# Patient Record
Sex: FEMALE | Race: OTHER | HISPANIC OR LATINO | Employment: OTHER | URBAN - METROPOLITAN AREA
[De-identification: names, ages, dates, MRNs, and addresses within clinical notes are randomized per-mention and may not be internally consistent; named-entity substitution may affect disease eponyms.]

---

## 2017-02-15 ENCOUNTER — TRANSCRIBE ORDERS (OUTPATIENT)
Dept: ADMINISTRATIVE | Facility: HOSPITAL | Age: 72
End: 2017-02-15

## 2017-03-16 ENCOUNTER — APPOINTMENT (EMERGENCY)
Dept: CT IMAGING | Facility: HOSPITAL | Age: 72
End: 2017-03-16
Payer: COMMERCIAL

## 2017-03-16 ENCOUNTER — HOSPITAL ENCOUNTER (EMERGENCY)
Facility: HOSPITAL | Age: 72
Discharge: HOME | End: 2017-03-16
Attending: EMERGENCY MEDICINE | Admitting: EMERGENCY MEDICINE
Payer: COMMERCIAL

## 2017-03-16 VITALS
SYSTOLIC BLOOD PRESSURE: 155 MMHG | OXYGEN SATURATION: 98 % | BODY MASS INDEX: 21.61 KG/M2 | TEMPERATURE: 98.2 F | DIASTOLIC BLOOD PRESSURE: 79 MMHG | RESPIRATION RATE: 16 BRPM | WEIGHT: 122 LBS | HEART RATE: 70 BPM

## 2017-03-16 DIAGNOSIS — R42 VERTIGO: Primary | ICD-10-CM

## 2017-03-16 LAB
ALBUMIN SERPL BCP-MCNC: 4.2 G/DL (ref 3.5–5)
ALP SERPL-CCNC: 76 U/L (ref 46–116)
ALT SERPL W P-5'-P-CCNC: 19 U/L (ref 12–78)
ANION GAP SERPL CALCULATED.3IONS-SCNC: 10 MMOL/L (ref 4–13)
AST SERPL W P-5'-P-CCNC: 16 U/L (ref 5–45)
ATRIAL RATE: 68 BPM
BACTERIA UR QL AUTO: ABNORMAL /HPF
BASOPHILS # BLD AUTO: 0.04 THOUSANDS/ΜL (ref 0–0.1)
BASOPHILS NFR BLD AUTO: 1 % (ref 0–1)
BILIRUB SERPL-MCNC: 0.59 MG/DL (ref 0.2–1)
BILIRUB UR QL STRIP: NEGATIVE
BUN SERPL-MCNC: 17 MG/DL (ref 5–25)
CALCIUM SERPL-MCNC: 9.6 MG/DL (ref 8.3–10.1)
CHLORIDE SERPL-SCNC: 101 MMOL/L (ref 100–108)
CLARITY UR: CLEAR
CO2 SERPL-SCNC: 30 MMOL/L (ref 21–32)
COLOR UR: YELLOW
CREAT SERPL-MCNC: 0.59 MG/DL (ref 0.6–1.3)
EOSINOPHIL # BLD AUTO: 0.3 THOUSAND/ΜL (ref 0–0.61)
EOSINOPHIL NFR BLD AUTO: 4 % (ref 0–6)
ERYTHROCYTE [DISTWIDTH] IN BLOOD BY AUTOMATED COUNT: 13.2 % (ref 11.6–15.1)
GFR SERPL CREATININE-BSD FRML MDRD: >60 ML/MIN/1.73SQ M
GLUCOSE SERPL-MCNC: 102 MG/DL (ref 65–140)
GLUCOSE UR STRIP-MCNC: NEGATIVE MG/DL
HCT VFR BLD AUTO: 40.4 % (ref 34.8–46.1)
HGB BLD-MCNC: 14.4 G/DL (ref 11.5–15.4)
HGB UR QL STRIP.AUTO: ABNORMAL
KETONES UR STRIP-MCNC: ABNORMAL MG/DL
LEUKOCYTE ESTERASE UR QL STRIP: ABNORMAL
LYMPHOCYTES # BLD AUTO: 1.64 THOUSANDS/ΜL (ref 0.6–4.47)
LYMPHOCYTES NFR BLD AUTO: 22 % (ref 14–44)
MCH RBC QN AUTO: 33.8 PG (ref 26.8–34.3)
MCHC RBC AUTO-ENTMCNC: 35.6 G/DL (ref 31.4–37.4)
MCV RBC AUTO: 95 FL (ref 82–98)
MONOCYTES # BLD AUTO: 0.32 THOUSAND/ΜL (ref 0.17–1.22)
MONOCYTES NFR BLD AUTO: 4 % (ref 4–12)
NEUTROPHILS # BLD AUTO: 5.27 THOUSANDS/ΜL (ref 1.85–7.62)
NEUTS SEG NFR BLD AUTO: 69 % (ref 43–75)
NITRITE UR QL STRIP: NEGATIVE
NON-SQ EPI CELLS URNS QL MICRO: ABNORMAL /HPF
NRBC BLD AUTO-RTO: 0 /100 WBCS
P AXIS: 59 DEGREES
PH UR STRIP.AUTO: 6 [PH] (ref 4.5–8)
PLATELET # BLD AUTO: 224 THOUSANDS/UL (ref 149–390)
PMV BLD AUTO: 12.3 FL (ref 8.9–12.7)
POTASSIUM SERPL-SCNC: 4.4 MMOL/L (ref 3.5–5.3)
PR INTERVAL: 150 MS
PROT SERPL-MCNC: 7.8 G/DL (ref 6.4–8.2)
PROT UR STRIP-MCNC: NEGATIVE MG/DL
QRS AXIS: 82 DEGREES
QRSD INTERVAL: 80 MS
QT INTERVAL: 428 MS
QTC INTERVAL: 455 MS
RBC # BLD AUTO: 4.26 MILLION/UL (ref 3.81–5.12)
RBC #/AREA URNS AUTO: ABNORMAL /HPF
SODIUM SERPL-SCNC: 141 MMOL/L (ref 136–145)
SP GR UR STRIP.AUTO: 1.02 (ref 1–1.03)
T WAVE AXIS: 69 DEGREES
UROBILINOGEN UR QL STRIP.AUTO: 0.2 E.U./DL
VENTRICULAR RATE: 68 BPM
WBC # BLD AUTO: 7.57 THOUSAND/UL (ref 4.31–10.16)
WBC #/AREA URNS AUTO: ABNORMAL /HPF

## 2017-03-16 PROCEDURE — 81002 URINALYSIS NONAUTO W/O SCOPE: CPT | Performed by: EMERGENCY MEDICINE

## 2017-03-16 PROCEDURE — 80053 COMPREHEN METABOLIC PANEL: CPT | Performed by: EMERGENCY MEDICINE

## 2017-03-16 PROCEDURE — 81001 URINALYSIS AUTO W/SCOPE: CPT

## 2017-03-16 PROCEDURE — 85025 COMPLETE CBC W/AUTO DIFF WBC: CPT | Performed by: EMERGENCY MEDICINE

## 2017-03-16 PROCEDURE — 70450 CT HEAD/BRAIN W/O DYE: CPT

## 2017-03-16 PROCEDURE — 87186 SC STD MICRODIL/AGAR DIL: CPT

## 2017-03-16 PROCEDURE — 36415 COLL VENOUS BLD VENIPUNCTURE: CPT | Performed by: EMERGENCY MEDICINE

## 2017-03-16 PROCEDURE — 87077 CULTURE AEROBIC IDENTIFY: CPT

## 2017-03-16 PROCEDURE — 99284 EMERGENCY DEPT VISIT MOD MDM: CPT

## 2017-03-16 PROCEDURE — 93005 ELECTROCARDIOGRAM TRACING: CPT

## 2017-03-16 PROCEDURE — 87086 URINE CULTURE/COLONY COUNT: CPT

## 2017-03-16 RX ORDER — MECLIZINE HCL 12.5 MG/1
12.5 TABLET ORAL 3 TIMES DAILY PRN
Qty: 20 TABLET | Refills: 0 | Status: SHIPPED | OUTPATIENT
Start: 2017-03-16 | End: 2019-04-26 | Stop reason: ALTCHOICE

## 2017-03-16 RX ORDER — MECLIZINE HCL 12.5 MG/1
25 TABLET ORAL ONCE
Status: COMPLETED | OUTPATIENT
Start: 2017-03-16 | End: 2017-03-16

## 2017-03-16 RX ORDER — CANDESARTAN 8 MG/1
1 TABLET ORAL DAILY
COMMUNITY
End: 2019-01-09 | Stop reason: DRUGHIGH

## 2017-03-16 RX ORDER — ALBUTEROL SULFATE 90 UG/1
2 AEROSOL, METERED RESPIRATORY (INHALATION) EVERY 6 HOURS PRN
COMMUNITY
End: 2019-01-09 | Stop reason: SDUPTHER

## 2017-03-16 RX ADMIN — MECLIZINE HCL 25 MG: 12.5 TABLET ORAL at 08:59

## 2017-03-18 LAB — BACTERIA UR CULT: NORMAL

## 2017-08-02 ENCOUNTER — TRANSCRIBE ORDERS (OUTPATIENT)
Dept: ADMINISTRATIVE | Facility: HOSPITAL | Age: 72
End: 2017-08-02

## 2017-08-02 ENCOUNTER — HOSPITAL ENCOUNTER (OUTPATIENT)
Dept: RADIOLOGY | Facility: HOSPITAL | Age: 72
Discharge: HOME | End: 2017-08-02
Payer: COMMERCIAL

## 2017-08-02 DIAGNOSIS — M79.671 RIGHT FOOT PAIN: ICD-10-CM

## 2017-08-02 DIAGNOSIS — M79.671 RIGHT FOOT PAIN: Primary | ICD-10-CM

## 2017-08-02 PROCEDURE — 73630 X-RAY EXAM OF FOOT: CPT

## 2017-08-25 ENCOUNTER — HOSPITAL ENCOUNTER (OUTPATIENT)
Facility: HOSPITAL | Age: 72
Setting detail: OBSERVATION
Discharge: HOME | End: 2017-08-26
Attending: EMERGENCY MEDICINE | Admitting: INTERNAL MEDICINE
Payer: COMMERCIAL

## 2017-08-25 ENCOUNTER — APPOINTMENT (EMERGENCY)
Dept: CT IMAGING | Facility: HOSPITAL | Age: 72
End: 2017-08-25
Payer: COMMERCIAL

## 2017-08-25 DIAGNOSIS — I10 UNCONTROLLED HYPERTENSION: ICD-10-CM

## 2017-08-25 DIAGNOSIS — N39.0 URINARY TRACT INFECTION: ICD-10-CM

## 2017-08-25 DIAGNOSIS — R55 SYNCOPE: Primary | ICD-10-CM

## 2017-08-25 PROBLEM — E11.9 DIABETES MELLITUS (HCC): Chronic | Status: ACTIVE | Noted: 2017-08-25

## 2017-08-25 PROBLEM — J45.909 ASTHMA: Chronic | Status: ACTIVE | Noted: 2017-08-25

## 2017-08-25 LAB
ALBUMIN SERPL BCP-MCNC: 3.8 G/DL (ref 3.5–5)
ALP SERPL-CCNC: 48 U/L (ref 46–116)
ALT SERPL W P-5'-P-CCNC: 24 U/L (ref 12–78)
ANION GAP SERPL CALCULATED.3IONS-SCNC: 8 MMOL/L (ref 4–13)
APTT PPP: 26 SECONDS (ref 23–35)
AST SERPL W P-5'-P-CCNC: 13 U/L (ref 5–45)
ATRIAL RATE: 67 BPM
BACTERIA UR QL AUTO: ABNORMAL /HPF
BASOPHILS # BLD AUTO: 0.04 THOUSANDS/ΜL (ref 0–0.1)
BASOPHILS NFR BLD AUTO: 1 % (ref 0–1)
BILIRUB SERPL-MCNC: 0.21 MG/DL (ref 0.2–1)
BILIRUB UR QL STRIP: NEGATIVE
BUN SERPL-MCNC: 17 MG/DL (ref 5–25)
CALCIUM SERPL-MCNC: 8.9 MG/DL (ref 8.3–10.1)
CHLORIDE SERPL-SCNC: 106 MMOL/L (ref 100–108)
CLARITY UR: ABNORMAL
CO2 SERPL-SCNC: 30 MMOL/L (ref 21–32)
COLOR UR: YELLOW
COLOR, POC: NORMAL
CREAT SERPL-MCNC: 0.74 MG/DL (ref 0.6–1.3)
EOSINOPHIL # BLD AUTO: 0.31 THOUSAND/ΜL (ref 0–0.61)
EOSINOPHIL NFR BLD AUTO: 4 % (ref 0–6)
ERYTHROCYTE [DISTWIDTH] IN BLOOD BY AUTOMATED COUNT: 13.9 % (ref 11.6–15.1)
GFR SERPL CREATININE-BSD FRML MDRD: 82 ML/MIN/1.73SQ M
GLUCOSE SERPL-MCNC: 137 MG/DL (ref 65–140)
GLUCOSE SERPL-MCNC: 146 MG/DL (ref 65–140)
GLUCOSE SERPL-MCNC: 206 MG/DL (ref 65–140)
GLUCOSE UR STRIP-MCNC: NEGATIVE MG/DL
HCT VFR BLD AUTO: 36.1 % (ref 34.8–46.1)
HGB BLD-MCNC: 12.5 G/DL (ref 11.5–15.4)
HGB UR QL STRIP.AUTO: ABNORMAL
INR PPP: 0.97 (ref 0.86–1.16)
KETONES UR STRIP-MCNC: NEGATIVE MG/DL
LEUKOCYTE ESTERASE UR QL STRIP: ABNORMAL
LYMPHOCYTES # BLD AUTO: 2.31 THOUSANDS/ΜL (ref 0.6–4.47)
LYMPHOCYTES NFR BLD AUTO: 31 % (ref 14–44)
MCH RBC QN AUTO: 31.7 PG (ref 26.8–34.3)
MCHC RBC AUTO-ENTMCNC: 34.6 G/DL (ref 31.4–37.4)
MCV RBC AUTO: 92 FL (ref 82–98)
MONOCYTES # BLD AUTO: 0.49 THOUSAND/ΜL (ref 0.17–1.22)
MONOCYTES NFR BLD AUTO: 7 % (ref 4–12)
NEUTROPHILS # BLD AUTO: 4.36 THOUSANDS/ΜL (ref 1.85–7.62)
NEUTS SEG NFR BLD AUTO: 57 % (ref 43–75)
NITRITE UR QL STRIP: POSITIVE
NON-SQ EPI CELLS URNS QL MICRO: ABNORMAL /HPF
NRBC BLD AUTO-RTO: 0 /100 WBCS
P AXIS: 73 DEGREES
PH UR STRIP.AUTO: 6 [PH] (ref 4.5–8)
PLATELET # BLD AUTO: 225 THOUSANDS/UL (ref 149–390)
PMV BLD AUTO: 12.3 FL (ref 8.9–12.7)
POTASSIUM SERPL-SCNC: 4.1 MMOL/L (ref 3.5–5.3)
PR INTERVAL: 164 MS
PROT SERPL-MCNC: 7.1 G/DL (ref 6.4–8.2)
PROT UR STRIP-MCNC: ABNORMAL MG/DL
PROTHROMBIN TIME: 12.9 SECONDS (ref 12.1–14.4)
QRS AXIS: 63 DEGREES
QRSD INTERVAL: 86 MS
QT INTERVAL: 416 MS
QTC INTERVAL: 439 MS
RBC # BLD AUTO: 3.94 MILLION/UL (ref 3.81–5.12)
RBC #/AREA URNS AUTO: ABNORMAL /HPF
SODIUM SERPL-SCNC: 144 MMOL/L (ref 136–145)
SP GR UR STRIP.AUTO: 1.02 (ref 1–1.03)
T WAVE AXIS: 72 DEGREES
TROPONIN I SERPL-MCNC: <0.02 NG/ML
TROPONIN I SERPL-MCNC: <0.02 NG/ML
UROBILINOGEN UR QL STRIP.AUTO: 0.2 E.U./DL
VENTRICULAR RATE: 67 BPM
WBC # BLD AUTO: 7.51 THOUSAND/UL (ref 4.31–10.16)
WBC #/AREA URNS AUTO: ABNORMAL /HPF

## 2017-08-25 PROCEDURE — 87186 SC STD MICRODIL/AGAR DIL: CPT

## 2017-08-25 PROCEDURE — 93005 ELECTROCARDIOGRAM TRACING: CPT | Performed by: EMERGENCY MEDICINE

## 2017-08-25 PROCEDURE — 36415 COLL VENOUS BLD VENIPUNCTURE: CPT

## 2017-08-25 PROCEDURE — 85730 THROMBOPLASTIN TIME PARTIAL: CPT | Performed by: EMERGENCY MEDICINE

## 2017-08-25 PROCEDURE — 82948 REAGENT STRIP/BLOOD GLUCOSE: CPT

## 2017-08-25 PROCEDURE — 84484 ASSAY OF TROPONIN QUANT: CPT | Performed by: EMERGENCY MEDICINE

## 2017-08-25 PROCEDURE — 84484 ASSAY OF TROPONIN QUANT: CPT | Performed by: FAMILY MEDICINE

## 2017-08-25 PROCEDURE — 85025 COMPLETE CBC W/AUTO DIFF WBC: CPT | Performed by: EMERGENCY MEDICINE

## 2017-08-25 PROCEDURE — 80053 COMPREHEN METABOLIC PANEL: CPT | Performed by: EMERGENCY MEDICINE

## 2017-08-25 PROCEDURE — 81002 URINALYSIS NONAUTO W/O SCOPE: CPT | Performed by: EMERGENCY MEDICINE

## 2017-08-25 PROCEDURE — 70450 CT HEAD/BRAIN W/O DYE: CPT

## 2017-08-25 PROCEDURE — 99285 EMERGENCY DEPT VISIT HI MDM: CPT

## 2017-08-25 PROCEDURE — 85610 PROTHROMBIN TIME: CPT | Performed by: EMERGENCY MEDICINE

## 2017-08-25 PROCEDURE — 81001 URINALYSIS AUTO W/SCOPE: CPT

## 2017-08-25 PROCEDURE — 87086 URINE CULTURE/COLONY COUNT: CPT

## 2017-08-25 PROCEDURE — 87077 CULTURE AEROBIC IDENTIFY: CPT

## 2017-08-25 RX ORDER — ALBUTEROL SULFATE 90 UG/1
2 AEROSOL, METERED RESPIRATORY (INHALATION) EVERY 6 HOURS PRN
Status: DISCONTINUED | OUTPATIENT
Start: 2017-08-25 | End: 2017-08-26 | Stop reason: HOSPADM

## 2017-08-25 RX ORDER — MECLIZINE HCL 12.5 MG/1
12.5 TABLET ORAL 3 TIMES DAILY PRN
Status: DISCONTINUED | OUTPATIENT
Start: 2017-08-25 | End: 2017-08-26 | Stop reason: HOSPADM

## 2017-08-25 RX ORDER — CANDESARTAN 4 MG/1
8 TABLET ORAL DAILY
Status: DISCONTINUED | OUTPATIENT
Start: 2017-08-26 | End: 2017-08-26 | Stop reason: HOSPADM

## 2017-08-25 RX ORDER — ONDANSETRON 2 MG/ML
4 INJECTION INTRAMUSCULAR; INTRAVENOUS EVERY 6 HOURS PRN
Status: DISCONTINUED | OUTPATIENT
Start: 2017-08-25 | End: 2017-08-26 | Stop reason: HOSPADM

## 2017-08-25 RX ORDER — CEPHALEXIN 250 MG/1
500 CAPSULE ORAL ONCE
Status: COMPLETED | OUTPATIENT
Start: 2017-08-25 | End: 2017-08-25

## 2017-08-25 RX ORDER — HEPARIN SODIUM 5000 [USP'U]/ML
5000 INJECTION, SOLUTION INTRAVENOUS; SUBCUTANEOUS EVERY 8 HOURS SCHEDULED
Status: DISCONTINUED | OUTPATIENT
Start: 2017-08-25 | End: 2017-08-26 | Stop reason: HOSPADM

## 2017-08-25 RX ADMIN — FLUTICASONE PROPIONATE AND SALMETEROL 1 PUFF: 50; 250 POWDER RESPIRATORY (INHALATION) at 23:49

## 2017-08-25 RX ADMIN — CEFAZOLIN SODIUM 500 MG: 1 INJECTION, POWDER, FOR SOLUTION INTRAMUSCULAR; INTRAVENOUS at 22:45

## 2017-08-25 RX ADMIN — CEPHALEXIN 500 MG: 250 CAPSULE ORAL at 19:03

## 2017-08-25 RX ADMIN — HEPARIN SODIUM 5000 UNITS: 5000 INJECTION, SOLUTION INTRAVENOUS; SUBCUTANEOUS at 22:45

## 2017-08-26 VITALS
HEART RATE: 67 BPM | DIASTOLIC BLOOD PRESSURE: 88 MMHG | BODY MASS INDEX: 24.29 KG/M2 | RESPIRATION RATE: 18 BRPM | WEIGHT: 137.13 LBS | TEMPERATURE: 97.3 F | SYSTOLIC BLOOD PRESSURE: 170 MMHG | OXYGEN SATURATION: 99 %

## 2017-08-26 PROBLEM — B96.20 E. COLI UTI (URINARY TRACT INFECTION): Status: ACTIVE | Noted: 2017-08-25

## 2017-08-26 PROBLEM — R55 SYNCOPE: Status: RESOLVED | Noted: 2017-08-25 | Resolved: 2017-08-26

## 2017-08-26 LAB
ANION GAP SERPL CALCULATED.3IONS-SCNC: 8 MMOL/L (ref 4–13)
BUN SERPL-MCNC: 14 MG/DL (ref 5–25)
CALCIUM SERPL-MCNC: 9 MG/DL (ref 8.3–10.1)
CHLORIDE SERPL-SCNC: 105 MMOL/L (ref 100–108)
CO2 SERPL-SCNC: 29 MMOL/L (ref 21–32)
CREAT SERPL-MCNC: 0.61 MG/DL (ref 0.6–1.3)
ERYTHROCYTE [DISTWIDTH] IN BLOOD BY AUTOMATED COUNT: 13.9 % (ref 11.6–15.1)
GFR SERPL CREATININE-BSD FRML MDRD: 92 ML/MIN/1.73SQ M
GLUCOSE P FAST SERPL-MCNC: 118 MG/DL (ref 65–99)
GLUCOSE SERPL-MCNC: 118 MG/DL (ref 65–140)
GLUCOSE SERPL-MCNC: 129 MG/DL (ref 65–140)
GLUCOSE SERPL-MCNC: 138 MG/DL (ref 65–140)
GLUCOSE SERPL-MCNC: 147 MG/DL (ref 65–140)
HCT VFR BLD AUTO: 35.1 % (ref 34.8–46.1)
HGB BLD-MCNC: 12.2 G/DL (ref 11.5–15.4)
MCH RBC QN AUTO: 32 PG (ref 26.8–34.3)
MCHC RBC AUTO-ENTMCNC: 34.8 G/DL (ref 31.4–37.4)
MCV RBC AUTO: 92 FL (ref 82–98)
PLATELET # BLD AUTO: 186 THOUSANDS/UL (ref 149–390)
PLATELET # BLD AUTO: 192 THOUSANDS/UL (ref 149–390)
PMV BLD AUTO: 12.3 FL (ref 8.9–12.7)
PMV BLD AUTO: 12.9 FL (ref 8.9–12.7)
POTASSIUM SERPL-SCNC: 4 MMOL/L (ref 3.5–5.3)
RBC # BLD AUTO: 3.81 MILLION/UL (ref 3.81–5.12)
SODIUM SERPL-SCNC: 142 MMOL/L (ref 136–145)
TROPONIN I SERPL-MCNC: <0.02 NG/ML
TROPONIN I SERPL-MCNC: <0.02 NG/ML
WBC # BLD AUTO: 5.96 THOUSAND/UL (ref 4.31–10.16)

## 2017-08-26 PROCEDURE — 80048 BASIC METABOLIC PNL TOTAL CA: CPT | Performed by: FAMILY MEDICINE

## 2017-08-26 PROCEDURE — 85027 COMPLETE CBC AUTOMATED: CPT | Performed by: FAMILY MEDICINE

## 2017-08-26 PROCEDURE — 82948 REAGENT STRIP/BLOOD GLUCOSE: CPT

## 2017-08-26 PROCEDURE — 84484 ASSAY OF TROPONIN QUANT: CPT | Performed by: FAMILY MEDICINE

## 2017-08-26 PROCEDURE — 85049 AUTOMATED PLATELET COUNT: CPT | Performed by: FAMILY MEDICINE

## 2017-08-26 RX ORDER — CEPHALEXIN 500 MG/1
500 CAPSULE ORAL EVERY 6 HOURS SCHEDULED
Qty: 20 CAPSULE | Refills: 0 | Status: SHIPPED | OUTPATIENT
Start: 2017-08-26 | End: 2017-08-31

## 2017-08-26 RX ORDER — HYDRALAZINE HYDROCHLORIDE 20 MG/ML
10 INJECTION INTRAMUSCULAR; INTRAVENOUS EVERY 6 HOURS PRN
Status: DISCONTINUED | OUTPATIENT
Start: 2017-08-26 | End: 2017-08-26 | Stop reason: HOSPADM

## 2017-08-26 RX ADMIN — FLUTICASONE PROPIONATE AND SALMETEROL 1 PUFF: 50; 250 POWDER RESPIRATORY (INHALATION) at 08:27

## 2017-08-26 RX ADMIN — CEFAZOLIN SODIUM 500 MG: 1 INJECTION, POWDER, FOR SOLUTION INTRAMUSCULAR; INTRAVENOUS at 14:44

## 2017-08-26 RX ADMIN — HYDRALAZINE HYDROCHLORIDE 10 MG: 20 INJECTION INTRAMUSCULAR; INTRAVENOUS at 06:01

## 2017-08-26 RX ADMIN — CANDESARTAN CILEXETIL 8 MG: 4 TABLET ORAL at 08:27

## 2017-08-26 RX ADMIN — CEFAZOLIN SODIUM 500 MG: 1 INJECTION, POWDER, FOR SOLUTION INTRAMUSCULAR; INTRAVENOUS at 06:01

## 2017-08-26 RX ADMIN — HEPARIN SODIUM 5000 UNITS: 5000 INJECTION, SOLUTION INTRAVENOUS; SUBCUTANEOUS at 14:43

## 2017-08-26 RX ADMIN — HEPARIN SODIUM 5000 UNITS: 5000 INJECTION, SOLUTION INTRAVENOUS; SUBCUTANEOUS at 06:01

## 2017-08-27 LAB — BACTERIA UR CULT: NORMAL

## 2017-10-24 ENCOUNTER — TRANSCRIBE ORDERS (OUTPATIENT)
Dept: ADMINISTRATIVE | Facility: HOSPITAL | Age: 72
End: 2017-10-24

## 2017-10-24 DIAGNOSIS — Z12.31 VISIT FOR SCREENING MAMMOGRAM: Primary | ICD-10-CM

## 2018-01-10 NOTE — MISCELLANEOUS
Provider Comments  Provider Comments:   PT WAS A TAWANA Jackson 89 FOLLOW UP      Signatures   Electronically signed by :  Dante Dean MD; May 26 2016  4:30PM EST                       (Author)

## 2018-01-12 NOTE — MISCELLANEOUS
Message   Recorded as Task   Date: 04/12/2016 01:01 PM, Created By: Lu Brunner   Task Name: Miscellaneous   Assigned To: Toya Hughes   Regarding Patient: Janet Martinez, Status: In Progress   Comment:    Jessica Fontanez - 12 Apr 2016 1:01 PM     TASK CREATED  Patient's sodium worsened  Have her restart fluid restriction 1 5 L per day  Recheck BMP in 2 weeks  Follow up with Dr Bower Service 2-4 weeks   Louie,Cindy - 14 Apr 2016 8:53 AM     TASK REASSIGNED: Previously Assigned To Kansritikainrinne 46 - 14 Apr 2016 10:50 AM     TASK EDITED  unable to leave voicemail   called home and cell #   Cindy Palma - 14 Apr 2016 10:57 AM     TASK IN PROGRESS   Cindy Palma - 21 Apr 2016 9:41 AM     TASK EDITED  Called home number; patient was not home at the time  Will try again later   Unable to contact patient since 4/14/2016   no appt has been made  Active Problems    1  Acute low back pain (724 2) (M54 5)   2  Asthma (493 90) (J45 909)   3  Clavicle fracture (810 00) (S42 009A)   4  Diabetes Mellitus (250 00)   5  Dyslipidemia (272 4) (E78 5)   6  Fracture of left clavicle (810 00) (S42 002A)   7  Hearing Loss (389 9)   8  Hypertension (401 9) (I10)   9  Hyponatremia (276 1) (E87 1)   10  Multiple rib fractures (807 09) (S22 49XA)   11  Multiple transverse process fractures (805 8)   12  Neck strain (847 0) (S16 1XXA)   13  Paresthesia of both hands (782 0) (R20 2)   14  Pneumothorax, left (512 89) (J93 9)   15  Poorly controlled type 2 diabetes mellitus (250 00) (E11 65)   16  Subarachnoid hemorrhage (430) (I60 9)   17  Trapezius muscle spasm (728 85) (N56 483)    Current Meds   1  Albuterol Sulfate (2 5 MG/3ML) 0 083% Inhalation Nebulization Solution; inhale contents   of 1 vial in nebulizer every 4 to 6 hours if needed   for SHORTNESS OF BREATH AND WHEEZING; Therapy: 69Uov7270 to (Evaluate:29Apr2013)  Requested for: 53Urg2311; Last   Rx:13Onx1055 Ordered   2   Diazepam 2 MG Oral Tablet; TAKE 1 TABLET AT BEDTIME; Therapy: 05Apr2016 to (Evaluate:12Apr2016); Last Rx:05Apr2016 Ordered   3  MetFORMIN HCl - 850 MG Oral Tablet; TAKE 1 TABLET TWICE DAILY,  WITH MORNING   AND EVENING MEAL; Therapy: 32RLW4947 to (Evaluate:19Jan2014)  Requested for: 21Oct2013; Last   Rx:21Oct2013 Ordered   4  OneTouch Test STRP; TEST TWICE DAILY; Therapy: 14SQX1749 to (Last Rx:08Feb2012)  Requested for: 17DMD2667 Ordered   5  OneTouch UltraSoft Lancets Miscellaneous; Therapy: 49BRG6953 to (Last Rx:03Feb2012)  Requested for: 03Feb2012 Ordered   6  OxyCONTIN 10 MG Oral Tablet ER 12 Hour Abuse-Deterrent; 1 tablet every 12hrs x 1   week then DC; Therapy: 02DUQ1885 to (Evaluate:29Mar2016); Last Rx:22Mar2016 Ordered   7  TraMADol HCl - 50 MG Oral Tablet; TAKE 1 TABLET EVERY 4 TO 6 HOURS AS NEEDED   FOR PAIN;   Therapy: 05Apr2016 to (Evaluate:12Apr2016); Last Rx:05Apr2016 Ordered   8  Ventolin  (90 Base) MCG/ACT Inhalation Aerosol Solution; inhale 2 puff by   mouth every 6 hours if needed; Therapy: 61Sjc0907 to (Evaluate:17May2013)  Requested for: 11Apr2013; Last   Rx:11Apr2013 Ordered    Allergies    1  Bactrim TABS   2  Penicillins    Signatures   Electronically signed by : ODETTE Lockwood;  Apr 21 2016 12:04PM EST                       (Author)

## 2018-01-16 NOTE — MISCELLANEOUS
Message   Recorded as Task   Date: 06/15/2016 09:49 AM, Created By: Henna Rankin   Task Name: Medical Complaint Callback   Assigned To: Henna Rankin   Regarding Patient: Sebastian Zhu, Status: Active   Comment:    Costenbader,Rondel - 15 Myles 2016 9:49 AM     TASK CREATED  Pt called c/o "pressure and sensitivity" to L side of head near her ear  No other symptoms  Reviewed with Brittney Ryan who advised f/u with her PCP  Pt was informed and stated an understanding          Signatures   Electronically signed by : Gloria Christopher, ; Myles 15 2016  9:50AM EST                       (Author)

## 2018-05-31 ENCOUNTER — APPOINTMENT (OUTPATIENT)
Dept: LAB | Facility: HOSPITAL | Age: 73
End: 2018-05-31
Payer: COMMERCIAL

## 2018-05-31 ENCOUNTER — TRANSCRIBE ORDERS (OUTPATIENT)
Dept: ADMINISTRATIVE | Facility: HOSPITAL | Age: 73
End: 2018-05-31

## 2018-05-31 DIAGNOSIS — E78.5 HYPERLIPIDEMIA, UNSPECIFIED HYPERLIPIDEMIA TYPE: ICD-10-CM

## 2018-05-31 DIAGNOSIS — E55.9 AVITAMINOSIS D: ICD-10-CM

## 2018-05-31 DIAGNOSIS — R35.0 URINARY FREQUENCY: ICD-10-CM

## 2018-05-31 DIAGNOSIS — E13.8 DIABETES MELLITUS OF OTHER TYPE WITH COMPLICATION, UNSPECIFIED WHETHER LONG TERM INSULIN USE: Primary | ICD-10-CM

## 2018-05-31 DIAGNOSIS — E13.8 DIABETES MELLITUS OF OTHER TYPE WITH COMPLICATION, UNSPECIFIED WHETHER LONG TERM INSULIN USE: ICD-10-CM

## 2018-05-31 LAB
25(OH)D3 SERPL-MCNC: 14.9 NG/ML (ref 30–100)
ALBUMIN SERPL BCP-MCNC: 3.7 G/DL (ref 3.5–5)
ALP SERPL-CCNC: 78 U/L (ref 46–116)
ALT SERPL W P-5'-P-CCNC: 17 U/L (ref 12–78)
ANION GAP SERPL CALCULATED.3IONS-SCNC: 8 MMOL/L (ref 4–13)
AST SERPL W P-5'-P-CCNC: 11 U/L (ref 5–45)
BACTERIA UR QL AUTO: ABNORMAL /HPF
BASOPHILS # BLD AUTO: 0.04 THOUSANDS/ΜL (ref 0–0.1)
BASOPHILS NFR BLD AUTO: 1 % (ref 0–1)
BILIRUB SERPL-MCNC: 0.43 MG/DL (ref 0.2–1)
BILIRUB UR QL STRIP: NEGATIVE
BUN SERPL-MCNC: 15 MG/DL (ref 5–25)
CALCIUM SERPL-MCNC: 9.3 MG/DL (ref 8.3–10.1)
CHLORIDE SERPL-SCNC: 98 MMOL/L (ref 100–108)
CHOLEST SERPL-MCNC: 180 MG/DL (ref 50–200)
CLARITY UR: ABNORMAL
CO2 SERPL-SCNC: 30 MMOL/L (ref 21–32)
COLOR UR: YELLOW
CREAT SERPL-MCNC: 0.65 MG/DL (ref 0.6–1.3)
CREAT UR-MCNC: 67.4 MG/DL
EOSINOPHIL # BLD AUTO: 0.24 THOUSAND/ΜL (ref 0–0.61)
EOSINOPHIL NFR BLD AUTO: 4 % (ref 0–6)
ERYTHROCYTE [DISTWIDTH] IN BLOOD BY AUTOMATED COUNT: 13 % (ref 11.6–15.1)
EST. AVERAGE GLUCOSE BLD GHB EST-MCNC: 160 MG/DL
GFR SERPL CREATININE-BSD FRML MDRD: 90 ML/MIN/1.73SQ M
GLUCOSE P FAST SERPL-MCNC: 162 MG/DL (ref 65–99)
GLUCOSE UR STRIP-MCNC: NEGATIVE MG/DL
HBA1C MFR BLD: 7.2 % (ref 4.2–6.3)
HCT VFR BLD AUTO: 36.2 % (ref 34.8–46.1)
HDLC SERPL-MCNC: 48 MG/DL (ref 40–60)
HGB BLD-MCNC: 12.9 G/DL (ref 11.5–15.4)
HGB UR QL STRIP.AUTO: ABNORMAL
KETONES UR STRIP-MCNC: NEGATIVE MG/DL
LDLC SERPL CALC-MCNC: 107 MG/DL (ref 0–100)
LEUKOCYTE ESTERASE UR QL STRIP: ABNORMAL
LYMPHOCYTES # BLD AUTO: 2.24 THOUSANDS/ΜL (ref 0.6–4.47)
LYMPHOCYTES NFR BLD AUTO: 33 % (ref 14–44)
MCH RBC QN AUTO: 31.5 PG (ref 26.8–34.3)
MCHC RBC AUTO-ENTMCNC: 35.6 G/DL (ref 31.4–37.4)
MCV RBC AUTO: 89 FL (ref 82–98)
MICROALBUMIN UR-MCNC: 45.5 MG/L (ref 0–20)
MICROALBUMIN/CREAT 24H UR: 68 MG/G CREATININE (ref 0–30)
MONOCYTES # BLD AUTO: 0.32 THOUSAND/ΜL (ref 0.17–1.22)
MONOCYTES NFR BLD AUTO: 5 % (ref 4–12)
NEUTROPHILS # BLD AUTO: 3.92 THOUSANDS/ΜL (ref 1.85–7.62)
NEUTS SEG NFR BLD AUTO: 58 % (ref 43–75)
NITRITE UR QL STRIP: NEGATIVE
NON-SQ EPI CELLS URNS QL MICRO: ABNORMAL /HPF
NONHDLC SERPL-MCNC: 132 MG/DL
NRBC BLD AUTO-RTO: 0 /100 WBCS
PH UR STRIP.AUTO: 6 [PH] (ref 4.5–8)
PLATELET # BLD AUTO: 201 THOUSANDS/UL (ref 149–390)
PMV BLD AUTO: 12 FL (ref 8.9–12.7)
POTASSIUM SERPL-SCNC: 5.3 MMOL/L (ref 3.5–5.3)
PROT SERPL-MCNC: 7.1 G/DL (ref 6.4–8.2)
PROT UR STRIP-MCNC: NEGATIVE MG/DL
RBC # BLD AUTO: 4.09 MILLION/UL (ref 3.81–5.12)
RBC #/AREA URNS AUTO: ABNORMAL /HPF
SODIUM SERPL-SCNC: 136 MMOL/L (ref 136–145)
SP GR UR STRIP.AUTO: 1.01 (ref 1–1.03)
TRIGL SERPL-MCNC: 127 MG/DL
TSH SERPL DL<=0.05 MIU/L-ACNC: 2.23 UIU/ML (ref 0.36–3.74)
UROBILINOGEN UR QL STRIP.AUTO: 0.2 E.U./DL
WBC # BLD AUTO: 6.76 THOUSAND/UL (ref 4.31–10.16)
WBC #/AREA URNS AUTO: ABNORMAL /HPF

## 2018-05-31 PROCEDURE — 87186 SC STD MICRODIL/AGAR DIL: CPT

## 2018-05-31 PROCEDURE — 82570 ASSAY OF URINE CREATININE: CPT

## 2018-05-31 PROCEDURE — 80061 LIPID PANEL: CPT

## 2018-05-31 PROCEDURE — 80053 COMPREHEN METABOLIC PANEL: CPT

## 2018-05-31 PROCEDURE — 87086 URINE CULTURE/COLONY COUNT: CPT

## 2018-05-31 PROCEDURE — 82306 VITAMIN D 25 HYDROXY: CPT

## 2018-05-31 PROCEDURE — 36415 COLL VENOUS BLD VENIPUNCTURE: CPT

## 2018-05-31 PROCEDURE — 83036 HEMOGLOBIN GLYCOSYLATED A1C: CPT

## 2018-05-31 PROCEDURE — 85025 COMPLETE CBC W/AUTO DIFF WBC: CPT

## 2018-05-31 PROCEDURE — 84443 ASSAY THYROID STIM HORMONE: CPT

## 2018-05-31 PROCEDURE — 87077 CULTURE AEROBIC IDENTIFY: CPT

## 2018-05-31 PROCEDURE — 81001 URINALYSIS AUTO W/SCOPE: CPT

## 2018-05-31 PROCEDURE — 82043 UR ALBUMIN QUANTITATIVE: CPT

## 2018-06-02 LAB — BACTERIA UR CULT: ABNORMAL

## 2018-06-12 ENCOUNTER — HOSPITAL ENCOUNTER (EMERGENCY)
Facility: HOSPITAL | Age: 73
Discharge: HOME | End: 2018-06-12
Attending: EMERGENCY MEDICINE | Admitting: EMERGENCY MEDICINE
Payer: COMMERCIAL

## 2018-06-12 ENCOUNTER — APPOINTMENT (EMERGENCY)
Dept: RADIOLOGY | Facility: HOSPITAL | Age: 73
End: 2018-06-12
Payer: COMMERCIAL

## 2018-06-12 VITALS
HEIGHT: 65 IN | DIASTOLIC BLOOD PRESSURE: 60 MMHG | SYSTOLIC BLOOD PRESSURE: 127 MMHG | HEART RATE: 64 BPM | RESPIRATION RATE: 17 BRPM | BODY MASS INDEX: 22.74 KG/M2 | TEMPERATURE: 98 F | WEIGHT: 136.47 LBS | OXYGEN SATURATION: 95 %

## 2018-06-12 DIAGNOSIS — N39.0 UTI (URINARY TRACT INFECTION): ICD-10-CM

## 2018-06-12 DIAGNOSIS — R73.9 HYPERGLYCEMIA: ICD-10-CM

## 2018-06-12 DIAGNOSIS — J40 BRONCHITIS: Primary | ICD-10-CM

## 2018-06-12 LAB
ANION GAP SERPL CALCULATED.3IONS-SCNC: 8 MMOL/L (ref 4–13)
APTT PPP: 29 SECONDS (ref 24–36)
ATRIAL RATE: 58 BPM
BACTERIA UR QL AUTO: ABNORMAL /HPF
BASOPHILS # BLD AUTO: 0.05 THOUSANDS/ΜL (ref 0–0.1)
BASOPHILS NFR BLD AUTO: 1 % (ref 0–1)
BILIRUB UR QL STRIP: NEGATIVE
BUN SERPL-MCNC: 11 MG/DL (ref 5–25)
CALCIUM SERPL-MCNC: 9.1 MG/DL (ref 8.3–10.1)
CHLORIDE SERPL-SCNC: 98 MMOL/L (ref 100–108)
CLARITY UR: ABNORMAL
CO2 SERPL-SCNC: 26 MMOL/L (ref 21–32)
COLOR UR: YELLOW
CREAT SERPL-MCNC: 0.66 MG/DL (ref 0.6–1.3)
EOSINOPHIL # BLD AUTO: 0.27 THOUSAND/ΜL (ref 0–0.61)
EOSINOPHIL NFR BLD AUTO: 5 % (ref 0–6)
ERYTHROCYTE [DISTWIDTH] IN BLOOD BY AUTOMATED COUNT: 12.9 % (ref 11.6–15.1)
GFR SERPL CREATININE-BSD FRML MDRD: 89 ML/MIN/1.73SQ M
GLUCOSE SERPL-MCNC: 269 MG/DL (ref 65–140)
GLUCOSE UR STRIP-MCNC: ABNORMAL MG/DL
HCT VFR BLD AUTO: 37.7 % (ref 34.8–46.1)
HGB BLD-MCNC: 13.2 G/DL (ref 11.5–15.4)
HGB UR QL STRIP.AUTO: ABNORMAL
INR PPP: 1 (ref 0.86–1.17)
KETONES UR STRIP-MCNC: NEGATIVE MG/DL
LEUKOCYTE ESTERASE UR QL STRIP: ABNORMAL
LYMPHOCYTES # BLD AUTO: 1.6 THOUSANDS/ΜL (ref 0.6–4.47)
LYMPHOCYTES NFR BLD AUTO: 29 % (ref 14–44)
MCH RBC QN AUTO: 30.5 PG (ref 26.8–34.3)
MCHC RBC AUTO-ENTMCNC: 35 G/DL (ref 31.4–37.4)
MCV RBC AUTO: 87 FL (ref 82–98)
MONOCYTES # BLD AUTO: 0.31 THOUSAND/ΜL (ref 0.17–1.22)
MONOCYTES NFR BLD AUTO: 6 % (ref 4–12)
NEUTROPHILS # BLD AUTO: 3.22 THOUSANDS/ΜL (ref 1.85–7.62)
NEUTS SEG NFR BLD AUTO: 59 % (ref 43–75)
NITRITE UR QL STRIP: POSITIVE
NON-SQ EPI CELLS URNS QL MICRO: ABNORMAL /HPF
NT-PROBNP SERPL-MCNC: 32 PG/ML
P AXIS: 128 DEGREES
PH UR STRIP.AUTO: 6.5 [PH] (ref 4.5–8)
PLATELET # BLD AUTO: 211 THOUSANDS/UL (ref 149–390)
PMV BLD AUTO: 11.8 FL (ref 8.9–12.7)
POTASSIUM SERPL-SCNC: 4.3 MMOL/L (ref 3.5–5.3)
PR INTERVAL: 156 MS
PROT UR STRIP-MCNC: NEGATIVE MG/DL
PROTHROMBIN TIME: 13.3 SECONDS (ref 11.8–14.2)
QRS AXIS: 64 DEGREES
QRSD INTERVAL: 82 MS
QT INTERVAL: 420 MS
QTC INTERVAL: 412 MS
RBC # BLD AUTO: 4.33 MILLION/UL (ref 3.81–5.12)
RBC #/AREA URNS AUTO: ABNORMAL /HPF
SODIUM SERPL-SCNC: 132 MMOL/L (ref 136–145)
SP GR UR STRIP.AUTO: 1.01 (ref 1–1.03)
T WAVE AXIS: 217 DEGREES
TROPONIN I SERPL-MCNC: <0.02 NG/ML
UROBILINOGEN UR QL STRIP.AUTO: 0.2 E.U./DL
VENTRICULAR RATE: 58 BPM
WBC # BLD AUTO: 5.45 THOUSAND/UL (ref 4.31–10.16)
WBC #/AREA URNS AUTO: ABNORMAL /HPF

## 2018-06-12 PROCEDURE — 87086 URINE CULTURE/COLONY COUNT: CPT

## 2018-06-12 PROCEDURE — 94640 AIRWAY INHALATION TREATMENT: CPT

## 2018-06-12 PROCEDURE — 85730 THROMBOPLASTIN TIME PARTIAL: CPT | Performed by: EMERGENCY MEDICINE

## 2018-06-12 PROCEDURE — 99284 EMERGENCY DEPT VISIT MOD MDM: CPT

## 2018-06-12 PROCEDURE — 84484 ASSAY OF TROPONIN QUANT: CPT | Performed by: EMERGENCY MEDICINE

## 2018-06-12 PROCEDURE — 85025 COMPLETE CBC W/AUTO DIFF WBC: CPT | Performed by: EMERGENCY MEDICINE

## 2018-06-12 PROCEDURE — 71046 X-RAY EXAM CHEST 2 VIEWS: CPT

## 2018-06-12 PROCEDURE — 93005 ELECTROCARDIOGRAM TRACING: CPT

## 2018-06-12 PROCEDURE — 80048 BASIC METABOLIC PNL TOTAL CA: CPT | Performed by: EMERGENCY MEDICINE

## 2018-06-12 PROCEDURE — 36415 COLL VENOUS BLD VENIPUNCTURE: CPT | Performed by: EMERGENCY MEDICINE

## 2018-06-12 PROCEDURE — 81001 URINALYSIS AUTO W/SCOPE: CPT

## 2018-06-12 PROCEDURE — 87186 SC STD MICRODIL/AGAR DIL: CPT

## 2018-06-12 PROCEDURE — 87077 CULTURE AEROBIC IDENTIFY: CPT

## 2018-06-12 PROCEDURE — 93010 ELECTROCARDIOGRAM REPORT: CPT | Performed by: INTERNAL MEDICINE

## 2018-06-12 PROCEDURE — 85610 PROTHROMBIN TIME: CPT | Performed by: EMERGENCY MEDICINE

## 2018-06-12 PROCEDURE — 83880 ASSAY OF NATRIURETIC PEPTIDE: CPT | Performed by: EMERGENCY MEDICINE

## 2018-06-12 RX ORDER — ALBUTEROL SULFATE 2.5 MG/3ML
5 SOLUTION RESPIRATORY (INHALATION) ONCE
Status: COMPLETED | OUTPATIENT
Start: 2018-06-12 | End: 2018-06-12

## 2018-06-12 RX ORDER — FEXOFENADINE HCL 180 MG/1
180 TABLET ORAL DAILY
COMMUNITY
Start: 2018-06-11 | End: 2019-04-26 | Stop reason: ALTCHOICE

## 2018-06-12 RX ORDER — CEPHALEXIN 500 MG/1
500 CAPSULE ORAL 4 TIMES DAILY
Qty: 20 CAPSULE | Refills: 0 | Status: SHIPPED | OUTPATIENT
Start: 2018-06-12 | End: 2018-06-17

## 2018-06-12 RX ORDER — ALBUTEROL SULFATE 90 UG/1
2 AEROSOL, METERED RESPIRATORY (INHALATION) EVERY 4 HOURS PRN
Qty: 1 INHALER | Refills: 0 | Status: SHIPPED | OUTPATIENT
Start: 2018-06-12 | End: 2018-12-13 | Stop reason: HOSPADM

## 2018-06-12 RX ADMIN — ALBUTEROL SULFATE 5 MG: 2.5 SOLUTION RESPIRATORY (INHALATION) at 07:30

## 2018-06-12 RX ADMIN — IPRATROPIUM BROMIDE 0.5 MG: 0.5 SOLUTION RESPIRATORY (INHALATION) at 07:30

## 2018-06-12 NOTE — ED NOTES
Besides asthma patient c/o sinus congestion and itchy irritated eyes  States has had frequent urination  Has not been testing her blood sugar- states is out of supplies  Was taking Claritin with no relief  A pharmacist recommended Allegra- took 1 yesterday       Joseluis Munoz RN  06/12/18 0800

## 2018-06-12 NOTE — DISCHARGE INSTRUCTIONS
Acute Bronchitis   AMBULATORY CARE:   Acute bronchitis  is swelling and irritation in the air passages of your lungs  This irritation may cause you to cough or have other breathing problems  Acute bronchitis often starts because of another illness, such as a cold or the flu  The illness spreads from your nose and throat to your windpipe and airways  Bronchitis is often called a chest cold  Acute bronchitis lasts about 3 to 6 weeks and is usually not a serious illness  Your cough can last for several weeks  You may have any of the following symptoms:   · A cough with sputum that may be clear, yellow, or green    · Feeling more tired than usual, and body aches    · A fever and chills    · Wheezing when you breathe    · A tight chest or pain when you breathe or cough  Seek care immediately if:   · You cough up blood  · Your lips or fingernails turn blue  · You feel like you are not getting enough air when you breathe  Contact your healthcare provider if:   · You have a fever  · Your breathing problems do not go away or get worse  · Your cough does not get better within 4 weeks  · You have questions or concerns about your condition or care  Self-care:   · Get more rest   Rest helps your body to heal  Slowly start to do more each day  Rest when you feel it is needed  · Avoid irritants in the air  Avoid chemicals, fumes, and dust  Wear a face mask if you must work around dust or fumes  Stay inside on days when air pollution levels are high  If you have allergies, stay inside when pollen counts are high  Do not use aerosol products, such as spray-on deodorant, bug spray, and hair spray  · Do not smoke or be around others who smoke  Nicotine and other chemicals in cigarettes and cigars damages the cilia that move mucus out of your lungs  Ask your healthcare provider for information if you currently smoke and need help to quit  E-cigarettes or smokeless tobacco still contain nicotine   Talk to your healthcare provider before you use these products  · Drink liquids as directed  Liquids help keep your air passages moist and help you cough up mucus  You may need to drink more liquids when you have acute bronchitis  Ask how much liquid to drink each day and which liquids are best for you  · Use a humidifier or vaporizer  Use a cool mist humidifier or a vaporizer to increase air moisture in your home  This may make it easier for you to breathe and help decrease your cough  Prevent acute bronchitis by doing the following:   · Get the vaccinations you need  Ask your healthcare provider if you should get vaccinated against the flu or pneumonia  · Prevent the spread of germs  You can decrease your risk of acute bronchitis and other illnesses by doing the following:     [facility] your hands often with soap and water  Carry germ-killing hand lotion or gel with you  You can use the lotion or gel to clean your hands when soap and water are not available  ¨ Do not touch your eyes, nose, or mouth unless you have washed your hands first     ¨ Always cover your mouth when you cough to prevent the spread of germs  It is best to cough into a tissue or your shirt sleeve instead of into your hand  Ask those around you cover their mouths when they cough  ¨ Try to avoid people who have a cold or the flu  If you are sick, stay away from others as much as possible  Medicines: Your healthcare provider may  give you any of the following:  · Ibuprofen or acetaminophen  are medicines that help lower your fever  They are available without a doctor's order  Ask your healthcare provider which medicine is right for you  Ask how much to take and how often to take it  Follow directions  These medicines can cause stomach bleeding if not taken correctly  Ibuprofen can cause kidney damage  Do not take ibuprofen if you have kidney disease, an ulcer, or allergies to aspirin  Acetaminophen can cause liver damage   Do not take more than 4,000 milligrams in 24 hours  · Decongestants  help loosen mucus in your lungs and make it easier to cough up  This can help you breathe easier  · Cough suppressants  decrease your urge to cough  If your cough produces mucus, do not take a cough suppressant unless your healthcare provider tells you to  Your healthcare provider may suggest that you take a cough suppressant at night so you can rest     · Inhalers  may be given  Your healthcare provider may give you one or more inhalers to help you breathe easier and cough less  An inhaler gives your medicine to open your airways  Ask your healthcare provider to show you how to use your inhaler correctly  Follow up with your healthcare provider as directed:  Write down questions you have so you will remember to ask them during your follow-up visits  © 2017 [employer] Information is for End User's use only and may not be sold, redistributed or otherwise used for commercial purposes  All illustrations and images included in CareNotes® are the copyrighted property of [employer]  or [employer]  The above information is an  only  It is not intended as medical advice for individual conditions or treatments  Talk to your doctor, nurse or pharmacist before following any medical regimen to see if it is safe and effective for you  Urinary Tract Infection in Women   WHAT YOU NEED TO KNOW:   A urinary tract infection (UTI) is caused by bacteria that get inside your urinary tract  Most bacteria that enter your urinary tract come out when you urinate  If the bacteria stay in your urinary tract, you may get an infection  Your urinary tract includes your kidneys, ureters, bladder, and urethra  Urine is made in your kidneys, and it flows from the ureters to the bladder  Urine leaves the bladder through the urethra  A UTI is more common in your lower urinary tract, which includes your bladder and urethra  DISCHARGE INSTRUCTIONS:   Return to the emergency department if:   · You are urinating very little or not at all  · You have a high fever with shaking chills  · You have side or back pain that gets worse  Contact your healthcare provider if:   · You have a fever  · You do not feel better after 2 days of taking antibiotics  · You are vomiting  · You have questions or concerns about your condition or care  Medicines:   · Antibiotics  help fight a bacterial infection  · Medicines  may be given to decrease pain and burning when you urinate  They will also help decrease the feeling that you need to urinate often  These medicines will make your urine orange or red  · Take your medicine as directed  Contact your healthcare provider if you think your medicine is not helping or if you have side effects  Tell him or her if you are allergic to any medicine  Keep a list of the medicines, vitamins, and herbs you take  Include the amounts, and when and why you take them  Bring the list or the pill bottles to follow-up visits  Carry your medicine list with you in case of an emergency  Follow up with your healthcare provider as directed:  Write down your questions so you remember to ask them during your visits  Prevent another UTI:   · Empty your bladder often  Urinate and empty your bladder as soon as you feel the need  Do not hold your urine for long periods of time  · Wipe from front to back after you urinate or have a bowel movement  This will help prevent germs from getting into your urinary tract through your urethra  · Drink liquids as directed  Ask how much liquid to drink each day and which liquids are best for you  You may need to drink more liquids than usual to help flush out the bacteria  Do not drink alcohol, caffeine, or citrus juices  These can irritate your bladder and increase your symptoms   Your healthcare provider may recommend cranberry juice to help prevent a UTI     · Urinate after you have sex  This can help flush out bacteria passed during sex  · Do not douche or use feminine deodorants  These can change the chemical balance in your vagina  · Change sanitary pads or tampons often  This will help prevent germs from getting into your urinary tract  · Do pelvic muscle exercises often  Pelvic muscle exercises may help you start and stop urinating  Strong pelvic muscles may help you empty your bladder easier  Squeeze these muscles tightly for 5 seconds like you are trying to hold back urine  Then relax for 5 seconds  Gradually work up to squeezing for 10 seconds  Do 3 sets of 15 repetitions a day, or as directed  © 2017 [employer]  Information is for End User's use only and may not be sold, redistributed or otherwise used for commercial purposes  All illustrations and images included in CareNotes® are the copyrighted property of [employer]  or [employer]  The above information is an  only  It is not intended as medical advice for individual conditions or treatments  Talk to your doctor, nurse or pharmacist before following any medical regimen to see if it is safe and effective for you  Diabetes in the Older Adult   WHAT YOU NEED TO KNOW:   Older adults with diabetes are at risk for heart disease, stroke, kidney disease, blindness, and nerve damage   You may also be at risk for any of the following:  · Poor nutrition or low blood sugar levels    · Confusion or problems with memory, attention, or learning new things    · Trouble controlling urination or frequent urinary tract infections    · Trouble with coordination or balance    · Falls and injuries    · Pain    · Depression    · Open sores on your legs or feet  DISCHARGE INSTRUCTIONS:   Call 911 for any of the following:   · You have any of the following signs of a stroke:      ¨ Numbness or drooping on one side of your face     ¨ Weakness in an arm or leg    ¨ Confusion or difficulty speaking    ¨ Dizziness, a severe headache, or vision loss    · You have any of the following signs of a heart attack:      ¨ Squeezing, pressure, or pain in your chest that lasts longer than 5 minutes or returns    ¨ Discomfort or pain in your back, neck, jaw, stomach, or arm     ¨ Trouble breathing    ¨ Nausea or vomiting    ¨ Lightheadedness or a sudden cold sweat, especially with chest pain or trouble breathing  Return to the emergency department if:   · You have severe abdominal pain, or the pain spreads to your back  You may also be vomiting  · You have trouble staying awake or focusing  · You are shaking or sweating  · You have blurred or double vision  · Your breath has a fruity, sweet smell  · Your breathing is deep and labored, or rapid and shallow  · Your heartbeat is fast and weak  · You fall and get hurt  Contact your healthcare provider if:   · You are vomiting or have diarrhea  · You have an upset stomach and cannot eat the foods on your meal plan  · You feel weak or more tired than usual      · You feel dizzy, have headaches, or are easily irritated  · Your skin is red, warm, dry, or swollen  · You have a wound that does not heal      · You have numbness in your arms or legs  · You have trouble coping with your illness, or you feel anxious or depressed  · You have problems with your memory  · You have changes in your vision  · You have questions or concerns about your condition or care  Medicines  may be given to decrease the amount of sugar in your blood  You may also need medicine to lower your blood pressure or cholesterol, or medicine to prevent blood clots  Manage the ABCs and prevent problems caused by diabetes:   · Check your blood sugar levels as directed  Your healthcare provider will tell you when and how often to check during the day   Your healthcare provider will also tell you what your blood sugar levels should be before and after a meal  You may need to check for ketones in your urine or blood if your level is higher than directed  Write down your results and show them to your healthcare provider  Your provider may use the results to make changes to your medicine, food, or exercise schedules  Ask your healthcare provider for more information about how to treat a high or low blood sugar level  · Follow your meal plan as directed  A dietitian will help you make a meal plan to keep your blood sugar level steady and make sure you get enough nutrition  Do not skip meals  Your blood sugar level may drop too low if you have taken diabetes medicine and do not eat  Ask your healthcare provider about programs in your community that can deliver the meals to your home  · Try to be active for 30 to 60 minutes most days of the week  Exercise can help keep your blood sugar level steady, decrease your risk of heart disease, and help you lose weight  It can also help improve your balance and decrease your risk for falls  Work with your healthcare provider to create an exercise plan  Ask a family member or friend to exercise with you  Start slow and exercise for 5 to 10 minutes at a time  Examples of activities include walking or swimming  Include muscle strengthening activities 2 to 3 days each week  Include balance training 2 to 3 times each week  Activities that help increase balance include yoga and gabriela chi      · Maintain a healthy weight  Ask your healthcare provider how much you should weigh  A healthy weight can help you control your diabetes and prevent heart disease  Ask your provider to help you create a weight loss plan if you are overweight  Together you can set manageable weight loss goals  · Do not smoke  Ask your healthcare provider for information if you currently smoke and need help to quit  Do not use e-cigarettes or smokeless tobacco in place of cigarettes or to help you quit  They still contain nicotine  · Manage stress  Stress may increase your blood sugar level  Deep breathing, muscle relaxation, and music may help you relax  Ask your healthcare provider for more information about these practices  Other ways to manage your diabetes:   · Check your feet every day for sores  Look at your whole foot, including the bottom, and between and under your toes  Check for wounds, corns, and calluses  Use a mirror to see the bottom of your feet  The skin on your feet may be shiny, tight, dry, or darker than normal  Your feet may also be cold and pale  Feel your feet by running your hands along the tops, bottoms, sides, and between your toes  Redness, swelling, and warmth are signs of blood flow problems that can lead to a foot ulcer  Do not try to remove corns or calluses yourself  · Wear medical alert identification  Wear medical alert jewelry or carry a card that says you have diabetes  Ask your healthcare provider where to get these items  · Ask about vaccines  You have a higher risk for serious illness if you get the flu, pneumonia, or hepatitis  Ask your healthcare provider if you should get a flu, pneumonia, shingles, or hepatitis B vaccine, and when to get the vaccine  · Keep all appointments  You may need to return to have your A1c checked every 3 months  You will need to return at least once each year to have your feet checked  You will need an eye exam once a year to check for retinopathy  You will also need urine tests every year to check for kidney problems  You may need tests to monitor for heart disease  Write down your questions so you remember to ask them during your visits  · Get help from family and friends  You may need help checking your blood sugar level, giving insulin injections, or preparing your meals  Ask your family and friends to help you with these tasks   Talk to your healthcare provider if you do not have someone at home to help you  A healthcare provider can come to your home to help you with these tasks  Follow up with your healthcare provider as directed: You may need to return to have your A1c checked every 3 months  You will need to return at least once each year to have your feet checked  You will need an eye exam once a year to check for retinopathy  You will also need urine tests every year to check for kidney problems  You may need tests to monitor for heart disease  Write down your questions so you remember to ask them during your visits  © 2017 [employer] Information is for End User's use only and may not be sold, redistributed or otherwise used for commercial purposes  All illustrations and images included in CareNotes® are the copyrighted property of [employer]  or [employer]  The above information is an  only  It is not intended as medical advice for individual conditions or treatments  Talk to your doctor, nurse or pharmacist before following any medical regimen to see if it is safe and effective for you

## 2018-06-12 NOTE — ED PROVIDER NOTES
History  Chief Complaint   Patient presents with    Asthma     Pt complains of asthma like symptoms supposed to take [facility] but hasn't been using it due to being out    Allergies     Pt reports also having allergies  Taking OTC medications with no relief  History provided by:  Patient   used: No    Medical Problem - Major   Location:  Coughing, asthma acting up, chest tight for 1 week  Severity:  Moderate  Onset quality:  Gradual  Duration:  1 week  Timing:  Constant  Progression:  Worsening  Chronicity:  Recurrent  Context:  No fever  Having trouble getting new glucose meter through PCP, they ordered the wrong one  Taking allegra  No sputum  No n/v or diaphoresis  No radiatio of pain  Relieved by:  Nothing  Worsened by:  Coughing  Ineffective treatments:  Ran out of meds  Associated symptoms: chest pain, congestion, cough, rhinorrhea, shortness of breath and wheezing    Associated symptoms: no abdominal pain, no diarrhea, no fever, no headaches, no nausea, no sore throat and no vomiting        Prior to Admission Medications   Prescriptions Last Dose Informant Patient Reported? Taking?    Mometasone Furo-Formoterol Fum (DULERA) 100-5 MCG/ACT AERO Past Week at Unknown time  No Yes   Si puffs BID   albuterol (PROVENTIL HFA,VENTOLIN HFA) 90 mcg/act inhaler Past Week at Unknown time  Yes Yes   Sig: Inhale 2 puffs every 6 (six) hours as needed for wheezing   candesartan (ATACAND) 8 MG tablet 2018 at Unknown time  Yes Yes   Sig: Take 1 tablet by mouth daily   fexofenadine (ALLEGRA) 180 MG tablet 2018 at Unknown time  Yes Yes   Sig: Take 180 mg by mouth daily   meclizine (ANTIVERT) 12 5 MG tablet   No No   Sig: Take 1 tablet by mouth 3 (three) times a day as needed for dizziness   metFORMIN (GLUCOPHAGE) 850 mg tablet 2018 at Unknown time  Yes Yes   Sig: Take 850 mg by mouth 2 (two) times a day with meals      Facility-Administered Medications: None       Past Medical History:   Diagnosis Date    Asthma     Diabetes mellitus ([facility] [address] )     Hypertension        Past Surgical History:   Procedure Laterality Date    BRONCHOSCOPY N/A 3/16/2016    Procedure: BRONCHOSCOPY FLEXIBLE/anesth ;  Surgeon: Veda Carney DO;  Location: [facility]; Service:        History reviewed  No pertinent family history  I have reviewed and agree with the history as documented  Social History   Substance Use Topics    Smoking status: Never Smoker    Smokeless tobacco: Never Used    Alcohol use No        Review of Systems   Constitutional: Negative for chills and fever  HENT: Positive for congestion and rhinorrhea  Negative for sore throat  Respiratory: Positive for cough, chest tightness, shortness of breath and wheezing  Cardiovascular: Positive for chest pain  Negative for leg swelling  Gastrointestinal: Negative for abdominal pain, diarrhea, nausea and vomiting  Genitourinary: Positive for frequency  Negative for difficulty urinating  Neurological: Negative for headaches  All other systems reviewed and are negative  Physical Exam  Physical Exam   Constitutional: She appears well-developed and well-nourished  She is cooperative  Non-toxic appearance  She does not have a sickly appearance  She does not appear ill  No distress  HENT:   Head: Normocephalic and atraumatic  Right Ear: Hearing normal  No drainage or swelling  Left Ear: Hearing normal  No drainage or swelling  Mouth/Throat: Mucous membranes are normal    Eyes: Conjunctivae and lids are normal  Right eye exhibits no discharge  Left eye exhibits no discharge  Neck: Trachea normal and normal range of motion  No JVD present  Cardiovascular: Normal rate, regular rhythm, normal heart sounds, intact distal pulses and normal pulses  Exam reveals no gallop and no friction rub  No murmur heard  Pulmonary/Chest: Effort normal  No stridor  No respiratory distress  She has wheezes  She has no rales   She exhibits no tenderness  Abdominal: Soft  Normal appearance  She exhibits no ascites and no mass  There is no hepatosplenomegaly  There is no tenderness  There is no rebound, no guarding and no CVA tenderness  Musculoskeletal: Normal range of motion  She exhibits no edema  Lymphadenopathy:        Right: No inguinal adenopathy present  Left: No inguinal adenopathy present  Neurological: She is alert  She has normal strength  She exhibits normal muscle tone  Gait normal  GCS eye subscore is 4  GCS verbal subscore is 5  GCS motor subscore is 6  Skin: Skin is warm, dry and intact  No rash noted  She is not diaphoretic  No pallor  Psychiatric: She has a normal mood and affect  Her speech is normal  Cognition and memory are normal    Nursing note and vitals reviewed  Vital Signs  ED Triage Vitals [06/12/18 0706]   Temperature Pulse Respirations Blood Pressure SpO2   98 °F (36 7 °C) 74 22 (!) 191/78 97 %      Temp Source Heart Rate Source Patient Position - Orthostatic VS BP Location FiO2 (%)   Oral Monitor Sitting Right arm --      Pain Score       2           Vitals:    06/12/18 0706 06/12/18 0731 06/12/18 0815 06/12/18 0845   BP: (!) 191/78 137/65 138/65 127/60   Pulse: 74  68 64   Patient Position - Orthostatic VS: Sitting          Visual Acuity      ED Medications  Medications   albuterol inhalation solution 5 mg (5 mg Nebulization Given 6/12/18 0730)   ipratropium (ATROVENT) 0 02 % inhalation solution 0 5 mg (0 5 mg Nebulization Given 6/12/18 0730)       Diagnostic Studies  Results Reviewed     Procedure Component Value Units Date/Time    Urine Microscopic [[id number]] Collected:  06/12/18 0816    Lab Status:   In process Specimen:  Urine from Urine, Clean Catch Updated:  06/12/18 0815    POCT urinalysis dipstick [[id number]]  (Abnormal) Resulted:  06/12/18 0813    Lab Status:  Final result Specimen:  Urine Updated:  06/12/18 0813    ED Urine Macroscopic [[id number]]  (Abnormal) Collected:  06/12/18 0377 Lab Status:  Final result Specimen:  Urine Updated:  06/12/18 0812     Color, UA Yellow     Clarity, UA Cloudy     pH, UA 6 5     Leukocytes, UA Small (A)     Nitrite, UA Positive (A)     Protein, UA Negative mg/dl      Glucose,  (1/4%) (A) mg/dl      Ketones, UA Negative mg/dl      Urobilinogen, UA 0 2 E U /dl      Bilirubin, UA Negative     Blood, UA Trace (A)     Specific [place], UA 1 010    Narrative:       CLINITEK RESULT    Basic metabolic panel [[id number]]  (Abnormal) Collected:  06/12/18 0725    Lab Status:  Final result Specimen:  Blood from Arm, Left Updated:  06/12/18 0757     Sodium 132 (L) mmol/L      Potassium 4 3 mmol/L      Chloride 98 (L) mmol/L      CO2 26 mmol/L      Anion Gap 8 mmol/L      BUN 11 mg/dL      Creatinine 0 66 mg/dL      Glucose 269 (H) mg/dL      Calcium 9 1 mg/dL      eGFR 89 ml/min/1 73sq m     Narrative:         National Kidney Disease Education Program recommendations are as follows:  GFR calculation is accurate only with a steady state creatinine  Chronic Kidney disease less than 60 ml/min/1 73 sq  meters  Kidney failure less than 15 ml/min/1 73 sq  meters      B-type natriuretic peptide [[id number]]  (Normal) Collected:  06/12/18 0725    Lab Status:  Final result Specimen:  Blood from Arm, Left Updated:  06/12/18 0757     NT-proBNP 32 pg/mL     Protime-INR [[id number]]  (Normal) Collected:  06/12/18 0725    Lab Status:  Final result Specimen:  Blood from Arm, Left Updated:  06/12/18 0757     Protime 13 3 seconds      INR 1 00    APTT [[id number]]  (Normal) Collected:  06/12/18 0725    Lab Status:  Final result Specimen:  Blood from Arm, Left Updated:  06/12/18 0757     PTT 29 seconds     Troponin I [[id number]]  (Normal) Collected:  06/12/18 0725    Lab Status:  Final result Specimen:  Blood from Arm, Left Updated:  06/12/18 0753     Troponin I <0 02 ng/mL     Narrative:         [employer] Chemistry analyzer 99% cutoff is > 0 04 ng/mL in network labs    o cTnI 99% cutoff is useful only when applied to patients in the clinical setting of myocardial ischemia  o cTnI 99% cutoff should be interpreted in the context of clinical history, ECG findings and possibly cardiac imaging to establish correct diagnosis  o cTnI 99% cutoff may be suggestive but clearly not indicative of a coronary event without the clinical setting of myocardial ischemia  CBC and differential [[id number]]  (Normal) Collected:  06/12/18 0725    Lab Status:  Final result Specimen:  Blood from Arm, Left Updated:  06/12/18 0731     WBC 5 45 Thousand/uL      RBC 4 33 Million/uL      Hemoglobin 13 2 g/dL      Hematocrit 37 7 %      MCV 87 fL      MCH 30 5 pg      MCHC 35 0 g/dL      RDW 12 9 %      MPV 11 8 fL      Platelets 831 Thousands/uL      Neutrophils Relative 59 %      Lymphocytes Relative 29 %      Monocytes Relative 6 %      Eosinophils Relative 5 %      Basophils Relative 1 %      Neutrophils Absolute 3 22 Thousands/µL      Lymphocytes Absolute 1 60 Thousands/µL      Monocytes Absolute 0 31 Thousand/µL      Eosinophils Absolute 0 27 Thousand/µL      Basophils Absolute 0 05 Thousands/µL                  XR chest 2 views   ED Interpretation by Papo Kerr MD (06/12 3558)   I have personally reviewed the x-ray and my findings are: no acute disease  Procedures  ECG 12 Lead Documentation  Date/Time: 6/12/2018 7:51 AM  Performed by: Janet Benoit  Authorized by: Janet Benoit     Indications / Diagnosis:  Coughing, CP for 1 week    ECG reviewed by me, the ED Provider: yes    Patient location:  ED  Interpretation:     Interpretation: non-specific    Rate:     ECG rate:  58    ECG rate assessment: bradycardic    Rhythm:     Rhythm: sinus bradycardia    Ectopy:     Ectopy: none    QRS:     QRS axis:  Normal    QRS intervals:  Normal  Conduction:     Conduction: normal    ST segments:     ST segments:  Non-specific  T waves:     T waves: non-specific             Phone Contacts  ED Phone Contact    ED Course       Patient feeling much better at discharge  HEART Risk Score      Most Recent Value   History  0 Filed at: 06/12/2018 0822   ECG  0 Filed at: 06/12/2018 5255   Age  2 Filed at: 06/12/2018 8876   Risk Factors  1 Filed at: 06/12/2018 7515   Troponin  0 Filed at: 06/12/2018 2509   Heart Score Risk Calculator   History  0 Filed at: 06/12/2018 4274   ECG  0 Filed at: 06/12/2018 3522   Age  2 Filed at: 06/12/2018 7239   Risk Factors  1 Filed at: 06/12/2018 1537   Troponin  0 Filed at: 06/12/2018 9658   HEART Score  3 Filed at: 06/12/2018 5178   HEART Score  3 Filed at: 06/12/2018 2209                            MDM  CritCare Time    Disposition  Final diagnoses:   Bronchitis   UTI (urinary tract infection)   Hyperglycemia     Time reflects when diagnosis was documented in both MDM as applicable and the Disposition within this note     Time User Action Codes Description Comment    6/12/2018  8:54 AM Jenn HOPE Add [J40] Bronchitis     6/12/2018  8:54 AM Jenn Washington Add [N39 0] UTI (urinary tract infection)     6/12/2018  8:54 AM Jenn Washington Add [R73 9] Hyperglycemia       ED Disposition     ED Disposition Condition Comment    Discharge  [facility] discharge to home/self care      Condition at discharge: Good        Follow-up Information     Follow up With Specialties Details Why Contact Info    Belia Fowler MD Family Medicine Schedule an appointment as soon as possible for a visit in 1 week  [phone] [facility]            Patient's Medications   Discharge Prescriptions    ALBUTEROL (PROVENTIL HFA,VENTOLIN HFA) 90 MCG/ACT INHALER    Inhale 2 puffs every 4 (four) hours as needed for wheezing       Start Date: 6/12/2018 End Date: --       Order Dose: 2 puffs       Quantity: 1 Inhaler    Refills: 0    CEPHALEXIN (KEFLEX) 500 MG CAPSULE    Take 1 capsule (500 mg total) by mouth 4 (four) times a day for 5 days Start Date: 6/12/2018 End Date: 6/17/2018       Order Dose: 500 mg       Quantity: 20 capsule    Refills: 0    MOMETASONE-FORMOTEROL (DULERA) 100-5 MCG/ACT INHALER    Inhale 2 puffs 2 (two) times a day Rinse mouth after use  Start Date: 6/12/2018 End Date: --       Order Dose: 2 puffs       Quantity: 1 Inhaler    Refills: 0     No discharge procedures on file      ED Provider  Electronically Signed by           Nick Ramon MD  06/12/18 3710

## 2018-06-14 LAB — BACTERIA UR CULT: ABNORMAL

## 2018-07-19 ENCOUNTER — APPOINTMENT (OUTPATIENT)
Dept: LAB | Facility: HOSPITAL | Age: 73
End: 2018-07-19
Payer: COMMERCIAL

## 2018-07-19 ENCOUNTER — TRANSCRIBE ORDERS (OUTPATIENT)
Dept: ADMINISTRATIVE | Facility: HOSPITAL | Age: 73
End: 2018-07-19

## 2018-07-19 DIAGNOSIS — E78.5 HYPERLIPIDEMIA, UNSPECIFIED HYPERLIPIDEMIA TYPE: ICD-10-CM

## 2018-07-19 DIAGNOSIS — E11.9 DIABETES MELLITUS, STABLE (HCC): Primary | ICD-10-CM

## 2018-07-19 DIAGNOSIS — R53.83 FATIGUE, UNSPECIFIED TYPE: ICD-10-CM

## 2018-07-19 DIAGNOSIS — R07.81 PLEURITIC CHEST PAIN: ICD-10-CM

## 2018-07-19 DIAGNOSIS — E11.9 DIABETES MELLITUS, STABLE (HCC): ICD-10-CM

## 2018-07-19 LAB
ALBUMIN SERPL BCP-MCNC: 3.9 G/DL (ref 3.5–5)
ALP SERPL-CCNC: 84 U/L (ref 46–116)
ALT SERPL W P-5'-P-CCNC: 17 U/L (ref 12–78)
ANION GAP SERPL CALCULATED.3IONS-SCNC: 6 MMOL/L (ref 4–13)
AST SERPL W P-5'-P-CCNC: 13 U/L (ref 5–45)
BASOPHILS # BLD AUTO: 0.05 THOUSANDS/ΜL (ref 0–0.1)
BASOPHILS NFR BLD AUTO: 1 % (ref 0–1)
BILIRUB SERPL-MCNC: 0.36 MG/DL (ref 0.2–1)
BUN SERPL-MCNC: 9 MG/DL (ref 5–25)
CALCIUM SERPL-MCNC: 9.4 MG/DL (ref 8.3–10.1)
CHLORIDE SERPL-SCNC: 99 MMOL/L (ref 100–108)
CHOLEST SERPL-MCNC: 174 MG/DL (ref 50–200)
CO2 SERPL-SCNC: 31 MMOL/L (ref 21–32)
CREAT SERPL-MCNC: 0.65 MG/DL (ref 0.6–1.3)
CREAT UR-MCNC: 82.7 MG/DL
EOSINOPHIL # BLD AUTO: 0.33 THOUSAND/ΜL (ref 0–0.61)
EOSINOPHIL NFR BLD AUTO: 5 % (ref 0–6)
ERYTHROCYTE [DISTWIDTH] IN BLOOD BY AUTOMATED COUNT: 13.1 % (ref 11.6–15.1)
ERYTHROCYTE [SEDIMENTATION RATE] IN BLOOD: 0 MM/HOUR (ref 0–20)
EST. AVERAGE GLUCOSE BLD GHB EST-MCNC: 143 MG/DL
GFR SERPL CREATININE-BSD FRML MDRD: 90 ML/MIN/1.73SQ M
GLUCOSE P FAST SERPL-MCNC: 180 MG/DL (ref 65–99)
HBA1C MFR BLD: 6.6 % (ref 4.2–6.3)
HCT VFR BLD AUTO: 38.3 % (ref 34.8–46.1)
HDLC SERPL-MCNC: 58 MG/DL (ref 40–60)
HGB BLD-MCNC: 13.2 G/DL (ref 11.5–15.4)
LDLC SERPL CALC-MCNC: 89 MG/DL (ref 0–100)
LYMPHOCYTES # BLD AUTO: 1.89 THOUSANDS/ΜL (ref 0.6–4.47)
LYMPHOCYTES NFR BLD AUTO: 30 % (ref 14–44)
MAGNESIUM SERPL-MCNC: 1.4 MG/DL (ref 1.6–2.6)
MCH RBC QN AUTO: 30.8 PG (ref 26.8–34.3)
MCHC RBC AUTO-ENTMCNC: 34.5 G/DL (ref 31.4–37.4)
MCV RBC AUTO: 90 FL (ref 82–98)
MICROALBUMIN UR-MCNC: 30.6 MG/L (ref 0–20)
MICROALBUMIN/CREAT 24H UR: 37 MG/G CREATININE (ref 0–30)
MONOCYTES # BLD AUTO: 0.31 THOUSAND/ΜL (ref 0.17–1.22)
MONOCYTES NFR BLD AUTO: 5 % (ref 4–12)
NEUTROPHILS # BLD AUTO: 3.73 THOUSANDS/ΜL (ref 1.85–7.62)
NEUTS SEG NFR BLD AUTO: 59 % (ref 43–75)
NONHDLC SERPL-MCNC: 116 MG/DL
NRBC BLD AUTO-RTO: 0 /100 WBCS
PLATELET # BLD AUTO: 198 THOUSANDS/UL (ref 149–390)
PMV BLD AUTO: 12.7 FL (ref 8.9–12.7)
POTASSIUM SERPL-SCNC: 4.6 MMOL/L (ref 3.5–5.3)
PROT SERPL-MCNC: 6.8 G/DL (ref 6.4–8.2)
RBC # BLD AUTO: 4.28 MILLION/UL (ref 3.81–5.12)
SODIUM SERPL-SCNC: 136 MMOL/L (ref 136–145)
TRIGL SERPL-MCNC: 133 MG/DL
WBC # BLD AUTO: 6.31 THOUSAND/UL (ref 4.31–10.16)

## 2018-07-19 PROCEDURE — 83735 ASSAY OF MAGNESIUM: CPT

## 2018-07-19 PROCEDURE — 80053 COMPREHEN METABOLIC PANEL: CPT

## 2018-07-19 PROCEDURE — 80061 LIPID PANEL: CPT

## 2018-07-19 PROCEDURE — 83036 HEMOGLOBIN GLYCOSYLATED A1C: CPT

## 2018-07-19 PROCEDURE — 82043 UR ALBUMIN QUANTITATIVE: CPT | Performed by: FAMILY MEDICINE

## 2018-07-19 PROCEDURE — 85025 COMPLETE CBC W/AUTO DIFF WBC: CPT

## 2018-07-19 PROCEDURE — 82570 ASSAY OF URINE CREATININE: CPT | Performed by: FAMILY MEDICINE

## 2018-07-19 PROCEDURE — 85652 RBC SED RATE AUTOMATED: CPT

## 2018-07-19 PROCEDURE — 36415 COLL VENOUS BLD VENIPUNCTURE: CPT

## 2018-07-20 ENCOUNTER — HOSPITAL ENCOUNTER (OUTPATIENT)
Dept: NON INVASIVE DIAGNOSTICS | Facility: HOSPITAL | Age: 73
Discharge: HOME | End: 2018-07-20
Payer: COMMERCIAL

## 2018-07-20 ENCOUNTER — TRANSCRIBE ORDERS (OUTPATIENT)
Dept: ADMINISTRATIVE | Facility: HOSPITAL | Age: 73
End: 2018-07-20

## 2018-07-20 DIAGNOSIS — Z01.818 PREOP EXAMINATION: ICD-10-CM

## 2018-07-20 DIAGNOSIS — Z01.818 PREOP EXAMINATION: Primary | ICD-10-CM

## 2018-07-20 LAB
ATRIAL RATE: 63 BPM
P AXIS: 33 DEGREES
PR INTERVAL: 164 MS
QRS AXIS: 22 DEGREES
QRSD INTERVAL: 86 MS
QT INTERVAL: 426 MS
QTC INTERVAL: 435 MS
T WAVE AXIS: 41 DEGREES
VENTRICULAR RATE: 63 BPM

## 2018-07-20 PROCEDURE — 93010 ELECTROCARDIOGRAM REPORT: CPT | Performed by: INTERNAL MEDICINE

## 2018-07-20 PROCEDURE — 93005 ELECTROCARDIOGRAM TRACING: CPT

## 2018-09-05 ENCOUNTER — TRANSCRIBE ORDERS (OUTPATIENT)
Dept: ADMINISTRATIVE | Facility: HOSPITAL | Age: 73
End: 2018-09-05

## 2018-09-05 ENCOUNTER — HOSPITAL ENCOUNTER (OUTPATIENT)
Dept: RADIOLOGY | Facility: HOSPITAL | Age: 73
Discharge: HOME | End: 2018-09-05
Payer: COMMERCIAL

## 2018-09-05 DIAGNOSIS — R07.82 INTERCOSTAL PAIN: Primary | ICD-10-CM

## 2018-09-05 DIAGNOSIS — M25.552 HIP PAIN, ACUTE, LEFT: ICD-10-CM

## 2018-09-05 DIAGNOSIS — R07.82 INTERCOSTAL PAIN: ICD-10-CM

## 2018-09-05 PROCEDURE — 73502 X-RAY EXAM HIP UNI 2-3 VIEWS: CPT

## 2018-09-05 PROCEDURE — 71101 X-RAY EXAM UNILAT RIBS/CHEST: CPT

## 2018-12-07 ENCOUNTER — APPOINTMENT (EMERGENCY)
Dept: RADIOLOGY | Facility: HOSPITAL | Age: 73
DRG: 202 | End: 2018-12-07
Payer: COMMERCIAL

## 2018-12-07 ENCOUNTER — HOSPITAL ENCOUNTER (INPATIENT)
Facility: HOSPITAL | Age: 73
LOS: 6 days | Discharge: HOME | DRG: 202 | End: 2018-12-13
Attending: EMERGENCY MEDICINE | Admitting: INTERNAL MEDICINE
Payer: COMMERCIAL

## 2018-12-07 DIAGNOSIS — E87.1 HYPONATREMIA: ICD-10-CM

## 2018-12-07 DIAGNOSIS — J45.31 MILD PERSISTENT ASTHMA WITH EXACERBATION: ICD-10-CM

## 2018-12-07 DIAGNOSIS — J45.902 STATUS ASTHMATICUS: Primary | ICD-10-CM

## 2018-12-07 LAB
ALBUMIN SERPL BCP-MCNC: 3.6 G/DL (ref 3.5–5)
ALP SERPL-CCNC: 109 U/L (ref 46–116)
ALT SERPL W P-5'-P-CCNC: 22 U/L (ref 12–78)
ANION GAP SERPL CALCULATED.3IONS-SCNC: 10 MMOL/L (ref 4–13)
AST SERPL W P-5'-P-CCNC: 13 U/L (ref 5–45)
BASOPHILS # BLD AUTO: 0.05 THOUSANDS/ΜL (ref 0–0.1)
BASOPHILS NFR BLD AUTO: 1 % (ref 0–1)
BILIRUB SERPL-MCNC: 0.35 MG/DL (ref 0.2–1)
BUN SERPL-MCNC: 9 MG/DL (ref 5–25)
CALCIUM SERPL-MCNC: 9.2 MG/DL (ref 8.3–10.1)
CHLORIDE SERPL-SCNC: 98 MMOL/L (ref 100–108)
CO2 SERPL-SCNC: 27 MMOL/L (ref 21–32)
CREAT SERPL-MCNC: 0.57 MG/DL (ref 0.6–1.3)
EOSINOPHIL # BLD AUTO: 0.15 THOUSAND/ΜL (ref 0–0.61)
EOSINOPHIL NFR BLD AUTO: 3 % (ref 0–6)
ERYTHROCYTE [DISTWIDTH] IN BLOOD BY AUTOMATED COUNT: 13.2 % (ref 11.6–15.1)
FLUAV AG SPEC QL IA: NEGATIVE
FLUAV AG SPEC QL: ABNORMAL
FLUBV AG SPEC QL IA: NEGATIVE
FLUBV AG SPEC QL: ABNORMAL
GFR SERPL CREATININE-BSD FRML MDRD: 93 ML/MIN/1.73SQ M
GLUCOSE SERPL-MCNC: 154 MG/DL (ref 65–140)
GLUCOSE SERPL-MCNC: 328 MG/DL (ref 65–140)
GLUCOSE SERPL-MCNC: 404 MG/DL (ref 65–140)
HCT VFR BLD AUTO: 36.3 % (ref 34.8–46.1)
HGB BLD-MCNC: 12.7 G/DL (ref 11.5–15.4)
IMM GRANULOCYTES # BLD AUTO: 0.02 THOUSAND/UL (ref 0–0.2)
IMM GRANULOCYTES NFR BLD AUTO: 0 % (ref 0–2)
LYMPHOCYTES # BLD AUTO: 1.51 THOUSANDS/ΜL (ref 0.6–4.47)
LYMPHOCYTES NFR BLD AUTO: 28 % (ref 14–44)
MAGNESIUM SERPL-MCNC: 1.5 MG/DL (ref 1.6–2.6)
MCH RBC QN AUTO: 32.1 PG (ref 26.8–34.3)
MCHC RBC AUTO-ENTMCNC: 35 G/DL (ref 31.4–37.4)
MCV RBC AUTO: 92 FL (ref 82–98)
MONOCYTES # BLD AUTO: 0.56 THOUSAND/ΜL (ref 0.17–1.22)
MONOCYTES NFR BLD AUTO: 10 % (ref 4–12)
NEUTROPHILS # BLD AUTO: 3.18 THOUSANDS/ΜL (ref 1.85–7.62)
NEUTS SEG NFR BLD AUTO: 58 % (ref 43–75)
NRBC BLD AUTO-RTO: 0 /100 WBCS
NT-PROBNP SERPL-MCNC: 291 PG/ML
PLATELET # BLD AUTO: 167 THOUSANDS/UL (ref 149–390)
PLATELET # BLD AUTO: 177 THOUSANDS/UL (ref 149–390)
PMV BLD AUTO: 11.7 FL (ref 8.9–12.7)
PMV BLD AUTO: 12.2 FL (ref 8.9–12.7)
POTASSIUM SERPL-SCNC: 4.1 MMOL/L (ref 3.5–5.3)
PROT SERPL-MCNC: 7.2 G/DL (ref 6.4–8.2)
RBC # BLD AUTO: 3.96 MILLION/UL (ref 3.81–5.12)
RSV B RNA SPEC QL NAA+PROBE: DETECTED
SODIUM SERPL-SCNC: 135 MMOL/L (ref 136–145)
TROPONIN I SERPL-MCNC: <0.02 NG/ML
WBC # BLD AUTO: 5.47 THOUSAND/UL (ref 4.31–10.16)

## 2018-12-07 PROCEDURE — 71046 X-RAY EXAM CHEST 2 VIEWS: CPT

## 2018-12-07 PROCEDURE — 83880 ASSAY OF NATRIURETIC PEPTIDE: CPT | Performed by: PHYSICIAN ASSISTANT

## 2018-12-07 PROCEDURE — 96374 THER/PROPH/DIAG INJ IV PUSH: CPT

## 2018-12-07 PROCEDURE — 99222 1ST HOSP IP/OBS MODERATE 55: CPT | Performed by: INTERNAL MEDICINE

## 2018-12-07 PROCEDURE — 80053 COMPREHEN METABOLIC PANEL: CPT | Performed by: PHYSICIAN ASSISTANT

## 2018-12-07 PROCEDURE — 82948 REAGENT STRIP/BLOOD GLUCOSE: CPT

## 2018-12-07 PROCEDURE — 93005 ELECTROCARDIOGRAM TRACING: CPT

## 2018-12-07 PROCEDURE — 85049 AUTOMATED PLATELET COUNT: CPT | Performed by: INTERNAL MEDICINE

## 2018-12-07 PROCEDURE — 99285 EMERGENCY DEPT VISIT HI MDM: CPT

## 2018-12-07 PROCEDURE — 87040 BLOOD CULTURE FOR BACTERIA: CPT | Performed by: PHYSICIAN ASSISTANT

## 2018-12-07 PROCEDURE — 83735 ASSAY OF MAGNESIUM: CPT | Performed by: PHYSICIAN ASSISTANT

## 2018-12-07 PROCEDURE — 36415 COLL VENOUS BLD VENIPUNCTURE: CPT | Performed by: PHYSICIAN ASSISTANT

## 2018-12-07 PROCEDURE — 85025 COMPLETE CBC W/AUTO DIFF WBC: CPT | Performed by: PHYSICIAN ASSISTANT

## 2018-12-07 PROCEDURE — 84484 ASSAY OF TROPONIN QUANT: CPT | Performed by: PHYSICIAN ASSISTANT

## 2018-12-07 PROCEDURE — 94760 N-INVAS EAR/PLS OXIMETRY 1: CPT

## 2018-12-07 PROCEDURE — 94644 CONT INHLJ TX 1ST HOUR: CPT

## 2018-12-07 PROCEDURE — 96361 HYDRATE IV INFUSION ADD-ON: CPT

## 2018-12-07 PROCEDURE — 87631 RESP VIRUS 3-5 TARGETS: CPT | Performed by: PHYSICIAN ASSISTANT

## 2018-12-07 PROCEDURE — 94640 AIRWAY INHALATION TREATMENT: CPT

## 2018-12-07 RX ORDER — IPRATROPIUM BROMIDE AND ALBUTEROL SULFATE 2.5; .5 MG/3ML; MG/3ML
3 SOLUTION RESPIRATORY (INHALATION)
Status: DISCONTINUED | OUTPATIENT
Start: 2018-12-07 | End: 2018-12-13 | Stop reason: HOSPADM

## 2018-12-07 RX ORDER — METHYLPREDNISOLONE SODIUM SUCCINATE 40 MG/ML
40 INJECTION, POWDER, LYOPHILIZED, FOR SOLUTION INTRAMUSCULAR; INTRAVENOUS EVERY 12 HOURS SCHEDULED
Status: DISCONTINUED | OUTPATIENT
Start: 2018-12-08 | End: 2018-12-09

## 2018-12-07 RX ORDER — GUAIFENESIN/DEXTROMETHORPHAN 100-10MG/5
10 SYRUP ORAL EVERY 4 HOURS PRN
Status: DISCONTINUED | OUTPATIENT
Start: 2018-12-07 | End: 2018-12-13 | Stop reason: HOSPADM

## 2018-12-07 RX ORDER — MAGNESIUM SULFATE HEPTAHYDRATE 40 MG/ML
2 INJECTION, SOLUTION INTRAVENOUS ONCE
Status: DISCONTINUED | OUTPATIENT
Start: 2018-12-07 | End: 2018-12-13 | Stop reason: HOSPADM

## 2018-12-07 RX ORDER — METHYLPREDNISOLONE SODIUM SUCCINATE 125 MG/2ML
125 INJECTION, POWDER, LYOPHILIZED, FOR SOLUTION INTRAMUSCULAR; INTRAVENOUS ONCE
Status: COMPLETED | OUTPATIENT
Start: 2018-12-07 | End: 2018-12-07

## 2018-12-07 RX ORDER — LOSARTAN POTASSIUM 25 MG/1
12.5 TABLET ORAL DAILY
Status: DISCONTINUED | OUTPATIENT
Start: 2018-12-07 | End: 2018-12-09

## 2018-12-07 RX ORDER — SODIUM CHLORIDE FOR INHALATION 0.9 %
12 VIAL, NEBULIZER (ML) INHALATION ONCE
Status: COMPLETED | OUTPATIENT
Start: 2018-12-07 | End: 2018-12-07

## 2018-12-07 RX ORDER — FLUTICASONE FUROATE AND VILANTEROL 100; 25 UG/1; UG/1
1 POWDER RESPIRATORY (INHALATION) 2 TIMES DAILY
Status: DISCONTINUED | OUTPATIENT
Start: 2018-12-07 | End: 2018-12-13 | Stop reason: HOSPADM

## 2018-12-07 RX ORDER — ACETAMINOPHEN 325 MG/1
650 TABLET ORAL EVERY 6 HOURS PRN
Status: DISCONTINUED | OUTPATIENT
Start: 2018-12-07 | End: 2018-12-13 | Stop reason: HOSPADM

## 2018-12-07 RX ORDER — LORATADINE 10 MG/1
10 TABLET ORAL DAILY
Status: DISCONTINUED | OUTPATIENT
Start: 2018-12-07 | End: 2018-12-13 | Stop reason: HOSPADM

## 2018-12-07 RX ORDER — BENZONATATE 100 MG/1
100 CAPSULE ORAL 3 TIMES DAILY PRN
Status: DISCONTINUED | OUTPATIENT
Start: 2018-12-07 | End: 2018-12-13 | Stop reason: HOSPADM

## 2018-12-07 RX ADMIN — ENOXAPARIN SODIUM 40 MG: 40 INJECTION SUBCUTANEOUS at 14:06

## 2018-12-07 RX ADMIN — IPRATROPIUM BROMIDE AND ALBUTEROL SULFATE 3 ML: 2.5; .5 SOLUTION RESPIRATORY (INHALATION) at 14:06

## 2018-12-07 RX ADMIN — IPRATROPIUM BROMIDE AND ALBUTEROL SULFATE 3 ML: 2.5; .5 SOLUTION RESPIRATORY (INHALATION) at 19:00

## 2018-12-07 RX ADMIN — GUAIFENESIN AND DEXTROMETHORPHAN 10 ML: 100; 10 SYRUP ORAL at 22:05

## 2018-12-07 RX ADMIN — ALBUTEROL SULFATE 10 MG: 2.5 SOLUTION RESPIRATORY (INHALATION) at 08:11

## 2018-12-07 RX ADMIN — LORATADINE 10 MG: 10 TABLET ORAL at 14:06

## 2018-12-07 RX ADMIN — FLUTICASONE FUROATE AND VILANTEROL TRIFENATATE 1 PUFF: 100; 25 POWDER RESPIRATORY (INHALATION) at 14:08

## 2018-12-07 RX ADMIN — IPRATROPIUM BROMIDE 1 MG: 0.5 SOLUTION RESPIRATORY (INHALATION) at 08:11

## 2018-12-07 RX ADMIN — FLUTICASONE FUROATE AND VILANTEROL TRIFENATATE 1 PUFF: 100; 25 POWDER RESPIRATORY (INHALATION) at 19:42

## 2018-12-07 RX ADMIN — BENZONATATE 100 MG: 100 CAPSULE ORAL at 16:38

## 2018-12-07 RX ADMIN — SODIUM CHLORIDE 1000 ML: 0.9 INJECTION, SOLUTION INTRAVENOUS at 07:49

## 2018-12-07 RX ADMIN — INSULIN LISPRO 5 UNITS: 100 INJECTION, SOLUTION INTRAVENOUS; SUBCUTANEOUS at 16:00

## 2018-12-07 RX ADMIN — ISODIUM CHLORIDE 6 ML: 0.03 SOLUTION RESPIRATORY (INHALATION) at 08:12

## 2018-12-07 RX ADMIN — LOSARTAN POTASSIUM 12.5 MG: 25 TABLET ORAL at 14:06

## 2018-12-07 RX ADMIN — METHYLPREDNISOLONE SODIUM SUCCINATE 125 MG: 125 INJECTION, POWDER, FOR SOLUTION INTRAMUSCULAR; INTRAVENOUS at 07:47

## 2018-12-07 NOTE — PLAN OF CARE
Problem: DISCHARGE PLANNING - CARE MANAGEMENT  Goal: Discharge to post-acute care or home with appropriate resources  INTERVENTIONS:  - Conduct assessment to determine patient/family and health care team treatment goals, and need for post-acute services based on payer coverage, community resources, and patient preferences, and barriers to discharge  - Address psychosocial, clinical, and financial barriers to discharge as identified in assessment in conjunction with the patient/family and health care team  - Arrange appropriate level of post-acute services according to patient's   needs and preference and payer coverage in collaboration with the physician and health care team  - Communicate with and update the patient/family, physician, and health care team regarding progress on the discharge plan  - Arrange appropriate transportation to post-acute venues  - Patient d/c with appropriate resources once reaches medical stability      Outcome: Progressing

## 2018-12-07 NOTE — ED NOTES
Pt given bag of pretzels and apple juice per request at this time     Harman Ledesma RN  12/07/18 3547

## 2018-12-07 NOTE — ED ATTENDING ATTESTATION
Candi Zambrano DO, saw and evaluated the patient  I have discussed the patient with the resident/non-physician practitioner and agree with the resident's/non-physician practitioner's findings, Plan of Care, and MDM as documented in the resident's/non-physician practitioner's note, except where noted  All available labs and Radiology studies were reviewed  At this point I agree with the current assessment done in the Emergency Department  I have conducted an independent evaluation of this patient a history and physical is as follows:    78-year-old female presents for evaluation of respiratory distress with associated cough and wheezing  On examination:  The patient is awake, alert and oriented  HEENT: Normocephalic/atraumatic  External examination of the ears is unremarkable  Pupils are equal round and reactive to light, there is no conjunctival injection or scleral icterus noted  Nares are patent without rhinorrhea  The oropharynx is moist without injection  The neck is supple  Lungs:  Bilateral inspiratory and expiratory wheezes with decreased air movement  Heart: Regular without murmurs rubs or gallops  Abdomen: Soft and nontender  There are positive bowel sounds  there is no rebound or guarding  Musculoskeletal: Normal range of motion with grossly normal strength  Neuro: Cranial nerves II through XII grossly intact   Nonfocal exam  Skin: No rash noted  Psych: Mood and affect normal    Results for orders placed or performed during the hospital encounter of 12/07/18   Rapid Influenza Screen with Reflex PCR   Result Value Ref Range    Rapid Influenza A Ag Negative Negative, Indeterminate    Rapid Influenza B Ag Negative Negative, Indeterminate   CBC and differential   Result Value Ref Range    WBC 5 47 4 31 - 10 16 Thousand/uL    RBC 3 96 3 81 - 5 12 Million/uL    Hemoglobin 12 7 11 5 - 15 4 g/dL    Hematocrit 36 3 34 8 - 46 1 %    MCV 92 82 - 98 fL    MCH 32 1 26 8 - 34 3 pg    MCHC 35 0 31 4 - 37 4 g/dL    RDW 13 2 11 6 - 15 1 %    MPV 11 7 8 9 - 12 7 fL    Platelets 070 262 - 848 Thousands/uL    nRBC 0 /100 WBCs    Neutrophils Relative 58 43 - 75 %    Immat GRANS % 0 0 - 2 %    Lymphocytes Relative 28 14 - 44 %    Monocytes Relative 10 4 - 12 %    Eosinophils Relative 3 0 - 6 %    Basophils Relative 1 0 - 1 %    Neutrophils Absolute 3 18 1 85 - 7 62 Thousands/µL    Immature Grans Absolute 0 02 0 00 - 0 20 Thousand/uL    Lymphocytes Absolute 1 51 0 60 - 4 47 Thousands/µL    Monocytes Absolute 0 56 0 17 - 1 22 Thousand/µL    Eosinophils Absolute 0 15 0 00 - 0 61 Thousand/µL    Basophils Absolute 0 05 0 00 - 0 10 Thousands/µL   Comprehensive metabolic panel   Result Value Ref Range    Sodium 135 (L) 136 - 145 mmol/L    Potassium 4 1 3 5 - 5 3 mmol/L    Chloride 98 (L) 100 - 108 mmol/L    CO2 27 21 - 32 mmol/L    ANION GAP 10 4 - 13 mmol/L    BUN 9 5 - 25 mg/dL    Creatinine 0 57 (L) 0 60 - 1 30 mg/dL    Glucose 154 (H) 65 - 140 mg/dL    Calcium 9 2 8 3 - 10 1 mg/dL    AST 13 5 - 45 U/L    ALT 22 12 - 78 U/L    Alkaline Phosphatase 109 46 - 116 U/L    Total Protein 7 2 6 4 - 8 2 g/dL    Albumin 3 6 3 5 - 5 0 g/dL    Total Bilirubin 0 35 0 20 - 1 00 mg/dL    eGFR 93 ml/min/1 73sq m   Magnesium   Result Value Ref Range    Magnesium 1 5 (L) 1 6 - 2 6 mg/dL   Troponin I   Result Value Ref Range    Troponin I <0 02 <=0 04 ng/mL   B-type natriuretic peptide   Result Value Ref Range    NT-proBNP 291 (H) <125 pg/mL     Patient was given continuous albuterol and Atrovent  IV steroids    The patient was also given IV magnesium for continued wheezing as well as hypomagnesemia    Diagnosis:  Status asthmaticus, hypomagnesemia    Critical Care Time  CritCare Time 35 minutes for respiratory distress    Procedures

## 2018-12-07 NOTE — ED PROVIDER NOTES
History  Chief Complaint   Patient presents with    Cough     Pt reports productive cough x 2 weeks, with thick white sputum, feeling SOB using albuterol MDI, dullera, and nebulizer machine with no relief  chest pain with cough    Wheezing     This is a 26-year-old female patient with history of asthma for the last 2 weeks she has had thick white sputum feeling short of breath she uses her albuterol 1 to 2 times a week because she states that her doctor told her to do  She started amoxicillin of her 's 2 days ago to try to help her without improvement  She has had no fevers no chills once again her cough is white sputum and she complains of chest pain of more so long her diaphragm only when she coughs  He could hear the wheezing and rhonchi without using a stethoscope  No nausea vomiting diarrhea abdominal pain no chest pain or shortness of breath no urinary symptoms no rashes stiff neck  Patient appears nontoxic  Nothing makes it better or worse  Differential diagnosis includes not limited to asthma exacerbation, COPD, pneumonia influenza less likely due to fact that she does not have fever chills headache or body aches  ACS, heart failure            Prior to Admission Medications   Prescriptions Last Dose Informant Patient Reported? Taking?    Mometasone Furo-Formoterol Fum (DULERA) 100-5 MCG/ACT AERO   No No   Si puffs BID   albuterol (PROVENTIL HFA,VENTOLIN HFA) 90 mcg/act inhaler   Yes No   Sig: Inhale 2 puffs every 6 (six) hours as needed for wheezing   albuterol (PROVENTIL HFA,VENTOLIN HFA) 90 mcg/act inhaler   No No   Sig: Inhale 2 puffs every 4 (four) hours as needed for wheezing   albuterol (PROVENTIL HFA,VENTOLIN HFA) 90 mcg/act inhaler   No No   Sig: Inhale 2 puffs every 4 (four) hours as needed for wheezing   candesartan (ATACAND) 8 MG tablet   Yes No   Sig: Take 1 tablet by mouth daily   fexofenadine (ALLEGRA) 180 MG tablet   Yes No   Sig: Take 180 mg by mouth daily   meclizine (ANTIVERT) 12 5 MG tablet   No No   Sig: Take 1 tablet by mouth 3 (three) times a day as needed for dizziness   metFORMIN (GLUCOPHAGE) 850 mg tablet   Yes No   Sig: Take 850 mg by mouth 2 (two) times a day with meals   mometasone-formoterol (DULERA) 100-5 MCG/ACT inhaler   No No   Sig: Inhale 2 puffs 2 (two) times a day Rinse mouth after use  Facility-Administered Medications: None       Past Medical History:   Diagnosis Date    Asthma     Diabetes mellitus ([address] )     Hypertension        Past Surgical History:   Procedure Laterality Date    BRONCHOSCOPY N/A 3/16/2016    Procedure: BRONCHOSCOPY FLEXIBLE/anesth ;  Surgeon: Vipul Lares DO;  Location: [facility]; Service:        History reviewed  No pertinent family history  I have reviewed and agree with the history as documented  Social History   Substance Use Topics    Smoking status: Never Smoker    Smokeless tobacco: Never Used    Alcohol use No        Review of Systems   All other systems reviewed and are negative  Physical Exam  Physical Exam   Constitutional: She appears well-developed and well-nourished  HENT:   Head: Normocephalic and atraumatic  Right Ear: External ear normal    Left Ear: External ear normal    Nose: Nose normal    Mouth/Throat: Oropharynx is clear and moist    Eyes: Pupils are equal, round, and reactive to light  Conjunctivae are normal    Neck: Normal range of motion  Neck supple  Cardiovascular: Normal rate and regular rhythm  Pulmonary/Chest: She has wheezes  She has rales  She exhibits no tenderness  Mild respiratory distress   Abdominal: Soft  Bowel sounds are normal  There is no tenderness  Neurological: She is alert  Skin: Skin is warm  Psychiatric: She has a normal mood and affect  Her behavior is normal    Nursing note and vitals reviewed        Vital Signs  ED Triage Vitals [12/07/18 0721]   Temperature Pulse Respirations Blood Pressure SpO2   98 4 °F (36 9 °C) 75 (!) 28 (!) 186/84 97 % Temp Source Heart Rate Source Patient Position - Orthostatic VS BP Location FiO2 (%)   Oral Monitor -- Left arm --      Pain Score       3           Vitals:    12/07/18 0721   BP: (!) 186/84   Pulse: 75       Visual Acuity      ED Medications  Medications   magnesium sulfate 2 g/50 mL IVPB (premix) 2 g (2 g Intravenous New Bag 12/7/18 0907)   sodium chloride 0 9 % bolus 1,000 mL (0 mL Intravenous Stopped 12/7/18 0907)   albuterol inhalation solution 10 mg (10 mg Nebulization Given 12/7/18 0811)   ipratropium (ATROVENT) 0 02 % inhalation solution 1 mg (1 mg Nebulization Given 12/7/18 0811)   sodium chloride 0 9 % inhalation solution 12 mL (6 mL Nebulization Given 12/7/18 0812)   methylPREDNISolone sodium succinate (Solu-MEDROL) injection 125 mg (125 mg Intravenous Given 12/7/18 0747)       Diagnostic Studies  Results Reviewed     Procedure Component Value Units Date/Time    Rapid Influenza Screen with Reflex PCR [[id number]] Collected:  12/07/18 0857    Lab Status: In process Specimen:  Nasopharyngeal from Nasopharyngeal Swab Updated:  12/07/18 0906    Blood culture #2 [[id number]] Collected:  12/07/18 0814    Lab Status:   In process Specimen:  Blood from Hand, Right Updated:  12/07/18 0823    Magnesium [[id number]]  (Abnormal) Collected:  12/07/18 0750    Lab Status:  Final result Specimen:  Blood from Arm, Left Updated:  12/07/18 0820     Magnesium 1 5 (L) mg/dL     B-type natriuretic peptide [[id number]]  (Abnormal) Collected:  12/07/18 0750    Lab Status:  Final result Specimen:  Blood from Arm, Left Updated:  12/07/18 0820     NT-proBNP 291 (H) pg/mL     Troponin I [[id number]]  (Normal) Collected:  12/07/18 0750    Lab Status:  Final result Specimen:  Blood from Arm, Left Updated:  12/07/18 0815     Troponin I <0 02 ng/mL     Comprehensive metabolic panel [[id number]]  (Abnormal) Collected:  12/07/18 0750    Lab Status:  Final result Specimen:  Blood from Arm, Left Updated:  12/07/18 0813     Sodium 135 (L) mmol/L Potassium 4 1 mmol/L      Chloride 98 (L) mmol/L      CO2 27 mmol/L      ANION GAP 10 mmol/L      BUN 9 mg/dL      Creatinine 0 57 (L) mg/dL      Glucose 154 (H) mg/dL      Calcium 9 2 mg/dL      AST 13 U/L      ALT 22 U/L      Alkaline Phosphatase 109 U/L      Total Protein 7 2 g/dL      Albumin 3 6 g/dL      Total Bilirubin 0 35 mg/dL      eGFR 93 ml/min/1 73sq m     Narrative:         National Kidney Disease Education Program recommendations are as follows:  GFR calculation is accurate only with a steady state creatinine  Chronic Kidney disease less than 60 ml/min/1 73 sq  meters  Kidney failure less than 15 ml/min/1 73 sq  meters  CBC and differential [[id number]] Collected:  12/07/18 0750    Lab Status:  Final result Specimen:  Blood from Arm, Left Updated:  12/07/18 0800     WBC 5 47 Thousand/uL      RBC 3 96 Million/uL      Hemoglobin 12 7 g/dL      Hematocrit 36 3 %      MCV 92 fL      MCH 32 1 pg      MCHC 35 0 g/dL      RDW 13 2 %      MPV 11 7 fL      Platelets 568 Thousands/uL      nRBC 0 /100 WBCs      Neutrophils Relative 58 %      Immat GRANS % 0 %      Lymphocytes Relative 28 %      Monocytes Relative 10 %      Eosinophils Relative 3 %      Basophils Relative 1 %      Neutrophils Absolute 3 18 Thousands/µL      Immature Grans Absolute 0 02 Thousand/uL      Lymphocytes Absolute 1 51 Thousands/µL      Monocytes Absolute 0 56 Thousand/µL      Eosinophils Absolute 0 15 Thousand/µL      Basophils Absolute 0 05 Thousands/µL     Blood culture #1 [[id number]] Collected:  12/07/18 0750    Lab Status: In process Specimen:  Blood from Arm, Left Updated:  12/07/18 0755                 XR chest 2 views   ED Interpretation by Jacqui Izquierdo PA-C (12/07 0825)   nad      Final Result by Austin Estrada DO (12/07 0840)   No lobar consolidation or large effusion  Cardiomegaly without pulmonary edema  Multiple old left-sided rib fractures              Workstation performed: [id number] Procedures  ECG 12 Lead Documentation  Date/Time: 12/7/2018 7:38 AM  Performed by: Mindy Wilkerson  Authorized by: Mindy Wilkerson     Comments:      Initial EKG interpreted by me 67 beats per minute normal sinus rhythm no ST elevation no ectopic similar to previous axis is normal            Phone Contacts  ED Phone Contact    ED Course  ED Course as of Dec 07 0921   Fri Dec 07, 2018   2585 Patient presents with wheezing and rhonchi  History of asthma chest x-ray negative EKG negative bag is slightly low  Heart neb given  Patient is still wheezing or rhonchitic not much improvement  He will be given magnesium IV also received Solu-Medrol would be admitted for dyspnea and status asthmaticus                                MDM  CritCare Time    Disposition  Final diagnoses:   Status asthmaticus     Time reflects when diagnosis was documented in both MDM as applicable and the Disposition within this note     Time User Action Codes Description Comment    12/7/2018  9:19 AM Vernon Shaw Add [[id number]] Status asthmaticus       ED Disposition     ED Disposition Condition Comment    Admit  Case was discussed with dr Agatha Blancas and the patient's admission status was agreed to be Admission Status: inpatient status to the service of Dr Agatha Blancas   Follow-up Information    None         Patient's Medications   Discharge Prescriptions    No medications on file     No discharge procedures on file      ED Provider  Electronically Signed by           Adelina Mattson PA-C  12/07/18 0522

## 2018-12-07 NOTE — H&P
History and Physical - [facility] Internal Medicine    Patient Information: Kori Mei 68 y o  female MRN: [id number]  Unit/Bed#: E5 -01 Encounter: [id number]  Admitting Physician: Mere Kessler MD  PCP: Oneida Tesfaye MD  Date of Admission:  12/07/18    Assessment/Plan:    Hospital Problem List:     Principal Problem:    Status asthmaticus  Active Problems:    HTN (hypertension)    E  coli UTI (urinary tract infection)    Diabetes mellitus ([facility] [address] )    Asthma      1 ) Asthma exacerbation  -unclear etiology, possibly secondary to need for adjusting her home regimen versus viral bronchitis versus cold induced  -will check influenza, RSV  -Solu-Medrol 40 mg IV b i d   -nebulizers  -Breo Ellipta    2 ) Hypertension  -patient is on candesartan at home, will start losartan 12 5 mg here    3 ) Diabetes mellitus  -will hold oral hypoglycemics  -monitor Accu-Cheks, sliding scale for coverage    4 ) Hyperlipidemia  -diet controlled    VTE Prophylaxis: Enoxaparin (Lovenox)  / sequential compression device   Code Status: FULL  POLST: There is no POLST form on file for this patient (pre-hospital)    Anticipated Length of Stay:  Patient will be admitted on an Inpatient basis with an anticipated length of stay of  greater than 2 midnights  Justification for Hospital Stay:  Asthma exacerbation    Total Time for Visit, including Counseling / Coordination of Care: 30 minutes  Greater than 50% of this total time spent on direct patient counseling and coordination of care  Chief Complaint:   Dyspnea    History of Present Illness:    Kori Mei is a 68 y o  female who presents with dyspnea  Patient has past medical history significant for asthma on home inhalers however progressively worsening over the last 3 weeks  Patient also has history of diabetes mellitus on oral hypoglycemics, hypertension, hyperlipidemia    Patient states her asthma is usually well controlled however the last several weeks he has been having chest tightness and using nebulizers at home without relief  Patient states for the she has been having a productive cough of white phlegm, subjective fever and chills at home  Also complains of having nausea and nonbloody nonbilious vomiting  Patient states he did Robitussin at home without relief  She had her boyfriend at home is also sick with similar complaints  Patient states she started her  amoxicillin 2 days ago without improvement of her symptoms  She denies any chest pain, palpitations  Denies any diarrhea, abdominal pain  Review of Systems:    Review of Systems   Constitutional: Positive for fever  HENT: Negative  Eyes: Negative  Respiratory: Positive for cough, shortness of breath and wheezing  Cardiovascular: Negative  Gastrointestinal: Positive for nausea and vomiting  Endocrine: Negative  Genitourinary: Negative  Musculoskeletal: Negative  Skin: Negative  Allergic/Immunologic: Negative  Neurological: Negative  Hematological: Negative  Psychiatric/Behavioral: Negative  Past Medical and Surgical History:     Past Medical History:   Diagnosis Date    Asthma     Diabetes mellitus ([facility] [address] )     Hypertension        Past Surgical History:   Procedure Laterality Date    BRONCHOSCOPY N/A 3/16/2016    Procedure: BRONCHOSCOPY FLEXIBLE/anesth ;  Surgeon: Yo Smallwood DO;  Location: [facility]; Service:        Meds/Allergies:    Prior to Admission medications    Medication Sig Start Date End Date Taking?  Authorizing Provider   albuterol (PROVENTIL HFA,VENTOLIN HFA) 90 mcg/act inhaler Inhale 2 puffs every 6 (six) hours as needed for wheezing    Historical Provider, MD   albuterol (PROVENTIL HFA,VENTOLIN HFA) 90 mcg/act inhaler Inhale 2 puffs every 4 (four) hours as needed for wheezing 6/12/18   Kip MD [place]   albuterol (PROVENTIL HFA,VENTOLIN HFA) 90 mcg/act inhaler Inhale 2 puffs every 4 (four) hours as needed for wheezing 6/12/18 Mariposa Graf MD   candesartan (ATACAND) 8 MG tablet Take 1 tablet by mouth daily    Historical Provider, MD   fexofenadine (ALLEGRA) 180 MG tablet Take 180 mg by mouth daily 6/11/18   Historical Provider, MD   meclizine (ANTIVERT) 12 5 MG tablet Take 1 tablet by mouth 3 (three) times a day as needed for dizziness 3/16/17   Wilhemena Ceja, DO   metFORMIN (GLUCOPHAGE) 850 mg tablet Take 850 mg by mouth 2 (two) times a day with meals    Historical Provider, MD   Mometasone Furo-Formoterol Fum (DULERA) 100-5 MCG/ACT AERO 2 puffs BID 3/19/16   Christine Hoffmann,    mometasone-formoterol (DULERA) 100-5 MCG/ACT inhaler Inhale 2 puffs 2 (two) times a day Rinse mouth after use  6/12/18   Mariposa Graf MD     I have reviewed home medications with patient personally  Allergies: Allergies   Allergen Reactions    Bactrim [Sulfamethoxazole-Trimethoprim] Hives       Social History:     History   Alcohol Use No     History   Smoking Status    Never Smoker   Smokeless Tobacco    Never Used     History   Drug Use No       Family History:    History reviewed  No pertinent family history  Physical Exam:     Vitals:   Blood Pressure: (!) 179/76 (12/07/18 1107)  Pulse: 74 (12/07/18 1107)  Temperature: 97 6 °F (36 4 °C) (12/07/18 1107)  Temp Source: Temporal (12/07/18 1107)  Respirations: 18 (12/07/18 1107)  Weight - Scale: 57 9 kg (127 lb 10 3 oz) (12/07/18 0721)  SpO2: 93 % (12/07/18 1107)    Constitutional: Patient is oriented to person, place and time, no acute distress  HEENT:  Normocephalic, atraumatic, EOMI, PERRLA, no scleral icterus, no pallor, moist oral mucosa  Neck:  Supple, no masses, no thyromegaly, no bruits Normal range of motion  Lymph nodes:  No lymphadenopathy  Cardiovascular: Normal S1S2, RRR, No murmurs/rubs/gallops appreciated    Pulmonary: Clear to auscultation bilaterally, scattered wheezing  Abdominal: Soft, Bowel sounds present, Non-tender, Non-distended, No rebound/guarding, no hepatomegaly   Musculoskeletal: No tenderness/abnormality   Extremities:  No cyanosis, clubbing or edema  Peripheral pulses palpable and equal bilaterally  Neurological: Cranial nerves II-XII grossly intact, sensation intact, otherwise no focal neurological symptoms  Skin: Skin is warm and dry, no rashes  Additional Data:     Lab Results: I have personally reviewed pertinent reports  Results from last 7 days  Lab Units 12/07/18  0750   WBC Thousand/uL 5 47   HEMOGLOBIN g/dL 12 7   HEMATOCRIT % 36 3   PLATELETS Thousands/uL 177   NEUTROS PCT % 58   LYMPHS PCT % 28   MONOS PCT % 10   EOS PCT % 3       Results from last 7 days  Lab Units 12/07/18  0750   POTASSIUM mmol/L 4 1   CHLORIDE mmol/L 98*   CO2 mmol/L 27   BUN mg/dL 9   CREATININE mg/dL 0 57*   CALCIUM mg/dL 9 2   ALK PHOS U/L 109   ALT U/L 22   AST U/L 13           Imaging: I have personally reviewed pertinent reports  Xr Chest 2 Views    Result Date: 12/7/2018  Narrative: CHEST INDICATION:   sob  COMPARISON:  None EXAM PERFORMED/VIEWS:  XR CHEST PA & LATERAL Images: 2 FINDINGS:  Lungs are adequately aerated  No consolidation or large effusion  Cardiac silhouette is enlarged  Prominent right superior mediastinum, likely ectatic vasculature, similar to prior exam   No vascular congestion or peribronchial thickening  Multiple old left-sided rib fractures are grossly stable  No pneumothorax or free air  Impression: No lobar consolidation or large effusion  Cardiomegaly without pulmonary edema  Multiple old left-sided rib fractures  Workstation performed: [id number]       Allscripts / Epic Records Reviewed: Yes     ** Please Note: This note has been constructed using a voice recognition system   **

## 2018-12-08 LAB
ALBUMIN SERPL BCP-MCNC: 3.5 G/DL (ref 3.5–5)
ALP SERPL-CCNC: 98 U/L (ref 46–116)
ALT SERPL W P-5'-P-CCNC: 20 U/L (ref 12–78)
ANION GAP SERPL CALCULATED.3IONS-SCNC: 10 MMOL/L (ref 4–13)
AST SERPL W P-5'-P-CCNC: 12 U/L (ref 5–45)
BASOPHILS # BLD AUTO: 0.02 THOUSANDS/ΜL (ref 0–0.1)
BASOPHILS NFR BLD AUTO: 0 % (ref 0–1)
BILIRUB SERPL-MCNC: 0.22 MG/DL (ref 0.2–1)
BUN SERPL-MCNC: 15 MG/DL (ref 5–25)
CALCIUM SERPL-MCNC: 9.5 MG/DL (ref 8.3–10.1)
CHLORIDE SERPL-SCNC: 96 MMOL/L (ref 100–108)
CO2 SERPL-SCNC: 25 MMOL/L (ref 21–32)
CREAT SERPL-MCNC: 0.65 MG/DL (ref 0.6–1.3)
EOSINOPHIL # BLD AUTO: 0.01 THOUSAND/ΜL (ref 0–0.61)
EOSINOPHIL NFR BLD AUTO: 0 % (ref 0–6)
ERYTHROCYTE [DISTWIDTH] IN BLOOD BY AUTOMATED COUNT: 13.2 % (ref 11.6–15.1)
GFR SERPL CREATININE-BSD FRML MDRD: 89 ML/MIN/1.73SQ M
GLUCOSE SERPL-MCNC: 197 MG/DL (ref 65–140)
GLUCOSE SERPL-MCNC: 198 MG/DL (ref 65–140)
GLUCOSE SERPL-MCNC: 246 MG/DL (ref 65–140)
GLUCOSE SERPL-MCNC: 258 MG/DL (ref 65–140)
GLUCOSE SERPL-MCNC: 477 MG/DL (ref 65–140)
GLUCOSE SERPL-MCNC: >500 MG/DL (ref 65–140)
HCT VFR BLD AUTO: 34.9 % (ref 34.8–46.1)
HGB BLD-MCNC: 12.2 G/DL (ref 11.5–15.4)
IMM GRANULOCYTES # BLD AUTO: 0.02 THOUSAND/UL (ref 0–0.2)
IMM GRANULOCYTES NFR BLD AUTO: 0 % (ref 0–2)
LYMPHOCYTES # BLD AUTO: 1.22 THOUSANDS/ΜL (ref 0.6–4.47)
LYMPHOCYTES NFR BLD AUTO: 22 % (ref 14–44)
MCH RBC QN AUTO: 32.2 PG (ref 26.8–34.3)
MCHC RBC AUTO-ENTMCNC: 35 G/DL (ref 31.4–37.4)
MCV RBC AUTO: 92 FL (ref 82–98)
MONOCYTES # BLD AUTO: 0.51 THOUSAND/ΜL (ref 0.17–1.22)
MONOCYTES NFR BLD AUTO: 9 % (ref 4–12)
NEUTROPHILS # BLD AUTO: 3.67 THOUSANDS/ΜL (ref 1.85–7.62)
NEUTS SEG NFR BLD AUTO: 69 % (ref 43–75)
NRBC BLD AUTO-RTO: 0 /100 WBCS
PLATELET # BLD AUTO: 183 THOUSANDS/UL (ref 149–390)
PMV BLD AUTO: 11.8 FL (ref 8.9–12.7)
POTASSIUM SERPL-SCNC: 4.1 MMOL/L (ref 3.5–5.3)
PROT SERPL-MCNC: 7.2 G/DL (ref 6.4–8.2)
RBC # BLD AUTO: 3.79 MILLION/UL (ref 3.81–5.12)
SODIUM SERPL-SCNC: 131 MMOL/L (ref 136–145)
WBC # BLD AUTO: 5.45 THOUSAND/UL (ref 4.31–10.16)

## 2018-12-08 PROCEDURE — 85025 COMPLETE CBC W/AUTO DIFF WBC: CPT | Performed by: INTERNAL MEDICINE

## 2018-12-08 PROCEDURE — 99232 SBSQ HOSP IP/OBS MODERATE 35: CPT | Performed by: INTERNAL MEDICINE

## 2018-12-08 PROCEDURE — 94760 N-INVAS EAR/PLS OXIMETRY 1: CPT

## 2018-12-08 PROCEDURE — 80053 COMPREHEN METABOLIC PANEL: CPT | Performed by: INTERNAL MEDICINE

## 2018-12-08 PROCEDURE — 94640 AIRWAY INHALATION TREATMENT: CPT

## 2018-12-08 PROCEDURE — 82948 REAGENT STRIP/BLOOD GLUCOSE: CPT

## 2018-12-08 RX ADMIN — INSULIN LISPRO 5 UNITS: 100 INJECTION, SOLUTION INTRAVENOUS; SUBCUTANEOUS at 16:42

## 2018-12-08 RX ADMIN — INSULIN LISPRO 5 UNITS: 100 INJECTION, SOLUTION INTRAVENOUS; SUBCUTANEOUS at 16:09

## 2018-12-08 RX ADMIN — METHYLPREDNISOLONE SODIUM SUCCINATE 40 MG: 40 INJECTION, POWDER, FOR SOLUTION INTRAMUSCULAR; INTRAVENOUS at 21:41

## 2018-12-08 RX ADMIN — LORATADINE 10 MG: 10 TABLET ORAL at 09:26

## 2018-12-08 RX ADMIN — GUAIFENESIN AND DEXTROMETHORPHAN 10 ML: 100; 10 SYRUP ORAL at 21:41

## 2018-12-08 RX ADMIN — IPRATROPIUM BROMIDE AND ALBUTEROL SULFATE 3 ML: 2.5; .5 SOLUTION RESPIRATORY (INHALATION) at 08:25

## 2018-12-08 RX ADMIN — IPRATROPIUM BROMIDE AND ALBUTEROL SULFATE 3 ML: 2.5; .5 SOLUTION RESPIRATORY (INHALATION) at 14:40

## 2018-12-08 RX ADMIN — ACETAMINOPHEN 650 MG: 325 TABLET ORAL at 07:39

## 2018-12-08 RX ADMIN — IPRATROPIUM BROMIDE AND ALBUTEROL SULFATE 3 ML: 2.5; .5 SOLUTION RESPIRATORY (INHALATION) at 01:33

## 2018-12-08 RX ADMIN — GUAIFENESIN AND DEXTROMETHORPHAN 10 ML: 100; 10 SYRUP ORAL at 11:34

## 2018-12-08 RX ADMIN — LOSARTAN POTASSIUM 12.5 MG: 25 TABLET ORAL at 09:26

## 2018-12-08 RX ADMIN — METHYLPREDNISOLONE SODIUM SUCCINATE 40 MG: 40 INJECTION, POWDER, FOR SOLUTION INTRAMUSCULAR; INTRAVENOUS at 09:30

## 2018-12-08 RX ADMIN — INSULIN LISPRO 1 UNITS: 100 INJECTION, SOLUTION INTRAVENOUS; SUBCUTANEOUS at 11:31

## 2018-12-08 RX ADMIN — INSULIN LISPRO 2 UNITS: 100 INJECTION, SOLUTION INTRAVENOUS; SUBCUTANEOUS at 22:37

## 2018-12-08 RX ADMIN — GUAIFENESIN AND DEXTROMETHORPHAN 10 ML: 100; 10 SYRUP ORAL at 16:16

## 2018-12-08 RX ADMIN — BENZONATATE 100 MG: 100 CAPSULE ORAL at 04:51

## 2018-12-08 RX ADMIN — FLUTICASONE FUROATE AND VILANTEROL TRIFENATATE 1 PUFF: 100; 25 POWDER RESPIRATORY (INHALATION) at 17:55

## 2018-12-08 RX ADMIN — ENOXAPARIN SODIUM 40 MG: 40 INJECTION SUBCUTANEOUS at 09:27

## 2018-12-08 RX ADMIN — INSULIN LISPRO 1 UNITS: 100 INJECTION, SOLUTION INTRAVENOUS; SUBCUTANEOUS at 09:25

## 2018-12-08 RX ADMIN — FLUTICASONE FUROATE AND VILANTEROL TRIFENATATE 1 PUFF: 100; 25 POWDER RESPIRATORY (INHALATION) at 09:30

## 2018-12-08 RX ADMIN — GUAIFENESIN AND DEXTROMETHORPHAN 10 ML: 100; 10 SYRUP ORAL at 05:47

## 2018-12-08 RX ADMIN — IPRATROPIUM BROMIDE AND ALBUTEROL SULFATE 3 ML: 2.5; .5 SOLUTION RESPIRATORY (INHALATION) at 19:26

## 2018-12-08 NOTE — UTILIZATION REVIEW
Initial Clinical Review    Admission: Date/Time/Statement: 12/7/18 @ 0921     Orders Placed This Encounter   Procedures    Inpatient Admission (expected length of stay for this patient is greater than two midnights)     Standing Status:   Standing     Number of Occurrences:   1     Order Specific Question:   Admitting Physician     Answer:   Xavier Guzman     Order Specific Question:   Level of Care     Answer:   Med Surg [16]     Order Specific Question:   Estimated length of stay     Answer:   More than 2 Midnights     Order Specific Question:   Certification     Answer:   I certify that inpatient services are medically necessary for this patient for a duration of greater than two midnights  See H&P and MD Progress Notes for additional information about the patient's course of treatment  ED: Date/Time/Mode of Arrival:   ED Arrival Information     Expected Arrival Acuity Means of Arrival Escorted By Service Admission Type    - 12/7/2018 07:11 Urgent Walk-In Family Member General Medicine Urgent    Arrival Complaint    Asthma; chest pain          Chief Complaint:   Chief Complaint   Patient presents with    Cough     Pt reports productive cough x 2 weeks, with thick white sputum, feeling SOB using albuterol MDI, dullera, and nebulizer machine with no relief  chest pain with cough    Wheezing       History of Illness: a 71-year-old female patient with history of asthma for the last 2 weeks she has had thick white sputum feeling short of breath she uses her albuterol 1 to 2 times a week because she states that her doctor told her to do  She started amoxicillin of her 's 2 days ago to try to help her without improvement  She has had no fevers no chills once again her cough is white sputum and she complains of chest pain of more so long her diaphragm only when she coughs  He could hear the wheezing and rhonchi without using a stethoscope    No nausea vomiting diarrhea abdominal pain no chest pain or shortness of breath no urinary symptoms no rashes stiff neck  Patient appears nontoxic  Nothing makes it better or worse  Differential diagnosis includes not limited to asthma exacerbation, COPD, pneumonia influenza less likely due to fact that she does not have fever chills headache or body aches  ACS, heart failure      ED Vital Signs:   ED Triage Vitals   Temperature Pulse Respirations Blood Pressure SpO2   12/07/18 0721 12/07/18 0721 12/07/18 0721 12/07/18 0721 12/07/18 0721   98 4 °F (36 9 °C) 75 (!) 28 (!) 186/84 97 %      Temp Source Heart Rate Source Patient Position - Orthostatic VS BP Location FiO2 (%)   12/07/18 0721 12/07/18 0721 12/07/18 0932 12/07/18 0721 --   Oral Monitor Lying Left arm       Pain Score       12/07/18 0721       3        Wt Readings from Last 1 Encounters:   12/07/18 57 9 kg (127 lb 10 3 oz)       Results Reviewed     Procedure Component Value Units Date/Time    Influenza A/B and RSV by PCR [[id number]]  (Abnormal) Collected:  12/07/18 0857    Lab Status:  Final result Specimen:  Nasopharyngeal from Nasopharyngeal Swab Updated:  12/07/18 1848     INFLU A PCR None Detected     INFLU B PCR None Detected     RSV PCR Detected (A)    Rapid Influenza Screen with Reflex PCR [[id number]]  (Normal) Collected:  12/07/18 0857    Lab Status:  Final result Specimen:  Nasopharyngeal from Nasopharyngeal Swab Updated:  12/07/18 0930     Rapid Influenza A Ag Negative     Rapid Influenza B Ag Negative    Blood culture #2 [[id number]] Collected:  12/07/18 0814    Lab Status:   In process Specimen:  Blood from Hand, Right Updated:  12/07/18 0823    Magnesium [[id number]]  (Abnormal) Collected:  12/07/18 0750    Lab Status:  Final result Specimen:  Blood from Arm, Left Updated:  12/07/18 0820     Magnesium 1 5 (L) mg/dL     B-type natriuretic peptide [[id number]]  (Abnormal) Collected:  12/07/18 0750    Lab Status:  Final result Specimen:  Blood from Arm, Left Updated:  12/07/18 0820     NT-proBNP 291 (H) pg/mL     Troponin I [[id number]]  (Normal) Collected:  12/07/18 0750    Lab Status:  Final result Specimen:  Blood from Arm, Left Updated:  12/07/18 0815     Troponin I <0 02 ng/mL     Comprehensive metabolic panel [[id number]]  (Abnormal) Collected:  12/07/18 0750    Lab Status:  Final result Specimen:  Blood from Arm, Left Updated:  12/07/18 0813     Sodium 135 (L) mmol/L      Potassium 4 1 mmol/L      Chloride 98 (L) mmol/L      CO2 27 mmol/L      ANION GAP 10 mmol/L      BUN 9 mg/dL      Creatinine 0 57 (L) mg/dL      Glucose 154 (H) mg/dL      Calcium 9 2 mg/dL      AST 13 U/L      ALT 22 U/L      Alkaline Phosphatase 109 U/L      Total Protein 7 2 g/dL      Albumin 3 6 g/dL      Total Bilirubin 0 35 mg/dL      eGFR 93 ml/min/1 73sq m     Narrative:         National Kidney Disease Education Program recommendations are as follows:  GFR calculation is accurate only with a steady state creatinine  Chronic Kidney disease less than 60 ml/min/1 73 sq  meters  Kidney failure less than 15 ml/min/1 73 sq  meters  CBC and differential [[id number]] Collected:  12/07/18 0750    Lab Status:  Final result Specimen:  Blood from Arm, Left Updated:  12/07/18 0800     WBC 5 47 Thousand/uL      RBC 3 96 Million/uL      Hemoglobin 12 7 g/dL      Hematocrit 36 3 %      MCV 92 fL      MCH 32 1 pg      MCHC 35 0 g/dL      RDW 13 2 %      MPV 11 7 fL      Platelets 698 Thousands/uL      nRBC 0 /100 WBCs      Neutrophils Relative 58 %      Immat GRANS % 0 %      Lymphocytes Relative 28 %      Monocytes Relative 10 %      Eosinophils Relative 3 %      Basophils Relative 1 %      Neutrophils Absolute 3 18 Thousands/µL      Immature Grans Absolute 0 02 Thousand/uL      Lymphocytes Absolute 1 51 Thousands/µL      Monocytes Absolute 0 56 Thousand/µL      Eosinophils Absolute 0 15 Thousand/µL      Basophils Absolute 0 05 Thousands/µL     Blood culture #1 [[id number]] Collected:  12/07/18 0750    Lab Status:   In process Specimen: Blood from Arm, Left Updated:  12/07/18 0755            ED Treatment:   Medication Administration from 12/07/2018 0711 to 12/07/2018 1103       Date/Time Order Dose Route Action Action by Comments     12/07/2018 0907 sodium chloride 0 9 % bolus 1,000 mL 0 mL Intravenous Stopped Alyssia Glaser RN      12/07/2018 0749 sodium chloride 0 9 % bolus 1,000 mL 1,000 mL Intravenous [address] Mildred Pino RN      12/07/2018 4486 albuterol inhalation solution 10 mg 10 mg Nebulization Given Wagoner Pippins, RT      12/07/2018 0811 ipratropium (ATROVENT) 0 02 % inhalation solution 1 mg 1 mg Nebulization Given Wagoner Pippins, RT      12/07/2018 9359 sodium chloride 0 9 % inhalation solution 12 mL 6 mL Nebulization Given Wagoner Pippins, RT      12/07/2018 0747 methylPREDNISolone sodium succinate (Solu-MEDROL) injection 125 mg 125 mg Intravenous Given Mildred Pino RN      12/07/2018 6841 magnesium sulfate 2 g/50 mL IVPB (premix) 2 g 2 g Intravenous Continue to Inpatient Floor Mildred Pino RN           Past Medical/Surgical History: Active Ambulatory Problems     Diagnosis Date Noted    Traumatic subarachnoid hemorrhage with loss of consciousness ([facility] [address] ) 02/28/2016    Multiple transverse process fractures 02/28/2016    Multiple closed fractures of ribs of left side 02/28/2016    Closed fracture of left clavicle 02/28/2016    Collapse of left lung 03/15/2016    HTN (hypertension) 08/25/2017    E  coli UTI (urinary tract infection) 08/25/2017    Diabetes mellitus ([address] ) 08/25/2017    Asthma 08/25/2017     Resolved Ambulatory Problems     Diagnosis Date Noted    Vasovagal syncope 08/25/2017     Past Medical History:   Diagnosis Date    Asthma     Diabetes mellitus ([facility] [address] )     Hypertension        Admitting Diagnosis: Status asthmaticus [J45 902]  Asthma [J45 909]    Age/Sex: 68 y o  female    Assessment/Plan: 68 Y  O FEMALE WITH HX ASTHMA C/O SOB WHITE THICK SPUTUM USING INHALERS AT HOME WITH NO IMPROVEMENT (+) WHEEZING AND RHONCHI   MILD RESPIRATORY DISTRESS NOTED      Admission Orders:  Scheduled Meds:   Current Facility-Administered Medications:  acetaminophen 650 mg Oral Q6H PRN Blayne Ward MD    benzonatate 100 mg Oral TID PRN Blayne Ward MD    dextromethorphan-guaiFENesin 10 mL Oral Q4H PRN Virginia Mars Spatzer, CRNP    enoxaparin 40 mg Subcutaneous Daily Blayne Ward MD    fluticasone-vilanterol 1 puff Inhalation BID Blayne Ward MD    insulin lispro 1-5 Units Subcutaneous TID AC Blayne Ward MD    ipratropium-albuterol 3 mL Nebulization Q6H Blayne Ward MD    loratadine 10 mg Oral Daily Blayne Ward MD    losartan 12 5 mg Oral Daily Blayne Ward MD    magnesium sulfate 2 g Intravenous Once Vernon Shaw PA-C Last Rate: Stopped (12/07/18 1128)   methylPREDNISolone sodium succinate 40 mg Intravenous Q12H [address] Blayne Ward MD      Continuous Infusions:    PRN Meds:   acetaminophen    benzonatate    dextromethorphan-guaiFENesin   BLOOD SUGARS BEFORE MEALS  SALINE LOCK  PEAK FLOW  POST TREATMENT PEAK FLOW 120 =33 %  COMPRESSION DEVICES  INCENTIVE SPIROMETRY

## 2018-12-08 NOTE — PROGRESS NOTES
[facility] Progress Note  Patient: Kori Mei 68 y o  female   MRN: [id number]  PCP: Oneida Tesfaye MD  Unit/Bed#: E5 -80 Encounter: [id number]  Date Of Visit: 18    Assessment:    Principal Problem:    Status asthmaticus  Active Problems:    HTN (hypertension)    E  coli UTI (urinary tract infection)    Diabetes mellitus ([address] )    Asthma      Plan:    1 ) Asthma exacerbation  -secondary to RSV   -Solu-Medrol 40 mg IV daily starting 18  -nebulizers  -Breo Ellipta     2 ) Hypertension  -patient is on candesartan at home, will start losartan 12 5 mg here     3 ) Diabetes mellitus  -will hold oral hypoglycemics  -monitor Accu-Cheks, sliding scale for coverage     4 ) Hyperlipidemia  -diet controlled    VTE Pharmacologic Prophylaxis:   Pharmacologic:  Lovenox    Patient Centered Rounds: I have performed bedside rounds with nursing staff today  Time Spent for Care: 20 minutes  More than 50% of total time spent on counseling and coordination of care as described above  Current Length of Stay: 1 day(s)    Current Patient Status: Inpatient   Certification Statement: The patient will continue to require additional inpatient hospital stay due to Asthma exacerbation    Discharge Plan / Estimated Discharge Date: 24-48 hours    Code Status: Level 1 - Full Code      Subjective:   Patient seen and examined at bedside, complaining of dyspnea, coughing  Objective:     Vitals:   Temp (24hrs), Av 6 °F (36 4 °C), Min:97 1 °F (36 2 °C), Max:98 °F (36 7 °C)    Temp:  [97 1 °F (36 2 °C)-98 °F (36 7 °C)] 97 1 °F (36 2 °C)  HR:  [63-85] 85  Resp:  [18-20] 20  BP: (154-176)/(68-96) 176/80  SpO2:  [93 %-97 %] 94 %  Body mass index is 21 24 kg/m²       Input and Output Summary (last 24 hours):     No intake or output data in the 24 hours ending 18 1511    Physical Exam:    Constitutional: Patient is oriented to person, place and time, no acute distress  HEENT:  Normocephalic, atraumatic, EOMI, PERRLA, no scleral icterus, no pallor, moist oral mucosa  Neck:  Supple, no masses, no thyromegaly, no bruits Normal range of motion  Lymph nodes:  No lymphadenopathy  Cardiovascular: Normal S1S2, RRR, No murmurs/rubs/gallops appreciated  Pulmonary: bilateral scattered rhonchi  Abdominal: Soft, Bowel sounds present, Non-tender, Non-distended, No rebound/guarding, no hepatomegaly   Musculoskeletal: No tenderness/abnormality   Extremities:  No cyanosis, clubbing or edema  Peripheral pulses palpable and equal bilaterally  Neurological: Cranial nerves II-XII grossly intact, sensation intact, otherwise no focal neurological symptoms  Skin: Skin is warm and dry, no rashes  Additional Data:     Labs:      Results from last 7 days  Lab Units 12/08/18  0457   WBC Thousand/uL 5 45   HEMOGLOBIN g/dL 12 2   HEMATOCRIT % 34 9   PLATELETS Thousands/uL 183   NEUTROS PCT % 69   LYMPHS PCT % 22   MONOS PCT % 9   EOS PCT % 0       Results from last 7 days  Lab Units 12/08/18  0457   POTASSIUM mmol/L 4 1   CHLORIDE mmol/L 96*   CO2 mmol/L 25   BUN mg/dL 15   CREATININE mg/dL 0 65   CALCIUM mg/dL 9 5   ALK PHOS U/L 98   ALT U/L 20   AST U/L 12            I Have Reviewed All Lab Data Listed Above  Recent Cultures (last 7 days):       Results from last 7 days  Lab Units 12/07/18  0857 12/07/18  0814 12/07/18  0750   BLOOD CULTURE   --  No Growth at 24 hrs  No Growth at 24 hrs     INFLUENZA B PCR  None Detected  --   --    RSV PCR  Detected*  --   --        Last 24 Hours Medication List:     Current Facility-Administered Medications:  acetaminophen 650 mg Oral Q6H PRN Caleb Altamirano MD    benzonatate 100 mg Oral TID PRN Caleb Altamirano MD    dextromethorphan-guaiFENesin 10 mL Oral Q4H PRN Mora Luo Spatzer, CRNP    enoxaparin 40 mg Subcutaneous Daily Caleb Altamirano MD    fluticasone-vilanterol 1 puff Inhalation BID Caleb Altamirano MD    insulin lispro 1-5 Units Subcutaneous TID Lawson Figueroa MD ipratropium-albuterol 3 mL Nebulization Q6H Jazmin Arboleda MD    loratadine 10 mg Oral Daily Jazmin Arboleda MD    losartan 12 5 mg Oral Daily Jazmin Arboleda MD    magnesium sulfate 2 g Intravenous Once Vernon Shaw PA-C Last Rate: Stopped (12/07/18 1128)   methylPREDNISolone sodium succinate 40 mg Intravenous Q12H Magdaleno Davenport MD         Today, Patient Was Seen By: Jazmin Arboleda MD

## 2018-12-09 PROBLEM — E78.5 HYPERLIPIDEMIA: Chronic | Status: ACTIVE | Noted: 2018-12-09

## 2018-12-09 PROBLEM — J40 TRACHEOBRONCHITIS: Status: ACTIVE | Noted: 2018-12-09

## 2018-12-09 LAB
ANION GAP SERPL CALCULATED.3IONS-SCNC: 10 MMOL/L (ref 4–13)
BUN SERPL-MCNC: 20 MG/DL (ref 5–25)
CALCIUM SERPL-MCNC: 10 MG/DL (ref 8.3–10.1)
CHLORIDE SERPL-SCNC: 94 MMOL/L (ref 100–108)
CO2 SERPL-SCNC: 26 MMOL/L (ref 21–32)
CREAT SERPL-MCNC: 0.78 MG/DL (ref 0.6–1.3)
GFR SERPL CREATININE-BSD FRML MDRD: 76 ML/MIN/1.73SQ M
GLUCOSE SERPL-MCNC: 273 MG/DL (ref 65–140)
GLUCOSE SERPL-MCNC: 273 MG/DL (ref 65–140)
GLUCOSE SERPL-MCNC: 306 MG/DL (ref 65–140)
GLUCOSE SERPL-MCNC: 320 MG/DL (ref 65–140)
GLUCOSE SERPL-MCNC: 331 MG/DL (ref 65–140)
GLUCOSE SERPL-MCNC: 378 MG/DL (ref 65–140)
POTASSIUM SERPL-SCNC: 4.3 MMOL/L (ref 3.5–5.3)
SODIUM SERPL-SCNC: 130 MMOL/L (ref 136–145)

## 2018-12-09 PROCEDURE — 82948 REAGENT STRIP/BLOOD GLUCOSE: CPT

## 2018-12-09 PROCEDURE — 94640 AIRWAY INHALATION TREATMENT: CPT

## 2018-12-09 PROCEDURE — 80048 BASIC METABOLIC PNL TOTAL CA: CPT | Performed by: PHYSICIAN ASSISTANT

## 2018-12-09 PROCEDURE — 99232 SBSQ HOSP IP/OBS MODERATE 35: CPT | Performed by: PHYSICIAN ASSISTANT

## 2018-12-09 PROCEDURE — 94760 N-INVAS EAR/PLS OXIMETRY 1: CPT

## 2018-12-09 RX ORDER — HYDRALAZINE HYDROCHLORIDE 20 MG/ML
5 INJECTION INTRAMUSCULAR; INTRAVENOUS ONCE
Status: COMPLETED | OUTPATIENT
Start: 2018-12-09 | End: 2018-12-09

## 2018-12-09 RX ORDER — PREDNISONE 20 MG/1
40 TABLET ORAL DAILY
Status: DISCONTINUED | OUTPATIENT
Start: 2018-12-10 | End: 2018-12-10

## 2018-12-09 RX ORDER — DOCUSATE SODIUM 100 MG/1
100 CAPSULE, LIQUID FILLED ORAL 2 TIMES DAILY
Status: DISCONTINUED | OUTPATIENT
Start: 2018-12-09 | End: 2018-12-13 | Stop reason: HOSPADM

## 2018-12-09 RX ORDER — LOSARTAN POTASSIUM 25 MG/1
25 TABLET ORAL DAILY
Status: DISCONTINUED | OUTPATIENT
Start: 2018-12-10 | End: 2018-12-13 | Stop reason: HOSPADM

## 2018-12-09 RX ADMIN — IPRATROPIUM BROMIDE AND ALBUTEROL SULFATE 3 ML: 2.5; .5 SOLUTION RESPIRATORY (INHALATION) at 01:12

## 2018-12-09 RX ADMIN — IPRATROPIUM BROMIDE AND ALBUTEROL SULFATE 3 ML: 2.5; .5 SOLUTION RESPIRATORY (INHALATION) at 19:37

## 2018-12-09 RX ADMIN — FLUTICASONE FUROATE AND VILANTEROL TRIFENATATE 1 PUFF: 100; 25 POWDER RESPIRATORY (INHALATION) at 08:43

## 2018-12-09 RX ADMIN — HYDRALAZINE HYDROCHLORIDE 5 MG: 20 INJECTION INTRAMUSCULAR; INTRAVENOUS at 01:26

## 2018-12-09 RX ADMIN — DOCUSATE SODIUM 100 MG: 100 CAPSULE, LIQUID FILLED ORAL at 11:42

## 2018-12-09 RX ADMIN — IPRATROPIUM BROMIDE AND ALBUTEROL SULFATE 3 ML: 2.5; .5 SOLUTION RESPIRATORY (INHALATION) at 14:35

## 2018-12-09 RX ADMIN — ENOXAPARIN SODIUM 40 MG: 40 INJECTION SUBCUTANEOUS at 08:48

## 2018-12-09 RX ADMIN — INSULIN LISPRO 3 UNITS: 100 INJECTION, SOLUTION INTRAVENOUS; SUBCUTANEOUS at 21:28

## 2018-12-09 RX ADMIN — IPRATROPIUM BROMIDE AND ALBUTEROL SULFATE 3 ML: 2.5; .5 SOLUTION RESPIRATORY (INHALATION) at 07:52

## 2018-12-09 RX ADMIN — INSULIN LISPRO 4 UNITS: 100 INJECTION, SOLUTION INTRAVENOUS; SUBCUTANEOUS at 16:47

## 2018-12-09 RX ADMIN — INSULIN LISPRO 3 UNITS: 100 INJECTION, SOLUTION INTRAVENOUS; SUBCUTANEOUS at 08:40

## 2018-12-09 RX ADMIN — GUAIFENESIN AND DEXTROMETHORPHAN 10 ML: 100; 10 SYRUP ORAL at 06:10

## 2018-12-09 RX ADMIN — FLUTICASONE FUROATE AND VILANTEROL TRIFENATATE 1 PUFF: 100; 25 POWDER RESPIRATORY (INHALATION) at 18:24

## 2018-12-09 RX ADMIN — GUAIFENESIN AND DEXTROMETHORPHAN 10 ML: 100; 10 SYRUP ORAL at 18:26

## 2018-12-09 RX ADMIN — METHYLPREDNISOLONE SODIUM SUCCINATE 40 MG: 40 INJECTION, POWDER, FOR SOLUTION INTRAMUSCULAR; INTRAVENOUS at 08:45

## 2018-12-09 RX ADMIN — LORATADINE 10 MG: 10 TABLET ORAL at 08:43

## 2018-12-09 RX ADMIN — DOCUSATE SODIUM 100 MG: 100 CAPSULE, LIQUID FILLED ORAL at 18:24

## 2018-12-09 RX ADMIN — BENZONATATE 100 MG: 100 CAPSULE ORAL at 21:29

## 2018-12-09 RX ADMIN — INSULIN LISPRO 3 UNITS: 100 INJECTION, SOLUTION INTRAVENOUS; SUBCUTANEOUS at 11:42

## 2018-12-09 RX ADMIN — LOSARTAN POTASSIUM 12.5 MG: 25 TABLET ORAL at 08:42

## 2018-12-09 NOTE — ASSESSMENT & PLAN NOTE
· Negative for influenza, positive for RSV   · Chest x-ray:  No lobar consolidation or large effusion, cardiomegaly without pulmonary edema, multiple old left-sided rib fractures  · Droplet precaution  · Continue supportive care

## 2018-12-09 NOTE — ASSESSMENT & PLAN NOTE
Lab Results   Component Value Date    HGBA1C 6 6 (H) 07/19/2018       Recent Labs      12/08/18   1728  12/08/18   2040  12/09/18   0101  12/09/18   0758   POCGLU  477*  258*  273*  273*       Blood Sugar Average: Last 72 hrs:  (P) 218 [id number]     · Well controlled per last A1c 6 6%   · Hyperglycemia now in setting of IV steroids   · Continue SSI, diabetic diet, Accu-Checks   · Continue to hold oral hypoglycemics while hospitalized, will resume upon discharge

## 2018-12-09 NOTE — ASSESSMENT & PLAN NOTE
· Asthma exacerbation secondary to RSV  · Blood cultures negative x 24 hrs, afebrile without leukocytosis  · Originally on IV Solu-Medrol 40 mg q 12 hours  · Will transition to p o  Prednisone 40 mg p o   Daily for 3 days, continue taper decreasing 10 mg every 3 days to taper completion  · Encourage incentive spirometer, flutter valve to help break up secretions  · Continue Breo, DuoNeb  · Patient still with some wheezing and chest congestion  · Please check Ambulatory pulse ox  · Will continue to monitor and if continues to clinically improve likely discharge within the next 24-48 hours  · Patient would benefit from outpatient follow-up with pulmonology

## 2018-12-09 NOTE — PROGRESS NOTES
Progress Note - So Figueroa 1945, 68 y o  female MRN: [id number]  Unit/Bed#: E5 -01 Encounter: [id number]  Primary Care Provider: Jose Guadalupe Pimentel MD   Date and time admitted to hospital: 12/7/2018  7:16 AM    * Status asthmaticus   Assessment & Plan    · Asthma exacerbation secondary to RSV  · Blood cultures negative x 24 hrs, afebrile without leukocytosis  · Originally on IV Solu-Medrol 40 mg q 12 hours  · Will transition to p o  Prednisone 40 mg p o   Daily for 3 days, continue taper decreasing 10 mg every 3 days to taper completion  · Encourage incentive spirometer, flutter valve to help break up secretions  · Continue Breo, DuoNeb  · Patient still with some wheezing and chest congestion  · Please check Ambulatory pulse ox  · Will continue to monitor and if continues to clinically improve likely discharge within the next 24-48 hours  · Patient would benefit from outpatient follow-up with pulmonology     Tracheobronchitis due to RSV   Assessment & Plan    · Negative for influenza, positive for RSV   · Chest x-ray:  No lobar consolidation or large effusion, cardiomegaly without pulmonary edema, multiple old left-sided rib fractures  · Droplet precaution  · Continue supportive care     HTN (hypertension)   Assessment & Plan    · Accelerated BP  · Will increase losartan to 25 mg po daily   · Monitor for improvement in blood pressure     Diabetes mellitus [facility])   Assessment & Plan    Lab Results   Component Value Date    HGBA1C 6 6 (H) 07/19/2018       Recent Labs      12/08/18   1728  12/08/18   2040  12/09/18   0101  12/09/18   0758   POCGLU  477*  258*  273*  273*       Blood Sugar Average: Last 72 hrs:  (P) 218 [id number]     · Well controlled per last A1c 6 6%   · Hyperglycemia now in setting of IV steroids   · Continue SSI, diabetic diet, Accu-Checks   · Continue to hold oral hypoglycemics while hospitalized, will resume upon discharge      Hyperlipidemia   Assessment & Plan    · Diet controlled        VTE Pharmacologic Prophylaxis:   Pharmacologic: Enoxaparin (Lovenox)  Mechanical VTE Prophylaxis in Place: No    Patient Centered Rounds: I have performed bedside rounds with nursing staff today  Discussions with Specialists or Other Care Team Provider:     Education and Discussions with Family / Patient: patient     Time Spent for Care: 30 minutes  More than 50% of total time spent on counseling and coordination of care as described above  Current Length of Stay: 2 day(s)    Current Patient Status: Inpatient   Certification Statement: The patient will continue to require additional inpatient hospital stay due to asthma exacerbation, RSV positive     Discharge Plan: pending     Code Status: Level 1 - Full Code      Subjective:   Ms Yue Torres continues to complain of cough unable to produce any sputum  She admits to shortness of breath with minimal exertion and with speaking in long sentences  She denies any chest pain, abdominal pain, urinary symptoms, fevers, night sweats, chills  She does not feel ready to go home today  Objective:     Vitals:   Temp (24hrs), Av 7 °F (36 5 °C), Min:97 6 °F (36 4 °C), Max:97 8 °F (36 6 °C)    Temp:  [97 6 °F (36 4 °C)-97 8 °F (36 6 °C)] 97 8 °F (36 6 °C)  HR:  [71-87] 72  Resp:  [18-20] 18  BP: (158-190)/(71-80) 172/79  SpO2:  [94 %-100 %] 100 %  Body mass index is 21 24 kg/m²  Input and Output Summary (last 24 hours):     No intake or output data in the 24 hours ending 18 1056    Physical Exam:     Physical Exam   Constitutional: She is oriented to person, place, and time  No distress  HENT:   Mouth/Throat: Oropharynx is clear and moist    Eyes: Conjunctivae are normal    Neck: Neck supple  Cardiovascular: Normal rate, regular rhythm and intact distal pulses  Pulmonary/Chest: No stridor     Patient is able to speak in clear sentences, presence of chest congestion with mild wheezing throughout lung fields bilaterally, no rhonchi or rales noted, no use of accessory muscles of respiration   Abdominal: Soft  Bowel sounds are normal  She exhibits no distension  There is no tenderness  There is no rebound and no guarding  Musculoskeletal: She exhibits no edema  Neurological: She is alert and oriented to person, place, and time  Skin: Skin is warm and dry  She is not diaphoretic  Psychiatric: She has a normal mood and affect  Nursing note and vitals reviewed  Additional Data:     Labs:      Results from last 7 days  Lab Units 12/08/18  0457   WBC Thousand/uL 5 45   HEMOGLOBIN g/dL 12 2   HEMATOCRIT % 34 9   PLATELETS Thousands/uL 183   NEUTROS PCT % 69   LYMPHS PCT % 22   MONOS PCT % 9   EOS PCT % 0       Results from last 7 days  Lab Units 12/08/18  0457   SODIUM mmol/L 131*   POTASSIUM mmol/L 4 1   CHLORIDE mmol/L 96*   CO2 mmol/L 25   BUN mg/dL 15   CREATININE mg/dL 0 65   ANION GAP mmol/L 10   CALCIUM mg/dL 9 5   ALBUMIN g/dL 3 5   TOTAL BILIRUBIN mg/dL 0 22   ALK PHOS U/L 98   ALT U/L 20   AST U/L 12   GLUCOSE RANDOM mg/dL 246*           Results from last 7 days  Lab Units 12/09/18  0758 12/09/18  0101 12/08/18  2040 12/08/18  1728 12/08/18  1635 12/08/18  1606 12/08/18  1603 12/08/18  1109 12/08/18  0733 12/07/18  2047 12/07/18  1547   POC GLUCOSE mg/dl 273* 273* 258* 477* >500* >500* >500* 198* 197* 328* 404*                   * I Have Reviewed All Lab Data Listed Above  * Additional Pertinent Lab Tests Reviewed: Labs Pending At The Time Of This Note    Imaging:    Imaging Reports Reviewed Today Include: chest xray   Imaging Personally Reviewed by Myself Includes:  none    Recent Cultures (last 7 days):       Results from last 7 days  Lab Units 12/07/18  0857 12/07/18  0814 12/07/18  0750   BLOOD CULTURE   --  No Growth at 24 hrs  No Growth at 24 hrs     INFLUENZA B PCR  None Detected  --   --    RSV PCR  Detected*  --   --        Last 24 Hours Medication List:     Current Facility-Administered Medications:  acetaminophen 650 mg Oral Q6H PRN Jarred Paz MD    benzonatate 100 mg Oral TID PRN Jarred Paz MD    dextromethorphan-guaiFENesin 10 mL Oral Q4H PRN Wheeler Gerhard Spatzer, CRNP    enoxaparin 40 mg Subcutaneous Daily Jarred Paz MD    fluticasone-vilanterol 1 puff Inhalation BID Jarred Paz MD    insulin lispro 1-5 Units Subcutaneous TID AC Jarred Paz MD    insulin lispro 1-5 Units Subcutaneous HS JAQUAN Jarvis    ipratropium-albuterol 3 mL Nebulization Q6H Jarred Paz MD    loratadine 10 mg Oral Daily Jarred Paz MD    [START ON 12/10/2018] losartan 25 mg Oral Daily Kelsi Salinas PA-C    magnesium sulfate 2 g Intravenous Once Vernon Shaw PA-C Last Rate: Stopped (12/07/18 1128)   [START ON 12/10/2018] predniSONE 40 mg Oral Daily Galdino Montgomery PA-C         Today, Patient Was Seen By: Galdino Montgomery PA-C    ** Please Note: Dictation voice to text software may have been used in the creation of this document   **

## 2018-12-09 NOTE — ASSESSMENT & PLAN NOTE
· Accelerated BP  · Will increase losartan to 25 mg po daily   · Monitor for improvement in blood pressure

## 2018-12-10 LAB
GLUCOSE SERPL-MCNC: 192 MG/DL (ref 65–140)
GLUCOSE SERPL-MCNC: 310 MG/DL (ref 65–140)
GLUCOSE SERPL-MCNC: 346 MG/DL (ref 65–140)
GLUCOSE SERPL-MCNC: 347 MG/DL (ref 65–140)

## 2018-12-10 PROCEDURE — 94760 N-INVAS EAR/PLS OXIMETRY 1: CPT

## 2018-12-10 PROCEDURE — 99232 SBSQ HOSP IP/OBS MODERATE 35: CPT | Performed by: FAMILY MEDICINE

## 2018-12-10 PROCEDURE — 82948 REAGENT STRIP/BLOOD GLUCOSE: CPT

## 2018-12-10 PROCEDURE — 94640 AIRWAY INHALATION TREATMENT: CPT

## 2018-12-10 RX ORDER — FLUTICASONE PROPIONATE 50 MCG
1 SPRAY, SUSPENSION (ML) NASAL DAILY
Status: DISCONTINUED | OUTPATIENT
Start: 2018-12-10 | End: 2018-12-13 | Stop reason: HOSPADM

## 2018-12-10 RX ORDER — METHYLPREDNISOLONE SODIUM SUCCINATE 125 MG/2ML
60 INJECTION, POWDER, LYOPHILIZED, FOR SOLUTION INTRAMUSCULAR; INTRAVENOUS EVERY 8 HOURS SCHEDULED
Status: DISCONTINUED | OUTPATIENT
Start: 2018-12-10 | End: 2018-12-11

## 2018-12-10 RX ORDER — GUAIFENESIN 600 MG
600 TABLET, EXTENDED RELEASE 12 HR ORAL EVERY 12 HOURS SCHEDULED
Status: DISCONTINUED | OUTPATIENT
Start: 2018-12-10 | End: 2018-12-13 | Stop reason: HOSPADM

## 2018-12-10 RX ADMIN — GUAIFENESIN AND DEXTROMETHORPHAN 10 ML: 100; 10 SYRUP ORAL at 05:48

## 2018-12-10 RX ADMIN — INSULIN LISPRO 4 UNITS: 100 INJECTION, SOLUTION INTRAVENOUS; SUBCUTANEOUS at 22:28

## 2018-12-10 RX ADMIN — PREDNISONE 40 MG: 20 TABLET ORAL at 08:33

## 2018-12-10 RX ADMIN — FLUTICASONE FUROATE AND VILANTEROL TRIFENATATE 1 PUFF: 100; 25 POWDER RESPIRATORY (INHALATION) at 08:33

## 2018-12-10 RX ADMIN — FLUTICASONE FUROATE AND VILANTEROL TRIFENATATE 1 PUFF: 100; 25 POWDER RESPIRATORY (INHALATION) at 19:20

## 2018-12-10 RX ADMIN — ENOXAPARIN SODIUM 40 MG: 40 INJECTION SUBCUTANEOUS at 08:33

## 2018-12-10 RX ADMIN — FLUTICASONE PROPIONATE 1 SPRAY: 50 SPRAY, METERED NASAL at 11:45

## 2018-12-10 RX ADMIN — BENZONATATE 100 MG: 100 CAPSULE ORAL at 16:35

## 2018-12-10 RX ADMIN — IPRATROPIUM BROMIDE AND ALBUTEROL SULFATE 3 ML: 2.5; .5 SOLUTION RESPIRATORY (INHALATION) at 01:31

## 2018-12-10 RX ADMIN — GUAIFENESIN AND DEXTROMETHORPHAN 10 ML: 100; 10 SYRUP ORAL at 10:25

## 2018-12-10 RX ADMIN — LOSARTAN POTASSIUM 25 MG: 25 TABLET, FILM COATED ORAL at 08:33

## 2018-12-10 RX ADMIN — INSULIN LISPRO 3 UNITS: 100 INJECTION, SOLUTION INTRAVENOUS; SUBCUTANEOUS at 11:45

## 2018-12-10 RX ADMIN — ACETAMINOPHEN 650 MG: 325 TABLET ORAL at 05:51

## 2018-12-10 RX ADMIN — GUAIFENESIN 600 MG: 600 TABLET, EXTENDED RELEASE ORAL at 22:24

## 2018-12-10 RX ADMIN — BENZONATATE 100 MG: 100 CAPSULE ORAL at 08:55

## 2018-12-10 RX ADMIN — INSULIN LISPRO 1 UNITS: 100 INJECTION, SOLUTION INTRAVENOUS; SUBCUTANEOUS at 08:33

## 2018-12-10 RX ADMIN — IPRATROPIUM BROMIDE AND ALBUTEROL SULFATE 3 ML: 2.5; .5 SOLUTION RESPIRATORY (INHALATION) at 19:24

## 2018-12-10 RX ADMIN — GUAIFENESIN AND DEXTROMETHORPHAN 10 ML: 100; 10 SYRUP ORAL at 16:35

## 2018-12-10 RX ADMIN — GUAIFENESIN 600 MG: 600 TABLET, EXTENDED RELEASE ORAL at 11:08

## 2018-12-10 RX ADMIN — DOCUSATE SODIUM 100 MG: 100 CAPSULE, LIQUID FILLED ORAL at 08:34

## 2018-12-10 RX ADMIN — IPRATROPIUM BROMIDE AND ALBUTEROL SULFATE 3 ML: 2.5; .5 SOLUTION RESPIRATORY (INHALATION) at 13:49

## 2018-12-10 RX ADMIN — IPRATROPIUM BROMIDE AND ALBUTEROL SULFATE 3 ML: 2.5; .5 SOLUTION RESPIRATORY (INHALATION) at 08:13

## 2018-12-10 RX ADMIN — METHYLPREDNISOLONE SODIUM SUCCINATE 60 MG: 125 INJECTION, POWDER, FOR SOLUTION INTRAMUSCULAR; INTRAVENOUS at 22:28

## 2018-12-10 RX ADMIN — METHYLPREDNISOLONE SODIUM SUCCINATE 60 MG: 125 INJECTION, POWDER, FOR SOLUTION INTRAMUSCULAR; INTRAVENOUS at 14:22

## 2018-12-10 RX ADMIN — LORATADINE 10 MG: 10 TABLET ORAL at 08:33

## 2018-12-10 RX ADMIN — INSULIN LISPRO 4 UNITS: 100 INJECTION, SOLUTION INTRAVENOUS; SUBCUTANEOUS at 16:36

## 2018-12-10 NOTE — ASSESSMENT & PLAN NOTE
· Asthma exacerbation secondary to RSV  · Blood cultures negative x 24 hrs, afebrile without leukocytosis  · Originally on IV Solu-Medrol 40 mg q 12 hours but  was transitioned to oral prednisone taper today  ·  however oral prednisone discontinued and IV Solu-Medrol 60 mg q 8 hours started due to significant wheezing on lung exam   · Encourage incentive spirometer, flutter valve to help break up secretions  · Continue Breo, DuoNeb  · Patient still with some wheezing and chest congestion  · Please check Ambulatory pulse ox  · Will continue to monitor and if continues to clinically improve likely discharge within the next 24-48 hours  · Patient would benefit from outpatient follow-up with pulmonology

## 2018-12-10 NOTE — PROGRESS NOTES
Progress Note - Jordan Main 1945, 68 y o  female MRN: [id number]    Unit/Bed#: E5 -01 Encounter: [id number]    Primary Care Provider: Suly Hamilton MD   Date and time admitted to hospital: 12/7/2018  7:16 AM        * Status asthmaticus   Assessment & Plan    · Asthma exacerbation secondary to RSV  · Blood cultures negative x 24 hrs, afebrile without leukocytosis  · Originally on IV Solu-Medrol 40 mg q 12 hours but  was transitioned to oral prednisone taper today  ·  however oral prednisone discontinued and IV Solu-Medrol 60 mg q 8 hours started due to significant wheezing on lung exam   · Encourage incentive spirometer, flutter valve to help break up secretions  · Continue Breo, DuoNeb  · Patient still with some wheezing and chest congestion  · Please check Ambulatory pulse ox  · Will continue to monitor and if continues to clinically improve likely discharge within the next 24-48 hours  · Patient would benefit from outpatient follow-up with pulmonology  Tracheobronchitis due to RSV   Assessment & Plan    · Negative for influenza, positive for RSV   · Chest x-ray:  No lobar consolidation or large effusion, cardiomegaly without pulmonary edema, multiple old left-sided rib fractures  · Droplet precaution  · Continue supportive care     Diabetes mellitus [facility])   Assessment & Plan    Lab Results   Component Value Date    HGBA1C 6 6 (H) 07/19/2018       Recent Labs      12/09/18   1635  12/09/18   2037  12/10/18   0742  12/10/18   1121   POCGLU  378*  306*  192*  310*       Blood Sugar Average: Last 72 hrs:  (P) 244 625     · Well controlled per last A1c 6 6%   · Hyperglycemia now in setting of IV steroids   · Continue SSI, diabetic diet, Accu-Checks   · Continue to hold oral hypoglycemics while hospitalized, will resume upon discharge      HTN (hypertension)   Assessment & Plan    · Accelerated BP possibly due to IV Solu-Medrol    ·  continue losartan to 25 mg po daily   · Monitor for improvement in blood pressure  Hyperlipidemia   Assessment & Plan    · Diet controlled          VTE Pharmacologic Prophylaxis:   Pharmacologic: Enoxaparin (Lovenox)  Mechanical VTE Prophylaxis in Place: No    Patient Centered Rounds: I have performed bedside rounds with nursing staff today  Discussions with Specialists or Other Care Team Provider:   Yes    Education and Discussions with Family / Patient:  yes    Time Spent for Care: 20 minutes  More than 50% of total time spent on counseling and coordination of care as described above  Current Length of Stay: 3 day(s)    Current Patient Status: Inpatient   Certification Statement: The patient will continue to require additional inpatient hospital stay due to Status asthmaticus    Discharge Plan: To be determined    Code Status: Level 1 - Full Code      Subjective:    patient states that she still feels fatigued, tired, extremely short of breath and wheezy  Objective:     Vitals:   Temp (24hrs), Av 5 °F (36 4 °C), Min:97 1 °F (36 2 °C), Max:97 9 °F (36 6 °C)    Temp:  [97 1 °F (36 2 °C)-97 9 °F (36 6 °C)] 97 9 °F (36 6 °C)  HR:  [58-83] 83  Resp:  [18-20] 20  BP: (168-180)/(73-96) 168/75  SpO2:  [92 %-97 %] 92 %  Body mass index is 21 24 kg/m²  Input and Output Summary (last 24 hours):     No intake or output data in the 24 hours ending 12/10/18 6412    Physical Exam:     Physical Exam   Constitutional: She is oriented to person, place, and time  She appears well-developed and well-nourished  She appears distressed  HENT:   Head: Normocephalic and atraumatic  Eyes: Pupils are equal, round, and reactive to light  EOM are normal    Cardiovascular: Normal rate, regular rhythm and normal heart sounds  Pulmonary/Chest: Effort normal  No respiratory distress  She has wheezes  Abdominal: Soft  Bowel sounds are normal  She exhibits no distension  There is no tenderness  Musculoskeletal: Normal range of motion   She exhibits no edema or tenderness  Neurological: She is alert and oriented to person, place, and time  Skin: Skin is warm and dry  She is not diaphoretic  Additional Data:     Labs:      Results from last 7 days  Lab Units 12/08/18  0457   WBC Thousand/uL 5 45   HEMOGLOBIN g/dL 12 2   HEMATOCRIT % 34 9   PLATELETS Thousands/uL 183   NEUTROS PCT % 69   LYMPHS PCT % 22   MONOS PCT % 9   EOS PCT % 0       Results from last 7 days  Lab Units 12/09/18  1103 12/08/18  0457   SODIUM mmol/L 130* 131*   POTASSIUM mmol/L 4 3 4 1   CHLORIDE mmol/L 94* 96*   CO2 mmol/L 26 25   BUN mg/dL 20 15   CREATININE mg/dL 0 78 0 65   ANION GAP mmol/L 10 10   CALCIUM mg/dL 10 0 9 5   ALBUMIN g/dL  --  3 5   TOTAL BILIRUBIN mg/dL  --  0 22   ALK PHOS U/L  --  98   ALT U/L  --  20   AST U/L  --  12   GLUCOSE RANDOM mg/dL 331* 246*           Results from last 7 days  Lab Units 12/10/18  1121 12/10/18  0742 12/09/18  2037 12/09/18  1635 12/09/18  1110 12/09/18  0758 12/09/18  0101 12/08/18  2040 12/08/18  1728 12/08/18  1635 12/08/18  1606 12/08/18  1603   POC GLUCOSE mg/dl 310* 192* 306* 378* 320* 273* 273* 258* 477* >500* >500* >500*                   * I Have Reviewed All Lab Data Listed Above  * Additional Pertinent Lab Tests Reviewed: Antwon 67 Admission Reviewed    Imaging:    Imaging Reports Reviewed Today Include:   None available  Imaging Personally Reviewed by Myself Includes:   none    Recent Cultures (last 7 days):       Results from last 7 days  Lab Units 12/07/18  0857 12/07/18  0814 12/07/18  0750   BLOOD CULTURE   --  No Growth at 72 hrs  No Growth at 72 hrs     INFLUENZA B PCR  None Detected  --   --    RSV PCR  Detected*  --   --        Last 24 Hours Medication List:     Current Facility-Administered Medications:  acetaminophen 650 mg Oral Q6H PRN Scarlet Santizo MD    benzonatate 100 mg Oral TID PRN Scarlet Santizo MD    dextromethorphan-guaiFENesin 10 mL Oral Q4H PRN Kelley Elder Spatzer, CRNP    docusate sodium 100 mg Oral BID Kelsi Salinas PA-C    enoxaparin 40 mg Subcutaneous Daily Chasity Lainez MD    fluticasone 1 spray Each Nare Daily Astrid Dhaliwal MD    fluticasone-vilanterol 1 puff Inhalation BID Chasity Lainez MD    guaiFENesin 600 mg Oral Q12H [address] Astrid Dhaliwal MD    insulin lispro 1-5 Units Subcutaneous TID AC Chasity Lainez MD    insulin lispro 1-5 Units Subcutaneous HS JAQUAN Shen    ipratropium-albuterol 3 mL Nebulization Q6H Chasity Lainez MD    loratadine 10 mg Oral Daily Chasity Lainez MD    losartan 25 mg Oral Daily Kelsi Salinas PA-C    magnesium sulfate 2 g Intravenous Once Vernon Shaw PA-C Last Rate: Stopped (12/07/18 1128)   methylPREDNISolone sodium succinate 60 mg Intravenous Q8H Dawn Navarrete MD         Today, Patient Was Seen By: Astrid Dhaliwal MD    ** Please Note: Dictation voice to text software may have been used in the creation of this document   **

## 2018-12-10 NOTE — ASSESSMENT & PLAN NOTE
· Accelerated BP possibly due to IV Solu-Medrol  ·  continue losartan to 25 mg po daily   · Monitor for improvement in blood pressure

## 2018-12-10 NOTE — ASSESSMENT & PLAN NOTE
Lab Results   Component Value Date    HGBA1C 6 6 (H) 07/19/2018       Recent Labs      12/09/18   1635  12/09/18   2037  12/10/18   0742  12/10/18   1121   POCGLU  378*  306*  192*  310*       Blood Sugar Average: Last 72 hrs:  (P) 244 625     · Well controlled per last A1c 6 6%   · Hyperglycemia now in setting of IV steroids   · Continue SSI, diabetic diet, Accu-Checks   · Continue to hold oral hypoglycemics while hospitalized, will resume upon discharge

## 2018-12-11 PROBLEM — E87.1 HYPONATREMIA: Status: ACTIVE | Noted: 2018-12-11

## 2018-12-11 LAB
ANION GAP SERPL CALCULATED.3IONS-SCNC: 11 MMOL/L (ref 4–13)
ATRIAL RATE: 67 BPM
BUN SERPL-MCNC: 21 MG/DL (ref 5–25)
CALCIUM SERPL-MCNC: 9.5 MG/DL (ref 8.3–10.1)
CHLORIDE SERPL-SCNC: 92 MMOL/L (ref 100–108)
CO2 SERPL-SCNC: 26 MMOL/L (ref 21–32)
CREAT SERPL-MCNC: 0.68 MG/DL (ref 0.6–1.3)
GFR SERPL CREATININE-BSD FRML MDRD: 88 ML/MIN/1.73SQ M
GLUCOSE SERPL-MCNC: 323 MG/DL (ref 65–140)
GLUCOSE SERPL-MCNC: 331 MG/DL (ref 65–140)
GLUCOSE SERPL-MCNC: 351 MG/DL (ref 65–140)
GLUCOSE SERPL-MCNC: 366 MG/DL (ref 65–140)
GLUCOSE SERPL-MCNC: 397 MG/DL (ref 65–140)
MAGNESIUM SERPL-MCNC: 2.1 MG/DL (ref 1.6–2.6)
P AXIS: 76 DEGREES
POTASSIUM SERPL-SCNC: 4 MMOL/L (ref 3.5–5.3)
PR INTERVAL: 154 MS
PROCALCITONIN SERPL-MCNC: <0.05 NG/ML
QRS AXIS: 76 DEGREES
QRSD INTERVAL: 80 MS
QT INTERVAL: 386 MS
QTC INTERVAL: 407 MS
SODIUM SERPL-SCNC: 129 MMOL/L (ref 136–145)
T WAVE AXIS: 77 DEGREES
VENTRICULAR RATE: 67 BPM

## 2018-12-11 PROCEDURE — 80048 BASIC METABOLIC PNL TOTAL CA: CPT | Performed by: FAMILY MEDICINE

## 2018-12-11 PROCEDURE — 99222 1ST HOSP IP/OBS MODERATE 55: CPT | Performed by: INTERNAL MEDICINE

## 2018-12-11 PROCEDURE — 94640 AIRWAY INHALATION TREATMENT: CPT

## 2018-12-11 PROCEDURE — 82948 REAGENT STRIP/BLOOD GLUCOSE: CPT

## 2018-12-11 PROCEDURE — 83735 ASSAY OF MAGNESIUM: CPT | Performed by: FAMILY MEDICINE

## 2018-12-11 PROCEDURE — 94760 N-INVAS EAR/PLS OXIMETRY 1: CPT

## 2018-12-11 PROCEDURE — 84145 PROCALCITONIN (PCT): CPT | Performed by: PHYSICIAN ASSISTANT

## 2018-12-11 PROCEDURE — 93010 ELECTROCARDIOGRAM REPORT: CPT | Performed by: INTERNAL MEDICINE

## 2018-12-11 PROCEDURE — 99232 SBSQ HOSP IP/OBS MODERATE 35: CPT | Performed by: FAMILY MEDICINE

## 2018-12-11 RX ORDER — METHYLPREDNISOLONE SODIUM SUCCINATE 40 MG/ML
40 INJECTION, POWDER, LYOPHILIZED, FOR SOLUTION INTRAMUSCULAR; INTRAVENOUS EVERY 12 HOURS SCHEDULED
Status: DISCONTINUED | OUTPATIENT
Start: 2018-12-11 | End: 2018-12-12

## 2018-12-11 RX ORDER — INSULIN GLARGINE 100 [IU]/ML
8 INJECTION, SOLUTION SUBCUTANEOUS
Status: DISCONTINUED | OUTPATIENT
Start: 2018-12-11 | End: 2018-12-12

## 2018-12-11 RX ADMIN — IPRATROPIUM BROMIDE AND ALBUTEROL SULFATE 3 ML: 2.5; .5 SOLUTION RESPIRATORY (INHALATION) at 19:09

## 2018-12-11 RX ADMIN — DOCUSATE SODIUM 100 MG: 100 CAPSULE, LIQUID FILLED ORAL at 18:30

## 2018-12-11 RX ADMIN — MAGNESIUM OXIDE TAB 400 MG (241.3 MG ELEMENTAL MG) 400 MG: 400 (241.3 MG) TAB at 18:30

## 2018-12-11 RX ADMIN — FLUTICASONE FUROATE AND VILANTEROL TRIFENATATE 1 PUFF: 100; 25 POWDER RESPIRATORY (INHALATION) at 08:32

## 2018-12-11 RX ADMIN — IPRATROPIUM BROMIDE AND ALBUTEROL SULFATE 3 ML: 2.5; .5 SOLUTION RESPIRATORY (INHALATION) at 01:35

## 2018-12-11 RX ADMIN — INSULIN LISPRO 4 UNITS: 100 INJECTION, SOLUTION INTRAVENOUS; SUBCUTANEOUS at 21:20

## 2018-12-11 RX ADMIN — BENZONATATE 100 MG: 100 CAPSULE ORAL at 01:28

## 2018-12-11 RX ADMIN — INSULIN LISPRO 3 UNITS: 100 INJECTION, SOLUTION INTRAVENOUS; SUBCUTANEOUS at 08:24

## 2018-12-11 RX ADMIN — GUAIFENESIN 600 MG: 600 TABLET, EXTENDED RELEASE ORAL at 08:31

## 2018-12-11 RX ADMIN — METHYLPREDNISOLONE SODIUM SUCCINATE 60 MG: 125 INJECTION, POWDER, FOR SOLUTION INTRAMUSCULAR; INTRAVENOUS at 06:11

## 2018-12-11 RX ADMIN — LORATADINE 10 MG: 10 TABLET ORAL at 08:31

## 2018-12-11 RX ADMIN — DOCUSATE SODIUM 100 MG: 100 CAPSULE, LIQUID FILLED ORAL at 08:33

## 2018-12-11 RX ADMIN — BENZONATATE 100 MG: 100 CAPSULE ORAL at 12:01

## 2018-12-11 RX ADMIN — LOSARTAN POTASSIUM 25 MG: 25 TABLET, FILM COATED ORAL at 08:31

## 2018-12-11 RX ADMIN — GUAIFENESIN AND DEXTROMETHORPHAN 10 ML: 100; 10 SYRUP ORAL at 01:28

## 2018-12-11 RX ADMIN — GUAIFENESIN AND DEXTROMETHORPHAN 10 ML: 100; 10 SYRUP ORAL at 14:56

## 2018-12-11 RX ADMIN — GUAIFENESIN AND DEXTROMETHORPHAN 10 ML: 100; 10 SYRUP ORAL at 21:25

## 2018-12-11 RX ADMIN — INSULIN LISPRO 5 UNITS: 100 INJECTION, SOLUTION INTRAVENOUS; SUBCUTANEOUS at 11:53

## 2018-12-11 RX ADMIN — INSULIN LISPRO 4 UNITS: 100 INJECTION, SOLUTION INTRAVENOUS; SUBCUTANEOUS at 16:41

## 2018-12-11 RX ADMIN — ENOXAPARIN SODIUM 40 MG: 40 INJECTION SUBCUTANEOUS at 08:30

## 2018-12-11 RX ADMIN — IPRATROPIUM BROMIDE AND ALBUTEROL SULFATE 3 ML: 2.5; .5 SOLUTION RESPIRATORY (INHALATION) at 13:21

## 2018-12-11 RX ADMIN — MAGNESIUM OXIDE TAB 400 MG (241.3 MG ELEMENTAL MG) 400 MG: 400 (241.3 MG) TAB at 11:54

## 2018-12-11 RX ADMIN — GUAIFENESIN 600 MG: 600 TABLET, EXTENDED RELEASE ORAL at 21:19

## 2018-12-11 RX ADMIN — INSULIN GLARGINE 8 UNITS: 100 INJECTION, SOLUTION SUBCUTANEOUS at 21:19

## 2018-12-11 RX ADMIN — IPRATROPIUM BROMIDE AND ALBUTEROL SULFATE 3 ML: 2.5; .5 SOLUTION RESPIRATORY (INHALATION) at 07:40

## 2018-12-11 RX ADMIN — FLUTICASONE PROPIONATE 1 SPRAY: 50 SPRAY, METERED NASAL at 08:32

## 2018-12-11 RX ADMIN — FLUTICASONE FUROATE AND VILANTEROL TRIFENATATE 1 PUFF: 100; 25 POWDER RESPIRATORY (INHALATION) at 18:31

## 2018-12-11 RX ADMIN — METHYLPREDNISOLONE SODIUM SUCCINATE 40 MG: 40 INJECTION, POWDER, FOR SOLUTION INTRAMUSCULAR; INTRAVENOUS at 21:19

## 2018-12-11 NOTE — PROGRESS NOTES
Patient refused for the new IV to be put in since the old one is due to be changed  Patient stated that she is going home tomorrow 12/12 and she does not need a new one for now  no cyanosis/no clubbing/no pedal edema

## 2018-12-11 NOTE — ASSESSMENT & PLAN NOTE
· Asthma exacerbation secondary to RSV  · Blood cultures negative x 72 hrs, afebrile   · Originally on IV Solu-Medrol 40 mg q 12 hours, attempted transition to po prednisone yesterday however due to continued chest congestion switched back to IV Solu-Medrol 60mg q8hrs   · At this point patient due to lack of significant improvement will consult Pulmonology for further recommendations   · Will check procalcitonin   · Encourage incentive spirometer, flutter valve to help break up secretions which seems to have helped   · Continue Иван CareyoNeb  · Maintained SpO2 during ambulation with Ambulatory pulse ox 94%   · Patient would benefit from outpatient follow-up with pulmonology

## 2018-12-11 NOTE — ASSESSMENT & PLAN NOTE
· Accelerated BP, improved currently at 158/82,  possibly due to IV Solu-Medrol    · continue losartan to 25 mg po daily   · Monitor for improvement in blood pressure

## 2018-12-11 NOTE — CONSULTS
Consultation - Pulmonary Medicine   Eric Moyer 68 y o  female MRN: [id number]  Unit/Bed#: E5 -01 Encounter: [id number]      Physician Requesting Consult: Dr Sarkis Bruner  Reason for Consult: RSV    Assessment/Plan:    1  Asthma exacerbation secondary to RSV  1  Continue flonase, Bro, and ipratroprium  2  Continue incentive spirometer  3  Decrease steroid to solu-medrol 40 mg Q12h  Likely can transition to oral prednisone tomorrow  2  Tracheobronchitis due RSV infection  1  Chest xray is clear without any signs of pneumonia  2  Continue mucinex, and Robitussin DM    ______________________________________________________________________    HPI:    Eric Moyer is a 68 y o  female with a past medical history significant for asthma diagnosed 20 years by her PCP  She states her asthma was under control until approximately 3 weeks ago when she started with a cough, dyspnea on exertion  She presented to the emergency department 4 days ago with ongoing cough and chest congestion along with expiratory wheeze  The patient was found to have RSV and an asthma exacerbation  She was placed on IV Solu medrol, inhalers, and robitussin  Yesterday, the patient was transitioned to oral prednisone, however, the patient began to have increasing wheezing and dyspnea and was transitioned back to solu medrol 60mg Q8h  Currently, the patient states she feels much better  She is not wheezing at this time and is able to ambulate around the halls without difficulty  Her oxygenation is well on room air  She states she still has a mild cough but was able to sleep the entire night without difficulty  Lajean Cassette PFT results:  None    Review of Systems:  Mild dyspnea, mild wheeze, positive cough with little sputum production  Full 12 point review of systems was performed  Aside from what was mentioned in the HPI, it is otherwise negative      Historical Information   Past Medical History:   Diagnosis Date    Asthma     Diabetes mellitus ([address] )     Hypertension      Past Surgical History:   Procedure Laterality Date    BRONCHOSCOPY N/A 3/16/2016    Procedure: BRONCHOSCOPY FLEXIBLE/anesth ;  Surgeon: Marc Camarena DO;  Location: [facility]; Service:      Social History   History   Alcohol Use No     History   Smoking Status    Never Smoker   Smokeless Tobacco    Never Used       Occupational history:      Family History:   History reviewed  No pertinent family history  Medications: The patient's active and prehospital medications were reviewed      Current Facility-Administered Medications:  acetaminophen 650 mg Oral Q6H PRN Amanda Alfaro MD    benzonatate 100 mg Oral TID PRN Amanda Alfaro MD    dextromethorphan-guaiFENesin 10 mL Oral Q4H PRN Zandra Destine Spatzer, CRNP    docusate sodium 100 mg Oral BID Kelsi Salinas PA-C    enoxaparin 40 mg Subcutaneous Daily Amanda Alfaro MD    fluticasone 1 spray Each Nare Daily Bonnie Gant MD    fluticasone-vilanterol 1 puff Inhalation BID Amanda Alfaro MD    guaiFENesin 600 mg Oral Q12H [facility] Bonnie Gant MD    insulin glargine 8 Units Subcutaneous HS Kelsi Salinas PA-C    insulin lispro 1-5 Units Subcutaneous TID AC Amanda Alfaro MD    insulin lispro 1-5 Units Subcutaneous HS JAQUAN Patino    ipratropium-albuterol 3 mL Nebulization Q6H Amanda Alfaro MD    loratadine 10 mg Oral Daily Amanda Alfaro MD    losartan 25 mg Oral Daily Kelsi Salinas PA-C    magnesium oxide 400 mg Oral BID Bonnie Gant MD    magnesium sulfate 2 g Intravenous Once Vernon Shaw PA-C Last Rate: Stopped (12/07/18 1128)   methylPREDNISolone sodium succinate 60 mg Intravenous Q8H [facility] Bonnie Gant MD          PhysicalExamination:  Vitals:   Vitals:    12/10/18 2300 12/11/18 0135 12/11/18 0710 12/11/18 0740   BP: 145/75   158/82   BP Location: Left arm   Left arm   Pulse:    84   Resp:    22   Temp:    98 3 °F (36 8 °C)   TempSrc:    Temporal   SpO2:  93% 95% 95%   Weight:         Body mass index is 21 24 kg/m²  Temp  Min: 97 1 °F (36 2 °C)  Max: 98 4 °F (36 9 °C)  IBW: -92 5 kg    SpO2: 95 %,   SpO2 Activity: At Rest,   O2 Device: None (Room air)    Gen: No acute distress  HEENT: NC/AT PERRLA EOMI oropharynx moist  Neck: supple, no JVD, trachea midline, no adenopathy  Chest: CTA, no wheeze at this time  Cardiac: Clear S1 and S2  Abd: Soft NT/ND +BS  Ext: no edema  Neuro: A&Ox3, non-focal  Skin: W/D/I    Diagnostic Data:  CBC:     Results from last 7 days  Lab Units 12/08/18  0457 12/07/18  1405 12/07/18  0750   WBC Thousand/uL 5 45  --  5 47   HEMOGLOBIN g/dL 12 2  --  12 7   HEMATOCRIT % 34 9  --  36 3   PLATELETS Thousands/uL 183 167 177       CMP:     Results from last 7 days  Lab Units 12/11/18  0620 12/09/18  1103 12/08/18  0457 12/07/18  0750   SODIUM mmol/L 129* 130* 131* 135*   POTASSIUM mmol/L 4 0 4 3 4 1 4 1   CHLORIDE mmol/L 92* 94* 96* 98*   CO2 mmol/L 26 26 25 27   BUN mg/dL 21 20 15 9   CREATININE mg/dL 0 68 0 78 0 65 0 57*   CALCIUM mg/dL 9 5 10 0 9 5 9 2   ALK PHOS U/L  --   --  98 109   ALT U/L  --   --  20 22   AST U/L  --   --  12 13         Microbiology:    Results from last 7 days  Lab Units 12/07/18  0857 12/07/18  0814 12/07/18  0750   BLOOD CULTURE   --  No Growth at 72 hrs  No Growth at 72 hrs     INFLUENZA B PCR  None Detected  --   --    RSV PCR  Detected*  --   --        ABG: No results found for: PHART, UMG7SPJ, PO2ART, HSH6JAM, N1FOROTP, BEART, SOURCE    Imaging:  CXR; no active disease    Cardiac lab/EKG/telemetry/ECHO:     Results from last 7 days  Lab Units 12/07/18  0750   TROPONIN I ng/mL <0 02   NT-PRO BNP pg/mL 291*           Ace Doretha, CRNP

## 2018-12-11 NOTE — PROGRESS NOTES
Progress Note - Chapis Hernandez 1945, 68 y o  female MRN: [id number]  Unit/Bed#: E5 -01 Encounter: [id number]  Primary Care Provider: Matthew Ryan MD   Date and time admitted to hospital: 12/7/2018  7:16 AM    * Status asthmaticus   Assessment & Plan    · Asthma exacerbation secondary to RSV  · Blood cultures negative x 72 hrs, afebrile   · Originally on IV Solu-Medrol 40 mg q 12 hours, attempted transition to po prednisone yesterday however due to continued chest congestion switched back to IV Solu-Medrol 60mg q8hrs   · At this point patient due to lack of significant improvement will consult Pulmonology for further recommendations   · Will check procalcitonin   · Encourage incentive spirometer, flutter valve to help break up secretions which seems to have helped   · Continue Breo, DuoNeb  · Maintained SpO2 during ambulation with Ambulatory pulse ox 94%   · Patient would benefit from outpatient follow-up with pulmonology       Tracheobronchitis due to RSV   Assessment & Plan    · Negative for influenza, positive for RSV   · Chest x-ray:  No lobar consolidation or large effusion, cardiomegaly without pulmonary edema, multiple old left-sided rib fractures  · Droplet precaution  · Continue supportive care  · Appreciate pulmonology input      HTN (hypertension)   Assessment & Plan    · Accelerated BP, improved currently at 158/82,  possibly due to IV Solu-Medrol    · continue losartan to 25 mg po daily   · Monitor for improvement in blood pressure     Diabetes mellitus [facility])   Assessment & Plan    Lab Results   Component Value Date    HGBA1C 6 6 (H) 07/19/2018       Recent Labs      12/10/18   1121  12/10/18   1622  12/10/18   2031  12/11/18   0749   POCGLU  310*  347*  346*  323*       Blood Sugar Average: Last 72 hrs:  (P) 188 [id number]     · Well controlled per last A1c 6 6%   · Hyperglycemia in setting of IV steroids   · Will add lantus 8 units qHS for better glucose control   · Continue SSI, diabetic diet, Accu-Checks   · Continue to hold oral hypoglycemics while hospitalized, will resume upon discharge      Hyperlipidemia   Assessment & Plan    · Diet controlled      Hyponatremia   Assessment & Plan    · Na corrected for hyperglycemia is 133   · Will continue to monitor electrolytes and treat hyperglycemia        VTE Pharmacologic Prophylaxis:   Pharmacologic: Enoxaparin (Lovenox)  Mechanical VTE Prophylaxis in Place: Yes    Patient Centered Rounds: I have performed bedside rounds with nursing staff today  Discussions with Specialists or Other Care Team Provider: nursing     Education and Discussions with Family / Patient: patient     Time Spent for Care: 30 minutes  More than 50% of total time spent on counseling and coordination of care as described above  Current Length of Stay: 4 day(s)    Current Patient Status: Inpatient   Certification Statement: The patient will continue to require additional inpatient hospital stay due to status asthmaticus, tracheobronchitis due to RSV     Discharge Plan: pending     Code Status: Level 1 - Full Code      Subjective:   Patient continues to have chest congestion and coughing with sputum production since using flutter valve  She admits to minimal improvement in symptoms  She denies any abdominal pain, dysuria, chest pain, significant shortness of breath at rest   She does have improvement in symptoms with ambulation  Objective:     Vitals:   Temp (24hrs), Av 9 °F (36 6 °C), Min:97 6 °F (36 4 °C), Max:98 3 °F (36 8 °C)    Temp:  [97 6 °F (36 4 °C)-98 3 °F (36 8 °C)] 98 3 °F (36 8 °C)  HR:  [78-84] 84  Resp:  [20-22] 22  BP: (145-169)/(75-82) 158/82  SpO2:  [92 %-98 %] 95 %  Body mass index is 21 24 kg/m²  Input and Output Summary (last 24 hours):     No intake or output data in the 24 hours ending 18 0837    Physical Exam:     Physical Exam   Constitutional: She is oriented to person, place, and time  No distress     HENT:   Head: Normocephalic and atraumatic  Mouth/Throat: Oropharynx is clear and moist    Eyes: Conjunctivae are normal    Neck: Neck supple  Cardiovascular: Normal rate, regular rhythm and intact distal pulses  Pulmonary/Chest: Effort normal  No respiratory distress  Presence of chest congestion with coarse wheezing in the lowe lobes bilaterally, no audible wheezing currently, no use of accessory muscles of respiration   Abdominal: Soft  Bowel sounds are normal  She exhibits no distension  There is no tenderness  There is no rebound and no guarding  Musculoskeletal: She exhibits no edema  Neurological: She is alert and oriented to person, place, and time  Skin: Skin is warm and dry  She is not diaphoretic  Psychiatric: She has a normal mood and affect  Nursing note and vitals reviewed  Additional Data:     Labs:      Results from last 7 days  Lab Units 12/08/18  0457   WBC Thousand/uL 5 45   HEMOGLOBIN g/dL 12 2   HEMATOCRIT % 34 9   PLATELETS Thousands/uL 183   NEUTROS PCT % 69   LYMPHS PCT % 22   MONOS PCT % 9   EOS PCT % 0       Results from last 7 days  Lab Units 12/11/18  0620  12/08/18  0457   SODIUM mmol/L 129*  < > 131*   POTASSIUM mmol/L 4 0  < > 4 1   CHLORIDE mmol/L 92*  < > 96*   CO2 mmol/L 26  < > 25   BUN mg/dL 21  < > 15   CREATININE mg/dL 0 68  < > 0 65   ANION GAP mmol/L 11  < > 10   CALCIUM mg/dL 9 5  < > 9 5   ALBUMIN g/dL  --   --  3 5   TOTAL BILIRUBIN mg/dL  --   --  0 22   ALK PHOS U/L  --   --  98   ALT U/L  --   --  20   AST U/L  --   --  12   GLUCOSE RANDOM mg/dL 351*  < > 246*   < > = values in this interval not displayed          Results from last 7 days  Lab Units 12/11/18  0749 12/10/18  2031 12/10/18  1622 12/10/18  1121 12/10/18  0742 12/09/18  2037 12/09/18  1635 12/09/18  1110 12/09/18  0758 12/09/18  0101 12/08/18  2040 12/08/18  1728   POC GLUCOSE mg/dl 323* 346* 347* 310* 192* 306* 378* 320* 273* 273* 258* 477*                   * I Have Reviewed All Lab Data Listed Above   * Additional Pertinent Lab Tests Reviewed: All Labs Within Last 24 Hours Reviewed    Imaging:    Imaging Reports Reviewed Today Include: CXR  Imaging Personally Reviewed by Myself Includes:  none    Recent Cultures (last 7 days):       Results from last 7 days  Lab Units 12/07/18  0857 12/07/18  0814 12/07/18  0750   BLOOD CULTURE   --  No Growth at 72 hrs  No Growth at 72 hrs  INFLUENZA B PCR  None Detected  --   --    RSV PCR  Detected*  --   --        Last 24 Hours Medication List:     Current Facility-Administered Medications:  acetaminophen 650 mg Oral Q6H PRN Tarik Villanueva MD    benzonatate 100 mg Oral TID PRN Tarik Villanueva MD    dextromethorphan-guaiFENesin 10 mL Oral Q4H PRN Marleta Miller Spatzer, CRNP    docusate sodium 100 mg Oral BID Kelsi Salinas PA-C    enoxaparin 40 mg Subcutaneous Daily Tarik Villanueva MD    fluticasone 1 spray Each Nare Daily Delmi Heart MD    fluticasone-vilanterol 1 puff Inhalation BID Tarik Villanueva MD    guaiFENesin 600 mg Oral Q12H [facility] Delmi Heart MD    insulin glargine 8 Units Subcutaneous HS Kelsi Salinas PA-C    insulin lispro 1-5 Units Subcutaneous TID AC Tarik Villanueva MD    insulin lispro 1-5 Units Subcutaneous HS JAQUAN Anna    ipratropium-albuterol 3 mL Nebulization Q6H Tarik Villanueva MD    loratadine 10 mg Oral Daily Tarik Villanueva MD    losartan 25 mg Oral Daily Kelsi Salinas PA-C    magnesium sulfate 2 g Intravenous Once Vernon Shaw PA-C Last Rate: Stopped (12/07/18 1128)   methylPREDNISolone sodium succinate 60 mg Intravenous Q8H Obdulia Min MD         Today, Patient Was Seen By: Rj Palm PA-C    ** Please Note: Dictation voice to text software may have been used in the creation of this document   **

## 2018-12-11 NOTE — ASSESSMENT & PLAN NOTE
· Na corrected for hyperglycemia is 133   · Will continue to monitor electrolytes and treat hyperglycemia

## 2018-12-11 NOTE — ASSESSMENT & PLAN NOTE
Lab Results   Component Value Date    HGBA1C 6 6 (H) 07/19/2018       Recent Labs      12/10/18   1121  12/10/18   1622  12/10/18   2031  12/11/18   0749   POCGLU  310*  347*  346*  323*       Blood Sugar Average: Last 72 hrs:  (P) 518 [id number]     · Well controlled per last A1c 6 6%   · Hyperglycemia in setting of IV steroids   · Will add lantus 8 units qHS for better glucose control   · Continue SSI, diabetic diet, Accu-Checks   · Continue to hold oral hypoglycemics while hospitalized, will resume upon discharge

## 2018-12-11 NOTE — NURSING NOTE
Pt refused IV stick at this time  Pt informed of reason for IV site change, pt cont to refuse  IV site patent, non tender, no erythema noted  Dressing was reinforced 12/10  Will cont to monitor

## 2018-12-11 NOTE — ASSESSMENT & PLAN NOTE
· Negative for influenza, positive for RSV   · Chest x-ray:  No lobar consolidation or large effusion, cardiomegaly without pulmonary edema, multiple old left-sided rib fractures  · Droplet precaution  · Continue supportive care  · Appreciate pulmonology input

## 2018-12-12 PROBLEM — J45.901 ASTHMA EXACERBATION: Status: ACTIVE | Noted: 2018-12-07

## 2018-12-12 LAB
ANION GAP SERPL CALCULATED.3IONS-SCNC: 10 MMOL/L (ref 4–13)
BACTERIA BLD CULT: NORMAL
BACTERIA BLD CULT: NORMAL
BASOPHILS # BLD AUTO: 0.01 THOUSANDS/ΜL (ref 0–0.1)
BASOPHILS NFR BLD AUTO: 0 % (ref 0–1)
BUN SERPL-MCNC: 21 MG/DL (ref 5–25)
CALCIUM SERPL-MCNC: 9.1 MG/DL (ref 8.3–10.1)
CHLORIDE SERPL-SCNC: 91 MMOL/L (ref 100–108)
CO2 SERPL-SCNC: 25 MMOL/L (ref 21–32)
CREAT SERPL-MCNC: 0.68 MG/DL (ref 0.6–1.3)
EOSINOPHIL # BLD AUTO: 0 THOUSAND/ΜL (ref 0–0.61)
EOSINOPHIL NFR BLD AUTO: 0 % (ref 0–6)
ERYTHROCYTE [DISTWIDTH] IN BLOOD BY AUTOMATED COUNT: 12.6 % (ref 11.6–15.1)
GFR SERPL CREATININE-BSD FRML MDRD: 88 ML/MIN/1.73SQ M
GLUCOSE SERPL-MCNC: 218 MG/DL (ref 65–140)
GLUCOSE SERPL-MCNC: 221 MG/DL (ref 65–140)
GLUCOSE SERPL-MCNC: 396 MG/DL (ref 65–140)
GLUCOSE SERPL-MCNC: 453 MG/DL (ref 65–140)
HCT VFR BLD AUTO: 37.9 % (ref 34.8–46.1)
HGB BLD-MCNC: 13.2 G/DL (ref 11.5–15.4)
IMM GRANULOCYTES # BLD AUTO: 0.08 THOUSAND/UL (ref 0–0.2)
IMM GRANULOCYTES NFR BLD AUTO: 1 % (ref 0–2)
LYMPHOCYTES # BLD AUTO: 1.26 THOUSANDS/ΜL (ref 0.6–4.47)
LYMPHOCYTES NFR BLD AUTO: 14 % (ref 14–44)
MCH RBC QN AUTO: 31.9 PG (ref 26.8–34.3)
MCHC RBC AUTO-ENTMCNC: 34.8 G/DL (ref 31.4–37.4)
MCV RBC AUTO: 92 FL (ref 82–98)
MONOCYTES # BLD AUTO: 0.61 THOUSAND/ΜL (ref 0.17–1.22)
MONOCYTES NFR BLD AUTO: 7 % (ref 4–12)
NEUTROPHILS # BLD AUTO: 7.25 THOUSANDS/ΜL (ref 1.85–7.62)
NEUTS SEG NFR BLD AUTO: 78 % (ref 43–75)
NRBC BLD AUTO-RTO: 0 /100 WBCS
PLATELET # BLD AUTO: 266 THOUSANDS/UL (ref 149–390)
PMV BLD AUTO: 12.1 FL (ref 8.9–12.7)
POTASSIUM SERPL-SCNC: 4.2 MMOL/L (ref 3.5–5.3)
RBC # BLD AUTO: 4.14 MILLION/UL (ref 3.81–5.12)
SODIUM SERPL-SCNC: 126 MMOL/L (ref 136–145)
WBC # BLD AUTO: 9.21 THOUSAND/UL (ref 4.31–10.16)

## 2018-12-12 PROCEDURE — 94640 AIRWAY INHALATION TREATMENT: CPT

## 2018-12-12 PROCEDURE — 94760 N-INVAS EAR/PLS OXIMETRY 1: CPT

## 2018-12-12 PROCEDURE — 99232 SBSQ HOSP IP/OBS MODERATE 35: CPT | Performed by: FAMILY MEDICINE

## 2018-12-12 PROCEDURE — 82948 REAGENT STRIP/BLOOD GLUCOSE: CPT

## 2018-12-12 PROCEDURE — 94668 MNPJ CHEST WALL SBSQ: CPT

## 2018-12-12 PROCEDURE — 80048 BASIC METABOLIC PNL TOTAL CA: CPT | Performed by: PHYSICIAN ASSISTANT

## 2018-12-12 PROCEDURE — 85025 COMPLETE CBC W/AUTO DIFF WBC: CPT | Performed by: PHYSICIAN ASSISTANT

## 2018-12-12 PROCEDURE — 99232 SBSQ HOSP IP/OBS MODERATE 35: CPT | Performed by: INTERNAL MEDICINE

## 2018-12-12 RX ORDER — PREDNISONE 20 MG/1
20 TABLET ORAL DAILY
Status: DISCONTINUED | OUTPATIENT
Start: 2018-12-17 | End: 2018-12-13 | Stop reason: HOSPADM

## 2018-12-12 RX ORDER — METHYLPREDNISOLONE SODIUM SUCCINATE 40 MG/ML
40 INJECTION, POWDER, LYOPHILIZED, FOR SOLUTION INTRAMUSCULAR; INTRAVENOUS ONCE
Status: COMPLETED | OUTPATIENT
Start: 2018-12-12 | End: 2018-12-12

## 2018-12-12 RX ORDER — PREDNISONE 20 MG/1
40 TABLET ORAL DAILY
Status: DISCONTINUED | OUTPATIENT
Start: 2018-12-13 | End: 2018-12-13 | Stop reason: HOSPADM

## 2018-12-12 RX ORDER — PREDNISONE 1 MG/1
10 TABLET ORAL DAILY
Status: DISCONTINUED | OUTPATIENT
Start: 2018-12-19 | End: 2018-12-13 | Stop reason: HOSPADM

## 2018-12-12 RX ORDER — INSULIN GLARGINE 100 [IU]/ML
20 INJECTION, SOLUTION SUBCUTANEOUS
Status: DISCONTINUED | OUTPATIENT
Start: 2018-12-12 | End: 2018-12-13

## 2018-12-12 RX ADMIN — DOCUSATE SODIUM 100 MG: 100 CAPSULE, LIQUID FILLED ORAL at 17:06

## 2018-12-12 RX ADMIN — IPRATROPIUM BROMIDE AND ALBUTEROL SULFATE 3 ML: 2.5; .5 SOLUTION RESPIRATORY (INHALATION) at 00:43

## 2018-12-12 RX ADMIN — INSULIN LISPRO 6 UNITS: 100 INJECTION, SOLUTION INTRAVENOUS; SUBCUTANEOUS at 11:46

## 2018-12-12 RX ADMIN — DOCUSATE SODIUM 100 MG: 100 CAPSULE, LIQUID FILLED ORAL at 08:54

## 2018-12-12 RX ADMIN — FLUTICASONE FUROATE AND VILANTEROL TRIFENATATE 1 PUFF: 100; 25 POWDER RESPIRATORY (INHALATION) at 17:06

## 2018-12-12 RX ADMIN — FLUTICASONE PROPIONATE 1 SPRAY: 50 SPRAY, METERED NASAL at 08:53

## 2018-12-12 RX ADMIN — METHYLPREDNISOLONE SODIUM SUCCINATE 40 MG: 40 INJECTION, POWDER, FOR SOLUTION INTRAMUSCULAR; INTRAVENOUS at 22:05

## 2018-12-12 RX ADMIN — INSULIN GLARGINE 20 UNITS: 100 INJECTION, SOLUTION SUBCUTANEOUS at 22:08

## 2018-12-12 RX ADMIN — GUAIFENESIN AND DEXTROMETHORPHAN 10 ML: 100; 10 SYRUP ORAL at 15:48

## 2018-12-12 RX ADMIN — MAGNESIUM OXIDE TAB 400 MG (241.3 MG ELEMENTAL MG) 400 MG: 400 (241.3 MG) TAB at 08:53

## 2018-12-12 RX ADMIN — GUAIFENESIN 600 MG: 600 TABLET, EXTENDED RELEASE ORAL at 08:53

## 2018-12-12 RX ADMIN — INSULIN LISPRO 2 UNITS: 100 INJECTION, SOLUTION INTRAVENOUS; SUBCUTANEOUS at 11:30

## 2018-12-12 RX ADMIN — INSULIN LISPRO 6 UNITS: 100 INJECTION, SOLUTION INTRAVENOUS; SUBCUTANEOUS at 17:06

## 2018-12-12 RX ADMIN — INSULIN LISPRO 2 UNITS: 100 INJECTION, SOLUTION INTRAVENOUS; SUBCUTANEOUS at 22:06

## 2018-12-12 RX ADMIN — METHYLPREDNISOLONE SODIUM SUCCINATE 40 MG: 40 INJECTION, POWDER, FOR SOLUTION INTRAMUSCULAR; INTRAVENOUS at 08:54

## 2018-12-12 RX ADMIN — LOSARTAN POTASSIUM 25 MG: 25 TABLET, FILM COATED ORAL at 08:53

## 2018-12-12 RX ADMIN — LORATADINE 10 MG: 10 TABLET ORAL at 08:53

## 2018-12-12 RX ADMIN — INSULIN LISPRO 2 UNITS: 100 INJECTION, SOLUTION INTRAVENOUS; SUBCUTANEOUS at 15:56

## 2018-12-12 RX ADMIN — IPRATROPIUM BROMIDE AND ALBUTEROL SULFATE 3 ML: 2.5; .5 SOLUTION RESPIRATORY (INHALATION) at 19:00

## 2018-12-12 RX ADMIN — GUAIFENESIN AND DEXTROMETHORPHAN 10 ML: 100; 10 SYRUP ORAL at 11:11

## 2018-12-12 RX ADMIN — FLUTICASONE FUROATE AND VILANTEROL TRIFENATATE 1 PUFF: 100; 25 POWDER RESPIRATORY (INHALATION) at 08:53

## 2018-12-12 RX ADMIN — ENOXAPARIN SODIUM 40 MG: 40 INJECTION SUBCUTANEOUS at 08:54

## 2018-12-12 RX ADMIN — GUAIFENESIN AND DEXTROMETHORPHAN 10 ML: 100; 10 SYRUP ORAL at 04:22

## 2018-12-12 RX ADMIN — MAGNESIUM OXIDE TAB 400 MG (241.3 MG ELEMENTAL MG) 400 MG: 400 (241.3 MG) TAB at 17:06

## 2018-12-12 RX ADMIN — IPRATROPIUM BROMIDE AND ALBUTEROL SULFATE 3 ML: 2.5; .5 SOLUTION RESPIRATORY (INHALATION) at 13:44

## 2018-12-12 RX ADMIN — BENZONATATE 100 MG: 100 CAPSULE ORAL at 04:22

## 2018-12-12 RX ADMIN — IPRATROPIUM BROMIDE AND ALBUTEROL SULFATE 3 ML: 2.5; .5 SOLUTION RESPIRATORY (INHALATION) at 07:45

## 2018-12-12 RX ADMIN — GUAIFENESIN 600 MG: 600 TABLET, EXTENDED RELEASE ORAL at 22:05

## 2018-12-12 RX ADMIN — INSULIN LISPRO 6 UNITS: 100 INJECTION, SOLUTION INTRAVENOUS; SUBCUTANEOUS at 09:55

## 2018-12-12 RX ADMIN — INSULIN LISPRO 5 UNITS: 100 INJECTION, SOLUTION INTRAVENOUS; SUBCUTANEOUS at 08:51

## 2018-12-12 NOTE — DISCHARGE INSTRUCTIONS
Acute Bronchitis   WHAT YOU NEED TO KNOW:   What is acute bronchitis? Acute bronchitis is swelling and irritation in the air passages of your lungs  This irritation may cause you to cough or have other breathing problems  Acute bronchitis often starts because of another illness, such as a cold or the flu  The illness spreads from your nose and throat to your windpipe and airways  Bronchitis is often called a chest cold  Acute bronchitis lasts about 3 to 6 weeks and is usually not a serious illness  What causes acute bronchitis? · Infection  caused by a virus, bacteria, or a fungus    · Polluted air  caused by chemical fumes, dust, smoke, allergens, or pollution  What increases my risk for acute bronchitis? · Age, usually older adults    · Smoking cigarettes or being around cigarette smoke    · Chronic lung diseases or chronic sinus infections    · Weakened immune system    · Gastroesophageal reflux disease    · Allergies and environmental changes  What are the signs and symptoms of acute bronchitis? · A cough with sputum that may be clear, yellow, or green    · Feeling more tired than usual, and body aches    · A fever and chills    · Wheezing when you breathe    · A tight chest or pain when you breathe or cough  How is acute bronchitis diagnosed? Your healthcare provider may diagnose bronchitis by your symptoms  If he is not sure, you may need the following:  · Blood tests  will be done to see if your symptoms are caused by an infection  · X-ray  pictures of your lungs and heart may show signs of infection, such as pneumonia  Chest x-rays may also show fluid around your heart and lungs  How is acute bronchitis treated? Your healthcare provider will treat any condition that has caused your acute bronchitis  He may also give you any of the following:  · Ibuprofen or acetaminophen  are medicines that help lower your fever  They are available without a doctor's order   Ask your healthcare provider which medicine is right for you  Ask how much to take and how often to take it  Follow directions  These medicines can cause stomach bleeding if not taken correctly  Ibuprofen can cause kidney damage  Do not take ibuprofen if you have kidney disease, an ulcer, or allergies to aspirin  Acetaminophen can cause liver damage  Do not take more than 4,000 milligrams in 24 hours  · Decongestants  help loosen mucus in your lungs and make it easier to cough up  This can help you breathe easier  · Cough suppressants  decrease your urge to cough  If your cough produces mucus, do not take a cough suppressant unless your healthcare provider tells you to  Your healthcare provider may suggest that you take a cough suppressant at night so you can rest     · Inhalers  may be given  Your healthcare provider may give you one or more inhalers to help you breathe easier and cough less  An inhaler gives your medicine to open your airways  Ask your healthcare provider to show you how to use your inhaler correctly  How can I care for myself when I have acute bronchitis? · Get more rest   Rest helps your body to heal  Slowly start to do more each day  Rest when you feel it is needed  · Avoid irritants in the air  Avoid chemicals, fumes, and dust  Wear a face mask if you must work around dust or fumes  Stay inside on days when air pollution levels are high  If you have allergies, stay inside when pollen counts are high  Do not use aerosol products, such as spray-on deodorant, bug spray, and hair spray  · Do not smoke or be around others who smoke  Nicotine and other chemicals in cigarettes and cigars damages the cilia that move mucus out of your lungs  Ask your healthcare provider for information if you currently smoke and need help to quit  E-cigarettes or smokeless tobacco still contain nicotine  Talk to your healthcare provider before you use these products  · Drink liquids as directed    Liquids help keep your air passages moist and help you cough up mucus  You may need to drink more liquids when you have acute bronchitis  Ask how much liquid to drink each day and which liquids are best for you  · Use a humidifier or vaporizer  Use a cool mist humidifier or a vaporizer to increase air moisture in your home  This may make it easier for you to breathe and help decrease your cough  How can I decrease my risk for acute bronchitis? · Get the vaccinations you need  Ask your healthcare provider if you should get vaccinated against the flu or pneumonia  · Prevent the spread of germs  You can decrease your risk of acute bronchitis and other illnesses by doing the following:     [facility] your hands often with soap and water  Carry germ-killing hand lotion or gel with you  You can use the lotion or gel to clean your hands when soap and water are not available  ¨ Do not touch your eyes, nose, or mouth unless you have washed your hands first     ¨ Always cover your mouth when you cough to prevent the spread of germs  It is best to cough into a tissue or your shirt sleeve instead of into your hand  Ask those around you cover their mouths when they cough  ¨ Try to avoid people who have a cold or the flu  If you are sick, stay away from others as much as possible  When should I seek immediate care? · You cough up blood  · Your lips or fingernails turn blue  · You feel like you are not getting enough air when you breathe  When should I contact my healthcare provider? · You have a fever  · Your breathing problems do not go away or get worse  · Your cough does not get better within 4 weeks  · You have questions or concerns about your condition or care  CARE AGREEMENT:   You have the right to help plan your care  Learn about your health condition and how it may be treated  Discuss treatment options with your caregivers to decide what care you want to receive  You always have the right to refuse treatment  The above information is an  only  It is not intended as medical advice for individual conditions or treatments  Talk to your doctor, nurse or pharmacist before following any medical regimen to see if it is safe and effective for you  © 2017 [facility] Information is for End User's use only and may not be sold, redistributed or otherwise used for commercial purposes  All illustrations and images included in CareNotes® are the copyrighted property of A D A M [employer]  or [employer]

## 2018-12-12 NOTE — PROGRESS NOTES
Patient wants to shower even though no order to shower  Patient encouraged to wait for the order, however says she would like to shower and that she will be fine

## 2018-12-12 NOTE — ASSESSMENT & PLAN NOTE
Lab Results   Component Value Date    HGBA1C 6 6 (H) 07/19/2018       Recent Labs      12/11/18   1054  12/11/18   1558  12/11/18   2045  12/12/18   0833   POCGLU  397*  366*  331*  453*       Blood Sugar Average: Last 72 hrs:  (P) 329 [id number]     · Well controlled per last A1c 6 6%   · Hyperglycemia in setting of IV steroids   · Increase Lantus to 20 units qHS for better glucose control, add Humalog 6 units t i d   · Continue SSI, diabetic diet, Accu-Checks   · Suspect hyperglycemia will continue to improve with tapering of steroids and normalize once off steroids completely   · Continue to hold oral hypoglycemics while hospitalized

## 2018-12-12 NOTE — PROGRESS NOTES
Progress Note - Dot English 1945, 68 y o  female MRN: [id number]  Unit/Bed#: E5 -01 Encounter: [id number]  Primary Care Provider: Jordin Snow MD   Date and time admitted to hospital: 12/7/2018  7:16 AM    * Asthma exacerbation secondary to RSV    Assessment & Plan    · Asthma exacerbation secondary to RSV  · Blood cultures negative x 4 days, afebrile   · procalcitonin normal   · Originally on IV Solu-Medrol 40 mg q 12 hours, attempted transition to po prednisone however due to continued chest congestion switched back to IV Solu-Medrol 60mg q8hrs   · Appreciate Pulmonology consult due to persistent wheezing   · Decreased IV Solu-Medrol to 40 mg q12hrs yesterday, likely transition to po prednisone today or tomorrow per Pulmonology   · Encourage incentive spirometer, flutter valve to help break up secretions which seems to have helped   · Continue Breo, DuoNeb, flonase   · Maintained SpO2 during ambulation with Ambulatory pulse ox 94%   · Patient would benefit from outpatient follow-up with pulmonology  · Hopeful for discharge in the next 24-48 hours if continues to clinically improve, tolerates transition to po prednisone and better glucose control      Tracheobronchitis due to RSV   Assessment & Plan    · Negative for influenza, positive for RSV   · Chest x-ray:  No lobar consolidation or large effusion, cardiomegaly without pulmonary edema, multiple old left-sided rib fractures  · Suspect significant wheezing 2/2 RSV in conjunction with asthma   · Continue supportive care with mucinex and Robitussin      HTN (hypertension)   Assessment & Plan    · BP improved, currently acceptable  · continue losartan to 25 mg po daily      Diabetes mellitus [facility])   Assessment & Plan    Lab Results   Component Value Date    HGBA1C 6 6 (H) 07/19/2018       Recent Labs      12/11/18   1054  12/11/18   1558  12/11/18   2045  12/12/18   0833   POCGLU  397*  366*  331*  453*       Blood Sugar Average: Last 72 hrs:  (P) 330 [id number]     · Well controlled per last A1c 6 6%   · Hyperglycemia in setting of IV steroids   · Increase Lantus to 20 units qHS for better glucose control, add Humalog 6 units t i d   · Continue SSI, diabetic diet, Accu-Checks   · Suspect hyperglycemia will continue to improve with tapering of steroids and normalize once off steroids completely   · Continue to hold oral hypoglycemics while hospitalized      Hyperlipidemia   Assessment & Plan    · Diet controlled      Hyponatremia   Assessment & Plan    · Na corrected for hyperglycemia is 131  · Continue to treat hyperglycemia   · Monitor electrolytes       VTE Pharmacologic Prophylaxis:   Pharmacologic: Enoxaparin (Lovenox)  Mechanical VTE Prophylaxis in Place: Yes    Patient Centered Rounds: I have performed bedside rounds with nursing staff today  Discussions with Specialists or Other Care Team Provider:     Education and Discussions with Family / Patient: patient, unable to get a hold of daughter via phone     Time Spent for Care: 30 minutes  More than 50% of total time spent on counseling and coordination of care as described above  Current Length of Stay: 5 day(s)    Current Patient Status: Inpatient   Certification Statement: The patient will continue to require additional inpatient hospital stay due to asthma exacerbation secondary to RSV, hyperglycemia     Discharge Plan: pending     Code Status: Level 1 - Full Code      Subjective:   Patient admits to some improvement in chest congestion  She still admits to feeling "smothered" and aggravated cough when lying flat  She denies any fevers, chills, night sweats, abdominal pain, urinary symptoms  She had normal bowel movement yesterday       Objective:     Vitals:   Temp (24hrs), Av 5 °F (36 4 °C), Min:97 2 °F (36 2 °C), Max:97 8 °F (36 6 °C)    Temp:  [97 2 °F (36 2 °C)-97 8 °F (36 6 °C)] 97 2 °F (36 2 °C)  HR:  [72-83] 83  Resp:  [18-20] 20  BP: (137-155)/(63-74) 137/63  SpO2:  [74 %-97 %] 94 %  Body mass index is 21 24 kg/m²  Input and Output Summary (last 24 hours): Intake/Output Summary (Last 24 hours) at 12/12/18 1054  Last data filed at 12/12/18 0748   Gross per 24 hour   Intake              900 ml   Output                0 ml   Net              900 ml       Physical Exam:     Physical Exam   Constitutional: She is oriented to person, place, and time  No distress  HENT:   Mouth/Throat: Oropharynx is clear and moist    Eyes: Conjunctivae are normal    Neck: Neck supple  Cardiovascular: Normal rate, regular rhythm and intact distal pulses  Pulmonary/Chest: Effort normal  No respiratory distress  Course wheezing during expiration worse in the lower lung fields bilaterally, no rhonchi or rales noted   Abdominal: Soft  Bowel sounds are normal  She exhibits no distension  There is no tenderness  There is no rebound and no guarding  Musculoskeletal: She exhibits no edema  Neurological: She is alert and oriented to person, place, and time  Skin: Skin is warm and dry  She is not diaphoretic  Psychiatric: She has a normal mood and affect  Nursing note and vitals reviewed  Additional Data:     Labs:      Results from last 7 days  Lab Units 12/12/18  0510   WBC Thousand/uL 9 21   HEMOGLOBIN g/dL 13 2   HEMATOCRIT % 37 9   PLATELETS Thousands/uL 266   NEUTROS PCT % 78*   LYMPHS PCT % 14   MONOS PCT % 7   EOS PCT % 0       Results from last 7 days  Lab Units 12/12/18  0510  12/08/18  0457   SODIUM mmol/L 126*  < > 131*   POTASSIUM mmol/L 4 2  < > 4 1   CHLORIDE mmol/L 91*  < > 96*   CO2 mmol/L 25  < > 25   BUN mg/dL 21  < > 15   CREATININE mg/dL 0 68  < > 0 65   ANION GAP mmol/L 10  < > 10   CALCIUM mg/dL 9 1  < > 9 5   ALBUMIN g/dL  --   --  3 5   TOTAL BILIRUBIN mg/dL  --   --  0 22   ALK PHOS U/L  --   --  98   ALT U/L  --   --  20   AST U/L  --   --  12   GLUCOSE RANDOM mg/dL 396*  < > 246*   < > = values in this interval not displayed          Results from last 7 days  Lab Units 12/12/18  0833 12/11/18  2045 12/11/18  1558 12/11/18  1054 12/11/18  0749 12/10/18  2031 12/10/18  1622 12/10/18  1121 12/10/18  0742 12/09/18  2037 12/09/18  1635 12/09/18  1110   POC GLUCOSE mg/dl 453* 331* 366* 397* 323* 346* 347* 310* 192* 306* 378* 320*           Results from last 7 days  Lab Units 12/11/18  0924   PROCALCITONIN ng/ml <0 05           * I Have Reviewed All Lab Data Listed Above  * Additional Pertinent Lab Tests Reviewed: All Labs Within Last 24 Hours Reviewed    Imaging:    Imaging Reports Reviewed Today Include: chest xray   Imaging Personally Reviewed by Myself Includes:  none    Recent Cultures (last 7 days):       Results from last 7 days  Lab Units 12/07/18  0857 12/07/18  0814 12/07/18  0750   BLOOD CULTURE   --  No Growth After 4 Days  No Growth After 4 Days     INFLUENZA B PCR  None Detected  --   --    RSV PCR  Detected*  --   --        Last 24 Hours Medication List:     Current Facility-Administered Medications:  acetaminophen 650 mg Oral Q6H PRN Blayne Ward MD    benzonatate 100 mg Oral TID PRN Blayne Ward MD    dextromethorphan-guaiFENesin 10 mL Oral Q4H PRN Virginia Mars Spatzer, CRNP    docusate sodium 100 mg Oral BID Kelsi Salinas PA-C    enoxaparin 40 mg Subcutaneous Daily Blayne Ward MD    fluticasone 1 spray Each Nare Daily Rajeev Darling MD    fluticasone-vilanterol 1 puff Inhalation BID Blayne Ward MD    guaiFENesin 600 mg Oral Q12H [address] Rajeev Darling MD    insulin glargine 20 Units Subcutaneous HS Rajeev Darling MD    insulin lispro 1-5 Units Subcutaneous TID AC Blayne Ward MD    insulin lispro 1-5 Units Subcutaneous HS JAQUAN Villatoro    insulin lispro 6 Units Subcutaneous TID With Meals Rajeev Darling MD    ipratropium-albuterol 3 mL Nebulization Q6H Blayne Ward MD    loratadine 10 mg Oral Daily Blayne Ward MD    losartan 25 mg Oral Daily Kelsi Salinas PA-C    magnesium oxide 400 mg Oral BID Rajeev Darling MD    magnesium sulfate 2 g Intravenous Once Ele Powell PA-C Last Rate: Stopped (12/07/18 1128)   methylPREDNISolone sodium succinate 40 mg Intravenous Q12H [address] JAQUAN Rodney         Today, Patient Was Seen By: Gerda Nicholson PA-C    ** Please Note: Dictation voice to text software may have been used in the creation of this document   **

## 2018-12-12 NOTE — UTILIZATION REVIEW
Continued Stay Review    Date:   12/12/2018    Vital Signs: /63 (BP Location: Left arm)   Pulse 83   Temp (!) 97 2 °F (36 2 °C) (Temporal)   Resp 20   Wt 57 9 kg (127 lb 10 3 oz)   LMP  (LMP Unknown)   SpO2 94%   BMI 21 24 kg/m²     Medications:   Scheduled Meds:   Current Facility-Administered Medications:  acetaminophen 650 mg Oral Q6H PRN Gaurav Kirk MD    benzonatate 100 mg Oral TID PRN Gaurav Kirk MD    dextromethorphan-guaiFENesin 10 mL Oral Q4H PRN Pearl Pleas Spatzer, CRNP    docusate sodium 100 mg Oral BID Kelsi Salinas PA-C    enoxaparin 40 mg Subcutaneous Daily Gaurav Kirk MD    fluticasone 1 spray Each Nare Daily Ashutosh Bowman MD    fluticasone-vilanterol 1 puff Inhalation BID Gaurav Kirk MD    guaiFENesin 600 mg Oral Q12H [address] Ashutosh Bowman MD    insulin glargine 20 Units Subcutaneous HS Ashutosh Bowman MD    insulin lispro 1-5 Units Subcutaneous TID AC Gaurav Kirk MD    insulin lispro 1-5 Units Subcutaneous HS JAQUAN Solomon    insulin lispro 6 Units Subcutaneous TID With Meals Ashutosh Bowman MD    ipratropium-albuterol 3 mL Nebulization Q6H Gaurav Kirk MD    loratadine 10 mg Oral Daily Gaurav Kirk MD    losartan 25 mg Oral Daily Kelsi Salinas PA-C    magnesium oxide 400 mg Oral BID Ashutosh Bowman MD    magnesium sulfate 2 g Intravenous Once Vernon Shwa PA-C Last Rate: Stopped (12/07/18 1128)   methylPREDNISolone sodium succinate 40 mg Intravenous Q12H [address] JAQUAN Love      Continuous Infusions:    PRN Meds:   acetaminophen    benzonatate    dextromethorphan-guaiFENesin    Abnormal Labs/Diagnostic Results:    Na  126  Chloride   91    Age/Sex: 68 y o  female     Assessment/Plan:   Asthma exacerbation secondary to RSV    Assessment & Plan     · Asthma exacerbation secondary to RSV  · Blood cultures negative x 4 days, afebrile   · procalcitonin normal   · Originally on IV Solu-Medrol 40 mg q 12 hours, attempted transition to po prednisone however due to continued chest congestion switched back to IV Solu-Medrol 60mg q8hrs   · Appreciate Pulmonology consult due to persistent wheezing   ? Decreased IV Solu-Medrol to 40 mg q12hrs yesterday, likely transition to po prednisone today or tomorrow per Pulmonology   · Encourage incentive spirometer, flutter valve to help break up secretions which seems to have helped   · Continue Breo, DuoNeb, flonase   · Maintained SpO2 during ambulation with Ambulatory pulse ox 94%   · Patient would benefit from outpatient follow-up with pulmonology  · Hopeful for discharge in the next 24-48 hours if continues to clinically improve, tolerates transition to po prednisone and better glucose control       Tracheobronchitis due to RSV   Assessment & Plan     · Negative for influenza, positive for RSV   · Chest x-ray:  No lobar consolidation or large effusion, cardiomegaly without pulmonary edema, multiple old left-sided rib fractures  · Suspect significant wheezing 2/2 RSV in conjunction with asthma   · Continue supportive care with mucinex and Robitussin         HTN (hypertension)   Assessment & Plan     · BP improved, currently acceptable  · continue losartan to 25 mg po daily       Diabetes mellitus ([facility] [address] )     Hyperlipidemia   Assessment & Plan     · Diet controlled       Hyponatremia   Assessment & Plan     · Na corrected for hyperglycemia is 131  · Continue to treat hyperglycemia   · Monitor electrolytes         The patient will continue to require additional inpatient hospital stay due to asthma exacerbation secondary to RSV, hyperglycemia      Patient admits to some improvement in chest congestion  She still admits to feeling "smothered" and aggravated cough when lying flat  She denies any fevers, chills, night sweats, abdominal pain, urinary symptoms  She had normal bowel movement yesterday  Discharge Plan:    [address] Utilization Review Department  Phone: [phone];  Fax [phone]  [email or web address]  ATTENTION: Please call with any questions or concerns to [phone]  and carefully listen to the prompts so that you are directed to the right person  Send all requests for admission clinical reviews, approved or denied determinations and any other requests to fax [phone]   All voicemails are confidential

## 2018-12-12 NOTE — ASSESSMENT & PLAN NOTE
· Asthma exacerbation secondary to RSV  · Blood cultures negative x 4 days, afebrile   · procalcitonin normal   · Originally on IV Solu-Medrol 40 mg q 12 hours, attempted transition to po prednisone however due to continued chest congestion switched back to IV Solu-Medrol 60mg q8hrs   · Appreciate Pulmonology consult due to persistent wheezing   · Decreased IV Solu-Medrol to 40 mg q12hrs yesterday, likely transition to po prednisone today or tomorrow per Pulmonology   · Encourage incentive spirometer, flutter valve to help break up secretions which seems to have helped   · Continue Breo, DuoNeb, flonase   · Maintained SpO2 during ambulation with Ambulatory pulse ox 94%   · Patient would benefit from outpatient follow-up with pulmonology  · Hopeful for discharge in the next 24-48 hours if continues to clinically improve, tolerates transition to po prednisone and better glucose control

## 2018-12-12 NOTE — ASSESSMENT & PLAN NOTE
· Negative for influenza, positive for RSV   · Chest x-ray:  No lobar consolidation or large effusion, cardiomegaly without pulmonary edema, multiple old left-sided rib fractures  · Suspect significant wheezing 2/2 RSV in conjunction with asthma   · Continue supportive care with mucinex and Robitussin

## 2018-12-12 NOTE — PROGRESS NOTES
Progress Note - Pulmonary   Ayesha Johnson 68 y o  female MRN: [id number]  Unit/Bed#: E5 -01 Encounter: [id number]    Assessment/Plan:    1  Pulmonary insufficiency secondary to RSV      *  keep O2 greater than 88%, pulmonary toileting,  OOB as tolerated, IS      *  patient requires no home oxygen, please obtain ambulatory    2  Acute tracheobronchitis secondary to RSV infection      *  bilateral breath sounds wheezy and rhonchorous      *  continue IV steroid Q 12 for 1 more day      *  will switch over to oral prednisone likely 12/13/18, with a longer taper:      *  40 mg Q 2 days, 30 mg to 2 days, 20 mg Q 2 days, 10 mg Q 2 days      *  continue Mucinex and Robitussin      *  Procalcitonin, BC (4 days) negative, afebrile, no WBC     3  Mild to Moderate persistent asthma with acute exacerbation secondary to RSV       *  patient's asthma is normally very well controlled       *  home regimen Delera b i d , Proventil q 6h p r n  Allegra daily       *  continue Flonase, Breo, DuoNeb q 6    4  Grade 1 diastolic heart failure      *  EF 60%, BNP-to 291      *  patient appears euvolemic    Outpatient follow-up for discharge instructions    Chief Complaint:    "His still feel very wheezy"    Subjective:    Jenna was sitting comfortably in her chair  She reported that she is still feeling not at her respiratory baseline  She reports wheezing and tightness with clear to yellow mucus production  Patient states that she uses her incentive spirometry approximately 10-12 times an hour which seems to be relieving the chest tightness  Patient currently denies hemoptysis, fever, chills, nausea, vomiting, palpitations, chest pain, or leg swelling  Objective:    Vitals: Blood pressure 137/63, pulse 83, temperature (!) 97 2 °F (36 2 °C), temperature source Temporal, resp  rate 20, weight 57 9 kg (127 lb 10 3 oz), SpO2 94 %  RA,Body mass index is 21 24 kg/m²        Intake/Output Summary (Last 24 hours) at 12/12/18 Dee 8 filed at 12/12/18 0748   Gross per 24 hour   Intake              900 ml   Output                0 ml   Net              900 ml       Invasive Devices     Peripheral Intravenous Line            Peripheral IV 12/07/18 Left Wrist 5 days                Physical Exam:   Physical Exam   Constitutional: She is oriented to person, place, and time  She appears well-developed and well-nourished  HENT:   Head: Normocephalic and atraumatic  Neck: Normal range of motion  Neck supple  Cardiovascular: Normal rate, regular rhythm and normal heart sounds  Exam reveals no gallop and no friction rub  No murmur heard  Pulmonary/Chest: No accessory muscle usage  No tachypnea and no bradypnea  No respiratory distress  She has no decreased breath sounds  She has wheezes  She has rhonchi  She has no rales  She exhibits no tenderness  Abdominal: Soft  Bowel sounds are normal  She exhibits no distension  There is no tenderness  Musculoskeletal: Normal range of motion  She exhibits no edema or deformity  Neurological: She is alert and oriented to person, place, and time  Skin: Skin is warm and dry  Psychiatric: She has a normal mood and affect  Her behavior is normal        Labs:    I have personally reviewed pertinent lab results CBC:   Lab Results   Component Value Date    WBC 9 21 12/12/2018    HGB 13 2 12/12/2018    HCT 37 9 12/12/2018    MCV 92 12/12/2018     12/12/2018    MCH 31 9 12/12/2018    MCHC 34 8 12/12/2018    RDW 12 6 12/12/2018    MPV 12 1 12/12/2018    NRBC 0 12/12/2018   , CMP:   Lab Results   Component Value Date    SODIUM 126 (L) 12/12/2018    K 4 2 12/12/2018    CL 91 (L) 12/12/2018    CO2 25 12/12/2018    BUN 21 12/12/2018    CREATININE 0 68 12/12/2018    CALCIUM 9 1 12/12/2018    EGFR 88 12/12/2018     Imaging and other studies: I have personally reviewed pertinent films in PACS     Chest x-ray 12/07/2018- no acute cardiopulmonary disease

## 2018-12-13 VITALS
SYSTOLIC BLOOD PRESSURE: 140 MMHG | TEMPERATURE: 98.2 F | HEART RATE: 74 BPM | WEIGHT: 127.65 LBS | OXYGEN SATURATION: 94 % | BODY MASS INDEX: 21.24 KG/M2 | DIASTOLIC BLOOD PRESSURE: 74 MMHG | RESPIRATION RATE: 18 BRPM

## 2018-12-13 LAB
GLUCOSE SERPL-MCNC: 229 MG/DL (ref 65–140)
GLUCOSE SERPL-MCNC: 246 MG/DL (ref 65–140)
GLUCOSE SERPL-MCNC: 260 MG/DL (ref 65–140)
GLUCOSE SERPL-MCNC: 271 MG/DL (ref 65–140)

## 2018-12-13 PROCEDURE — 82948 REAGENT STRIP/BLOOD GLUCOSE: CPT

## 2018-12-13 PROCEDURE — 99238 HOSP IP/OBS DSCHRG MGMT 30/<: CPT | Performed by: FAMILY MEDICINE

## 2018-12-13 PROCEDURE — 99232 SBSQ HOSP IP/OBS MODERATE 35: CPT | Performed by: INTERNAL MEDICINE

## 2018-12-13 PROCEDURE — 94760 N-INVAS EAR/PLS OXIMETRY 1: CPT

## 2018-12-13 PROCEDURE — 94640 AIRWAY INHALATION TREATMENT: CPT

## 2018-12-13 PROCEDURE — 94668 MNPJ CHEST WALL SBSQ: CPT

## 2018-12-13 RX ORDER — PREDNISONE 20 MG/1
40 TABLET ORAL DAILY
Qty: 1 TABLET | Refills: 0 | Status: SHIPPED | OUTPATIENT
Start: 2018-12-14 | End: 2018-12-15

## 2018-12-13 RX ORDER — PREDNISONE 20 MG/1
20 TABLET ORAL DAILY
Qty: 2 TABLET | Refills: 0 | Status: SHIPPED | OUTPATIENT
Start: 2018-12-17 | End: 2018-12-19

## 2018-12-13 RX ORDER — PREDNISONE 10 MG/1
10 TABLET ORAL DAILY
Qty: 2 TABLET | Refills: 0 | Status: SHIPPED | OUTPATIENT
Start: 2018-12-19 | End: 2018-12-21

## 2018-12-13 RX ORDER — PREDNISONE 10 MG/1
30 TABLET ORAL DAILY
Qty: 6 TABLET | Refills: 0 | Status: SHIPPED | OUTPATIENT
Start: 2018-12-15 | End: 2018-12-17

## 2018-12-13 RX ADMIN — BENZONATATE 100 MG: 100 CAPSULE ORAL at 11:08

## 2018-12-13 RX ADMIN — IPRATROPIUM BROMIDE AND ALBUTEROL SULFATE 3 ML: 2.5; .5 SOLUTION RESPIRATORY (INHALATION) at 07:13

## 2018-12-13 RX ADMIN — FLUTICASONE PROPIONATE 1 SPRAY: 50 SPRAY, METERED NASAL at 08:14

## 2018-12-13 RX ADMIN — PREDNISONE 40 MG: 20 TABLET ORAL at 08:12

## 2018-12-13 RX ADMIN — GUAIFENESIN AND DEXTROMETHORPHAN 10 ML: 100; 10 SYRUP ORAL at 12:38

## 2018-12-13 RX ADMIN — LOSARTAN POTASSIUM 25 MG: 25 TABLET, FILM COATED ORAL at 08:12

## 2018-12-13 RX ADMIN — GUAIFENESIN 600 MG: 600 TABLET, EXTENDED RELEASE ORAL at 08:13

## 2018-12-13 RX ADMIN — INSULIN LISPRO 6 UNITS: 100 INJECTION, SOLUTION INTRAVENOUS; SUBCUTANEOUS at 11:09

## 2018-12-13 RX ADMIN — GUAIFENESIN AND DEXTROMETHORPHAN 10 ML: 100; 10 SYRUP ORAL at 07:25

## 2018-12-13 RX ADMIN — BENZONATATE 100 MG: 100 CAPSULE ORAL at 01:41

## 2018-12-13 RX ADMIN — INSULIN LISPRO 3 UNITS: 100 INJECTION, SOLUTION INTRAVENOUS; SUBCUTANEOUS at 08:15

## 2018-12-13 RX ADMIN — IPRATROPIUM BROMIDE AND ALBUTEROL SULFATE 3 ML: 2.5; .5 SOLUTION RESPIRATORY (INHALATION) at 00:51

## 2018-12-13 RX ADMIN — INSULIN LISPRO 6 UNITS: 100 INJECTION, SOLUTION INTRAVENOUS; SUBCUTANEOUS at 08:15

## 2018-12-13 RX ADMIN — LORATADINE 10 MG: 10 TABLET ORAL at 08:12

## 2018-12-13 RX ADMIN — FLUTICASONE FUROATE AND VILANTEROL TRIFENATATE 1 PUFF: 100; 25 POWDER RESPIRATORY (INHALATION) at 08:16

## 2018-12-13 RX ADMIN — DOCUSATE SODIUM 100 MG: 100 CAPSULE, LIQUID FILLED ORAL at 08:12

## 2018-12-13 RX ADMIN — ENOXAPARIN SODIUM 40 MG: 40 INJECTION SUBCUTANEOUS at 08:12

## 2018-12-13 RX ADMIN — METFORMIN HYDROCHLORIDE 850 MG: 850 TABLET, FILM COATED ORAL at 11:08

## 2018-12-13 RX ADMIN — MAGNESIUM OXIDE TAB 400 MG (241.3 MG ELEMENTAL MG) 400 MG: 400 (241.3 MG) TAB at 08:13

## 2018-12-13 RX ADMIN — INSULIN LISPRO 2 UNITS: 100 INJECTION, SOLUTION INTRAVENOUS; SUBCUTANEOUS at 11:08

## 2018-12-13 RX ADMIN — GUAIFENESIN AND DEXTROMETHORPHAN 10 ML: 100; 10 SYRUP ORAL at 00:41

## 2018-12-13 NOTE — ASSESSMENT & PLAN NOTE
· Na corrected for hyperglycemia was 131    · Continue to treat hyperglycemia   · Monitor electrolytes

## 2018-12-13 NOTE — DISCHARGE SUMMARY
Discharge- Harry Peoples 1945, 68 y o  female MRN: [id number]    Unit/Bed#: E5 -01 Encounter: [id number]    Primary Care Provider: Ren Alexander MD   Date and time admitted to hospital: 12/7/2018  7:16 AM        * Asthma exacerbation secondary to RSV    Assessment & Plan    · Asthma exacerbation secondary to RSV  · Blood cultures negative x 4 days, afebrile   · procalcitonin normal   · Transition to oral prednisone taper 40 mg daily for 2 days, then taper by 10 mg every 2 days until complete  · Resume Dulera b i d , Allegra, and Proventil at home  · Will require outpatient pulmonology follow-up  · Patient is hemodynamically stable for discharge to home today  Tracheobronchitis due to RSV   Assessment & Plan    · Negative for influenza, positive for RSV   · Chest x-ray:  No lobar consolidation or large effusion, cardiomegaly without pulmonary edema, multiple old left-sided rib fractures  · Suspect significant wheezing 2/2 RSV in conjunction with asthma   · Continue supportive care with mucinex and Robitussin      Diabetes mellitus [facility])   Assessment & Plan    Lab Results   Component Value Date    HGBA1C 6 6 (H) 07/19/2018       Recent Labs      12/12/18   2054  12/12/18   2204  12/13/18   0724  12/13/18   1053   POCGLU  260*  229*  271*  246*       Blood Sugar Average: Last 72 hrs:  (P) 512 [id number]     · Well controlled per last A1c 6 6%   · Blood sugars now better controlled to institution of Lantus 20 units subcutaneously at bedtime daily, mealtime insulin lispro 6 units t i d  with meals, sliding scale insulin  · Continue with diabetic diet  · In preparation for discharge today, metformin 850 mg PO BID was resumed today       HTN (hypertension)   Assessment & Plan    · BP improved, currently acceptable  · continue losartan to 25 mg po daily      Hyperlipidemia   Assessment & Plan    · Diet controlled      Hyponatremia   Assessment & Plan    · Na corrected for hyperglycemia was 131   · Continue to treat hyperglycemia   · Monitor electrolytes           Discharging Physician / Practitioner: Ti Eubanks MD  PCP: Diann Murillo MD  Admission Date:   Admission Orders     Ordered        12/07/18 0921  Inpatient Admission (expected length of stay for this patient is greater than two midnights)  Once             Discharge Date: 12/13/18    Resolved Problems  Date Reviewed: 12/13/2018    None          Consultations During Hospital Stay:  · Pulmonology    Procedures Performed:     · Chest x-ray PA and lateral 12/07/2018, 12 lead EKG 12/07/2018  Significant Findings / Test Results:     · Chest x-ray PA and lateral 12/7-no lobar consolidation or large effusion  Cardiomegaly without pulmonary edema  Multiple old left-sided rib fractures  · Twelve lead EKG 12/7-normal sinus rhythm  Incidental Findings:   · None     Test Results Pending at Discharge (will require follow up): · None     Outpatient Tests Requested:  · None    Complications:  Hyperglycemia    Reason for Admission:  Acute asthma exacerbation secondary to RSV  Hospital Course: Gorge Mo is a 68 y o  female patient who originally presented to the hospital on 12/7/2018 due to dyspnea with chest tightness and cough which she has been having for the last several weeks prior to her presentation  She also described some cough productive of white phlegm, subjective fever and chills at home  She also had a sick contact at home who was her boyfriend with similar complaints  She started her boyfriend's amoxicillin 2 days prior to her presentation without any improvement in his symptoms  Upon evaluation in the ED at [facility] she was found to have an acute asthma exacerbation and was started on IV Solu-Medrol with DuoNeb breathing treatments  Influenza A and B and RSV PCR testing came back positive for RSV but negative for influenza a and B  She was kept on droplet isolation    She responded well initially to the IV steroids as well as the DuoNeb nebulized breathing treatments during hospitalization  However her condition slightly deteriorated with increased wheezing on lung exam after transition to oral prednisone  The oral prednisone was discontinued and she was placed back on the IV Solu-Medrol  A pulmonology consultation was obtained  They agreed with the current management plan and followed along nicely with the patient's management  During hospitalization and treatment with IV steroids she developed hyperglycemia which required institution of bedtime Lantus insulin as well as mealtime insulin to control her blood sugars  Her blood sugars responded well to these measures  Eventually she was transitioned off of the IV Solu-Medrol back to oral prednisone with improvement in her wheezing on lung exam   She was also started back on her home dose of metformin 850 mg PO BID  The patient will be continued on oral prednisone taper for the next couple of days  She is to follow up with pulmonology as an outpatient  The patient is hemodynamically stable for discharge to home today  Please see above list of diagnoses and related plan for additional information  Condition at Discharge: stable     Discharge Day Visit / Exam:     Subjective:  Patient denies any chest pain or shortness of breath  Vitals: Blood Pressure: 140/74 (12/13/18 0700)  Pulse: 74 (12/13/18 0700)  Temperature: 98 2 °F (36 8 °C) (12/13/18 0700)  Temp Source: Temporal (12/13/18 0700)  Respirations: 18 (12/13/18 0700)  Weight - Scale: 57 9 kg (127 lb 10 3 oz) (12/07/18 0721)  SpO2: 94 % (12/13/18 0713)  Exam:   Physical Exam   Constitutional: She is oriented to person, place, and time  She appears well-developed and well-nourished  No distress  HENT:   Head: Normocephalic and atraumatic  Cardiovascular: Normal rate, regular rhythm and normal heart sounds  Pulmonary/Chest: Effort normal  No respiratory distress  She has wheezes  Markedly decreased wheezing in both lung fields bilaterally  Abdominal: Soft  Bowel sounds are normal    Musculoskeletal: Normal range of motion  Neurological: She is alert and oriented to person, place, and time  Skin: She is not diaphoretic  Discussion with Family:  No    Discharge instructions/Information to patient and family:   See after visit summary for information provided to patient and family  Provisions for Follow-Up Care:  See after visit summary for information related to follow-up care and any pertinent home health orders  Disposition:     Home    For Discharges to [facility] SNF:   · Not Applicable to this Patient - Not Applicable to this Patient    Planned Readmission:  None     Discharge Statement:  I spent 23 minutes discharging the patient  This time was spent on the day of discharge  I had direct contact with the patient on the day of discharge  Greater than 50% of the total time was spent examining patient, answering all patient questions, arranging and discussing plan of care with patient as well as directly providing post-discharge instructions  Additional time then spent on discharge activities  Discharge Medications:  See after visit summary for reconciled discharge medications provided to patient and family        ** Please Note: This note has been constructed using a voice recognition system **

## 2018-12-13 NOTE — ASSESSMENT & PLAN NOTE
Lab Results   Component Value Date    HGBA1C 6 6 (H) 07/19/2018       Recent Labs      12/12/18 2054 12/12/18   2204  12/13/18   0724  12/13/18   1053   POCGLU  260*  229*  271*  246*       Blood Sugar Average: Last 72 hrs:  (P) 463 [id number]     · Well controlled per last A1c 6 6%   · Blood sugars now better controlled to institution of Lantus 20 units subcutaneously at bedtime daily, mealtime insulin lispro 6 units t i d  with meals, sliding scale insulin  · Continue with diabetic diet  · In preparation for discharge today, metformin 850 mg PO BID was resumed today

## 2018-12-13 NOTE — ASSESSMENT & PLAN NOTE
· Asthma exacerbation secondary to RSV  · Blood cultures negative x 4 days, afebrile   · procalcitonin normal   · Transition to oral prednisone taper 40 mg daily for 2 days, then taper by 10 mg every 2 days until complete  · Resume Dulera b i d , Allegra, and Proventil at home  · Will require outpatient pulmonology follow-up  · Patient is hemodynamically stable for discharge to home today

## 2018-12-13 NOTE — PROGRESS NOTES
Progress Note - Pulmonary   Ilene Morton 68 y o  female MRN: [id number]  Unit/Bed#: E5 -01 Encounter: [id number]    Assessment/Plan:    1  Pulmonary insufficiency secondary to RSV      *  keep O2 greater than 88%, pulmonary toileting,  OOB as tolerated, IS      *  patient requires no home oxygen, please obtain ambulatory     2  Acute tracheobronchitis secondary to RSV infection      *  bilateral breath sounds wheezy and rhonchorous (improved)      *  switch over to oral prednisone 40 mg Q 2 days, 30 mg to 2 days, 20 mg Q 2 days, 10 mg Q 2 days      *  continue Mucinex and Robitussin      *  Procalcitonin, BC (5 days) negative, afebrile, no WBC      3  Mild to Moderate persistent asthma with acute exacerbation secondary to RSV       *  patient's asthma is normally very well controlled       *   will continue home regimen Delera b i d , Proventil q 6h p r n  Allegra daily       *  continue Flonase, Breo, DuoNeb q 6     4  Grade 1 diastolic heart failure      *  EF 60%, BNP-to 291      *  patient appears euvolemic     Outpatient follow-up for discharge instructions    Chief Complaint:    "I would like to be discharged"    Subjective:    [place] very pleasant woman who is sitting comfortably on her bed  She reported that she feels her breathing has improved significantly upon admission  Holding her flutter valve and expressed that she has been using it approximately every 15-30 minutes  Patient expressed that she would like to be discharged today and she would like to follow up outpatient with pulmonary  Patient currently denies cough, hemoptysis, fever, chills, nausea, vomiting, and headache  Objective:    Vitals: Blood pressure 140/74, pulse 74, temperature 98 2 °F (36 8 °C), temperature source Temporal, resp  rate 18, weight 57 9 kg (127 lb 10 3 oz), SpO2 94 %  RA,Body mass index is 21 24 kg/m²      No intake or output data in the 24 hours ending 12/13/18 1037    Invasive Devices     Peripheral Intravenous Line            Peripheral IV 12/07/18 Left Wrist 6 days                Physical Exam:   Physical Exam   Constitutional: She is oriented to person, place, and time  She appears well-developed and well-nourished  HENT:   Head: Normocephalic and atraumatic  Neck: Normal range of motion  Neck supple  No tracheal deviation present  No thyromegaly present  Cardiovascular: Normal rate, regular rhythm and normal heart sounds  Exam reveals no gallop and no friction rub  No murmur heard  Pulmonary/Chest: Effort normal  No accessory muscle usage  No respiratory distress  She has decreased breath sounds  She has wheezes in the right upper field, the right middle field, the right lower field and the left lower field  She has rhonchi in the right upper field, the right middle field and the right lower field  She has no rales  She exhibits no tenderness  Abdominal: Soft  Bowel sounds are normal  She exhibits no distension  There is no tenderness  Musculoskeletal: Normal range of motion  She exhibits no edema or deformity  Neurological: She is alert and oriented to person, place, and time  Skin: Skin is warm and dry  Psychiatric: She has a normal mood and affect  Her behavior is normal        Labs:  I have personally reviewed pertinent lab results from 12/12/2018    Imaging and other studies: I have personally reviewed pertinent films in PACS     Chest x-ray 12/07/2018- no acute cardiopulmonary disease

## 2019-01-09 ENCOUNTER — OFFICE VISIT (OUTPATIENT)
Dept: PULMONOLOGY | Facility: CLINIC | Age: 74
End: 2019-01-09
Payer: COMMERCIAL

## 2019-01-09 VITALS
HEART RATE: 85 BPM | HEIGHT: 65 IN | RESPIRATION RATE: 18 BRPM | TEMPERATURE: 98.7 F | BODY MASS INDEX: 21.66 KG/M2 | SYSTOLIC BLOOD PRESSURE: 122 MMHG | DIASTOLIC BLOOD PRESSURE: 60 MMHG | OXYGEN SATURATION: 95 % | WEIGHT: 130 LBS

## 2019-01-09 DIAGNOSIS — J45.909 ASTHMA, UNSPECIFIED ASTHMA SEVERITY, UNSPECIFIED WHETHER COMPLICATED, UNSPECIFIED WHETHER PERSISTENT: Primary | ICD-10-CM

## 2019-01-09 PROCEDURE — 99213 OFFICE O/P EST LOW 20 MIN: CPT | Performed by: PHYSICIAN ASSISTANT

## 2019-01-09 RX ORDER — ATORVASTATIN CALCIUM 20 MG/1
20 TABLET, FILM COATED ORAL
Refills: 0 | COMMUNITY
Start: 2018-10-18 | End: 2020-02-20 | Stop reason: SDUPTHER

## 2019-01-09 RX ORDER — LANCETS
EACH MISCELLANEOUS
COMMUNITY
Start: 2012-02-03 | End: 2020-09-15

## 2019-01-09 RX ORDER — CANDESARTAN 32 MG/1
32 TABLET ORAL
COMMUNITY
Start: 2017-06-28 | End: 2019-06-05 | Stop reason: SDUPTHER

## 2019-01-09 RX ORDER — ALBUTEROL SULFATE 90 UG/1
2 AEROSOL, METERED RESPIRATORY (INHALATION) EVERY 6 HOURS PRN
Qty: 1 INHALER | Refills: 5 | Status: SHIPPED | OUTPATIENT
Start: 2019-01-09 | End: 2019-07-09 | Stop reason: SDUPTHER

## 2019-01-09 RX ORDER — ALBUTEROL SULFATE 2.5 MG/3ML
SOLUTION RESPIRATORY (INHALATION)
COMMUNITY
Start: 2018-05-09 | End: 2019-12-20 | Stop reason: SDUPTHER

## 2019-01-09 NOTE — PROGRESS NOTES
Assessment:    1  Asthma, unspecified asthma severity, unspecified whether complicated, unspecified whether persistent  albuterol (PROVENTIL HFA,VENTOLIN HFA) 90 mcg/act inhaler    mometasone-formoterol (DULERA) 100-5 MCG/ACT inhaler    CANCELED: PNEUMOCOCCAL CONJUGATE VACCINE 13-VALENT GREATER THAN 6 MONTHS   2  Need for pneumococcal vaccination  CANCELED: PNEUMOCOCCAL CONJUGATE VACCINE 13-VALENT GREATER THAN 6 MONTHS     12/07/2018 chest x-ray two views showing no active cardiopulmonary disease  Plan:     Ms Tunde Rivera will continue her current pulmonary medications regimen consisting of Dulera 100-5 b i d  And albuterol Q 4 p r n  Gerhardt Sep Two samples of Dulera given to patient  Also refills sent to pharmacy for [facility] and albuterol  Patient instructed to use her albuterol on a more frequent basis if she does notice dyspnea with exertion or occasional reported wheezing  She is scheduled to return to our office in 6 months or sooner if needed  Patient instructed to call us with any questions/concerns or recurrent/worsening symptoms  Prevnar 13 was ordered however patient left office prior to administration  Patient states influenza vaccine up-to-date  Subjective:     Patient ID: Vj Moulton is a 68 y o  female  Chief Complaint:  HPI  67-year-old female who was recently admitted from 12/07/2018 till 12/13/2018 for asthma exacerbation/ tracheobronchitis secondary to RSV infection  Patient states feeling much better since her hospitalization and reports occasional wheezing  And dyspnea with exertion  Denies any fevers, chills, cough, chest pain  See full ROS below  Patient remains on Dulera 100- 5 b i d  And albuterol which she is usually using only once in the morning  Patient instructed that she may use her albuterol every 4 hr as needed for shortness of breath and wheezing  Patient did receive her influenza vaccine this year    However has not had any pneumococcal vaccines in the past     Review of Systems   Constitutional: Positive for fatigue  Negative for chills and fever  HENT: Positive for congestion (Mostly nasal congestion in the morning)  Negative for rhinorrhea, sinus pain and trouble swallowing  Respiratory: Positive for chest tightness ( mild) and shortness of breath ( mild states mostly with activity)  Negative for cough and wheezing  Cardiovascular: Negative for chest pain, palpitations and leg swelling  Gastrointestinal: Negative for abdominal pain, nausea and vomiting  Neurological: Negative for dizziness and headaches  Past Medical History:   Diagnosis Date    Asthma     Diabetes mellitus ([facility] [address] )     Hypertension        Current Outpatient Prescriptions on File Prior to Visit   Medication Sig Dispense Refill    magnesium oxide (MAG-OX) 400 mg Take 1 tablet (400 mg total) by mouth 2 (two) times a day 60 tablet 2    meclizine (ANTIVERT) 12 5 MG tablet Take 1 tablet by mouth 3 (three) times a day as needed for dizziness 20 tablet 0    metFORMIN (GLUCOPHAGE) 850 mg tablet Take 850 mg by mouth 2 (two) times a day with meals      [DISCONTINUED] albuterol (PROVENTIL HFA,VENTOLIN HFA) 90 mcg/act inhaler Inhale 2 puffs every 6 (six) hours as needed for wheezing      [DISCONTINUED] Mometasone Furo-Formoterol Fum (DULERA) 100-5 MCG/ACT AERO 2 puffs BID 13 g 0    fexofenadine (ALLEGRA) 180 MG tablet Take 180 mg by mouth daily      [DISCONTINUED] candesartan (ATACAND) 8 MG tablet Take 1 tablet by mouth daily       No current facility-administered medications on file prior to visit  Objective:  /60 (BP Location: Left arm, Patient Position: Sitting, Cuff Size: Standard)   Pulse 85   Temp 98 7 °F (37 1 °C) (Tympanic)   Resp 18   Ht 5' 5" (1 651 m)   Wt 59 kg (130 lb)   LMP  (LMP Unknown)   SpO2 95%   BMI 21 63 kg/m²      Room-air  Physical Exam   Constitutional: She appears well-developed and well-nourished  Non-toxic appearance  No distress     HENT:   Head: Normocephalic and atraumatic  Nose: Nose normal  No rhinorrhea or sinus tenderness  Mouth/Throat: Uvula is midline and oropharynx is clear and moist  No oropharyngeal exudate  Eyes: Pupils are equal, round, and reactive to light  Conjunctivae are normal  No scleral icterus  Neck: Neck supple  No tracheal deviation present  Cardiovascular: Normal rate, regular rhythm, normal heart sounds and intact distal pulses  Exam reveals no gallop and no friction rub  No murmur heard  Pulmonary/Chest: Effort normal and breath sounds normal  No respiratory distress  She has no wheezes  She has no rales  She exhibits no tenderness  Abdominal: Soft  Bowel sounds are normal  There is no tenderness  Musculoskeletal: She exhibits no edema or tenderness  Lymphadenopathy:     She has no cervical adenopathy  Neurological: She is alert  Skin: Capillary refill takes less than 2 seconds  No rash noted  She is not diaphoretic  Psychiatric: She has a normal mood and affect  Nursing note and vitals reviewed

## 2019-02-20 DIAGNOSIS — Z11.59 ENCOUNTER FOR HEPATITIS C SCREENING TEST FOR LOW RISK PATIENT: ICD-10-CM

## 2019-02-20 DIAGNOSIS — Z12.11 SCREENING FOR COLON CANCER: Primary | ICD-10-CM

## 2019-02-21 ENCOUNTER — OFFICE VISIT (OUTPATIENT)
Dept: FAMILY MEDICINE CLINIC | Facility: CLINIC | Age: 74
End: 2019-02-21
Payer: COMMERCIAL

## 2019-02-21 VITALS
HEART RATE: 89 BPM | SYSTOLIC BLOOD PRESSURE: 140 MMHG | WEIGHT: 132.2 LBS | DIASTOLIC BLOOD PRESSURE: 72 MMHG | TEMPERATURE: 97.7 F | OXYGEN SATURATION: 96 % | BODY MASS INDEX: 22.02 KG/M2 | HEIGHT: 65 IN

## 2019-02-21 DIAGNOSIS — E83.42 HYPOMAGNESEMIA: ICD-10-CM

## 2019-02-21 DIAGNOSIS — E78.5 HYPERLIPIDEMIA, UNSPECIFIED HYPERLIPIDEMIA TYPE: Chronic | ICD-10-CM

## 2019-02-21 DIAGNOSIS — Z12.11 SCREENING FOR COLON CANCER: ICD-10-CM

## 2019-02-21 DIAGNOSIS — Z23 NEED FOR PNEUMOCOCCAL VACCINATION: ICD-10-CM

## 2019-02-21 DIAGNOSIS — E11.9 TYPE 2 DIABETES MELLITUS WITHOUT COMPLICATION, WITHOUT LONG-TERM CURRENT USE OF INSULIN (HCC): Primary | ICD-10-CM

## 2019-02-21 DIAGNOSIS — Z12.39 SCREENING FOR BREAST CANCER: ICD-10-CM

## 2019-02-21 DIAGNOSIS — Z00.00 MEDICARE ANNUAL WELLNESS VISIT, INITIAL: ICD-10-CM

## 2019-02-21 PROBLEM — J40 TRACHEOBRONCHITIS: Status: RESOLVED | Noted: 2018-12-09 | Resolved: 2019-02-21

## 2019-02-21 PROBLEM — N39.0 E. COLI UTI (URINARY TRACT INFECTION): Status: RESOLVED | Noted: 2017-08-25 | Resolved: 2019-02-21

## 2019-02-21 PROBLEM — E87.1 HYPONATREMIA: Status: RESOLVED | Noted: 2018-12-11 | Resolved: 2019-02-21

## 2019-02-21 PROBLEM — B96.20 E. COLI UTI (URINARY TRACT INFECTION): Status: RESOLVED | Noted: 2017-08-25 | Resolved: 2019-02-21

## 2019-02-21 PROBLEM — J45.901 ASTHMA EXACERBATION: Status: RESOLVED | Noted: 2018-12-07 | Resolved: 2019-02-21

## 2019-02-21 PROBLEM — J45.909 ASTHMA: Chronic | Status: RESOLVED | Noted: 2017-08-25 | Resolved: 2019-02-21

## 2019-02-21 LAB — SL AMB POCT HEMOGLOBIN AIC: 6.2 (ref ?–6.5)

## 2019-02-21 PROCEDURE — G0438 PPPS, INITIAL VISIT: HCPCS | Performed by: FAMILY MEDICINE

## 2019-02-21 PROCEDURE — 3725F SCREEN DEPRESSION PERFORMED: CPT | Performed by: FAMILY MEDICINE

## 2019-02-21 PROCEDURE — 3044F HG A1C LEVEL LT 7.0%: CPT | Performed by: FAMILY MEDICINE

## 2019-02-21 PROCEDURE — 90670 PCV13 VACCINE IM: CPT | Performed by: FAMILY MEDICINE

## 2019-02-21 PROCEDURE — 3008F BODY MASS INDEX DOCD: CPT | Performed by: FAMILY MEDICINE

## 2019-02-21 PROCEDURE — 1160F RVW MEDS BY RX/DR IN RCRD: CPT | Performed by: FAMILY MEDICINE

## 2019-02-21 PROCEDURE — 1170F FXNL STATUS ASSESSED: CPT | Performed by: FAMILY MEDICINE

## 2019-02-21 PROCEDURE — 83036 HEMOGLOBIN GLYCOSYLATED A1C: CPT | Performed by: FAMILY MEDICINE

## 2019-02-21 PROCEDURE — G0009 ADMIN PNEUMOCOCCAL VACCINE: HCPCS | Performed by: FAMILY MEDICINE

## 2019-02-21 PROCEDURE — 4040F PNEUMOC VAC/ADMIN/RCVD: CPT | Performed by: FAMILY MEDICINE

## 2019-02-21 PROCEDURE — 1125F AMNT PAIN NOTED PAIN PRSNT: CPT | Performed by: FAMILY MEDICINE

## 2019-02-21 PROCEDURE — 99213 OFFICE O/P EST LOW 20 MIN: CPT | Performed by: FAMILY MEDICINE

## 2019-02-21 PROCEDURE — 1101F PT FALLS ASSESS-DOCD LE1/YR: CPT | Performed by: FAMILY MEDICINE

## 2019-02-21 NOTE — PROGRESS NOTES
Assessment and Plan:    Problem List Items Addressed This Visit        Endocrine    Diabetes mellitus ([facility] [address] ) - Primary (Chronic)        Health Maintenance Due   Topic Date Due    Hepatitis C Screening  1945   [employer] Annual Wellness Visit (AWV)  1945    CRC Screening: Colonoscopy  1945    Diabetic Foot Exam  08/15/1956    DM Eye Exam  08/15/1956    HEPATITIS B VACCINES (1 of 3 - Risk 3-dose series) 08/15/1965    Pneumococcal PPSV23/PCV13 65+ Years / Low and Medium Risk (1 of 2 - PCV13) 08/15/2011    HEMOGLOBIN A1C  01/19/2019         HPI:  Chapis Hernandez is a 68 y o  female here for her Initial Wellness Visit  Patient Active Problem List   Diagnosis    Traumatic subarachnoid hemorrhage with loss of consciousness ([facility] )    Multiple transverse process fractures    Multiple closed fractures of ribs of left side    Closed fracture of left clavicle    Collapse of left lung    HTN (hypertension)    E  coli UTI (urinary tract infection)    [facility]    Asthma    Asthma exacerbation secondary to RSV     Tracheobronchitis due to RSV    Hyperlipidemia    Hyponatremia     Past Medical History:   Diagnosis Date    Asthma     [facility]     Hypertension      Past Surgical History:   Procedure Laterality Date    BRONCHOSCOPY N/A 3/16/2016    Procedure: BRONCHOSCOPY FLEXIBLE/anesth ;  Surgeon: Saintclair Levee, DO;  Location: [facility]; Service:      No family history on file    Social History     Tobacco Use   Smoking Status Never Smoker   Smokeless Tobacco Never Used     Social History     Substance and Sexual Activity   Alcohol Use No      Social History     Substance and Sexual Activity   Drug Use No       Current Outpatient Medications   Medication Sig Dispense Refill    albuterol (2 5 mg/3 mL) 0 083 % nebulizer solution       albuterol (PROVENTIL HFA,VENTOLIN HFA) 90 mcg/act inhaler Inhale 2 puffs every 6 (six) hours as needed for wheezing 1 Inhaler 5    atorvastatin (LIPITOR) 20 mg tablet 20 mg  0    Blood Glucose Calibration (ONETOUCH GLUCOSE CONTROL VI) use as directed      candesartan (ATACAND) 32 MG tablet Take 32 mg by mouth      esomeprazole (NEXIUM) 20 mg capsule Take 20 mg by mouth      fexofenadine (ALLEGRA) 180 MG tablet Take 180 mg by mouth daily      Lancets (ONETOUCH ULTRASOFT) lancets by Does not apply route      magnesium oxide (MAG-OX) 400 mg Take 1 tablet (400 mg total) by mouth 2 (two) times a day 60 tablet 2    meclizine (ANTIVERT) 12 5 MG tablet Take 1 tablet by mouth 3 (three) times a day as needed for dizziness 20 tablet 0    metFORMIN (GLUCOPHAGE) 850 mg tablet Take 850 mg by mouth 2 (two) times a day with meals      mometasone-formoterol (DULERA) 100-5 MCG/ACT inhaler 2 puffs BID 13 g 5    ONE TOUCH ULTRA TEST test strip   0     No current facility-administered medications for this visit  Allergies   Allergen Reactions    Bactrim [Sulfamethoxazole-Trimethoprim] Hives    Penicillins      Immunization History   Administered Date(s) Administered    H1N1, All Formulations 12/09/2009, 02/22/2010    INFLUENZA 09/20/2014, 08/07/2015, 09/25/2018    Tdap 08/15/2013       Patient Care Team:  Oneida Tesfaye MD as PCP - General (Family Medicine)  Isaiah Herman MD    Medicare Screening Tests and Risk Assessments:  Shakira Morgan is here for her Subsequent Wellness visit  Health Risk Assessment:  Patient rates overall health as good  Patient feels that their physical health rating is Same  Eyesight was rated as Same  Hearing was rated as Same  Patient feels that their emotional and mental health rating is Same  Pain experienced by patient in the last 7 days has been None  Patient states that she has experienced no weight loss or gain in last 6 months  Emotional/Mental Health:  Patient has been feeling nervous/anxious  PHQ-9 Depression Screening:    Frequency of the following problems over the past two weeks:      1   Little interest or pleasure in doing things: 0 - not at all      2  Feeling down, depressed, or hopeless: 0 - not at all  PHQ-2 Score: 0          Broken Bones/Falls: Fall Risk Assessment:    In the past year, patient has experienced: No history of falling in past year          Bladder/Bowel:  Patient has not leaked urine accidently in the last six months  Patient reports no loss of bowel control  Immunizations:  Patient has had a flu vaccination within the last year  Patient has not received a pneumonia shot  Patient has not received a shingles shot  Patient has received tetanus/diphtheria shot  Home Safety:  Patient has trouble with stairs inside or outside of their home  Patient currently reports that there are no safety hazards present in home, working smoke alarms, working carbon monoxide detectors  Preventative Screenings:   Breast cancer screening performed, colon cancer screen completed, cholesterol screen completed, glaucoma eye exam completed,     Nutrition:  Current diet: Regular with servings of the following:    Medications:  Patient is currently taking over-the-counter supplements  Patient is able to manage medications  Lifestyle Choices:  Patient reports no tobacco use  Patient has not smoked or used tobacco in the past   Patient reports no alcohol use  Patient drives a vehicle  Patient wears seat belt  Activities of Daily Living:  Can get out of bed by his or her self, able to dress self, able to make own meals, able to do own shopping, able to bathe self, can do own laundry/housekeeping, can manage own money, pay bills and track expenses    Previous Hospitalizations:  Hospitalization or ED visit in past 12 months        Advanced Directives:  Patient has not decided on power of   Patient has not completed advanced directive          Preventative Screening/Counseling:      Cardiovascular:      General: Screening Current      Counseling: Healthy Diet and Healthy Weight          Diabetes:      General: Screening Current      Counseling: Healthy Diet and Healthy Weight          Colorectal Cancer:      General: Screening Current          Breast Cancer:      General: Risks and Benefits Discussed          Cervical Cancer:      General: Risks and Benefits Discussed          Osteoporosis:      General: Risks and Benefits Discussed      Counseling: Calcium and Vitamin D Intake and Regular Weightbearing Exercise      Due for studies: DXA Axial          AAA:      General: Screening Not Indicated          Glaucoma:      General: Screening Current      Comments: Has upcoming appt-had ptosis surgery        HIV:      General: Screening Not Indicated          Hepatitis C:      General: Risks and Benefits Discussed        Advanced Directives:   Patient has no living will for healthcare, has durable POA for healthcare, patient does not have an advanced directive  5 wishes given  Immunizations:      Influenza: Influenza UTD This Year      Pneumococcal: Lifetime Vaccine Completed      Shingrix: Risks & Benefits Discussed      TDAP: Tdap Vaccine UTD      Other Preventative Counseling (Non-Medicare):   Fall Prevention, Increase physical activity, Car/seat belt/driving safety reviewed, Skin self-exam and Sunscreen use

## 2019-02-21 NOTE — PROGRESS NOTES
Assessment/Plan:    No problem-specific Assessment & Plan notes found for this encounter  Diagnoses and all orders for this visit:    Type 2 diabetes mellitus without complication, without long-term current use of insulin ([facility])  -     POCT hemoglobin A1c    Medicare annual wellness visit, initial    Screening for colon cancer  -     Occult Blood, Fecal Immunochemical; Future    Screening for breast cancer  -     Mammo screening bilateral w cad; Future    Need for pneumococcal vaccination  -     PNEUMOCOCCAL CONJUGATE VACCINE 13-VALENT GREATER THAN 6 MONTHS    Other orders  -     esomeprazole (NEXIUM) 20 mg capsule; Take 20 mg by mouth          Subjective:      Patient ID: Belgica Reed is a 68 y o  female  Diabetes   She presents for her follow-up diabetic visit  She has type 2 diabetes mellitus  No MedicAlert identification noted  Her disease course has been stable  Pertinent negatives for diabetes include no blurred vision, no chest pain, no fatigue, no foot paresthesias and no weight loss  Symptoms are stable  There are no diabetic complications  Risk factors for coronary artery disease include diabetes mellitus, dyslipidemia and post-menopausal  Current diabetic treatment includes oral agent (monotherapy)  She is compliant with treatment all of the time  Her weight is stable  She is following a generally healthy diet  Meal planning includes avoidance of concentrated sweets  She has not had a previous visit with a dietitian  There is no change in her home blood glucose trend  An ACE inhibitor/angiotensin II receptor blocker is being taken  She does not see a podiatrist Eye exam is current  The following portions of the patient's history were reviewed and updated as appropriate: allergies, current medications, past family history, past medical history, past social history, past surgical history and problem list       Review of Systems   Constitutional: Negative for fatigue and weight loss     Eyes: Negative for blurred vision  Cardiovascular: Negative for chest pain  Musculoskeletal: Positive for arthralgias  Has had knee arthritis but doing better with taking vitamins         Objective:      /72 (BP Location: Left arm, Patient Position: Sitting, Cuff Size: Adult)   Pulse 89   Temp 97 7 °F (36 5 °C) (Temporal)   Ht 5' 5" (1 651 m)   Wt 60 kg (132 lb 3 2 oz)   LMP  (LMP Unknown)   SpO2 96%   BMI 22 00 kg/m²          Physical Exam   Constitutional: She appears well-developed and well-nourished  HENT:   Right Ear: Tympanic membrane normal    Left Ear: Tympanic membrane normal    Nose: Nose normal    Mouth/Throat: Oropharynx is clear and moist    Neck: No thyromegaly present  Cardiovascular: Normal rate, regular rhythm and normal heart sounds  Musculoskeletal: She exhibits no edema or tenderness  Lymphadenopathy:     She has no cervical adenopathy  Vitals reviewed

## 2019-03-15 ENCOUNTER — TRANSCRIBE ORDERS (OUTPATIENT)
Dept: ADMINISTRATIVE | Facility: HOSPITAL | Age: 74
End: 2019-03-15

## 2019-03-15 DIAGNOSIS — Z12.31 VISIT FOR SCREENING MAMMOGRAM: Primary | ICD-10-CM

## 2019-03-15 LAB
LEFT EYE DIABETIC RETINOPATHY: NORMAL
RIGHT EYE DIABETIC RETINOPATHY: NORMAL

## 2019-03-15 PROCEDURE — 3072F LOW RISK FOR RETINOPATHY: CPT | Performed by: FAMILY MEDICINE

## 2019-03-18 ENCOUNTER — HOSPITAL ENCOUNTER (OUTPATIENT)
Dept: MAMMOGRAPHY | Facility: MEDICAL CENTER | Age: 74
Discharge: HOME | End: 2019-03-18
Payer: COMMERCIAL

## 2019-03-18 ENCOUNTER — APPOINTMENT (OUTPATIENT)
Dept: LAB | Facility: HOSPITAL | Age: 74
End: 2019-03-18
Payer: COMMERCIAL

## 2019-03-18 VITALS — HEIGHT: 65 IN | WEIGHT: 132 LBS | BODY MASS INDEX: 21.99 KG/M2

## 2019-03-18 DIAGNOSIS — E83.42 HYPOMAGNESEMIA: ICD-10-CM

## 2019-03-18 DIAGNOSIS — Z11.59 ENCOUNTER FOR HEPATITIS C SCREENING TEST FOR LOW RISK PATIENT: ICD-10-CM

## 2019-03-18 DIAGNOSIS — E11.9 TYPE 2 DIABETES MELLITUS WITHOUT COMPLICATION, WITHOUT LONG-TERM CURRENT USE OF INSULIN (HCC): ICD-10-CM

## 2019-03-18 DIAGNOSIS — Z12.11 SCREENING FOR COLON CANCER: ICD-10-CM

## 2019-03-18 DIAGNOSIS — E78.5 HYPERLIPIDEMIA, UNSPECIFIED HYPERLIPIDEMIA TYPE: Chronic | ICD-10-CM

## 2019-03-18 DIAGNOSIS — Z12.31 VISIT FOR SCREENING MAMMOGRAM: ICD-10-CM

## 2019-03-18 LAB
ALBUMIN SERPL BCP-MCNC: 3.7 G/DL (ref 3.5–5)
ALP SERPL-CCNC: 80 U/L (ref 46–116)
ALT SERPL W P-5'-P-CCNC: 17 U/L (ref 12–78)
ANION GAP SERPL CALCULATED.3IONS-SCNC: 7 MMOL/L (ref 4–13)
AST SERPL W P-5'-P-CCNC: 15 U/L (ref 5–45)
BILIRUB SERPL-MCNC: 0.44 MG/DL (ref 0.2–1)
BUN SERPL-MCNC: 10 MG/DL (ref 5–25)
CALCIUM SERPL-MCNC: 9.2 MG/DL (ref 8.3–10.1)
CHLORIDE SERPL-SCNC: 100 MMOL/L (ref 100–108)
CHOLEST SERPL-MCNC: 159 MG/DL (ref 50–200)
CO2 SERPL-SCNC: 32 MMOL/L (ref 21–32)
CREAT SERPL-MCNC: 0.52 MG/DL (ref 0.6–1.3)
GFR SERPL CREATININE-BSD FRML MDRD: 96 ML/MIN/1.73SQ M
GLUCOSE P FAST SERPL-MCNC: 117 MG/DL (ref 65–99)
HCV AB SER QL: NORMAL
HDLC SERPL-MCNC: 80 MG/DL (ref 40–60)
HEMOCCULT STL QL IA: NEGATIVE
LDLC SERPL CALC-MCNC: 65 MG/DL (ref 0–100)
MAGNESIUM SERPL-MCNC: 1.5 MG/DL (ref 1.6–2.6)
POTASSIUM SERPL-SCNC: 4.4 MMOL/L (ref 3.5–5.3)
PROT SERPL-MCNC: 7.1 G/DL (ref 6.4–8.2)
SODIUM SERPL-SCNC: 139 MMOL/L (ref 136–145)
TRIGL SERPL-MCNC: 70 MG/DL
TSH SERPL DL<=0.05 MIU/L-ACNC: 2.04 UIU/ML (ref 0.36–3.74)

## 2019-03-18 PROCEDURE — 77063 BREAST TOMOSYNTHESIS BI: CPT

## 2019-03-18 PROCEDURE — 86803 HEPATITIS C AB TEST: CPT

## 2019-03-18 PROCEDURE — 83735 ASSAY OF MAGNESIUM: CPT

## 2019-03-18 PROCEDURE — G0328 FECAL BLOOD SCRN IMMUNOASSAY: HCPCS

## 2019-03-18 PROCEDURE — 84443 ASSAY THYROID STIM HORMONE: CPT

## 2019-03-18 PROCEDURE — 77067 SCR MAMMO BI INCL CAD: CPT

## 2019-03-18 PROCEDURE — 36415 COLL VENOUS BLD VENIPUNCTURE: CPT

## 2019-03-18 PROCEDURE — 80061 LIPID PANEL: CPT

## 2019-03-18 PROCEDURE — 80053 COMPREHEN METABOLIC PANEL: CPT

## 2019-03-19 ENCOUNTER — TELEPHONE (OUTPATIENT)
Dept: FAMILY MEDICINE CLINIC | Facility: CLINIC | Age: 74
End: 2019-03-19

## 2019-04-26 ENCOUNTER — OFFICE VISIT (OUTPATIENT)
Dept: FAMILY MEDICINE CLINIC | Facility: CLINIC | Age: 74
End: 2019-04-26
Payer: COMMERCIAL

## 2019-04-26 VITALS
HEART RATE: 78 BPM | DIASTOLIC BLOOD PRESSURE: 78 MMHG | RESPIRATION RATE: 18 BRPM | SYSTOLIC BLOOD PRESSURE: 138 MMHG | BODY MASS INDEX: 21.76 KG/M2 | HEIGHT: 65 IN | WEIGHT: 130.6 LBS | TEMPERATURE: 97.5 F

## 2019-04-26 DIAGNOSIS — H25.9 AGE-RELATED CATARACT OF BOTH EYES, UNSPECIFIED AGE-RELATED CATARACT TYPE: Primary | ICD-10-CM

## 2019-04-26 PROCEDURE — 99214 OFFICE O/P EST MOD 30 MIN: CPT | Performed by: FAMILY MEDICINE

## 2019-04-26 PROCEDURE — 3008F BODY MASS INDEX DOCD: CPT | Performed by: FAMILY MEDICINE

## 2019-05-22 ENCOUNTER — APPOINTMENT (INPATIENT)
Dept: NON INVASIVE DIAGNOSTICS | Facility: HOSPITAL | Age: 74
DRG: 282 | End: 2019-05-22
Payer: COMMERCIAL

## 2019-05-22 ENCOUNTER — HOSPITAL ENCOUNTER (INPATIENT)
Facility: HOSPITAL | Age: 74
LOS: 1 days | Discharge: HOME | DRG: 282 | End: 2019-05-23
Attending: EMERGENCY MEDICINE | Admitting: INTERNAL MEDICINE
Payer: COMMERCIAL

## 2019-05-22 ENCOUNTER — APPOINTMENT (EMERGENCY)
Dept: RADIOLOGY | Facility: HOSPITAL | Age: 74
DRG: 282 | End: 2019-05-22
Payer: COMMERCIAL

## 2019-05-22 DIAGNOSIS — R07.9 CHEST PAIN: ICD-10-CM

## 2019-05-22 DIAGNOSIS — R77.8 ELEVATED TROPONIN: ICD-10-CM

## 2019-05-22 DIAGNOSIS — I21.4 NSTEMI (NON-ST ELEVATED MYOCARDIAL INFARCTION) (HCC): Primary | ICD-10-CM

## 2019-05-22 DIAGNOSIS — R07.89 CHEST WALL PAIN: ICD-10-CM

## 2019-05-22 DIAGNOSIS — I10 ESSENTIAL HYPERTENSION: Chronic | ICD-10-CM

## 2019-05-22 DIAGNOSIS — R03.0 ELEVATED BLOOD PRESSURE READING: ICD-10-CM

## 2019-05-22 LAB
ALBUMIN SERPL BCP-MCNC: 3.9 G/DL (ref 3.5–5)
ALP SERPL-CCNC: 113 U/L (ref 46–116)
ALT SERPL W P-5'-P-CCNC: 16 U/L (ref 12–78)
ANION GAP SERPL CALCULATED.3IONS-SCNC: 8 MMOL/L (ref 4–13)
APTT PPP: 28 SECONDS (ref 26–38)
AST SERPL W P-5'-P-CCNC: 12 U/L (ref 5–45)
ATRIAL RATE: 66 BPM
ATRIAL RATE: 81 BPM
BASOPHILS # BLD AUTO: 0.06 THOUSANDS/ΜL (ref 0–0.1)
BASOPHILS NFR BLD AUTO: 1 % (ref 0–1)
BILIRUB SERPL-MCNC: 0.47 MG/DL (ref 0.2–1)
BUN SERPL-MCNC: 11 MG/DL (ref 5–25)
CALCIUM SERPL-MCNC: 9.8 MG/DL (ref 8.3–10.1)
CHLORIDE SERPL-SCNC: 99 MMOL/L (ref 100–108)
CO2 SERPL-SCNC: 31 MMOL/L (ref 21–32)
CREAT SERPL-MCNC: 0.57 MG/DL (ref 0.6–1.3)
EOSINOPHIL # BLD AUTO: 0.35 THOUSAND/ΜL (ref 0–0.61)
EOSINOPHIL NFR BLD AUTO: 4 % (ref 0–6)
ERYTHROCYTE [DISTWIDTH] IN BLOOD BY AUTOMATED COUNT: 13.2 % (ref 11.6–15.1)
GFR SERPL CREATININE-BSD FRML MDRD: 93 ML/MIN/1.73SQ M
GLUCOSE SERPL-MCNC: 122 MG/DL (ref 65–140)
GLUCOSE SERPL-MCNC: 155 MG/DL (ref 65–140)
GLUCOSE SERPL-MCNC: 161 MG/DL (ref 65–140)
GLUCOSE SERPL-MCNC: 162 MG/DL (ref 65–140)
GLUCOSE SERPL-MCNC: 196 MG/DL (ref 65–140)
HCT VFR BLD AUTO: 42.1 % (ref 34.8–46.1)
HGB BLD-MCNC: 14.1 G/DL (ref 11.5–15.4)
IMM GRANULOCYTES # BLD AUTO: 0.03 THOUSAND/UL (ref 0–0.2)
IMM GRANULOCYTES NFR BLD AUTO: 0 % (ref 0–2)
INR PPP: 0.95 (ref 0.86–1.17)
LYMPHOCYTES # BLD AUTO: 2.36 THOUSANDS/ΜL (ref 0.6–4.47)
LYMPHOCYTES NFR BLD AUTO: 28 % (ref 14–44)
MCH RBC QN AUTO: 32 PG (ref 26.8–34.3)
MCHC RBC AUTO-ENTMCNC: 33.5 G/DL (ref 31.4–37.4)
MCV RBC AUTO: 96 FL (ref 82–98)
MONOCYTES # BLD AUTO: 0.41 THOUSAND/ΜL (ref 0.17–1.22)
MONOCYTES NFR BLD AUTO: 5 % (ref 4–12)
NEUTROPHILS # BLD AUTO: 5.13 THOUSANDS/ΜL (ref 1.85–7.62)
NEUTS SEG NFR BLD AUTO: 62 % (ref 43–75)
NRBC BLD AUTO-RTO: 0 /100 WBCS
NT-PROBNP SERPL-MCNC: 67 PG/ML
P AXIS: 52 DEGREES
P AXIS: 85 DEGREES
PLATELET # BLD AUTO: 217 THOUSANDS/UL (ref 149–390)
PLATELET # BLD AUTO: 258 THOUSANDS/UL (ref 149–390)
PMV BLD AUTO: 11.7 FL (ref 8.9–12.7)
PMV BLD AUTO: 11.9 FL (ref 8.9–12.7)
POTASSIUM SERPL-SCNC: 5 MMOL/L (ref 3.5–5.3)
PR INTERVAL: 158 MS
PROT SERPL-MCNC: 7.7 G/DL (ref 6.4–8.2)
PROTHROMBIN TIME: 12.8 SECONDS (ref 11.8–14.2)
QRS AXIS: 33 DEGREES
QRS AXIS: 76 DEGREES
QRSD INTERVAL: 86 MS
QRSD INTERVAL: 94 MS
QT INTERVAL: 370 MS
QT INTERVAL: 454 MS
QTC INTERVAL: 426 MS
QTC INTERVAL: 475 MS
RBC # BLD AUTO: 4.41 MILLION/UL (ref 3.81–5.12)
SODIUM SERPL-SCNC: 138 MMOL/L (ref 136–145)
T WAVE AXIS: 57 DEGREES
T WAVE AXIS: 86 DEGREES
TROPONIN I SERPL-MCNC: 0.06 NG/ML
TROPONIN I SERPL-MCNC: 0.57 NG/ML
TROPONIN I SERPL-MCNC: 0.59 NG/ML
VENTRICULAR RATE: 66 BPM
VENTRICULAR RATE: 80 BPM
WBC # BLD AUTO: 8.34 THOUSAND/UL (ref 4.31–10.16)

## 2019-05-22 PROCEDURE — 85025 COMPLETE CBC W/AUTO DIFF WBC: CPT | Performed by: EMERGENCY MEDICINE

## 2019-05-22 PROCEDURE — 93454 CORONARY ARTERY ANGIO S&I: CPT | Performed by: PHYSICIAN ASSISTANT

## 2019-05-22 PROCEDURE — C1769 GUIDE WIRE: HCPCS | Performed by: PHYSICIAN ASSISTANT

## 2019-05-22 PROCEDURE — 82948 REAGENT STRIP/BLOOD GLUCOSE: CPT

## 2019-05-22 PROCEDURE — 93005 ELECTROCARDIOGRAM TRACING: CPT

## 2019-05-22 PROCEDURE — 93454 CORONARY ARTERY ANGIO S&I: CPT | Performed by: INTERNAL MEDICINE

## 2019-05-22 PROCEDURE — 99152 MOD SED SAME PHYS/QHP 5/>YRS: CPT | Performed by: PHYSICIAN ASSISTANT

## 2019-05-22 PROCEDURE — 99152 MOD SED SAME PHYS/QHP 5/>YRS: CPT | Performed by: INTERNAL MEDICINE

## 2019-05-22 PROCEDURE — 85730 THROMBOPLASTIN TIME PARTIAL: CPT | Performed by: EMERGENCY MEDICINE

## 2019-05-22 PROCEDURE — B2111ZZ FLUOROSCOPY OF MULTIPLE CORONARY ARTERIES USING LOW OSMOLAR CONTRAST: ICD-10-PCS | Performed by: INTERNAL MEDICINE

## 2019-05-22 PROCEDURE — 83880 ASSAY OF NATRIURETIC PEPTIDE: CPT | Performed by: EMERGENCY MEDICINE

## 2019-05-22 PROCEDURE — 84484 ASSAY OF TROPONIN QUANT: CPT | Performed by: EMERGENCY MEDICINE

## 2019-05-22 PROCEDURE — 96374 THER/PROPH/DIAG INJ IV PUSH: CPT

## 2019-05-22 PROCEDURE — 99285 EMERGENCY DEPT VISIT HI MDM: CPT | Performed by: EMERGENCY MEDICINE

## 2019-05-22 PROCEDURE — 36415 COLL VENOUS BLD VENIPUNCTURE: CPT | Performed by: EMERGENCY MEDICINE

## 2019-05-22 PROCEDURE — 93010 ELECTROCARDIOGRAM REPORT: CPT | Performed by: INTERNAL MEDICINE

## 2019-05-22 PROCEDURE — 99285 EMERGENCY DEPT VISIT HI MDM: CPT

## 2019-05-22 PROCEDURE — 85610 PROTHROMBIN TIME: CPT | Performed by: EMERGENCY MEDICINE

## 2019-05-22 PROCEDURE — 99223 1ST HOSP IP/OBS HIGH 75: CPT | Performed by: INTERNAL MEDICINE

## 2019-05-22 PROCEDURE — 94640 AIRWAY INHALATION TREATMENT: CPT

## 2019-05-22 PROCEDURE — 84484 ASSAY OF TROPONIN QUANT: CPT | Performed by: INTERNAL MEDICINE

## 2019-05-22 PROCEDURE — C1894 INTRO/SHEATH, NON-LASER: HCPCS | Performed by: PHYSICIAN ASSISTANT

## 2019-05-22 PROCEDURE — 85049 AUTOMATED PLATELET COUNT: CPT | Performed by: INTERNAL MEDICINE

## 2019-05-22 PROCEDURE — 99153 MOD SED SAME PHYS/QHP EA: CPT | Performed by: PHYSICIAN ASSISTANT

## 2019-05-22 PROCEDURE — 99222 1ST HOSP IP/OBS MODERATE 55: CPT | Performed by: INTERNAL MEDICINE

## 2019-05-22 PROCEDURE — 80053 COMPREHEN METABOLIC PANEL: CPT | Performed by: EMERGENCY MEDICINE

## 2019-05-22 PROCEDURE — 71046 X-RAY EXAM CHEST 2 VIEWS: CPT

## 2019-05-22 RX ORDER — VERAPAMIL HCL 2.5 MG/ML
AMPUL (ML) INTRAVENOUS CODE/TRAUMA/SEDATION MEDICATION
Status: COMPLETED | OUTPATIENT
Start: 2019-05-22 | End: 2019-05-22

## 2019-05-22 RX ORDER — HEPARIN SODIUM 1000 [USP'U]/ML
3600 INJECTION, SOLUTION INTRAVENOUS; SUBCUTANEOUS ONCE
Status: DISCONTINUED | OUTPATIENT
Start: 2019-05-22 | End: 2019-05-22 | Stop reason: SDUPTHER

## 2019-05-22 RX ORDER — LIDOCAINE HYDROCHLORIDE 10 MG/ML
INJECTION, SOLUTION INFILTRATION; PERINEURAL CODE/TRAUMA/SEDATION MEDICATION
Status: COMPLETED | OUTPATIENT
Start: 2019-05-22 | End: 2019-05-22

## 2019-05-22 RX ORDER — PANTOPRAZOLE SODIUM 40 MG/1
40 INJECTION, POWDER, FOR SOLUTION INTRAVENOUS EVERY 12 HOURS SCHEDULED
Status: DISCONTINUED | OUTPATIENT
Start: 2019-05-22 | End: 2019-05-22

## 2019-05-22 RX ORDER — KETOROLAC TROMETHAMINE 30 MG/ML
15 INJECTION, SOLUTION INTRAMUSCULAR; INTRAVENOUS ONCE
Status: COMPLETED | OUTPATIENT
Start: 2019-05-22 | End: 2019-05-22

## 2019-05-22 RX ORDER — SODIUM CHLORIDE 9 MG/ML
100 INJECTION, SOLUTION INTRAVENOUS ONCE
Status: DISCONTINUED | OUTPATIENT
Start: 2019-05-23 | End: 2019-05-23

## 2019-05-22 RX ORDER — DIFLUPREDNATE 0.5 MG/ML
1 EMULSION OPHTHALMIC 4 TIMES DAILY
Status: DISCONTINUED | OUTPATIENT
Start: 2019-05-22 | End: 2019-05-22 | Stop reason: CLARIF

## 2019-05-22 RX ORDER — HEPARIN SODIUM 1000 [USP'U]/ML
4000 INJECTION, SOLUTION INTRAVENOUS; SUBCUTANEOUS ONCE
Status: COMPLETED | OUTPATIENT
Start: 2019-05-22 | End: 2019-05-22

## 2019-05-22 RX ORDER — CLOPIDOGREL BISULFATE 75 MG/1
300 TABLET ORAL ONCE
Status: COMPLETED | OUTPATIENT
Start: 2019-05-22 | End: 2019-05-22

## 2019-05-22 RX ORDER — ASPIRIN 325 MG
325 TABLET ORAL DAILY
Status: DISCONTINUED | OUTPATIENT
Start: 2019-05-22 | End: 2019-05-23 | Stop reason: HOSPADM

## 2019-05-22 RX ORDER — KETOROLAC TROMETHAMINE 5 MG/ML
1 SOLUTION OPHTHALMIC 4 TIMES DAILY
Status: DISCONTINUED | OUTPATIENT
Start: 2019-05-22 | End: 2019-05-23 | Stop reason: HOSPADM

## 2019-05-22 RX ORDER — ATORVASTATIN CALCIUM 40 MG/1
40 TABLET, FILM COATED ORAL
Status: DISCONTINUED | OUTPATIENT
Start: 2019-05-23 | End: 2019-05-23 | Stop reason: HOSPADM

## 2019-05-22 RX ORDER — ATORVASTATIN CALCIUM 10 MG/1
20 TABLET, FILM COATED ORAL
Status: DISCONTINUED | OUTPATIENT
Start: 2019-05-23 | End: 2019-05-22

## 2019-05-22 RX ORDER — AMLODIPINE BESYLATE 5 MG/1
5 TABLET ORAL DAILY
Status: DISCONTINUED | OUTPATIENT
Start: 2019-05-22 | End: 2019-05-23 | Stop reason: HOSPADM

## 2019-05-22 RX ORDER — KETOROLAC TROMETHAMINE 5 MG/ML
1 SOLUTION OPHTHALMIC 4 TIMES DAILY
COMMUNITY
End: 2020-01-06

## 2019-05-22 RX ORDER — SODIUM CHLORIDE 9 MG/ML
INJECTION, SOLUTION INTRAVENOUS
Status: COMPLETED | OUTPATIENT
Start: 2019-05-22 | End: 2019-05-22

## 2019-05-22 RX ORDER — TRIPROLIDINE/PSEUDOEPHEDRINE 2.5MG-60MG
1 TABLET ORAL 4 TIMES DAILY
COMMUNITY
End: 2020-01-06

## 2019-05-22 RX ORDER — PANTOPRAZOLE SODIUM 40 MG/1
40 TABLET, DELAYED RELEASE ORAL
Status: DISCONTINUED | OUTPATIENT
Start: 2019-05-22 | End: 2019-05-23 | Stop reason: HOSPADM

## 2019-05-22 RX ORDER — MIDAZOLAM HYDROCHLORIDE 1 MG/ML
INJECTION INTRAMUSCULAR; INTRAVENOUS CODE/TRAUMA/SEDATION MEDICATION
Status: COMPLETED | OUTPATIENT
Start: 2019-05-22 | End: 2019-05-22

## 2019-05-22 RX ORDER — DIFLUPREDNATE 0.5 MG/ML
1 EMULSION OPHTHALMIC 2 TIMES DAILY
Status: DISCONTINUED | OUTPATIENT
Start: 2019-05-23 | End: 2019-05-23 | Stop reason: HOSPADM

## 2019-05-22 RX ORDER — FENTANYL CITRATE 50 UG/ML
INJECTION, SOLUTION INTRAMUSCULAR; INTRAVENOUS CODE/TRAUMA/SEDATION MEDICATION
Status: COMPLETED | OUTPATIENT
Start: 2019-05-22 | End: 2019-05-22

## 2019-05-22 RX ORDER — IPRATROPIUM BROMIDE AND ALBUTEROL SULFATE 2.5; .5 MG/3ML; MG/3ML
3 SOLUTION RESPIRATORY (INHALATION) EVERY 6 HOURS PRN
Status: DISCONTINUED | OUTPATIENT
Start: 2019-05-22 | End: 2019-05-23 | Stop reason: HOSPADM

## 2019-05-22 RX ORDER — SODIUM CHLORIDE 9 MG/ML
125 INJECTION, SOLUTION INTRAVENOUS CONTINUOUS
Status: DISPENSED | OUTPATIENT
Start: 2019-05-22 | End: 2019-05-22

## 2019-05-22 RX ORDER — DIFLUPREDNATE 0.5 MG/ML
1 EMULSION OPHTHALMIC DAILY
Status: DISCONTINUED | OUTPATIENT
Start: 2019-05-30 | End: 2019-05-23 | Stop reason: HOSPADM

## 2019-05-22 RX ORDER — DIFLUPREDNATE 0.5 MG/ML
1 EMULSION OPHTHALMIC 3 TIMES DAILY
Status: COMPLETED | OUTPATIENT
Start: 2019-05-22 | End: 2019-05-22

## 2019-05-22 RX ORDER — FLUTICASONE FUROATE AND VILANTEROL 100; 25 UG/1; UG/1
1 POWDER RESPIRATORY (INHALATION)
Status: DISCONTINUED | OUTPATIENT
Start: 2019-05-22 | End: 2019-05-23 | Stop reason: HOSPADM

## 2019-05-22 RX ORDER — HEPARIN SODIUM 1000 [USP'U]/ML
INJECTION, SOLUTION INTRAVENOUS; SUBCUTANEOUS CODE/TRAUMA/SEDATION MEDICATION
Status: COMPLETED | OUTPATIENT
Start: 2019-05-22 | End: 2019-05-22

## 2019-05-22 RX ORDER — ALBUTEROL SULFATE 2.5 MG/3ML
2.5 SOLUTION RESPIRATORY (INHALATION) ONCE
Status: COMPLETED | OUTPATIENT
Start: 2019-05-22 | End: 2019-05-22

## 2019-05-22 RX ORDER — HEPARIN SODIUM 10000 [USP'U]/100ML
3-20 INJECTION, SOLUTION INTRAVENOUS
Status: DISCONTINUED | OUTPATIENT
Start: 2019-05-22 | End: 2019-05-22

## 2019-05-22 RX ORDER — LOSARTAN POTASSIUM 25 MG/1
25 TABLET ORAL DAILY
Status: DISCONTINUED | OUTPATIENT
Start: 2019-05-22 | End: 2019-05-23 | Stop reason: HOSPADM

## 2019-05-22 RX ORDER — NITROGLYCERIN 20 MG/100ML
INJECTION INTRAVENOUS CODE/TRAUMA/SEDATION MEDICATION
Status: COMPLETED | OUTPATIENT
Start: 2019-05-22 | End: 2019-05-22

## 2019-05-22 RX ADMIN — FENTANYL CITRATE 50 MCG: 50 INJECTION, SOLUTION INTRAMUSCULAR; INTRAVENOUS at 14:38

## 2019-05-22 RX ADMIN — HEPARIN SODIUM AND DEXTROSE 12 UNITS/KG/HR: 10000; 5 INJECTION INTRAVENOUS at 11:45

## 2019-05-22 RX ADMIN — HEPARIN SODIUM 4000 UNITS: 1000 INJECTION INTRAVENOUS; SUBCUTANEOUS at 11:40

## 2019-05-22 RX ADMIN — NITROGLYCERIN 1 INCH: 20 OINTMENT TOPICAL at 06:19

## 2019-05-22 RX ADMIN — METOPROLOL TARTRATE 25 MG: 25 TABLET, FILM COATED ORAL at 22:06

## 2019-05-22 RX ADMIN — NITROGLYCERIN 200 MCG: 20 INJECTION INTRAVENOUS at 14:50

## 2019-05-22 RX ADMIN — MIDAZOLAM 2 MG: 1 INJECTION INTRAMUSCULAR; INTRAVENOUS at 14:38

## 2019-05-22 RX ADMIN — LIDOCAINE HYDROCHLORIDE 1 ML: 10 INJECTION, SOLUTION INFILTRATION; PERINEURAL at 14:46

## 2019-05-22 RX ADMIN — KETOROLAC TROMETHAMINE 1 DROP: 5 SOLUTION OPHTHALMIC at 22:08

## 2019-05-22 RX ADMIN — Medication 2.5 MG: at 14:50

## 2019-05-22 RX ADMIN — PANTOPRAZOLE SODIUM 40 MG: 40 TABLET, DELAYED RELEASE ORAL at 16:15

## 2019-05-22 RX ADMIN — HEPARIN SODIUM 4000 UNITS: 1000 INJECTION, SOLUTION INTRAVENOUS; SUBCUTANEOUS at 14:50

## 2019-05-22 RX ADMIN — DIFLUPREDNATE 1 DROP: 0.5 EMULSION OPHTHALMIC at 22:09

## 2019-05-22 RX ADMIN — INSULIN LISPRO 1 UNITS: 100 INJECTION, SOLUTION INTRAVENOUS; SUBCUTANEOUS at 09:35

## 2019-05-22 RX ADMIN — PANTOPRAZOLE SODIUM 40 MG: 40 TABLET, DELAYED RELEASE ORAL at 10:53

## 2019-05-22 RX ADMIN — LOSARTAN POTASSIUM 25 MG: 25 TABLET, FILM COATED ORAL at 09:34

## 2019-05-22 RX ADMIN — METOPROLOL TARTRATE 25 MG: 25 TABLET, FILM COATED ORAL at 12:26

## 2019-05-22 RX ADMIN — IOHEXOL 45 ML: 350 INJECTION, SOLUTION INTRAVENOUS at 14:54

## 2019-05-22 RX ADMIN — KETOROLAC TROMETHAMINE 1 DROP: 5 SOLUTION OPHTHALMIC at 17:17

## 2019-05-22 RX ADMIN — DIFLUPREDNATE 1 DROP: 0.5 EMULSION OPHTHALMIC at 16:15

## 2019-05-22 RX ADMIN — KETOROLAC TROMETHAMINE 15 MG: 30 INJECTION, SOLUTION INTRAMUSCULAR; INTRAVENOUS at 05:28

## 2019-05-22 RX ADMIN — SODIUM CHLORIDE 125 ML/HR: 0.9 INJECTION, SOLUTION INTRAVENOUS at 15:19

## 2019-05-22 RX ADMIN — FLUTICASONE FUROATE AND VILANTEROL TRIFENATATE 1 PUFF: 100; 25 POWDER RESPIRATORY (INHALATION) at 09:35

## 2019-05-22 RX ADMIN — ASPIRIN 325 MG ORAL TABLET 325 MG: 325 PILL ORAL at 09:34

## 2019-05-22 RX ADMIN — SODIUM CHLORIDE 100 ML/HR: 0.9 INJECTION, SOLUTION INTRAVENOUS at 14:38

## 2019-05-22 RX ADMIN — ENOXAPARIN SODIUM 40 MG: 40 INJECTION SUBCUTANEOUS at 09:34

## 2019-05-22 RX ADMIN — ALBUTEROL SULFATE 2.5 MG: 2.5 SOLUTION RESPIRATORY (INHALATION) at 05:29

## 2019-05-22 RX ADMIN — FENTANYL CITRATE 50 MCG: 50 INJECTION, SOLUTION INTRAMUSCULAR; INTRAVENOUS at 14:50

## 2019-05-22 RX ADMIN — CLOPIDOGREL BISULFATE 300 MG: 75 TABLET ORAL at 11:40

## 2019-05-22 RX ADMIN — AMLODIPINE BESYLATE 5 MG: 5 TABLET ORAL at 16:15

## 2019-05-23 ENCOUNTER — TRANSITIONAL CARE MANAGEMENT (OUTPATIENT)
Dept: FAMILY MEDICINE CLINIC | Facility: CLINIC | Age: 74
End: 2019-05-23

## 2019-05-23 VITALS
HEART RATE: 64 BPM | BODY MASS INDEX: 23.09 KG/M2 | OXYGEN SATURATION: 96 % | TEMPERATURE: 96.7 F | RESPIRATION RATE: 18 BRPM | DIASTOLIC BLOOD PRESSURE: 85 MMHG | SYSTOLIC BLOOD PRESSURE: 139 MMHG | HEIGHT: 63 IN | WEIGHT: 130.3 LBS

## 2019-05-23 PROBLEM — R07.89 CHEST WALL PAIN: Status: RESOLVED | Noted: 2019-05-22 | Resolved: 2019-05-23

## 2019-05-23 LAB
ALBUMIN SERPL BCP-MCNC: 3.7 G/DL (ref 3.5–5)
ALP SERPL-CCNC: 113 U/L (ref 46–116)
ALT SERPL W P-5'-P-CCNC: 20 U/L (ref 12–78)
ANION GAP SERPL CALCULATED.3IONS-SCNC: 13 MMOL/L (ref 4–13)
AST SERPL W P-5'-P-CCNC: 17 U/L (ref 5–45)
BASOPHILS # BLD AUTO: 0.07 THOUSANDS/ΜL (ref 0–0.1)
BASOPHILS NFR BLD AUTO: 1 % (ref 0–1)
BILIRUB SERPL-MCNC: 0.55 MG/DL (ref 0.2–1)
BUN SERPL-MCNC: 19 MG/DL (ref 5–25)
CALCIUM SERPL-MCNC: 9.6 MG/DL (ref 8.3–10.1)
CHLORIDE SERPL-SCNC: 100 MMOL/L (ref 100–108)
CHOLEST SERPL-MCNC: 193 MG/DL (ref 50–200)
CO2 SERPL-SCNC: 25 MMOL/L (ref 21–32)
CREAT SERPL-MCNC: 0.69 MG/DL (ref 0.6–1.3)
EOSINOPHIL # BLD AUTO: 0.33 THOUSAND/ΜL (ref 0–0.61)
EOSINOPHIL NFR BLD AUTO: 4 % (ref 0–6)
ERYTHROCYTE [DISTWIDTH] IN BLOOD BY AUTOMATED COUNT: 13.1 % (ref 11.6–15.1)
GFR SERPL CREATININE-BSD FRML MDRD: 87 ML/MIN/1.73SQ M
GLUCOSE SERPL-MCNC: 114 MG/DL (ref 65–140)
GLUCOSE SERPL-MCNC: 182 MG/DL (ref 65–140)
GLUCOSE SERPL-MCNC: 211 MG/DL (ref 65–140)
HCT VFR BLD AUTO: 39.7 % (ref 34.8–46.1)
HDLC SERPL-MCNC: 73 MG/DL (ref 40–60)
HGB BLD-MCNC: 13.5 G/DL (ref 11.5–15.4)
IMM GRANULOCYTES # BLD AUTO: 0.02 THOUSAND/UL (ref 0–0.2)
IMM GRANULOCYTES NFR BLD AUTO: 0 % (ref 0–2)
LDLC SERPL CALC-MCNC: 86 MG/DL (ref 0–100)
LYMPHOCYTES # BLD AUTO: 2.05 THOUSANDS/ΜL (ref 0.6–4.47)
LYMPHOCYTES NFR BLD AUTO: 24 % (ref 14–44)
MCH RBC QN AUTO: 32.4 PG (ref 26.8–34.3)
MCHC RBC AUTO-ENTMCNC: 34 G/DL (ref 31.4–37.4)
MCV RBC AUTO: 95 FL (ref 82–98)
MONOCYTES # BLD AUTO: 0.5 THOUSAND/ΜL (ref 0.17–1.22)
MONOCYTES NFR BLD AUTO: 6 % (ref 4–12)
NEUTROPHILS # BLD AUTO: 5.52 THOUSANDS/ΜL (ref 1.85–7.62)
NEUTS SEG NFR BLD AUTO: 65 % (ref 43–75)
NRBC BLD AUTO-RTO: 0 /100 WBCS
PLATELET # BLD AUTO: 243 THOUSANDS/UL (ref 149–390)
PMV BLD AUTO: 12.4 FL (ref 8.9–12.7)
POTASSIUM SERPL-SCNC: 4.2 MMOL/L (ref 3.5–5.3)
PROT SERPL-MCNC: 7.4 G/DL (ref 6.4–8.2)
RBC # BLD AUTO: 4.17 MILLION/UL (ref 3.81–5.12)
SODIUM SERPL-SCNC: 138 MMOL/L (ref 136–145)
TRIGL SERPL-MCNC: 168 MG/DL
WBC # BLD AUTO: 8.49 THOUSAND/UL (ref 4.31–10.16)

## 2019-05-23 PROCEDURE — 99239 HOSP IP/OBS DSCHRG MGMT >30: CPT | Performed by: INTERNAL MEDICINE

## 2019-05-23 PROCEDURE — 80061 LIPID PANEL: CPT | Performed by: PHYSICIAN ASSISTANT

## 2019-05-23 PROCEDURE — 85025 COMPLETE CBC W/AUTO DIFF WBC: CPT | Performed by: INTERNAL MEDICINE

## 2019-05-23 PROCEDURE — 82948 REAGENT STRIP/BLOOD GLUCOSE: CPT

## 2019-05-23 PROCEDURE — 99232 SBSQ HOSP IP/OBS MODERATE 35: CPT | Performed by: INTERNAL MEDICINE

## 2019-05-23 PROCEDURE — 80053 COMPREHEN METABOLIC PANEL: CPT | Performed by: INTERNAL MEDICINE

## 2019-05-23 RX ORDER — PANTOPRAZOLE SODIUM 40 MG/1
40 TABLET, DELAYED RELEASE ORAL DAILY
Qty: 30 TABLET | Refills: 0 | Status: SHIPPED | OUTPATIENT
Start: 2019-05-23 | End: 2019-06-05 | Stop reason: SDUPTHER

## 2019-05-23 RX ORDER — AMLODIPINE BESYLATE 5 MG/1
5 TABLET ORAL DAILY
Qty: 30 TABLET | Refills: 0 | Status: SHIPPED | OUTPATIENT
Start: 2019-05-24 | End: 2019-06-05 | Stop reason: SDUPTHER

## 2019-05-23 RX ADMIN — INSULIN LISPRO 2 UNITS: 100 INJECTION, SOLUTION INTRAVENOUS; SUBCUTANEOUS at 08:44

## 2019-05-23 RX ADMIN — KETOROLAC TROMETHAMINE 1 DROP: 5 SOLUTION OPHTHALMIC at 08:44

## 2019-05-23 RX ADMIN — AMLODIPINE BESYLATE 5 MG: 5 TABLET ORAL at 08:43

## 2019-05-23 RX ADMIN — ASPIRIN 325 MG ORAL TABLET 325 MG: 325 PILL ORAL at 08:43

## 2019-05-23 RX ADMIN — DIFLUPREDNATE 1 DROP: 0.5 EMULSION OPHTHALMIC at 08:44

## 2019-05-23 RX ADMIN — FLUTICASONE FUROATE AND VILANTEROL TRIFENATATE 1 PUFF: 100; 25 POWDER RESPIRATORY (INHALATION) at 08:44

## 2019-05-23 RX ADMIN — METOPROLOL TARTRATE 25 MG: 25 TABLET, FILM COATED ORAL at 08:43

## 2019-05-23 RX ADMIN — LOSARTAN POTASSIUM 25 MG: 25 TABLET, FILM COATED ORAL at 08:43

## 2019-05-23 RX ADMIN — PANTOPRAZOLE SODIUM 40 MG: 40 TABLET, DELAYED RELEASE ORAL at 06:39

## 2019-06-05 ENCOUNTER — OFFICE VISIT (OUTPATIENT)
Dept: FAMILY MEDICINE CLINIC | Facility: CLINIC | Age: 74
End: 2019-06-05
Payer: COMMERCIAL

## 2019-06-05 VITALS
DIASTOLIC BLOOD PRESSURE: 56 MMHG | TEMPERATURE: 97.8 F | BODY MASS INDEX: 23.21 KG/M2 | HEART RATE: 80 BPM | SYSTOLIC BLOOD PRESSURE: 118 MMHG | HEIGHT: 63 IN | WEIGHT: 131 LBS | RESPIRATION RATE: 18 BRPM

## 2019-06-05 DIAGNOSIS — R77.8 ELEVATED TROPONIN: ICD-10-CM

## 2019-06-05 DIAGNOSIS — E11.9 TYPE 2 DIABETES MELLITUS WITHOUT COMPLICATION, WITHOUT LONG-TERM CURRENT USE OF INSULIN (HCC): Chronic | ICD-10-CM

## 2019-06-05 DIAGNOSIS — Z78.0 POSTMENOPAUSAL: Primary | ICD-10-CM

## 2019-06-05 DIAGNOSIS — I10 ESSENTIAL HYPERTENSION: Chronic | ICD-10-CM

## 2019-06-05 PROCEDURE — 99495 TRANSJ CARE MGMT MOD F2F 14D: CPT | Performed by: NURSE PRACTITIONER

## 2019-06-05 PROCEDURE — 4010F ACE/ARB THERAPY RXD/TAKEN: CPT | Performed by: FAMILY MEDICINE

## 2019-06-05 PROCEDURE — 1160F RVW MEDS BY RX/DR IN RCRD: CPT | Performed by: NURSE PRACTITIONER

## 2019-06-05 RX ORDER — CANDESARTAN 32 MG/1
32 TABLET ORAL DAILY
Qty: 90 TABLET | Refills: 1 | Status: SHIPPED | OUTPATIENT
Start: 2019-06-05 | End: 2019-10-14 | Stop reason: CLARIF

## 2019-06-05 RX ORDER — AMLODIPINE BESYLATE 5 MG/1
5 TABLET ORAL DAILY
Qty: 90 TABLET | Refills: 1 | Status: SHIPPED | OUTPATIENT
Start: 2019-06-05 | End: 2020-02-07 | Stop reason: SDUPTHER

## 2019-06-05 RX ORDER — PANTOPRAZOLE SODIUM 40 MG/1
40 TABLET, DELAYED RELEASE ORAL DAILY
Qty: 90 TABLET | Refills: 1 | Status: SHIPPED | OUTPATIENT
Start: 2019-06-05 | End: 2020-01-06

## 2019-06-20 ENCOUNTER — TELEPHONE (OUTPATIENT)
Dept: GASTROENTEROLOGY | Facility: CLINIC | Age: 74
End: 2019-06-20

## 2019-07-09 ENCOUNTER — OFFICE VISIT (OUTPATIENT)
Dept: PULMONOLOGY | Facility: CLINIC | Age: 74
End: 2019-07-09
Payer: COMMERCIAL

## 2019-07-09 VITALS
OXYGEN SATURATION: 96 % | BODY MASS INDEX: 24.1 KG/M2 | HEART RATE: 71 BPM | WEIGHT: 136 LBS | DIASTOLIC BLOOD PRESSURE: 72 MMHG | SYSTOLIC BLOOD PRESSURE: 134 MMHG | HEIGHT: 63 IN | TEMPERATURE: 97.8 F | RESPIRATION RATE: 16 BRPM

## 2019-07-09 DIAGNOSIS — J45.30 MILD PERSISTENT ASTHMA WITHOUT COMPLICATION: Primary | ICD-10-CM

## 2019-07-09 DIAGNOSIS — J45.909 ASTHMA, UNSPECIFIED ASTHMA SEVERITY, UNSPECIFIED WHETHER COMPLICATED, UNSPECIFIED WHETHER PERSISTENT: ICD-10-CM

## 2019-07-09 DIAGNOSIS — G47.33 OSA (OBSTRUCTIVE SLEEP APNEA): ICD-10-CM

## 2019-07-09 PROCEDURE — 99214 OFFICE O/P EST MOD 30 MIN: CPT | Performed by: INTERNAL MEDICINE

## 2019-07-09 RX ORDER — ALBUTEROL SULFATE 90 UG/1
2 AEROSOL, METERED RESPIRATORY (INHALATION) EVERY 6 HOURS PRN
Qty: 1 INHALER | Refills: 5 | Status: SHIPPED | OUTPATIENT
Start: 2019-07-09 | End: 2020-12-16 | Stop reason: SDUPTHER

## 2019-07-09 NOTE — ASSESSMENT & PLAN NOTE
· Patient with previously diagnosed obstructive sleep apnea many years ago  Tried CPAP but could not tolerate it  · She has significant daytime fatigue, morning headaches, poor sleep quality with frequent awakenings  Findings are suggestive of obstructive sleep apnea  · Split night polysomnogram has been ordered    She is agreeable to attempt CPAP again if she has significant sleep apnea

## 2019-07-09 NOTE — ASSESSMENT & PLAN NOTE
· Continues to use Dulera with good control  · Has a rescue albuterol inhaler which she uses intermittently  Has not required this lately however    · Continue present management

## 2019-07-09 NOTE — PROGRESS NOTES
Progress note - Pulmonary Medicine   Kathy Dunham 68 y o  female MRN: [id number]       Impression & Plan:     Mild persistent asthma without complication  · Continues to use Dulera with good control  · Has a rescue albuterol inhaler which she uses intermittently  Has not required this lately however  · Continue present management    NGOC (obstructive sleep apnea)  · Patient with previously diagnosed obstructive sleep apnea many years ago  Tried CPAP but could not tolerate it  · She has significant daytime fatigue, morning headaches, poor sleep quality with frequent awakenings  Findings are suggestive of obstructive sleep apnea  · Split night polysomnogram has been ordered  She is agreeable to attempt CPAP again if she has significant sleep apnea    I will see her back in 4 months  Will obtain spirometry at that time  ______________________________________________________________________    HPI:    Kathy Dunham presents today for follow-up of mild persistent asthma without recent exacerbation  She was last in the hospital in May  She was not seen by us at that time  During the hospitalization she had a shortness of breath and chest tightness  She had accelerated hypertension and a type 2 MI  Cardiac catheterization was negative  She also had elevated troponin levels  She did improve with conservative treatment and has been discharged with pulmonary follow-up  Currently she does not report significant asthma symptoms  She has been taking her Dulera regularly  No wheezing or chest tightness  She does feel fatigued and lacks energy  She feels like she might need supplemental oxygen  She does have snoring, significant awakenings, and has been told by her  that she has periods of apnea  She had a remote diagnosis of sleep apnea 20 years ago but was intolerant to CPAP at that time and returned the machine  She intermittently has morning headaches particularly with the bed night's sleep  She does have a stable weight however  Review of Systems:  Review of Systems   Constitutional: Negative for activity change, appetite change and unexpected weight change  HENT: Negative for congestion, postnasal drip and sore throat  Cardiovascular: Negative  Gastrointestinal: Negative  All other systems reviewed and are negative  Past medical history, surgical history, and family history were reviewed and updated as appropriate    Social history updates:  Social History     Tobacco Use   Smoking Status Never Smoker   Smokeless Tobacco Never Used       PhysicalExamination:  Vitals:   /72   Pulse 71   Temp 97 8 °F (36 6 °C)   Resp 16   Ht 5' 3" (1 6 m)   Wt 61 7 kg (136 lb)   LMP  (LMP Unknown)   SpO2 96%   BMI 24 09 kg/m²     Gen: Comfortable  Non-labored  HEENT: PERRL  O/P: clear  moist   Slightly crowded posteriorly  Neck: Trachea is midline  No JVD  No adenopathy  Chest:  Symmetric chest wall excursion with clear breath sounds  Cardiac:  Regular  no murmur  Abdomen: Soft and nontender  Bowel sounds are present  Extremities: No edema  Neuro:  Nonfocal  Skin:  No rash    Diagnostic Data:  Labs:   I personally reviewed the most recent laboratory data pertinent to today's visit    Lab Results   Component Value Date    WBC 8 49 05/23/2019    HGB 13 5 05/23/2019    HCT 39 7 05/23/2019    MCV 95 05/23/2019     05/23/2019     Lab Results   Component Value Date    SODIUM 138 05/23/2019    K 4 2 05/23/2019    CO2 25 05/23/2019     05/23/2019    BUN 19 05/23/2019    CREATININE 0 69 05/23/2019    CALCIUM 9 6 05/23/2019       Imaging:  I personally reviewed the images on the [facility] system pertinent to today's visit  5/22 chest x-ray shows clear lung ross    Jose Henderson MD

## 2019-07-25 ENCOUNTER — TELEPHONE (OUTPATIENT)
Dept: SLEEP CENTER | Facility: CLINIC | Age: 74
End: 2019-07-25

## 2019-07-25 NOTE — TELEPHONE ENCOUNTER
----- Message from Sonido Canada DO sent at 7/25/2019  7:28 AM EDT -----  Chart reviewed  Study approved   ----- Message -----  From: Michela Jarrell MA  Sent: 7/19/2019   8:25 AM EDT  To: Sleep Medicine [facility] Provider    This sleep study needs approval      If approved please sign and return to clerical pool  If denied please include reasons why  Also provide alternative testing if warranted  Please sign and return to clerical pool

## 2019-07-29 ENCOUNTER — APPOINTMENT (EMERGENCY)
Dept: RADIOLOGY | Facility: HOSPITAL | Age: 74
End: 2019-07-29
Payer: COMMERCIAL

## 2019-07-29 ENCOUNTER — HOSPITAL ENCOUNTER (EMERGENCY)
Facility: HOSPITAL | Age: 74
Discharge: HOME | End: 2019-07-29
Admitting: EMERGENCY MEDICINE
Payer: COMMERCIAL

## 2019-07-29 VITALS
OXYGEN SATURATION: 96 % | HEART RATE: 75 BPM | BODY MASS INDEX: 24.09 KG/M2 | SYSTOLIC BLOOD PRESSURE: 159 MMHG | DIASTOLIC BLOOD PRESSURE: 74 MMHG | RESPIRATION RATE: 18 BRPM | TEMPERATURE: 98.7 F | WEIGHT: 136 LBS

## 2019-07-29 DIAGNOSIS — M25.539 WRIST PAIN: ICD-10-CM

## 2019-07-29 DIAGNOSIS — M25.531 RIGHT WRIST PAIN: Primary | ICD-10-CM

## 2019-07-29 DIAGNOSIS — S69.91XA RIGHT WRIST INJURY: ICD-10-CM

## 2019-07-29 DIAGNOSIS — M79.643 HAND PAIN: ICD-10-CM

## 2019-07-29 PROCEDURE — 73110 X-RAY EXAM OF WRIST: CPT

## 2019-07-29 PROCEDURE — 99284 EMERGENCY DEPT VISIT MOD MDM: CPT | Performed by: EMERGENCY MEDICINE

## 2019-07-29 PROCEDURE — 73130 X-RAY EXAM OF HAND: CPT

## 2019-07-29 PROCEDURE — 99283 EMERGENCY DEPT VISIT LOW MDM: CPT

## 2019-07-29 PROCEDURE — 96372 THER/PROPH/DIAG INJ SC/IM: CPT

## 2019-07-29 RX ORDER — KETOROLAC TROMETHAMINE 30 MG/ML
15 INJECTION, SOLUTION INTRAMUSCULAR; INTRAVENOUS ONCE
Status: COMPLETED | OUTPATIENT
Start: 2019-07-29 | End: 2019-07-29

## 2019-07-29 RX ORDER — ACETAMINOPHEN 325 MG/1
975 TABLET ORAL ONCE
Status: COMPLETED | OUTPATIENT
Start: 2019-07-29 | End: 2019-07-29

## 2019-07-29 RX ADMIN — KETOROLAC TROMETHAMINE 15 MG: 30 INJECTION, SOLUTION INTRAMUSCULAR at 04:07

## 2019-07-29 RX ADMIN — ACETAMINOPHEN 975 MG: 325 TABLET ORAL at 04:05
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
## 2019-08-22 ENCOUNTER — TELEPHONE (OUTPATIENT)
Dept: PULMONOLOGY | Facility: HOSPITAL | Age: 74
End: 2019-08-22

## 2019-08-22 NOTE — TELEPHONE ENCOUNTER
Patient needs a 6m f/u// split study prior in November with Dr Keena Farooq in [facility]  Reminder card sent

## 2019-08-26 DIAGNOSIS — E11.9 TYPE 2 DIABETES MELLITUS WITHOUT COMPLICATION, WITHOUT LONG-TERM CURRENT USE OF INSULIN (HCC): Chronic | ICD-10-CM

## 2019-08-26 RX ORDER — NEOMYCIN SULFATE, POLYMYXIN B SULFATE, AND DEXAMETHASONE 3.5; 10000; 1 MG/G; [USP'U]/G; MG/G
OINTMENT OPHTHALMIC
Refills: 0 | COMMUNITY
Start: 2019-08-20 | End: 2019-10-14 | Stop reason: CLARIF

## 2019-08-26 RX ORDER — LOTEPREDNOL ETABONATE 2 MG/ML
1 SUSPENSION/ DROPS OPHTHALMIC DAILY
Refills: 0 | COMMUNITY
Start: 2019-08-21 | End: 2020-01-06

## 2019-08-26 RX ORDER — HYDROCHLOROTHIAZIDE 12.5 MG/1
TABLET ORAL
Refills: 0 | COMMUNITY
Start: 2019-07-22 | End: 2019-09-29 | Stop reason: HOSPADM

## 2019-08-27 ENCOUNTER — OFFICE VISIT (OUTPATIENT)
Dept: UROLOGY | Age: 74
End: 2019-08-27
Payer: COMMERCIAL

## 2019-08-27 VITALS
SYSTOLIC BLOOD PRESSURE: 138 MMHG | BODY MASS INDEX: 23.39 KG/M2 | HEART RATE: 82 BPM | HEIGHT: 63 IN | DIASTOLIC BLOOD PRESSURE: 60 MMHG | WEIGHT: 132 LBS

## 2019-08-27 DIAGNOSIS — R82.81 PYURIA: ICD-10-CM

## 2019-08-27 DIAGNOSIS — N39.41 URGE INCONTINENCE OF URINE: Primary | ICD-10-CM

## 2019-08-27 LAB
POST-VOID RESIDUAL VOLUME, ML POC: 11 ML
SL AMB  POCT GLUCOSE, UA: ABNORMAL
SL AMB LEUKOCYTE ESTERASE,UA: ABNORMAL
SL AMB POCT BILIRUBIN,UA: ABNORMAL
SL AMB POCT BLOOD,UA: ABNORMAL
SL AMB POCT CLARITY,UA: CLEAR
SL AMB POCT COLOR,UA: YELLOW
SL AMB POCT KETONES,UA: ABNORMAL
SL AMB POCT NITRITE,UA: POSITIVE
SL AMB POCT PH,UA: 5
SL AMB POCT SPECIFIC GRAVITY,UA: 1.01
SL AMB POCT URINE PROTEIN: ABNORMAL
SL AMB POCT UROBILINOGEN: 0.2

## 2019-08-27 PROCEDURE — 51798 US URINE CAPACITY MEASURE: CPT | Performed by: UROLOGY

## 2019-08-27 PROCEDURE — 87077 CULTURE AEROBIC IDENTIFY: CPT | Performed by: UROLOGY

## 2019-08-27 PROCEDURE — 87086 URINE CULTURE/COLONY COUNT: CPT | Performed by: UROLOGY

## 2019-08-27 PROCEDURE — 99204 OFFICE O/P NEW MOD 45 MIN: CPT | Performed by: UROLOGY

## 2019-08-27 PROCEDURE — 81002 URINALYSIS NONAUTO W/O SCOPE: CPT | Performed by: UROLOGY

## 2019-08-27 PROCEDURE — 87186 SC STD MICRODIL/AGAR DIL: CPT | Performed by: UROLOGY

## 2019-08-27 RX ORDER — CEPHALEXIN 500 MG/1
500 CAPSULE ORAL EVERY 12 HOURS SCHEDULED
Qty: 20 CAPSULE | Refills: 0 | Status: SHIPPED | OUTPATIENT
Start: 2019-08-27 | End: 2019-09-06

## 2019-08-27 NOTE — PATIENT INSTRUCTIONS
Cephalexin (By mouth)   Cephalexin (jwv-b-XIN-in)  Treats infections  This medicine is a cephalosporin antibiotic  Brand Name(s): Daxbia, Keflex   There may be other brand names for this medicine  When This Medicine Should Not Be Used: This medicine is not right for everyone  Do not use this medicine if you had an allergic reaction to cephalexin or another cephalosporin medicine  How to Use This Medicine:   Capsule, Tablet, Tablet for Suspension, Liquid  · Your doctor will tell you how much medicine to use  Do not use more than directed  · Read and follow the patient instructions that come with this medicine  Talk to your doctor or pharmacist if you have any questions  · You may take your medicine with food or milk to avoid stomach upset  · Oral liquid: Shake well just before use  Measure the oral liquid medicine with a marked measuring spoon, oral syringe, or medicine cup  · Take all of the medicine in your prescription to clear up your infection, even if you feel better after the first few doses  · Missed dose: Take a dose as soon as you remember  If it is almost time for your next dose, wait until then and take a regular dose  Do not take extra medicine to make up for a missed dose  · Capsule or tablet: Store at room temperature away from heat, moisture, and direct light  · Oral liquid: Store in the refrigerator for 14 days  After 14 days, throw away any unused medicine  Do not freeze  Drugs and Foods to Avoid:   Ask your doctor or pharmacist before using any other medicine, including over-the-counter medicines, vitamins, and herbal products  · Some medicines and foods can affect how cephalexin works  Tell your doctor if you are also using  ¨ Metformin  ¨ Probenecid  Warnings While Using This Medicine:   · Tell your doctor if you are pregnant or breastfeeding, or if you have kidney disease, liver disease, or a history of digestive problems, such as colitis   Tell your doctor if you are allergic to penicillin  · This medicine can cause diarrhea  Call your doctor if the diarrhea becomes severe, does not stop, or is bloody  Do not take any medicine to stop diarrhea until you have talked to your doctor  Diarrhea can occur 2 months or more after you stop taking this medicine  · Tell any doctor or dentist who treats you that you are using this medicine  This medicine may affect certain medical test results  · Call your doctor if your symptoms do not improve or if they get worse  · Keep all medicine out of the reach of children  Never share your medicine with anyone  Possible Side Effects While Using This Medicine:   Call your doctor right away if you notice any of these side effects:  · Allergic reaction: Itching or hives, swelling in your face or hands, swelling or tingling in your mouth or throat, chest tightness, trouble breathing  · Blistering, peeling, red skin rash  · Severe diarrhea, especially if bloody or ongoing  · Severe stomach pain, vomiting  If you notice these less serious side effects, talk with your doctor:   · Mild diarrhea or nausea  If you notice other side effects that you think are caused by this medicine, tell your doctor  Call your doctor for medical advice about side effects  You may report side effects to FDA at 1-966-FDA-108  © 2017 [address] [employer] [employer] Information is for End User's use only and may not be sold, redistributed or otherwise used for commercial purposes  The above information is an  only  It is not intended as medical advice for individual conditions or treatments  Talk to your doctor, nurse or pharmacist before following any medical regimen to see if it is safe and effective for you

## 2019-08-27 NOTE — ASSESSMENT & PLAN NOTE
Pyuria is noted  Her postvoid residual is normal   We will obtain a urine culture and treat empirically with Keflex 500 mg twice daily times 10 days  If her symptoms do not improve we will then consider further evaluation with renal ultrasound, cystoscopy and the use of an anticholinergic agent  She will return in 1 month

## 2019-08-27 NOTE — LETTER
August 27, 2019     Miguel[place] Maldonado, [address] [address]    Patient: Carlin Reddy   YOB: 1945   Date of Visit: 8/27/2019       Dear Dr Cyndie Goldberg:    Thank you for referring Carlin Reddy to me for evaluation  Below are my notes for this consultation  If you have questions, please do not hesitate to call me  I look forward to following your patient along with you  Sincerely,        Rhina Lopez MD        CC: No Recipients  Rhina Lopez MD  8/27/2019 10:50 AM  Sign at close encounter  Assessment/Plan:    Urge incontinence of urine  Pyuria is noted  Her postvoid residual is normal   We will obtain a urine culture and treat empirically with Keflex 500 mg twice daily times 10 days  If her symptoms do not improve we will then consider further evaluation with renal ultrasound, cystoscopy and the use of an anticholinergic agent  She will return in 1 month  Pyuria  See above  Diagnoses and all orders for this visit:    Urge incontinence of urine  -     POCT urine dip  -     POCT Measure PVR  -     Urine culture  -     cephalexin (KEFLEX) 500 mg capsule; Take 1 capsule (500 mg total) by mouth every 12 (twelve) hours for 10 days    Pyuria  -     cephalexin (KEFLEX) 500 mg capsule; Take 1 capsule (500 mg total) by mouth every 12 (twelve) hours for 10 days    Other orders  -     neomycin-polymyxin-dexamethasone (MAXITROL) 0 35%-10,000 units/g-0 1%; APPLY AT BEDTIME A THIN RIBBON TO BOTH EYELIDS FOR 1 WEEK, THEN STOP  -     ALREX 0 2 % SUSP  -     hydrochlorothiazide (HYDRODIURIL) 12 5 mg tablet          Subjective:      Patient ID: Carlin Reddy is a 69 y o  female  Chief complaint:  Urge incontinence    HPI:  61-year-old female with remote history of bladder suspension with approximately 1 year of urinary urgency and urge incontinence  The patient notes mild dysuria as well  She voids frequently in gets up twice a night to urinate  She has been using 4 pads per day  She denies gross hematuria  She has no fever or flank pain  She is unhappy with her voiding pattern  With questioning she does remember being on an anticholinergic agent in the past for her voiding symptoms  The following portions of the patient's history were reviewed and updated as appropriate: allergies, current medications, past family history, past medical history, past social history, past surgical history and problem list     Review of Systems   Constitutional: Negative for activity change, appetite change, fatigue and fever  HENT: Negative  Eyes: Positive for discharge  Respiratory: Positive for shortness of breath  Cardiovascular: Negative  Gastrointestinal: Positive for diarrhea  Negative for blood in stool and constipation  Endocrine: Negative  Genitourinary:        See HPI   Musculoskeletal: Negative  Skin: Negative  Allergic/Immunologic: Negative  Neurological: Negative  Hematological: Negative  Psychiatric/Behavioral: Negative  Objective:      /60 (BP Location: Left arm, Patient Position: Sitting, Cuff Size: Adult)   Pulse 82   Ht 5' 3" (1 6 m)   Wt 59 9 kg (132 lb)   LMP  (LMP Unknown)   BMI 23 38 kg/m²           Physical Exam   Constitutional: She is oriented to person, place, and time  She appears well-developed and well-nourished  HENT:   Head: Normocephalic and atraumatic  Eyes: Conjunctivae are normal    Neck: Neck supple  Cardiovascular: Normal rate  Pulmonary/Chest: Effort normal    Abdominal: Soft  She exhibits no distension and no mass  There is no tenderness  There is no rebound, no guarding and no CVA tenderness  Musculoskeletal: She exhibits no edema  No CVAT   Neurological: She is alert and oriented to person, place, and time  Skin: Skin is warm and dry  Psychiatric: She has a normal mood and affect   Her behavior is normal  Judgment and thought content normal

## 2019-08-27 NOTE — PROGRESS NOTES
Assessment/Plan:    Urge incontinence of urine  Pyuria is noted  Her postvoid residual is normal   We will obtain a urine culture and treat empirically with Keflex 500 mg twice daily times 10 days  If her symptoms do not improve we will then consider further evaluation with renal ultrasound, cystoscopy and the use of an anticholinergic agent  She will return in 1 month  Pyuria  See above  Diagnoses and all orders for this visit:    Urge incontinence of urine  -     POCT urine dip  -     POCT Measure PVR  -     Urine culture  -     cephalexin (KEFLEX) 500 mg capsule; Take 1 capsule (500 mg total) by mouth every 12 (twelve) hours for 10 days    Pyuria  -     cephalexin (KEFLEX) 500 mg capsule; Take 1 capsule (500 mg total) by mouth every 12 (twelve) hours for 10 days    Other orders  -     neomycin-polymyxin-dexamethasone (MAXITROL) 0 35%-10,000 units/g-0 1%; APPLY AT BEDTIME A THIN RIBBON TO BOTH EYELIDS FOR 1 WEEK, THEN STOP  -     ALREX 0 2 % SUSP  -     hydrochlorothiazide (HYDRODIURIL) 12 5 mg tablet          Subjective:      Patient ID: Mateo Posada is a 69 y o  female  Chief complaint:  Urge incontinence    HPI:  67-year-old female with remote history of bladder suspension with approximately 1 year of urinary urgency and urge incontinence  The patient notes mild dysuria as well  She voids frequently in gets up twice a night to urinate  She has been using 4 pads per day  She denies gross hematuria  She has no fever or flank pain  She is unhappy with her voiding pattern  With questioning she does remember being on an anticholinergic agent in the past for her voiding symptoms        The following portions of the patient's history were reviewed and updated as appropriate: allergies, current medications, past family history, past medical history, past social history, past surgical history and problem list     Review of Systems   Constitutional: Negative for activity change, appetite change, fatigue and fever  HENT: Negative  Eyes: Positive for discharge  Respiratory: Positive for shortness of breath  Cardiovascular: Negative  Gastrointestinal: Positive for diarrhea  Negative for blood in stool and constipation  Endocrine: Negative  Genitourinary:        See HPI   Musculoskeletal: Negative  Skin: Negative  Allergic/Immunologic: Negative  Neurological: Negative  Hematological: Negative  Psychiatric/Behavioral: Negative  Objective:      /60 (BP Location: Left arm, Patient Position: Sitting, Cuff Size: Adult)   Pulse 82   Ht 5' 3" (1 6 m)   Wt 59 9 kg (132 lb)   LMP  (LMP Unknown)   BMI 23 38 kg/m²          Physical Exam   Constitutional: She is oriented to person, place, and time  She appears well-developed and well-nourished  HENT:   Head: Normocephalic and atraumatic  Eyes: Conjunctivae are normal    Neck: Neck supple  Cardiovascular: Normal rate  Pulmonary/Chest: Effort normal    Abdominal: Soft  She exhibits no distension and no mass  There is no tenderness  There is no rebound, no guarding and no CVA tenderness  Musculoskeletal: She exhibits no edema  No CVAT   Neurological: She is alert and oriented to person, place, and time  Skin: Skin is warm and dry  Psychiatric: She has a normal mood and affect   Her behavior is normal  Judgment and thought content normal

## 2019-08-27 NOTE — ASSESSMENT & PLAN NOTE
Problem: Patient Care Overview  Goal: Plan of Care Review  Outcome: Ongoing (interventions implemented as appropriate)   05/19/19 4934   OTHER   Outcome Summary VSS throughout shift. multiple bm's today. pain treated with tylenol. no diuretic today d/t sodium levels. coumadin continued. will continue to monitor.    Plan of Care Review   Progress improving   Coping/Psychosocial   Plan of Care Reviewed With patient     Goal: Individualization and Mutuality  Outcome: Ongoing (interventions implemented as appropriate)      Problem: Fall Risk (Adult)  Goal: Identify Related Risk Factors and Signs and Symptoms  Outcome: Ongoing (interventions implemented as appropriate)      Problem: Cardiac Surgery (Adult)  Goal: Signs and Symptoms of Listed Potential Problems Will be Absent, Minimized or Managed (Cardiac Surgery)  Outcome: Ongoing (interventions implemented as appropriate)         See above

## 2019-08-29 ENCOUNTER — TELEPHONE (OUTPATIENT)
Dept: UROLOGY | Facility: MEDICAL CENTER | Age: 74
End: 2019-08-29

## 2019-08-29 LAB — BACTERIA UR CULT: ABNORMAL

## 2019-08-29 NOTE — TELEPHONE ENCOUNTER
----- Message from Teresa Kurtz MD sent at 8/29/2019  1:01 PM EDT -----  Please call the patient regarding her abnormal result  She did have a urine infection  The Keflex that I prescribed should work    Keep her follow-up appointment

## 2019-09-16 ENCOUNTER — OFFICE VISIT (OUTPATIENT)
Dept: FAMILY MEDICINE CLINIC | Facility: CLINIC | Age: 74
End: 2019-09-16
Payer: COMMERCIAL

## 2019-09-16 VITALS
DIASTOLIC BLOOD PRESSURE: 68 MMHG | WEIGHT: 133 LBS | SYSTOLIC BLOOD PRESSURE: 118 MMHG | BODY MASS INDEX: 23.57 KG/M2 | HEART RATE: 88 BPM | HEIGHT: 63 IN | RESPIRATION RATE: 16 BRPM

## 2019-09-16 DIAGNOSIS — R82.81 PYURIA: Primary | ICD-10-CM

## 2019-09-16 DIAGNOSIS — N30.00 ACUTE CYSTITIS WITHOUT HEMATURIA: ICD-10-CM

## 2019-09-16 DIAGNOSIS — N76.0 ACUTE VAGINITIS: ICD-10-CM

## 2019-09-16 DIAGNOSIS — M54.2 NECK PAIN: ICD-10-CM

## 2019-09-16 DIAGNOSIS — E11.9 TYPE 2 DIABETES MELLITUS WITHOUT COMPLICATION, WITHOUT LONG-TERM CURRENT USE OF INSULIN (HCC): ICD-10-CM

## 2019-09-16 PROCEDURE — 99214 OFFICE O/P EST MOD 30 MIN: CPT | Performed by: FAMILY MEDICINE

## 2019-09-16 RX ORDER — NITROFURANTOIN 25; 75 MG/1; MG/1
100 CAPSULE ORAL 2 TIMES DAILY
Qty: 10 CAPSULE | Refills: 0 | Status: SHIPPED | OUTPATIENT
Start: 2019-09-16 | End: 2019-09-29 | Stop reason: HOSPADM

## 2019-09-16 RX ORDER — METAXALONE 800 MG/1
800 TABLET ORAL 3 TIMES DAILY PRN
Qty: 30 TABLET | Refills: 2 | Status: SHIPPED | OUTPATIENT
Start: 2019-09-16 | End: 2019-10-14 | Stop reason: CLARIF

## 2019-09-16 RX ORDER — FLUCONAZOLE 150 MG/1
150 TABLET ORAL ONCE
Qty: 1 TABLET | Refills: 0 | Status: SHIPPED | OUTPATIENT
Start: 2019-09-16 | End: 2019-09-16

## 2019-09-16 RX ORDER — MELOXICAM 7.5 MG/1
7.5 TABLET ORAL 2 TIMES DAILY PRN
Qty: 30 TABLET | Refills: 5 | Status: SHIPPED | OUTPATIENT
Start: 2019-09-16 | End: 2019-10-14 | Stop reason: CLARIF

## 2019-09-16 NOTE — ASSESSMENT & PLAN NOTE
Lab Results   Component Value Date    HGBA1C 6 2 02/21/2019     Due for lab  No results for input(s): POCGLU in the last 72 hours      Blood Sugar Average: Last 72 hrs:

## 2019-09-16 NOTE — PROGRESS NOTES
Assessment and Plan:    Problem List Items Addressed This Visit        Endocrine    Diabetes mellitus ([facility] [address] ) (Chronic)     Lab Results   Component Value Date    HGBA1C 6 2 02/21/2019     Due for lab  No results for input(s): POCGLU in the last 72 hours  Blood Sugar Average: Last 72 hrs:           Relevant Orders    Comprehensive metabolic panel    Lipid panel    Hemoglobin A1C       Other    Pyuria - Primary    Relevant Medications    nitrofurantoin (MACROBID) 100 mg capsule      Other Visit Diagnoses     Acute cystitis without hematuria        Relevant Medications    nitrofurantoin (MACROBID) 100 mg capsule    Acute vaginitis        Relevant Medications    fluconazole (DIFLUCAN) 150 mg tablet    Neck pain        Relevant Medications    metaxalone (SKELAXIN) 800 mg tablet    meloxicam (MOBIC) 7 5 mg tablet                 Diagnoses and all orders for this visit:    Pyuria  -     nitrofurantoin (MACROBID) 100 mg capsule; Take 1 capsule (100 mg total) by mouth 2 (two) times a day for 5 days    Acute cystitis without hematuria  -     nitrofurantoin (MACROBID) 100 mg capsule; Take 1 capsule (100 mg total) by mouth 2 (two) times a day for 5 days    Acute vaginitis  -     fluconazole (DIFLUCAN) 150 mg tablet; Take 1 tablet (150 mg total) by mouth once for 1 dose    Neck pain  -     metaxalone (SKELAXIN) 800 mg tablet; Take 1 tablet (800 mg total) by mouth 3 (three) times a day as needed for muscle spasms  -     meloxicam (MOBIC) 7 5 mg tablet; Take 1 tablet (7 5 mg total) by mouth 2 (two) times a day as needed for moderate pain    Type 2 diabetes mellitus without complication, without long-term current use of insulin ([facility])  -     Comprehensive metabolic panel; Future  -     Lipid panel; Future  -     Hemoglobin A1C; Future              Subjective:      Patient ID: Fátima Jean is a 69 y o  female  CC:    Chief Complaint   Patient presents with    Shoulder Pain     pt here with c/o shoulder and neck pain x 3 days   Pt states she thinks its from her pillow  Pt states the pain is severe she can hardly move  R Deon    Neck Pain       HPI:    Neck Pain    This is a new problem  The current episode started in the past 7 days  The problem occurs constantly  The problem has been unchanged  The pain is associated with nothing  The pain is present in the right side  The quality of the pain is described as aching  The pain is at a severity of 5/10  The pain is mild  The symptoms are aggravated by bending and twisting  The pain is same all the time  Pertinent negatives include no chest pain, headaches or numbness  She has tried acetaminophen and ice (muscle cream) for the symptoms  The treatment provided mild relief  The following portions of the patient's history were reviewed and updated as appropriate: allergies, current medications, past family history, past medical history, past social history, past surgical history and problem list         Review of Systems   Constitutional: Negative for activity change, appetite change and unexpected weight change  Respiratory: Negative for shortness of breath  Cardiovascular: Negative for chest pain  Genitourinary: Positive for dysuria  Musculoskeletal: Positive for neck pain  Neurological: Negative for numbness and headaches  Data to review:       Objective:    Vitals:    09/16/19 0945   BP: 118/68   BP Location: Left arm   Patient Position: Sitting   Cuff Size: Large   Pulse: 88   Resp: 16   Weight: 60 3 kg (133 lb)   Height: 5' 3" (1 6 m)        Physical Exam   Constitutional: She appears well-developed and well-nourished  uncomfortable   Neck: No thyromegaly present  Cardiovascular: Normal rate, regular rhythm and normal heart sounds  Pulmonary/Chest: Effort normal and breath sounds normal    Musculoskeletal: She exhibits tenderness  Cervical back: She exhibits decreased range of motion, tenderness and spasm     Lymphadenopathy:     She has no cervical adenopathy  Psychiatric: She has a normal mood and affect  Vitals reviewed

## 2019-09-18 ENCOUNTER — APPOINTMENT (OUTPATIENT)
Dept: LAB | Facility: HOSPITAL | Age: 74
DRG: 183 | End: 2019-09-18
Payer: COMMERCIAL

## 2019-09-18 DIAGNOSIS — E11.9 TYPE 2 DIABETES MELLITUS WITHOUT COMPLICATION, WITHOUT LONG-TERM CURRENT USE OF INSULIN (HCC): ICD-10-CM

## 2019-09-18 LAB
ALBUMIN SERPL BCP-MCNC: 3.6 G/DL (ref 3.5–5)
ALP SERPL-CCNC: 96 U/L (ref 46–116)
ALT SERPL W P-5'-P-CCNC: 13 U/L (ref 12–78)
ANION GAP SERPL CALCULATED.3IONS-SCNC: 6 MMOL/L (ref 4–13)
AST SERPL W P-5'-P-CCNC: 11 U/L (ref 5–45)
BILIRUB SERPL-MCNC: 0.55 MG/DL (ref 0.2–1)
BUN SERPL-MCNC: 9 MG/DL (ref 5–25)
CALCIUM SERPL-MCNC: 9.6 MG/DL (ref 8.3–10.1)
CHLORIDE SERPL-SCNC: 99 MMOL/L (ref 100–108)
CHOLEST SERPL-MCNC: 150 MG/DL (ref 50–200)
CO2 SERPL-SCNC: 31 MMOL/L (ref 21–32)
CREAT SERPL-MCNC: 0.52 MG/DL (ref 0.6–1.3)
EST. AVERAGE GLUCOSE BLD GHB EST-MCNC: 126 MG/DL
GFR SERPL CREATININE-BSD FRML MDRD: 95 ML/MIN/1.73SQ M
GLUCOSE P FAST SERPL-MCNC: 133 MG/DL (ref 65–99)
HBA1C MFR BLD: 6 % (ref 4.2–6.3)
HDLC SERPL-MCNC: 53 MG/DL (ref 40–60)
LDLC SERPL CALC-MCNC: 70 MG/DL (ref 0–100)
NONHDLC SERPL-MCNC: 97 MG/DL
POTASSIUM SERPL-SCNC: 4.8 MMOL/L (ref 3.5–5.3)
PROT SERPL-MCNC: 7.4 G/DL (ref 6.4–8.2)
SODIUM SERPL-SCNC: 136 MMOL/L (ref 136–145)
TRIGL SERPL-MCNC: 135 MG/DL

## 2019-09-18 PROCEDURE — 80053 COMPREHEN METABOLIC PANEL: CPT

## 2019-09-18 PROCEDURE — 80061 LIPID PANEL: CPT

## 2019-09-18 PROCEDURE — 83036 HEMOGLOBIN GLYCOSYLATED A1C: CPT

## 2019-09-18 PROCEDURE — 36415 COLL VENOUS BLD VENIPUNCTURE: CPT

## 2019-09-20 ENCOUNTER — HOSPITAL ENCOUNTER (EMERGENCY)
Facility: HOSPITAL | Age: 74
Discharge: NURSING, REHAB OR LONG-TERM CARE | DRG: 183 | End: 2019-09-21
Attending: EMERGENCY MEDICINE | Admitting: EMERGENCY MEDICINE
Payer: COMMERCIAL

## 2019-09-20 ENCOUNTER — APPOINTMENT (EMERGENCY)
Dept: CT IMAGING | Facility: HOSPITAL | Age: 74
DRG: 183 | End: 2019-09-20
Payer: COMMERCIAL

## 2019-09-20 DIAGNOSIS — R07.9 RIGHT-SIDED CHEST PAIN: ICD-10-CM

## 2019-09-20 DIAGNOSIS — J94.2 HEMOTHORAX ON RIGHT: ICD-10-CM

## 2019-09-20 DIAGNOSIS — R09.02 HYPOXIA: ICD-10-CM

## 2019-09-20 DIAGNOSIS — S22.41XA CLOSED FRACTURE OF MULTIPLE RIBS OF RIGHT SIDE, INITIAL ENCOUNTER: Primary | ICD-10-CM

## 2019-09-20 DIAGNOSIS — I71.2 ASCENDING AORTIC ANEURYSM (HCC): ICD-10-CM

## 2019-09-20 DIAGNOSIS — S27.321A CONTUSION OF RIGHT LUNG, INITIAL ENCOUNTER: ICD-10-CM

## 2019-09-20 LAB
ALBUMIN SERPL BCP-MCNC: 3.4 G/DL (ref 3.5–5)
ALP SERPL-CCNC: 91 U/L (ref 46–116)
ALT SERPL W P-5'-P-CCNC: 14 U/L (ref 12–78)
ANION GAP SERPL CALCULATED.3IONS-SCNC: 13 MMOL/L (ref 4–13)
APAP SERPL-MCNC: <2 UG/ML (ref 10–20)
APTT PPP: 23 SECONDS (ref 23–37)
AST SERPL W P-5'-P-CCNC: 15 U/L (ref 5–45)
BASOPHILS # BLD AUTO: 0.09 THOUSANDS/ΜL (ref 0–0.1)
BASOPHILS NFR BLD AUTO: 1 % (ref 0–1)
BILIRUB SERPL-MCNC: 0.14 MG/DL (ref 0.2–1)
BUN SERPL-MCNC: 16 MG/DL (ref 5–25)
CALCIUM SERPL-MCNC: 8.6 MG/DL (ref 8.3–10.1)
CHLORIDE SERPL-SCNC: 98 MMOL/L (ref 100–108)
CO2 SERPL-SCNC: 22 MMOL/L (ref 21–32)
CREAT SERPL-MCNC: 0.6 MG/DL (ref 0.6–1.3)
EOSINOPHIL # BLD AUTO: 0.39 THOUSAND/ΜL (ref 0–0.61)
EOSINOPHIL NFR BLD AUTO: 3 % (ref 0–6)
ERYTHROCYTE [DISTWIDTH] IN BLOOD BY AUTOMATED COUNT: 12.7 % (ref 11.6–15.1)
ETHANOL SERPL-MCNC: 218 MG/DL (ref 0–3)
GFR SERPL CREATININE-BSD FRML MDRD: 91 ML/MIN/1.73SQ M
GLUCOSE SERPL-MCNC: 212 MG/DL (ref 65–140)
HCT VFR BLD AUTO: 38.4 % (ref 34.8–46.1)
HGB BLD-MCNC: 12.9 G/DL (ref 11.5–15.4)
IMM GRANULOCYTES # BLD AUTO: 0.07 THOUSAND/UL (ref 0–0.2)
IMM GRANULOCYTES NFR BLD AUTO: 1 % (ref 0–2)
INR PPP: 0.88 (ref 0.84–1.19)
LIPASE SERPL-CCNC: 80 U/L (ref 73–393)
LYMPHOCYTES # BLD AUTO: 4.52 THOUSANDS/ΜL (ref 0.6–4.47)
LYMPHOCYTES NFR BLD AUTO: 37 % (ref 14–44)
MCH RBC QN AUTO: 33.1 PG (ref 26.8–34.3)
MCHC RBC AUTO-ENTMCNC: 33.6 G/DL (ref 31.4–37.4)
MCV RBC AUTO: 99 FL (ref 82–98)
MONOCYTES # BLD AUTO: 0.58 THOUSAND/ΜL (ref 0.17–1.22)
MONOCYTES NFR BLD AUTO: 5 % (ref 4–12)
NEUTROPHILS # BLD AUTO: 6.5 THOUSANDS/ΜL (ref 1.85–7.62)
NEUTS SEG NFR BLD AUTO: 53 % (ref 43–75)
NRBC BLD AUTO-RTO: 0 /100 WBCS
PLATELET # BLD AUTO: 251 THOUSANDS/UL (ref 149–390)
PMV BLD AUTO: 12.9 FL (ref 8.9–12.7)
POTASSIUM SERPL-SCNC: 3.9 MMOL/L (ref 3.5–5.3)
PROT SERPL-MCNC: 7 G/DL (ref 6.4–8.2)
PROTHROMBIN TIME: 12 SECONDS (ref 11.6–14.5)
RBC # BLD AUTO: 3.9 MILLION/UL (ref 3.81–5.12)
SALICYLATES SERPL-MCNC: <3 MG/DL (ref 3–20)
SODIUM SERPL-SCNC: 133 MMOL/L (ref 136–145)
TROPONIN I SERPL-MCNC: <0.02 NG/ML
WBC # BLD AUTO: 12.15 THOUSAND/UL (ref 4.31–10.16)

## 2019-09-20 PROCEDURE — 36415 COLL VENOUS BLD VENIPUNCTURE: CPT | Performed by: EMERGENCY MEDICINE

## 2019-09-20 PROCEDURE — 85730 THROMBOPLASTIN TIME PARTIAL: CPT | Performed by: EMERGENCY MEDICINE

## 2019-09-20 PROCEDURE — 96365 THER/PROPH/DIAG IV INF INIT: CPT

## 2019-09-20 PROCEDURE — 84484 ASSAY OF TROPONIN QUANT: CPT | Performed by: EMERGENCY MEDICINE

## 2019-09-20 PROCEDURE — 71275 CT ANGIOGRAPHY CHEST: CPT

## 2019-09-20 PROCEDURE — 85025 COMPLETE CBC W/AUTO DIFF WBC: CPT | Performed by: EMERGENCY MEDICINE

## 2019-09-20 PROCEDURE — 99285 EMERGENCY DEPT VISIT HI MDM: CPT | Performed by: EMERGENCY MEDICINE

## 2019-09-20 PROCEDURE — 85610 PROTHROMBIN TIME: CPT | Performed by: EMERGENCY MEDICINE

## 2019-09-20 PROCEDURE — 83690 ASSAY OF LIPASE: CPT | Performed by: EMERGENCY MEDICINE

## 2019-09-20 PROCEDURE — 80320 DRUG SCREEN QUANTALCOHOLS: CPT | Performed by: EMERGENCY MEDICINE

## 2019-09-20 PROCEDURE — 99285 EMERGENCY DEPT VISIT HI MDM: CPT

## 2019-09-20 PROCEDURE — 74174 CTA ABD&PLVS W/CONTRAST: CPT

## 2019-09-20 PROCEDURE — 96367 TX/PROPH/DG ADDL SEQ IV INF: CPT

## 2019-09-20 PROCEDURE — 96375 TX/PRO/DX INJ NEW DRUG ADDON: CPT

## 2019-09-20 PROCEDURE — 80329 ANALGESICS NON-OPIOID 1 OR 2: CPT | Performed by: EMERGENCY MEDICINE

## 2019-09-20 PROCEDURE — 80053 COMPREHEN METABOLIC PANEL: CPT | Performed by: EMERGENCY MEDICINE

## 2019-09-20 PROCEDURE — 93005 ELECTROCARDIOGRAM TRACING: CPT

## 2019-09-20 RX ORDER — ONDANSETRON 2 MG/ML
4 INJECTION INTRAMUSCULAR; INTRAVENOUS ONCE
Status: COMPLETED | OUTPATIENT
Start: 2019-09-20 | End: 2019-09-20

## 2019-09-20 RX ORDER — KETOROLAC TROMETHAMINE 30 MG/ML
15 INJECTION, SOLUTION INTRAMUSCULAR; INTRAVENOUS ONCE
Status: DISCONTINUED | OUTPATIENT
Start: 2019-09-20 | End: 2019-09-20

## 2019-09-20 RX ORDER — FENTANYL CITRATE 50 UG/ML
50 INJECTION, SOLUTION INTRAMUSCULAR; INTRAVENOUS ONCE
Status: COMPLETED | OUTPATIENT
Start: 2019-09-20 | End: 2019-09-20

## 2019-09-20 RX ADMIN — SODIUM CHLORIDE 1000 ML: 0.9 INJECTION, SOLUTION INTRAVENOUS at 21:36

## 2019-09-20 RX ADMIN — IOHEXOL 100 ML: 350 INJECTION, SOLUTION INTRAVENOUS at 22:52

## 2019-09-20 RX ADMIN — FOLIC ACID 1 MG: 5 INJECTION, SOLUTION INTRAMUSCULAR; INTRAVENOUS; SUBCUTANEOUS at 21:44

## 2019-09-20 RX ADMIN — FENTANYL CITRATE 50 MCG: 50 INJECTION INTRAMUSCULAR; INTRAVENOUS at 23:43

## 2019-09-20 RX ADMIN — THIAMINE HYDROCHLORIDE 100 MG: 100 INJECTION, SOLUTION INTRAMUSCULAR; INTRAVENOUS at 22:58

## 2019-09-20 RX ADMIN — ONDANSETRON 4 MG: 2 INJECTION INTRAMUSCULAR; INTRAVENOUS at 22:09

## 2019-09-21 ENCOUNTER — APPOINTMENT (INPATIENT)
Dept: RADIOLOGY | Facility: HOSPITAL | Age: 74
DRG: 183 | End: 2019-09-21
Payer: COMMERCIAL

## 2019-09-21 ENCOUNTER — HOSPITAL ENCOUNTER (INPATIENT)
Facility: HOSPITAL | Age: 74
LOS: 8 days | Discharge: HOME HEALTH | DRG: 183 | End: 2019-09-29
Attending: SURGERY | Admitting: SURGERY
Payer: COMMERCIAL

## 2019-09-21 VITALS
OXYGEN SATURATION: 97 % | TEMPERATURE: 97.3 F | BODY MASS INDEX: 25.03 KG/M2 | DIASTOLIC BLOOD PRESSURE: 61 MMHG | WEIGHT: 141.31 LBS | HEART RATE: 88 BPM | SYSTOLIC BLOOD PRESSURE: 132 MMHG | RESPIRATION RATE: 20 BRPM

## 2019-09-21 DIAGNOSIS — S22.41XA CLOSED FRACTURE OF MULTIPLE RIBS OF RIGHT SIDE: ICD-10-CM

## 2019-09-21 DIAGNOSIS — J93.9 PNEUMOTHORAX: ICD-10-CM

## 2019-09-21 DIAGNOSIS — J94.2 HEMOTHORAX ON RIGHT: Primary | ICD-10-CM

## 2019-09-21 DIAGNOSIS — E87.1 HYPONATREMIA: ICD-10-CM

## 2019-09-21 LAB
GLUCOSE SERPL-MCNC: 171 MG/DL (ref 65–140)
GLUCOSE SERPL-MCNC: 171 MG/DL (ref 65–140)
GLUCOSE SERPL-MCNC: 180 MG/DL (ref 65–140)
GLUCOSE SERPL-MCNC: 234 MG/DL (ref 65–140)
GLUCOSE SERPL-MCNC: 274 MG/DL (ref 65–140)

## 2019-09-21 PROCEDURE — NC001 PR NO CHARGE: Performed by: SURGERY

## 2019-09-21 PROCEDURE — 99285 EMERGENCY DEPT VISIT HI MDM: CPT

## 2019-09-21 PROCEDURE — 71046 X-RAY EXAM CHEST 2 VIEWS: CPT

## 2019-09-21 PROCEDURE — 94762 N-INVAS EAR/PLS OXIMTRY CONT: CPT

## 2019-09-21 PROCEDURE — 0W9930Z DRAINAGE OF RIGHT PLEURAL CAVITY WITH DRAINAGE DEVICE, PERCUTANEOUS APPROACH: ICD-10-PCS | Performed by: SURGERY

## 2019-09-21 PROCEDURE — 82948 REAGENT STRIP/BLOOD GLUCOSE: CPT

## 2019-09-21 PROCEDURE — 32551 INSERTION OF CHEST TUBE: CPT | Performed by: SURGERY

## 2019-09-21 PROCEDURE — 99223 1ST HOSP IP/OBS HIGH 75: CPT | Performed by: SURGERY

## 2019-09-21 RX ORDER — ONDANSETRON 2 MG/ML
4 INJECTION INTRAMUSCULAR; INTRAVENOUS EVERY 6 HOURS PRN
Status: DISCONTINUED | OUTPATIENT
Start: 2019-09-21 | End: 2019-09-29 | Stop reason: HOSPADM

## 2019-09-21 RX ORDER — FENTANYL CITRATE 50 UG/ML
50 INJECTION, SOLUTION INTRAMUSCULAR; INTRAVENOUS ONCE
Status: COMPLETED | OUTPATIENT
Start: 2019-09-21 | End: 2019-09-21

## 2019-09-21 RX ORDER — DOCUSATE SODIUM 100 MG/1
100 CAPSULE, LIQUID FILLED ORAL 2 TIMES DAILY
Status: DISCONTINUED | OUTPATIENT
Start: 2019-09-21 | End: 2019-09-29 | Stop reason: HOSPADM

## 2019-09-21 RX ORDER — THIAMINE MONONITRATE (VIT B1) 100 MG
100 TABLET ORAL DAILY
Status: DISCONTINUED | OUTPATIENT
Start: 2019-09-21 | End: 2019-09-29 | Stop reason: HOSPADM

## 2019-09-21 RX ORDER — FOLIC ACID 1 MG/1
1 TABLET ORAL DAILY
Status: DISCONTINUED | OUTPATIENT
Start: 2019-09-21 | End: 2019-09-29 | Stop reason: HOSPADM

## 2019-09-21 RX ORDER — ALBUTEROL SULFATE 90 UG/1
2 AEROSOL, METERED RESPIRATORY (INHALATION) EVERY 4 HOURS PRN
Status: DISCONTINUED | OUTPATIENT
Start: 2019-09-21 | End: 2019-09-21

## 2019-09-21 RX ORDER — ALBUTEROL SULFATE 90 UG/1
2 AEROSOL, METERED RESPIRATORY (INHALATION) 4 TIMES DAILY
Status: DISCONTINUED | OUTPATIENT
Start: 2019-09-21 | End: 2019-09-22

## 2019-09-21 RX ORDER — OXYCODONE HYDROCHLORIDE 5 MG/1
5 TABLET ORAL EVERY 4 HOURS PRN
Status: DISCONTINUED | OUTPATIENT
Start: 2019-09-21 | End: 2019-09-29 | Stop reason: HOSPADM

## 2019-09-21 RX ORDER — ALBUTEROL SULFATE 2.5 MG/3ML
2.5 SOLUTION RESPIRATORY (INHALATION) EVERY 4 HOURS PRN
Status: DISCONTINUED | OUTPATIENT
Start: 2019-09-21 | End: 2019-09-29 | Stop reason: HOSPADM

## 2019-09-21 RX ORDER — GABAPENTIN 100 MG/1
100 CAPSULE ORAL 2 TIMES DAILY
Status: DISCONTINUED | OUTPATIENT
Start: 2019-09-21 | End: 2019-09-26

## 2019-09-21 RX ORDER — SENNOSIDES 8.6 MG
2 TABLET ORAL DAILY
Status: DISCONTINUED | OUTPATIENT
Start: 2019-09-21 | End: 2019-09-29 | Stop reason: HOSPADM

## 2019-09-21 RX ORDER — LIDOCAINE HYDROCHLORIDE 10 MG/ML
5 INJECTION, SOLUTION EPIDURAL; INFILTRATION; INTRACAUDAL; PERINEURAL ONCE
Status: COMPLETED | OUTPATIENT
Start: 2019-09-21 | End: 2019-09-21

## 2019-09-21 RX ORDER — ACETAMINOPHEN 325 MG/1
975 TABLET ORAL EVERY 8 HOURS SCHEDULED
Status: DISCONTINUED | OUTPATIENT
Start: 2019-09-21 | End: 2019-09-29 | Stop reason: HOSPADM

## 2019-09-21 RX ORDER — OXYCODONE HYDROCHLORIDE 5 MG/1
2.5 TABLET ORAL EVERY 4 HOURS PRN
Status: DISCONTINUED | OUTPATIENT
Start: 2019-09-21 | End: 2019-09-29 | Stop reason: HOSPADM

## 2019-09-21 RX ORDER — HYDROMORPHONE HCL/PF 1 MG/ML
0.2 SYRINGE (ML) INJECTION
Status: DISCONTINUED | OUTPATIENT
Start: 2019-09-21 | End: 2019-09-29 | Stop reason: HOSPADM

## 2019-09-21 RX ORDER — MIDAZOLAM HYDROCHLORIDE 1 MG/ML
4 INJECTION INTRAMUSCULAR; INTRAVENOUS ONCE
Status: COMPLETED | OUTPATIENT
Start: 2019-09-21 | End: 2019-09-21

## 2019-09-21 RX ORDER — LIDOCAINE HYDROCHLORIDE 10 MG/ML
15 INJECTION, SOLUTION EPIDURAL; INFILTRATION; INTRACAUDAL; PERINEURAL ONCE
Status: COMPLETED | OUTPATIENT
Start: 2019-09-21 | End: 2019-09-21

## 2019-09-21 RX ADMIN — THIAMINE HCL TAB 100 MG 100 MG: 100 TAB at 08:35

## 2019-09-21 RX ADMIN — OXYCODONE HYDROCHLORIDE 5 MG: 5 TABLET ORAL at 15:41

## 2019-09-21 RX ADMIN — FENTANYL CITRATE 50 MCG: 50 INJECTION INTRAMUSCULAR; INTRAVENOUS at 17:11

## 2019-09-21 RX ADMIN — Medication 1 TABLET: at 08:35

## 2019-09-21 RX ADMIN — GABAPENTIN 100 MG: 100 CAPSULE ORAL at 17:10

## 2019-09-21 RX ADMIN — ALBUTEROL SULFATE 2 PUFF: 90 AEROSOL, METERED RESPIRATORY (INHALATION) at 08:36

## 2019-09-21 RX ADMIN — INSULIN LISPRO 1 UNITS: 100 INJECTION, SOLUTION INTRAVENOUS; SUBCUTANEOUS at 08:35

## 2019-09-21 RX ADMIN — ALBUTEROL SULFATE 2 PUFF: 90 AEROSOL, METERED RESPIRATORY (INHALATION) at 17:10

## 2019-09-21 RX ADMIN — ALBUTEROL SULFATE 2 PUFF: 90 AEROSOL, METERED RESPIRATORY (INHALATION) at 22:09

## 2019-09-21 RX ADMIN — FENTANYL CITRATE 50 MCG: 50 INJECTION INTRAMUSCULAR; INTRAVENOUS at 13:32

## 2019-09-21 RX ADMIN — FOLIC ACID 1 MG: 1 TABLET ORAL at 08:34

## 2019-09-21 RX ADMIN — INSULIN LISPRO 1 UNITS: 100 INJECTION, SOLUTION INTRAVENOUS; SUBCUTANEOUS at 11:50

## 2019-09-21 RX ADMIN — ALBUTEROL SULFATE 2 PUFF: 90 AEROSOL, METERED RESPIRATORY (INHALATION) at 11:50

## 2019-09-21 RX ADMIN — LIDOCAINE HYDROCHLORIDE 15 ML: 10 INJECTION, SOLUTION EPIDURAL; INFILTRATION; INTRACAUDAL; PERINEURAL at 13:35

## 2019-09-21 RX ADMIN — ALBUTEROL SULFATE 2 PUFF: 90 AEROSOL, METERED RESPIRATORY (INHALATION) at 05:38

## 2019-09-21 RX ADMIN — ACETAMINOPHEN 975 MG: 325 TABLET ORAL at 05:25

## 2019-09-21 RX ADMIN — OXYCODONE HYDROCHLORIDE 5 MG: 5 TABLET ORAL at 02:49

## 2019-09-21 RX ADMIN — FENTANYL CITRATE 50 MCG: 50 INJECTION INTRAMUSCULAR; INTRAVENOUS at 14:08

## 2019-09-21 RX ADMIN — LIDOCAINE HYDROCHLORIDE 5 ML: 10 INJECTION, SOLUTION EPIDURAL; INFILTRATION; INTRACAUDAL; PERINEURAL at 13:35

## 2019-09-21 RX ADMIN — GABAPENTIN 100 MG: 100 CAPSULE ORAL at 08:35

## 2019-09-21 RX ADMIN — INSULIN LISPRO 3 UNITS: 100 INJECTION, SOLUTION INTRAVENOUS; SUBCUTANEOUS at 17:13

## 2019-09-21 RX ADMIN — MIDAZOLAM 2 MG: 1 INJECTION INTRAMUSCULAR; INTRAVENOUS at 13:20

## 2019-09-21 RX ADMIN — ONDANSETRON 4 MG: 2 INJECTION INTRAMUSCULAR; INTRAVENOUS at 15:38

## 2019-09-21 RX ADMIN — OXYCODONE HYDROCHLORIDE 5 MG: 5 TABLET ORAL at 10:39

## 2019-09-21 RX ADMIN — ACETAMINOPHEN 975 MG: 325 TABLET ORAL at 21:08

## 2019-09-21 RX ADMIN — OXYCODONE HYDROCHLORIDE 5 MG: 5 TABLET ORAL at 20:22

## 2019-09-21 NOTE — PLAN OF CARE
Problem: Potential for Falls  Goal: Patient will remain free of falls  Description  INTERVENTIONS:  - Assess patient frequently for physical needs  -  Identify cognitive and physical deficits and behaviors that affect risk of falls    -  [place] fall precautions as indicated by assessment   - Educate patient/family on patient safety including physical limitations  - Instruct patient to call for assistance with activity based on assessment  - Modify environment to reduce risk of injury  - Consider OT/PT consult to assist with strengthening/mobility  Outcome: Progressing     Problem: PAIN - ADULT  Goal: Verbalizes/displays adequate comfort level or baseline comfort level  Description  Interventions:  - Encourage patient to monitor pain and request assistance  - Assess pain using appropriate pain scale  - Administer analgesics based on type and severity of pain and evaluate response  - Implement non-pharmacological measures as appropriate and evaluate response  - Consider cultural and social influences on pain and pain management  - Notify physician/advanced practitioner if interventions unsuccessful or patient reports new pain  Outcome: Progressing     Problem: SAFETY ADULT  Goal: Maintain or return to baseline ADL function  Description  INTERVENTIONS:  -  Assess patient's ability to carry out ADLs; assess patient's baseline for ADL function and identify physical deficits which impact ability to perform ADLs (bathing, care of mouth/teeth, toileting, grooming, dressing, etc )  - Assess/evaluate cause of self-care deficits   - Assess range of motion  - Assess patient's mobility; develop plan if impaired  - Assess patient's need for assistive devices and provide as appropriate  - Encourage maximum independence but intervene and supervise when necessary  - Involve family in performance of ADLs  - Assess for home care needs following discharge   - Consider OT consult to assist with ADL evaluation and planning for discharge  - Provide patient education as appropriate  Outcome: Progressing  Goal: Maintain or return mobility status to optimal level  Description  INTERVENTIONS:  - Assess patient's baseline mobility status (ambulation, transfers, stairs, etc )    - Identify cognitive and physical deficits and behaviors that affect mobility  - Identify mobility aids required to assist with transfers and/or ambulation (gait belt, sit-to-stand, lift, walker, cane, etc )  - [place] fall precautions as indicated by assessment  - Record patient progress and toleration of activity level on Mobility SBAR; progress patient to next Phase/Stage  - Instruct patient to call for assistance with activity based on assessment  - Consider rehabilitation consult to assist with strengthening/weightbearing, etc   Outcome: Progressing     Problem: DISCHARGE PLANNING  Goal: Discharge to home or other facility with appropriate resources  Description  INTERVENTIONS:  - Identify barriers to discharge w/patient and caregiver  - Arrange for needed discharge resources and transportation as appropriate  - Identify discharge learning needs (meds, wound care, etc )  - Arrange for interpretive services to assist at discharge as needed  - Refer to Case Management Department for coordinating discharge planning if the patient needs post-hospital services based on physician/advanced practitioner order or complex needs related to functional status, cognitive ability, or social support system  Outcome: Progressing     Problem: Knowledge Deficit  Goal: Patient/family/caregiver demonstrates understanding of disease process, treatment plan, medications, and discharge instructions  Description  Complete learning assessment and assess knowledge base    Interventions:  - Provide teaching at level of understanding  - Provide teaching via preferred learning methods  Outcome: Progressing

## 2019-09-21 NOTE — PROCEDURES
Chest Tube Insertion  Date/Time: 9/21/2019 2:14 PM  Performed by: Claudene Creeks, MD  Authorized by: Claudene Creeks, MD     Patient location:  Bedside  Other Assisting Provider: No    Consent:     Consent obtained:  Written    Consent given by:  Patient    Risks discussed:  Damage to surrounding structures, incomplete drainage, bleeding, infection, pain and nerve damage    Alternatives discussed:  No treatment  Universal protocol:     Procedure explained and questions answered to patient or proxy's satisfaction: yes      Relevant documents present and verified: yes      Test results available and properly labeled: yes      Patient identity confirmed:  Verbally with patient and arm band  Pre-procedure details:     Skin preparation:  ChloraPrep  Indications:     Indications: pleural effusion and hemothorax    Anesthesia (see MAR for exact dosages): Anesthesia method:  Local infiltration    Local anesthetic:  Lidocaine 1% w/o epi (20cc)  Procedure details:     Placement location:  Lateral    Laterality:  Right    Approach:  Open    Scalpel size:  10    Tube size (Fr):  32    Dissection instrument:  Mariangel clamp and finger    Ultrasound guidance: no      Tension pneumothorax: no      Needle Decompression: no      Tube connected to:  Suction    Drainage characteristics:  Bloody    Suture material:  0 silk    Dressing:  4x4 sterile gauze and Xeroform gauze  Post-procedure details:     Patient tolerance of procedure: Tolerated well, no immediate complications  Comments:      Chest tube in 6th-7th rib space close the diaphragm but appears to be in the thorax  [place] hole appears to be close to lateral chest wall but is still intrathoracic  Initial blood loss of 700 cc  Chest tube tidalling well on -20 cm H2O cont  Suction

## 2019-09-21 NOTE — SOCIAL WORK
CM met with Pt with an introduction and explanation of role  Pt reported residing with her daughter Vermillion and son-n-law in a 2 story home with the use of a cane, roller walker and 4 steps to enter  Pt reported being independent with ADLs with no hx of VNA, SNF, mental health or drug/alcohol placements  Pt denied having a living will, reported the use of [employer] on [address] and has Inna Diaz as a PCP  CM reviewed d/c planning process including the following: identifying help at home, patient preference for d/c planning needs, Discharge Lounge, [facility] Meds to Bed program, availability of treatment team to discuss questions or concerns patient and/or family may have regarding understanding medications and recognizing signs and symptoms once discharged  CM also encouraged patient to follow up with all recommended appointments after discharge  Patient advised of importance for patient and family to participate in managing patients medical well being

## 2019-09-21 NOTE — RESPIRATORY THERAPY NOTE
RT Protocol Note  Betsy Payer 69 y o  female MRN: [id number]  Unit/Bed#: [facility] 259-05 Encounter: [id number]    Assessment    Active Problems:    * No active hospital problems   *      Home Pulmonary Medications:  Albuterol MDI and UDN       Past Medical History:   Diagnosis Date    Asthma     Diabetes mellitus ([facility] [address] )     Hypertension      Social History     Socioeconomic History    Marital status: /Civil Union     Spouse name: None    Number of children: None    Years of education: None    Highest education level: None   Occupational History    None   Social Needs    Financial resource strain: None    Food insecurity:     Worry: None     Inability: None    Transportation needs:     Medical: None     Non-medical: None   Tobacco Use    Smoking status: Never Smoker    Smokeless tobacco: Never Used   Substance and Sexual Activity    Alcohol use: Yes     Frequency: 2-4 times a month     Drinks per session: 3 or 4     Comment: 1 pint rachele on weekends    Drug use: No    Sexual activity: None   Lifestyle    Physical activity:     Days per week: None     Minutes per session: None    Stress: None   Relationships    Social connections:     Talks on phone: None     Gets together: None     Attends Hoahaoism service: None     Active member of club or organization: None     Attends meetings of clubs or organizations: None     Relationship status: None    Intimate partner violence:     Fear of current or ex partner: None     Emotionally abused: None     Physically abused: None     Forced sexual activity: None   Other Topics Concern    None   Social History Narrative    Lives with daughter and son and boyfriend    Jimena alive 18 grandcchildren       Subjective    Subjective Data: Pt asking for pain meds, notified RN    Objective    Physical Exam:   Assessment Type: Assess only  General Appearance: Awake, Alert  Respiratory Pattern: Shallow  Chest Assessment: Chest expansion symmetrical  Bilateral Breath Sounds: Diminished, Clear  O2 Device: 2LPM NC    Vitals:  Blood pressure 132/55, pulse 85, temperature 97 5 °F (36 4 °C), temperature source Oral, resp  rate 18, height 5' 3" (1 6 m), weight 63 7 kg (140 lb 6 9 oz), SpO2 100 %  Imaging and other studies: I have personally reviewed pertinent reports  O2 Device: 2LPM NC     Plan    Respiratory Plan: Home Bronchodilator Patient pathway, Discontinue Protocol  Airway Clearance Plan: Incentive Spirometer     Resp Comments: Pt here with multipule right rib fx  Pt instructed on stenting with blanket roll on right  Pt stops mid breath on IS due to pain  RN aware pt requesting pain medication  MDI changed to QID as pt states she takes albuterol MDI at least TID at home and UDN only when sick  Will continue to monitor

## 2019-09-21 NOTE — EMTALA/ACUTE CARE TRANSFER
[facility]  [address] [id number]  Dept: [phone]      EMTALA TRANSFER CONSENT    NAME Kwabena Farooq                                                                       MRN [id number]    I have been informed of my rights regarding examination, treatment, and transfer   by Dr Jenifer Pickett DO    Benefits: Specialized equipment and/or services available at the receiving facility (Include comment)________________________    Risks: Potential for delay in receiving treatment      Consent for Transfer:  I acknowledge that my medical condition has been evaluated and explained to me by the emergency department physician or other qualified medical person and/or my attending physician, who has recommended that I be transferred to the service of  Accepting Physician: Dr Erin Clarke at [address] Name, [address] : [facility]  The above potential benefits of such transfer, the potential risks associated with such transfer, and the probable risks of not being transferred have been explained to me, and I fully understand them  The doctor has explained that, in my case, the benefits of transfer outweigh the risks  I agree to be transferred  I authorize the performance of emergency medical procedures and treatments upon me in both transit and upon arrival at the receiving facility  Additionally, I authorize the release of any and all medical records to the receiving facility and request they be transported with me, if possible  I understand that the safest mode of transportation during a medical emergency is an ambulance and that the Hospital advocates the use of this mode of transport  Risks of traveling to the receiving facility by car, including absence of medical control, life sustaining equipment, such as oxygen, and medical personnel has been explained to me and I fully understand them      ([place] BELOW)  [  ]  I consent to the stated transfer and to be transported by ambulance/helicopter  [  ]  I consent to the stated transfer, but refuse transportation by ambulance and accept full responsibility for my transportation by car  I understand the risks of non-ambulance transfers and I exonerate the Hospital and its staff from any deterioration in my condition that results from this refusal     X___________________________________________    DATE  19  TIME________  Signature of patient or legally responsible individual signing on patient behalf           RELATIONSHIP TO PATIENT_________________________          Provider [address] [place] [address]                                                                       MRN [id number]    A medical screening exam was performed on the above named patient  Based on the examination:    Condition Necessitating Transfer The primary encounter diagnosis was Closed fracture of multiple ribs of right side, initial encounter  Diagnoses of Hemothorax on right, Ascending aortic aneurysm (HCC), Right-sided chest pain, Hypoxia, and Contusion of right lung, initial encounter were also pertinent to this visit      Patient Condition: The patient has been stabilized such that within reasonable medical probability, no material deterioration of the patient condition or the condition of the unborn child(amparo) is likely to result from the transfer    Reason for Transfer: Level of Care needed not available at this facility    Transfer Requirements: [facility]   · Space available and qualified personnel available for treatment as acknowledged by    · Agreed to accept transfer and to provide appropriate medical treatment as acknowledged by       Dr Edwina Pichardo  · Appropriate medical records of the examination and treatment of the patient are provided at the time of transfer   [address] _______  · Transfer will be performed by qualified personnel from    and appropriate transfer equipment as required, including the use of necessary and appropriate life support measures  Provider Certification: I have examined the patient and explained the following risks and benefits of being transferred/refusing transfer to the patient/family:  General risk, such as traffic hazards, adverse weather conditions, rough terrain or turbulence, possible failure of equipment (including vehicle or aircraft), or consequences of actions of persons outside the control of the transport personnel      Based on these reasonable risks and benefits to the patient and/or the unborn child(amparo), and based upon the information available at the time of the patients examination, I certify that the medical benefits reasonably to be expected from the provision of appropriate medical treatments at another medical facility outweigh the increasing risks, if any, to the individuals medical condition, and in the case of labor to the unborn child, from effecting the transfer      X____________________________________________ DATE 09/21/19        TIME_______      ORIGINAL - SEND TO MEDICAL RECORDS   COPY - SEND WITH PATIENT DURING TRANSFER

## 2019-09-21 NOTE — ED NOTES
Pts family demanding pain medication for pt at this time  RN attempted to inform provider but provider is unavailable at this time  Will try again later        Torres Che RN  09/20/19 6375

## 2019-09-21 NOTE — H&P
H&P Exam - Alicia B [address] 69 y o  female MRN: [id number]  Unit/Bed#: ED 03 Encounter: [id number]    Assessment/Plan   Trauma Alert: Other transfer from [facility]: Ambulance  Trauma Team: Attending Nga Zhang and Residents Jocelyne Babcock  Consultants: None    Trauma Active Problems:     Displaced right rib fractures to 6,7,9 ribs  Nondisplaced fracture rib 8  Hemothorax      Trauma Plan:     Admit to trauma  Rib protocol, IS  Possible chest thoracostomy  Pain management and APS consult   PT/OT  Repeat CXR   CIWA, thiamine and folate supplements       Chief Complaint: right chest wall pain    History of Present Illness   HPI:  Lynnette Jordan is a 69 y o  female who presents as a trauma transfer from [address] [place] [address]  Per patient and she does not know how she injured herself however she does admit to drinking 2 day  Patient states that she does not drink every day and has never gone into withdrawal rehab  Per chart review patient was initially complaining of back pain, she stated that her chest wall pain started approximately 2 hours prior to arrival   Family did present to [facility] however they were not with her and do not live with her, so they could not provide details of to what happened yesterday  Mechanism:Fall    Review of Systems   Eyes: Negative for visual disturbance  Respiratory: Negative for shortness of breath  Cardiovascular: Negative for chest pain  Gastrointestinal: Negative for abdominal pain, nausea and vomiting  Musculoskeletal: Positive for arthralgias and myalgias  Negative for back pain and neck pain  Skin: Negative for wound  Neurological: Negative for light-headedness and headaches  All other systems reviewed and are negative  Historical Information   History is unobtainable from the patient due to intoxication    Efforts to obtain history included the following sources: other medical personnel, obtained from other records    Past Medical History: Diagnosis Date    Asthma     Diabetes mellitus ([facility] [address] )     Hypertension      Past Surgical History:   Procedure Laterality Date    BRONCHOSCOPY N/A 3/16/2016    Procedure: BRONCHOSCOPY FLEXIBLE/anesth ;  Surgeon: Judith John DO;  [facility]; Service:     EYE SURGERY      OOPHORECTOMY Left     age 49     Social History   Social History     Substance and Sexual Activity   Alcohol Use Yes    Comment: social     Social History     Substance and Sexual Activity   Drug Use No     Social History     Tobacco Use   Smoking Status Never Smoker   Smokeless Tobacco Never Used     Immunization History   Administered Date(s) Administered    H1N1, All Formulations 2009, 2010    INFLUENZA 2014, 2015, 2018    Pneumococcal Conjugate 13-Valent 2019    Tdap 08/15/2013     Last Tetanus:   Family History: Non-contributory        Meds/Allergies   PTA meds:   Prior to Admission Medications   Prescriptions Last Dose Informant Patient Reported? Taking?    ALREX 0 2 % SUSP   Yes Yes   Besifloxacin HCl (BESIVANCE) 0 6 % SUSP  Self Yes Yes   Sig: Apply 1 drop to eye 4 (four) times a day   Blood Glucose Calibration (ONETOUCH GLUCOSE CONTROL VI)  Self Yes Yes   Sig: use as directed   Difluprednate (DUREZOL) 0 05 % EMUL  Self Yes Yes   Sig: Apply 1 drop to eye 4 (four) times a day   Lancets (ONETOUCH ULTRASOFT) lancets  Self Yes Yes   Sig: by Does not apply route   ONE TOUCH ULTRA TEST test strip  Self Yes Yes   albuterol (2 5 mg/3 mL) 0 083 % nebulizer solution  Self Yes Yes   albuterol (PROVENTIL HFA,VENTOLIN HFA) 90 mcg/act inhaler   No Yes   Sig: Inhale 2 puffs every 6 (six) hours as needed for wheezing   amLODIPine (NORVASC) 5 mg tablet  Self No Yes   Sig: Take 1 tablet (5 mg total) by mouth daily   atorvastatin (LIPITOR) 20 mg tablet  Self Yes Yes   Si mg   candesartan (ATACAND) 32 MG tablet  Self No Yes   Sig: Take 1 tablet (32 mg total) by mouth daily   hydrochlorothiazide (HYDRODIURIL) 12 5 mg tablet   Yes Yes   ketorolac (ACULAR) 0 5 % ophthalmic solution  Self Yes Yes   Sig: Administer 1 drop to the right eye 4 (four) times a day   meloxicam (MOBIC) 7 5 mg tablet   No Yes   Sig: Take 1 tablet (7 5 mg total) by mouth 2 (two) times a day as needed for moderate pain   metFORMIN (GLUCOPHAGE) 850 mg tablet   No Yes   Sig: take 1 tablet by mouth twice a day WITH MEALS   metaxalone (SKELAXIN) 800 mg tablet   No Yes   Sig: Take 1 tablet (800 mg total) by mouth 3 (three) times a day as needed for muscle spasms   mometasone-formoterol (DULERA) 100-5 MCG/ACT inhaler   No Yes   Si puffs BID   neomycin-polymyxin-dexamethasone (MAXITROL) 0 35%-10,000 units/g-0 1%   Yes Yes   Sig: APPLY AT BEDTIME A THIN RIBBON TO BOTH EYELIDS FOR 1 WEEK, THEN STOP   nitrofurantoin (MACROBID) 100 mg capsule   No No   Sig: Take 1 capsule (100 mg total) by mouth 2 (two) times a day for 5 days   pantoprazole (PROTONIX) 40 mg tablet  Self No Yes   Sig: Take 1 tablet (40 mg total) by mouth daily      Facility-Administered Medications: None       Allergies   Allergen Reactions    Bactrim [Sulfamethoxazole-Trimethoprim] Hives         PHYSICAL EXAM    PE limited by: none    Objective   Vitals:   First set: Temperature: 97 9 °F (36 6 °C) (19)  Pulse: 100 (19)  Respirations: 19 (19)  Blood Pressure: 151/70 (19)    Primary Survey:   (A) Airway: intact  (B) Breathing: breath sounds equal bilaterally  (C) Circulation: Pulses:   pedal  2/4 and radial  2/4  (D) Disabliity:  Eye Opening:   Spontaneous = 4, Motor Response: Obeys commands = 6 and Verbal Response:  Oriented = 5  (E) Expose:  Completed    Secondary Survey: (Click on Physical Exam tab above)  Physical Exam   Constitutional: She appears well-developed and well-nourished  In pain, holding right inferolateral chest wall   HENT:   Head: Normocephalic and atraumatic  Head is without raccoon's eyes     Right Ear: External ear normal  No hemotympanum  Left Ear: External ear normal  No hemotympanum  Eyes: Pupils are equal, round, and reactive to light  EOM are normal  Right eye exhibits no discharge  Left eye exhibits no discharge  Neck: Normal range of motion  Cardiovascular: Normal rate, regular rhythm and intact distal pulses  Pulmonary/Chest: Effort normal  No respiratory distress  She has no wheezes  She exhibits tenderness (right chest wall)  Abdominal: Soft  She exhibits no distension  There is no tenderness  There is no rebound and no guarding  Musculoskeletal: She exhibits no edema or deformity  No midline c/t/l tenderness; moving all extremities without difficulty in bed   Neurological: She is alert  No cranial nerve deficit or sensory deficit  She exhibits normal muscle tone  Skin: Skin is warm  No erythema  Vitals reviewed        Invasive Devices     Peripheral Intravenous Line            Peripheral IV 09/20/19 Left Antecubital less than 1 day                Lab Results:   BMP/CMP:   Lab Results   Component Value Date    SODIUM 133 (L) 09/20/2019    K 3 9 09/20/2019    CL 98 (L) 09/20/2019    CO2 22 09/20/2019    BUN 16 09/20/2019    CREATININE 0 60 09/20/2019    CALCIUM 8 6 09/20/2019    AST 15 09/20/2019    ALT 14 09/20/2019    ALKPHOS 91 09/20/2019    EGFR 91 09/20/2019   , CBC:   Lab Results   Component Value Date    WBC 12 15 (H) 09/20/2019    HGB 12 9 09/20/2019    HCT 38 4 09/20/2019    MCV 99 (H) 09/20/2019     09/20/2019    MCH 33 1 09/20/2019    MCHC 33 6 09/20/2019    RDW 12 7 09/20/2019    MPV 12 9 (H) 09/20/2019    NRBC 0 09/20/2019   , Urinalysis: No results found for: Edwin Olivera, SPECGRAV, PHUR, LEUKOCYTESUR, NITRITE, PROTEINUA, GLUCOSEU, KETONESU, BILIRUBINUR, BLOODU, Troponin:   Lab Results   Component Value Date    TROPONINI <0 02 09/20/2019   , EtOH:   Lab Results   Component Value Date    ETOH 218 (H) 09/20/2019   , UDS: No components found for: RAPIDDRUGSCREEN and ISTAT: No components found for: VBG  Imaging/EKG Studies: Results: I have personally reviewed pertinent reports          Code Status: Level 1 - Full Code  Advance Directive and Living Will:      Power of :    POLST:

## 2019-09-21 NOTE — ED NOTES
Pt was found out of bed urinating on the floor  Rodney Alvarado RN, CORONA Starkey and Brian Villatoro, ED Tech assisted pt back into bed and provided clean linens to pt at this time        Sarah Hernandez RN  09/21/19 0592

## 2019-09-21 NOTE — ED PROVIDER NOTES
History  Chief Complaint   Patient presents with    Shortness of Breath     Per EMS, pt has been drinking all night  Pt reports having rachele  Pt is reporting back pain and shortness of breath  Patient is a 52-year-old female that presents via EMS for shortness of breath and back pain but for me patient is complaining of chest pain  History is limited because of alcohol intoxication  Patient does admit to drinking rachele all night long  History provided by:  Patient  History limited by: Alcohol intoxication   used: No    Chest Pain   Pain location:  R chest  Pain severity:  Unable to specify  Onset quality:  Unable to specify  Duration:  2 hours  Timing:  Unable to specify  Progression:  Unable to specify  Ineffective treatments:  None tried  Associated symptoms: shortness of breath        Prior to Admission Medications   Prescriptions Last Dose Informant Patient Reported? Taking?    ALREX 0 2 % SUSP   Yes No   Besifloxacin HCl (BESIVANCE) 0 6 % SUSP  Self Yes Yes   Sig: Apply 1 drop to eye 4 (four) times a day   Blood Glucose Calibration (ONETOUCH GLUCOSE CONTROL VI)  Self Yes No   Sig: use as directed   Difluprednate (DUREZOL) 0 05 % EMUL  Self Yes Yes   Sig: Apply 1 drop to eye 4 (four) times a day   Lancets (ONETOUCH ULTRASOFT) lancets  Self Yes Yes   Sig: by Does not apply route   ONE TOUCH ULTRA TEST test strip  Self Yes Yes   albuterol (2 5 mg/3 mL) 0 083 % nebulizer solution  Self Yes Yes   albuterol (PROVENTIL HFA,VENTOLIN HFA) 90 mcg/act inhaler   No Yes   Sig: Inhale 2 puffs every 6 (six) hours as needed for wheezing   amLODIPine (NORVASC) 5 mg tablet  Self No Yes   Sig: Take 1 tablet (5 mg total) by mouth daily   atorvastatin (LIPITOR) 20 mg tablet  Self Yes Yes   Si mg   candesartan (ATACAND) 32 MG tablet  Self No Yes   Sig: Take 1 tablet (32 mg total) by mouth daily   hydrochlorothiazide (HYDRODIURIL) 12 5 mg tablet   Yes Yes   ketorolac (ACULAR) 0 5 % ophthalmic solution  Self Yes Yes   Sig: Administer 1 drop to the right eye 4 (four) times a day   meloxicam (MOBIC) 7 5 mg tablet   No Yes   Sig: Take 1 tablet (7 5 mg total) by mouth 2 (two) times a day as needed for moderate pain   metFORMIN (GLUCOPHAGE) 850 mg tablet   No Yes   Sig: take 1 tablet by mouth twice a day WITH MEALS   metaxalone (SKELAXIN) 800 mg tablet   No Yes   Sig: Take 1 tablet (800 mg total) by mouth 3 (three) times a day as needed for muscle spasms   mometasone-formoterol (DULERA) 100-5 MCG/ACT inhaler   No Yes   Si puffs BID   neomycin-polymyxin-dexamethasone (MAXITROL) 0 35%-10,000 units/g-0 1%   Yes Yes   Sig: APPLY AT BEDTIME A THIN RIBBON TO BOTH EYELIDS FOR 1 WEEK, THEN STOP   nitrofurantoin (MACROBID) 100 mg capsule   No Yes   Sig: Take 1 capsule (100 mg total) by mouth 2 (two) times a day for 5 days   pantoprazole (PROTONIX) 40 mg tablet  Self No Yes   Sig: Take 1 tablet (40 mg total) by mouth daily      Facility-Administered Medications: None       Past Medical History:   Diagnosis Date    Asthma     Diabetes mellitus ([facility] [address] )     Hypertension        Past Surgical History:   Procedure Laterality Date    BRONCHOSCOPY N/A 3/16/2016    Procedure: BRONCHOSCOPY FLEXIBLE/anesth ;  Surgeon: Jennifer Souza DO;  [facility]; Service:     EYE SURGERY      OOPHORECTOMY Left     age 49       Family History   Problem Relation Age of Onset    Cancer Mother 45        uterine    Cancer Daughter 48        breast    Cancer Son 35        asbestosis related cancer     I have reviewed and agree with the history as documented  Social History     Tobacco Use    Smoking status: Never Smoker    Smokeless tobacco: Never Used   Substance Use Topics    Alcohol use: Yes     Comment: social    Drug use: No        Review of Systems   Unable to perform ROS: Other   Respiratory: Positive for shortness of breath  Cardiovascular: Positive for chest pain         Physical Exam  Physical Exam Constitutional: She appears well-developed and well-nourished  She is easily aroused  She appears distressed  HENT:   Head: Normocephalic and atraumatic  Mouth/Throat: Oropharynx is clear and moist    Eyes: Right conjunctiva is injected  Left conjunctiva is injected  Pupils are 2 mm bilaterally and sluggish   Neck: Normal range of motion  Neck supple  Cardiovascular: Normal rate, regular rhythm, normal heart sounds and intact distal pulses  Exam reveals no friction rub  No murmur heard  Pulmonary/Chest: Effort normal and breath sounds normal  No respiratory distress  She has no wheezes  She has no rales  Abdominal: Soft  She exhibits no distension  There is no tenderness  There is no rebound and no guarding  Musculoskeletal: Normal range of motion  She exhibits no edema, tenderness or deformity  Right lower leg: She exhibits no tenderness and no edema  Left lower leg: She exhibits no tenderness and no edema  Neurological: She is alert and easily aroused  She is disoriented  Skin: Skin is warm and dry  Psychiatric: Her speech is slurred  Cognition and memory are impaired  Nursing note and vitals reviewed        Vital Signs  ED Triage Vitals [09/20/19 2102]   Temperature Pulse Respirations Blood Pressure SpO2   (!) 97 3 °F (36 3 °C) 89 20 (!) 215/98 92 %      Temp Source Heart Rate Source Patient Position - Orthostatic VS BP Location FiO2 (%)   Oral Monitor Lying Right arm --      Pain Score       Worst Possible Pain           Vitals:    09/20/19 2353 09/21/19 0015 09/21/19 0030 09/21/19 0045   BP: 132/63 135/63 133/61 136/62   Pulse: 95 90 86 88   Patient Position - Orthostatic VS: Lying Lying Lying Lying         Visual Acuity      ED Medications  Medications   sodium chloride 0 9 % bolus 1,000 mL (0 mL Intravenous Stopped 4/67/69 5713)   folic acid 1 mg in sodium chloride 0 9 % 50 mL IVPB (0 mg Intravenous Stopped 9/20/19 0011)   thiamine (VITAMIN B1) 100 mg in sodium chloride 0 9 % 50 mL IVPB (0 mg Intravenous Stopped 9/20/19 2341)   ondansetron (ZOFRAN) injection 4 mg (4 mg Intravenous Given 9/20/19 2209)   iohexol (OMNIPAQUE) 350 MG/ML injection (MULTI-DOSE) 100 mL (100 mL Intravenous Given 9/20/19 2252)   fentanyl citrate (PF) 100 MCG/2ML 50 mcg (50 mcg Intravenous Given 9/20/19 2343)       Diagnostic Studies  Results Reviewed     Procedure Component Value Units Date/Time    Comprehensive metabolic panel [[id number]]  (Abnormal) Collected:  09/20/19 2201    Lab Status:  Final result Specimen:  Blood from Arm, Left Updated:  09/20/19 2231     Sodium 133 mmol/L      Potassium 3 9 mmol/L      Chloride 98 mmol/L      CO2 22 mmol/L      ANION GAP 13 mmol/L      BUN 16 mg/dL      Creatinine 0 60 mg/dL      Glucose 212 mg/dL      Calcium 8 6 mg/dL      AST 15 U/L      ALT 14 U/L      Alkaline Phosphatase 91 U/L      Total Protein 7 0 g/dL      Albumin 3 4 g/dL      Total Bilirubin 0 14 mg/dL      eGFR 91 ml/min/1 73sq m     Narrative:       [place] guidelines for Chronic Kidney Disease (CKD):     Stage 1 with normal or high GFR (GFR > 90 mL/min/1 73 square meters)    Stage 2 Mild CKD (GFR = 60-89 mL/min/1 73 square meters)    Stage 3A Moderate CKD (GFR = 45-59 mL/min/1 73 square meters)    Stage 3B Moderate CKD (GFR = 30-44 mL/min/1 73 square meters)    Stage 4 Severe CKD (GFR = 15-29 mL/min/1 73 square meters)    Stage 5 End Stage CKD (GFR <15 mL/min/1 73 square meters)  Note: GFR calculation is accurate only with a steady state creatinine    Troponin I [[id number]]  (Normal) Collected:  09/20/19 2118    Lab Status:  Final result Specimen:  Blood from Arm, Left Updated:  09/20/19 2201     Troponin I <0 02 ng/mL     Protime-INR [[id number]]  (Normal) Collected:  09/20/19 2118    Lab Status:  Final result Specimen:  Blood from Arm, Left Updated:  09/20/19 2158     Protime 12 0 seconds      INR 0 88    APTT [[id number]]  (Normal) Collected:  09/20/19 0005    Lab Status:  Final result Specimen:  Blood from Arm, Left Updated:  09/20/19 2158     PTT 23 seconds     Acetaminophen level-If concentration is detectable, please discuss with medical  on call  [[id number]]  (Abnormal) Collected:  09/20/19 2118    Lab Status:  Final result Specimen:  Blood from Arm, Left Updated:  09/20/19 2157     Acetaminophen Level <2 ug/mL     Salicylate level [[id number]]  (Abnormal) Collected:  09/20/19 2118    Lab Status:  Final result Specimen:  Blood from Arm, Left Updated:  52/39/65 5770     Salicylate Lvl <3 mg/dL     Lipase [[id number]]  (Normal) Collected:  09/20/19 2118    Lab Status:  Final result Specimen:  Blood from Arm, Left Updated:  09/20/19 2156     Lipase 80 u/L     Ethanol [[id number]]  (Abnormal) Collected:  09/20/19 2118    Lab Status:  Final result Specimen:  Blood from Arm, Left Updated:  09/20/19 2153     Ethanol Lvl 218 mg/dL     CBC and differential [[id number]]  (Abnormal) Collected:  09/20/19 2118    Lab Status:  Final result Specimen:  Blood from Arm, Left Updated:  09/20/19 2143     WBC 12 15 Thousand/uL      RBC 3 90 Million/uL      Hemoglobin 12 9 g/dL      Hematocrit 38 4 %      MCV 99 fL      MCH 33 1 pg      MCHC 33 6 g/dL      RDW 12 7 %      MPV 12 9 fL      Platelets 570 Thousands/uL      nRBC 0 /100 WBCs      Neutrophils Relative 53 %      Immat GRANS % 1 %      Lymphocytes Relative 37 %      Monocytes Relative 5 %      Eosinophils Relative 3 %      Basophils Relative 1 %      Neutrophils Absolute 6 50 Thousands/µL      Immature Grans Absolute 0 07 Thousand/uL      Lymphocytes Absolute 4 52 Thousands/µL      Monocytes Absolute 0 58 Thousand/µL      Eosinophils Absolute 0 39 Thousand/µL      Basophils Absolute 0 09 Thousands/µL                  CTA dissection protocol chest abdomen pelvis w wo contrast   Final Result by  (09/21 0052)   Addendum 1 of 1 by Gabriella Tomas MD (09/20 7433)   ADDENDUM:      Impression should include:      6    There is a moderate right hemothorax  Final                 Procedures  ECG 12 Lead Documentation Only  Date/Time: 9/20/2019 9:37 PM  Performed by: Joe Garner DO  Authorized by: Joe Garner DO     Indications / Diagnosis:  Chest pain  ECG reviewed by me, the ED Provider: yes    Patient location:  ED  Previous ECG:     Previous ECG:  Compared to current    Similarity:  Changes noted  Interpretation:     Interpretation: non-specific    Rate:     ECG rate:  86    ECG rate assessment: normal    Rhythm:     Rhythm: sinus rhythm    Ectopy:     Ectopy: none    QRS:     QRS intervals:  Normal  Conduction:     Conduction: normal    ST segments:     ST segments:  Non-specific  T waves:     T waves: normal             ED Course                               MDM  Number of Diagnoses or Management Options  Ascending aortic aneurysm [facility]): new and requires workup  Closed fracture of multiple ribs of right side, initial encounter: new and requires workup  Contusion of right lung, initial encounter: new and requires workup  Hemothorax on right: new and requires workup  Hypoxia: new and requires workup  Right-sided chest pain: new and requires workup  Diagnosis management comments: Patient is a 16-year-old female that presents via EMS with multiple complaints  For me she is complaining of chest pain  Patient is altered and intoxicated  She does admit to drinking a pint of rachele  Patient is complaining of right-sided chest pain that started about 2 hours ago  Review of systems is limited because of her alcohol intoxication  No family here at the time  She is hypertensive and slightly hypoxic at 92%  She denies vomiting but history is limited  Will obtain labs, CT for possible dissection, give IV fluids and continue to observe  11:54 PM  CT noted and discussed with patient and family  Patient still clinically intoxicated  She denies falling or any trauma today    Family that is with her now does not live with her and they do not know of any trauma that she could have had  There is very minimal bruising to her right flank over her area of trauma  There is no trauma noted anywhere else  I did not obtain CT scans of her head or cervical spine because I was not concerned of trauma at 1st   Because of contrast given this will not give a proper study of her head but I will discuss this with the Department of Trauma  Patient requiring oxygen now but only because she received fentanyl  She responds well to 2 L of oxygen  She only dropped down to 89% on room air  Will discuss with trauma for transfer  12:02 AM  Spoke with Dr Serene Roldan from trauma  He agrees on workup thus far  States that we can hold off on head CT and cervical spine and she could be observed in this can be done tomorrow  Will send over to [place] [id number]         Amount and/or Complexity of Data Reviewed  Clinical lab tests: ordered and reviewed  Tests in the radiology section of CPT®: ordered and reviewed  Tests in the medicine section of CPT®: reviewed and ordered  Review and summarize past medical records: yes    Patient Progress  Patient progress: stable      Disposition  Final diagnoses:   Closed fracture of multiple ribs of right side, initial encounter   Hemothorax on right   Ascending aortic aneurysm (HCC)   Right-sided chest pain   Hypoxia   Contusion of right lung, initial encounter     Time reflects when diagnosis was documented in both MDM as applicable and the Disposition within this note     Time User Action Codes Description Comment    9/20/2019 11:56 PM Good Galicia Add [S22 41XA] Closed fracture of multiple ribs of right side, initial encounter     9/20/2019 11:56 PM Burlene Fluke [J94 2] Hemothorax on right     9/20/2019 11:56 PM Good Galicia Add [I71 2] Ascending aortic aneurysm ([address] )     9/20/2019 11:56 PM Good Galicia Add [R07 9] Right-sided chest pain     9/20/2019 11:56 PM Grayson Néstor Crawford [R09 02] Hypoxia     9/20/2019 11:57 PM Luke Galicia Add [W55 143S] Contusion of right lung, initial encounter       ED Disposition     ED Disposition Condition Date/Time Comment    Transfer to Another Facility-In Network  Fri Sep 20, 2019 11:56 PM Ko Saliva should be transferred out to [facility]  MD Documentation      Most Recent Value   Patient Condition  The patient has been stabilized such that within reasonable medical probability, no material deterioration of the patient condition or the condition of the unborn child(amparo) is likely to result from the transfer   Reason for Transfer  Level of Care needed not available at this facility   Benefits of Transfer  Specialized equipment and/or services available at the receiving facility (Include comment)________________________   Risks of Transfer  Potential for delay in receiving treatment   Accepting Physician  Dr Nicole Ho Name, Torres Marcial   Sending MD Dr Sandra Mercado   Provider Certification  General risk, such as traffic hazards, adverse weather conditions, rough terrain or turbulence, possible failure of equipment (including vehicle or aircraft), or consequences of actions of persons outside the control of the transport personnel      RN Documentation      [facility] Name, Torres Marcial   Transport Mode  Ambulance   Level of Care  Advanced life support      Follow-up Information    None         Patient's Medications   Discharge Prescriptions    No medications on file     No discharge procedures on file      ED Provider  Electronically Signed by           Tamika Esquivel DO  09/21/19 4309

## 2019-09-21 NOTE — RESPIRATORY THERAPY NOTE
RT Protocol Note  Yessenia Leonard 69 y o  female MRN: [id number]  Unit/Bed#: ED 03 Encounter: [id number]    Assessment    Active Problems:    * No active hospital problems  *      Home Pulmonary Medications:  Albuterol MDI Q4prn       Past Medical History:   Diagnosis Date    Asthma     Diabetes mellitus ([facility] [address] )     Hypertension      Social History     Socioeconomic History    Marital status: /Civil Union     Spouse name: None    Number of children: None    Years of education: None    Highest education level: None   Occupational History    None   Social Needs    Financial resource strain: None    Food insecurity:     Worry: None     Inability: None    Transportation needs:     Medical: None     Non-medical: None   Tobacco Use    Smoking status: Never Smoker    Smokeless tobacco: Never Used   Substance and Sexual Activity    Alcohol use: Yes     Comment: social    Drug use: No    Sexual activity: None   Lifestyle    Physical activity:     Days per week: None     Minutes per session: None    Stress: None   Relationships    Social connections:     Talks on phone: None     Gets together: None     Attends Taoism service: None     Active member of club or organization: None     Attends meetings of clubs or organizations: None     Relationship status: None    Intimate partner violence:     Fear of current or ex partner: None     Emotionally abused: None     Physically abused: None     Forced sexual activity: None   Other Topics Concern    None   Social History Narrative    Lives with daughter and son and boyfriend    4  Children,3 alive 25 grandcchildren       Subjective         Objective    Physical Exam:   Assessment Type: Assess only  General Appearance: Awake  Respiratory Pattern: Normal  Chest Assessment: Chest expansion symmetrical  Bilateral Breath Sounds: (P) Diminished    Vitals:  Blood pressure 124/62, pulse 86, temperature 98 °F (36 7 °C), temperature source Oral, resp   rate 20, height 5' 3" (1 6 m), weight 64 1 kg (141 lb 5 oz), SpO2 98 %  Imaging and other studies: I have personally reviewed pertinent reports  Plan    Respiratory Plan: Home Bronchodilator Patient pathway, Discontinue Protocol  Airway Clearance Plan: Discontinue Protocol, Incentive Spirometer     Resp Comments: (P) 69 yr old female with hx of asthma admitted with multiple right fx found resting comfortably in no distress,  ailyn bs diminished, npc, x-ray report na, pt uses prn MDI albuterol for sob and wheezing, Will order prn MDI albuterol for sob and wheezing  Pt to perform IS Q1 WA per ACP

## 2019-09-21 NOTE — ED NOTES
Transport scheduled 2:30 am ANISA     [facility] report to be called to [phone]  Accepting physician Dr José Miguel Donaldson, RN  09/21/19 0588

## 2019-09-22 ENCOUNTER — APPOINTMENT (INPATIENT)
Dept: RADIOLOGY | Facility: HOSPITAL | Age: 74
DRG: 183 | End: 2019-09-22
Payer: COMMERCIAL

## 2019-09-22 PROBLEM — S27.1XXA HEMOTHORAX, TRAUMATIC: Status: ACTIVE | Noted: 2019-09-22

## 2019-09-22 PROBLEM — S22.41XA CLOSED FRACTURE OF MULTIPLE RIBS OF RIGHT SIDE: Status: ACTIVE | Noted: 2019-09-22

## 2019-09-22 LAB
ANION GAP SERPL CALCULATED.3IONS-SCNC: 5 MMOL/L (ref 4–13)
ATRIAL RATE: 86 BPM
BASOPHILS # BLD AUTO: 0.04 THOUSANDS/ΜL (ref 0–0.1)
BASOPHILS NFR BLD AUTO: 1 % (ref 0–1)
BUN SERPL-MCNC: 15 MG/DL (ref 5–25)
CALCIUM SERPL-MCNC: 9.6 MG/DL (ref 8.3–10.1)
CHLORIDE SERPL-SCNC: 93 MMOL/L (ref 100–108)
CO2 SERPL-SCNC: 34 MMOL/L (ref 21–32)
CREAT SERPL-MCNC: 0.71 MG/DL (ref 0.6–1.3)
EOSINOPHIL # BLD AUTO: 0.21 THOUSAND/ΜL (ref 0–0.61)
EOSINOPHIL NFR BLD AUTO: 3 % (ref 0–6)
ERYTHROCYTE [DISTWIDTH] IN BLOOD BY AUTOMATED COUNT: 12.4 % (ref 11.6–15.1)
GFR SERPL CREATININE-BSD FRML MDRD: 85 ML/MIN/1.73SQ M
GLUCOSE SERPL-MCNC: 185 MG/DL (ref 65–140)
GLUCOSE SERPL-MCNC: 232 MG/DL (ref 65–140)
GLUCOSE SERPL-MCNC: 234 MG/DL (ref 65–140)
GLUCOSE SERPL-MCNC: 235 MG/DL (ref 65–140)
GLUCOSE SERPL-MCNC: 246 MG/DL (ref 65–140)
HCT VFR BLD AUTO: 30.9 % (ref 34.8–46.1)
HGB BLD-MCNC: 10.5 G/DL (ref 11.5–15.4)
IMM GRANULOCYTES # BLD AUTO: 0.02 THOUSAND/UL (ref 0–0.2)
IMM GRANULOCYTES NFR BLD AUTO: 0 % (ref 0–2)
LYMPHOCYTES # BLD AUTO: 1.65 THOUSANDS/ΜL (ref 0.6–4.47)
LYMPHOCYTES NFR BLD AUTO: 20 % (ref 14–44)
MCH RBC QN AUTO: 32.8 PG (ref 26.8–34.3)
MCHC RBC AUTO-ENTMCNC: 34 G/DL (ref 31.4–37.4)
MCV RBC AUTO: 97 FL (ref 82–98)
MONOCYTES # BLD AUTO: 0.71 THOUSAND/ΜL (ref 0.17–1.22)
MONOCYTES NFR BLD AUTO: 9 % (ref 4–12)
NEUTROPHILS # BLD AUTO: 5.65 THOUSANDS/ΜL (ref 1.85–7.62)
NEUTS SEG NFR BLD AUTO: 67 % (ref 43–75)
NRBC BLD AUTO-RTO: 0 /100 WBCS
P AXIS: 79 DEGREES
PLATELET # BLD AUTO: 193 THOUSANDS/UL (ref 149–390)
PMV BLD AUTO: 12.6 FL (ref 8.9–12.7)
POTASSIUM SERPL-SCNC: 4.8 MMOL/L (ref 3.5–5.3)
PR INTERVAL: 164 MS
QRS AXIS: 64 DEGREES
QRSD INTERVAL: 88 MS
QT INTERVAL: 368 MS
QTC INTERVAL: 440 MS
RBC # BLD AUTO: 3.2 MILLION/UL (ref 3.81–5.12)
SODIUM SERPL-SCNC: 132 MMOL/L (ref 136–145)
T WAVE AXIS: 77 DEGREES
VENTRICULAR RATE: 86 BPM
WBC # BLD AUTO: 8.28 THOUSAND/UL (ref 4.31–10.16)

## 2019-09-22 PROCEDURE — 94762 N-INVAS EAR/PLS OXIMTRY CONT: CPT

## 2019-09-22 PROCEDURE — 94640 AIRWAY INHALATION TREATMENT: CPT

## 2019-09-22 PROCEDURE — 93010 ELECTROCARDIOGRAM REPORT: CPT | Performed by: INTERNAL MEDICINE

## 2019-09-22 PROCEDURE — 80048 BASIC METABOLIC PNL TOTAL CA: CPT | Performed by: PHYSICIAN ASSISTANT

## 2019-09-22 PROCEDURE — 82948 REAGENT STRIP/BLOOD GLUCOSE: CPT

## 2019-09-22 PROCEDURE — 94760 N-INVAS EAR/PLS OXIMETRY 1: CPT

## 2019-09-22 PROCEDURE — 85025 COMPLETE CBC W/AUTO DIFF WBC: CPT | Performed by: PHYSICIAN ASSISTANT

## 2019-09-22 PROCEDURE — 71045 X-RAY EXAM CHEST 1 VIEW: CPT

## 2019-09-22 PROCEDURE — 99232 SBSQ HOSP IP/OBS MODERATE 35: CPT | Performed by: SURGERY

## 2019-09-22 RX ORDER — LIDOCAINE 50 MG/G
1 PATCH TOPICAL ONCE
Status: COMPLETED | OUTPATIENT
Start: 2019-09-22 | End: 2019-09-22

## 2019-09-22 RX ORDER — FLUTICASONE FUROATE AND VILANTEROL 100; 25 UG/1; UG/1
1 POWDER RESPIRATORY (INHALATION)
Status: DISCONTINUED | OUTPATIENT
Start: 2019-09-23 | End: 2019-09-29 | Stop reason: HOSPADM

## 2019-09-22 RX ORDER — PANTOPRAZOLE SODIUM 40 MG/1
40 TABLET, DELAYED RELEASE ORAL
Status: DISCONTINUED | OUTPATIENT
Start: 2019-09-23 | End: 2019-09-29 | Stop reason: HOSPADM

## 2019-09-22 RX ORDER — METHOCARBAMOL 500 MG/1
500 TABLET, FILM COATED ORAL EVERY 6 HOURS PRN
Status: DISCONTINUED | OUTPATIENT
Start: 2019-09-22 | End: 2019-09-29 | Stop reason: HOSPADM

## 2019-09-22 RX ORDER — AMLODIPINE BESYLATE 5 MG/1
5 TABLET ORAL DAILY
Status: DISCONTINUED | OUTPATIENT
Start: 2019-09-23 | End: 2019-09-29 | Stop reason: HOSPADM

## 2019-09-22 RX ORDER — HYDROCHLOROTHIAZIDE 12.5 MG/1
12.5 TABLET ORAL DAILY
Status: DISCONTINUED | OUTPATIENT
Start: 2019-09-23 | End: 2019-09-29 | Stop reason: HOSPADM

## 2019-09-22 RX ADMIN — ACETAMINOPHEN 975 MG: 325 TABLET ORAL at 21:23

## 2019-09-22 RX ADMIN — ALBUTEROL SULFATE 2.5 MG: 2.5 SOLUTION RESPIRATORY (INHALATION) at 02:47

## 2019-09-22 RX ADMIN — FOLIC ACID 1 MG: 1 TABLET ORAL at 08:54

## 2019-09-22 RX ADMIN — INSULIN LISPRO 3 UNITS: 100 INJECTION, SOLUTION INTRAVENOUS; SUBCUTANEOUS at 17:49

## 2019-09-22 RX ADMIN — OXYCODONE HYDROCHLORIDE 5 MG: 5 TABLET ORAL at 21:23

## 2019-09-22 RX ADMIN — ACETAMINOPHEN 975 MG: 325 TABLET ORAL at 05:24

## 2019-09-22 RX ADMIN — INSULIN LISPRO 3 UNITS: 100 INJECTION, SOLUTION INTRAVENOUS; SUBCUTANEOUS at 13:00

## 2019-09-22 RX ADMIN — OXYCODONE HYDROCHLORIDE 5 MG: 5 TABLET ORAL at 13:00

## 2019-09-22 RX ADMIN — METHOCARBAMOL TABLETS 500 MG: 500 TABLET, COATED ORAL at 13:00

## 2019-09-22 RX ADMIN — DOCUSATE SODIUM 100 MG: 100 CAPSULE, LIQUID FILLED ORAL at 17:48

## 2019-09-22 RX ADMIN — Medication 1 TABLET: at 08:55

## 2019-09-22 RX ADMIN — ALBUTEROL SULFATE 2 PUFF: 90 AEROSOL, METERED RESPIRATORY (INHALATION) at 12:15

## 2019-09-22 RX ADMIN — METHOCARBAMOL TABLETS 500 MG: 500 TABLET, COATED ORAL at 05:24

## 2019-09-22 RX ADMIN — ALBUTEROL SULFATE 2 PUFF: 90 AEROSOL, METERED RESPIRATORY (INHALATION) at 08:54

## 2019-09-22 RX ADMIN — HYDROMORPHONE HYDROCHLORIDE 0.2 MG: 1 INJECTION, SOLUTION INTRAMUSCULAR; INTRAVENOUS; SUBCUTANEOUS at 23:42

## 2019-09-22 RX ADMIN — DOCUSATE SODIUM 100 MG: 100 CAPSULE, LIQUID FILLED ORAL at 08:55

## 2019-09-22 RX ADMIN — GABAPENTIN 100 MG: 100 CAPSULE ORAL at 17:49

## 2019-09-22 RX ADMIN — THIAMINE HCL TAB 100 MG 100 MG: 100 TAB at 08:55

## 2019-09-22 RX ADMIN — INSULIN LISPRO 6 UNITS: 100 INJECTION, SOLUTION INTRAVENOUS; SUBCUTANEOUS at 17:50

## 2019-09-22 RX ADMIN — SENNOSIDES 17.2 MG: 8.6 TABLET, FILM COATED ORAL at 08:55

## 2019-09-22 RX ADMIN — LIDOCAINE 1 PATCH: 50 PATCH CUTANEOUS at 05:25

## 2019-09-22 RX ADMIN — ENOXAPARIN SODIUM 30 MG: 30 INJECTION SUBCUTANEOUS at 17:49

## 2019-09-22 RX ADMIN — INSULIN LISPRO 1 UNITS: 100 INJECTION, SOLUTION INTRAVENOUS; SUBCUTANEOUS at 08:55

## 2019-09-22 RX ADMIN — OXYCODONE HYDROCHLORIDE 5 MG: 5 TABLET ORAL at 02:29

## 2019-09-22 RX ADMIN — OXYCODONE HYDROCHLORIDE 5 MG: 5 TABLET ORAL at 08:55

## 2019-09-22 RX ADMIN — ALBUTEROL SULFATE 2.5 MG: 2.5 SOLUTION RESPIRATORY (INHALATION) at 07:17

## 2019-09-22 RX ADMIN — ACETAMINOPHEN 975 MG: 325 TABLET ORAL at 13:00

## 2019-09-22 RX ADMIN — GABAPENTIN 100 MG: 100 CAPSULE ORAL at 08:54

## 2019-09-22 NOTE — ASSESSMENT & PLAN NOTE
- right-sided rib fractures 6 through 9 on the right  - continue pulmonary toilet  - followup repeat chest x-ray  - chest tube in place secondary to hemothorax  - continue to monitor output  - aggressive pulmonary toilet

## 2019-09-22 NOTE — ASSESSMENT & PLAN NOTE
- traumatic hemothorax on the right  - status post chest tube placement on 09/21/2019  - continue on suction now  - possible transition to water seal pending a m   Chest x-ray on 09/23/2019  - continue to monitor output  - aggressive pulmonary toilet

## 2019-09-22 NOTE — PHYSICAL THERAPY NOTE
Physical Therapy Cancellation Note    PT orders received, chart reviewed  PT spoke to medical team, requesting hold on therapy as pt just received pain medication and likely not able to participate in therapy with current pain levels  PT to continue to follow and perform initial evaluation when able      Anastacio Samayoa, PT, DPT

## 2019-09-22 NOTE — ASSESSMENT & PLAN NOTE
Lab Results   Component Value Date    HGBA1C 6 0 09/18/2019       Recent Labs     09/21/19  1543 09/21/19  2107 09/22/19  0724 09/22/19  1116   POCGLU 274* 234* 185* 232*       Blood Sugar Average: Last 72 hrs:  (P) 418 [id number]     - increase sliding scale today  - continue to monitor blood sugars  - diabetic diet

## 2019-09-22 NOTE — PROGRESS NOTES
Progress Note Angeles Diaz 1945, 69 y o  female MRN: [id number]    Unit/Bed#: [facility] 814-01 Encounter: [id number]    Primary Care Provider: Elizabet Crow MD   Date and time admitted to hospital: 9/21/2019  2:24 AM        Hemothorax, traumatic  Assessment & Plan  - traumatic hemothorax on the right  - status post chest tube placement on 09/21/2019  - continue on suction now  - possible transition to water seal pending a m  Chest x-ray on 09/23/2019  - continue to monitor output  - aggressive pulmonary toilet    Hyperlipidemia  Assessment & Plan  - will discuss with patient home medications confirmed dosage of Lipitor    Diabetes mellitus [facility])  Assessment & Plan  Lab Results   Component Value Date    HGBA1C 6 0 09/18/2019       Recent Labs     09/21/19  1543 09/21/19  2107 09/22/19  0724 09/22/19  1116   POCGLU 274* 234* 185* 232*       Blood Sugar Average: Last 72 hrs:  (P) 660 [id number]     - increase sliding scale today  - continue to monitor blood sugars  - diabetic diet    HTN (hypertension)  Assessment & Plan  - confirm home medication regimen  - discuss with patient at bedside    * Closed fracture of multiple ribs of right side  Assessment & Plan  - right-sided rib fractures 6 through 9 on the right  - continue pulmonary toilet  - followup repeat chest x-ray  - chest tube in place secondary to hemothorax  - continue to monitor output  - aggressive pulmonary toilet    DVT prophylaxis: SCDs and Lovenox  PT and OT: eval and treat    Disposition: Continue chest tube at this time  Continue to monitor clinically  CXR in AM  Follow-up labs  Bedside nurse rounds completed with nurse  Prophylaxis: SCDs and Lovenox    Code status:  Level 1 - Full Code    Consultants:  PT, OT, geriatrics    Is the patient 72 years or older?: YES:    1  Before the illness or injury that brought you to the Emergency, did you need someone to help you on a regular basis? 1=Yes   2   Since the illness or injury that brought you to the Emergency, have you needed more help than usual to take care of yourself? 1=Yes   3  Have you been hospitalized for one or more nights during the past 6 months (excluding a stay in the Emergency Department)? 1=Yes   4  In general, do you see well? 0=Yes   5  In general, do you have serious problems with your memory? 0=No   6  Do you take more than three different medications everyday? 1=Yes   TOTAL   4     Did you order a geriatric consult if the score was 2 or greater?: yes    SUBJECTIVE:     Transfer from: [facility] Received: yes  Tertiary Exam Due on: 9/22/19    Chief Complaint: "Patient reports that she has some pain in her ribs "    HPI/Last 24 hour events:  Reports that her pain is somewhat better this morning however she is still having pain with the chest tube  Denies any fevers, chills, sweats  No nausea or vomiting  No chest pain or shortness of breath  Continues to work with her incentive spirometer      Active medications:           Current Facility-Administered Medications:     acetaminophen (TYLENOL) tablet 975 mg, 975 mg, Oral, Q8H [facility], 975 mg at 09/22/19 1300    albuterol (PROVENTIL HFA,VENTOLIN HFA) inhaler 2 puff, 2 puff, Inhalation, 4x Daily, 2 puff at 09/22/19 1215    albuterol inhalation solution 2 5 mg, 2 5 mg, Nebulization, Q4H PRN, 2 5 mg at 09/22/19 0717    docusate sodium (COLACE) capsule 100 mg, 100 mg, Oral, BID, 100 mg at 09/22/19 0855    enoxaparin (LOVENOX) subcutaneous injection 30 mg, 30 mg, Subcutaneous, K49K [facility]    folic acid (FOLVITE) tablet 1 mg, 1 mg, Oral, Daily, 1 mg at 09/22/19 0854    gabapentin (NEURONTIN) capsule 100 mg, 100 mg, Oral, BID, 100 mg at 09/22/19 0854    HYDROmorphone (DILAUDID) injection 0 2 mg, 0 2 mg, Intravenous, Q3H PRN    insulin lispro (HumaLOG) 100 units/mL subcutaneous injection 1-6 Units, 1-6 Units, Subcutaneous, TID With Meals **AND** Fingerstick Glucose (POCT), , , 4x Daily AC and at bedtime   insulin lispro (HumaLOG) 100 units/mL subcutaneous injection 6 Units, 6 Units, Subcutaneous, TID With Meals    lidocaine (LIDODERM) 5 % patch 1 patch, 1 patch, Topical, Once, 1 patch at 09/22/19 0525    methocarbamol (ROBAXIN) tablet 500 mg, 500 mg, Oral, Q6H PRN, 500 mg at 09/22/19 1300    multivitamin-minerals (CENTRUM) tablet 1 tablet, 1 tablet, Oral, Daily, 1 tablet at 09/22/19 0855    ondansetron (ZOFRAN) injection 4 mg, 4 mg, Intravenous, Q6H PRN, 4 mg at 09/21/19 1538    oxyCODONE (ROXICODONE) IR tablet 2 5 mg, 2 5 mg, Oral, Q4H PRN    oxyCODONE (ROXICODONE) IR tablet 5 mg, 5 mg, Oral, Q4H PRN, 5 mg at 09/22/19 1300    senna (SENOKOT) tablet 17 2 mg, 2 tablet, Oral, Daily, 17 2 mg at 09/22/19 0855    thiamine (VITAMIN B1) tablet 100 mg, 100 mg, Oral, Daily, 100 mg at 09/22/19 0855      OBJECTIVE:     Vitals:   Vitals:    09/22/19 0746   BP: 137/63   Pulse: 73   Resp: 18   Temp: 97 8 °F (36 6 °C)   SpO2: 95%       Physical Exam:   GENERAL APPEARANCE:  No acute distress  NEURO:  Cranial nerves 2-12 intact, no focal deficits  HEENT:  Normocephalic  CV:  Regular rate and rhythm  LUNGS:  Chest tube in place on the right otherwise clear auscultation bilaterally  GI:  Bowel sounds x4, nontender  :  No Fox  MSK:  +2 pulses on extremities, neurovascularly intact  SKIN:  Warm, dry, intact      I/O:   I/O       09/20 0701 - 09/21 0700 09/21 0701 - 09/22 0700 09/22 0701 - 09/23 0700    P  O   180 1080    Total Intake(mL/kg)  180 (2 8) 1080 (17)    Urine (mL/kg/hr)  1000 (0 7) 640 (1 4)    Stool   0    Chest Tube  900     Total Output  1900 640    Net  -1720 +440           Unmeasured Urine Occurrence   1 x          Invasive Devices: Invasive Devices     Peripheral Intravenous Line            Peripheral IV 09/20/19 Left Antecubital 1 day          Drain            Chest Tube 1 Right Pleural less than 1 day                  Imaging:   Xr Chest Pa & Lateral    Result Date: 9/22/2019  Impression: 1    Right basilar chest tube in place  No evident pneumothorax  2   Bibasilar opacities compatible with small effusions and component of atelectasis or infection  Workstation performed: [id number]     Xr Chest Pa & Lateral (24 Hours After Admission)    Result Date: 9/21/2019  Impression: Large right pleural fluid, increased compared to prior  No gross pneumothorax  Limited due to obliquity   Workstation performed: [id number]       Labs: CBC: No results found for: WBC, HGB, HCT, MCV, PLT, ADJUSTEDWBC, MCH, MCHC, RDW, MPV, NRBC  CMP: No results found for: NA, CL, CO2, ANIONGAP, BUN, CREATININE, GLUCOSE, CALCIUM, AST, ALT, ALKPHOS, PROT, BILITOT, EGFR

## 2019-09-22 NOTE — QUICK NOTE
Resumed home medications after discussion with patient and her [employer]   Confirmed medications over the phone with the pharmacist

## 2019-09-22 NOTE — OCCUPATIONAL THERAPY NOTE
Occupational Therapy Cancellation Note    Orders received and chart reviewed  OT spoke with medical team, at this time requesting to hold on initial OT eval 2' Pt recently receiving pain medication and not able to participate in therapy this moment   Will continue to follow to be seen for OT evaluation as appropriate/when medically cleared      David Soliman MS, OTR/L

## 2019-09-23 ENCOUNTER — ANESTHESIA (INPATIENT)
Dept: ANESTHESIOLOGY | Facility: HOSPITAL | Age: 74
DRG: 183 | End: 2019-09-23
Payer: COMMERCIAL

## 2019-09-23 ENCOUNTER — APPOINTMENT (INPATIENT)
Dept: RADIOLOGY | Facility: HOSPITAL | Age: 74
DRG: 183 | End: 2019-09-23
Payer: COMMERCIAL

## 2019-09-23 ENCOUNTER — ANESTHESIA EVENT (INPATIENT)
Dept: ANESTHESIOLOGY | Facility: HOSPITAL | Age: 74
DRG: 183 | End: 2019-09-23
Payer: COMMERCIAL

## 2019-09-23 ENCOUNTER — APPOINTMENT (INPATIENT)
Dept: SURGERY | Facility: HOSPITAL | Age: 74
DRG: 183 | End: 2019-09-23
Payer: COMMERCIAL

## 2019-09-23 LAB
ANION GAP SERPL CALCULATED.3IONS-SCNC: 2 MMOL/L (ref 4–13)
BASOPHILS # BLD AUTO: 0.03 THOUSANDS/ΜL (ref 0–0.1)
BASOPHILS NFR BLD AUTO: 1 % (ref 0–1)
BUN SERPL-MCNC: 13 MG/DL (ref 5–25)
CALCIUM SERPL-MCNC: 9.2 MG/DL (ref 8.3–10.1)
CHLORIDE SERPL-SCNC: 94 MMOL/L (ref 100–108)
CO2 SERPL-SCNC: 36 MMOL/L (ref 21–32)
CREAT SERPL-MCNC: 0.5 MG/DL (ref 0.6–1.3)
EOSINOPHIL # BLD AUTO: 0.18 THOUSAND/ΜL (ref 0–0.61)
EOSINOPHIL NFR BLD AUTO: 3 % (ref 0–6)
ERYTHROCYTE [DISTWIDTH] IN BLOOD BY AUTOMATED COUNT: 12.3 % (ref 11.6–15.1)
GFR SERPL CREATININE-BSD FRML MDRD: 96 ML/MIN/1.73SQ M
GLUCOSE SERPL-MCNC: 156 MG/DL (ref 65–140)
GLUCOSE SERPL-MCNC: 189 MG/DL (ref 65–140)
GLUCOSE SERPL-MCNC: 190 MG/DL (ref 65–140)
GLUCOSE SERPL-MCNC: 221 MG/DL (ref 65–140)
GLUCOSE SERPL-MCNC: 266 MG/DL (ref 65–140)
HCT VFR BLD AUTO: 30 % (ref 34.8–46.1)
HGB BLD-MCNC: 9.9 G/DL (ref 11.5–15.4)
IMM GRANULOCYTES # BLD AUTO: 0.03 THOUSAND/UL (ref 0–0.2)
IMM GRANULOCYTES NFR BLD AUTO: 1 % (ref 0–2)
LYMPHOCYTES # BLD AUTO: 1.49 THOUSANDS/ΜL (ref 0.6–4.47)
LYMPHOCYTES NFR BLD AUTO: 24 % (ref 14–44)
MCH RBC QN AUTO: 31.9 PG (ref 26.8–34.3)
MCHC RBC AUTO-ENTMCNC: 33 G/DL (ref 31.4–37.4)
MCV RBC AUTO: 97 FL (ref 82–98)
MONOCYTES # BLD AUTO: 0.56 THOUSAND/ΜL (ref 0.17–1.22)
MONOCYTES NFR BLD AUTO: 9 % (ref 4–12)
NEUTROPHILS # BLD AUTO: 4.01 THOUSANDS/ΜL (ref 1.85–7.62)
NEUTS SEG NFR BLD AUTO: 62 % (ref 43–75)
NRBC BLD AUTO-RTO: 0 /100 WBCS
PLATELET # BLD AUTO: 183 THOUSANDS/UL (ref 149–390)
PMV BLD AUTO: 12.7 FL (ref 8.9–12.7)
POTASSIUM SERPL-SCNC: 5 MMOL/L (ref 3.5–5.3)
RBC # BLD AUTO: 3.1 MILLION/UL (ref 3.81–5.12)
SODIUM SERPL-SCNC: 132 MMOL/L (ref 136–145)
WBC # BLD AUTO: 6.3 THOUSAND/UL (ref 4.31–10.16)

## 2019-09-23 PROCEDURE — 82948 REAGENT STRIP/BLOOD GLUCOSE: CPT

## 2019-09-23 PROCEDURE — 99222 1ST HOSP IP/OBS MODERATE 55: CPT | Performed by: INTERNAL MEDICINE

## 2019-09-23 PROCEDURE — 94664 DEMO&/EVAL PT USE INHALER: CPT

## 2019-09-23 PROCEDURE — 97167 OT EVAL HIGH COMPLEX 60 MIN: CPT

## 2019-09-23 PROCEDURE — 94668 MNPJ CHEST WALL SBSQ: CPT

## 2019-09-23 PROCEDURE — 71045 X-RAY EXAM CHEST 1 VIEW: CPT

## 2019-09-23 PROCEDURE — 80048 BASIC METABOLIC PNL TOTAL CA: CPT | Performed by: PHYSICIAN ASSISTANT

## 2019-09-23 PROCEDURE — 94760 N-INVAS EAR/PLS OXIMETRY 1: CPT

## 2019-09-23 PROCEDURE — 94762 N-INVAS EAR/PLS OXIMTRY CONT: CPT

## 2019-09-23 PROCEDURE — G8988 SELF CARE GOAL STATUS: HCPCS

## 2019-09-23 PROCEDURE — 94640 AIRWAY INHALATION TREATMENT: CPT

## 2019-09-23 PROCEDURE — 99232 SBSQ HOSP IP/OBS MODERATE 35: CPT | Performed by: SURGERY

## 2019-09-23 PROCEDURE — 85025 COMPLETE CBC W/AUTO DIFF WBC: CPT | Performed by: PHYSICIAN ASSISTANT

## 2019-09-23 PROCEDURE — G8987 SELF CARE CURRENT STATUS: HCPCS

## 2019-09-23 RX ORDER — ALBUMIN, HUMAN INJ 5% 5 %
SOLUTION INTRAVENOUS CONTINUOUS PRN
Status: DISCONTINUED | OUTPATIENT
Start: 2019-09-23 | End: 2019-09-23 | Stop reason: HOSPADM

## 2019-09-23 RX ORDER — HEPARIN SODIUM 5000 [USP'U]/ML
5000 INJECTION, SOLUTION INTRAVENOUS; SUBCUTANEOUS EVERY 8 HOURS SCHEDULED
Status: DISCONTINUED | OUTPATIENT
Start: 2019-09-23 | End: 2019-09-28

## 2019-09-23 RX ORDER — LIDOCAINE HYDROCHLORIDE 15 MG/ML
INJECTION, SOLUTION EPIDURAL; INFILTRATION; INTRACAUDAL; PERINEURAL AS NEEDED
Status: DISCONTINUED | OUTPATIENT
Start: 2019-09-23 | End: 2019-09-23 | Stop reason: HOSPADM

## 2019-09-23 RX ORDER — FENTANYL CITRATE 50 UG/ML
INJECTION, SOLUTION INTRAMUSCULAR; INTRAVENOUS
Status: COMPLETED
Start: 2019-09-23 | End: 2019-09-23

## 2019-09-23 RX ORDER — MIDAZOLAM HYDROCHLORIDE 1 MG/ML
INJECTION INTRAMUSCULAR; INTRAVENOUS
Status: COMPLETED
Start: 2019-09-23 | End: 2019-09-23

## 2019-09-23 RX ORDER — FAMOTIDINE 20 MG/1
20 TABLET, FILM COATED ORAL ONCE
Status: COMPLETED | OUTPATIENT
Start: 2019-09-23 | End: 2019-09-23

## 2019-09-23 RX ORDER — KETAMINE HYDROCHLORIDE 50 MG/ML
INJECTION, SOLUTION, CONCENTRATE INTRAMUSCULAR; INTRAVENOUS AS NEEDED
Status: DISCONTINUED | OUTPATIENT
Start: 2019-09-23 | End: 2019-09-23 | Stop reason: HOSPADM

## 2019-09-23 RX ORDER — MIDAZOLAM HYDROCHLORIDE 1 MG/ML
INJECTION INTRAMUSCULAR; INTRAVENOUS AS NEEDED
Status: DISCONTINUED | OUTPATIENT
Start: 2019-09-23 | End: 2019-09-23 | Stop reason: HOSPADM

## 2019-09-23 RX ORDER — FENTANYL CITRATE 50 UG/ML
INJECTION, SOLUTION INTRAMUSCULAR; INTRAVENOUS AS NEEDED
Status: DISCONTINUED | OUTPATIENT
Start: 2019-09-23 | End: 2019-09-23 | Stop reason: HOSPADM

## 2019-09-23 RX ADMIN — LIDOCAINE HYDROCHLORIDE 3 ML: 15 INJECTION, SOLUTION EPIDURAL; INFILTRATION; INTRACAUDAL; PERINEURAL at 10:34

## 2019-09-23 RX ADMIN — GABAPENTIN 100 MG: 100 CAPSULE ORAL at 17:43

## 2019-09-23 RX ADMIN — HYDROMORPHONE HYDROCHLORIDE 0.2 MG: 1 INJECTION, SOLUTION INTRAMUSCULAR; INTRAVENOUS; SUBCUTANEOUS at 05:39

## 2019-09-23 RX ADMIN — INSULIN LISPRO 6 UNITS: 100 INJECTION, SOLUTION INTRAVENOUS; SUBCUTANEOUS at 17:44

## 2019-09-23 RX ADMIN — ACETAMINOPHEN 975 MG: 325 TABLET ORAL at 05:31

## 2019-09-23 RX ADMIN — ACETAMINOPHEN 975 MG: 325 TABLET ORAL at 21:15

## 2019-09-23 RX ADMIN — DOCUSATE SODIUM 100 MG: 100 CAPSULE, LIQUID FILLED ORAL at 08:47

## 2019-09-23 RX ADMIN — INSULIN LISPRO 6 UNITS: 100 INJECTION, SOLUTION INTRAVENOUS; SUBCUTANEOUS at 12:25

## 2019-09-23 RX ADMIN — LIDOCAINE HYDROCHLORIDE 2 ML: 15 INJECTION, SOLUTION EPIDURAL; INFILTRATION; INTRACAUDAL; PERINEURAL at 10:38

## 2019-09-23 RX ADMIN — Medication: at 12:24

## 2019-09-23 RX ADMIN — DOCUSATE SODIUM 100 MG: 100 CAPSULE, LIQUID FILLED ORAL at 17:43

## 2019-09-23 RX ADMIN — KETAMINE HYDROCHLORIDE 10 MG: 50 INJECTION, SOLUTION INTRAMUSCULAR; INTRAVENOUS at 10:23

## 2019-09-23 RX ADMIN — HEPARIN SODIUM 5000 UNITS: 5000 INJECTION INTRAVENOUS; SUBCUTANEOUS at 21:15

## 2019-09-23 RX ADMIN — MIDAZOLAM 1 MG: 1 INJECTION INTRAMUSCULAR; INTRAVENOUS at 10:23

## 2019-09-23 RX ADMIN — METHOCARBAMOL TABLETS 500 MG: 500 TABLET, COATED ORAL at 08:46

## 2019-09-23 RX ADMIN — INSULIN LISPRO 2 UNITS: 100 INJECTION, SOLUTION INTRAVENOUS; SUBCUTANEOUS at 08:48

## 2019-09-23 RX ADMIN — THIAMINE HCL TAB 100 MG 100 MG: 100 TAB at 08:47

## 2019-09-23 RX ADMIN — ALBUMIN (HUMAN): 12.5 SOLUTION INTRAVENOUS at 10:45

## 2019-09-23 RX ADMIN — FOLIC ACID 1 MG: 1 TABLET ORAL at 08:47

## 2019-09-23 RX ADMIN — FENTANYL CITRATE 50 MCG: 50 INJECTION, SOLUTION INTRAMUSCULAR; INTRAVENOUS at 10:23

## 2019-09-23 RX ADMIN — Medication 1 TABLET: at 08:47

## 2019-09-23 RX ADMIN — FAMOTIDINE 20 MG: 20 TABLET ORAL at 14:19

## 2019-09-23 RX ADMIN — SENNOSIDES 17.2 MG: 8.6 TABLET, FILM COATED ORAL at 08:47

## 2019-09-23 RX ADMIN — Medication: at 11:42

## 2019-09-23 RX ADMIN — INSULIN LISPRO 2 UNITS: 100 INJECTION, SOLUTION INTRAVENOUS; SUBCUTANEOUS at 17:44

## 2019-09-23 RX ADMIN — PANTOPRAZOLE SODIUM 40 MG: 40 TABLET, DELAYED RELEASE ORAL at 05:31

## 2019-09-23 RX ADMIN — INSULIN LISPRO 3 UNITS: 100 INJECTION, SOLUTION INTRAVENOUS; SUBCUTANEOUS at 12:25

## 2019-09-23 RX ADMIN — OXYCODONE HYDROCHLORIDE 5 MG: 5 TABLET ORAL at 08:47

## 2019-09-23 RX ADMIN — HYDROCHLOROTHIAZIDE 12.5 MG: 12.5 TABLET ORAL at 08:45

## 2019-09-23 RX ADMIN — OXYCODONE HYDROCHLORIDE 5 MG: 5 TABLET ORAL at 03:18

## 2019-09-23 RX ADMIN — ALBUMIN (HUMAN): 12.5 SOLUTION INTRAVENOUS at 10:41

## 2019-09-23 RX ADMIN — FLUTICASONE FUROATE AND VILANTEROL TRIFENATATE 1 PUFF: 100; 25 POWDER RESPIRATORY (INHALATION) at 08:45

## 2019-09-23 RX ADMIN — INSULIN LISPRO 6 UNITS: 100 INJECTION, SOLUTION INTRAVENOUS; SUBCUTANEOUS at 08:48

## 2019-09-23 RX ADMIN — AMLODIPINE BESYLATE 5 MG: 5 TABLET ORAL at 08:47

## 2019-09-23 RX ADMIN — GABAPENTIN 100 MG: 100 CAPSULE ORAL at 08:45

## 2019-09-23 RX ADMIN — ALBUTEROL SULFATE 2.5 MG: 2.5 SOLUTION RESPIRATORY (INHALATION) at 08:25

## 2019-09-23 NOTE — OCCUPATIONAL THERAPY NOTE
OccupationalTherapy Evaluation     Patient Name: Emelyn Rubi  PXENG'G Date: 9/23/2019  Problem List  Principal Problem:    Closed fracture of multiple ribs of right side  Active Problems:    HTN (hypertension)    Diabetes mellitus ([facility] [address] )    Hyperlipidemia    Hemothorax, traumatic    Past Medical History  Past Medical History:   Diagnosis Date    Asthma     Diabetes mellitus ([facility] [address] )     Hypertension      Past Surgical History  Past Surgical History:   Procedure Laterality Date    BRONCHOSCOPY N/A 3/16/2016    Procedure: BRONCHOSCOPY FLEXIBLE/anesth ;  Surgeon: Reinaldo Guevara DO;  [facility]; Service:     COLONOSCOPY      EYE SURGERY      OOPHORECTOMY Left     age 49         09/23/19 1740   Note Type   Note type Eval only   Restrictions/Precautions   Weight Bearing Precautions Per Order No   Other Precautions Telemetry;Multiple lines;O2;Fall Risk;Pain  (2L of 02, PCP pump-epidural, chest tube)   Pain Assessment   Pain Assessment 0-10   Pain Score Worst Possible Pain   Pain Type Acute pain;Surgical pain   Pain Location Pelvis;Rib cage   Hospital Pain Intervention(s) Repositioned; Ambulation/increased activity; Emotional support; Rest   Home Living   Type of Home House   Home Layout Two level  (4 RADHA)   Bathroom Shower/Tub Walk-in shower   Bathroom Toilet Raised   [facility];Cane   Prior Function   Level of Ada Independent with ADLs and functional mobility   Lives With Son  (+DIL)   Receives Help From [facility] [address] in the last 6 months 1 to 4   Vocational Retired   Lifestyle   Autonomy I ADL's/IADL's, +RW/SPC occassionally for functional ambulation in house/community (pt states when she feels she needs AD), does not drive, family provides transportation   Reciprocal Relationships family-son/DIL   Service to Others retired   Intrinsic Gratification reading, yoga Psychosocial   Psychosocial (WDL) WDL   Subjective   Subjective motivated and cooperative for therapy   ADL   Where Assessed Edge of bed   Eating Assistance 7  Independent   Grooming Assistance 4  Minimal Assistance   Grooming Deficit Brushing hair   UB Bathing Assistance 4  Minimal Assistance   LB Bathing Assistance 3  Moderate Assistance   UB Dressing Assistance 3  Moderate Assistance   LB Dressing Assistance 2  Maximal Assistance   LB Dressing Deficit Don/doff R sock; Don/doff L sock   Toileting Assistance  3  Moderate Assistance   Additional Comments pt limited with self care tasks 2* to pain/fatigue   Bed Mobility   Supine to Sit 3  Moderate assistance   Additional items Assist x 1; Increased time required;Verbal cues;HOB elevated   Transfers   Sit to Stand 3  Moderate assistance   Additional items Assist x 1; Increased time required;Verbal cues   Stand to Sit 4  Minimal assistance   Additional items Assist x 1; Increased time required;Verbal cues   Stand pivot 4  Minimal assistance   Additional items Assist x 1; Increased time required;Verbal cues  (+RW -1 step verbal cues inst to sequence steps/RW management)   Functional Mobility   Additional Comments min a x1 with RW 2 small steps to chair   Balance   Static Sitting Fair   Static Standing Fair -   Dynamic Standing Poor +   Activity Tolerance   Activity Tolerance Patient limited by fatigue;Patient limited by pain   Medical Staff Made Aware RN, PCA   Nurse Made Aware RN cleared pt for therapy   RUE Assessment   RUE Assessment WFL  (with in pain free range)   LUE Assessment   LUE Assessment WFL  (within pain free range)   Hand Function   Gross Motor Coordination Functional   Fine Motor Coordination Functional   Cognition   Overall Cognitive Status Impaired   Arousal/Participation Alert; Cooperative   Attention Attends with cues to redirect   Orientation Level Oriented to person;Oriented to place;Oriented to time  (grossly to time stating -mon/tues sept 26th ) Memory Decreased recall of recent events;Decreased recall of precautions   Following Commands Follows one step commands with increased time or repetition   Comments pt reports feeling "groggy" from pain medication  pt minimally slow processing, unable to recall full details of current medical events, able to report full social history   Assessment   Limitation Decreased ADL status; Decreased Safe judgement during ADL;Decreased endurance;Decreased self-care trans;Decreased high-level ADLs   Prognosis Fair   Assessment Pt is a 69 y o  female who was admitted from [address] to [facility] on 9/21/2019 with back pain  Closed fracture of multiple ribs of right side pain epidural on 9/21/19 hemothorax with chest tube placement on 9/21/19, hyperlipidemia, diabetes   Pt's problem list also includes PMH of asthma, diabetes, HTN NGOC, pyuria, hearing loss    At baseline pt was completing IADL's/IADL's +RW/SPC for functional ambulation  Pt lives with son/DIL in a [facility] with 4 RADHA  Currently pt requires min/mod a UB, max a LB for overall ADLS and mod a sit to stand, min a with RW for functional mobility/transfers  Pt currently presents with impairments in the following categories -steps to enter environment, difficulty performing ADLS, difficulty performing IADLS  and health management  activity tolerance, endurance, standing balance/tolerance, sitting balance/tolerance, memory, safety , attention , sequencing  and coping skills   These impairments, as well as pt's fatigue, pain and risk for falls  limit pt's ability to safely engage in all baseline areas of occupation, includinggrooming, bathing, dressing, toileting, functional mobility/transfers, community mobility, laundry , house maintenance, medication management, meal prep and leisure activities  From OT standpoint, recommend STR vs home OT and family support pending goal progress upon D/C  OT will continue to follow to address the below stated goals      Goals   Patient Goals get into chair   LTG Time Frame 10-14   Long Term Goal #1 see goals below   Plan   Treatment Interventions ADL retraining;Functional transfer training; Endurance training;Cognitive reorientation;Patient/family training;Equipment evaluation/education; Compensatory technique education; Energy conservation; Activityengagement   Goal Expiration Date 10/07/19   OT Frequency 3-5x/wk   Recommendation   OT Discharge Recommendation Short Term Rehab   OT - OK to Discharge No   Barthel Index   Feeding 10   Bathing 0   Grooming Score 0   Dressing Score 5   Bladder Score 5   Bowels Score 5   Toilet Use Score 5   Transfers (Bed/Chair) Score 5   Mobility (Level Surface) Score 0   Stairs Score 0   Barthel Index Score 35   Modified Nicollet Scale   Modified Nicollet Scale 4      Occupational Therapy Goals:    *Mod I with bed mobility to engage in functional tasks  *Mod I Adl's after setup with use of AE PRN  *Mod I toileting and clothing management   *Mod I functional mobility and transfers to/from all surfaces with Fair + dynamic balance and safety for participation in dynamic adls and iadl tasks   *Demonstrate good carryover with safe use of RW during functional tasks   *Assess DME needs   *Increase activity tolerance to 25-30 minutes for participation in adls and enjoyable activities  *Pt to participate in further cognitive testing with good attention and participation to assist with safe d/c recommendations  *Mod I with Simulated IADL management task    Garima THOMAS, OTR/L

## 2019-09-23 NOTE — ASSESSMENT & PLAN NOTE
- traumatic hemothorax on the right  - status post chest tube placement on 09/21/2019  - continue on suction now  - continue to monitor output  - aggressive pulmonary toilet

## 2019-09-23 NOTE — CONSULTS
Consultation - Acute Pain Service   Lavonne Pineda 69 y o  female MRN: [id number]  Unit/Bed#: [facility] 385-45 Encounter: [id number]               Assessment/Plan     Assessment:   70 yo female s/p traumatic rib fractures day 3  Plan:     1  Epidural placed at T6-7 for pain control  2  Would discontinue sedating medications, and provide only minimal breakthrough pain medications to allow her to wake up and participate in PT/OT  APS will continue to follow; please contact APS ( btwn [phone]) with any further questions    History of Present Illness    Admit Date:  9/21/2019  Hospital Day:  2 days  Primary Service:  Trauma  Attending Provider:  Kayleen Mcdowell DO  Reason for Consult / Principal Problem: Right sided rib fractures, traumatic hemothorax, placement of chest tube  Hx and PE limited by: none  HPI: Lavonne Pineda is a 69y o  year old female who presents with  traumatic rib fractures day 3  She had a hemothorax drained by right sided chest tube  Her fractured ribs are T 6-9  Her current pain is controlled with pain medication ordered, but she refuses to move or participate in PT/OT  She is currently eating and sitting in bed  She has been using her ISB appropriately  Inpatient consult to Acute Pain Service (see Comments)  Consult performed by: Farzana Singh MD  Consult ordered by: Pedro Flores DO          Review of Systems    Historical Information   Past Medical History:   Diagnosis Date    Asthma     Diabetes mellitus ([facility] [address] )     Hypertension      Past Surgical History:   Procedure Laterality Date    BRONCHOSCOPY N/A 3/16/2016    Procedure: BRONCHOSCOPY FLEXIBLE/anesth ;  Surgeon: William Pond DO;  [facility];   Service:     COLONOSCOPY      EYE SURGERY      OOPHORECTOMY Left     age 49     Social History   Social History     Substance and Sexual Activity   Alcohol Use Yes    Frequency: 2-4 times a month    Drinks per session: 3 or 4    Comment: 1 lilly jeffrey on weekends Social History     Substance and Sexual Activity   Drug Use No     Social History     Tobacco Use   Smoking Status Never Smoker   Smokeless Tobacco Never Used     Family History: non-contributory    Meds/Allergies   all current active meds have been reviewed    Allergies   Allergen Reactions    Bactrim [Sulfamethoxazole-Trimethoprim] Hives       Objective   Temp:  [97 4 °F (36 3 °C)-99 °F (37 2 °C)] 98 2 °F (36 8 °C)  HR:  [] 92  Resp:  [16-22] 16  BP: ()/(40-95) 102/52    Intake/Output Summary (Last 24 hours) at 9/23/2019 1240  Last data filed at 9/23/2019 1047  Gross per 24 hour   Intake 2000 ml   Output 1647 ml   Net 353 ml       Physical Exam  HEENT: alert and oriented  Musc: chest tube dressing intact right side  Lung: symmetric chest excursion    Lab Results: I have personally reviewed pertinent labs  Imaging Studies: I have personally reviewed pertinent reports  EKG, Pathology, and Other Studies: I have personally reviewed pertinent reports  Counseling / Coordination of Care  Total floor / unit time spent today Level 1 = 20 minutes  Greater than 50% of total time was spent with the patient and / or family counseling and / or coordination of care

## 2019-09-23 NOTE — ASSESSMENT & PLAN NOTE
Lab Results   Component Value Date    HGBA1C 6 0 09/18/2019       Recent Labs     09/22/19 2057 09/23/19  0723 09/23/19  1153 09/23/19  1613   POCGLU 235* 190* 266* 221*       Blood Sugar Average: Last 72 hrs:  (P) 563 [id number]     - increased sliding scale and add on meal time humalog  - continue to monitor blood sugars  - diabetic diet

## 2019-09-23 NOTE — ASSESSMENT & PLAN NOTE
- right-sided rib fractures 6 through 9 on the right  - continue pulmonary toilet  - followup repeat chest x-ray today, pervious showed apical pneumo  - No air leak, continue suction  - Monitor output  - chest tube in place secondary to hemothorax  - continue to monitor output  - aggressive pulmonary toilet  - epidural placed on 09/23/2019; started on subcutaneous heparin status post epidural placement  - patient pain significantly improved and was able to ascertain 493 on incentive spirometry as soon as she came back from epidural placement

## 2019-09-23 NOTE — UTILIZATION REVIEW
Initial Clinical Review    TRAUMA TRANSFER FROM [facility] ED TO [place] [address]    Admission: Date/Time/Statement: Inpatient Admission Orders (From admission, onward)     Ordered        09/21/19 0240  Inpatient Admission  Once                   Orders Placed This Encounter   Procedures    Inpatient Admission     Standing Status:   Standing     Number of Occurrences:   1     Order Specific Question:   Admitting Physician     Answer:   Houston Edmonds [[id number]]     Order Specific Question:   Level of Care     Answer:   Med Surg [[id number]]     Order Specific Question:   Estimated length of stay     Answer:   Not Applicable     ED Arrival Information     Expected Arrival [address] Shea [facility] of Arrival Escorted By Service Admission Type    9/21/2019 9/21/2019 02:24 Emergent Ambulance [facility] Trauma Emergency    Arrival Complaint    Rib fractures        Chief Complaint   Patient presents with    Rib Pain     Pt is a trauma transfer from [address] [place] [address], pt c/o right sided rib pain      Assessment/Plan: 68 yo female transferred from [facility] to [address] [place] [address] for a trauma evaluation  Initially presented to [facility] c/o chest and back pain with R hemothorax noted on CTA chest a&p  Was drinking rachele "all night" pta and history is unobtainable  It is believed that patient fell  Trauma evaluation revealed equal breath sounds, GCS 15, holding R inferolateral chest wall; imaging confirmed displaced R 6,7,9th ribs, and a nodisplaced R 8th rib fx along with the hemothorax  She is admitted as an inpatient on the trauma service due to hemothorax and rib fractures  Repeat imaging to be obtained, [place] assessments in progress, pain management provided, and will possibly need a thoracostomy  9/21 @1414 chest tube inserted by trauma  9/22 per trauma: continues with pain with chest tube; lungs are clear other than having the chest tube in place   chest tube is to suction, will re-image on 9/23, considering transition to water seal  Geriatrics consulted  I/O        09/20 0701 - 09/21 0700 09/21 0701 - 09/22 0700 09/22 0701 - 09/23 0700     P  O    180 1080     Total Intake(mL/kg)   180 (2 8) 1080 (17)     Urine (mL/kg/hr)   1000 (0 7) 640 (1 4)     Stool     0     Chest Tube   900       Total Output   1900 640     Net   -1720 +440     Unmeasured Urine Occurrence     1 x     9/23 chest tube remains in place to suction, serosanguinous drainage; geriatric consult completed and noted pt is deconditioned, recommending rehab post discharge    9/23 @1240: epidural placed for pain control; refusing to participate with PT/OT      ED Triage Vitals [09/21/19 0224]   Temperature Pulse Respirations Blood Pressure SpO2   97 9 °F (36 6 °C) 100 19 151/70 97 %      Temp Source Heart Rate Source Patient Position - Orthostatic VS BP Location FiO2 (%)   Oral Monitor Lying Left arm --      Pain Score       Worst Possible Pain        Wt Readings from Last 1 Encounters:   09/21/19 63 7 kg (140 lb 6 9 oz)     Additional Vital Signs:   Date/Time  Temp  Pulse  Resp  BP  MAP (mmHg)  SpO2  O2 Device   09/23/19 15:17:33  98 1 °F (36 7 °C)  89  17  138/75  96  92 %  Nasal cannula   09/23/19 1340  98 °F (36 7 °C)  88  16  112/52    96 %  Nasal cannula 2 liters   09/23/19 1236  98 2 °F (36 8 °C)  92  16  102/52    94 %  Nasal cannula   09/23/19 1211  97 4 °F (36 3 °C)Abnormal   86  16  82/46Abnormal      96 %  Nasal cannula   09/23/19 1141  98 °F (36 7 °C)  88  16  78/40Abnormal      95 %  Nasal cannula 2 liters   09/23/19 07:03:02  98 2 °F (36 8 °C)  82  18  130/68  89  96 %     09/23/19 0300  99 °F (37 2 °C)  105    185/95Abnormal     96 %     09/22/19 2300  98 5 °F (36 9 °C)  76    141/72    96 %     09/22/19 1920              Nasal cannula  2 liters   09/22/19 1500  98 7 °F (37 1 °C)  78  22  122/80    96 %  Nasal cannula   09/22/19 0746  97 8 °F (36 6 °C)  73  18  137/63   95 %  Nasal cannula   09/21/19 2330  97 7 °F (36 5 °C)  72  18  110/59    97 %     09/21/19 1710    87  18      97 %  Nasal cannula   09/21/19 1539  97 9 °F (36 6 °C)  104  22  162/88    96 %  Nasal cannula    09/21/19 1100  98 2 °F (36 8 °C)  74  18  147/66    97 %  Nasal cannula   09/21/19 0700  97 5 °F (36 4 °C)  80  18  132/55    100 %  Nasal cannula   09/21/19 0516  98 °F (36 7 °C)  90  20  166/76    98 %       Date and Time Eye Opening Best Verbal Response Best Motor Response Kaylah Coma Scale Score   09/23/19 0800 4 5 6 15   09/22/19 1920 4 5 6 15   09/22/19 0830 4 5 6 15   09/21/19 1941 4 5 6 15   09/21/19 0800 4 5 6 15   09/21/19 0556 4 5 6 15   09/21/19 0415 4 5 6 15   09/21/19 0400 4 5 6 15   09/21/19 0345 4 5 6 15   09/21/19 0330 4 5 6 15   09/21/19 0315 4 5 6 15   09/21/19 0245 4 5 6 15   09/21/19 0229 4 5 6 15     Date and Time P O    09/23/19 1440 360 mL   09/23/19 0845 240 mL   09/22/19 1700 360 mL   09/22/19 1344 900 mL   09/22/19 0746 180 mL   09/21/19 0815 180 mL   Trauma Output     Date and Time Urine   09/22/19 1344 200 mL   09/22/19 0900 200 mL   09/22/19 0401 200 mL   09/21/19 2330 300 mL   09/21/19 1913 100 mL   09/21/19 1100 200 mL   09/21/19 0815 200 mL     Pertinent Labs/Diagnostic Test Results:     XR chest portable   Final Result by Fe Diaz MD (09/23 1631)   Stable right small right apical pneumothorax  Unchanged right basilar chest tube position with the side port projecting over the midaxillary line  Stable right greater than left basilar atelectasis  XR chest portable   Final Result by rFank Luis MD (09/22 1938)   Right chest tube present  Tiny right apical pneumothorax remains present  XR chest pa & lateral   Final Result by Amanda Schulz MD (09/22 1002)   1  Right basilar chest tube in place  No evident pneumothorax  2   Bibasilar opacities compatible with small effusions and component of atelectasis or infection     XR chest pa & lateral (24 hours after admission)   Final Result by Sigrid Rivas MD (09/21 1448)   Large right pleural fluid, increased compared to prior  No gross pneumothorax  Limited due to obliquity         Results from last 7 days   Lab Units 09/23/19  0445 09/22/19  1440 09/20/19 2118   WBC Thousand/uL 6 30 8 28 12 15*   HEMOGLOBIN g/dL 9 9* 10 5* 12 9   HEMATOCRIT % 30 0* 30 9* 38 4   PLATELETS Thousands/uL 183 193 251   NEUTROS ABS Thousands/µL 4 01 5 65 6 50     Results from last 7 days   Lab Units 09/23/19  0445 09/22/19  1439 09/20/19  2201 09/18/19  0720   SODIUM mmol/L 132* 132* 133* 136   POTASSIUM mmol/L 5 0 4 8 3 9 4 8   CHLORIDE mmol/L 94* 93* 98* 99*   CO2 mmol/L 36* 34* 22 31   ANION GAP mmol/L 2* 5 13 6   BUN mg/dL 13 15 16 9   CREATININE mg/dL 0 50* 0 71 0 60 0 52*   EGFR ml/min/1 73sq m 96 85 91 95   CALCIUM mg/dL 9 2 9 6 8 6 9 6     Results from last 7 days   Lab Units 09/20/19  2201 09/18/19  0720   AST U/L 15 11   ALT U/L 14 13   ALK PHOS U/L 91 96   TOTAL PROTEIN g/dL 7 0 7 4   ALBUMIN g/dL 3 4* 3 6   TOTAL BILIRUBIN mg/dL 0 14* 0 55     Results from last 7 days   Lab Units 09/23/19  1613 09/23/19  1153 09/23/19  0723 09/22/19  2057 09/22/19  1606 09/22/19  1116 09/22/19  0724 09/21/19  2107 09/21/19  1543 09/21/19  1128   POC GLUCOSE mg/dl 221* 266* 190* 235* 246* 232* 185* 234* 274* 171*     Results from last 7 days   Lab Units 09/23/19  0445 09/22/19  1439 09/20/19  2201   GLUCOSE RANDOM mg/dL 189* 234* 212*     Results from last 7 days   Lab Units 09/18/19  0720   HEMOGLOBIN A1C % 6 0   EAG mg/dl 126     Results from last 7 days   Lab Units 09/20/19  2118   TROPONIN I ng/mL <0 02         Results from last 7 days   Lab Units 09/20/19 2118   PROTIME seconds 12 0   INR  0 88   PTT seconds 23     Results from last 7 days   Lab Units 09/20/19 2118   LIPASE u/L 80     Results from last 7 days   Lab Units 09/20/19 2118   ETHANOL LVL mg/dL 218*   ACETAMINOPHEN LVL ug/mL <2*   SALICYLATE LVL mg/dL <3*     ED Treatment:   Medication Administration from 09/21/2019 0223 to 09/21/2019 3361       Date/Time Order Dose Route Action     09/21/2019 0249 oxyCODONE (ROXICODONE) IR tablet 5 mg 5 mg Oral Given        Past Medical History:   Diagnosis Date    Asthma     Diabetes mellitus (RUST 75 )     Hypertension      Present on Admission:   Hyperlipidemia   Diabetes mellitus (RUST 75 )   HTN (hypertension)      Admitting Diagnosis: Rib pain [R07 81]  Age/Sex: 69 y o  female  Admission Orders:    Current Facility-Administered Medications:  acetaminophen 975 mg Oral Q8H [address]   albuterol 2 5 mg Nebulization Q4H PRN x 2 on 9/22, x 1 on 9/23  2 puffs MDI x 1on 9/21   amLODIPine 5 mg Oral Daily   docusate sodium 100 mg Oral BID   fluticasone-vilanterol 1 puff Inhalation Daily   folic acid 1 mg Oral Daily   gabapentin 100 mg Oral BID   heparin (porcine) 5,000 Units Subcutaneous Q8H [address]   hydrochlorothiazide 12 5 mg Oral Daily   HYDROmorphone 0 2 mg Intravenous Q3H PRN x 1 on 9/22, x 1 on 9/23   insulin lispro 1-6 Units Subcutaneous TID With Meals   insulin lispro 6 Units Subcutaneous TID With Meals   methocarbamol 500 mg Oral Q6H PRN   multivitamin-minerals 1 tablet Oral Daily   ondansetron 4 mg Intravenous Q6H PRN x 1 on 9/21   oxyCODONE 2 5 mg Oral Q4H PRN   oxyCODONE 5 mg Oral Q4H PRN x 4 on 9/21, x 4 on 9/22, x 2 on 9/23   pantoprazole 40 mg Oral Early Morning   ropivacaine 0 1% and fentaNYL 2 mcg/mL  Epidural Continuous   senna 2 tablet Oral Daily   thiamine 100 mg Oral Daily     IV fentanyl x 3 on 9/21  IV versed x 1 on 9/21  IV ketamine x 1 on 9/23    ropivacaine 0 1% and fentaNYL 2 mcg/mL PCEA   Continuous Rate: 12 mL/hr  PCEA Dose: 5 mL  PCEA Lock-out: 10 Minutes  Max Doses per Hour: 3 doses/hr  Freq: Continuous Route: EP  Last Dose: Stopped (09/23/19 1225)  Start: 09/23/19 1130 End: 09/23/19 1210    albumin human (FLEXBUMIN) 5 % injection   Freq: Continuous   Last Dose: Stopped (09/23/19 1047)  Start: 09/23/19 1041 End: 09/23/19 1048    Chest tube to suction  Ciwa scores  scd's  Maintain sat >92%    IP CONSULT TO ACUTE PAIN SERVICE  IP CONSULT TO CASE MANAGEMENT  IP CONSULT TO ALCOHOL BRIEF INTERVENTION TRAUMA  IP CONSULT TO [address] [place] [address] Utilization Review Department  Phone: [phone]; Fax [phone]  [email or web address]  ATTENTION: Please call with any questions or concerns to [phone]  and carefully listen to the prompts so that you are directed to the right person  Send all requests for admission clinical reviews, approved or denied determinations and any other requests to fax [phone]   All voicemails are confidential

## 2019-09-23 NOTE — PROGRESS NOTES
Progress Note Matt Chávez 1945, 69 y o  female MRN: [id number]    Unit/Bed#: [facility] 814-01 Encounter: [id number]    Primary Care Provider: Andrei Guidry MD   Date and time admitted to hospital: 9/21/2019  2:24 AM        Hemothorax, traumatic  Assessment & Plan  - traumatic hemothorax on the right  - status post chest tube placement on 09/21/2019  - continue on suction now  - continue to monitor output  - aggressive pulmonary toilet    Hyperlipidemia  Assessment & Plan  - will discuss with patient home medications confirmed dosage of Lipitor    Diabetes mellitus [facility])  Assessment & Plan  Lab Results   Component Value Date    HGBA1C 6 0 09/18/2019       Recent Labs     09/22/19  2057 09/23/19  0723 09/23/19  1153 09/23/19  1613   POCGLU 235* 190* 266* 221*       Blood Sugar Average: Last 72 hrs:  (P) 429 [id number]     - increased sliding scale and add on meal time humalog  - continue to monitor blood sugars  - diabetic diet    HTN (hypertension)  Assessment & Plan  - confirm home medication regimen  - discuss with patient at bedside    * Closed fracture of multiple ribs of right side  Assessment & Plan  - right-sided rib fractures 6 through 9 on the right  - continue pulmonary toilet  - followup repeat chest x-ray today, pervious showed apical pneumo  - No air leak, continue suction  - Monitor output  - chest tube in place secondary to hemothorax  - continue to monitor output  - aggressive pulmonary toilet  - epidural placed on 09/23/2019; started on subcutaneous heparin status post epidural placement  - patient pain significantly improved and was able to ascertain 712 on incentive spirometry as soon as she came back from epidural placement    DVT prophylaxis: SCDs and SQH  PT and OT: eval and treat    Disposition: Epidural today  Follow-up repeat CXR  Continue to follow clinically  Patient status post epidural placement is doing much better and able to ascertain 750 on her incentive spirometer  Reports her pain is more controlled  Bedside rounds completed with nurse  SUBJECTIVE:  Chief Complaint: "I am having a lot of pain "    Subjective:  Prior to the epidural the patient was having a lot of pain  She was also noted to be splinting when she would attempt to take deep breaths  Status post epidural placement the patient pain significantly improved  She was able to do better with her incentive spirometer  She reported that she felt much better        OBJECTIVE:     Meds/Allergies     Current Facility-Administered Medications:     acetaminophen (TYLENOL) tablet 975 mg, 975 mg, Oral, Q8H [address], Kathy Mancini DO, 975 mg at 09/23/19 0531    albuterol inhalation solution 2 5 mg, 2 5 mg, Nebulization, Q4H PRN, Yaya Bolden DO, 2 5 mg at 09/23/19 0825    amLODIPine (NORVASC) tablet 5 mg, 5 mg, Oral, Daily, Thomas Trotter PA-C, 5 mg at 09/23/19 0847    docusate sodium (COLACE) capsule 100 mg, 100 mg, Oral, BID, Kathy Mancini DO, 100 mg at 09/23/19 1743    fluticasone-vilanterol (BREO ELLIPTA) 100-25 mcg/inh inhaler 1 puff, 1 puff, Inhalation, Daily, Ángel Silveira PA-C, 1 puff at 36/14/06 9278    folic acid (FOLVITE) tablet 1 mg, 1 mg, Oral, Daily, Kathy Mancini DO, 1 mg at 09/23/19 0847    gabapentin (NEURONTIN) capsule 100 mg, 100 mg, Oral, BID, Kathy Mancini DO, 100 mg at 09/23/19 1743    heparin (porcine) subcutaneous injection 5,000 Units, 5,000 Units, Subcutaneous, Q8H [address], Iain Trotter PA-C    hydrochlorothiazide (HYDRODIURIL) tablet 12 5 mg, 12 5 mg, Oral, Daily, Ángel Silveira PA-C, 12 5 mg at 09/23/19 0845    HYDROmorphone (DILAUDID) injection 0 2 mg, 0 2 mg, Intravenous, Q3H PRN, Kathy Mancini DO, 0 2 mg at 09/23/19 0539    insulin lispro (HumaLOG) 100 units/mL subcutaneous injection 1-6 Units, 1-6 Units, Subcutaneous, TID With Meals, 2 Units at 09/23/19 1744 **AND** Fingerstick Glucose (POCT), , , 4x Daily AC and at bedtime, Ángel Silveira PA-C   insulin lispro (HumaLOG) 100 units/mL subcutaneous injection 6 Units, 6 Units, Subcutaneous, TID With Meals, Eileen Goyal PA-C, 6 Units at 09/23/19 1744    methocarbamol (ROBAXIN) tablet 500 mg, 500 mg, Oral, Q6H PRN, Harjit Chaparro MD, 500 mg at 09/23/19 0846    multivitamin-minerals (CENTRUM) tablet 1 tablet, 1 tablet, Oral, Daily, Kathy Mancini, DO, 1 tablet at 09/23/19 0847    ondansetron (ZOFRAN) injection 4 mg, 4 mg, Intravenous, Q6H PRN, Kathy Mancini, DO, 4 mg at 09/21/19 1538    oxyCODONE (ROXICODONE) IR tablet 2 5 mg, 2 5 mg, Oral, Q4H PRN, Kathy Mancini, DO    oxyCODONE (ROXICODONE) IR tablet 5 mg, 5 mg, Oral, Q4H PRN, Kathy Mancini, DO, 5 mg at 09/23/19 0847    pantoprazole (PROTONIX) EC tablet 40 mg, 40 mg, Oral, Early Morning, Eileen Goyal PA-C, 40 mg at 09/23/19 0531    ropivacaine 0 1% and fentaNYL 2 mcg/mL PCEA, , Epidural, Continuous, Gregg Adams MD    [facility]) tablet 17 2 mg, 2 tablet, Oral, Daily, Kathy Mancini, DO, 17 2 mg at 09/23/19 0847    thiamine (VITAMIN B1) tablet 100 mg, 100 mg, Oral, Daily, Kathy Mancini, DO, 100 mg at 09/23/19 0847     Vitals:   Vitals:    09/23/19 1517   BP: 138/75   Pulse: 89   Resp: 17   Temp: 98 1 °F (36 7 °C)   SpO2: 92%       Intake/Output:  I/O       09/21 0701 - 09/22 0700 09/22 0701 - 09/23 0700 09/23 0701 - 09/24 0700    P  O  180 1440 840    IV Piggyback   500    Total Intake(mL/kg) 180 (2 8) 1440 (22 6) 1340 (21)    Urine (mL/kg/hr) 1000 (0 7) 1715 (1 1) 1262 (1 7)    Stool  0     Chest Tube 900 100 200    Total Output 1900 1815 1462    Net -1720 -375 -122           Unmeasured Urine Occurrence  1 x     Unmeasured Stool Occurrence  0 x            Nutrition/GI Proph/Bowel Reg: Continue current diet    Physical Exam:   GENERAL APPEARANCE: NAD, well-appearing  NEURO: CN 2-12 intact  HEENT: normocephalic  CV: RRR  LUNGS: CTA b/l, Chest tube in place, no air leak noted on exam  GI: bowel sounds x4  : no josef  MSK: +2 pulses on extremities  SKIN: warm, dry, intact    Invasive Devices     Peripheral Intravenous Line            Peripheral IV 09/23/19 Right Forearm less than 1 day          Epidural Line            Epidural Catheter 09/23/19 less than 1 day          Drain            Chest Tube 1 Right Pleural 2 days    External Urinary Catheter less than 1 day                 Lab Results:   Results: I have personally reviewed pertinent reports   , BMP/CMP:   Lab Results   Component Value Date    SODIUM 132 (L) 09/23/2019    K 5 0 09/23/2019    CL 94 (L) 09/23/2019    CO2 36 (H) 09/23/2019    BUN 13 09/23/2019    CREATININE 0 50 (L) 09/23/2019    CALCIUM 9 2 09/23/2019    EGFR 96 09/23/2019    and CBC:   Lab Results   Component Value Date    WBC 6 30 09/23/2019    HGB 9 9 (L) 09/23/2019    HCT 30 0 (L) 09/23/2019    MCV 97 09/23/2019     09/23/2019    MCH 31 9 09/23/2019    MCHC 33 0 09/23/2019    RDW 12 3 09/23/2019    MPV 12 7 09/23/2019    NRBC 0 09/23/2019     Imaging/EKG Studies: Results: I have personally reviewed pertinent reports      Other Studies: no other studies  VTE Prophylaxis: SCDs and SQH

## 2019-09-23 NOTE — PLAN OF CARE
Problem: Potential for Falls  Goal: Patient will remain free of falls  Description  INTERVENTIONS:  - Assess patient frequently for physical needs  -  Identify cognitive and physical deficits and behaviors that affect risk of falls    -  [place] fall precautions as indicated by assessment   - Educate patient/family on patient safety including physical limitations  - Instruct patient to call for assistance with activity based on assessment  - Modify environment to reduce risk of injury  - Consider OT/PT consult to assist with strengthening/mobility  Outcome: Progressing     Problem: PAIN - ADULT  Goal: Verbalizes/displays adequate comfort level or baseline comfort level  Description  Interventions:  - Encourage patient to monitor pain and request assistance  - Assess pain using appropriate pain scale  - Administer analgesics based on type and severity of pain and evaluate response  - Implement non-pharmacological measures as appropriate and evaluate response  - Consider cultural and social influences on pain and pain management  - Notify physician/advanced practitioner if interventions unsuccessful or patient reports new pain  Outcome: Progressing     Problem: SAFETY ADULT  Goal: Maintain or return to baseline ADL function  Description  INTERVENTIONS:  -  Assess patient's ability to carry out ADLs; assess patient's baseline for ADL function and identify physical deficits which impact ability to perform ADLs (bathing, care of mouth/teeth, toileting, grooming, dressing, etc )  - Assess/evaluate cause of self-care deficits   - Assess range of motion  - Assess patient's mobility; develop plan if impaired  - Assess patient's need for assistive devices and provide as appropriate  - Encourage maximum independence but intervene and supervise when necessary  - Involve family in performance of ADLs  - Assess for home care needs following discharge   - Consider OT consult to assist with ADL evaluation and planning for discharge  - Provide patient education as appropriate  Outcome: Progressing  Goal: Maintain or return mobility status to optimal level  Description  INTERVENTIONS:  - Assess patient's baseline mobility status (ambulation, transfers, stairs, etc )    - Identify cognitive and physical deficits and behaviors that affect mobility  - Identify mobility aids required to assist with transfers and/or ambulation (gait belt, sit-to-stand, lift, walker, cane, etc )  - [place] fall precautions as indicated by assessment  - Record patient progress and toleration of activity level on Mobility SBAR; progress patient to next Phase/Stage  - Instruct patient to call for assistance with activity based on assessment  - Consider rehabilitation consult to assist with strengthening/weightbearing, etc   Outcome: Progressing     Problem: DISCHARGE PLANNING  Goal: Discharge to home or other facility with appropriate resources  Description  INTERVENTIONS:  - Identify barriers to discharge w/patient and caregiver  - Arrange for needed discharge resources and transportation as appropriate  - Identify discharge learning needs (meds, wound care, etc )  - Arrange for interpretive services to assist at discharge as needed  - Refer to Case Management Department for coordinating discharge planning if the patient needs post-hospital services based on physician/advanced practitioner order or complex needs related to functional status, cognitive ability, or social support system  Outcome: Progressing     Problem: Knowledge Deficit  Goal: Patient/family/caregiver demonstrates understanding of disease process, treatment plan, medications, and discharge instructions  Description  Complete learning assessment and assess knowledge base    Interventions:  - Provide teaching at level of understanding  - Provide teaching via preferred learning methods  Outcome: Progressing     Problem: Prexisting or High Potential for Compromised Skin Integrity  Goal: Skin integrity is maintained or improved  Description  INTERVENTIONS:  - Identify patients at risk for skin breakdown  - Assess and monitor skin integrity  - Assess and monitor nutrition and hydration status  - Monitor labs   - Assess for incontinence   - Turn and reposition patient  - Assist with mobility/ambulation  - Relieve pressure over bony prominences  - Avoid friction and shearing  - Provide appropriate hygiene as needed including keeping skin clean and dry  - Evaluate need for skin moisturizer/barrier cream  - Collaborate with interdisciplinary team   - Patient/family teaching  - Consider wound care consult   Outcome: Progressing

## 2019-09-23 NOTE — PLAN OF CARE
Problem: OCCUPATIONAL THERAPY ADULT  Goal: Performs self-care activities at highest level of function for planned discharge setting  See evaluation for individualized goals  Description  Treatment Interventions: ADL retraining, Functional transfer training, Endurance training, Cognitive reorientation, Patient/family training, Equipment evaluation/education, Compensatory technique education, Energy conservation, Activityengagement          See flowsheet documentation for full assessment, interventions and recommendations  Note:   Limitation: Decreased ADL status, Decreased Safe judgement during ADL, Decreased endurance, Decreased self-care trans, Decreased high-level ADLs  Prognosis: Fair  Assessment: Pt is a 69 y o  female who was admitted from [facility] to [facility] on 9/21/2019 with back pain  Closed fracture of multiple ribs of right side pain epidural on 9/21/19 hemothorax with chest tube placement on 9/21/19, hyperlipidemia, diabetes   Pt's problem list also includes PMH of asthma, diabetes, HTN NGOC, pyuria, hearing loss    At baseline pt was completing IADL's/IADL's +RW/SPC for functional ambulation  Pt lives with son/DIL in a [facility] with 4 RADHA  Currently pt requires min/mod a UB, max a LB for overall ADLS and mod a sit to stand, min a with RW for functional mobility/transfers  Pt currently presents with impairments in the following categories -steps to enter environment, difficulty performing ADLS, difficulty performing IADLS  and health management  activity tolerance, endurance, standing balance/tolerance, sitting balance/tolerance, memory, safety , attention , sequencing  and coping skills    These impairments, as well as pt's fatigue, pain and risk for falls  limit pt's ability to safely engage in all baseline areas of occupation, includinggrooming, bathing, dressing, toileting, functional mobility/transfers, community mobility, laundry , house maintenance, medication management, meal prep and leisure activities  From OT standpoint, recommend STR vs home OT and family support pending goal progress upon D/C  OT will continue to follow to address the below stated goals        OT Discharge Recommendation: Short Term Rehab  OT - OK to Discharge: No

## 2019-09-23 NOTE — ANESTHESIA PROCEDURE NOTES
Epidural Block    Patient location during procedure: holding area  Start time: 9/23/2019 10:34 AM  Reason for block: procedure for pain and at surgeon's request  Staffing  Anesthesiologist: Stacie Morton MD  Performed: anesthesiologist   Preanesthetic Checklist  Completed: patient identified, site marked, surgical consent, pre-op evaluation, timeout performed, IV checked, risks and benefits discussed and monitors and equipment checked  Epidural  Patient position: sitting  Prep: ChloraPrep  Patient monitoring: continuous pulse ox, cardiac monitor, frequent blood pressure checks and heart rate  Approach: midline  Location: thoracic (1-12) (T6-7)  Injection technique: MARLENE saline  Needle  Needle type: Tuohy   Needle gauge: 18 G  Catheter type: end hole  Catheter size: 18 G  Catheter at skin depth: 12 cm  Test dose: negative  Assessment  Sensory level: T4  patient tolerated the procedure well with no immediate complications

## 2019-09-23 NOTE — CONSULTS
Consultation - [id number] 69 y o  female MRN: [id number]  [facility] Encounter: [id number]      Assessment/Plan  1  Acute pain due to injury  Geriatric pain protocol  Scheduled acetaminophen  Lidoderm patch  P r n  Oxycodone  Epidural in place    2  Ambulatory dysfunction  Patient ambulates with cane/roller walker baseline  PT/OT  Recommend rehab post hospitalization  Vitamin-D 3 1000 International Units daily    3  Deconditioning  Multifactorial  Albumin 3 4  PT/OT  Encourage out of bed, mobilization    4  Delirium precautions  Patient alert and orient x3 baseline  Provide frequent redirection, reorientation, distraction techniques  Avoid deliriogenic medications such as tramadol, benzodiazepines, anticholinergics,  Benadryl  Treat pain, See geriatric pain protocol  Monitor for constipation and urinary retention  Encourage early and frequent moblization, OOB  Encourage Hydration/ Nutrition  Implement sleep hygiene, limit night time interuptions, group activities    5  Acute cystitis without hematuria  Patient started on Macrobid 100 mg  Diflucan 150 mg    6  Neck pain  Per epic patient was seen by PCP on 09/16/2019 and prescribed Skelaxin and Mobic  Do not recommend may cause increased confusion in the older adult population  Refer to geriatric pain protocol  Continue yoga and streching  Re -evaluate pillow    7  Age-related cognitive decline  Normal forgetfulness  TSH within normal limits  Recommend check vitamin B12 and folate    8  Closed fracture of multiple ribs right side  Rib fracture protocol  Geriatric pain protocol  Trauma following    History of Present Illness   Physician Requesting Consult: Laquita Garcia DO  Reason for Consult / Principal Problem:   Hx and PE limited by:   HPI: Robin Monahan is a 69y o  year old female who presents with a trauma transfer from [facility]  She is uncertain how she injured herself  Initial complaint was back/chest wall pain   She states that her neck has been hurting her when she wakes up in the am  She has tried new pillows and yoga, but nothing was helping  She took medication prescribed by doctor and only had a little bit of wine to drink  The next thing she remembers is waking up with pain  She has a past medical history of asthma, diabetes, hypertension, hyperlipidemia  Prior to arrival patient lives at home with her daughter and son-in-law  Her  does the driving  She is able to perform her own ADLs  She ambulates with a cane and roller walker  She denies previous history of falls  She is a   She does not wear glasses  She denies issues with hearing  She does not have dentures  She has some missing teeth and chipped teeth, that she states the dentist want [phone] dollars to fix  She has stress incontinence  She has issues with constipation  Her last BM was Saturday  She has occasional issues with memory forgetting names  She reads daily and is involved in [employer]  Upon exam she is in bed  She is alert and oriented x3  She denies pain  She is c/o constipation, states the nurse just gave me something  Inpatient consult to Gerontology  Consult performed by: Tuesday Author JAQUAN Noe  Consult ordered by: Franklyn Maza PA-C          Review of Systems   Constitutional: Negative for unexpected weight change  HENT: Negative for dental problem and hearing loss  Eyes: Negative for visual disturbance  Respiratory: Negative for shortness of breath  Cardiovascular: Negative for chest pain  Gastrointestinal: Positive for constipation  Genitourinary: Negative for difficulty urinating  Musculoskeletal: Positive for gait problem  Neurological: Negative for dizziness  Psychiatric/Behavioral: Negative for confusion and sleep disturbance         Historical Information   Past Medical History:   Diagnosis Date    Asthma     Diabetes mellitus ([facility] [address] )     Hypertension      Past Surgical History:   Procedure Laterality Date    BRONCHOSCOPY N/A 3/16/2016    Procedure: BRONCHOSCOPY FLEXIBLE/anesth ;  Surgeon: Daphne Graf DO;  Location: [facility];   Service:     COLONOSCOPY      EYE SURGERY      OOPHORECTOMY Left     age 49     Social History   Social History     Substance and Sexual Activity   Alcohol Use Yes    Frequency: 2-4 times a month    Drinks per session: 3 or 4    Comment: 1 maggyt rachele on weekends     Social History     Substance and Sexual Activity   Drug Use No     Social History     Tobacco Use   Smoking Status Never Smoker   Smokeless Tobacco Never Used         Family History: non-contributory    Meds/Allergies   Current meds:   Current Facility-Administered Medications   Medication Dose Route Frequency    acetaminophen (TYLENOL) tablet 975 mg  975 mg Oral Q8H [facility]    albuterol inhalation solution 2 5 mg  2 5 mg Nebulization Q4H PRN    amLODIPine (NORVASC) tablet 5 mg  5 mg Oral Daily    docusate sodium (COLACE) capsule 100 mg  100 mg Oral BID    enoxaparin (LOVENOX) subcutaneous injection 30 mg  30 mg Subcutaneous Q12H [facility]    fluticasone-vilanterol (BREO ELLIPTA) 100-25 mcg/inh inhaler 1 puff  1 puff Inhalation Daily    folic acid (FOLVITE) tablet 1 mg  1 mg Oral Daily    gabapentin (NEURONTIN) capsule 100 mg  100 mg Oral BID    hydrochlorothiazide (HYDRODIURIL) tablet 12 5 mg  12 5 mg Oral Daily    HYDROmorphone (DILAUDID) injection 0 2 mg  0 2 mg Intravenous Q3H PRN    insulin lispro (HumaLOG) 100 units/mL subcutaneous injection 1-6 Units  1-6 Units Subcutaneous TID With Meals    insulin lispro (HumaLOG) 100 units/mL subcutaneous injection 6 Units  6 Units Subcutaneous TID With Meals    methocarbamol (ROBAXIN) tablet 500 mg  500 mg Oral Q6H PRN    multivitamin-minerals (CENTRUM) tablet 1 tablet  1 tablet Oral Daily    ondansetron (ZOFRAN) injection 4 mg  4 mg Intravenous Q6H PRN    oxyCODONE (ROXICODONE) IR tablet 2 5 mg  2 5 mg Oral Q4H PRN    oxyCODONE (ROXICODONE) IR tablet 5 mg 5 mg Oral Q4H PRN    pantoprazole (PROTONIX) EC tablet 40 mg  40 mg Oral Early Morning    senna (SENOKOT) tablet 17 2 mg  2 tablet Oral Daily    thiamine (VITAMIN B1) tablet 100 mg  100 mg Oral Daily      Current PTA meds:  Medications Prior to Admission   Medication    albuterol (2 5 mg/3 mL) 0 083 % nebulizer solution    albuterol (PROVENTIL HFA,VENTOLIN HFA) 90 mcg/act inhaler    ALREX 0 2 % SUSP    amLODIPine (NORVASC) 5 mg tablet    atorvastatin (LIPITOR) 20 mg tablet    Besifloxacin HCl (BESIVANCE) 0 6 % SUSP    Blood Glucose Calibration (ONETOUCH GLUCOSE CONTROL VI)    candesartan (ATACAND) 32 MG tablet    Difluprednate (DUREZOL) 0 05 % EMUL    hydrochlorothiazide (HYDRODIURIL) 12 5 mg tablet    ketorolac (ACULAR) 0 5 % ophthalmic solution    Lancets (ONETOUCH ULTRASOFT) lancets    meloxicam (MOBIC) 7 5 mg tablet    metaxalone (SKELAXIN) 800 mg tablet    metFORMIN (GLUCOPHAGE) 850 mg tablet    mometasone-formoterol (DULERA) 100-5 MCG/ACT inhaler    neomycin-polymyxin-dexamethasone (MAXITROL) 0 35%-10,000 units/g-0 1%    ONE TOUCH ULTRA TEST test strip    pantoprazole (PROTONIX) 40 mg tablet    [] nitrofurantoin (MACROBID) 100 mg capsule        Allergies   Allergen Reactions    Bactrim [Sulfamethoxazole-Trimethoprim] Hives       Objective   Vitals: Blood pressure 130/68, pulse 82, temperature 98 2 °F (36 8 °C), resp  rate 18, height 5' 3" (1 6 m), weight 63 7 kg (140 lb 6 9 oz), SpO2 95 %  ,Body mass index is 24 88 kg/m²  Physical Exam   Constitutional: She is oriented to person, place, and time  She appears well-developed and well-nourished  No distress  HENT:   Head: Normocephalic  Eyes: Right eye exhibits no discharge  Left eye exhibits no discharge  Neck: Normal range of motion  Cardiovascular: Normal rate, regular rhythm and normal heart sounds  Exam reveals no gallop and no friction rub  No murmur heard    Pulmonary/Chest: Effort normal and breath sounds normal  No stridor  No respiratory distress  She has no wheezes  Abdominal: Soft  Bowel sounds are normal  She exhibits no distension  There is no tenderness  There is no guarding  Musculoskeletal: Normal range of motion  She exhibits no edema or deformity  Neurological: She is alert and oriented to person, place, and time  Skin: Skin is warm and dry  She is not diaphoretic  Psychiatric: She has a normal mood and affect  Nursing note and vitals reviewed  Lab Results:   Results from last 7 days   Lab Units 09/23/19  0445   WBC Thousand/uL 6 30   HEMOGLOBIN g/dL 9 9*   HEMATOCRIT % 30 0*   PLATELETS Thousands/uL 183        Results from last 7 days   Lab Units 09/23/19  0445  09/20/19  2201   POTASSIUM mmol/L 5 0   < > 3 9   CHLORIDE mmol/L 94*   < > 98*   CO2 mmol/L 36*   < > 22   BUN mg/dL 13   < > 16   CREATININE mg/dL 0 50*   < > 0 60   CALCIUM mg/dL 9 2   < > 8 6   ALK PHOS U/L  --   --  91   ALT U/L  --   --  14   AST U/L  --   --  15    < > = values in this interval not displayed  Imaging Studies: I have personally reviewed pertinent reports  EKG, Pathology, and Other Studies: I have personally reviewed pertinent reports      VTE Prophylaxis: Sequential compression device (Venodyne)     Code Status: Level 1 - Full Code

## 2019-09-24 ENCOUNTER — APPOINTMENT (INPATIENT)
Dept: RADIOLOGY | Facility: HOSPITAL | Age: 74
DRG: 183 | End: 2019-09-24
Payer: COMMERCIAL

## 2019-09-24 LAB
ANION GAP SERPL CALCULATED.3IONS-SCNC: 6 MMOL/L (ref 4–13)
BASOPHILS # BLD AUTO: 0.04 THOUSANDS/ΜL (ref 0–0.1)
BASOPHILS NFR BLD AUTO: 1 % (ref 0–1)
BUN SERPL-MCNC: 12 MG/DL (ref 5–25)
CALCIUM SERPL-MCNC: 10 MG/DL (ref 8.3–10.1)
CHLORIDE SERPL-SCNC: 94 MMOL/L (ref 100–108)
CO2 SERPL-SCNC: 34 MMOL/L (ref 21–32)
CREAT SERPL-MCNC: 0.56 MG/DL (ref 0.6–1.3)
EOSINOPHIL # BLD AUTO: 0.36 THOUSAND/ΜL (ref 0–0.61)
EOSINOPHIL NFR BLD AUTO: 4 % (ref 0–6)
ERYTHROCYTE [DISTWIDTH] IN BLOOD BY AUTOMATED COUNT: 12.3 % (ref 11.6–15.1)
GFR SERPL CREATININE-BSD FRML MDRD: 93 ML/MIN/1.73SQ M
GLUCOSE SERPL-MCNC: 174 MG/DL (ref 65–140)
GLUCOSE SERPL-MCNC: 178 MG/DL (ref 65–140)
GLUCOSE SERPL-MCNC: 179 MG/DL (ref 65–140)
GLUCOSE SERPL-MCNC: 192 MG/DL (ref 65–140)
GLUCOSE SERPL-MCNC: 241 MG/DL (ref 65–140)
HCT VFR BLD AUTO: 29.6 % (ref 34.8–46.1)
HGB BLD-MCNC: 9.8 G/DL (ref 11.5–15.4)
IMM GRANULOCYTES # BLD AUTO: 0.03 THOUSAND/UL (ref 0–0.2)
IMM GRANULOCYTES NFR BLD AUTO: 0 % (ref 0–2)
LYMPHOCYTES # BLD AUTO: 2.89 THOUSANDS/ΜL (ref 0.6–4.47)
LYMPHOCYTES NFR BLD AUTO: 35 % (ref 14–44)
MCH RBC QN AUTO: 32.3 PG (ref 26.8–34.3)
MCHC RBC AUTO-ENTMCNC: 33.1 G/DL (ref 31.4–37.4)
MCV RBC AUTO: 98 FL (ref 82–98)
MONOCYTES # BLD AUTO: 0.6 THOUSAND/ΜL (ref 0.17–1.22)
MONOCYTES NFR BLD AUTO: 7 % (ref 4–12)
NEUTROPHILS # BLD AUTO: 4.3 THOUSANDS/ΜL (ref 1.85–7.62)
NEUTS SEG NFR BLD AUTO: 53 % (ref 43–75)
NRBC BLD AUTO-RTO: 0 /100 WBCS
PLATELET # BLD AUTO: 229 THOUSANDS/UL (ref 149–390)
PMV BLD AUTO: 12.2 FL (ref 8.9–12.7)
POTASSIUM SERPL-SCNC: 4.8 MMOL/L (ref 3.5–5.3)
RBC # BLD AUTO: 3.03 MILLION/UL (ref 3.81–5.12)
SODIUM SERPL-SCNC: 134 MMOL/L (ref 136–145)
WBC # BLD AUTO: 8.22 THOUSAND/UL (ref 4.31–10.16)

## 2019-09-24 PROCEDURE — 94760 N-INVAS EAR/PLS OXIMETRY 1: CPT

## 2019-09-24 PROCEDURE — 97535 SELF CARE MNGMENT TRAINING: CPT

## 2019-09-24 PROCEDURE — 97163 PT EVAL HIGH COMPLEX 45 MIN: CPT

## 2019-09-24 PROCEDURE — 94762 N-INVAS EAR/PLS OXIMTRY CONT: CPT

## 2019-09-24 PROCEDURE — 99232 SBSQ HOSP IP/OBS MODERATE 35: CPT | Performed by: SURGERY

## 2019-09-24 PROCEDURE — G8978 MOBILITY CURRENT STATUS: HCPCS

## 2019-09-24 PROCEDURE — G8979 MOBILITY GOAL STATUS: HCPCS

## 2019-09-24 PROCEDURE — 82948 REAGENT STRIP/BLOOD GLUCOSE: CPT

## 2019-09-24 PROCEDURE — 85025 COMPLETE CBC W/AUTO DIFF WBC: CPT | Performed by: PHYSICIAN ASSISTANT

## 2019-09-24 PROCEDURE — 80048 BASIC METABOLIC PNL TOTAL CA: CPT | Performed by: PHYSICIAN ASSISTANT

## 2019-09-24 PROCEDURE — 71046 X-RAY EXAM CHEST 2 VIEWS: CPT

## 2019-09-24 PROCEDURE — 71045 X-RAY EXAM CHEST 1 VIEW: CPT

## 2019-09-24 PROCEDURE — 97530 THERAPEUTIC ACTIVITIES: CPT

## 2019-09-24 RX ADMIN — Medication: at 09:54

## 2019-09-24 RX ADMIN — SENNOSIDES 17.2 MG: 8.6 TABLET, FILM COATED ORAL at 09:50

## 2019-09-24 RX ADMIN — FLUTICASONE FUROATE AND VILANTEROL TRIFENATATE 1 PUFF: 100; 25 POWDER RESPIRATORY (INHALATION) at 07:01

## 2019-09-24 RX ADMIN — GABAPENTIN 100 MG: 100 CAPSULE ORAL at 09:50

## 2019-09-24 RX ADMIN — DOCUSATE SODIUM 100 MG: 100 CAPSULE, LIQUID FILLED ORAL at 17:41

## 2019-09-24 RX ADMIN — INSULIN LISPRO 2 UNITS: 100 INJECTION, SOLUTION INTRAVENOUS; SUBCUTANEOUS at 17:41

## 2019-09-24 RX ADMIN — THIAMINE HCL TAB 100 MG 100 MG: 100 TAB at 09:50

## 2019-09-24 RX ADMIN — DOCUSATE SODIUM 100 MG: 100 CAPSULE, LIQUID FILLED ORAL at 09:50

## 2019-09-24 RX ADMIN — INSULIN LISPRO 3 UNITS: 100 INJECTION, SOLUTION INTRAVENOUS; SUBCUTANEOUS at 12:07

## 2019-09-24 RX ADMIN — HEPARIN SODIUM 5000 UNITS: 5000 INJECTION INTRAVENOUS; SUBCUTANEOUS at 05:10

## 2019-09-24 RX ADMIN — INSULIN LISPRO 1 UNITS: 100 INJECTION, SOLUTION INTRAVENOUS; SUBCUTANEOUS at 07:01

## 2019-09-24 RX ADMIN — HEPARIN SODIUM 5000 UNITS: 5000 INJECTION INTRAVENOUS; SUBCUTANEOUS at 14:07

## 2019-09-24 RX ADMIN — HYDROCHLOROTHIAZIDE 12.5 MG: 12.5 TABLET ORAL at 09:50

## 2019-09-24 RX ADMIN — AMLODIPINE BESYLATE 5 MG: 5 TABLET ORAL at 09:50

## 2019-09-24 RX ADMIN — PANTOPRAZOLE SODIUM 40 MG: 40 TABLET, DELAYED RELEASE ORAL at 05:10

## 2019-09-24 RX ADMIN — ACETAMINOPHEN 975 MG: 325 TABLET ORAL at 05:10

## 2019-09-24 RX ADMIN — GABAPENTIN 100 MG: 100 CAPSULE ORAL at 17:41

## 2019-09-24 RX ADMIN — INSULIN LISPRO 6 UNITS: 100 INJECTION, SOLUTION INTRAVENOUS; SUBCUTANEOUS at 07:01

## 2019-09-24 RX ADMIN — Medication 1 TABLET: at 09:50

## 2019-09-24 RX ADMIN — FOLIC ACID 1 MG: 1 TABLET ORAL at 09:50

## 2019-09-24 RX ADMIN — ACETAMINOPHEN 975 MG: 325 TABLET ORAL at 21:36

## 2019-09-24 RX ADMIN — INSULIN LISPRO 6 UNITS: 100 INJECTION, SOLUTION INTRAVENOUS; SUBCUTANEOUS at 17:41

## 2019-09-24 RX ADMIN — HEPARIN SODIUM 5000 UNITS: 5000 INJECTION INTRAVENOUS; SUBCUTANEOUS at 21:36

## 2019-09-24 RX ADMIN — ACETAMINOPHEN 975 MG: 325 TABLET ORAL at 14:07

## 2019-09-24 RX ADMIN — INSULIN LISPRO 6 UNITS: 100 INJECTION, SOLUTION INTRAVENOUS; SUBCUTANEOUS at 12:07

## 2019-09-24 NOTE — QUICK NOTE
Right sided chest tube removed without immediate sequelae  Oxygen saturations in low 90s pre- and post removal  Placed on 2 L NC prophylactically due to residual PTX   Will f/u post pull CXR at  4pm

## 2019-09-24 NOTE — PLAN OF CARE
Problem: OCCUPATIONAL THERAPY ADULT  Goal: Performs self-care activities at highest level of function for planned discharge setting  See evaluation for individualized goals  Description  Treatment Interventions: ADL retraining, Functional transfer training, Endurance training, Cognitive reorientation, Patient/family training, Equipment evaluation/education, Compensatory technique education, Energy conservation, Activityengagement          See flowsheet documentation for full assessment, interventions and recommendations  Note:   Limitation: Decreased ADL status, Decreased Safe judgement during ADL, Decreased endurance, Decreased self-care trans, Decreased high-level ADLs  Prognosis: Fair  Assessment: Patient participated in Skilled OT session this date with interventions consisting of functional transfers/functional mobility, toileting, activity tolerance-see assistance levels above   Patient agreeable to OT treatment session, upon arrival patient was found supine in bed  In comparison to previous session, patient with improvements in functional mobility, cognition, activity tolerance   Patient requiring frequent rest periods and ocassional safety reminders  Patient continues to be functioning below baseline level, occupational performance remains limited secondary to factors listed above and increased risk for falls and injury  From OT standpoint, recommendation at time of d/c would be Short Term Rehab  Patient to benefit from continued Occupational Therapy treatment while in the hospital to address deficits as defined above and maximize level of functional independence with ADLs and functional mobility        OT Discharge Recommendation: Short Term Rehab  OT - OK to Discharge: Yes(when medically cleared)

## 2019-09-24 NOTE — ASSESSMENT & PLAN NOTE
Lab Results   Component Value Date    HGBA1C 6 0 09/18/2019       Recent Labs     09/23/19  1613 09/23/19  2108 09/24/19  0510 09/24/19  1122   POCGLU 221* 156* 174* 241*       Blood Sugar Average: Last 72 hrs:  (P) 074 [id number]     - increased sliding scale and add on meal time humalog  - continue to monitor blood sugars  - diabetic diet

## 2019-09-24 NOTE — ASSESSMENT & PLAN NOTE
- right-sided rib fractures 6 through 9 on the right  - continue pulmonary toilet  - aggressive pulmonary toilet  - epidural placed on 09/23/2019; started on subcutaneous heparin status post epidural placement  - patient pain significantly improved and is achieving 750 on incentive spirometry

## 2019-09-24 NOTE — PROGRESS NOTES
Progress Note - Acute Pain Service    Makenna Busby 69 y o  female MRN: [id number]  Unit/Bed#: [facility] 814-01 Encounter: [id number]      Assessment:   Day 1 s/p epidural placement for traumatic hemothorax and rib fractures T6-9  Doing well  Incentive spirometry improved to 750 cc  Denies pain at rest  Has been subsequently able to participate in rehab  States she will soon ask to walk the halls  Plan:   Continue epidural at current settings  APS will continue to follow; please contact APS ( [phone]) with any further questions    Pain History  24 hour history: After placement of epidural, some initial hypotension, adjustment of dose, but has done well  24 hour opoid requirement: None since placement of epidural     Meds/Allergies   all current active meds have been reviewed    Allergies   Allergen Reactions    Bactrim [Sulfamethoxazole-Trimethoprim] Hives       Objective     Temp:  [97 4 °F (36 3 °C)-99 3 °F (37 4 °C)] 98 °F (36 7 °C)  HR:  [64-92] 64  Resp:  [16-17] 16  BP: ()/(40-75) 109/53    Physical Exam  Alert and oriented  Epidural site intact  Normal chest excursion  Adequate lower extremity power  Lab Results: I have personally reviewed pertinent labs  Imaging Studies: I have personally reviewed pertinent reports  EKG, Pathology, and Other Studies: I have personally reviewed pertinent reports        Levels of Care: Level 1 = 15 minutes

## 2019-09-24 NOTE — OCCUPATIONAL THERAPY NOTE
OccupationalTherapy Progress Note     Patient Name: Ko Spear  [id number] Date: 9/24/2019  Problem List  Principal Problem:    Closed fracture of multiple ribs of right side  Active Problems:    HTN (hypertension)    Diabetes mellitus ([address] )    Hyperlipidemia    Hemothorax, traumatic          09/24/19 1513   Restrictions/Precautions   Weight Bearing Precautions Per Order No   Other Precautions Multiple lines;Telemetry;O2;Fall Risk;Pain  (+chest tube, +epidural PCA pump, 2L 02)   Lifestyle   Autonomy I ADL's/IADL's, +RW/SPC occassionally for functional ambulation in house/community (pt states when she feels she needs AD), does not drive, family provides transportation   Reciprocal Relationships family-son/DIL   Service to Others retired   Intrinsic Gratification reading, yoga   Pain Assessment   Pain Assessment Snyder-Baker FACES   Snyder-Baker FACES Pain Rating 6   Pain Type Acute pain   Pain Location Rib cage   Hospital Pain Intervention(s) Repositioned; Ambulation/increased activity; Emotional support; Rest  (verbal cues to use PCA pump)   ADL   Where Assessed Chair   Eating Assistance 7  Independent   Grooming Assistance 5  Supervision/Setup   Grooming Deficit Setup; Wash/dry hands   Toileting Assistance  5  Supervision/Setup   Toileting Deficit Perineal hygiene  (seated)   Functional Standing Tolerance   Time 1 minute   Activity rest breaks with mobility   Bed Mobility   Supine to Sit 3  Moderate assistance   Additional items Assist x 1; Increased time required  (min a from bed with HOB elevated, mod a stretcher    Sit to Supine 4  Minimal assistance   Additional items LE management;Verbal cues   Transfers   Sit to Stand 4  Minimal assistance   Additional items Assist x 1   Stand to Sit 4  Minimal assistance   Additional items Assist x 1   Stand pivot 4  Minimal assistance   Additional items Assist x 1  (stretcher>chair)   Functional Mobility   Functional Mobility 4  Minimal assistance   Additional Comments min a x 1 with short distance functional mobility -bed>stretcher>bathroom   Additional items Rolling walker   Toilet Transfers   Toilet Transfer From Rolling walker   Toilet Transfer Type To and from   Toilet Transfer to Standard toilet   Toilet Transfer Technique Ambulating   Toilet Transfers Minimal assistance;Verbal cues  (+grab bar use)   Cognition   Overall Cognitive Status   (improved cognition this session)   Arousal/Participation Alert; Cooperative   Attention Attends with cues to redirect   Orientation Level Oriented X4   Memory Within functional limits   Following Commands Follows multistep commands with increased time or repetition  (increased ability to follow multi-step directives this sessiion)   Comments pt with increased processing time to [facility], forgetful at times to use pain pump   Activity Tolerance   Activity Tolerance Patient limited by pain; Patient limited by fatigue   Medical Staff Made Aware RN cleared pt for therapy   Assessment   Assessment Patient participated in Skilled OT session this date with interventions consisting of functional transfers/functional mobility, toileting, activity tolerance-see assistance levels above   Patient agreeable to OT treatment session, upon arrival patient was found supine in bed  In comparison to previous session, patient with improvements in functional mobility, cognition, activity tolerance   Patient requiring frequent rest periods and ocassional safety reminders  Patient continues to be functioning below baseline level, occupational performance remains limited secondary to factors listed above and increased risk for falls and injury  From OT standpoint, recommendation at time of d/c would be Short Term Rehab  Patient to benefit from continued Occupational Therapy treatment while in the hospital to address deficits as defined above and maximize level of functional independence with ADLs and functional mobility      Plan   Treatment Interventions ADL retraining;Functional transfer training; Endurance training;Patient/family training;Equipment evaluation/education; Compensatory technique education; Activityengagement   Goal Expiration Date 10/07/19   OT Frequency 3-5x/wk   Recommendation   OT Discharge Recommendation Short Term Rehab   OT - OK to Discharge Yes  (when medically cleared)     Yariel Perez MOT, OTR/L

## 2019-09-24 NOTE — PHYSICAL THERAPY NOTE
PHYSICAL THERAPY Evaluation NOTE    Patient Name: Beatrix Peabody  HOGQB'W Date: 9/24/2019     AGE:   69 y o  Mrn:   [id number]  ADMIT DX:  Rib pain [R07 81]    Past Medical History:   Diagnosis Date    Asthma     Diabetes mellitus ([facility] [address] )     Hypertension      Length Of Stay: 3  PHYSICAL THERAPY EVALUATION :    09/24/19 1033   Note Type   Note type Eval only   Pain Assessment   Pain Assessment 0-10   Pain Score 2   Pain Type Acute pain   Pain Location Rib cage   Pain Orientation Left   Home Living   Type of Home House  (2 SH, 4 RADHA)   Home Layout Two level   Bathroom Shower/Tub Walk-in shower   Bathroom Toilet Raised   Bathroom Equipment Grab bars in shower   Bathroom Accessibility Accessible   Home Equipment Walker;Cane   Additional Comments Pt  lives with family in a 2 SH, with 4 RADHA   Prior Function   Level of [place] Independent with ADLs and functional mobility   Lives With [employer] Help From Family   ADL Assistance Independent   IADLs Independent   Falls in the last 6 months 1 to 4   Vocational Retired   Comments PTA, Pt  reports INDEP with ADLs, IADLs and functional mobility with RW as AD  Restrictions/Precautions   Weight Bearing Precautions Per Order No   Other Precautions Multiple lines;Telemetry;O2;Fall Risk;Pain  (+chest tube, +epidural PCP pump, 2L 02)   General   Additional Pertinent History Pt  Is a 70 yo F who presents as a trauma transfer from [address] [place] [address]  Pt  Does not know how she injured herself, however admits to drinking upon evaluation  Dx: multiple rib Fx, comorbidities include htn, Diabetes Mellitus, HLD, Hemothorax traumatic, Dyslipidemia, Hearing loss, Mild persistent asthma, NGOC, and Pyuria      Family/Caregiver Present No   Cognition   Overall Cognitive Status WFL   Arousal/Participation Cooperative   Attention Attends with cues to redirect   Orientation Level Oriented X4   Memory Within functional limits   Following Commands Follows multistep commands inconsistently   Comments Pt  was identified with full name and birthdate   RLE Assessment   RLE Assessment   (functionally > 3+/5)   LLE Assessment   LLE Assessment   (functionally > 3+/5)   Coordination   Movements are Fluid and Coordinated 1   Sensation WFL   Light Touch   RLE Light Touch Grossly intact   LLE Light Touch Grossly intact   Bed Mobility   Additional Comments unable to assess Pt  seated OOB in chair prior to PT session   Transfers   Sit to Stand 5  Supervision   Additional items Assist x 1; Increased time required;Verbal cues  (for hand placement and sequencing)   Stand to Sit 5  Supervision   Additional items Assist x 1; Impulsive;Verbal cues  (to reach back to control descent)   Ambulation/Elevation   Gait pattern Improper Weight shift;Narrow LANCE; Forward Flexion;Decreased foot clearance;Shuffling; Inconsistent breanna; Short stride; Step to;Excessively slow   Gait Assistance 5  Supervision   Additional items Assist x 1;Verbal cues  (for hand placement and sequencing)   Assistive Device Rolling walker   Distance 120'x1   Balance   Static Sitting Fair   Dynamic Sitting Fair   Static Standing Fair -   Dynamic Standing Fair -   Ambulatory Fair -   Endurance Deficit   Endurance Deficit Yes   Endurance Deficit Description postural and gait degradation noted with fatigue   Activity Tolerance   Activity Tolerance Patient limited by fatigue   Medical Staff Made Aware Spoke to CM    Nurse Made Aware Spoke to RN   Assessment   Prognosis Good   Problem List Decreased strength;Decreased range of motion;Decreased endurance; Impaired balance;Decreased mobility; Decreased coordination;Decreased safety awareness;Pain;Decreased skin integrity   Assessment Pt  Is a 68 yo F who presents as a trauma transfer from [address] [place] [address]  Pt  Does not know how she injured herself, however admits to drinking upon evaluation   Dx: multiple rib Fx, order placed for PT eval and tx, w/ activity order of up w/ A  pt presents w/ comorbidities of  htn, Diabetes Mellitus, HLD, Hemothorax traumatic, Dyslipidemia, Hearing loss, Mild persistent asthma, NGOC, and Pyuria and personal factors of inaccessible home environment, mobilizing w/ assistive device, stair(s) to enter home, inability to navigate community distances, inability to navigate level surfaces w/o external assistance, unable to perform dynamic tasks in community, unable to perform physical activity, inability to perform IADLs, inability to perform ADLs and inability to live alone  pt presents w/ pain, weakness, decreased ROM, decreased endurance, impaired balance, gait deviations, decreased safety awareness, impaired judgment, fall risk and impaired skin integrity  these impairments are evident in findings from physical examination (weakness, impaired coordination and impaired skin integrity), mobility assessment (need for supervision assist w/ all phases of mobility when usually mobilizing independently, tolerance to only 120 feet of ambulation and need for cueing for mobility technique), and Barthel Index: 55/100  pt needed input for task focus and mobility technique  pt is at risk for falls due to physical and safety awareness deficits  pt's clinical presentation is unstable/unpredictable (evident in need for assist w/ all phases of mobility when usually mobilizing independently, tolerance to only 120 feet of ambulation, pain impacting overall mobility status, declining oxygen saturation w/ low level of activity, need for input for mobility technique, need for input for task focus and mobility technique and need for input for mobility technique/safety)  pt needs inpatient PT tx to improve mobility deficits  discharge recommendation is for home PT and home w/ family support to reduce fall risk and maximize level of functional independence      Goals   Patient Goals to get home   STG Expiration Date 10/04/19   Short Term Goal #1 pt will: Increase bilateral LE strength 1/2 grade to facilitate independent mobility, Perform all bed mobility tasks w/ supervision to decrease fall risk factors, Perform all transfers modified independent to improve independence, Ambulate 250 ft  with roller walker w/ supervision w/o LOB, Navigate 4 stairs w/ supervision with unilateral handrail to facilitate return to previous living environment, Increase all balance 1/2 grade to decrease risk for falls and Improve Barthel Index score to 80 or greater to facilitate independence   PT Treatment Day 0   Plan   Treatment/Interventions Functional transfer training;LE strengthening/ROM; Therapeutic exercise; Endurance training;Elevations; Patient/family training;Equipment eval/education; Bed mobility;Gait training;Spoke to nursing;Spoke to case management; Family   PT Frequency   (3-6x/week)   Recommendation   Recommendation Home PT; Home with family support   Equipment Recommended Walker   PT - OK to Discharge Yes   Additional Comments when medically approrpiate   Barthel Index   Feeding 10   Bathing 0   Grooming Score 0   Dressing Score 5   Bladder Score 5   Bowels Score 5   Toilet Use Score 5   Transfers (Bed/Chair) Score 10   Mobility (Level Surface) Score 10   Stairs Score 5   Barthel Index Score 55     Skilled PT recommended while in hospital and upon DC to progress pt toward treatment goals       Desi Shukla, PT, DPT 9/24/2019

## 2019-09-24 NOTE — PROGRESS NOTES
Progress Note Racquel Sera 1945, 69 y o  female MRN: [id number]    Unit/Bed#: [facility] 814-01 Encounter: [id number]    Primary Care Provider: Artemio Blackmon MD   Date and time admitted to hospital: 9/21/2019  2:24 AM        Hemothorax, traumatic  Assessment & Plan  - output 275ml per 24 hours  - CXR this AM with side hole in fat layer  - chest tube removed this AM by Justen Chu PA-C  - follow up CXR in AM    Diabetes mellitus [facility])  Assessment & Plan  Lab Results   Component Value Date    HGBA1C 6 0 09/18/2019       Recent Labs     09/23/19  1613 09/23/19  2108 09/24/19  0510 09/24/19  1122   POCGLU 221* 156* 174* 241*       Blood Sugar Average: Last 72 hrs:  (P) 009 [id number]     - increased sliding scale and add on meal time humalog  - continue to monitor blood sugars  - diabetic diet    * Closed fracture of multiple ribs of right side  Assessment & Plan  - right-sided rib fractures 6 through 9 on the right  - continue pulmonary toilet  - aggressive pulmonary toilet  - epidural placed on 09/23/2019; started on subcutaneous heparin status post epidural placement  - patient pain significantly improved and is achieving 750 on incentive spirometry           Bedside rounds completed with nurse Ileana Wayne  Disposition: pending progress      SUBJECTIVE:  Chief Complaint: rib pain    Subjective: Rib pain, well controlled    Denies SOB      OBJECTIVE:     Meds/Allergies     Current Facility-Administered Medications:     acetaminophen (TYLENOL) tablet 975 mg, 975 mg, Oral, Q8H [address], Kathy Mancini DO, 975 mg at 09/24/19 0510    albuterol inhalation solution 2 5 mg, 2 5 mg, Nebulization, Q4H PRN, Yaya Bolden DO, 2 5 mg at 09/23/19 0825    amLODIPine (NORVASC) tablet 5 mg, 5 mg, Oral, Daily, Arthur Trotter PA-C, 5 mg at 09/24/19 0950    docusate sodium (COLACE) capsule 100 mg, 100 mg, Oral, BID, Kathy Mancini DO, 100 mg at 09/24/19 0950    fluticasone-vilanterol (BREO ELLIPTA) 100-25 mcg/inh inhaler 1 puff, 1 puff, Inhalation, Daily, Melvin Guevara PA-C, 1 puff at 24/65/00 2372    folic acid (FOLVITE) tablet 1 mg, 1 mg, Oral, Daily, Kathy Ferreraan, DO, 1 mg at 09/24/19 0950    gabapentin (NEURONTIN) capsule 100 mg, 100 mg, Oral, BID, Kathy TORRES Addis, DO, 100 mg at 09/24/19 0950    heparin (porcine) subcutaneous injection 5,000 Units, 5,000 Units, Subcutaneous, Q8H [address], Melvin Guevara PA-C, 5,000 Units at 09/24/19 0510    hydrochlorothiazide (HYDRODIURIL) tablet 12 5 mg, 12 5 mg, Oral, Daily, Melvin Guevara PA-C, 12 5 mg at 09/24/19 0950    HYDROmorphone (DILAUDID) injection 0 2 mg, 0 2 mg, Intravenous, Q3H PRN, Trixie Mcknight DO, 0 2 mg at 09/23/19 0539    insulin lispro (HumaLOG) 100 units/mL subcutaneous injection 1-6 Units, 1-6 Units, Subcutaneous, TID With Meals, 3 Units at 09/24/19 1207 **AND** Fingerstick Glucose (POCT), , , 4x Daily AC and at bedtime, Melvin Guevara PA-C    insulin lispro (HumaLOG) 100 units/mL subcutaneous injection 6 Units, 6 Units, Subcutaneous, TID With Meals, Melvin Guevara PA-C, 6 Units at 09/24/19 1207    methocarbamol (ROBAXIN) tablet 500 mg, 500 mg, Oral, Q6H PRN, Claudene Creeks, MD, 500 mg at 09/23/19 0846    multivitamin-minerals (CENTRUM) tablet 1 tablet, 1 tablet, Oral, Daily, Kathy Mancini, DO, 1 tablet at 09/24/19 0950    ondansetron (ZOFRAN) injection 4 mg, 4 mg, Intravenous, Q6H PRN, Kathy Ferreraan, DO, 4 mg at 09/21/19 1538    oxyCODONE (ROXICODONE) IR tablet 2 5 mg, 2 5 mg, Oral, Q4H PRN, Kathy Mancini DO    oxyCODONE (ROXICODONE) IR tablet 5 mg, 5 mg, Oral, Q4H PRN, Kathy Mancini DO, 5 mg at 09/23/19 0847    pantoprazole (PROTONIX) EC tablet 40 mg, 40 mg, Oral, Early Morning, Melvin Guevara PA-C, 40 mg at 09/24/19 0510    ropivacaine 0 1% and fentaNYL 2 mcg/mL PCEA, , Epidural, Continuous, Maral Quijano MD    [facility]) tablet 17 2 mg, 2 tablet, Oral, Daily, Kathy Mancini DO, 17 2 mg at 09/24/19 1751    thiamine (VITAMIN B1) tablet 100 mg, 100 mg, Oral, Daily, Kathy Mancini DO, 100 mg at 09/24/19 0950     Vitals:   Vitals:    09/24/19 1322   BP:    Pulse:    Resp:    Temp:    SpO2: 96%       Intake/Output:  I/O       09/22 0701 - 09/23 0700 09/23 0701 - 09/24 0700 09/24 0701 - 09/25 0700    P  O  1440 840     I V  (mL/kg)  174 1 (2 7)     IV Piggyback  500     Total Intake(mL/kg) 1440 (22 6) 1514 1 (23 8)     Urine (mL/kg/hr) 1715 (1 1) 2912 (1 9) 1150 (2 8)    Stool 0      Chest Tube 100 275     Total Output 1815 3187 1150    Net -375 -1672 9 -1150           Unmeasured Urine Occurrence 1 x      Unmeasured Stool Occurrence 0 x             Nutrition/GI Proph/Bowel Reg: Colace, Senokot    Physical Exam:   Constitution: patient lying in bed, appears comfortable  HEENT: CLARIBEL, EOMI  CV: regular rate and rhythm, +2 DP pulses  Pulm: CTA, no wheezes, rhonchi or crackles, unlabored, equal bilaterally  Abd: soft, nontender, nondistended, active bowel sounds  Extremities: moves all extremities, equal strength, no edema, no skin breakdown  Neuro: A&O, no focal deficits  Skin: no rash or breakdown, warm          Invasive Devices     Peripheral Intravenous Line            Peripheral IV 09/23/19 Right Forearm 1 day          Epidural Line            Epidural Catheter 09/23/19 1 day          Drain            Chest Tube 1 Right Pleural 2 days    External Urinary Catheter 1 day                 Lab Results: Results: I have personally reviewed pertinent reports  Imaging/EKG Studies: Results: I have personally reviewed pertinent reports      Other Studies: n/a  VTE Prophylaxis: Heparin

## 2019-09-24 NOTE — SOCIAL WORK
Pt recommended for home therapies and a rolling walker  CM discussed this with pt's dtr  Referral was placed for walker  [facility] referrals sent   [facility] accepted

## 2019-09-24 NOTE — PLAN OF CARE
Problem: PHYSICAL THERAPY ADULT  Goal: Performs mobility at highest level of function for planned discharge setting  See evaluation for individualized goals  Description  Treatment/Interventions: Functional transfer training, LE strengthening/ROM, Therapeutic exercise, Endurance training, Elevations, Patient/family training, Equipment eval/education, Bed mobility, Gait training, Spoke to nursing, Spoke to case management, Family  Equipment Recommended: Deng Gone       See flowsheet documentation for full assessment, interventions and recommendations  Outcome: Progressing  Note:   Prognosis: Good  Problem List: Decreased strength, Decreased range of motion, Decreased endurance, Impaired balance, Decreased mobility, Decreased coordination, Decreased safety awareness, Pain, Decreased skin integrity  Assessment: Pt  Is a 70 yo F who presents as a trauma transfer from [address] [place] [address]  Pt  Does not know how she injured herself, however admits to drinking upon evaluation  Dx: multiple rib Fx, order placed for PT eval and tx, w/ activity order of up w/ A  pt presents w/ comorbidities of  htn, Diabetes Mellitus, HLD, Hemothorax traumatic, Dyslipidemia, Hearing loss, Mild persistent asthma, NGOC, and Pyuria and personal factors of inaccessible home environment, mobilizing w/ assistive device, stair(s) to enter home, inability to navigate community distances, inability to navigate level surfaces w/o external assistance, unable to perform dynamic tasks in community, unable to perform physical activity, inability to perform IADLs, inability to perform ADLs and inability to live alone  pt presents w/ pain, weakness, decreased ROM, decreased endurance, impaired balance, gait deviations, decreased safety awareness, impaired judgment, fall risk and impaired skin integrity   these impairments are evident in findings from physical examination (weakness, impaired coordination and impaired skin integrity), mobility assessment (need for supervision assist w/ all phases of mobility when usually mobilizing independently, tolerance to only 120 feet of ambulation and need for cueing for mobility technique), and Barthel Index: 55/100  pt needed input for task focus and mobility technique  pt is at risk for falls due to physical and safety awareness deficits  pt's clinical presentation is unstable/unpredictable (evident in need for assist w/ all phases of mobility when usually mobilizing independently, tolerance to only 120 feet of ambulation, pain impacting overall mobility status, declining oxygen saturation w/ low level of activity, need for input for mobility technique, need for input for task focus and mobility technique and need for input for mobility technique/safety)  pt needs inpatient PT tx to improve mobility deficits  discharge recommendation is for home PT and home w/ family support to reduce fall risk and maximize level of functional independence  Recommendation: Home PT, Home with family support     PT - OK to Discharge: Yes    See flowsheet documentation for full assessment

## 2019-09-24 NOTE — ASSESSMENT & PLAN NOTE
- output 275ml per 24 hours  - CXR this AM with side hole in fat layer  - chest tube removed this AM by Giulia Kam PA-C  - follow up CXR in AM

## 2019-09-25 ENCOUNTER — APPOINTMENT (INPATIENT)
Dept: RADIOLOGY | Facility: HOSPITAL | Age: 74
DRG: 183 | End: 2019-09-25
Payer: COMMERCIAL

## 2019-09-25 LAB
GLUCOSE SERPL-MCNC: 171 MG/DL (ref 65–140)
GLUCOSE SERPL-MCNC: 174 MG/DL (ref 65–140)
GLUCOSE SERPL-MCNC: 188 MG/DL (ref 65–140)
GLUCOSE SERPL-MCNC: 220 MG/DL (ref 65–140)
GLUCOSE SERPL-MCNC: 247 MG/DL (ref 65–140)

## 2019-09-25 PROCEDURE — 97535 SELF CARE MNGMENT TRAINING: CPT

## 2019-09-25 PROCEDURE — 82948 REAGENT STRIP/BLOOD GLUCOSE: CPT

## 2019-09-25 PROCEDURE — 94668 MNPJ CHEST WALL SBSQ: CPT

## 2019-09-25 PROCEDURE — 99232 SBSQ HOSP IP/OBS MODERATE 35: CPT | Performed by: SURGERY

## 2019-09-25 PROCEDURE — 97530 THERAPEUTIC ACTIVITIES: CPT

## 2019-09-25 PROCEDURE — 94762 N-INVAS EAR/PLS OXIMTRY CONT: CPT

## 2019-09-25 PROCEDURE — 99232 SBSQ HOSP IP/OBS MODERATE 35: CPT | Performed by: INTERNAL MEDICINE

## 2019-09-25 PROCEDURE — 71045 X-RAY EXAM CHEST 1 VIEW: CPT

## 2019-09-25 RX ORDER — POLYETHYLENE GLYCOL 3350 17 G/17G
17 POWDER, FOR SOLUTION ORAL DAILY PRN
Status: DISCONTINUED | OUTPATIENT
Start: 2019-09-25 | End: 2019-09-29 | Stop reason: HOSPADM

## 2019-09-25 RX ORDER — OLANZAPINE 5 MG/1
2.5 TABLET, ORALLY DISINTEGRATING ORAL ONCE
Status: DISCONTINUED | OUTPATIENT
Start: 2019-09-25 | End: 2019-09-29 | Stop reason: HOSPADM

## 2019-09-25 RX ADMIN — SENNOSIDES 17.2 MG: 8.6 TABLET, FILM COATED ORAL at 08:06

## 2019-09-25 RX ADMIN — INSULIN LISPRO 6 UNITS: 100 INJECTION, SOLUTION INTRAVENOUS; SUBCUTANEOUS at 17:17

## 2019-09-25 RX ADMIN — INSULIN LISPRO 1 UNITS: 100 INJECTION, SOLUTION INTRAVENOUS; SUBCUTANEOUS at 17:17

## 2019-09-25 RX ADMIN — ACETAMINOPHEN 975 MG: 325 TABLET ORAL at 13:03

## 2019-09-25 RX ADMIN — HYDROCHLOROTHIAZIDE 12.5 MG: 12.5 TABLET ORAL at 08:06

## 2019-09-25 RX ADMIN — ACETAMINOPHEN 975 MG: 325 TABLET ORAL at 05:02

## 2019-09-25 RX ADMIN — INSULIN LISPRO 1 UNITS: 100 INJECTION, SOLUTION INTRAVENOUS; SUBCUTANEOUS at 07:49

## 2019-09-25 RX ADMIN — INSULIN LISPRO 3 UNITS: 100 INJECTION, SOLUTION INTRAVENOUS; SUBCUTANEOUS at 11:32

## 2019-09-25 RX ADMIN — HEPARIN SODIUM 5000 UNITS: 5000 INJECTION INTRAVENOUS; SUBCUTANEOUS at 21:28

## 2019-09-25 RX ADMIN — HEPARIN SODIUM 5000 UNITS: 5000 INJECTION INTRAVENOUS; SUBCUTANEOUS at 05:02

## 2019-09-25 RX ADMIN — FOLIC ACID 1 MG: 1 TABLET ORAL at 08:06

## 2019-09-25 RX ADMIN — INSULIN LISPRO 6 UNITS: 100 INJECTION, SOLUTION INTRAVENOUS; SUBCUTANEOUS at 11:33

## 2019-09-25 RX ADMIN — FLUTICASONE FUROATE AND VILANTEROL TRIFENATATE 1 PUFF: 100; 25 POWDER RESPIRATORY (INHALATION) at 08:08

## 2019-09-25 RX ADMIN — Medication 1 TABLET: at 08:06

## 2019-09-25 RX ADMIN — PANTOPRAZOLE SODIUM 40 MG: 40 TABLET, DELAYED RELEASE ORAL at 05:02

## 2019-09-25 RX ADMIN — ACETAMINOPHEN 975 MG: 325 TABLET ORAL at 21:28

## 2019-09-25 RX ADMIN — GABAPENTIN 100 MG: 100 CAPSULE ORAL at 08:06

## 2019-09-25 RX ADMIN — DOCUSATE SODIUM 100 MG: 100 CAPSULE, LIQUID FILLED ORAL at 08:08

## 2019-09-25 RX ADMIN — HEPARIN SODIUM 5000 UNITS: 5000 INJECTION INTRAVENOUS; SUBCUTANEOUS at 13:02

## 2019-09-25 RX ADMIN — Medication: at 09:55

## 2019-09-25 RX ADMIN — AMLODIPINE BESYLATE 5 MG: 5 TABLET ORAL at 08:06

## 2019-09-25 RX ADMIN — THIAMINE HCL TAB 100 MG 100 MG: 100 TAB at 08:08

## 2019-09-25 RX ADMIN — DOCUSATE SODIUM 100 MG: 100 CAPSULE, LIQUID FILLED ORAL at 17:18

## 2019-09-25 RX ADMIN — GABAPENTIN 100 MG: 100 CAPSULE ORAL at 17:18

## 2019-09-25 RX ADMIN — INSULIN LISPRO 6 UNITS: 100 INJECTION, SOLUTION INTRAVENOUS; SUBCUTANEOUS at 07:49

## 2019-09-25 NOTE — ASSESSMENT & PLAN NOTE
- output 275ml per 24 hours  - CXR this AM with side hole in fat layer  - chest tube removed 9/24  - follow up CXR   - O2 sats dip into the 80's off O2    Attempt to wean

## 2019-09-25 NOTE — PLAN OF CARE
Problem: Potential for Falls  Goal: Patient will remain free of falls  Description  INTERVENTIONS:  - Assess patient frequently for physical needs  -  Identify cognitive and physical deficits and behaviors that affect risk of falls    -  [place] fall precautions as indicated by assessment   - Educate patient/family on patient safety including physical limitations  - Instruct patient to call for assistance with activity based on assessment  - Modify environment to reduce risk of injury  - Consider OT/PT consult to assist with strengthening/mobility  Outcome: Progressing     Problem: PAIN - ADULT  Goal: Verbalizes/displays adequate comfort level or baseline comfort level  Description  Interventions:  - Encourage patient to monitor pain and request assistance  - Assess pain using appropriate pain scale  - Administer analgesics based on type and severity of pain and evaluate response  - Implement non-pharmacological measures as appropriate and evaluate response  - Consider cultural and social influences on pain and pain management  - Notify physician/advanced practitioner if interventions unsuccessful or patient reports new pain  Outcome: Progressing     Problem: SAFETY ADULT  Goal: Maintain or return to baseline ADL function  Description  INTERVENTIONS:  -  Assess patient's ability to carry out ADLs; assess patient's baseline for ADL function and identify physical deficits which impact ability to perform ADLs (bathing, care of mouth/teeth, toileting, grooming, dressing, etc )  - Assess/evaluate cause of self-care deficits   - Assess range of motion  - Assess patient's mobility; develop plan if impaired  - Assess patient's need for assistive devices and provide as appropriate  - Encourage maximum independence but intervene and supervise when necessary  - Involve family in performance of ADLs  - Assess for home care needs following discharge   - Consider OT consult to assist with ADL evaluation and planning for discharge  - Provide patient education as appropriate  Outcome: Progressing  Goal: Maintain or return mobility status to optimal level  Description  INTERVENTIONS:  - Assess patient's baseline mobility status (ambulation, transfers, stairs, etc )    - Identify cognitive and physical deficits and behaviors that affect mobility  - Identify mobility aids required to assist with transfers and/or ambulation (gait belt, sit-to-stand, lift, walker, cane, etc )  - [place] fall precautions as indicated by assessment  - Record patient progress and toleration of activity level on Mobility SBAR; progress patient to next Phase/Stage  - Instruct patient to call for assistance with activity based on assessment  - Consider rehabilitation consult to assist with strengthening/weightbearing, etc   Outcome: Progressing     Problem: DISCHARGE PLANNING  Goal: Discharge to home or other facility with appropriate resources  Description  INTERVENTIONS:  - Identify barriers to discharge w/patient and caregiver  - Arrange for needed discharge resources and transportation as appropriate  - Identify discharge learning needs (meds, wound care, etc )  - Arrange for interpretive services to assist at discharge as needed  - Refer to Case Management Department for coordinating discharge planning if the patient needs post-hospital services based on physician/advanced practitioner order or complex needs related to functional status, cognitive ability, or social support system  Outcome: Progressing     Problem: Knowledge Deficit  Goal: Patient/family/caregiver demonstrates understanding of disease process, treatment plan, medications, and discharge instructions  Description  Complete learning assessment and assess knowledge base    Interventions:  - Provide teaching at level of understanding  - Provide teaching via preferred learning methods  Outcome: Progressing     Problem: Prexisting or High Potential for Compromised Skin Integrity  Goal: Skin integrity is maintained or improved  Description  INTERVENTIONS:  - Identify patients at risk for skin breakdown  - Assess and monitor skin integrity  - Assess and monitor nutrition and hydration status  - Monitor labs   - Assess for incontinence   - Turn and reposition patient  - Assist with mobility/ambulation  - Relieve pressure over bony prominences  - Avoid friction and shearing  - Provide appropriate hygiene as needed including keeping skin clean and dry  - Evaluate need for skin moisturizer/barrier cream  - Collaborate with interdisciplinary team   - Patient/family teaching  - Consider wound care consult   Outcome: Progressing

## 2019-09-25 NOTE — RESPIRATORY THERAPY NOTE
Resp Care      09/25/19 0737   Respiratory Protocol   Protocol Initiated? Yes   Protocol Selection Airway Clearance   Language Barrier? No   Medical & Social History Reviewed? Yes   Diagnostic Studies Reviewed? Yes   Physical Assessment Performed? Yes   Respiratory Plan Discontinue Protocol   Airway Clearance Plan Incentive Spirometer   Respiratory Assessment   Assessment Type Assess only   General Appearance Awake; Alert   Respiratory Pattern Normal   Chest Assessment Chest expansion symmetrical   Cough Dry;Weak   Resp Comments Pt reassessed for ACP  Pt reaching consistant 750 on IS and able to preform independantly  BS clear, CXR reveals a healing pneumothorax  Will d/c protocol but keep IS order and leave flutter at bedside

## 2019-09-25 NOTE — RESPIRATORY THERAPY NOTE
Pt refuses flutter valve and states it doesnt help  Breath sounds are clear/diminished and pt has a dry nonproductive cough  Her CXR from today shows a sm right pneumo, small bilateral pleural effusions and a RLL opacity likely atelectasis  IS performed with pt at this time  Will DC flutter but continue to encourage IS and albuterol prn per patient home therapy

## 2019-09-25 NOTE — PLAN OF CARE
Problem: Potential for Falls  Goal: Patient will remain free of falls  Description  INTERVENTIONS:  - Assess patient frequently for physical needs  -  Identify cognitive and physical deficits and behaviors that affect risk of falls    -  [place] fall precautions as indicated by assessment   - Educate patient/family on patient safety including physical limitations  - Instruct patient to call for assistance with activity based on assessment  - Modify environment to reduce risk of injury  - Consider OT/PT consult to assist with strengthening/mobility  Outcome: Progressing     Problem: PAIN - ADULT  Goal: Verbalizes/displays adequate comfort level or baseline comfort level  Description  Interventions:  - Encourage patient to monitor pain and request assistance  - Assess pain using appropriate pain scale  - Administer analgesics based on type and severity of pain and evaluate response  - Implement non-pharmacological measures as appropriate and evaluate response  - Consider cultural and social influences on pain and pain management  - Notify physician/advanced practitioner if interventions unsuccessful or patient reports new pain  Outcome: Progressing     Problem: SAFETY ADULT  Goal: Maintain or return to baseline ADL function  Description  INTERVENTIONS:  -  Assess patient's ability to carry out ADLs; assess patient's baseline for ADL function and identify physical deficits which impact ability to perform ADLs (bathing, care of mouth/teeth, toileting, grooming, dressing, etc )  - Assess/evaluate cause of self-care deficits   - Assess range of motion  - Assess patient's mobility; develop plan if impaired  - Assess patient's need for assistive devices and provide as appropriate  - Encourage maximum independence but intervene and supervise when necessary  - Involve family in performance of ADLs  - Assess for home care needs following discharge   - Consider OT consult to assist with ADL evaluation and planning for discharge  - Provide patient education as appropriate  Outcome: Progressing  Goal: Maintain or return mobility status to optimal level  Description  INTERVENTIONS:  - Assess patient's baseline mobility status (ambulation, transfers, stairs, etc )    - Identify cognitive and physical deficits and behaviors that affect mobility  - Identify mobility aids required to assist with transfers and/or ambulation (gait belt, sit-to-stand, lift, walker, cane, etc )  - [place] fall precautions as indicated by assessment  - Record patient progress and toleration of activity level on Mobility SBAR; progress patient to next Phase/Stage  - Instruct patient to call for assistance with activity based on assessment  - Consider rehabilitation consult to assist with strengthening/weightbearing, etc   Outcome: Progressing     Problem: DISCHARGE PLANNING  Goal: Discharge to home or other facility with appropriate resources  Description  INTERVENTIONS:  - Identify barriers to discharge w/patient and caregiver  - Arrange for needed discharge resources and transportation as appropriate  - Identify discharge learning needs (meds, wound care, etc )  - Arrange for interpretive services to assist at discharge as needed  - Refer to Case Management Department for coordinating discharge planning if the patient needs post-hospital services based on physician/advanced practitioner order or complex needs related to functional status, cognitive ability, or social support system  Outcome: Progressing     Problem: Knowledge Deficit  Goal: Patient/family/caregiver demonstrates understanding of disease process, treatment plan, medications, and discharge instructions  Description  Complete learning assessment and assess knowledge base    Interventions:  - Provide teaching at level of understanding  - Provide teaching via preferred learning methods  Outcome: Progressing     Problem: Prexisting or High Potential for Compromised Skin Integrity  Goal: Skin integrity is maintained or improved  Description  INTERVENTIONS:  - Identify patients at risk for skin breakdown  - Assess and monitor skin integrity  - Assess and monitor nutrition and hydration status  - Monitor labs   - Assess for incontinence   - Turn and reposition patient  - Assist with mobility/ambulation  - Relieve pressure over bony prominences  - Avoid friction and shearing  - Provide appropriate hygiene as needed including keeping skin clean and dry  - Evaluate need for skin moisturizer/barrier cream  - Collaborate with interdisciplinary team   - Patient/family teaching  - Consider wound care consult   Outcome: Progressing

## 2019-09-25 NOTE — PROGRESS NOTES
Patient evaluated at approximately 0945 a m  Note placed retroactively    Progress Note - Acute Pain Service Regional Follow Up  David Carpio 69 y o  female MRN: [id number]  Unit/Bed#: [facility] 155-05 Encounter: [id number]    Assessment:   Patient is a 70 y/o F s/p fall without clear causality c/b multiple right displaced rib fractures and hemorthorax  Patient had thoracic epidural placed 9/23/19 with excellent analgesia  Patient had chest tube removed today without complication    Plan:   - Continue thoracic epidural with current settings ropivacaine 0 1% with fentanyl 2 mcg/mL 8 mL/hr continuous with 5 mL DD [id number] max 3 doses/hr  Patient does have some sparing but adequately covered with demand doses  - Will evaluate need for ongoing epidural tomorrow with consideration for removal tomorrow vs Friday  - Continue multimodal analgesia with acetaminophen 975 mg PO q8hrs, gabapentin 100 mg PO BID, and robaxin 500 mg PO q6hrs PRN for muscle spasm (consider scheduling if patient continues to favor right side after chest tube tape removal)  - Continue geriatric narcotic regimen with oxycodone 2 5 mg/5mg PO q4hrs PRN for moderate/severe pain  - Continue dilaudid 0 2 mg IV q3hrs for breakthrough pain    APS will continue to follow  Please call  / 1644 ( between [phone] and on weekends) with questions or concerns    Subjective:  Patient states her pain continues to be adequately controlled  She currently rates it a 3/10 and is not considerably worsened by deep breathing or movement  She is most bothered by the chest tube tape which extends on to her right breast and pulls with movement  She wished to have this addressed with the surgical team but understands chest tube may be removed today obviating the need  She denies any side effects from the medications  Mild headache  No nausea   Eating well    Meds/Allergies     Allergies   Allergen Reactions    Bactrim [Sulfamethoxazole-Trimethoprim] Hives       Objective Temp:  [97 7 °F (36 5 °C)-98 6 °F (37 °C)] 98 2 °F (36 8 °C)  HR:  [58-71] 71  Resp:  [16-17] 16  BP: ()/(63-83) 97/63    Physical Exam   Gen: NAD, in good spirits, sitting in chair eating, leaning somewhat to right side  CV: RRR  Pulm: Normal excursion, nonlabored, chest tube in place to wall suction at time of initial encounter (since removed)  Abd: Soft, NTND  Ext: B/l LE motor intact  Epidural site c/d/i, pump continuing to alarm obstruction though no obvious source in external tubing, cartridge replaced with apparent resolution of issue

## 2019-09-25 NOTE — PLAN OF CARE
Problem: Potential for Falls  Goal: Patient will remain free of falls  Description  INTERVENTIONS:  - Assess patient frequently for physical needs  -  Identify cognitive and physical deficits and behaviors that affect risk of falls    -  [place] fall precautions as indicated by assessment   - Educate patient/family on patient safety including physical limitations  - Instruct patient to call for assistance with activity based on assessment  - Modify environment to reduce risk of injury  - Consider OT/PT consult to assist with strengthening/mobility  Outcome: Progressing     Problem: PAIN - ADULT  Goal: Verbalizes/displays adequate comfort level or baseline comfort level  Description  Interventions:  - Encourage patient to monitor pain and request assistance  - Assess pain using appropriate pain scale  - Administer analgesics based on type and severity of pain and evaluate response  - Implement non-pharmacological measures as appropriate and evaluate response  - Consider cultural and social influences on pain and pain management  - Notify physician/advanced practitioner if interventions unsuccessful or patient reports new pain  Outcome: Progressing     Problem: SAFETY ADULT  Goal: Maintain or return to baseline ADL function  Description  INTERVENTIONS:  -  Assess patient's ability to carry out ADLs; assess patient's baseline for ADL function and identify physical deficits which impact ability to perform ADLs (bathing, care of mouth/teeth, toileting, grooming, dressing, etc )  - Assess/evaluate cause of self-care deficits   - Assess range of motion  - Assess patient's mobility; develop plan if impaired  - Assess patient's need for assistive devices and provide as appropriate  - Encourage maximum independence but intervene and supervise when necessary  - Involve family in performance of ADLs  - Assess for home care needs following discharge   - Consider OT consult to assist with ADL evaluation and planning for discharge  - Provide patient education as appropriate  Outcome: Progressing  Goal: Maintain or return mobility status to optimal level  Description  INTERVENTIONS:  - Assess patient's baseline mobility status (ambulation, transfers, stairs, etc )    - Identify cognitive and physical deficits and behaviors that affect mobility  - Identify mobility aids required to assist with transfers and/or ambulation (gait belt, sit-to-stand, lift, walker, cane, etc )  - [place] fall precautions as indicated by assessment  - Record patient progress and toleration of activity level on Mobility SBAR; progress patient to next Phase/Stage  - Instruct patient to call for assistance with activity based on assessment  - Consider rehabilitation consult to assist with strengthening/weightbearing, etc   Outcome: Progressing     Problem: DISCHARGE PLANNING  Goal: Discharge to home or other facility with appropriate resources  Description  INTERVENTIONS:  - Identify barriers to discharge w/patient and caregiver  - Arrange for needed discharge resources and transportation as appropriate  - Identify discharge learning needs (meds, wound care, etc )  - Arrange for interpretive services to assist at discharge as needed  - Refer to Case Management Department for coordinating discharge planning if the patient needs post-hospital services based on physician/advanced practitioner order or complex needs related to functional status, cognitive ability, or social support system  Outcome: Progressing     Problem: Knowledge Deficit  Goal: Patient/family/caregiver demonstrates understanding of disease process, treatment plan, medications, and discharge instructions  Description  Complete learning assessment and assess knowledge base    Interventions:  - Provide teaching at level of understanding  - Provide teaching via preferred learning methods  Outcome: Progressing     Problem: Prexisting or High Potential for Compromised Skin Integrity  Goal: Skin integrity is maintained or improved  Description  INTERVENTIONS:  - Identify patients at risk for skin breakdown  - Assess and monitor skin integrity  - Assess and monitor nutrition and hydration status  - Monitor labs   - Assess for incontinence   - Turn and reposition patient  - Assist with mobility/ambulation  - Relieve pressure over bony prominences  - Avoid friction and shearing  - Provide appropriate hygiene as needed including keeping skin clean and dry  - Evaluate need for skin moisturizer/barrier cream  - Collaborate with interdisciplinary team   - Patient/family teaching  - Consider wound care consult   Outcome: Progressing

## 2019-09-25 NOTE — RESTORATIVE TECHNICIAN NOTE
Restorative Specialist Mobility Note       Activity: Chair     Assistive Device: Other (Comment)(HHA x1)        Repositioned: Up in chair, Sitting              Anti-Embolism Device On:  Bilateral, Sequential compression devices, below knee

## 2019-09-25 NOTE — OCCUPATIONAL THERAPY NOTE
Occupational Therapy Treatment Note:       09/25/19 1605   Restrictions/Precautions   Weight Bearing Precautions Per Order No   Other Precautions Multiple lines;Pain;O2;Fall Risk  (epidual PCA pump, 3L O2)   Pain Assessment   Pain Score 4   Pain Type Acute pain   Pain Location Rib cage   Pain Orientation Right   ADL   Where Assessed Standing at sink   Grooming Assistance 5  Supervision/Setup   Grooming Deficit Wash/dry face   Grooming Comments standing at sink   LB Dressing Assistance 5  Supervision/Setup   LB Dressing Deficit Don/doff R sock; Don/doff L sock   LB Dressing Comments long sit in bed   Toileting Assistance  6  Modified independent   Toileting Deficit Grab bar use   Toileting Comments standard toilet   Functional Standing Tolerance   Time ~5 mins   Activity static standing   Comments RW level   Bed Mobility   Supine to Sit 5  Supervision   Additional items HOB elevated   Additional Comments OOB at end of session   Transfers   Sit to Stand 5  Supervision   Additional items Increased time required   Stand to Sit 5  Supervision   Additional items Increased time required   Stand pivot 5  Supervision   Additional items Increased time required   Toilet transfer 5  Supervision   Additional items Increased time required   Additional Comments RW level, assit to manage lines only   Functional Mobility   Functional Mobility 5  Supervision   Additional Comments greater than house hold distances, A to manage lines only    Additional items Rolling walker   Toilet Transfers   Toilet Transfer From Rolling walker   Toilet Transfer Type To and from   Toilet Transfer to Standard toilet   Toilet Transfer Technique Ambulating   Toilet Transfers Supervision   Toilet Transfers Comments increased time, grab bar use   Cognition   Overall Cognitive Status [facility]   Arousal/Participation Alert; Cooperative   Attention Attends with cues to redirect   Orientation Level Oriented X4   Memory Within functional limits   Following Commands Follows multistep commands inconsistently   Comments Upon discussing home set up and suppoort pt becomes tearful when discussing her family and requies emotional support  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 and not being able to be around to help them is upsetting to her however she reprorts she knows she has to gt better before she can take car of the house and others  Activity Tolerance   Activity Tolerance Patient tolerated treatment well   Medical Staff Made Aware NSG aware   Assessment   Assessment Pt was seen this date for OT tx session focusing on self care tasks, bed mobility, sit to stnad progressions, tranfers, funciontal mobility, enrgy conservation techqniues, fall prevention educaiton and overall activity tolerance  Pt presnts supine and ocmepltes previously mentioned tasks at documented assist levels, see above  Pt has progressed to S level and with increased time is able to compelte all funciotnal tasks with ocassional VC for safety and technique  While significant progress has been made since last session it is important to note pt still has epidural for pain mangment at this time  OT will continue to follow pt on caseload to assess overall funciotnal abilties once epidural is removed  Pt is also noted to be self-limiting at times however with encouragment and reinforcment, pt overall attitue and funciotnal ability was noted to improve throuhout tx session  Educated pt on improtance of sitting upright OOB and mobility to improve activity toleranc while hopitalized, reports understanidng of same  From OT stand point at this time, change d/c recommendation to home with family support  Spoke to OTR/L agreeable to same  Pt resting in chair at end of session with all needs in reach  Continue to follow with current POC     Plan   Treatment Interventions ADL retraining   Goal Expiration Date 10/07/19   OT Treatment Day 2   OT Frequency 3-5x/wk   Recommendation   OT Discharge Recommendation Home with family support     Elver Allred [place]

## 2019-09-25 NOTE — PLAN OF CARE
Problem: OCCUPATIONAL THERAPY ADULT  Goal: Performs self-care activities at highest level of function for planned discharge setting  See evaluation for individualized goals  Description  Treatment Interventions: ADL retraining, Functional transfer training, Endurance training, Cognitive reorientation, Patient/family training, Equipment evaluation/education, Compensatory technique education, Energy conservation, Activityengagement          See flowsheet documentation for full assessment, interventions and recommendations  Outcome: Progressing  Note:   Limitation: Decreased ADL status, Decreased Safe judgement during ADL, Decreased endurance, Decreased self-care trans, Decreased high-level ADLs  Prognosis: Fair  Assessment: Pt was seen this date for OT tx session focusing on self care tasks, bed mobility, sit to stnad progressions, tranfers, funciontal mobility, enrgy conservation techqniues, fall prevention educaiton and overall activity tolerance  Pt presnts supine and ocmepltes previously mentioned tasks at documented assist levels, see above  Pt has progressed to S level and with increased time is able to compelte all funciotnal tasks with ocassional VC for safety and technique  While significant progress has been made since last session it is important to note pt still has epidural for pain mangment at this time  OT will continue to follow pt on caseload to assess overall funciotnal abilties once epidural is removed  Pt is also noted to be self-limiting at times however with encouragment and reinforcment, pt overall attitue and funciotnal ability was noted to improve throuhout tx session  Educated pt on improtance of sitting upright OOB and mobility to improve activity toleranc while hopitalized, reports understanidng of same  From OT stand point at this time, change d/c recommendation to home with family support  Spoke to OTR/L agreeable to same   Pt resting in chair at end of session with all needs in reach  Continue to follow with current POC       OT Discharge Recommendation: Home with family support  OT - OK to Discharge: Yes(when medically cleared)

## 2019-09-25 NOTE — PROGRESS NOTES
Progress Note - Betsy Werner 69 y o  female MRN: [id number]    Unit/Bed#: [facility] 814-01 Encounter: [id number]      Assessment/Plan:    1  Acute pain due to injury  Geriatric pain protocol  Scheduled acetaminophen  Lidoderm patch  P r n  Oxycodone    2  Ambulatory dysfunction  Patient ambulates with cane/roller walker baseline  PT/OT  Recommend rehab post hospitalization  Assessment vitamin D3 1000 International Units p o  Daily    3  Deconditioning  Multifactorial  Albumin 3 4  PT/OT  Encourage mobilization  Encourage adequate p o  Hydration/nutrition    4  Delirium precautions  Patient alert and oriented x3, baseline  Geriatric pain protocol  Monitor for constipation- no bowel movement, MiraLax ordered  Monitor for urinary retention  Encourage mobilization  Encourage hydration/nutrition  Ensure good sleep hygiene    5  Acute cystitis without hematuria  Pt started on Macrobid 100 mg and diflucan 150mg po as an outpatient    6  Neck pain  Per epic patient was seen by PCP on 09/16/2019 and prescribed Skelaxin and Mobic  Do not recommend may cause increased confusion in the older adult population  Refer to geriatric pain protocol  Continue yoga and streching  Re -evaluate pillow    7  Age related cognitive decline  Normal forgetfulness  TSH wnl  Recommend check vitamin b12 and folate    8  Closed fracture of multiple ribs right side  Rib fracture protocol  Geriatric protocol  Trauma following            Subjective:   Upon exam pt is out of bed in recliner chair she states that the teeth to her right side is uncomfortable so she lifted off  Trauma notified  Patient states she still has not had a bowel movement  Objective:     Vitals: Blood pressure 123/64, pulse 67, temperature 97 9 °F (36 6 °C), resp  rate 16, height 5' 3" (1 6 m), weight 63 7 kg (140 lb 6 9 oz), SpO2 99 %  ,Body mass index is 24 88 kg/m²        Intake/Output Summary (Last 24 hours) at 9/25/2019 1307  Last data filed at 9/25/2019 1230  Gross per 24 hour   Intake 1184 85 ml   Output 2152 ml   Net -967 15 ml       Physical Exam:   General : NAD  HEENT : MMM   Heart : Normal rate, no murmur rub or gallop  Lungs : CTA no wheezes, rales or rhonchi  Abdomen : Soft, NT/ND, BS auscultated in all 4 quads  Ext :  no edema  Skin : Pink, warm, dry, age appropriate turgor and mobility  Neuro : Nonfocal  Psych : Alert and O x 3      Invasive Devices     Peripheral Intravenous Line            Peripheral IV 09/23/19 Right Forearm 2 days          Epidural Line            Epidural Catheter 09/23/19 2 days          Drain            External Urinary Catheter 2 days                Lab, Imaging and other studies: I have personally reviewed pertinent reports      VTE Pharmacologic Prophylaxis: Sequential compression device (Venodyne)   VTE Mechanical Prophylaxis: sequential compression device

## 2019-09-25 NOTE — PROGRESS NOTES
Progress Note Maelen Or 1945, 69 y o  female MRN: [id number]    Unit/Bed#: [facility] 814-01 Encounter: [id number]    Primary Care Provider: Stephanie Bliss MD   Date and time admitted to hospital: 9/21/2019  2:24 AM        Hemothorax, traumatic  Assessment & Plan  - output 275ml per 24 hours  - CXR this AM with side hole in fat layer  - chest tube removed 9/24  - follow up CXR   - O2 sats dip into the 80's off O2  Attempt to wean    Diabetes mellitus [facility])  Assessment & Plan  Lab Results   Component Value Date    HGBA1C 6 0 09/18/2019       Recent Labs     09/24/19  1557 09/24/19  2050 09/25/19  0309 09/25/19  0733   POCGLU 192* 178* 171* 174*       Blood Sugar Average: Last 72 hrs:  (P) 569 [id number]     - increased sliding scale and add on meal time humalog  - continue to monitor blood sugars  - diabetic diet    * Closed fracture of multiple ribs of right side  Assessment & Plan  - right-sided rib fractures 6 through 9 on the right  - continue pulmonary toilet  - aggressive pulmonary toilet  - epidural placed on 09/23/2019; started on subcutaneous heparin status post epidural placement  - patient pain significantly improved and is achieving 750 on incentive spirometry           Bedside rounds completed with nurse Jaqueline Nixon  Disposition: pending progress      SUBJECTIVE:  Chief Complaint: itchy    Subjective: Feels itchy  C/O rib pain        OBJECTIVE:     Meds/Allergies     Current Facility-Administered Medications:     acetaminophen (TYLENOL) tablet 975 mg, 975 mg, Oral, Q8H [facility], Kathy Mancini DO, 975 mg at 09/25/19 0502    albuterol inhalation solution 2 5 mg, 2 5 mg, Nebulization, Q4H PRN, Yaya Bolden DO, 2 5 mg at 09/23/19 0825    amLODIPine (NORVASC) tablet 5 mg, 5 mg, Oral, Daily, Rhett Trotter PA-C, 5 mg at 09/25/19 0806    docusate sodium (COLACE) capsule 100 mg, 100 mg, Oral, BID, Kathy Mancini DO, 100 mg at 09/25/19 0808    fluticasone-vilanterol (AZAR RONDON) 100-25 mcg/inh inhaler 1 puff, 1 puff, Inhalation, Daily, Danny Fountain PA-C, 1 puff at 83/88/57 5905    folic acid (FOLVITE) tablet 1 mg, 1 mg, Oral, Daily, Kathy TORRES [place], DO, 1 mg at 09/25/19 0806    gabapentin (NEURONTIN) capsule 100 mg, 100 mg, Oral, BID, Kathy TORRES [place], DO, 100 mg at 09/25/19 0806    heparin (porcine) subcutaneous injection 5,000 Units, 5,000 Units, Subcutaneous, Q8H [address], Danny Founatin PA-C, 5,000 Units at 09/25/19 0502    hydrochlorothiazide (HYDRODIURIL) tablet 12 5 mg, 12 5 mg, Oral, Daily, Danny Fountain PA-C, 12 5 mg at 09/25/19 0806    HYDROmorphone (DILAUDID) injection 0 2 mg, 0 2 mg, Intravenous, Q3H PRN, Penelope Luu DO, 0 2 mg at 09/23/19 0539    insulin lispro (HumaLOG) 100 units/mL subcutaneous injection 1-6 Units, 1-6 Units, Subcutaneous, TID With Meals, 1 Units at 09/25/19 0749 **AND** Fingerstick Glucose (POCT), , , 4x Daily AC and at bedtime, Danny Fountain PA-C    insulin lispro (HumaLOG) 100 units/mL subcutaneous injection 6 Units, 6 Units, Subcutaneous, TID With Meals, Jerrellcoreen Fountain PA-C, 6 Units at 09/25/19 0749    methocarbamol (ROBAXIN) tablet 500 mg, 500 mg, Oral, Q6H PRN, Viridiana Mattson MD, 500 mg at 09/23/19 0846    multivitamin-minerals (CENTRUM) tablet 1 tablet, 1 tablet, Oral, Daily, Kathy Ferreraan, DO, 1 tablet at 09/25/19 0806    ondansetron (ZOFRAN) injection 4 mg, 4 mg, Intravenous, Q6H PRN, Kathy Ferreraan, DO, 4 mg at 09/21/19 1538    oxyCODONE (ROXICODONE) IR tablet 2 5 mg, 2 5 mg, Oral, Q4H PRN, Kathy Mancini DO    oxyCODONE (ROXICODONE) IR tablet 5 mg, 5 mg, Oral, Q4H PRN, Kathy Mancini DO, 5 mg at 09/23/19 0847    pantoprazole (PROTONIX) EC tablet 40 mg, 40 mg, Oral, Early Morning, Danny Fountain PA-C, 40 mg at 09/25/19 0502    ropivacaine 0 1% and fentaNYL 2 mcg/mL PCEA, , Epidural, Continuous, Brittni Liz MD    [facility]) tablet 17 2 mg, 2 tablet, Oral, Daily, Kathy Mancini DO, 17 2 mg at 09/25/19 0806    thiamine (VITAMIN B1) tablet 100 mg, 100 mg, Oral, Daily, Kathy Mancini DO, 100 mg at 09/25/19 5086     Vitals:   Vitals:    09/25/19 0700   BP:    Pulse:    Resp:    Temp:    SpO2: 97%       Intake/Output:  I/O       09/23 0701 - 09/24 0700 09/24 0701 - 09/25 0700 09/25 0701 - 09/26 0700    P  O  840 860 180    I V  (mL/kg) 174 1 (2 7) 244 9 (3 8)     IV Piggyback 500      Total Intake(mL/kg) 1514 1 (23 8) 1104 9 (17 3) 180 (2 8)    Urine (mL/kg/hr) 2912 (1 9) 3302 (2 2)     Stool  0     Chest Tube 275      Total Output 3187 3302     Net -1672 9 -2197 2 +180                  Nutrition/GI Proph/Bowel Reg: Colace, Senokot    Physical Exam:   Constitution: patient lying in bed, appears comfortable  HEENT: CLARIBEL, EOMI  CV: regular rate and rhythm, +2 DP pulses  Pulm: CTA, no wheezes, rhonchi or crackles, unlabored, equal bilaterally  Abd: soft, nontender, nondistended, active bowel sounds  Extremities: moves all extremities, equal strength, no edema, no skin breakdown  Neuro: A&O, no focal deficits  Skin: no rash or breakdown, warm          Invasive Devices     Peripheral Intravenous Line            Peripheral IV 09/23/19 Right Forearm 2 days          Epidural Line            Epidural Catheter 09/23/19 1 day          Drain            External Urinary Catheter 1 day                 Lab Results: Results: I have personally reviewed pertinent reports  Imaging/EKG Studies: Results: I have personally reviewed pertinent reports      Other Studies: n/a  VTE Prophylaxis: Heparin

## 2019-09-25 NOTE — UTILIZATION REVIEW
Continued Stay Review    Date: 9/25/19                          Current Patient Class: IP    Current Level of Care: MED SURG    HPI:74 y o  female initially admitted on 9/21 S/P FALL WITH MULTIPLE RIB FRACTURES AND HEMOTHORAX     Assessment/Plan:  MS MACKEY CONTINUES TO HAVE THE EPIDURAL GOING FOR CONTINUED PAIN RELIEF  SHE HAD CHEST TUBE REMOVED YESTERDAY 9/*24 AND CXR IS STABLE  SATs DIP INTO THE 80s WHEN OFF OXYGEN  NEED TO WORK ON OXYGEN WEANING  PT IS ACHIEVING 750 ON INCENTIVE SPIROMETRY  NEEDS CONTINUED AGGRESSIVE PULMONARY TOILET  Pertinent Labs/Diagnostic Results:     9/25 CXR - No substantial interval change in size of the small right pneumothorax      9/24 CXR - No substantial interval change in size of the small right pneumothorax after removal of the chest tube      Results from last 7 days   Lab Units 09/24/19  0506 09/23/19  0445 09/22/19  1440 09/20/19  2118   WBC Thousand/uL 8 22 6 30 8 28 12 15*   HEMOGLOBIN g/dL 9 8* 9 9* 10 5* 12 9   HEMATOCRIT % 29 6* 30 0* 30 9* 38 4   PLATELETS Thousands/uL 229 183 193 251   NEUTROS ABS Thousands/µL 4 30 4 01 5 65 6 50     Results from last 7 days   Lab Units 09/24/19  0506 09/23/19  0445 09/22/19  1439 09/20/19  2201   SODIUM mmol/L 134* 132* 132* 133*   POTASSIUM mmol/L 4 8 5 0 4 8 3 9   CHLORIDE mmol/L 94* 94* 93* 98*   CO2 mmol/L 34* 36* 34* 22   ANION GAP mmol/L 6 2* 5 13   BUN mg/dL 12 13 15 16   CREATININE mg/dL 0 56* 0 50* 0 71 0 60   EGFR ml/min/1 73sq m 93 96 85 91   CALCIUM mg/dL 10 0 9 2 9 6 8 6     Results from last 7 days   Lab Units 09/20/19  2201   AST U/L 15   ALT U/L 14   ALK PHOS U/L 91   TOTAL PROTEIN g/dL 7 0   ALBUMIN g/dL 3 4*   TOTAL BILIRUBIN mg/dL 0 14*     Results from last 7 days   Lab Units 09/25/19  1102 09/25/19  0733 09/25/19  0309 09/24/19  2050 09/24/19  1557 09/24/19  1122 09/24/19  0510 09/23/19  2108 09/23/19  1613 09/23/19  1153   POC GLUCOSE mg/dl 247* 174* 171* 178* 192* 241* 174* 156* 221* 266*     Results from last 7 days   Lab Units 09/24/19  0506 09/23/19  0445 09/22/19  1439 09/20/19  2201   GLUCOSE RANDOM mg/dL 179* 189* 234* 212*     Results from last 7 days   Lab Units 09/20/19 2118   TROPONIN I ng/mL <0 02     Results from last 7 days   Lab Units 09/20/19 2118   PROTIME seconds 12 0   INR  0 88   PTT seconds 23     Results from last 7 days   Lab Units 09/20/19 2118   LIPASE u/L 80     Results from last 7 days   Lab Units 09/20/19 2118   ETHANOL LVL mg/dL 218*   ACETAMINOPHEN LVL ug/mL <2*   SALICYLATE LVL mg/dL <3*     Vital Signs:   09/25/19 11:19:15  97 9 °F (36 6 °C)  67  16  123/64  84  99 %     09/25/19 0700            97 %  Nasal cannula   09/25/19 06:56:48  97 7 °F (36 5 °C)  58  16  127/69  88  98 %     09/25/19 03:04:43  98 5 °F (36 9 °C)  60  16  124/66  85  98 %     09/24/19 23:02:57  98 6 °F (37 °C)  68  16  146/83  104  98 %  Nasal cannula   09/24/19 2001            96 %  Nasal cannula   09/24/19 18:59:18  98 1 °F (36 7 °C)  66  17  128/66  87  97 %     09/24/19 15:04:58  98 4 °F (36 9 °C)  73  16  129/66  87  98 %  Nasal cannula     Medications:   Scheduled Meds:   Current Facility-Administered Medications:  acetaminophen 975 mg Oral Q8H DINORAH    albuterol 2 5 mg Nebulization Q4H PRN    amLODIPine 5 mg Oral Daily    docusate sodium 100 mg Oral BID    fluticasone-vilanterol 1 puff Inhalation Daily    folic acid 1 mg Oral Daily    gabapentin 100 mg Oral BID    heparin (porcine) 5,000 Units Subcutaneous Q8H [facility]    hydrochlorothiazide 12 5 mg Oral Daily    HYDROmorphone 0 2 mg Intravenous Q3H PRN    insulin lispro 1-6 Units Subcutaneous TID With Meals    insulin lispro 6 Units Subcutaneous TID With Meals    methocarbamol 500 mg Oral Q6H PRN    multivitamin-minerals 1 tablet Oral Daily    ondansetron 4 mg Intravenous Q6H PRN    oxyCODONE 2 5 mg Oral Q4H PRN    oxyCODONE 5 mg Oral Q4H PRN    pantoprazole 40 mg Oral Early Morning    polyethylene glycol 17 g Oral Daily PRN    ropivacaine 0 1% and fentaNYL 2 mcg/mL  Epidural Continuous    senna 2 tablet Oral Daily    thiamine 100 mg Oral Daily        Discharge Plan: [facility] Utilization Review Department  Phone: [phone]; Fax [phone]  [email or web address]  ATTENTION: Please call with any questions or concerns to [phone]  and carefully listen to the prompts so that you are directed to the right person  Send all requests for admission clinical reviews, approved or denied determinations and any other requests to fax [phone]   All voicemails are confidential

## 2019-09-25 NOTE — ASSESSMENT & PLAN NOTE
Lab Results   Component Value Date    HGBA1C 6 0 09/18/2019       Recent Labs     09/24/19  1557 09/24/19 2050 09/25/19  0309 09/25/19  0733   POCGLU 192* 178* 171* 174*       Blood Sugar Average: Last 72 hrs:  (P) 156 [id number]     - increased sliding scale and add on meal time humalog  - continue to monitor blood sugars  - diabetic diet

## 2019-09-26 ENCOUNTER — APPOINTMENT (INPATIENT)
Dept: RADIOLOGY | Facility: HOSPITAL | Age: 74
DRG: 183 | End: 2019-09-26
Payer: COMMERCIAL

## 2019-09-26 LAB
GLUCOSE SERPL-MCNC: 173 MG/DL (ref 65–140)
GLUCOSE SERPL-MCNC: 202 MG/DL (ref 65–140)
GLUCOSE SERPL-MCNC: 211 MG/DL (ref 65–140)
GLUCOSE SERPL-MCNC: 222 MG/DL (ref 65–140)
GLUCOSE SERPL-MCNC: 233 MG/DL (ref 65–140)

## 2019-09-26 PROCEDURE — 99232 SBSQ HOSP IP/OBS MODERATE 35: CPT | Performed by: SURGERY

## 2019-09-26 PROCEDURE — 97530 THERAPEUTIC ACTIVITIES: CPT

## 2019-09-26 PROCEDURE — 94760 N-INVAS EAR/PLS OXIMETRY 1: CPT

## 2019-09-26 PROCEDURE — 71045 X-RAY EXAM CHEST 1 VIEW: CPT

## 2019-09-26 PROCEDURE — 99232 SBSQ HOSP IP/OBS MODERATE 35: CPT | Performed by: NURSE PRACTITIONER

## 2019-09-26 PROCEDURE — 93005 ELECTROCARDIOGRAM TRACING: CPT

## 2019-09-26 PROCEDURE — 82948 REAGENT STRIP/BLOOD GLUCOSE: CPT

## 2019-09-26 PROCEDURE — 97116 GAIT TRAINING THERAPY: CPT

## 2019-09-26 RX ORDER — GABAPENTIN 100 MG/1
100 CAPSULE ORAL 3 TIMES DAILY
Status: DISCONTINUED | OUTPATIENT
Start: 2019-09-26 | End: 2019-09-29 | Stop reason: HOSPADM

## 2019-09-26 RX ORDER — LIDOCAINE 50 MG/G
1 PATCH TOPICAL DAILY
Status: DISCONTINUED | OUTPATIENT
Start: 2019-09-26 | End: 2019-09-29 | Stop reason: HOSPADM

## 2019-09-26 RX ADMIN — DOCUSATE SODIUM 100 MG: 100 CAPSULE, LIQUID FILLED ORAL at 08:19

## 2019-09-26 RX ADMIN — INSULIN LISPRO 6 UNITS: 100 INJECTION, SOLUTION INTRAVENOUS; SUBCUTANEOUS at 12:04

## 2019-09-26 RX ADMIN — LIDOCAINE 1 PATCH: 50 PATCH CUTANEOUS at 14:34

## 2019-09-26 RX ADMIN — SENNOSIDES 17.2 MG: 8.6 TABLET, FILM COATED ORAL at 08:19

## 2019-09-26 RX ADMIN — THIAMINE HCL TAB 100 MG 100 MG: 100 TAB at 08:19

## 2019-09-26 RX ADMIN — FLUTICASONE FUROATE AND VILANTEROL TRIFENATATE 1 PUFF: 100; 25 POWDER RESPIRATORY (INHALATION) at 08:20

## 2019-09-26 RX ADMIN — GABAPENTIN 100 MG: 100 CAPSULE ORAL at 08:19

## 2019-09-26 RX ADMIN — Medication: at 09:25

## 2019-09-26 RX ADMIN — DOCUSATE SODIUM 100 MG: 100 CAPSULE, LIQUID FILLED ORAL at 17:01

## 2019-09-26 RX ADMIN — OXYCODONE HYDROCHLORIDE 5 MG: 5 TABLET ORAL at 17:01

## 2019-09-26 RX ADMIN — GABAPENTIN 100 MG: 100 CAPSULE ORAL at 21:45

## 2019-09-26 RX ADMIN — INSULIN LISPRO 2 UNITS: 100 INJECTION, SOLUTION INTRAVENOUS; SUBCUTANEOUS at 17:00

## 2019-09-26 RX ADMIN — HYDROCHLOROTHIAZIDE 12.5 MG: 12.5 TABLET ORAL at 08:19

## 2019-09-26 RX ADMIN — GABAPENTIN 100 MG: 100 CAPSULE ORAL at 17:01

## 2019-09-26 RX ADMIN — FOLIC ACID 1 MG: 1 TABLET ORAL at 08:19

## 2019-09-26 RX ADMIN — ACETAMINOPHEN 975 MG: 325 TABLET ORAL at 05:59

## 2019-09-26 RX ADMIN — OXYCODONE HYDROCHLORIDE 5 MG: 5 TABLET ORAL at 23:13

## 2019-09-26 RX ADMIN — INSULIN LISPRO 2 UNITS: 100 INJECTION, SOLUTION INTRAVENOUS; SUBCUTANEOUS at 08:20

## 2019-09-26 RX ADMIN — ACETAMINOPHEN 975 MG: 325 TABLET ORAL at 21:45

## 2019-09-26 RX ADMIN — INSULIN LISPRO 3 UNITS: 100 INJECTION, SOLUTION INTRAVENOUS; SUBCUTANEOUS at 12:04

## 2019-09-26 RX ADMIN — Medication 1 TABLET: at 08:19

## 2019-09-26 RX ADMIN — ACETAMINOPHEN 975 MG: 325 TABLET ORAL at 13:12

## 2019-09-26 RX ADMIN — INSULIN LISPRO 6 UNITS: 100 INJECTION, SOLUTION INTRAVENOUS; SUBCUTANEOUS at 08:20

## 2019-09-26 RX ADMIN — HEPARIN SODIUM 5000 UNITS: 5000 INJECTION INTRAVENOUS; SUBCUTANEOUS at 21:45

## 2019-09-26 RX ADMIN — PANTOPRAZOLE SODIUM 40 MG: 40 TABLET, DELAYED RELEASE ORAL at 05:59

## 2019-09-26 RX ADMIN — INSULIN LISPRO 6 UNITS: 100 INJECTION, SOLUTION INTRAVENOUS; SUBCUTANEOUS at 17:00

## 2019-09-26 RX ADMIN — HEPARIN SODIUM 5000 UNITS: 5000 INJECTION INTRAVENOUS; SUBCUTANEOUS at 05:59

## 2019-09-26 RX ADMIN — AMLODIPINE BESYLATE 5 MG: 5 TABLET ORAL at 08:19

## 2019-09-26 RX ADMIN — OXYCODONE HYDROCHLORIDE 5 MG: 5 TABLET ORAL at 12:07

## 2019-09-26 NOTE — ASSESSMENT & PLAN NOTE
- right-sided rib fractures 6 through 9 on the right  - continue pulmonary toilet  - aggressive pulmonary toilet  - epidural placed on 09/23/2019; started on subcutaneous heparin status post epidural placement  - patient pain continues to remain stable and continues to use flutter valve and ascertain 049 on incentive spirometry  - will discuss possible removal of epidural on 09/26/2019 verses 09/27/2019

## 2019-09-26 NOTE — PLAN OF CARE
Problem: PHYSICAL THERAPY ADULT  Goal: Performs mobility at highest level of function for planned discharge setting  See evaluation for individualized goals  Description  Treatment/Interventions: Functional transfer training, LE strengthening/ROM, Therapeutic exercise, Endurance training, Elevations, Patient/family training, Equipment eval/education, Bed mobility, Gait training, Spoke to nursing, Spoke to case management, Family  Equipment Recommended: Ras Chowdhury       See flowsheet documentation for full assessment, interventions and recommendations  Outcome: Progressing  Note:   Prognosis: Good  Problem List: Decreased strength, Decreased endurance, Impaired balance, Decreased mobility, Decreased coordination, Impaired judgement, Decreased safety awareness, Pain  Assessment: Pt participated in therapy session focusing on functional mobility tasks  Pt reports increased pain this session as PCA pump is no longer being utilized for pain control  Pt displayed increased activity tolerance as shown by participation in stair negotiation trial and increased ambulation distance completed this session  Provided pt education regarding safe functional mobility strategies, energy conservation techniques, goals of therapy session, pain management strategies  Skilled physical therapy is indicated to address listed functional deficits focusing on strength, endurance and functional mobility  Recommendation: Home with family support, Home PT     PT - OK to Discharge: Yes    See flowsheet documentation for full assessment

## 2019-09-26 NOTE — ASSESSMENT & PLAN NOTE
- chest tube removed 9/24  - follow up CXR -- stable with small apical pneumothorax  - O2 sats dip into the 80's off O2    Attempt to wean  - follows outpatient with pulmonology; will recommend outpatient follow-up

## 2019-09-26 NOTE — PROGRESS NOTES
Anesthesia Acute Pain Service:      Patient appearing tearful, anxious, and reporting 9-10/10 pain since stopping the epidural   Will restart and reassess tomorrow  Likely to remain until Saturday AM at this point (epidural day #5)  Jose Smith MD  September 26, 2019  2:54 PM      ADDENDUM @ 15:54:    Called by RN d/t "downstream occlusion" alarm on pump  Dressing removed and catheter noted to be kinked at skin  Catheter withdrawn 0 5 cm and resecured in sterile fashion  Catheter flushed without resistance and infusion restarted with resolution of alarm      Jose Smith MD  September 26, 2019  3:54 PM

## 2019-09-26 NOTE — PROGRESS NOTES
Progress Note - Laurel Dinh 69 y o  female MRN: [id number]    [facility] Encounter: [id number]      Assessment/Plan:  1  Acute pain due to injury  Geriatric pain protocol  Scheduled acetaminophen  Lidoderm patch  P r n  Oxycodone  Neurontin 100 mg p o  T i d     2  Ambulatory dysfunction  Patient ambulates with a cane/roller walker baseline  PT/OT  Recommend rehab post hospitalization  Vitamin D3 1000 International Units p o  Daily    3  Deconditioning  Multifactorial  Albumin 3 4  PT/OT  Encourage mobilization  Encourage adequate p o  Hydration/nutrition    4  Delirium precautions  Patient alert and orient x3, baseline  Geriatric rib fracture pain protocol  Monitor for constipation- last bm this am   Monitor for urinary retention  Encourage mobilization  Encourage adequate p o  Hydration/nutrition    5  Acute cystitis without hematuria  Patient started on Macrobid and Diflucan as outpatient    6  Neck pain  Per epic patient was seen by PCP on 09/16/2019 and prescribed Skelaxin and Mobic  Do not recommend may cause increased confusion in the older adult population  Refer to geriatric pain protocol  Continue yoga and streching  Re -evaluate pillow    7  Age-related cognitive decline  Normal forgetfulness  TSH within normal limits  Recommend check vitamin B12 and folate    8  Closed fracture of multiple ribs right side  Rib fracture protocol  Geriatric protocol  Trauma following    9  Hemothorax, traumatic  Chest tube removed 9/24  Trauma following  Subjective:   Upon exam pt in bed, she is alert and oriented x3  She is holding her right side, with a tense look on her face  She is complaining of increased pain since epidural was taken out  Objective:     Vitals: Blood pressure 133/71, pulse 78, temperature 98 1 °F (36 7 °C), resp  rate 20, height 5' 3" (1 6 m), weight 63 7 kg (140 lb 6 9 oz), SpO2 96 %  ,Body mass index is 24 88 kg/m²        Intake/Output Summary (Last 24 hours) at 9/26/2019 1415  Last data filed at 9/26/2019 1406  Gross per 24 hour   Intake 1114 15 ml   Output 2900 ml   Net -1785 85 ml       Physical Exam:   General : NAD  HEENT : MMM   Heart : Normal rate, no murmur rub or gallop  Lungs : CTA no wheezes, rales or rhonchi, decreased  Abdomen : Soft, NT/ND, BS auscultated in all 4 quads  Ext :  no edema  Skin : Pink, warm, dry, age appropriate turgor and mobility  Neuro : Nonfocal  Psych : Alert and O x 3      Invasive Devices     Peripheral Intravenous Line            Peripheral IV 09/23/19 Right Forearm 3 days          Epidural Line            Epidural Catheter 09/23/19 3 days          Drain            External Urinary Catheter 3 days                Lab, Imaging and other studies: I have personally reviewed pertinent reports      VTE Pharmacologic Prophylaxis: Sequential compression device (Venodyne)   VTE Mechanical Prophylaxis: sequential compression device

## 2019-09-26 NOTE — PROGRESS NOTES
Patient evaluated at approximately 0945 a m  Note placed retroactively    Progress Note - Acute Pain Service Regional Follow Up  David Carpio 69 y o  female MRN: [id number]  Unit/Bed#: [facility] 899-24 Encounter: [id number]    Assessment:   Patient is a 68 y/o F s/p fall without clear causality c/b multiple right displaced rib fractures and hemorthorax  Plan:     - Can stop infusion and observe over next 6-8 hours  If pain tolerable can remove epidural   - Continue multimodal analgesia with acetaminophen 975 mg PO q8hrs, gabapentin 100 mg PO BID, and robaxin 500 mg PO q6hrs PRN for muscle spasm (consider scheduling if patient continues to favor right side after chest tube tape removal)  - Continue geriatric narcotic regimen with oxycodone 2 5 mg/5mg PO q4hrs PRN for moderate/severe pain  - Continue dilaudid 0 2 mg IV q3hrs for breakthrough pain    APS will continue to follow  Please call  / [phone] ( between [phone] and on weekends) with questions or concerns    Subjective:    Pain well controlled  Possible d/c epidural today      Meds/Allergies     Allergies   Allergen Reactions    Bactrim [Sulfamethoxazole-Trimethoprim] Hives       Objective     Temp:  [97 9 °F (36 6 °C)-98 6 °F (37 °C)] 98 6 °F (37 °C)  HR:  [67-83] 78  Resp:  [16-18] 16  BP: ()/(63-75) 127/72    Physical Exam     Well appearing, NAD  Normal respiratory pattern  WWP  Site clean, dry, and dressing intact

## 2019-09-26 NOTE — ASSESSMENT & PLAN NOTE
Lab Results   Component Value Date    HGBA1C 6 0 09/18/2019       Recent Labs     09/25/19  1102 09/25/19  1601 09/25/19  2120 09/26/19  0732   POCGLU 247* 188* 220* 222*       Blood Sugar Average: Last 72 hrs:  (P) 202 [id number]     - increased sliding scale and add on meal time humalog  - continue to monitor blood sugars  - diabetic diet

## 2019-09-26 NOTE — PLAN OF CARE
Problem: Potential for Falls  Goal: Patient will remain free of falls  Description  INTERVENTIONS:  - Assess patient frequently for physical needs  -  Identify cognitive and physical deficits and behaviors that affect risk of falls    -  [place] fall precautions as indicated by assessment   - Educate patient/family on patient safety including physical limitations  - Instruct patient to call for assistance with activity based on assessment  - Modify environment to reduce risk of injury  - Consider OT/PT consult to assist with strengthening/mobility  Outcome: Progressing     Problem: PAIN - ADULT  Goal: Verbalizes/displays adequate comfort level or baseline comfort level  Description  Interventions:  - Encourage patient to monitor pain and request assistance  - Assess pain using appropriate pain scale  - Administer analgesics based on type and severity of pain and evaluate response  - Implement non-pharmacological measures as appropriate and evaluate response  - Consider cultural and social influences on pain and pain management  - Notify physician/advanced practitioner if interventions unsuccessful or patient reports new pain  Outcome: Progressing     Problem: SAFETY ADULT  Goal: Maintain or return to baseline ADL function  Description  INTERVENTIONS:  -  Assess patient's ability to carry out ADLs; assess patient's baseline for ADL function and identify physical deficits which impact ability to perform ADLs (bathing, care of mouth/teeth, toileting, grooming, dressing, etc )  - Assess/evaluate cause of self-care deficits   - Assess range of motion  - Assess patient's mobility; develop plan if impaired  - Assess patient's need for assistive devices and provide as appropriate  - Encourage maximum independence but intervene and supervise when necessary  - Involve family in performance of ADLs  - Assess for home care needs following discharge   - Consider OT consult to assist with ADL evaluation and planning for discharge  - Provide patient education as appropriate  Outcome: Progressing  Goal: Maintain or return mobility status to optimal level  Description  INTERVENTIONS:  - Assess patient's baseline mobility status (ambulation, transfers, stairs, etc )    - Identify cognitive and physical deficits and behaviors that affect mobility  - Identify mobility aids required to assist with transfers and/or ambulation (gait belt, sit-to-stand, lift, walker, cane, etc )  - [place] fall precautions as indicated by assessment  - Record patient progress and toleration of activity level on Mobility SBAR; progress patient to next Phase/Stage  - Instruct patient to call for assistance with activity based on assessment  - Consider rehabilitation consult to assist with strengthening/weightbearing, etc   Outcome: Progressing     Problem: DISCHARGE PLANNING  Goal: Discharge to home or other facility with appropriate resources  Description  INTERVENTIONS:  - Identify barriers to discharge w/patient and caregiver  - Arrange for needed discharge resources and transportation as appropriate  - Identify discharge learning needs (meds, wound care, etc )  - Arrange for interpretive services to assist at discharge as needed  - Refer to Case Management Department for coordinating discharge planning if the patient needs post-hospital services based on physician/advanced practitioner order or complex needs related to functional status, cognitive ability, or social support system  Outcome: Progressing     Problem: Knowledge Deficit  Goal: Patient/family/caregiver demonstrates understanding of disease process, treatment plan, medications, and discharge instructions  Description  Complete learning assessment and assess knowledge base    Interventions:  - Provide teaching at level of understanding  - Provide teaching via preferred learning methods  Outcome: Progressing     Problem: Prexisting or High Potential for Compromised Skin Integrity  Goal: Skin integrity is maintained or improved  Description  INTERVENTIONS:  - Identify patients at risk for skin breakdown  - Assess and monitor skin integrity  - Assess and monitor nutrition and hydration status  - Monitor labs   - Assess for incontinence   - Turn and reposition patient  - Assist with mobility/ambulation  - Relieve pressure over bony prominences  - Avoid friction and shearing  - Provide appropriate hygiene as needed including keeping skin clean and dry  - Evaluate need for skin moisturizer/barrier cream  - Collaborate with interdisciplinary team   - Patient/family teaching  - Consider wound care consult   Outcome: Progressing

## 2019-09-26 NOTE — PROGRESS NOTES
Progress Note Karma Gonzales 1945, 69 y o  female MRN: [id number]    Unit/Bed#: [facility] 814-01 Encounter: [id number]    Primary Care Provider: Ronnie Faustin MD   Date and time admitted to hospital: 9/21/2019  2:24 AM        Hemothorax, traumatic  Assessment & Plan  - chest tube removed 9/24  - follow up CXR -- stable with small apical pneumothorax  - O2 sats dip into the 80's off O2  Attempt to wean  - follows outpatient with pulmonology; will recommend outpatient follow-up    Hyperlipidemia  Assessment & Plan  - will discuss with patient home medications; confirmed dosage of Lipitor    Diabetes mellitus [facility])  Assessment & Plan  Lab Results   Component Value Date    HGBA1C 6 0 09/18/2019       Recent Labs     09/25/19  1102 09/25/19  1601 09/25/19  2120 09/26/19  0732   POCGLU 247* 188* 220* 222*       Blood Sugar Average: Last 72 hrs:  (P) 202 [id number]     - increased sliding scale and add on meal time humalog  - continue to monitor blood sugars  - diabetic diet    HTN (hypertension)  Assessment & Plan  - confirm home medication regimen  - discuss with patient at bedside    * Closed fracture of multiple ribs of right side  Assessment & Plan  - right-sided rib fractures 6 through 9 on the right  - continue pulmonary toilet  - aggressive pulmonary toilet  - epidural placed on 09/23/2019; started on subcutaneous heparin status post epidural placement  - patient pain continues to remain stable and continues to use flutter valve and ascertain 455 on incentive spirometry  - will discuss possible removal of epidural on 09/26/2019 verses 09/27/2019    DVT prophylaxis: SCDs and SQH  PT and OT: eval and treat    Disposition: Continue Epidural at this time per APS; will discuss when the appropriate time will be to remove the epidural   Continue to be aggressive with pulmonary toilet    Patient will likely need outpatient follow-up with pulmonology to discuss possible initiation of oxygen for supplementation at baseline  Bedside rounds completed with nurse  SUBJECTIVE:  Chief Complaint: "Doing well today "    Subjective:  Patient is out of bed to the chair and feeling better this morning  She reports she continues to improve daily  She is overall concerned when the epidural does come out if her pain level is going to increase  Otherwise she is doing well with her incentive spirometry and her flutter valve        OBJECTIVE:     Meds/Allergies     Current Facility-Administered Medications:     acetaminophen (TYLENOL) tablet 975 mg, 975 mg, Oral, Q8H [address], Kathy Mancini, DO, 975 mg at 09/26/19 0559    albuterol inhalation solution 2 5 mg, 2 5 mg, Nebulization, Q4H PRN, Yaya Bolden DO, 2 5 mg at 09/23/19 0825    amLODIPine (NORVASC) tablet 5 mg, 5 mg, Oral, Daily, MILKA Peterson-ARISTIDES, 5 mg at 09/26/19 1014    docusate sodium (COLACE) capsule 100 mg, 100 mg, Oral, BID, Kathy Mancini, DO, 100 mg at 09/26/19 0819    fluticasone-vilanterol (BREO ELLIPTA) 100-25 mcg/inh inhaler 1 puff, 1 puff, Inhalation, Daily, Gurvinder Marie PA-C, 1 puff at 22/37/31 7289    folic acid (FOLVITE) tablet 1 mg, 1 mg, Oral, Daily, Kathy Mancini, DO, 1 mg at 09/26/19 0819    gabapentin (NEURONTIN) capsule 100 mg, 100 mg, Oral, BID, Kathy Mancini, DO, 100 mg at 09/26/19 0819    heparin (porcine) subcutaneous injection 5,000 Units, 5,000 Units, Subcutaneous, Q8H [address], MILKA Vogel-C, 5,000 Units at 09/26/19 0559    hydrochlorothiazide (HYDRODIURIL) tablet 12 5 mg, 12 5 mg, Oral, Daily, JAMES VogelC, 12 5 mg at 09/26/19 0819    HYDROmorphone (DILAUDID) injection 0 2 mg, 0 2 mg, Intravenous, Q3H PRN, Kathy Mancini DO, 0 2 mg at 09/23/19 0539    insulin lispro (HumaLOG) 100 units/mL subcutaneous injection 1-6 Units, 1-6 Units, Subcutaneous, TID With Meals, 2 Units at 09/26/19 0820 **AND** Fingerstick Glucose (POCT), , , 4x Daily AC and at bedtime, Gurvinder Cynthia, PA-C    insulin lispro (HumaLOG) 100 units/mL subcutaneous injection 6 Units, 6 Units, Subcutaneous, TID With Meals, Jerald Carter PA-C, 6 Units at 09/26/19 0820    methocarbamol (ROBAXIN) tablet 500 mg, 500 mg, Oral, Q6H PRN, Ronnie Cottrell MD, 500 mg at 09/23/19 0846    multivitamin-minerals (CENTRUM) tablet 1 tablet, 1 tablet, Oral, Daily, Kathy Ferreraan, DO, 1 tablet at 09/26/19 0819    OLANZapine (ZyPREXA ZYDIS) dispersible tablet 2 5 mg, 2 5 mg, Oral, Once, JAQUAN Tineo    ondansetron [facility]) injection 4 mg, 4 mg, Intravenous, Q6H PRN, Kathy Mancini, DO, 4 mg at 09/21/19 1538    oxyCODONE (ROXICODONE) IR tablet 2 5 mg, 2 5 mg, Oral, Q4H PRN, Kathy Mancini, DO    oxyCODONE (ROXICODONE) IR tablet 5 mg, 5 mg, Oral, Q4H PRN, Kathy Mancini, DO, 5 mg at 09/23/19 0847    pantoprazole (PROTONIX) EC tablet 40 mg, 40 mg, Oral, Early Morning, Jerald Carter PA-C, 40 mg at 09/26/19 0559    polyethylene glycol (MIRALAX) packet 17 g, 17 g, Oral, Daily PRN, Tuesday M JAQUAN Gomez    ropivacaine 0 1% and fentaNYL 2 mcg/mL PCEA, , Epidural, Continuous, Iris Flores MD    [facility]) tablet 17 2 mg, 2 tablet, Oral, Daily, Kathy Mancini, DO, 17 2 mg at 09/26/19 0819    thiamine (VITAMIN B1) tablet 100 mg, 100 mg, Oral, Daily, Kathy العليrigan, DO, 100 mg at 09/26/19 0819     Vitals:   Vitals:    09/26/19 0747   BP:    Pulse: 78   Resp:    Temp:    SpO2: 96%       Intake/Output:  I/O       09/24 0701 - 09/25 0700 09/25 0701 - 09/26 0700 09/26 0701 - 09/27 0700    P  O  860 780     I V  (mL/kg) 244 9 (3 8) 234 1 (3 7)     IV Piggyback       Total Intake(mL/kg) 1104 9 (17 3) 1014 1 (15 9)     Urine (mL/kg/hr) 3302 (2 2) 1600 (1) 200 (2 3)    Stool 0 0     Chest Tube       Total Output 3302 1600 200    Net -2197 2 -586 -200           Unmeasured Urine Occurrence  2 x     Unmeasured Stool Occurrence  2 x            Nutrition/GI Proph/Bowel Reg:  Continue current diet    Physical Exam:   GENERAL APPEARANCE:  No acute distress, well-appearing  NEURO:  Cranial nerves 2-12 intact, no focal deficits  HEENT:  Normocephalic  CV:  Regular rate and rhythm  LUNGS:  CTA bilaterally  GI:  Bowel sounds x4, nontender  :  No Fox  MSK:  +2 pulses on extremities, neurovascularly intact  SKIN:  Warm, dry, intact    Invasive Devices     Peripheral Intravenous Line            Peripheral IV 09/23/19 Right Forearm 3 days          Epidural Line            Epidural Catheter 09/23/19 2 days          Drain            External Urinary Catheter 2 days                 Lab Results: Results: I have personally reviewed pertinent reports   , BMP/CMP: No results found for: SODIUM, K, CL, CO2, ANIONGAP, BUN, CREATININE, GLUCOSE, CALCIUM, AST, ALT, ALKPHOS, PROT, BILITOT, EGFR and CBC: No results found for: WBC, HGB, HCT, MCV, PLT, ADJUSTEDWBC, MCH, MCHC, RDW, MPV, NRBC  Imaging/EKG Studies: Results: I have personally reviewed pertinent reports      Other Studies:  No other studies  VTE Prophylaxis:  SCDs and subcutaneous heparin

## 2019-09-26 NOTE — PHYSICAL THERAPY NOTE
PHYSICAL THERAPY NOTE          Patient Name: Kwabena Farooq  EJDFT'I Date: 9/26/2019 09/26/19 1140   Pain Assessment   Pain Assessment 0-10   Pain Score 6   Pain Type Acute pain   Pain Location Rib cage   Pain Orientation Right   Hospital Pain Intervention(s) Repositioned; Ambulation/increased activity   Response to Interventions tolerated   Restrictions/Precautions   Weight Bearing Precautions Per Order No   Other Precautions Multiple lines; Fall Risk;Pain  (epidural PCA pump)   General   Chart Reviewed Yes   Family/Caregiver Present No   Cognition   Overall Cognitive Status WFL   Arousal/Participation Alert; Cooperative   Attention Attends with cues to redirect   Orientation Level Oriented X4   Following Commands Follows one step commands without difficulty   Subjective   Subjective "I am starting to have more pain now"   Bed Mobility   Additional Comments Not assessed, pt received seated in bedside chair, agreeable to therapy session   Transfers   Sit to Stand 5  Supervision   Additional items Increased time required;Verbal cues   Stand to Sit 5  Supervision   Additional items Increased time required;Verbal cues   Toilet transfer 5  Supervision   Additional items Increased time required;Verbal cues; Commode   Ambulation/Elevation   Gait pattern Improper Weight shift;Narrow LANCE; Forward Flexion;Decreased foot clearance; Inconsistent breanna; Short stride; Excessively slow   Gait Assistance 5  Supervision   Additional items Verbal cues   Assistive Device Rolling walker   Distance 125' x 2   Stair Management Assistance 4  Minimal assist   Additional items Verbal cues; Tactile cues; Increased time required   Stair Management Technique One rail L;Alternating pattern; Foreward;Backward  (ascended forward, descended backward)   Number of Stairs 3   Balance   Static Sitting Good   Dynamic Sitting Fair +   Static Standing Fair  (RW)   Dynamic Standing Fair -  (RW)   Ambulatory Fair -  (RW)   Endurance Deficit   Endurance Deficit Yes   Endurance Deficit Description fatigue, pain   Activity Tolerance   Activity Tolerance Patient limited by fatigue;Patient limited by pain   Nurse Made Aware RN cleared patient for PT session   Assessment   Prognosis Good   Problem List Decreased strength;Decreased endurance; Impaired balance;Decreased mobility; Decreased coordination; Impaired judgement;Decreased safety awareness;Pain   Assessment Pt participated in therapy session focusing on functional mobility tasks  Pt reports increased pain this session as PCA pump is no longer being utilized for pain control  Pt displayed increased activity tolerance as shown by participation in stair negotiation trial and increased ambulation distance completed this session  Provided pt education regarding safe functional mobility strategies, energy conservation techniques, goals of therapy session, pain management strategies  Skilled physical therapy is indicated to address listed functional deficits focusing on strength, endurance and functional mobility  Goals   Patient Goals have less pain   STG Expiration Date 10/04/19   Short Term Goal #1 pt will: Increase bilateral LE strength 1/2 grade to facilitate independent mobility, Perform all bed mobility tasks w/ supervision to decrease fall risk factors, Perform all transfers modified independent to improve independence, Ambulate 250 ft  with roller walker w/ supervision w/o LOB, Navigate 4 stairs w/ supervision with unilateral handrail to facilitate return to previous living environment, Increase all balance 1/2 grade to decrease risk for falls and Improve Barthel Index score to 80 or greater to facilitate independence   PT Treatment Day 1   Plan   Treatment/Interventions Functional transfer training;LE strengthening/ROM; Elevations; Therapeutic exercise; Endurance training;Patient/family training;Equipment eval/education; Bed mobility;Gait training;Spoke to nursing   Progress Progressing toward goals   PT Frequency Other (Comment)  (3-6x/wk)   Recommendation   Recommendation Home with family support;Home PT   Equipment Recommended Walker  (RW)   PT - OK to Discharge Yes   Additional Comments when medically stable       Prince Schroeder PT, DPT

## 2019-09-27 ENCOUNTER — APPOINTMENT (INPATIENT)
Dept: RADIOLOGY | Facility: HOSPITAL | Age: 74
DRG: 183 | End: 2019-09-27
Payer: COMMERCIAL

## 2019-09-27 LAB
ANION GAP SERPL CALCULATED.3IONS-SCNC: 6 MMOL/L (ref 4–13)
ATRIAL RATE: 75 BPM
BASOPHILS # BLD AUTO: 0.03 THOUSANDS/ΜL (ref 0–0.1)
BASOPHILS NFR BLD AUTO: 0 % (ref 0–1)
BUN SERPL-MCNC: 15 MG/DL (ref 5–25)
CALCIUM SERPL-MCNC: 9.3 MG/DL (ref 8.3–10.1)
CHLORIDE SERPL-SCNC: 92 MMOL/L (ref 100–108)
CO2 SERPL-SCNC: 31 MMOL/L (ref 21–32)
CREAT SERPL-MCNC: 0.5 MG/DL (ref 0.6–1.3)
EOSINOPHIL # BLD AUTO: 0.32 THOUSAND/ΜL (ref 0–0.61)
EOSINOPHIL NFR BLD AUTO: 4 % (ref 0–6)
ERYTHROCYTE [DISTWIDTH] IN BLOOD BY AUTOMATED COUNT: 12.1 % (ref 11.6–15.1)
GFR SERPL CREATININE-BSD FRML MDRD: 96 ML/MIN/1.73SQ M
GLUCOSE SERPL-MCNC: 178 MG/DL (ref 65–140)
GLUCOSE SERPL-MCNC: 184 MG/DL (ref 65–140)
GLUCOSE SERPL-MCNC: 192 MG/DL (ref 65–140)
GLUCOSE SERPL-MCNC: 211 MG/DL (ref 65–140)
GLUCOSE SERPL-MCNC: 279 MG/DL (ref 65–140)
HCT VFR BLD AUTO: 28.4 % (ref 34.8–46.1)
HGB BLD-MCNC: 9.8 G/DL (ref 11.5–15.4)
IMM GRANULOCYTES # BLD AUTO: 0.04 THOUSAND/UL (ref 0–0.2)
IMM GRANULOCYTES NFR BLD AUTO: 1 % (ref 0–2)
LYMPHOCYTES # BLD AUTO: 1.84 THOUSANDS/ΜL (ref 0.6–4.47)
LYMPHOCYTES NFR BLD AUTO: 23 % (ref 14–44)
MCH RBC QN AUTO: 32.3 PG (ref 26.8–34.3)
MCHC RBC AUTO-ENTMCNC: 34.5 G/DL (ref 31.4–37.4)
MCV RBC AUTO: 94 FL (ref 82–98)
MONOCYTES # BLD AUTO: 0.53 THOUSAND/ΜL (ref 0.17–1.22)
MONOCYTES NFR BLD AUTO: 7 % (ref 4–12)
NEUTROPHILS # BLD AUTO: 5.1 THOUSANDS/ΜL (ref 1.85–7.62)
NEUTS SEG NFR BLD AUTO: 65 % (ref 43–75)
NRBC BLD AUTO-RTO: 0 /100 WBCS
P AXIS: 48 DEGREES
PLATELET # BLD AUTO: 252 THOUSANDS/UL (ref 149–390)
PMV BLD AUTO: 12.5 FL (ref 8.9–12.7)
POTASSIUM SERPL-SCNC: 3.8 MMOL/L (ref 3.5–5.3)
PR INTERVAL: 160 MS
QRS AXIS: 22 DEGREES
QRSD INTERVAL: 88 MS
QT INTERVAL: 398 MS
QTC INTERVAL: 444 MS
RBC # BLD AUTO: 3.03 MILLION/UL (ref 3.81–5.12)
SODIUM SERPL-SCNC: 129 MMOL/L (ref 136–145)
T WAVE AXIS: 52 DEGREES
VENTRICULAR RATE: 75 BPM
WBC # BLD AUTO: 7.86 THOUSAND/UL (ref 4.31–10.16)

## 2019-09-27 PROCEDURE — 97535 SELF CARE MNGMENT TRAINING: CPT

## 2019-09-27 PROCEDURE — 93010 ELECTROCARDIOGRAM REPORT: CPT | Performed by: INTERNAL MEDICINE

## 2019-09-27 PROCEDURE — 97530 THERAPEUTIC ACTIVITIES: CPT

## 2019-09-27 PROCEDURE — 71046 X-RAY EXAM CHEST 2 VIEWS: CPT

## 2019-09-27 PROCEDURE — 82948 REAGENT STRIP/BLOOD GLUCOSE: CPT

## 2019-09-27 PROCEDURE — 99232 SBSQ HOSP IP/OBS MODERATE 35: CPT | Performed by: SURGERY

## 2019-09-27 PROCEDURE — 85025 COMPLETE CBC W/AUTO DIFF WBC: CPT | Performed by: PHYSICIAN ASSISTANT

## 2019-09-27 PROCEDURE — 94760 N-INVAS EAR/PLS OXIMETRY 1: CPT

## 2019-09-27 PROCEDURE — 80048 BASIC METABOLIC PNL TOTAL CA: CPT | Performed by: PHYSICIAN ASSISTANT

## 2019-09-27 RX ORDER — ALBUTEROL SULFATE 90 UG/1
2 AEROSOL, METERED RESPIRATORY (INHALATION) EVERY 4 HOURS PRN
Status: DISCONTINUED | OUTPATIENT
Start: 2019-09-27 | End: 2019-09-29 | Stop reason: HOSPADM

## 2019-09-27 RX ADMIN — THIAMINE HCL TAB 100 MG 100 MG: 100 TAB at 08:38

## 2019-09-27 RX ADMIN — INSULIN LISPRO 2 UNITS: 100 INJECTION, SOLUTION INTRAVENOUS; SUBCUTANEOUS at 17:42

## 2019-09-27 RX ADMIN — INSULIN LISPRO 6 UNITS: 100 INJECTION, SOLUTION INTRAVENOUS; SUBCUTANEOUS at 17:41

## 2019-09-27 RX ADMIN — OXYCODONE HYDROCHLORIDE 5 MG: 5 TABLET ORAL at 12:36

## 2019-09-27 RX ADMIN — HEPARIN SODIUM 5000 UNITS: 5000 INJECTION INTRAVENOUS; SUBCUTANEOUS at 14:25

## 2019-09-27 RX ADMIN — OXYCODONE HYDROCHLORIDE 5 MG: 5 TABLET ORAL at 21:12

## 2019-09-27 RX ADMIN — SENNOSIDES 17.2 MG: 8.6 TABLET, FILM COATED ORAL at 08:38

## 2019-09-27 RX ADMIN — HEPARIN SODIUM 5000 UNITS: 5000 INJECTION INTRAVENOUS; SUBCUTANEOUS at 05:43

## 2019-09-27 RX ADMIN — HEPARIN SODIUM 5000 UNITS: 5000 INJECTION INTRAVENOUS; SUBCUTANEOUS at 21:12

## 2019-09-27 RX ADMIN — DOCUSATE SODIUM 100 MG: 100 CAPSULE, LIQUID FILLED ORAL at 08:38

## 2019-09-27 RX ADMIN — AMLODIPINE BESYLATE 5 MG: 5 TABLET ORAL at 08:38

## 2019-09-27 RX ADMIN — INSULIN LISPRO 6 UNITS: 100 INJECTION, SOLUTION INTRAVENOUS; SUBCUTANEOUS at 08:37

## 2019-09-27 RX ADMIN — DOCUSATE SODIUM 100 MG: 100 CAPSULE, LIQUID FILLED ORAL at 17:41

## 2019-09-27 RX ADMIN — ROPIVACAINE HYDROCHLORIDE: 5 INJECTION, SOLUTION EPIDURAL; INFILTRATION; PERINEURAL at 12:51

## 2019-09-27 RX ADMIN — GABAPENTIN 100 MG: 100 CAPSULE ORAL at 08:38

## 2019-09-27 RX ADMIN — ALBUTEROL SULFATE 2 PUFF: 90 AEROSOL, METERED RESPIRATORY (INHALATION) at 18:40

## 2019-09-27 RX ADMIN — FOLIC ACID 1 MG: 1 TABLET ORAL at 08:38

## 2019-09-27 RX ADMIN — GABAPENTIN 100 MG: 100 CAPSULE ORAL at 17:41

## 2019-09-27 RX ADMIN — INSULIN LISPRO 1 UNITS: 100 INJECTION, SOLUTION INTRAVENOUS; SUBCUTANEOUS at 08:37

## 2019-09-27 RX ADMIN — HYDROCHLOROTHIAZIDE 12.5 MG: 12.5 TABLET ORAL at 08:38

## 2019-09-27 RX ADMIN — PANTOPRAZOLE SODIUM 40 MG: 40 TABLET, DELAYED RELEASE ORAL at 05:42

## 2019-09-27 RX ADMIN — INSULIN LISPRO 6 UNITS: 100 INJECTION, SOLUTION INTRAVENOUS; SUBCUTANEOUS at 22:58

## 2019-09-27 RX ADMIN — ACETAMINOPHEN 975 MG: 325 TABLET ORAL at 14:25

## 2019-09-27 RX ADMIN — INSULIN LISPRO 2 UNITS: 100 INJECTION, SOLUTION INTRAVENOUS; SUBCUTANEOUS at 12:00

## 2019-09-27 RX ADMIN — ACETAMINOPHEN 975 MG: 325 TABLET ORAL at 05:42

## 2019-09-27 RX ADMIN — FLUTICASONE FUROATE AND VILANTEROL TRIFENATATE 1 PUFF: 100; 25 POWDER RESPIRATORY (INHALATION) at 08:38

## 2019-09-27 RX ADMIN — INSULIN LISPRO 6 UNITS: 100 INJECTION, SOLUTION INTRAVENOUS; SUBCUTANEOUS at 11:59

## 2019-09-27 RX ADMIN — LIDOCAINE 1 PATCH: 50 PATCH CUTANEOUS at 08:38

## 2019-09-27 RX ADMIN — ACETAMINOPHEN 975 MG: 325 TABLET ORAL at 21:11

## 2019-09-27 RX ADMIN — Medication 1 TABLET: at 08:38

## 2019-09-27 RX ADMIN — GABAPENTIN 100 MG: 100 CAPSULE ORAL at 21:12

## 2019-09-27 NOTE — OCCUPATIONAL THERAPY NOTE
Occupational Therapy Treatment Note:     09/27/19 1225   Restrictions/Precautions   Weight Bearing Precautions Per Order No   Other Precautions Multiple lines  (PCA pump)   Pain Assessment   Pain Score 8   Snyder-Baker FACES Pain Rating 0   Pain Type Acute pain   Pain Location Chest;Abdomen   Pain Orientation Right   ADL   Where Assessed Commode   Toileting Assistance  6  Modified independent   Toileting Deficit Grab bar use;Clothing management up;Clothing management down;Perineal hygiene   Toileting Comments standard toilet, assit to manage lines only   Functional Standing Tolerance   Time ~ 5 mins   Activity static standing   Comments RW level   Bed Mobility   Additional Comments OOB upon presentation and at end of session   Transfers   Sit to Stand 5  Supervision   Additional items Increased time required   Stand to Sit 5  Supervision   Additional items Increased time required   Stand pivot 5  Supervision   Additional items Increased time required   Toilet transfer 5  Supervision   Additional items Increased time required   Additional Comments RW levle   Functional Mobility   Functional Mobility 5  Supervision   Additional Comments greater than house hold distances   Additional items Rolling walker   Toilet Transfers   Toilet Transfer From Rolling walker   Toilet Transfer Type To and from   Toilet Transfer to Standard toilet   Toilet Transfer Technique Ambulating   Toilet Transfers Supervision   Toilet Transfers Comments grab bar use   Cognition   Overall Cognitive Status [facility]   Arousal/Participation Cooperative   Attention Attends with cues to redirect   Orientation Level Oriented X4   Memory Within functional limits   Following Commands Follows all commands and directions without difficulty   Comments Pt states that her DTR will be able to help at home upon d/c, reports understaiding of all education provided this date     Activity Tolerance   Activity Tolerance Patient tolerated treatment well   Medical Staff Made Aware NSG aware   Assessment   Assessment Pt was seen this date for OT tx session focusign on  self care tasks, sit to stand progressions, tranfers, funciotnal mobility, enery conservation techniques, fall prevention education, home d/c discussion and planning and overal lactiivty tolerance  Pt presents seated OOB in chair, compeltes previously mentioned tasks at docuemented assist levels, see above  Pt is currently functioning at S-mod I level and report she will have support at home from her  and DTR  Pt states that she has 8/10 pain with PCA pumpp and asking NSG for oral pain meds however flaccing at pain level- 0 and observed smileing and dancing at RW level following stair trial with PT in celebration of al lthe progress she has made  Pt goals have been addressed, no immeidate OT needs at thsi time  Spoke to OTR/L, recommendations have been made, no immeidate OT needs, D/C OT   Reconsult if needed should there be a significant change in status post PCA removal     Plan   Treatment Interventions ADL retraining   Goal Expiration Date 10/07/19   OT Treatment Day 3   OT Frequency 3-5x/wk   Recommendation   OT Discharge Recommendation Home with family support       Garret Ahn, [address]

## 2019-09-27 NOTE — PLAN OF CARE
Problem: Potential for Falls  Goal: Patient will remain free of falls  Description  INTERVENTIONS:  - Assess patient frequently for physical needs  -  Identify cognitive and physical deficits and behaviors that affect risk of falls    -  [place] fall precautions as indicated by assessment   - Educate patient/family on patient safety including physical limitations  - Instruct patient to call for assistance with activity based on assessment  - Modify environment to reduce risk of injury  - Consider OT/PT consult to assist with strengthening/mobility  Outcome: Progressing     Problem: PAIN - ADULT  Goal: Verbalizes/displays adequate comfort level or baseline comfort level  Description  Interventions:  - Encourage patient to monitor pain and request assistance  - Assess pain using appropriate pain scale  - Administer analgesics based on type and severity of pain and evaluate response  - Implement non-pharmacological measures as appropriate and evaluate response  - Consider cultural and social influences on pain and pain management  - Notify physician/advanced practitioner if interventions unsuccessful or patient reports new pain  Outcome: Progressing     Problem: SAFETY ADULT  Goal: Maintain or return to baseline ADL function  Description  INTERVENTIONS:  -  Assess patient's ability to carry out ADLs; assess patient's baseline for ADL function and identify physical deficits which impact ability to perform ADLs (bathing, care of mouth/teeth, toileting, grooming, dressing, etc )  - Assess/evaluate cause of self-care deficits   - Assess range of motion  - Assess patient's mobility; develop plan if impaired  - Assess patient's need for assistive devices and provide as appropriate  - Encourage maximum independence but intervene and supervise when necessary  - Involve family in performance of ADLs  - Assess for home care needs following discharge   - Consider OT consult to assist with ADL evaluation and planning for discharge  - Provide patient education as appropriate  Outcome: Progressing  Goal: Maintain or return mobility status to optimal level  Description  INTERVENTIONS:  - Assess patient's baseline mobility status (ambulation, transfers, stairs, etc )    - Identify cognitive and physical deficits and behaviors that affect mobility  - Identify mobility aids required to assist with transfers and/or ambulation (gait belt, sit-to-stand, lift, walker, cane, etc )  - [place] fall precautions as indicated by assessment  - Record patient progress and toleration of activity level on Mobility SBAR; progress patient to next Phase/Stage  - Instruct patient to call for assistance with activity based on assessment  - Consider rehabilitation consult to assist with strengthening/weightbearing, etc   Outcome: Progressing     Problem: DISCHARGE PLANNING  Goal: Discharge to home or other facility with appropriate resources  Description  INTERVENTIONS:  - Identify barriers to discharge w/patient and caregiver  - Arrange for needed discharge resources and transportation as appropriate  - Identify discharge learning needs (meds, wound care, etc )  - Arrange for interpretive services to assist at discharge as needed  - Refer to Case Management Department for coordinating discharge planning if the patient needs post-hospital services based on physician/advanced practitioner order or complex needs related to functional status, cognitive ability, or social support system  Outcome: Progressing     Problem: Knowledge Deficit  Goal: Patient/family/caregiver demonstrates understanding of disease process, treatment plan, medications, and discharge instructions  Description  Complete learning assessment and assess knowledge base    Interventions:  - Provide teaching at level of understanding  - Provide teaching via preferred learning methods  Outcome: Progressing     Problem: Prexisting or High Potential for Compromised Skin Integrity  Goal: Skin integrity is maintained or improved  Description  INTERVENTIONS:  - Identify patients at risk for skin breakdown  - Assess and monitor skin integrity  - Assess and monitor nutrition and hydration status  - Monitor labs   - Assess for incontinence   - Turn and reposition patient  - Assist with mobility/ambulation  - Relieve pressure over bony prominences  - Avoid friction and shearing  - Provide appropriate hygiene as needed including keeping skin clean and dry  - Evaluate need for skin moisturizer/barrier cream  - Collaborate with interdisciplinary team   - Patient/family teaching  - Consider wound care consult   Outcome: Progressing     Problem: COPING  Goal: Pt/Family able to verbalize concerns and demonstrate effective coping strategies  Description  INTERVENTIONS:  - Assist patient/family to identify coping skills, available support systems and cultural and spiritual values  - Provide emotional support, including active listening and acknowledgement of concerns of patient and caregivers  - Reduce environmental stimuli, as able  - Provide patient education  - Assess for spiritual pain/suffering and initiate spiritual care, including notification of Pastoral Care or [place] based community as needed  - Assess effectiveness of coping strategies  Outcome: Progressing     Problem: RESPIRATORY - ADULT  Goal: Achieves optimal ventilation and oxygenation  Description  INTERVENTIONS:  - Assess for changes in respiratory status  - Assess for changes in mentation and behavior  - Position to facilitate oxygenation and minimize respiratory effort  - Oxygen administered by appropriate delivery if ordered  - Initiate smoking cessation education as indicated  - Encourage broncho-pulmonary hygiene including cough, deep breathe, Incentive Spirometry  - Assess the need for suctioning and aspirate as needed  - Assess and instruct to report SOB or any respiratory difficulty  - Respiratory Therapy support as indicated  Outcome: Progressing     Problem: MUSCULOSKELETAL - ADULT  Goal: Maintain or return mobility to safest level of function  Description  INTERVENTIONS:  - Assess patient's ability to carry out ADLs; assess patient's baseline for ADL function and identify physical deficits which impact ability to perform ADLs (bathing, care of mouth/teeth, toileting, grooming, dressing, etc )  - Assess/evaluate cause of self-care deficits   - Assess range of motion  - Assess patient's mobility  - Assess patient's need for assistive devices and provide as appropriate  - Encourage maximum independence but intervene and supervise when necessary  - Involve family in performance of ADLs  - Assess for home care needs following discharge   - Consider OT consult to assist with ADL evaluation and planning for discharge  - Provide patient education as appropriate  Outcome: Progressing  Goal: Maintain proper alignment of affected body part  Description  INTERVENTIONS:  - Support, maintain and protect limb and body alignment  - Provide patient/ family with appropriate education  Outcome: Progressing

## 2019-09-27 NOTE — PROGRESS NOTES
Anesthesia Progress Note - Epidural Pain Management    Livan Rosario MRN: [id number]  Unit/Bed#: [facility] 601-91 Encounter: [id number]    SURGERY DATE: * No surgery found *  * No surgery found *    Assessment:   69 y o  female STATUS POST multiple rib fractures with thoracic epidural for pain control    POD# 4    Plan:   Patient denies pain and can use her incentive spirometer without issue  Plan is to remove her epidural tomorrow, please hold morning dose of anticoagulation   Patient may take PRN pain medications before catheter removal to decrease post-epidural pain    Please call  ( [facility] [phone]) with any questions    Subjective:  Current pain location(s): Chest wall  Pain Scale:   0/10    Meds/Allergies     Allergies   Allergen Reactions    Bactrim [Sulfamethoxazole-Trimethoprim] Hives       Objective     Temp:  [97 9 °F (36 6 °C)-99 °F (37 2 °C)] 98 7 °F (37 1 °C)  HR:  [63-83] 63  Resp:  [16-20] 19  BP: (112-145)/(56-63) 130/63    Physical Exam:  Gen: Well appearing, NAD  CV: RRR  Pulm: CTAB  Neuro: No focal deficits  Epidural Site: Catheter in good position, not tender to palpation, site clean    SIGNATURE: Brad Tamayo MD  DATE: September 27, 2019  TIME: 1:48 PM

## 2019-09-27 NOTE — PROGRESS NOTES
Spoke to [place] [employer] [facility] because pt refusing IV site change  Trauma service okay with maintaining current IV access  Per service, pt expected to be discharged tomorrow  Will continue to monitor

## 2019-09-27 NOTE — PLAN OF CARE
Problem: PHYSICAL THERAPY ADULT  Goal: Performs mobility at highest level of function for planned discharge setting  See evaluation for individualized goals  Description  Treatment/Interventions: Functional transfer training, LE strengthening/ROM, Therapeutic exercise, Endurance training, Elevations, Patient/family training, Equipment eval/education, Bed mobility, Gait training, Spoke to nursing, Spoke to case management, Family  Equipment Recommended: Faustino Bennett       See flowsheet documentation for full assessment, interventions and recommendations  Note:   Prognosis: Good  Problem List: Decreased strength, Decreased endurance, Impaired balance, Decreased mobility, Decreased coordination, Impaired judgement, Decreased safety awareness, Pain  Assessment: Patient agreeable to therapy session focusing on functional mobility  Patient c/o increased R abdomen/rib pain during activity, RN notified  Patient required less assistance for stair management and is now at a supervision level  Verbal cues required for safety and energy conservation  At this time, patient's primary limiting factor is pain as patient still continues to use PCA and request for pain meds  Patient would continue to benefit from skilled inpatient PT to maximize functional mobility and to decrease risk of falls  Upon discharge once medically stable; recommend home with increased family support and homePT to improve patient's independence and decrease burden of care  End of session, patient upright in chair with all needs in reach  Recommendation: Home with family support, Home PT     PT - OK to Discharge: Yes(Once medically stable)    See flowsheet documentation for full assessment

## 2019-09-27 NOTE — PROGRESS NOTES
Progress Note Seth Sanz 1945, 69 y o  female MRN: [id number]    Unit/Bed#: [facility] 814-01 Encounter: [id number]    Primary Care Provider: Bishnu Bahena MD   Date and time admitted to hospital: 9/21/2019  2:24 AM        Hemothorax, traumatic  Assessment & Plan  - chest tube removed 9/24  - follow up CXR -- stable with small apical pneumothorax  - O2 sats dip into the 80's off O2  Attempt to wean  - follows outpatient with pulmonology; will recommend outpatient follow-up    Diabetes mellitus [facility])  Assessment & Plan  Lab Results   Component Value Date    HGBA1C 6 0 09/18/2019       Recent Labs     09/26/19  1650 09/26/19  2042 09/27/19  0735 09/27/19  1127   POCGLU 202* 173* 184* 192*       Blood Sugar Average: Last 72 hrs:  (P) 200 125     - increased sliding scale and add on meal time humalog  - continue to monitor blood sugars  - diabetic diet    HTN (hypertension)  Assessment & Plan  - confirm home medication regimen  - discuss with patient at bedside    * Closed fracture of multiple ribs of right side  Assessment & Plan  - right-sided rib fractures 6 through 9 on the right  - continue pulmonary toilet  - aggressive pulmonary toilet  - epidural placed on 09/23/2019; started on subcutaneous heparin status post epidural placement  - patient pain continues to remain stable and continues to use flutter valve and ascertain 147 on incentive spirometry  - will discuss possible removal of epidural on 09/28/2019          Bedside rounds completed with nurse yes       Disposition: home vs rehab      SUBJECTIVE:  Chief Complaint: pain    Subjective: I feel slightly better until I move      OBJECTIVE:     Meds/Allergies     Current Facility-Administered Medications:     acetaminophen (TYLENOL) tablet 975 mg, 975 mg, Oral, Q8H [address], Kathy Mancini DO, 975 mg at 09/27/19 1425    albuterol inhalation solution 2 5 mg, 2 5 mg, Nebulization, Q4H PRN, Yaya Bolden DO, 2 5 mg at 09/23/19 0825    amLODIPine (NORVASC) tablet 5 mg, 5 mg, Oral, Daily, Lauryn Vazquez PA-C, 5 mg at 09/27/19 6289    docusate sodium (COLACE) capsule 100 mg, 100 mg, Oral, BID, Meagan M Dryden, DO, 100 mg at 09/27/19 0838    fluticasone-vilanterol (BREO ELLIPTA) 100-25 mcg/inh inhaler 1 puff, 1 puff, Inhalation, Daily, Lauryn Vazquez PA-C, 1 puff at 58/40/14 5369    folic acid (FOLVITE) tablet 1 mg, 1 mg, Oral, Daily, Kathy MELISSA Addis, DO, 1 mg at 09/27/19 1357    gabapentin (NEURONTIN) capsule 100 mg, 100 mg, Oral, TID, Tuesday M RENATA GomezNP, 100 mg at 09/27/19 7740    heparin (porcine) subcutaneous injection 5,000 Units, 5,000 Units, Subcutaneous, Q8H [address], Lauryn Vazquez PA-C, 5,000 Units at 09/27/19 1425    hydrochlorothiazide (HYDRODIURIL) tablet 12 5 mg, 12 5 mg, Oral, Daily, Lauryn Vazquez PA-C, 12 5 mg at 09/27/19 2230    HYDROmorphone (DILAUDID) injection 0 2 mg, 0 2 mg, Intravenous, Q3H PRN, Sam Mode, DO, 0 2 mg at 09/23/19 0539    insulin lispro (HumaLOG) 100 units/mL subcutaneous injection 1-6 Units, 1-6 Units, Subcutaneous, TID With Meals, 2 Units at 09/27/19 1200 **AND** Fingerstick Glucose (POCT), , , 4x Daily AC and at bedtime, Lauryn Vazquez PA-C    insulin lispro (HumaLOG) 100 units/mL subcutaneous injection 6 Units, 6 Units, Subcutaneous, TID With Meals, Lauryn Vazquez PA-C, 6 Units at 09/27/19 1159    lidocaine (LIDODERM) 5 % patch 1 patch, 1 patch, Topical, Daily, Tuesday M JAQUAN Gomez, 1 patch at 09/27/19 6024    methocarbamol (ROBAXIN) tablet 500 mg, 500 mg, Oral, Q6H PRN, Haydee Schulz MD, 500 mg at 09/23/19 0846    multivitamin-minerals (CENTRUM) tablet 1 tablet, 1 tablet, Oral, Daily, Kathy Mancini DO, 1 tablet at 09/27/19 0838    OLANZapine (ZyPREXA ZYDIS) dispersible tablet 2 5 mg, 2 5 mg, Oral, Once, JAQUAN Gonzalez    ondansetron [facility]) injection 4 mg, 4 mg, Intravenous, Q6H PRN, Kathy Mancini DO, 4 mg at 09/21/19 1538    oxyCODONE (ROXICODONE) IR tablet 2 5 mg, 2 5 mg, Oral, Q4H PRN, Kathy TORRES Addis, DO    oxyCODONE (ROXICODONE) IR tablet 5 mg, 5 mg, Oral, Q4H PRN, Kathy Mancini, DO, 5 mg at 09/27/19 1236    pantoprazole (PROTONIX) EC tablet 40 mg, 40 mg, Oral, Early Morning, MILKA Tovar-C, 40 mg at 09/27/19 0542    polyethylene glycol (MIRALAX) packet 17 g, 17 g, Oral, Daily PRN, Tuesday M Bay, CRNP    ropivacaine 0 1% and fentaNYL 2 mcg/mL PCEA, , Epidural, Continuous, Dory Lagos MD    [facility]) tablet 17 2 mg, 2 tablet, Oral, Daily, Kathy Mancini, DO, 17 2 mg at 09/27/19 9095    thiamine (VITAMIN B1) tablet 100 mg, 100 mg, Oral, Daily, Kathy Mancini, DO, 100 mg at 09/27/19 0838     Vitals:   Vitals:    09/27/19 1453   BP: 133/65   Pulse: 71   Resp: 18   Temp: 98 8 °F (37 1 °C)   SpO2: 95%       Intake/Output:  I/O       09/25 0701 - 09/26 0700 09/26 0701 - 09/27 0700 09/27 0701 - 09/28 0700    P  O  780 900 180    I V  (mL/kg) 234 1 (3 7) 196 2 (3 1) 70 9 (1 1)    Total Intake(mL/kg) 1014 1 (15 9) 1096 2 (17 2) 250 9 (3 9)    Urine (mL/kg/hr) 1600 (1) 3253 (2 1)     Stool 0      Total Output 1600 3253     Net -586 -2156 9 +250 9           Unmeasured Urine Occurrence 2 x  1 x    Unmeasured Stool Occurrence 2 x             Nutrition/GI Proph/Bowel Reg: Constant Carbs    Physical Exam:   GENERAL APPEARANCE: OOB in a chiar  NEURO: intact  HEENT: EOm's intact  CV: RRR, no complaint of chest pain  LUNGS: CTA bilaterally, no shortness of breath  GI: tolerating a diet  : voiding  MSK: moving extremities  SKIN: warm and dry    Invasive Devices     Peripheral Intravenous Line            Peripheral IV 09/23/19 Right Forearm 4 days          Epidural Line            Epidural Catheter 09/23/19 4 days                 Lab Results: Results for Renee Shine (MRN [id number]) as of 9/27/2019 14:58   Ref   Range 9/27/2019 05:00   Sodium Latest Ref Range: 136 - 145 mmol/L 129 (L)   Potassium Latest Ref Range: 3 5 - 5 3 mmol/L 3 8 Chloride Latest Ref Range: 100 - 108 mmol/L 92 (L)   CO2 Latest Ref Range: 21 - 32 mmol/L 31   Anion Gap Latest Ref Range: 4 - 13 mmol/L 6   BUN Latest Ref Range: 5 - 25 mg/dL 15   Creatinine Latest Ref Range: 0 60 - 1 30 mg/dL 0 50 (L)   Glucose, Random Latest Ref Range: 65 - 140 mg/dL 178 (H)   Calcium Latest Ref Range: 8 3 - 10 1 mg/dL 9 3   eGFR Latest Units: ml/min/1 73sq m 96   WBC Latest Ref Range: 4 31 - 10 16 Thousand/uL 7 86   Red Blood Cell Count Latest Ref Range: 3 81 - 5 12 Million/uL 3 03 (L)   Hemoglobin Latest Ref Range: 11 5 - 15 4 g/dL 9 8 (L)   HCT Latest Ref Range: 34 8 - 46 1 % 28 4 (L)   MCV Latest Ref Range: 82 - 98 fL 94   MCH Latest Ref Range: 26 8 - 34 3 pg 32 3   MCHC Latest Ref Range: 31 4 - 37 4 g/dL 34 5   RDW Latest Ref Range: 11 6 - 15 1 % 12 1   Platelet Count Latest Ref Range: 149 - 390 Thousands/uL 252   MPV Latest Ref Range: 8 9 - 12 7 fL 12 5   nRBC Latest Units: /100 WBCs 0   Neutrophils % Latest Ref Range: 43 - 75 % 65   Immat GRANS % Latest Ref Range: 0 - 2 % 1   Lymphocytes Relative Latest Ref Range: 14 - 44 % 23   Monocytes Relative Latest Ref Range: 4 - 12 % 7   Eosinophils Latest Ref Range: 0 - 6 % 4   Basophils Relative Latest Ref Range: 0 - 1 % 0   Immature Grans Absolute Latest Ref Range: 0 00 - 0 20 Thousand/uL 0 04   Absolute Neutrophils Latest Ref Range: 1 85 - 7 62 Thousands/µL 5 10   Lymphocytes Absolute Latest Ref Range: 0 60 - 4 47 Thousands/µL 1 84   Absolute Monocytes Latest Ref Range: 0 17 - 1 22 Thousand/µL 0 53   Absolute Eosinophils Latest Ref Range: 0 00 - 0 61 Thousand/µL 0 32   Basophils Absolute Latest Ref Range: 0 00 - 0 10 Thousands/µL 0 03     Imaging/EKG Studies:    Minimal right apical pneumothorax, diminished in size      Small right effusion, minimal left effusion and right basilar airspace disease, unchanged  Other Studies: none  VTE Prophylaxis: Sequential compression device (Venodyne)  and Heparin

## 2019-09-27 NOTE — NURSING NOTE
Pt  Called out to nursing station complaining of chest pain  Pt  States she believes it is gas pain, not her heart  Pt  States it is stabbing  Vitals WNL  EKG done  Pt  Belching excessively when moved from bed to chair  Some relief with belching  Spoke with trauma resident in regards to above  Trauma resident Will be up to see pt

## 2019-09-27 NOTE — PLAN OF CARE
Problem: OCCUPATIONAL THERAPY ADULT  Goal: Performs self-care activities at highest level of function for planned discharge setting  See evaluation for individualized goals  Description  Treatment Interventions: ADL retraining, Functional transfer training, Endurance training, Cognitive reorientation, Patient/family training, Equipment evaluation/education, Compensatory technique education, Energy conservation, Activityengagement          See flowsheet documentation for full assessment, interventions and recommendations  Outcome: Completed  Note:   Limitation: Decreased ADL status, Decreased Safe judgement during ADL, Decreased endurance, Decreased self-care trans, Decreased high-level ADLs  Prognosis: Fair  Assessment: Pt was seen this date for OT tx session focusign on  self care tasks, sit to stand progressions, tranfers, funciotnal mobility, enery conservation techniques, fall prevention education, home d/c discussion and planning and overal lactiivty tolerance  Pt presents seated OOB in chair, compeltes previously mentioned tasks at docuemented assist levels, see above  Pt is currently functioning at S-mod I level and report she will have support at home from her  and DTR  Pt states that she has 8/10 pain with PCA pumpp and asking NSG for oral pain meds however flaccing at pain level- 0 and observed smileing and dancing at RW level following stair trial with PT in celebration of al lthe progress she has made  Pt goals have been addressed, no immeidate OT needs at thsi time  Spoke to OTR/L, recommendations have been made, no immeidate OT needs, D/C OT   Reconsult if needed should there be a significant change in status post PCA removal       OT Discharge Recommendation: Home with family support  OT - OK to Discharge: Yes(when medically cleared)

## 2019-09-27 NOTE — ASSESSMENT & PLAN NOTE
Lab Results   Component Value Date    HGBA1C 6 0 09/18/2019       Recent Labs     09/26/19  1650 09/26/19  2042 09/27/19  0735 09/27/19  1127   POCGLU 202* 173* 184* 192*       Blood Sugar Average: Last 72 hrs:  (P) 200 125     - increased sliding scale and add on meal time humalog  - continue to monitor blood sugars  - diabetic diet

## 2019-09-27 NOTE — PHYSICAL THERAPY NOTE
Physical Therapy Progress Note    Patient Name: Laurel Dinh    EJWAF'M Date: 9/27/2019     Problem List  Principal Problem:    Closed fracture of multiple ribs of right side  Active Problems:    HTN (hypertension)    Diabetes mellitus ([facility] [address] )    Hyperlipidemia    Hemothorax, traumatic       Past Medical History  Past Medical History:   Diagnosis Date    Asthma     Diabetes mellitus ([facility] [address] )     Hypertension         Past Surgical History  Past Surgical History:   Procedure Laterality Date    BRONCHOSCOPY N/A 3/16/2016    Procedure: BRONCHOSCOPY FLEXIBLE/anesth ;  Surgeon: Sixto Spencer DO;  Location: [facility]; Service:     COLONOSCOPY      EYE SURGERY      OOPHORECTOMY Left     age 49           09/27/19 1216   Pain Assessment   Pain Assessment 0-10   Pain Score 8   Pain Type Acute pain   Pain Location Abdomen   Pain Orientation Right   Hospital Pain Intervention(s) Ambulation/increased activity;Repositioned   Restrictions/Precautions   Weight Bearing Precautions Per Order No   Other Precautions Multiple lines; Fall Risk;Pain  (Epidural PCA pump )   General   Chart Reviewed Yes   Response to Previous Treatment Patient with no complaints from previous session     Family/Caregiver Present No   Cognition   Overall Cognitive Status WFL   Arousal/Participation Cooperative   Attention Attends with cues to redirect   Orientation Level Oriented X4   Memory Within functional limits   Following Commands Follows multistep commands without difficulty   Subjective   Subjective "I just want to go home to my family"   Bed Mobility   Additional Comments Not assessed upon arrival patient sitting OOB   Transfers   Sit to Stand 5  Supervision   Additional items Increased time required   Stand to Sit 5  Supervision   Additional items Increased time required   Toilet transfer 5  Supervision   Additional items Increased time required   Additional Comments Performed with RW; No LOB; Assist for managing lines    Ambulation/Elevation   Gait pattern Improper Weight shift;Decreased foot clearance; Short stride   Gait Assistance 5  Supervision   Additional items Verbal cues  (VCs for safety)   Assistive Device Rolling walker   Distance 80 feet x 2   Stair Management Assistance 5  Supervision   Additional items Verbal cues   Stair Management Technique One rail R;Step to pattern   Number of Stairs 4  (Limited by lines; No LOB; Demostrate good safety)   Balance   Static Sitting Good   Dynamic Sitting Fair +   Static Standing Fair   Dynamic Standing Fair   Ambulatory Fair   Endurance Deficit   Endurance Deficit Yes   Endurance Deficit Description pain   Activity Tolerance   Activity Tolerance Patient tolerated treatment well;Patient limited by pain   Nurse Made Aware RN cleared patient to see PT; Notified RN about increased R abdomen pain   Assessment   Assessment Patient agreeable to therapy session focusing on functional mobility  Patient c/o increased R abdomen/rib pain during activity, RN notified  Patient required less assistance for stair management and is now at a supervision level  Verbal cues required for safety and energy conservation  At this time, patient's primary limiting factor is pain as patient still continues to use PCA and request for pain meds  Patient would continue to benefit from skilled inpatient PT to maximize functional mobility and to decrease risk of falls  Upon discharge once medically stable; recommend home with increased family support and homePT to improve patient's independence and decrease burden of care  End of session, patient upright in chair with all needs in reach  Goals   STG Expiration Date 10/04/19   Short Term Goal #1 pt will:  Increase bilateral LE strength 1/2 grade to facilitate independent mobility, Perform all bed mobility tasks w/ supervision to decrease fall risk factors, Perform all transfers modified independent to improve independence, Ambulate 250 ft  with roller walker w/ supervision w/o LOB, Navigate 4 stairs w/ supervision with unilateral handrail to facilitate return to previous living environment, Increase all balance 1/2 grade to decrease risk for falls and Improve Barthel Index score to 80 or greater to facilitate independence   PT Treatment Day 2   Plan   Progress Progressing toward goals   Recommendation   Recommendation Home with family support;Home PT   Equipment Recommended Walker   PT - OK to Discharge Yes  (Once medically stable)       ANJEL BynumT, PT

## 2019-09-27 NOTE — ASSESSMENT & PLAN NOTE
- right-sided rib fractures 6 through 9 on the right  - continue pulmonary toilet  - aggressive pulmonary toilet  - epidural placed on 09/23/2019; started on subcutaneous heparin status post epidural placement  - patient pain continues to remain stable and continues to use flutter valve and ascertain 634 on incentive spirometry  - will discuss possible removal of epidural on 09/28/2019

## 2019-09-28 PROBLEM — J93.9 PNEUMOTHORAX: Status: ACTIVE | Noted: 2019-09-28

## 2019-09-28 PROBLEM — G89.11 ACUTE PAIN DUE TO TRAUMA: Status: ACTIVE | Noted: 2019-09-28

## 2019-09-28 LAB
ANION GAP SERPL CALCULATED.3IONS-SCNC: 8 MMOL/L (ref 4–13)
BASOPHILS # BLD AUTO: 0.04 THOUSANDS/ΜL (ref 0–0.1)
BASOPHILS NFR BLD AUTO: 1 % (ref 0–1)
BUN SERPL-MCNC: 15 MG/DL (ref 5–25)
CALCIUM SERPL-MCNC: 9.3 MG/DL (ref 8.3–10.1)
CHLORIDE SERPL-SCNC: 93 MMOL/L (ref 100–108)
CO2 SERPL-SCNC: 30 MMOL/L (ref 21–32)
CREAT SERPL-MCNC: 0.54 MG/DL (ref 0.6–1.3)
EOSINOPHIL # BLD AUTO: 0.33 THOUSAND/ΜL (ref 0–0.61)
EOSINOPHIL NFR BLD AUTO: 4 % (ref 0–6)
ERYTHROCYTE [DISTWIDTH] IN BLOOD BY AUTOMATED COUNT: 12.2 % (ref 11.6–15.1)
GFR SERPL CREATININE-BSD FRML MDRD: 94 ML/MIN/1.73SQ M
GLUCOSE SERPL-MCNC: 170 MG/DL (ref 65–140)
GLUCOSE SERPL-MCNC: 183 MG/DL (ref 65–140)
GLUCOSE SERPL-MCNC: 192 MG/DL (ref 65–140)
GLUCOSE SERPL-MCNC: 198 MG/DL (ref 65–140)
GLUCOSE SERPL-MCNC: 279 MG/DL (ref 65–140)
HCT VFR BLD AUTO: 28.6 % (ref 34.8–46.1)
HGB BLD-MCNC: 9.5 G/DL (ref 11.5–15.4)
IMM GRANULOCYTES # BLD AUTO: 0.05 THOUSAND/UL (ref 0–0.2)
IMM GRANULOCYTES NFR BLD AUTO: 1 % (ref 0–2)
LYMPHOCYTES # BLD AUTO: 1.64 THOUSANDS/ΜL (ref 0.6–4.47)
LYMPHOCYTES NFR BLD AUTO: 20 % (ref 14–44)
MCH RBC QN AUTO: 31.7 PG (ref 26.8–34.3)
MCHC RBC AUTO-ENTMCNC: 33.2 G/DL (ref 31.4–37.4)
MCV RBC AUTO: 95 FL (ref 82–98)
MONOCYTES # BLD AUTO: 0.63 THOUSAND/ΜL (ref 0.17–1.22)
MONOCYTES NFR BLD AUTO: 8 % (ref 4–12)
NEUTROPHILS # BLD AUTO: 5.43 THOUSANDS/ΜL (ref 1.85–7.62)
NEUTS SEG NFR BLD AUTO: 66 % (ref 43–75)
NRBC BLD AUTO-RTO: 0 /100 WBCS
PLATELET # BLD AUTO: 263 THOUSANDS/UL (ref 149–390)
PMV BLD AUTO: 12.1 FL (ref 8.9–12.7)
POTASSIUM SERPL-SCNC: 4.1 MMOL/L (ref 3.5–5.3)
RBC # BLD AUTO: 3 MILLION/UL (ref 3.81–5.12)
SODIUM SERPL-SCNC: 131 MMOL/L (ref 136–145)
WBC # BLD AUTO: 8.12 THOUSAND/UL (ref 4.31–10.16)

## 2019-09-28 PROCEDURE — 85025 COMPLETE CBC W/AUTO DIFF WBC: CPT | Performed by: NURSE PRACTITIONER

## 2019-09-28 PROCEDURE — 80048 BASIC METABOLIC PNL TOTAL CA: CPT | Performed by: NURSE PRACTITIONER

## 2019-09-28 PROCEDURE — 82948 REAGENT STRIP/BLOOD GLUCOSE: CPT

## 2019-09-28 PROCEDURE — 94760 N-INVAS EAR/PLS OXIMETRY 1: CPT

## 2019-09-28 PROCEDURE — 99232 SBSQ HOSP IP/OBS MODERATE 35: CPT | Performed by: PHYSICIAN ASSISTANT

## 2019-09-28 RX ORDER — OXYCODONE HYDROCHLORIDE 5 MG/1
5 TABLET ORAL ONCE
Status: COMPLETED | OUTPATIENT
Start: 2019-09-28 | End: 2019-09-28

## 2019-09-28 RX ORDER — HYDROMORPHONE HCL/PF 1 MG/ML
0.5 SYRINGE (ML) INJECTION ONCE
Status: DISCONTINUED | OUTPATIENT
Start: 2019-09-28 | End: 2019-09-28

## 2019-09-28 RX ADMIN — METHOCARBAMOL TABLETS 500 MG: 500 TABLET, COATED ORAL at 16:56

## 2019-09-28 RX ADMIN — AMLODIPINE BESYLATE 5 MG: 5 TABLET ORAL at 08:20

## 2019-09-28 RX ADMIN — Medication 1 TABLET: at 08:20

## 2019-09-28 RX ADMIN — THIAMINE HCL TAB 100 MG 100 MG: 100 TAB at 08:20

## 2019-09-28 RX ADMIN — ALBUTEROL SULFATE 2 PUFF: 90 AEROSOL, METERED RESPIRATORY (INHALATION) at 04:43

## 2019-09-28 RX ADMIN — DOCUSATE SODIUM 100 MG: 100 CAPSULE, LIQUID FILLED ORAL at 08:20

## 2019-09-28 RX ADMIN — GABAPENTIN 100 MG: 100 CAPSULE ORAL at 08:20

## 2019-09-28 RX ADMIN — GABAPENTIN 100 MG: 100 CAPSULE ORAL at 21:41

## 2019-09-28 RX ADMIN — GABAPENTIN 100 MG: 100 CAPSULE ORAL at 16:56

## 2019-09-28 RX ADMIN — ALBUTEROL SULFATE 2 PUFF: 90 AEROSOL, METERED RESPIRATORY (INHALATION) at 17:54

## 2019-09-28 RX ADMIN — ALBUTEROL SULFATE 2 PUFF: 90 AEROSOL, METERED RESPIRATORY (INHALATION) at 08:24

## 2019-09-28 RX ADMIN — INSULIN LISPRO 6 UNITS: 100 INJECTION, SOLUTION INTRAVENOUS; SUBCUTANEOUS at 08:19

## 2019-09-28 RX ADMIN — FOLIC ACID 1 MG: 1 TABLET ORAL at 08:20

## 2019-09-28 RX ADMIN — ACETAMINOPHEN 975 MG: 325 TABLET ORAL at 14:21

## 2019-09-28 RX ADMIN — OXYCODONE HYDROCHLORIDE 2.5 MG: 5 TABLET ORAL at 04:55

## 2019-09-28 RX ADMIN — LIDOCAINE 1 PATCH: 50 PATCH CUTANEOUS at 08:20

## 2019-09-28 RX ADMIN — ENOXAPARIN SODIUM 30 MG: 30 INJECTION SUBCUTANEOUS at 17:00

## 2019-09-28 RX ADMIN — ACETAMINOPHEN 975 MG: 325 TABLET ORAL at 05:22

## 2019-09-28 RX ADMIN — FLUTICASONE FUROATE AND VILANTEROL TRIFENATATE 1 PUFF: 100; 25 POWDER RESPIRATORY (INHALATION) at 08:19

## 2019-09-28 RX ADMIN — INSULIN LISPRO 2 UNITS: 100 INJECTION, SOLUTION INTRAVENOUS; SUBCUTANEOUS at 21:42

## 2019-09-28 RX ADMIN — OXYCODONE HYDROCHLORIDE 2.5 MG: 5 TABLET ORAL at 11:58

## 2019-09-28 RX ADMIN — HYDROCHLOROTHIAZIDE 12.5 MG: 12.5 TABLET ORAL at 08:20

## 2019-09-28 RX ADMIN — SENNOSIDES 17.2 MG: 8.6 TABLET, FILM COATED ORAL at 08:20

## 2019-09-28 RX ADMIN — INSULIN LISPRO 6 UNITS: 100 INJECTION, SOLUTION INTRAVENOUS; SUBCUTANEOUS at 16:56

## 2019-09-28 RX ADMIN — INSULIN LISPRO 12 UNITS: 100 INJECTION, SOLUTION INTRAVENOUS; SUBCUTANEOUS at 16:56

## 2019-09-28 RX ADMIN — INSULIN LISPRO 2 UNITS: 100 INJECTION, SOLUTION INTRAVENOUS; SUBCUTANEOUS at 08:19

## 2019-09-28 RX ADMIN — ACETAMINOPHEN 975 MG: 325 TABLET ORAL at 21:41

## 2019-09-28 RX ADMIN — INSULIN LISPRO 6 UNITS: 100 INJECTION, SOLUTION INTRAVENOUS; SUBCUTANEOUS at 11:59

## 2019-09-28 RX ADMIN — INSULIN LISPRO 1 UNITS: 100 INJECTION, SOLUTION INTRAVENOUS; SUBCUTANEOUS at 11:59

## 2019-09-28 RX ADMIN — OXYCODONE HYDROCHLORIDE 5 MG: 5 TABLET ORAL at 19:10

## 2019-09-28 RX ADMIN — HEPARIN SODIUM 5000 UNITS: 5000 INJECTION INTRAVENOUS; SUBCUTANEOUS at 14:22

## 2019-09-28 RX ADMIN — OXYCODONE HYDROCHLORIDE 5 MG: 5 TABLET ORAL at 14:21

## 2019-09-28 RX ADMIN — DOCUSATE SODIUM 100 MG: 100 CAPSULE, LIQUID FILLED ORAL at 17:00

## 2019-09-28 RX ADMIN — PANTOPRAZOLE SODIUM 40 MG: 40 TABLET, DELAYED RELEASE ORAL at 05:23

## 2019-09-28 NOTE — ASSESSMENT & PLAN NOTE
- right-sided rib fractures 6 through 9 on the right  - continue pulmonary toilet  - epidural catheter removed this morning, patient having more severe pain now  Will give 5 mg Oxycodone and tylenol now  IV dilaudid avialable to patient but requests not to have an IV replaced at this time  Will re-evaluate following oral analgesics and place an IV for breakthrough medications if needed and patient agrees  -  patient pain continues to remain stable and continues to use flutter valve and IS, pulling 750 mls  - resume Lovenox this evening now that [facility] has been removed

## 2019-09-28 NOTE — PROGRESS NOTES
IV catheter flushed after speaking to [employer], site noted to be leaking  Catheter removed and site change attempted x1 but was unsuccessful  Pt was very upset at the idea of a new site  Trauma paged, spoke to Terrance who permitted holding IV site change at this time  Will continue to monitor

## 2019-09-28 NOTE — PLAN OF CARE
Problem: Potential for Falls  Goal: Patient will remain free of falls  Description  INTERVENTIONS:  - Assess patient frequently for physical needs  -  Identify cognitive and physical deficits and behaviors that affect risk of falls    -  [place] fall precautions as indicated by assessment   - Educate patient/family on patient safety including physical limitations  - Instruct patient to call for assistance with activity based on assessment  - Modify environment to reduce risk of injury  - Consider OT/PT consult to assist with strengthening/mobility  Outcome: Progressing     Problem: PAIN - ADULT  Goal: Verbalizes/displays adequate comfort level or baseline comfort level  Description  Interventions:  - Encourage patient to monitor pain and request assistance  - Assess pain using appropriate pain scale  - Administer analgesics based on type and severity of pain and evaluate response  - Implement non-pharmacological measures as appropriate and evaluate response  - Consider cultural and social influences on pain and pain management  - Notify physician/advanced practitioner if interventions unsuccessful or patient reports new pain  Outcome: Progressing     Problem: SAFETY ADULT  Goal: Maintain or return to baseline ADL function  Description  INTERVENTIONS:  -  Assess patient's ability to carry out ADLs; assess patient's baseline for ADL function and identify physical deficits which impact ability to perform ADLs (bathing, care of mouth/teeth, toileting, grooming, dressing, etc )  - Assess/evaluate cause of self-care deficits   - Assess range of motion  - Assess patient's mobility; develop plan if impaired  - Assess patient's need for assistive devices and provide as appropriate  - Encourage maximum independence but intervene and supervise when necessary  - Involve family in performance of ADLs  - Assess for home care needs following discharge   - Consider OT consult to assist with ADL evaluation and planning for discharge  - Provide patient education as appropriate  Outcome: Progressing  Goal: Maintain or return mobility status to optimal level  Description  INTERVENTIONS:  - Assess patient's baseline mobility status (ambulation, transfers, stairs, etc )    - Identify cognitive and physical deficits and behaviors that affect mobility  - Identify mobility aids required to assist with transfers and/or ambulation (gait belt, sit-to-stand, lift, walker, cane, etc )  - [place] fall precautions as indicated by assessment  - Record patient progress and toleration of activity level on Mobility SBAR; progress patient to next Phase/Stage  - Instruct patient to call for assistance with activity based on assessment  - Consider rehabilitation consult to assist with strengthening/weightbearing, etc   Outcome: Progressing     Problem: DISCHARGE PLANNING  Goal: Discharge to home or other facility with appropriate resources  Description  INTERVENTIONS:  - Identify barriers to discharge w/patient and caregiver  - Arrange for needed discharge resources and transportation as appropriate  - Identify discharge learning needs (meds, wound care, etc )  - Arrange for interpretive services to assist at discharge as needed  - Refer to Case Management Department for coordinating discharge planning if the patient needs post-hospital services based on physician/advanced practitioner order or complex needs related to functional status, cognitive ability, or social support system  Outcome: Progressing     Problem: Knowledge Deficit  Goal: Patient/family/caregiver demonstrates understanding of disease process, treatment plan, medications, and discharge instructions  Description  Complete learning assessment and assess knowledge base    Interventions:  - Provide teaching at level of understanding  - Provide teaching via preferred learning methods  Outcome: Progressing     Problem: Prexisting or High Potential for Compromised Skin Integrity  Goal: Skin integrity is maintained or improved  Description  INTERVENTIONS:  - Identify patients at risk for skin breakdown  - Assess and monitor skin integrity  - Assess and monitor nutrition and hydration status  - Monitor labs   - Assess for incontinence   - Turn and reposition patient  - Assist with mobility/ambulation  - Relieve pressure over bony prominences  - Avoid friction and shearing  - Provide appropriate hygiene as needed including keeping skin clean and dry  - Evaluate need for skin moisturizer/barrier cream  - Collaborate with interdisciplinary team   - Patient/family teaching  - Consider wound care consult   Outcome: Progressing     Problem: COPING  Goal: Pt/Family able to verbalize concerns and demonstrate effective coping strategies  Description  INTERVENTIONS:  - Assist patient/family to identify coping skills, available support systems and cultural and spiritual values  - Provide emotional support, including active listening and acknowledgement of concerns of patient and caregivers  - Reduce environmental stimuli, as able  - Provide patient education  - Assess for spiritual pain/suffering and initiate spiritual care, including notification of Pastoral Care or [place] based community as needed  - Assess effectiveness of coping strategies  Outcome: Progressing     Problem: RESPIRATORY - ADULT  Goal: Achieves optimal ventilation and oxygenation  Description  INTERVENTIONS:  - Assess for changes in respiratory status  - Assess for changes in mentation and behavior  - Position to facilitate oxygenation and minimize respiratory effort  - Oxygen administered by appropriate delivery if ordered  - Initiate smoking cessation education as indicated  - Encourage broncho-pulmonary hygiene including cough, deep breathe, Incentive Spirometry  - Assess the need for suctioning and aspirate as needed  - Assess and instruct to report SOB or any respiratory difficulty  - Respiratory Therapy support as indicated  Outcome: Progressing     Problem: MUSCULOSKELETAL - ADULT  Goal: Maintain or return mobility to safest level of function  Description  INTERVENTIONS:  - Assess patient's ability to carry out ADLs; assess patient's baseline for ADL function and identify physical deficits which impact ability to perform ADLs (bathing, care of mouth/teeth, toileting, grooming, dressing, etc )  - Assess/evaluate cause of self-care deficits   - Assess range of motion  - Assess patient's mobility  - Assess patient's need for assistive devices and provide as appropriate  - Encourage maximum independence but intervene and supervise when necessary  - Involve family in performance of ADLs  - Assess for home care needs following discharge   - Consider OT consult to assist with ADL evaluation and planning for discharge  - Provide patient education as appropriate  Outcome: Progressing  Goal: Maintain proper alignment of affected body part  Description  INTERVENTIONS:  - Support, maintain and protect limb and body alignment  - Provide patient/ family with appropriate education  Outcome: Progressing

## 2019-09-28 NOTE — ASSESSMENT & PLAN NOTE
-epidural catheters removed today and patient is having increased pain  Has only been receiving 2 and 0 5 mg of oxycodone  Will administer her scheduled Tylenol as well as 5 mg of oxycodone  She had her IV removed yesterday and has been refusing to have 1 replaced  Will monitor pain following oral analgesic administration and place IV if needed for breakthrough medication    -patient will likely discharge home on 09/29 was pain is under better control

## 2019-09-28 NOTE — PROGRESS NOTES
Anesthesia Progress Note - Epidural Pain Management    Betsy Werner MRN: [id number]  Unit/Bed#: [facility] 522-95 Encounter: [id number]      Pertinent labs and anticoagulants reviewed  Epidural catheter removed and catheter tip intact  Site of epidural clean, dry without erythema or pus  No DVT PPX for 4 hours after removal  RN notified      Primary service to resume pain control   (please call  with any questions)      SIGNATURE: Cici Figueroa MD  DATE: September 28, 2019  TIME: 11:55 AM

## 2019-09-28 NOTE — ASSESSMENT & PLAN NOTE
-residual apical pneumothorax following chest tube removal   This is diminished in size on chest x-ray on 09/27   -follow-up with Trauma in 2 weeks with repeat chest x-ray prior to up

## 2019-09-28 NOTE — PROGRESS NOTES
Progress Note Norma Verduzco 1945, 69 y o  female MRN: [id number]    Unit/Bed#: [facility] 814-01 Encounter: [id number]    Primary Care Provider: Roger Colin MD   Date and time admitted to hospital: 9/21/2019  2:24 AM        * Closed fracture of multiple ribs of right side  Assessment & Plan  - right-sided rib fractures 6 through 9 on the right  - continue pulmonary toilet  - epidural catheter removed this morning, patient having more severe pain now  Will give 5 mg Oxycodone and tylenol now  IV dilaudid avialable to patient but requests not to have an IV replaced at this time  Will re-evaluate following oral analgesics and place an IV for breakthrough medications if needed and patient agrees  -  patient pain continues to remain stable and continues to use flutter valve and IS, pulling 750 mls  - resume Lovenox this evening now that [facility] has been removed  Hemothorax, traumatic  Assessment & Plan  - chest tube removed 9/24  - follow up CXR -- stable with small apical pneumothorax which is diminished in size on CXR on 9/27  - on room air  - follows outpatient with pulmonology; will recommend outpatient follow-up    Pneumothorax  Assessment & Plan  -residual apical pneumothorax following chest tube removal   This is diminished in size on chest x-ray on 09/27   -follow-up with Trauma in 2 weeks with repeat chest x-ray prior to up    Acute pain due to trauma  Assessment & Plan  -epidural catheters removed today and patient is having increased pain  Has only been receiving 2 and 0 5 mg of oxycodone  Will administer her scheduled Tylenol as well as 5 mg of oxycodone  She had her IV removed yesterday and has been refusing to have 1 replaced  Will monitor pain following oral analgesic administration and place IV if needed for breakthrough medication    -patient will likely discharge home on 09/29 was pain is under better control    HTN (hypertension)  Assessment & Plan  -continue medications    Hyperlipidemia  Assessment & Plan  - on home Lipitor    Diabetes mellitus [facility])  Assessment & Plan  Lab Results   Component Value Date    HGBA1C 6 0 09/18/2019       Recent Labs     09/27/19  1613 09/27/19  2136 09/28/19  0744 09/28/19  1056   POCGLU 211* 279* 198* 183*       Blood Sugar Average: Last 72 hrs:  (P) 205 5     - increase sliding scale and continue meal time humalog  -resume home metformin upon discharge  - continue to monitor blood sugars  - diabetic diet    Hyponatremia  Assessment & Plan  -sodium improved today at 1:31 a m   -asymptomatic  -repeat BMP in a m  Bedside rounds completed with nurse  Disposition: Continue med-surg status      SUBJECTIVE:  Chief Complaint: "I'm having so much pain "    Subjective:  Patient is epidural catheter was removed this morning by the acute Pain service  Since then she is having intense pain in her right chest wall  She denies shortness of breath, lightheadedness, dizziness  I explained to her that her chest x-ray results yesterday looked improved        OBJECTIVE:     Meds/Allergies     Current Facility-Administered Medications:     acetaminophen (TYLENOL) tablet 975 mg, 975 mg, Oral, Q8H [address], Kathy Mancini, DO, 975 mg at 09/28/19 1421    albuterol (PROVENTIL HFA,VENTOLIN HFA) inhaler 2 puff, 2 puff, Inhalation, Q4H PRN, Yaya Bolden DO, 2 puff at 09/28/19 0824    albuterol inhalation solution 2 5 mg, 2 5 mg, Nebulization, Q4H PRN, Yaya Bolden DO, 2 5 mg at 09/23/19 0825    amLODIPine (NORVASC) tablet 5 mg, 5 mg, Oral, Daily, Torsten Trotter PA-C, 5 mg at 09/28/19 0820    docusate sodium (COLACE) capsule 100 mg, 100 mg, Oral, BID, Kathy Mancini DO, 100 mg at 09/28/19 0820    fluticasone-vilanterol (BREO ELLIPTA) 100-25 mcg/inh inhaler 1 puff, 1 puff, Inhalation, Daily, Kathy Esquivel PA-C, 1 puff at 03/00/58 2332    folic acid (FOLVITE) tablet 1 mg, 1 mg, Oral, Daily, Kathy Mancini DO, 1 mg at 09/28/19 2882   gabapentin (NEURONTIN) capsule 100 mg, 100 mg, Oral, TID, Tuesday M Jason, RENATANP, 100 mg at 09/28/19 0820    heparin (porcine) subcutaneous injection 5,000 Units, 5,000 Units, Subcutaneous, Q8H [facility], Karrie Rueda PA-C, 5,000 Units at 09/28/19 1422    hydrochlorothiazide (HYDRODIURIL) tablet 12 5 mg, 12 5 mg, Oral, Daily, Karrie Rueda PA-C, 12 5 mg at 09/28/19 0820    HYDROmorphone (DILAUDID) injection 0 2 mg, 0 2 mg, Intravenous, Q3H PRN, Maria R Morelos DO, 0 2 mg at 09/23/19 0539    insulin lispro (HumaLOG) 100 units/mL subcutaneous injection 2-12 Units, 2-12 Units, Subcutaneous, HS, Jorge Summers MD, 6 Units at 09/27/19 2258    insulin lispro (HumaLOG) 100 units/mL subcutaneous injection 2-12 Units, 2-12 Units, Subcutaneous, HS, Burdette Lennox, PA-C    insulin lispro (HumaLOG) 100 units/mL subcutaneous injection 4-20 Units, 4-20 Units, Subcutaneous, TID AC **AND** Fingerstick Glucose (POCT), , , TID AC, Burdette Lennox, PA-C    insulin lispro (HumaLOG) 100 units/mL subcutaneous injection 6 Units, 6 Units, Subcutaneous, TID With Meals, Karrie Rueda PA-C, 6 Units at 09/28/19 1159    lidocaine (LIDODERM) 5 % patch 1 patch, 1 patch, Topical, Daily, Tuesday M JAQUAN Gomez, 1 patch at 09/28/19 0820    methocarbamol (ROBAXIN) tablet 500 mg, 500 mg, Oral, Q6H PRN, Ag Guido MD, 500 mg at 09/23/19 0846    multivitamin-minerals (CENTRUM) tablet 1 tablet, 1 tablet, Oral, Daily, Kathy Mancini DO, 1 tablet at 09/28/19 0820    OLANZapine (ZyPREXA ZYDIS) dispersible tablet 2 5 mg, 2 5 mg, Oral, Once, BenjaminRENATA LeosNP    ondansetron [facility]) injection 4 mg, 4 mg, Intravenous, Q6H PRN, Kathy Mancini DO, 4 mg at 09/21/19 1538    oxyCODONE (ROXICODONE) IR tablet 2 5 mg, 2 5 mg, Oral, Q4H PRN, Kathy Mancini DO, 2 5 mg at 09/28/19 1158    oxyCODONE (ROXICODONE) IR tablet 5 mg, 5 mg, Oral, Q4H PRN, Maria R Morelos DO, 5 mg at 09/27/19 2112    pantoprazole (PROTONIX) EC tablet 40 mg, 40 mg, Oral, Early Morning, Neda Moore PA-C, 40 mg at 09/28/19 8331    polyethylene glycol (MIRALAX) packet 17 g, 17 g, Oral, Daily PRN, Tuesday MELISSA JAQUAN Gomez    [facility]) tablet 17 2 mg, 2 tablet, Oral, Daily, Kathy Mancini, DO, 17 2 mg at 09/28/19 0820    thiamine (VITAMIN B1) tablet 100 mg, 100 mg, Oral, Daily, Kathy Mancini, DO, 100 mg at 09/28/19 0820     Vitals:   Vitals:    09/28/19 1055   BP: 110/52   Pulse: 64   Resp: 16   Temp: 98 3 °F (36 8 °C)   SpO2: 98%       Intake/Output:  I/O       09/26 0701 - 09/27 0700 09/27 0701 - 09/28 0700 09/28 0701 - 09/29 0700    P  O  900 300 300    I V  (mL/kg) 196 2 (3 1) 182 4 (2 9) 137 6 (2 2)    Total Intake(mL/kg) 1096 2 (17 2) 482 4 (7 6) 437 6 (6 9)    Urine (mL/kg/hr) 3253 (2 1) 1900 (1 2) 600 (1 2)    Stool       Total Output 3253 1900 600    Net -2156 9 -1417 7 -162 5           Unmeasured Urine Occurrence  2 x            Nutrition/GI Proph/Bowel Reg:  Regular    Physical Exam:   GENERAL APPEARANCE:  +having right chest wall pain  NEURO:  GCS 15, no focal deficits  HEENT:  Normocephalic, atraumatic  CV:  Regular rate and rhythm, no murmurs gallops or rubs  LUNGS:  Clear to auscultation bilaterally; +right chest wall dressing clean and intact  GI:  Soft, nontender, nondistended  :  Voiding  MSK:  Moving all extremities equally with full strength  SKIN:  Pink, warm, dry    Invasive Devices     Epidural Line            Epidural Catheter 09/23/19 5 days                 Lab Results:   Results: I have personally reviewed pertinent reports   , BMP/CMP:   Lab Results   Component Value Date    SODIUM 131 (L) 09/28/2019    K 4 1 09/28/2019    CL 93 (L) 09/28/2019    CO2 30 09/28/2019    BUN 15 09/28/2019    CREATININE 0 54 (L) 09/28/2019    CALCIUM 9 3 09/28/2019    EGFR 94 09/28/2019    and CBC:   Lab Results   Component Value Date    WBC 8 12 09/28/2019    HGB 9 5 (L) 09/28/2019    HCT 28 6 (L) 09/28/2019    MCV 95 09/28/2019  09/28/2019    MCH 31 7 09/28/2019    MCHC 33 2 09/28/2019    RDW 12 2 09/28/2019    MPV 12 1 09/28/2019    NRBC 0 09/28/2019     Imaging/EKG Studies: Results: I have personally reviewed pertinent reports       9/27 CXR: Minimal right apical pneumothorax, diminished in size      Small right effusion, minimal left effusion and right basilar airspace disease, unchanged     Other Studies: no new  VTE Prophylaxis: Sequential compression device (Venodyne)

## 2019-09-28 NOTE — ASSESSMENT & PLAN NOTE
Lab Results   Component Value Date    HGBA1C 6 0 09/18/2019       Recent Labs     09/27/19  1613 09/27/19  2136 09/28/19  0744 09/28/19  1056   POCGLU 211* 279* 198* 183*       Blood Sugar Average: Last 72 hrs:  (P) 205 5     - increase sliding scale and continue meal time humalog  -resume home metformin upon discharge  - continue to monitor blood sugars  - diabetic diet

## 2019-09-28 NOTE — ASSESSMENT & PLAN NOTE
- chest tube removed 9/24  - follow up CXR -- stable with small apical pneumothorax which is diminished in size on CXR on 9/27  - on room air  - follows outpatient with pulmonology; will recommend outpatient follow-up

## 2019-09-29 VITALS
BODY MASS INDEX: 24.88 KG/M2 | OXYGEN SATURATION: 97 % | TEMPERATURE: 98.3 F | SYSTOLIC BLOOD PRESSURE: 147 MMHG | HEIGHT: 63 IN | HEART RATE: 67 BPM | WEIGHT: 140.43 LBS | RESPIRATION RATE: 18 BRPM | DIASTOLIC BLOOD PRESSURE: 72 MMHG

## 2019-09-29 LAB
ANION GAP SERPL CALCULATED.3IONS-SCNC: 5 MMOL/L (ref 4–13)
BASOPHILS # BLD AUTO: 0.04 THOUSANDS/ΜL (ref 0–0.1)
BASOPHILS NFR BLD AUTO: 1 % (ref 0–1)
BUN SERPL-MCNC: 13 MG/DL (ref 5–25)
CALCIUM SERPL-MCNC: 9.8 MG/DL (ref 8.3–10.1)
CHLORIDE SERPL-SCNC: 94 MMOL/L (ref 100–108)
CO2 SERPL-SCNC: 29 MMOL/L (ref 21–32)
CREAT SERPL-MCNC: 0.5 MG/DL (ref 0.6–1.3)
EOSINOPHIL # BLD AUTO: 0.25 THOUSAND/ΜL (ref 0–0.61)
EOSINOPHIL NFR BLD AUTO: 3 % (ref 0–6)
ERYTHROCYTE [DISTWIDTH] IN BLOOD BY AUTOMATED COUNT: 12.4 % (ref 11.6–15.1)
GFR SERPL CREATININE-BSD FRML MDRD: 96 ML/MIN/1.73SQ M
GLUCOSE SERPL-MCNC: 233 MG/DL (ref 65–140)
GLUCOSE SERPL-MCNC: 239 MG/DL (ref 65–140)
GLUCOSE SERPL-MCNC: 275 MG/DL (ref 65–140)
HCT VFR BLD AUTO: 29.5 % (ref 34.8–46.1)
HGB BLD-MCNC: 9.9 G/DL (ref 11.5–15.4)
IMM GRANULOCYTES # BLD AUTO: 0.05 THOUSAND/UL (ref 0–0.2)
IMM GRANULOCYTES NFR BLD AUTO: 1 % (ref 0–2)
LYMPHOCYTES # BLD AUTO: 1.44 THOUSANDS/ΜL (ref 0.6–4.47)
LYMPHOCYTES NFR BLD AUTO: 17 % (ref 14–44)
MCH RBC QN AUTO: 31.5 PG (ref 26.8–34.3)
MCHC RBC AUTO-ENTMCNC: 33.6 G/DL (ref 31.4–37.4)
MCV RBC AUTO: 94 FL (ref 82–98)
MONOCYTES # BLD AUTO: 0.6 THOUSAND/ΜL (ref 0.17–1.22)
MONOCYTES NFR BLD AUTO: 7 % (ref 4–12)
NEUTROPHILS # BLD AUTO: 6.27 THOUSANDS/ΜL (ref 1.85–7.62)
NEUTS SEG NFR BLD AUTO: 71 % (ref 43–75)
NRBC BLD AUTO-RTO: 0 /100 WBCS
PLATELET # BLD AUTO: 294 THOUSANDS/UL (ref 149–390)
PMV BLD AUTO: 11.8 FL (ref 8.9–12.7)
POTASSIUM SERPL-SCNC: 4.1 MMOL/L (ref 3.5–5.3)
RBC # BLD AUTO: 3.14 MILLION/UL (ref 3.81–5.12)
SODIUM SERPL-SCNC: 128 MMOL/L (ref 136–145)
WBC # BLD AUTO: 8.65 THOUSAND/UL (ref 4.31–10.16)

## 2019-09-29 PROCEDURE — 99238 HOSP IP/OBS DSCHRG MGMT 30/<: CPT | Performed by: SURGERY

## 2019-09-29 PROCEDURE — 82948 REAGENT STRIP/BLOOD GLUCOSE: CPT

## 2019-09-29 PROCEDURE — 80048 BASIC METABOLIC PNL TOTAL CA: CPT | Performed by: PHYSICIAN ASSISTANT

## 2019-09-29 PROCEDURE — 85025 COMPLETE CBC W/AUTO DIFF WBC: CPT | Performed by: PHYSICIAN ASSISTANT

## 2019-09-29 RX ORDER — SODIUM CHLORIDE 1000 MG
1 TABLET, SOLUBLE MISCELLANEOUS 2 TIMES DAILY WITH MEALS
Status: DISCONTINUED | OUTPATIENT
Start: 2019-09-29 | End: 2019-09-29 | Stop reason: HOSPADM

## 2019-09-29 RX ORDER — GABAPENTIN 100 MG/1
100 CAPSULE ORAL 3 TIMES DAILY
Qty: 60 CAPSULE | Refills: 0 | Status: SHIPPED | OUTPATIENT
Start: 2019-09-29 | End: 2019-10-14 | Stop reason: SINTOL

## 2019-09-29 RX ORDER — SODIUM CHLORIDE 1000 MG
1 TABLET, SOLUBLE MISCELLANEOUS 2 TIMES DAILY WITH MEALS
Qty: 10 TABLET | Refills: 0 | Status: SHIPPED | OUTPATIENT
Start: 2019-09-29 | End: 2020-01-06

## 2019-09-29 RX ORDER — ACETAMINOPHEN 325 MG/1
975 TABLET ORAL EVERY 8 HOURS SCHEDULED
Qty: 30 TABLET | Refills: 0 | Status: SHIPPED | OUTPATIENT
Start: 2019-09-29

## 2019-09-29 RX ORDER — DOCUSATE SODIUM 100 MG/1
100 CAPSULE, LIQUID FILLED ORAL 2 TIMES DAILY
Qty: 10 CAPSULE | Refills: 0 | Status: SHIPPED | OUTPATIENT
Start: 2019-09-29 | End: 2020-01-06

## 2019-09-29 RX ORDER — OXYCODONE HYDROCHLORIDE 5 MG/1
5 TABLET ORAL EVERY 4 HOURS PRN
Qty: 30 TABLET | Refills: 0 | Status: SHIPPED | OUTPATIENT
Start: 2019-09-29 | End: 2019-10-09

## 2019-09-29 RX ORDER — SENNOSIDES 8.6 MG
2 TABLET ORAL DAILY
Qty: 120 EACH | Refills: 0 | Status: SHIPPED | OUTPATIENT
Start: 2019-09-30 | End: 2020-01-06

## 2019-09-29 RX ORDER — POLYETHYLENE GLYCOL 3350 17 G/17G
17 POWDER, FOR SOLUTION ORAL DAILY PRN
Qty: 14 EACH | Refills: 0 | Status: SHIPPED | OUTPATIENT
Start: 2019-09-29 | End: 2020-01-06

## 2019-09-29 RX ORDER — METHOCARBAMOL 500 MG/1
500 TABLET, FILM COATED ORAL 3 TIMES DAILY
Qty: 60 TABLET | Refills: 0 | Status: SHIPPED | OUTPATIENT
Start: 2019-09-29 | End: 2020-01-06

## 2019-09-29 RX ADMIN — ACETAMINOPHEN 975 MG: 325 TABLET ORAL at 05:10

## 2019-09-29 RX ADMIN — Medication 1 TABLET: at 08:32

## 2019-09-29 RX ADMIN — METHOCARBAMOL TABLETS 500 MG: 500 TABLET, COATED ORAL at 03:19

## 2019-09-29 RX ADMIN — PANTOPRAZOLE SODIUM 40 MG: 40 TABLET, DELAYED RELEASE ORAL at 05:10

## 2019-09-29 RX ADMIN — SENNOSIDES 17.2 MG: 8.6 TABLET, FILM COATED ORAL at 08:32

## 2019-09-29 RX ADMIN — OXYCODONE HYDROCHLORIDE 5 MG: 5 TABLET ORAL at 00:01

## 2019-09-29 RX ADMIN — INSULIN LISPRO 12 UNITS: 100 INJECTION, SOLUTION INTRAVENOUS; SUBCUTANEOUS at 12:07

## 2019-09-29 RX ADMIN — INSULIN LISPRO 6 UNITS: 100 INJECTION, SOLUTION INTRAVENOUS; SUBCUTANEOUS at 12:07

## 2019-09-29 RX ADMIN — FOLIC ACID 1 MG: 1 TABLET ORAL at 08:32

## 2019-09-29 RX ADMIN — ALBUTEROL SULFATE 2 PUFF: 90 AEROSOL, METERED RESPIRATORY (INHALATION) at 11:12

## 2019-09-29 RX ADMIN — OXYCODONE HYDROCHLORIDE 5 MG: 5 TABLET ORAL at 05:10

## 2019-09-29 RX ADMIN — AMLODIPINE BESYLATE 5 MG: 5 TABLET ORAL at 08:36

## 2019-09-29 RX ADMIN — THIAMINE HCL TAB 100 MG 100 MG: 100 TAB at 08:32

## 2019-09-29 RX ADMIN — LIDOCAINE 1 PATCH: 50 PATCH CUTANEOUS at 08:32

## 2019-09-29 RX ADMIN — FLUTICASONE FUROATE AND VILANTEROL TRIFENATATE 1 PUFF: 100; 25 POWDER RESPIRATORY (INHALATION) at 08:32

## 2019-09-29 RX ADMIN — ENOXAPARIN SODIUM 30 MG: 30 INJECTION SUBCUTANEOUS at 05:10

## 2019-09-29 RX ADMIN — INSULIN LISPRO 6 UNITS: 100 INJECTION, SOLUTION INTRAVENOUS; SUBCUTANEOUS at 08:30

## 2019-09-29 RX ADMIN — OXYCODONE HYDROCHLORIDE 5 MG: 5 TABLET ORAL at 12:07

## 2019-09-29 RX ADMIN — DOCUSATE SODIUM 100 MG: 100 CAPSULE, LIQUID FILLED ORAL at 08:32

## 2019-09-29 RX ADMIN — INSULIN LISPRO 8 UNITS: 100 INJECTION, SOLUTION INTRAVENOUS; SUBCUTANEOUS at 08:30

## 2019-09-29 RX ADMIN — HYDROCHLOROTHIAZIDE 12.5 MG: 12.5 TABLET ORAL at 08:36

## 2019-09-29 RX ADMIN — GABAPENTIN 100 MG: 100 CAPSULE ORAL at 08:32

## 2019-09-29 NOTE — ASSESSMENT & PLAN NOTE
-epidural catheters removed yesterday pain is now controlled on oral regimen     -discharge home today and follow up with Trauma in 2 weeks

## 2019-09-29 NOTE — PLAN OF CARE
Problem: Potential for Falls  Goal: Patient will remain free of falls  Description  INTERVENTIONS:  - Assess patient frequently for physical needs  -  Identify cognitive and physical deficits and behaviors that affect risk of falls    -  [place] fall precautions as indicated by assessment   - Educate patient/family on patient safety including physical limitations  - Instruct patient to call for assistance with activity based on assessment  - Modify environment to reduce risk of injury  - Consider OT/PT consult to assist with strengthening/mobility  Outcome: Adequate for Discharge     Problem: PAIN - ADULT  Goal: Verbalizes/displays adequate comfort level or baseline comfort level  Description  Interventions:  - Encourage patient to monitor pain and request assistance  - Assess pain using appropriate pain scale  - Administer analgesics based on type and severity of pain and evaluate response  - Implement non-pharmacological measures as appropriate and evaluate response  - Consider cultural and social influences on pain and pain management  - Notify physician/advanced practitioner if interventions unsuccessful or patient reports new pain  Outcome: Adequate for Discharge     Problem: SAFETY ADULT  Goal: Maintain or return to baseline ADL function  Description  INTERVENTIONS:  -  Assess patient's ability to carry out ADLs; assess patient's baseline for ADL function and identify physical deficits which impact ability to perform ADLs (bathing, care of mouth/teeth, toileting, grooming, dressing, etc )  - Assess/evaluate cause of self-care deficits   - Assess range of motion  - Assess patient's mobility; develop plan if impaired  - Assess patient's need for assistive devices and provide as appropriate  - Encourage maximum independence but intervene and supervise when necessary  - Involve family in performance of ADLs  - Assess for home care needs following discharge   - Consider OT consult to assist with ADL evaluation and planning for discharge  - Provide patient education as appropriate  Outcome: Adequate for Discharge  Goal: Maintain or return mobility status to optimal level  Description  INTERVENTIONS:  - Assess patient's baseline mobility status (ambulation, transfers, stairs, etc )    - Identify cognitive and physical deficits and behaviors that affect mobility  - Identify mobility aids required to assist with transfers and/or ambulation (gait belt, sit-to-stand, lift, walker, cane, etc )  - [place] fall precautions as indicated by assessment  - Record patient progress and toleration of activity level on Mobility SBAR; progress patient to next Phase/Stage  - Instruct patient to call for assistance with activity based on assessment  - Consider rehabilitation consult to assist with strengthening/weightbearing, etc   Outcome: Adequate for Discharge     Problem: DISCHARGE PLANNING  Goal: Discharge to home or other facility with appropriate resources  Description  INTERVENTIONS:  - Identify barriers to discharge w/patient and caregiver  - Arrange for needed discharge resources and transportation as appropriate  - Identify discharge learning needs (meds, wound care, etc )  - Arrange for interpretive services to assist at discharge as needed  - Refer to Case Management Department for coordinating discharge planning if the patient needs post-hospital services based on physician/advanced practitioner order or complex needs related to functional status, cognitive ability, or social support system  Outcome: Adequate for Discharge     Problem: Knowledge Deficit  Goal: Patient/family/caregiver demonstrates understanding of disease process, treatment plan, medications, and discharge instructions  Description  Complete learning assessment and assess knowledge base    Interventions:  - Provide teaching at level of understanding  - Provide teaching via preferred learning methods  Outcome: Adequate for Discharge     Problem: Prexisting or High Potential for Compromised Skin Integrity  Goal: Skin integrity is maintained or improved  Description  INTERVENTIONS:  - Identify patients at risk for skin breakdown  - Assess and monitor skin integrity  - Assess and monitor nutrition and hydration status  - Monitor labs   - Assess for incontinence   - Turn and reposition patient  - Assist with mobility/ambulation  - Relieve pressure over bony prominences  - Avoid friction and shearing  - Provide appropriate hygiene as needed including keeping skin clean and dry  - Evaluate need for skin moisturizer/barrier cream  - Collaborate with interdisciplinary team   - Patient/family teaching  - Consider wound care consult   Outcome: Adequate for Discharge     Problem: COPING  Goal: Pt/Family able to verbalize concerns and demonstrate effective coping strategies  Description  INTERVENTIONS:  - Assist patient/family to identify coping skills, available support systems and cultural and spiritual values  - Provide emotional support, including active listening and acknowledgement of concerns of patient and caregivers  - Reduce environmental stimuli, as able  - Provide patient education  - Assess for spiritual pain/suffering and initiate spiritual care, including notification of Pastoral Care or [place] based community as needed  - Assess effectiveness of coping strategies  Outcome: Adequate for Discharge     Problem: RESPIRATORY - ADULT  Goal: Achieves optimal ventilation and oxygenation  Description  INTERVENTIONS:  - Assess for changes in respiratory status  - Assess for changes in mentation and behavior  - Position to facilitate oxygenation and minimize respiratory effort  - Oxygen administered by appropriate delivery if ordered  - Initiate smoking cessation education as indicated  - Encourage broncho-pulmonary hygiene including cough, deep breathe, Incentive Spirometry  - Assess the need for suctioning and aspirate as needed  - Assess and instruct to report SOB or any respiratory difficulty  - Respiratory Therapy support as indicated  Outcome: Adequate for Discharge     Problem: MUSCULOSKELETAL - ADULT  Goal: Maintain or return mobility to safest level of function  Description  INTERVENTIONS:  - Assess patient's ability to carry out ADLs; assess patient's baseline for ADL function and identify physical deficits which impact ability to perform ADLs (bathing, care of mouth/teeth, toileting, grooming, dressing, etc )  - Assess/evaluate cause of self-care deficits   - Assess range of motion  - Assess patient's mobility  - Assess patient's need for assistive devices and provide as appropriate  - Encourage maximum independence but intervene and supervise when necessary  - Involve family in performance of ADLs  - Assess for home care needs following discharge   - Consider OT consult to assist with ADL evaluation and planning for discharge  - Provide patient education as appropriate  Outcome: Adequate for Discharge  Goal: Maintain proper alignment of affected body part  Description  INTERVENTIONS:  - Support, maintain and protect limb and body alignment  - Provide patient/ family with appropriate education  Outcome: Adequate for Discharge

## 2019-09-29 NOTE — ASSESSMENT & PLAN NOTE
-sodium is mildly decreased to 128 today  Was 129 a few days ago  Patient is asymptomatic and ready to go home  Will hold home HCTZ and place on salt tablets with repeat BMP in 3 days and f/u with PCP in 1 week

## 2019-09-29 NOTE — PLAN OF CARE
Problem: Potential for Falls  Goal: Patient will remain free of falls  Description  INTERVENTIONS:  - Assess patient frequently for physical needs  -  Identify cognitive and physical deficits and behaviors that affect risk of falls    -  [place] fall precautions as indicated by assessment   - Educate patient/family on patient safety including physical limitations  - Instruct patient to call for assistance with activity based on assessment  - Modify environment to reduce risk of injury  - Consider OT/PT consult to assist with strengthening/mobility  Outcome: Progressing     Problem: PAIN - ADULT  Goal: Verbalizes/displays adequate comfort level or baseline comfort level  Description  Interventions:  - Encourage patient to monitor pain and request assistance  - Assess pain using appropriate pain scale  - Administer analgesics based on type and severity of pain and evaluate response  - Implement non-pharmacological measures as appropriate and evaluate response  - Consider cultural and social influences on pain and pain management  - Notify physician/advanced practitioner if interventions unsuccessful or patient reports new pain  Outcome: Progressing     Problem: SAFETY ADULT  Goal: Maintain or return to baseline ADL function  Description  INTERVENTIONS:  -  Assess patient's ability to carry out ADLs; assess patient's baseline for ADL function and identify physical deficits which impact ability to perform ADLs (bathing, care of mouth/teeth, toileting, grooming, dressing, etc )  - Assess/evaluate cause of self-care deficits   - Assess range of motion  - Assess patient's mobility; develop plan if impaired  - Assess patient's need for assistive devices and provide as appropriate  - Encourage maximum independence but intervene and supervise when necessary  - Involve family in performance of ADLs  - Assess for home care needs following discharge   - Consider OT consult to assist with ADL evaluation and planning for discharge  - Provide patient education as appropriate  Outcome: Progressing  Goal: Maintain or return mobility status to optimal level  Description  INTERVENTIONS:  - Assess patient's baseline mobility status (ambulation, transfers, stairs, etc )    - Identify cognitive and physical deficits and behaviors that affect mobility  - Identify mobility aids required to assist with transfers and/or ambulation (gait belt, sit-to-stand, lift, walker, cane, etc )  - [place] fall precautions as indicated by assessment  - Record patient progress and toleration of activity level on Mobility SBAR; progress patient to next Phase/Stage  - Instruct patient to call for assistance with activity based on assessment  - Consider rehabilitation consult to assist with strengthening/weightbearing, etc   Outcome: Progressing     Problem: DISCHARGE PLANNING  Goal: Discharge to home or other facility with appropriate resources  Description  INTERVENTIONS:  - Identify barriers to discharge w/patient and caregiver  - Arrange for needed discharge resources and transportation as appropriate  - Identify discharge learning needs (meds, wound care, etc )  - Arrange for interpretive services to assist at discharge as needed  - Refer to Case Management Department for coordinating discharge planning if the patient needs post-hospital services based on physician/advanced practitioner order or complex needs related to functional status, cognitive ability, or social support system  Outcome: Progressing     Problem: Knowledge Deficit  Goal: Patient/family/caregiver demonstrates understanding of disease process, treatment plan, medications, and discharge instructions  Description  Complete learning assessment and assess knowledge base    Interventions:  - Provide teaching at level of understanding  - Provide teaching via preferred learning methods  Outcome: Progressing     Problem: Prexisting or High Potential for Compromised Skin Integrity  Goal: Skin integrity is maintained or improved  Description  INTERVENTIONS:  - Identify patients at risk for skin breakdown  - Assess and monitor skin integrity  - Assess and monitor nutrition and hydration status  - Monitor labs   - Assess for incontinence   - Turn and reposition patient  - Assist with mobility/ambulation  - Relieve pressure over bony prominences  - Avoid friction and shearing  - Provide appropriate hygiene as needed including keeping skin clean and dry  - Evaluate need for skin moisturizer/barrier cream  - Collaborate with interdisciplinary team   - Patient/family teaching  - Consider wound care consult   Outcome: Progressing     Problem: COPING  Goal: Pt/Family able to verbalize concerns and demonstrate effective coping strategies  Description  INTERVENTIONS:  - Assist patient/family to identify coping skills, available support systems and cultural and spiritual values  - Provide emotional support, including active listening and acknowledgement of concerns of patient and caregivers  - Reduce environmental stimuli, as able  - Provide patient education  - Assess for spiritual pain/suffering and initiate spiritual care, including notification of Pastoral Care or [place] based community as needed  - Assess effectiveness of coping strategies  Outcome: Progressing     Problem: RESPIRATORY - ADULT  Goal: Achieves optimal ventilation and oxygenation  Description  INTERVENTIONS:  - Assess for changes in respiratory status  - Assess for changes in mentation and behavior  - Position to facilitate oxygenation and minimize respiratory effort  - Oxygen administered by appropriate delivery if ordered  - Initiate smoking cessation education as indicated  - Encourage broncho-pulmonary hygiene including cough, deep breathe, Incentive Spirometry  - Assess the need for suctioning and aspirate as needed  - Assess and instruct to report SOB or any respiratory difficulty  - Respiratory Therapy support as indicated  Outcome: Progressing     Problem: MUSCULOSKELETAL - ADULT  Goal: Maintain or return mobility to safest level of function  Description  INTERVENTIONS:  - Assess patient's ability to carry out ADLs; assess patient's baseline for ADL function and identify physical deficits which impact ability to perform ADLs (bathing, care of mouth/teeth, toileting, grooming, dressing, etc )  - Assess/evaluate cause of self-care deficits   - Assess range of motion  - Assess patient's mobility  - Assess patient's need for assistive devices and provide as appropriate  - Encourage maximum independence but intervene and supervise when necessary  - Involve family in performance of ADLs  - Assess for home care needs following discharge   - Consider OT consult to assist with ADL evaluation and planning for discharge  - Provide patient education as appropriate  Outcome: Progressing  Goal: Maintain proper alignment of affected body part  Description  INTERVENTIONS:  - Support, maintain and protect limb and body alignment  - Provide patient/ family with appropriate education  Outcome: Progressing

## 2019-09-29 NOTE — INCIDENTAL FINDINGS
The following findings require follow up:  Radiographic finding   Finding: Small hiatal hernia   Follow up required: family doctor   Follow up should be done within 2 week(s)

## 2019-09-29 NOTE — DISCHARGE SUMMARY
Discharge- Jose Luis Ruiz 1945, 69 y o  female MRN: [id number]    Unit/Bed#: [facility] 814-01 Encounter: [id number]    Primary Care Provider: Cuco Lobato MD   Date and time admitted to hospital: 9/21/2019  2:24 AM        * Closed fracture of multiple ribs of right side  Assessment & Plan  - right-sided rib fractures 6 through 9 on the right  - continue pulmonary toilet  - epidural catheter removed yesterday  Pain is controlled on an oral regimen  -  patient pain continues to remain stable and continues to use flutter valve and IS, pulling 750 mls  -discharge home today with VNA and home PT/OT      Hemothorax, traumatic  Assessment & Plan  - chest tube removed 9/24  - follow up CXR -- stable with small apical pneumothorax which is diminished in size on CXR on 9/27  - on room air  - follows outpatient with pulmonology; will recommend outpatient follow-up  -follow-up with Trauma in 2 weeks with a repeat x-ray    Pneumothorax  Assessment & Plan  -residual apical pneumothorax following chest tube removal   This is diminished in size on chest x-ray on 09/27   -follow-up with Trauma in 2 weeks with repeat chest x-ray prior to up    Acute pain due to trauma  Assessment & Plan  -epidural catheters removed yesterday pain is now controlled on oral regimen     -discharge home today and follow up with Trauma in 2 weeks    HTN (hypertension)  Assessment & Plan  -continue medications    Hyperlipidemia  Assessment & Plan  - on home Lipitor    Diabetes mellitus [facility])  Assessment & Plan  Lab Results   Component Value Date    HGBA1C 6 0 09/18/2019       Recent Labs     09/28/19  1056 09/28/19  1607 09/28/19  2129 09/29/19  0715   POCGLU 183* 279* 192* 233*       Blood Sugar Average: Last 72 hrs:  (P) 732 [id number]     - will resume home metformin upon discharge today  - diabetic diet  -follow-up with PCP    Hyponatremia  Assessment & Plan  -sodium is mildly decreased to 128 today  Was 129 a few days ago   Patient is asymptomatic and ready to go home  Will hold home HCTZ and place on salt tablets with repeat BMP in 3 days and f/u with PCP in 1 week  Resolved Problems  Date Reviewed: 9/29/2019    None          Admission Date:   Admission Orders (From admission, onward)     Ordered        09/21/19 0240  Inpatient Admission  Once                     Admitting Diagnosis: Rib pain [R07 81]    HPI: As documented by Dr Abigail Osorio who evaluated the patient on admission, "Nestor Simmons is a 69 y o  female who presents as a trauma transfer from [address] [place] [address]  Per patient and she does not know how she injured herself however she does admit to drinking 2 day  Patient states that she does not drink every day and has never gone into withdrawal rehab  Per chart review patient was initially complaining of back pain, she stated that her chest wall pain started approximately 2 hours prior to arrival   Family did present to [facility] however they were not with her and do not live with her, so they could not provide details of to what happened yesterday   "    Procedures Performed:   Orders Placed This Encounter   Procedures    Chest Tube Insertion       Summary of Hospital Course:  Patient was placed on the trauma service following possible fall  She sustained multiple right-sided rib fractures and a hemopneumothorax  She had a right-sided chest tube placed  Her chest tube was removed on 09/24 and a post pull chest x-ray showed a residual apical pneumothorax that was monitored with serial chest x-rays  Ultimately the pneumothorax appears to have diminished in size and she has only a tiny apical pneumothorax persistently high on most recent chest x-ray on 09/27  Acute pain was following her throughout her stay due to significant pain in the right chest wall  She ultimately required an epidural catheter placement for pain control    This was ultimately removed on 09/28 thereafter was pulled she had significant right-sided pain   It took about 24 hours to get her pain controlled on oral regimen  She was up and ambulatory with PT and OT who cleared her for discharge home  She is being sent home she is medically stable for discharge on 09/29/2019  She will have home VNA and PT/OT  She will follow up with Trauma in 2 weeks with a repeat chest x-ray at that time  She was mildly hyponatremic with a sodium of 128 on at day of discharge  She was started on salt tabs as she was asymptomatic in her home hydrochlorothiazide was held upon discharge  She is to have BMP in 3 days and follow up with her PCP regarding this within a week  This morning the patient notes pain in the right side however states that overall it is better and she is able to move around much more easily  Bedside nurse rounds were completed  Exam:  GEN:  No acute distress  HEENT:  Normocephalic, atraumatic  NEURO:  GCS 15, nonfocal exam  CV:  Regular rate and rhythm, no murmurs gallops or rubs  PULM:  Clear to auscultation bilaterally  GI:  Soft, nontender, nondistended  :  Voiding  MSK:  +moving all extremities equally and with full strength; +right chest wall tenderness to palpation; +right chest tube site clean dry and intact, well healing with dressing intact  SKIN:  Pink, warm, dry      Significant Findings, Care, Treatment and Services Provided:   Xr Chest Portable    Result Date: 9/26/2019  Impression: Small right apical pneumothorax is grossly unchanged but more difficult to visualize due to overlying posterior 3rd rib  Small right-sided effusion  Improving basilar opacity may represent improving atelectasis or pneumonia  Workstation performed: [id number]     Xr Chest Portable    Result Date: 9/25/2019  Impression: No substantial interval change in size of the small right pneumothorax   Workstation performed: [id number]     Xr Chest Portable    Result Date: 9/24/2019  Impression: No substantial interval change in size of the small right pneumothorax after removal of the chest tube  Workstation performed: [id number]     Xr Chest Portable    Result Date: 9/23/2019  Impression: Stable right small right apical pneumothorax  Unchanged right basilar chest tube position with the side port projecting over the midaxillary line  Stable right greater than left basilar atelectasis  Workstation performed: [id number]     Xr Chest Portable    Result Date: 9/22/2019  Impression: Right chest tube present  Tiny right apical pneumothorax remains present  Workstation performed: [id number]     Xr Chest Pa & Lateral    Result Date: 9/24/2019  Impression: Small right apical pneumothorax may be slightly larger  Chest tube side port has migrated into the soft tissues as compared to the prior study  Result was discussed with MILKA Brown at 10:50 AM  Workstation performed: [id number]     Xr Chest Pa & Lateral    Result Date: 9/22/2019  Impression: 1  Right basilar chest tube in place  No evident pneumothorax  2   Bibasilar opacities compatible with small effusions and component of atelectasis or infection  Workstation performed: [id number]     Xr Chest Pa & Lateral (24 Hours After Admission)    Result Date: 9/21/2019  Impression: Large right pleural fluid, increased compared to prior  No gross pneumothorax  Limited due to obliquity  Workstation performed: [id number]       Complications: none    Condition at Discharge: good         Discharge instructions/Information to patient and family:   See after visit summary for information provided to patient and family  Provisions for Follow-Up Care:  See after visit summary for information related to follow-up care and any pertinent home health orders  PCP: Fuentes Linder MD    Disposition: Home    Planned Readmission: No    Discharge Statement   I spent 30 minutes discharging the patient  This time was spent on the day of discharge  I had direct contact with the patient on the day of discharge   Additional documentation is required if more than 30 minutes were spent on discharge  Discharge Medications:  See after visit summary for reconciled discharge medications provided to patient and family

## 2019-09-29 NOTE — PROGRESS NOTES
Discharge instructions reviewed with pt  New medications discussed, robaxin and oxycodone frequencies emphasized

## 2019-09-29 NOTE — ASSESSMENT & PLAN NOTE
- right-sided rib fractures 6 through 9 on the right  - continue pulmonary toilet  - epidural catheter removed yesterday  Pain is controlled on an oral regimen     -  patient pain continues to remain stable and continues to use flutter valve and IS, pulling 750 mls  -discharge home today with VNA and home PT/OT

## 2019-09-29 NOTE — ASSESSMENT & PLAN NOTE
- chest tube removed 9/24  - follow up CXR -- stable with small apical pneumothorax which is diminished in size on CXR on 9/27  - on room air  - follows outpatient with pulmonology; will recommend outpatient follow-up  -follow-up with Trauma in 2 weeks with a repeat x-ray

## 2019-09-29 NOTE — DISCHARGE INSTRUCTIONS
Seek medical attn if you develop worsening chest pain or shortness of breath, dizziness/lightheadness, fevers/chills or sweats  No heavy lifting, pushing or pulling >5 pounds  No strenuous physical activity  No work or driving while taking narcotic pain medications and until cleared by trauma  Do not find airplanes until seen and cleared by Trauma  Right-sided chest wall dressing to be changed once daily with dry gauze and tape  Have blood work to follow-up low sodium with a basic metabolic panel in 3 days and follow-up with family doctor in 1 week  Take salt tabs as described for 5 days  Hold hydrochlorothiazide until seen and cleared to resume by her family doctor following blood work  Have chest x-ray completed prior to trauma follow-up appointment in 2 weeks

## 2019-09-29 NOTE — ASSESSMENT & PLAN NOTE
Lab Results   Component Value Date    HGBA1C 6 0 09/18/2019       Recent Labs     09/28/19  1056 09/28/19  1607 09/28/19 2129 09/29/19  0715   POCGLU 183* 279* 192* 233*       Blood Sugar Average: Last 72 hrs:  (P) 095 [id number]     - will resume home metformin upon discharge today  - diabetic diet  -follow-up with PCP

## 2019-09-30 ENCOUNTER — TRANSITIONAL CARE MANAGEMENT (OUTPATIENT)
Dept: FAMILY MEDICINE CLINIC | Facility: CLINIC | Age: 74
End: 2019-09-30

## 2019-10-02 ENCOUNTER — LAB REQUISITION (OUTPATIENT)
Dept: LAB | Facility: HOSPITAL | Age: 74
End: 2019-10-02
Payer: COMMERCIAL

## 2019-10-02 DIAGNOSIS — I10 ESSENTIAL (PRIMARY) HYPERTENSION: ICD-10-CM

## 2019-10-02 LAB
ANION GAP SERPL CALCULATED.3IONS-SCNC: 7 MMOL/L (ref 4–13)
BUN SERPL-MCNC: 12 MG/DL (ref 5–25)
CALCIUM SERPL-MCNC: 9.4 MG/DL (ref 8.3–10.1)
CHLORIDE SERPL-SCNC: 96 MMOL/L (ref 100–108)
CO2 SERPL-SCNC: 28 MMOL/L (ref 21–32)
CREAT SERPL-MCNC: 0.55 MG/DL (ref 0.6–1.3)
GFR SERPL CREATININE-BSD FRML MDRD: 93 ML/MIN/1.73SQ M
GLUCOSE SERPL-MCNC: 191 MG/DL (ref 65–140)
POTASSIUM SERPL-SCNC: 5.3 MMOL/L (ref 3.5–5.3)
SODIUM SERPL-SCNC: 131 MMOL/L (ref 136–145)

## 2019-10-02 PROCEDURE — 80048 BASIC METABOLIC PNL TOTAL CA: CPT | Performed by: SURGERY

## 2019-10-07 ENCOUNTER — TELEPHONE (OUTPATIENT)
Dept: FAMILY MEDICINE CLINIC | Facility: CLINIC | Age: 74
End: 2019-10-07

## 2019-10-07 DIAGNOSIS — Z71.89 COMPLEX CARE COORDINATION: Primary | ICD-10-CM

## 2019-10-07 NOTE — TELEPHONE ENCOUNTER
Patient has a TCM appointment coming up but she is in a lot of pain and wants Dr Preethi Villatoro to refill her Oxycodone   She would like it called into [facility] on [facility]

## 2019-10-07 NOTE — TELEPHONE ENCOUNTER
Medication was originally ordered by trauma surgery  unfortunately she will have to wait for dr Urban Souza appt 10/10 or for her trauma follow up 10/15 to discuss refills  She can try contacting trauma surgery office

## 2019-10-07 NOTE — TELEPHONE ENCOUNTER
MARY    please advise  I spoke to the patient and she was upset that Dr Da Blue wouldn't re-order and said she's in pain and has an appt  Wednesday but she cant wait until then  Ian Nguyen

## 2019-10-08 NOTE — TELEPHONE ENCOUNTER
Pt aware as per Wilson Irvin, Pt needs to contact surgeon or wait until pt is seen by MF for her pain meds

## 2019-10-09 ENCOUNTER — PATIENT OUTREACH (OUTPATIENT)
Dept: FAMILY MEDICINE CLINIC | Facility: CLINIC | Age: 74
End: 2019-10-09

## 2019-10-09 DIAGNOSIS — E11.9 TYPE 2 DIABETES MELLITUS WITHOUT COMPLICATION, WITHOUT LONG-TERM CURRENT USE OF INSULIN (HCC): Primary | ICD-10-CM

## 2019-10-09 NOTE — PROGRESS NOTES
This CM spoke to patient who declined OPCM services but accepted CM contact number and was informed she could call with any concerns

## 2019-10-10 ENCOUNTER — TRANSCRIBE ORDERS (OUTPATIENT)
Dept: ADMINISTRATIVE | Facility: HOSPITAL | Age: 74
End: 2019-10-10

## 2019-10-10 ENCOUNTER — HOSPITAL ENCOUNTER (OUTPATIENT)
Dept: RADIOLOGY | Facility: HOSPITAL | Age: 74
Discharge: HOME | End: 2019-10-10
Payer: COMMERCIAL

## 2019-10-10 ENCOUNTER — APPOINTMENT (OUTPATIENT)
Dept: LAB | Facility: HOSPITAL | Age: 74
End: 2019-10-10
Payer: COMMERCIAL

## 2019-10-10 DIAGNOSIS — E87.1 HYPONATREMIA: ICD-10-CM

## 2019-10-10 DIAGNOSIS — J93.83 CLOSED PNEUMOTHORAX: ICD-10-CM

## 2019-10-10 DIAGNOSIS — J93.83 CLOSED PNEUMOTHORAX: Primary | ICD-10-CM

## 2019-10-10 LAB
ANION GAP SERPL CALCULATED.3IONS-SCNC: 8 MMOL/L (ref 4–13)
BUN SERPL-MCNC: 6 MG/DL (ref 5–25)
CALCIUM SERPL-MCNC: 9.4 MG/DL (ref 8.3–10.1)
CHLORIDE SERPL-SCNC: 98 MMOL/L (ref 100–108)
CO2 SERPL-SCNC: 28 MMOL/L (ref 21–32)
CREAT SERPL-MCNC: 0.48 MG/DL (ref 0.6–1.3)
GFR SERPL CREATININE-BSD FRML MDRD: 98 ML/MIN/1.73SQ M
GLUCOSE P FAST SERPL-MCNC: 104 MG/DL (ref 65–99)
POTASSIUM SERPL-SCNC: 4.6 MMOL/L (ref 3.5–5.3)
SODIUM SERPL-SCNC: 134 MMOL/L (ref 136–145)

## 2019-10-10 PROCEDURE — 36415 COLL VENOUS BLD VENIPUNCTURE: CPT

## 2019-10-10 PROCEDURE — 71046 X-RAY EXAM CHEST 2 VIEWS: CPT

## 2019-10-10 PROCEDURE — 80048 BASIC METABOLIC PNL TOTAL CA: CPT

## 2019-10-14 ENCOUNTER — OFFICE VISIT (OUTPATIENT)
Dept: FAMILY MEDICINE CLINIC | Facility: CLINIC | Age: 74
End: 2019-10-14
Payer: COMMERCIAL

## 2019-10-14 VITALS
DIASTOLIC BLOOD PRESSURE: 72 MMHG | HEART RATE: 60 BPM | BODY MASS INDEX: 23.92 KG/M2 | TEMPERATURE: 97.4 F | SYSTOLIC BLOOD PRESSURE: 118 MMHG | WEIGHT: 135 LBS | RESPIRATION RATE: 18 BRPM | HEIGHT: 63 IN

## 2019-10-14 DIAGNOSIS — Z23 ENCOUNTER FOR IMMUNIZATION: ICD-10-CM

## 2019-10-14 DIAGNOSIS — S22.41XS CLOSED FRACTURE OF MULTIPLE RIBS OF RIGHT SIDE, SEQUELA: Primary | ICD-10-CM

## 2019-10-14 DIAGNOSIS — I10 ESSENTIAL HYPERTENSION: Chronic | ICD-10-CM

## 2019-10-14 DIAGNOSIS — I71.2 THORACIC AORTIC ANEURYSM WITHOUT RUPTURE (HCC): ICD-10-CM

## 2019-10-14 PROBLEM — R82.81 PYURIA: Status: RESOLVED | Noted: 2019-08-27 | Resolved: 2019-10-14

## 2019-10-14 PROBLEM — E87.1 HYPONATREMIA: Status: RESOLVED | Noted: 2018-12-11 | Resolved: 2019-10-14

## 2019-10-14 PROBLEM — S27.1XXA HEMOTHORAX, TRAUMATIC: Status: RESOLVED | Noted: 2019-09-22 | Resolved: 2019-10-14

## 2019-10-14 PROBLEM — J93.9 PNEUMOTHORAX: Status: RESOLVED | Noted: 2019-09-28 | Resolved: 2019-10-14

## 2019-10-14 PROBLEM — M81.0 AGE-RELATED OSTEOPOROSIS WITHOUT CURRENT PATHOLOGICAL FRACTURE: Status: ACTIVE | Noted: 2019-10-14

## 2019-10-14 PROBLEM — I71.20 THORACIC AORTIC ANEURYSM WITHOUT RUPTURE: Status: ACTIVE | Noted: 2019-10-14

## 2019-10-14 PROCEDURE — 90662 IIV NO PRSV INCREASED AG IM: CPT

## 2019-10-14 PROCEDURE — 3078F DIAST BP <80 MM HG: CPT | Performed by: FAMILY MEDICINE

## 2019-10-14 PROCEDURE — 3074F SYST BP LT 130 MM HG: CPT | Performed by: FAMILY MEDICINE

## 2019-10-14 PROCEDURE — G0008 ADMIN INFLUENZA VIRUS VAC: HCPCS

## 2019-10-14 PROCEDURE — 99214 OFFICE O/P EST MOD 30 MIN: CPT | Performed by: FAMILY MEDICINE

## 2019-10-14 PROCEDURE — 3008F BODY MASS INDEX DOCD: CPT | Performed by: FAMILY MEDICINE

## 2019-10-14 RX ORDER — CANDESARTAN 32 MG/1
32 TABLET ORAL DAILY
Qty: 90 TABLET | Refills: 1
Start: 2019-10-14 | End: 2019-10-14 | Stop reason: SDUPTHER

## 2019-10-14 RX ORDER — CANDESARTAN 32 MG/1
32 TABLET ORAL DAILY
Qty: 90 TABLET | Refills: 1 | Status: SHIPPED | OUTPATIENT
Start: 2019-10-14 | End: 2020-02-18

## 2019-10-14 RX ORDER — ACETAMINOPHEN AND CODEINE PHOSPHATE 300; 30 MG/1; MG/1
1 TABLET ORAL EVERY 4 HOURS PRN
Qty: 30 TABLET | Refills: 0 | Status: SHIPPED | OUTPATIENT
Start: 2019-10-14 | End: 2020-01-06

## 2019-10-14 NOTE — PROGRESS NOTES
Assessment and Plan:    Problem List Items Addressed This Visit     None                 There are no diagnoses linked to this encounter  Subjective:      Patient ID: Makenna Busby is a 69 y o  female  CC:    Chief Complaint   Patient presents with    Follow-up     Patient present today for her 1 month follow up to review her blood work  HPI:    Here for f/u rib fractures and also was found to have small hiatal hernia, gallstones and an aneurysm of thoracic aorta, also had f/u lab after discharge-all stable      The following portions of the patient's history were reviewed and updated as appropriate: allergies, current medications, past family history, past medical history, past social history, past surgical history and problem list       Review of Systems   Constitutional: Negative for activity change, appetite change and unexpected weight change  Respiratory: Negative for shortness of breath  Cardiovascular: Positive for chest pain  Negative for palpitations and leg swelling  Rib pain from fractures   Musculoskeletal: Positive for arthralgias  Rib pain   Neurological: Negative for dizziness and headaches  Data to review:       Objective:    Vitals:    10/14/19 0754   BP: 118/72   BP Location: Left arm   Patient Position: Sitting   Cuff Size: Standard   Pulse: 60   Resp: 18   Temp: (!) 97 4 °F (36 3 °C)   TempSrc: Temporal   Weight: 61 2 kg (135 lb)   Height: 5' 3" (1 6 m)        Physical Exam   Constitutional: She appears well-developed and well-nourished  Neck: No thyromegaly present  Cardiovascular: Normal rate, regular rhythm and normal heart sounds  Pulmonary/Chest: Effort normal and breath sounds normal    Musculoskeletal: She exhibits tenderness  She exhibits no edema  r ribs   Lymphadenopathy:     She has no cervical adenopathy  Vitals reviewed

## 2019-10-14 NOTE — ASSESSMENT & PLAN NOTE
Slowly improving but still with shortness of breath, advised will take 6-8 weeks to improve, meloxicam not working, will give 2 week course Tylenol with codeine

## 2019-10-14 NOTE — ASSESSMENT & PLAN NOTE
Has had 2 episodes of falls with rib fractures and previous fall also cause fracture of collarbone, osteoporosis on last DEXA, not taking vit D-discussed meds, wants to wait until ribs healed and will start Vitamin D

## 2019-10-29 DIAGNOSIS — I10 ESSENTIAL HYPERTENSION: Primary | Chronic | ICD-10-CM

## 2019-10-29 RX ORDER — HYDROCHLOROTHIAZIDE 12.5 MG/1
12.5 TABLET ORAL DAILY
COMMUNITY
End: 2019-10-29 | Stop reason: SDUPTHER

## 2019-10-29 RX ORDER — HYDROCHLOROTHIAZIDE 12.5 MG/1
12.5 TABLET ORAL DAILY
Qty: 30 TABLET | Refills: 0 | Status: SHIPPED | OUTPATIENT
Start: 2019-10-29 | End: 2019-12-20 | Stop reason: SDUPTHER

## 2019-11-04 DIAGNOSIS — I71.2 THORACIC AORTIC ANEURYSM WITHOUT RUPTURE (HCC): Primary | ICD-10-CM

## 2019-11-11 ENCOUNTER — TELEPHONE (OUTPATIENT)
Dept: FAMILY MEDICINE CLINIC | Facility: CLINIC | Age: 74
End: 2019-11-11

## 2019-11-11 DIAGNOSIS — K04.7 DENTAL ABSCESS: Primary | ICD-10-CM

## 2019-11-11 RX ORDER — CEFUROXIME AXETIL 500 MG/1
500 TABLET ORAL EVERY 12 HOURS SCHEDULED
Qty: 20 TABLET | Refills: 0 | Status: SHIPPED | OUTPATIENT
Start: 2019-11-11 | End: 2019-11-21

## 2019-11-11 NOTE — TELEPHONE ENCOUNTER
Patient seen with  today  Has concern over possible dental abscess  She will be started on Ceftin 500 mg twice daily until she can be seen by Dr Paulo Jeffery in the next week

## 2019-11-12 ENCOUNTER — OFFICE VISIT (OUTPATIENT)
Dept: CARDIAC SURGERY | Facility: CLINIC | Age: 74
End: 2019-11-12
Payer: COMMERCIAL

## 2019-11-12 VITALS
HEART RATE: 78 BPM | WEIGHT: 132 LBS | DIASTOLIC BLOOD PRESSURE: 62 MMHG | BODY MASS INDEX: 23.39 KG/M2 | TEMPERATURE: 97.1 F | SYSTOLIC BLOOD PRESSURE: 142 MMHG | HEIGHT: 63 IN | OXYGEN SATURATION: 97 % | RESPIRATION RATE: 14 BRPM

## 2019-11-12 DIAGNOSIS — I71.2 THORACIC AORTIC ANEURYSM WITHOUT RUPTURE (HCC): ICD-10-CM

## 2019-11-12 PROCEDURE — 99204 OFFICE O/P NEW MOD 45 MIN: CPT | Performed by: NURSE PRACTITIONER

## 2019-11-12 NOTE — PROGRESS NOTES
Aortic [id number] [facility] 69 y o  female MRN: [id number]    Physician Requesting Consult: Dr Karissa Shaikh    Reason for Consult / Principal Problem: Ascending aortic aneurysm    History of Present Illness: Makenna Busby is a 69y o  year old female who presents today for initial out patient consultation in our aortic clinic for an ascending aortic aneurysm  This was initially identified on CTA in 2016 and measured 40-41 mm in maximal diameter  She recently underwent repeat CTA chest/abdomen during an ER visit for SOB & chest pain  She was noted to be intoxicated and admitted to drinking a pint of rachele  CTA chest dated 9/20/19 demonstrated aneurysmal dilatation of the ascending aorta measuring 40 mm in maximal diameter, without rupture and unchanged from previous CTA in 2016  Echo in 2016 demonstrates a trileaflet aortic valve, normal size aortic root and no AS or AI  Upon interview patient reports frequent falls and h/o multiple fractures  She states he is undergoing workup  She reports SOB but states this is a chronic problem and relates it to her asthma  She uses a  nebulizer and inhalers  She oleg chest pain, lightheadedness, numbness, tingling or numbness  She has a h/o HTN, currently on Amlodipine, Candesartan & HCTZ  She is on statin therapy and she takes Metformin for diabetes  She is a non-smoker  She denies any FH of aneurysms  Past Medical History:  Past Medical History:   Diagnosis Date    Asthma     Diabetes mellitus ([address] )     Hypertension          Past Surgical History:   Past Surgical History:   Procedure Laterality Date    BRONCHOSCOPY N/A 3/16/2016    Procedure: BRONCHOSCOPY FLEXIBLE/anesth ;  Surgeon: Declan Hensley DO;  Location: [facility];   Service:     COLONOSCOPY      EYE SURGERY      OOPHORECTOMY Left     age 49         Family History:  Family History   Problem Relation Age of Onset    Cancer Mother 45        uterine    Cancer Daughter 48        breast    Cancer Son 35 asbestosis related cancer         Social History:    Social History     Substance and Sexual Activity   Alcohol Use Yes    Frequency: 2-4 times a month    Drinks per session: 3 or 4    Comment: 1 pint rachele on weekends     Social History     Substance and Sexual Activity   Drug Use No     Social History     Tobacco Use   Smoking Status Never Smoker   Smokeless Tobacco Never Used         Home Medications:   Prior to Admission medications    Medication Sig Start Date End Date Taking?  Authorizing Provider   acetaminophen (TYLENOL) 325 mg tablet Take 3 tablets (975 mg total) by mouth every 8 (eight) hours 9/29/19  Yes Lakisha Muniz PA-C   acetaminophen-codeine (TYLENOL #3) 300-30 mg per tablet Take 1 tablet by mouth every 4 (four) hours as needed for moderate pain 10/14/19  Yes Tyler Oliver MD   albuterol (2 5 mg/3 mL) 0 083 % nebulizer solution  5/9/18  Yes Historical Provider, MD   albuterol (PROVENTIL HFA,VENTOLIN HFA) 90 mcg/act inhaler Inhale 2 puffs every 6 (six) hours as needed for wheezing 7/9/19  Yes Jennifer Saravia MD   ALREX 0 2 % SUSP 1 drop daily  8/21/19  Yes Historical Provider, MD   amLODIPine (NORVASC) 5 mg tablet Take 1 tablet (5 mg total) by mouth daily 6/5/19  Yes JAQUAN Mcclain   atorvastatin (LIPITOR) 20 mg tablet 20 mg 10/18/18  Yes Historical Provider, MD   Besifloxacin HCl (BESIVANCE) 0 6 % SUSP Apply 1 drop to eye 4 (four) times a day   Yes Historical Provider, MD   Blood Glucose Calibration (ONETOUCH GLUCOSE CONTROL VI) use as directed 6/13/18  Yes Historical Provider, MD   candesartan (ATACAND) 32 MG tablet Take 1 tablet (32 mg total) by mouth daily 10/14/19  Yes Tyler Oliver MD   cefuroxime (CEFTIN) 500 mg tablet Take 1 tablet (500 mg total) by mouth every 12 (twelve) hours for 10 days 11/11/19 11/21/19 Yes Librado Jernigan PA-C   Difluprednate (DUREZOL) 0 05 % EMUL Apply 1 drop to eye 4 (four) times a day    Yes Historical Provider, MD   docusate sodium (COLACE) 100 mg capsule Take 1 capsule (100 mg total) by mouth 2 (two) times a day 9/29/19  Yes Justen Chu PA-C   hydrochlorothiazide (HYDRODIURIL) 12 5 mg tablet Take 1 tablet (12 5 mg total) by mouth daily 10/29/19  Yes Artemio Blackmon MD   ketorolac (ACULAR) 0 5 % ophthalmic solution Administer 1 drop to the right eye 4 (four) times a day   Yes Historical Provider, MD   Lancets (Mereverette Nestmario ULTRASOFT) lancets by Does not apply route 2/3/12  Yes Historical Provider, MD   metFORMIN (GLUCOPHAGE) 850 mg tablet take 1 tablet by mouth twice a day WITH MEALS 8/30/19  Yes JAQUAN Pantoja   methocarbamol (ROBAXIN) 500 mg tablet Take 1 tablet (500 mg total) by mouth 3 (three) times a day 9/29/19  Yes Justen Chu PA-C   mometasone-formoterol (DULERA) 100-5 MCG/ACT inhaler 2 puffs BID 7/9/19  Yes Gabriella Ceullar MD   ONE TOUCH ULTRA TEST test strip TEST TWICE A DAY 10/10/19  Yes Artemio Blackmon MD   pantoprazole (PROTONIX) 40 mg tablet Take 1 tablet (40 mg total) by mouth daily 6/5/19  Yes JAQUAN Mcclain   polyethylene glycol (MIRALAX) 17 g packet Take 17 g by mouth daily as needed (constipation) 9/29/19  Yes Justen Chu PA-C   senna (SENOKOT) 8 6 mg Take 2 tablets (17 2 mg total) by mouth daily 9/30/19  Yes Justen Chu PA-C   sodium chloride 1 g tablet Take 1 tablet (1 g total) by mouth 2 (two) times a day with meals for 10 doses 9/29/19 10/4/19  Justen Chu PA-C       Allergies:   Allergies   Allergen Reactions    Bactrim [Sulfamethoxazole-Trimethoprim] Hives       Review of Systems:   Review of Systems - History obtained from chart review and the patient  General ROS: negative  Psychological ROS: negative  Ophthalmic ROS: negative  ENT ROS: negative  Hematological and Lymphatic ROS: negative for - bleeding problems, bruising or jaundice  Respiratory ROS: positive for - shortness of breath  negative for - cough, hemoptysis or orthopnea  Cardiovascular ROS: no chest pain or dyspnea on exertion  Gastrointestinal ROS: no abdominal pain, change in bowel habits, or black or bloody stools  Genito-Urinary ROS: no dysuria, trouble voiding, or hematuria  Musculoskeletal ROS: positive for - gait disturbance, joint pain and joint swelling  Neurological ROS: positive for - impaired coordination/balance  negative for - numbness/tingling, speech problems or visual changes  Dermatological ROS: negative    Vital Signs:   Vitals:    11/12/19 1000 11/12/19 1051   BP: 140/64 142/62   BP Location: Left arm Right arm   Cuff Size: Adult    Pulse: 78    Resp: 14    Temp: (!) 97 1 °F (36 2 °C)    TempSrc: Oral    SpO2: 97%    Weight: 59 9 kg (132 lb)    Height: 5' 3" (1 6 m)        Physical Exam:  General: Alert, oriented, well developed, no acute distress  HEENT/NECK:  PERRLA  No jugular venous distention  Cardiac:Regular rate and rhythm, No murmurs rubs or gallops  Carotid arteries: 2+ pulses, no bruits  Pulmonary:  Breath sounds clear bilaterally  Abdomen:  Non-tender, Non-distended  Positive bowel sounds  Upper extremities: 2+ radial pulses; brisk capillary refill  Lower extremities: Extremities warm/dry  PT/DP pulses 2+ bilaterally  No edema B/L  Neuro: Alert and oriented X 3  Sensation is grossly intact  No focal deficits  Musculoskeletal: MAEE, stable gait  Skin: Warm/Dry, without rashes or lesions  Lab Results:     Lab Results   Component Value Date    HGBA1C 6 0 09/18/2019     Lab Results   Component Value Date    TROPONINI <0 02 09/20/2019       Imaging Studies:     CTA Chest:   Stable 40 mm ascending aortic aneurysm    Echocardiogram: 2016  Trileaflet aortic valve  Normal aortic root   No AS, no AI    I have personally reviewed pertinent films in PACS    Assessment:  Patient Active Problem List    Diagnosis Date Noted    Thoracic aortic aneurysm without rupture ([facility] [address] ) 10/14/2019    Age-related osteoporosis without current pathological fracture 10/14/2019    Acute pain due to trauma 09/28/2019    Closed fracture of multiple ribs of right side 09/22/2019    Urge incontinence of urine 08/27/2019    Mild persistent asthma without complication 16/91/5719    NGOC (obstructive sleep apnea) 07/09/2019    Hyperlipidemia 12/09/2018    HTN (hypertension) 08/25/2017    Diabetes mellitus ([facility] [address] ) 08/25/2017    Hearing loss 04/11/2013       Plan:    CT imaging performed prior to this visit demonstrates the ascending aorta measuring 40 mm in size at its greatest diameter  This finding is stable in comparison to CTA performed in 2016  Prior echo demonstrates a normal trileaflet aortic valve  This size does not meet surgical criteria  These findings were confirmed and shared with the patient today  She is asymptomatic, with no family history, and stable imaging, therefore, follow-up monitoring is the treatment plan  Arrangements have been made for future surveillance to be completed with CT chest, without contrast in 2 Years  Arun  was comfortable with our recommendations, and her questions were answered to her satisfaction  Thank you for allowing us to participate in the care of this patient  Aortic Aneurysm Instructions were provided to the patient as follows:    1  No lifting more than 50 pounds  Regular aerobic exercise permitted and recommended  2  Maintain a controlled blood pressure with a goal of less than 140/80  3  Follow up in Aortic Clinic as recommended with radiology follow up as instructed  4  Report to the ER or call 911 immediately with the following signs / symptoms: sudden onset of back pain, chest pain or shortness of breath  5  Remain tobacco free  The patient recently had a screening colonoscopy in 7/2019  Therefore GI referral is not indicated at this time       SIGNATURE: JAQUAN Boothe  DATE: November 12, 2019  TIME: 10:56 AM

## 2019-11-18 ENCOUNTER — TELEPHONE (OUTPATIENT)
Dept: PULMONOLOGY | Facility: CLINIC | Age: 74
End: 2019-11-18

## 2019-11-18 DIAGNOSIS — R06.00 DYSPNEA, UNSPECIFIED TYPE: Primary | ICD-10-CM

## 2019-11-18 NOTE — TELEPHONE ENCOUNTER
Patient calling saying she has been sick for a week  Her asthma has been acting up  She is SOB with exertion  Her ribs hurt  She has a non productive cough  She has chest tightness  She is using her nebulizer and inhalers  Denies wheezing, fevers or chills  I offered her a sick visit in [place] this week with Dr Kelsie Dietrich  She declined saying she just wanted to let him know about her lungs  I advised I would send a message over  She does sound sick on the phone

## 2019-11-18 NOTE — TELEPHONE ENCOUNTER
Can you please get pt an appt ASAP - she prefers [address] if able  Also, can you let her know to get CXR prior to the appt? Order is in  She was hospitalized at the end of Sept under trauma service  She had multiple, right sided rib fractures (6th-9th) as well as a hemothorax which required a chest tube (this was removed on 9/24)  She notes rib pain and a dry cough as well as increased shortness of breath  She has been afebrile and denies wheezing or sputum production  She was hesitant to come in for evaluation but I expressed to her the importance

## 2019-11-19 ENCOUNTER — HOSPITAL ENCOUNTER (OUTPATIENT)
Dept: RADIOLOGY | Facility: HOSPITAL | Age: 74
Discharge: HOME | End: 2019-11-19
Payer: COMMERCIAL

## 2019-11-19 ENCOUNTER — OFFICE VISIT (OUTPATIENT)
Dept: PULMONOLOGY | Facility: CLINIC | Age: 74
End: 2019-11-19
Payer: COMMERCIAL

## 2019-11-19 VITALS
BODY MASS INDEX: 23.67 KG/M2 | OXYGEN SATURATION: 95 % | TEMPERATURE: 97.6 F | SYSTOLIC BLOOD PRESSURE: 132 MMHG | HEART RATE: 87 BPM | DIASTOLIC BLOOD PRESSURE: 76 MMHG | HEIGHT: 63 IN | WEIGHT: 133.6 LBS | RESPIRATION RATE: 16 BRPM

## 2019-11-19 DIAGNOSIS — S22.41XS CLOSED FRACTURE OF MULTIPLE RIBS OF RIGHT SIDE, SEQUELA: ICD-10-CM

## 2019-11-19 DIAGNOSIS — R06.00 DYSPNEA, UNSPECIFIED TYPE: ICD-10-CM

## 2019-11-19 DIAGNOSIS — J45.30 MILD PERSISTENT ASTHMA WITHOUT COMPLICATION: Primary | ICD-10-CM

## 2019-11-19 PROCEDURE — 71046 X-RAY EXAM CHEST 2 VIEWS: CPT

## 2019-11-19 PROCEDURE — 99214 OFFICE O/P EST MOD 30 MIN: CPT | Performed by: INTERNAL MEDICINE

## 2019-11-19 RX ORDER — PREDNISONE 10 MG/1
10 TABLET ORAL SEE ADMIN INSTRUCTIONS
Qty: 21 TABLET | Refills: 0 | Status: SHIPPED | OUTPATIENT
Start: 2019-11-19 | End: 2020-01-06

## 2019-11-19 NOTE — PROGRESS NOTES
Progress note - Pulmonary Medicine   Kaveh Pfeiffer 69 y o  female MRN: [id number]       Impression & Plan:     Mild persistent asthma without complication  · Continue Dulera twice daily with albuterol nebulizer if needed  · I have given her prednisone for possible mild exacerbation  This should also help with cough, chest pain, and other symptoms she is experiencing currently  · Will have her follow-up in 4 months  Would be beneficial to obtain spirometry at that time    Closed fracture of multiple ribs of right side  · Has stopped taking Robaxin and minimizing Tylenol 3 but has significant pain and cough  · I recommended that she resume the Tylenol more regularly and remain off Robaxin since this was causing some minimal side effects  · I have given her prescription for prednisone which should help as an adjunct for pain but was predominantly prescribed for mild asthma exacerbation      ______________________________________________________________________    HPI:    Kaveh Pfeiffer presents today for follow-up of asthma but has been having increasing symptoms  She has had cough, wheezing, and chest tightness  She has recently been hospitalized for traumatic fracture of ribs on the right with a small hemothorax  She had a chest tube removed at the end of September but has had increased symptoms  She was supposed to be taking Robaxin, Tylenol No   3, and regular Tylenol for pain management  She did stop several of the medications on her own  As a result, she has been having chest wall pain  She has some difficulty sleeping  She has been having a dry cough which has been worsening her pain as well  She has no abdominal pain or GERD symptoms  No lightheadedness or fever  No significant weight changes  She has been wheezing and tight in the chest   No phlegm production with the cough  No hemoptysis  She was sent for a chest x-ray prior to today's visit      She did see her primary care physician recently because of recurrent symptoms in her mouth  She has seen 2 dentists and ultimately developed a small abscess in her right cheek  This drained spontaneously and seems to be recurring    She was placed on Ceftin a few days ago    Current Medications:    Current Outpatient Medications:     acetaminophen (TYLENOL) 325 mg tablet, Take 3 tablets (975 mg total) by mouth every 8 (eight) hours, Disp: 30 tablet, Rfl: 0    acetaminophen-codeine (TYLENOL #3) 300-30 mg per tablet, Take 1 tablet by mouth every 4 (four) hours as needed for moderate pain, Disp: 30 tablet, Rfl: 0    albuterol (2 5 mg/3 mL) 0 083 % nebulizer solution, , Disp: , Rfl:     albuterol (PROVENTIL HFA,VENTOLIN HFA) 90 mcg/act inhaler, Inhale 2 puffs every 6 (six) hours as needed for wheezing, Disp: 1 Inhaler, Rfl: 5    amLODIPine (NORVASC) 5 mg tablet, Take 1 tablet (5 mg total) by mouth daily, Disp: 90 tablet, Rfl: 1    atorvastatin (LIPITOR) 20 mg tablet, 20 mg, Disp: , Rfl: 0    Blood Glucose Calibration (ONETOUCH GLUCOSE CONTROL VI), use as directed, Disp: , Rfl:     candesartan (ATACAND) 32 MG tablet, Take 1 tablet (32 mg total) by mouth daily, Disp: 90 tablet, Rfl: 1    cefuroxime (CEFTIN) 500 mg tablet, Take 1 tablet (500 mg total) by mouth every 12 (twelve) hours for 10 days, Disp: 20 tablet, Rfl: 0    hydrochlorothiazide (HYDRODIURIL) 12 5 mg tablet, Take 1 tablet (12 5 mg total) by mouth daily, Disp: 30 tablet, Rfl: 0    Lancets (ONETOUCH ULTRASOFT) lancets, by Does not apply route, Disp: , Rfl:     metFORMIN (GLUCOPHAGE) 850 mg tablet, take 1 tablet by mouth twice a day WITH MEALS, Disp: 180 tablet, Rfl: 1    mometasone-formoterol (DULERA) 100-5 MCG/ACT inhaler, 2 puffs BID, Disp: 13 g, Rfl: 5    ONE TOUCH ULTRA TEST test strip, TEST TWICE A DAY, Disp: 200 each, Rfl: 1    pantoprazole (PROTONIX) 40 mg tablet, Take 1 tablet (40 mg total) by mouth daily, Disp: 90 tablet, Rfl: 1    ALREX 0 2 % SUSP, 1 drop daily , Disp: , Rfl: 0   Besifloxacin HCl (BESIVANCE) 0 6 % SUSP, Apply 1 drop to eye 4 (four) times a day, Disp: , Rfl:     Difluprednate (DUREZOL) 0 05 % EMUL, Apply 1 drop to eye 4 (four) times a day , Disp: , Rfl:     docusate sodium (COLACE) 100 mg capsule, Take 1 capsule (100 mg total) by mouth 2 (two) times a day (Patient not taking: Reported on 11/19/2019), Disp: 10 capsule, Rfl: 0    ketorolac (ACULAR) 0 5 % ophthalmic solution, Administer 1 drop to the right eye 4 (four) times a day, Disp: , Rfl:     methocarbamol (ROBAXIN) 500 mg tablet, Take 1 tablet (500 mg total) by mouth 3 (three) times a day (Patient not taking: Reported on 11/19/2019), Disp: 60 tablet, Rfl: 0    polyethylene glycol (MIRALAX) 17 g packet, Take 17 g by mouth daily as needed (constipation) (Patient not taking: Reported on 11/19/2019), Disp: 14 each, Rfl: 0    predniSONE 10 mg tablet, Take 1 tablet (10 mg total) by mouth see administration instructions 2 tablet daily for 1 week then 1 tablet daily for 1 week, Disp: 21 tablet, Rfl: 0    senna (SENOKOT) 8 6 mg, Take 2 tablets (17 2 mg total) by mouth daily (Patient not taking: Reported on 11/19/2019), Disp: 120 each, Rfl: 0    sodium chloride 1 g tablet, Take 1 tablet (1 g total) by mouth 2 (two) times a day with meals for 10 doses, Disp: 10 tablet, Rfl: 0    Review of Systems:  Aside From what was noted in HPI is otherwise negative  Past medical history, surgical history, and family history were reviewed and updated as appropriate    Social history updates:  Social History     Tobacco Use   Smoking Status Never Smoker   Smokeless Tobacco Never Used       PhysicalExamination:  Vitals:   /76   Pulse 87   Temp 97 6 °F (36 4 °C)   Resp 16   Ht 5' 3" (1 6 m)   Wt 60 6 kg (133 lb 9 6 oz)   LMP  (LMP Unknown)   SpO2 95%   BMI 23 67 kg/m²   Gen:  Appears comfortable on room air, not tachypneic  HEENT: PERRL, Nares clear  Oropharynx without erythema or exudate  Neck: Supple   No JVD or lymphadenopathy  Trachea is midline  No thyromegaly  Chest: There is symmetric chest wall excursion  Lung fields demonstrate diffuse wheezing  There is normal resonance to percussion  Cardiac: Regular rate and rhythm  No murmur  Abdomen: Soft and nontender  Bowel sounds are present in all 4 quadrants  Extremities: No clubbing, cyanosis or edema  Neurologic: No focal deficits  Affect:  Normal  Skin: No appreciable rashes  Diagnostic Data:  Labs: I personally reviewed the most recent laboratory data pertinent to today's visit    Lab Results   Component Value Date    WBC 8 65 09/29/2019    HGB 9 9 (L) 09/29/2019    HCT 29 5 (L) 09/29/2019    MCV 94 09/29/2019     09/29/2019     Lab Results   Component Value Date    SODIUM 134 (L) 10/10/2019    K 4 6 10/10/2019    CO2 28 10/10/2019    CL 98 (L) 10/10/2019    BUN 6 10/10/2019    CREATININE 0 48 (L) 10/10/2019    CALCIUM 9 4 10/10/2019     Imaging:  I personally reviewed the images on the [facility] system pertinent to today's visit  The chest x-ray showed persistent small right pleural effusion but no evidence of pneumothorax remaining  Riight rib fractures remain    Left-sided old and displaced rib fractures       Viridiana Ferris MD

## 2019-11-19 NOTE — ASSESSMENT & PLAN NOTE
· Has stopped taking Robaxin and minimizing Tylenol 3 but has significant pain and cough  · I recommended that she resume the Tylenol more regularly and remain off Robaxin since this was causing some minimal side effects  · I have given her prescription for prednisone which should help as an adjunct for pain but was predominantly prescribed for mild asthma exacerbation

## 2019-11-19 NOTE — ASSESSMENT & PLAN NOTE
· Continue Dulera twice daily with albuterol nebulizer if needed  · I have given her prednisone for possible mild exacerbation  This should also help with cough, chest pain, and other symptoms she is experiencing currently  · Will have her follow-up in 4 months    Would be beneficial to obtain spirometry at that time

## 2019-11-29 DIAGNOSIS — E11.9 TYPE 2 DIABETES MELLITUS WITHOUT COMPLICATION, WITHOUT LONG-TERM CURRENT USE OF INSULIN (HCC): Chronic | ICD-10-CM

## 2019-12-20 DIAGNOSIS — J45.30 MILD PERSISTENT ASTHMA WITHOUT COMPLICATION: Primary | ICD-10-CM

## 2019-12-20 DIAGNOSIS — I10 ESSENTIAL HYPERTENSION: Chronic | ICD-10-CM

## 2019-12-20 RX ORDER — ALBUTEROL SULFATE 2.5 MG/3ML
2.5 SOLUTION RESPIRATORY (INHALATION) EVERY 6 HOURS PRN
Qty: 60 VIAL | Refills: 5 | Status: SHIPPED | OUTPATIENT
Start: 2019-12-20 | End: 2020-01-30 | Stop reason: SDUPTHER

## 2019-12-20 RX ORDER — HYDROCHLOROTHIAZIDE 12.5 MG/1
12.5 TABLET ORAL DAILY
Qty: 30 TABLET | Refills: 0 | Status: SHIPPED | OUTPATIENT
Start: 2019-12-20 | End: 2020-02-07 | Stop reason: CLARIF

## 2019-12-27 ENCOUNTER — TELEPHONE (OUTPATIENT)
Dept: PULMONOLOGY | Facility: CLINIC | Age: 74
End: 2019-12-27

## 2020-01-06 ENCOUNTER — HOSPITAL ENCOUNTER (EMERGENCY)
Facility: HOSPITAL | Age: 75
Discharge: HOME | End: 2020-01-06
Attending: EMERGENCY MEDICINE | Admitting: EMERGENCY MEDICINE
Payer: COMMERCIAL

## 2020-01-06 ENCOUNTER — APPOINTMENT (EMERGENCY)
Dept: RADIOLOGY | Facility: HOSPITAL | Age: 75
End: 2020-01-06
Payer: COMMERCIAL

## 2020-01-06 VITALS
TEMPERATURE: 98.3 F | RESPIRATION RATE: 20 BRPM | OXYGEN SATURATION: 97 % | SYSTOLIC BLOOD PRESSURE: 167 MMHG | WEIGHT: 135.14 LBS | BODY MASS INDEX: 23.94 KG/M2 | HEART RATE: 89 BPM | DIASTOLIC BLOOD PRESSURE: 73 MMHG

## 2020-01-06 DIAGNOSIS — R09.81 NASAL CONGESTION: ICD-10-CM

## 2020-01-06 DIAGNOSIS — R05.9 COUGH: Primary | ICD-10-CM

## 2020-01-06 DIAGNOSIS — J02.9 SORE THROAT: ICD-10-CM

## 2020-01-06 PROCEDURE — 71046 X-RAY EXAM CHEST 2 VIEWS: CPT

## 2020-01-06 PROCEDURE — 99284 EMERGENCY DEPT VISIT MOD MDM: CPT | Performed by: EMERGENCY MEDICINE

## 2020-01-06 PROCEDURE — 99283 EMERGENCY DEPT VISIT LOW MDM: CPT

## 2020-01-06 PROCEDURE — 94640 AIRWAY INHALATION TREATMENT: CPT

## 2020-01-06 RX ORDER — ALBUTEROL SULFATE 2.5 MG/3ML
5 SOLUTION RESPIRATORY (INHALATION) ONCE
Status: COMPLETED | OUTPATIENT
Start: 2020-01-06 | End: 2020-01-06

## 2020-01-06 RX ORDER — DEXTROMETHORPHAN HYDROBROMIDE AND PROMETHAZINE HYDROCHLORIDE 15; 6.25 MG/5ML; MG/5ML
5 SOLUTION ORAL 4 TIMES DAILY PRN
Qty: 118 ML | Refills: 0 | Status: SHIPPED | OUTPATIENT
Start: 2020-01-06 | End: 2020-01-30 | Stop reason: CLARIF

## 2020-01-06 RX ADMIN — IPRATROPIUM BROMIDE 0.5 MG: 0.5 SOLUTION RESPIRATORY (INHALATION) at 08:51

## 2020-01-06 RX ADMIN — ALBUTEROL SULFATE 5 MG: 2.5 SOLUTION RESPIRATORY (INHALATION) at 08:51

## 2020-01-06 NOTE — ED PROVIDER NOTES
History  Chief Complaint   Patient presents with    Cough     pt reports cough, congestion, and SOB worsening last night  pt reports symptoms for 1 week, reports subjective fever at home  69 y o  F w/h/o asthma, DM, HTN p/w cough x 1 week  Productive cough  Associated with wheezing, chest congestion, nasal congestion, and subjective fever  History provided by:  Patient   used: No    Cough   Cough characteristics:  Productive  Sputum characteristics:  White  Duration:  1 week  Timing:  Constant  Progression:  Worsening  Chronicity:  New  Relieved by:  Nothing  Worsened by:  Nothing  Ineffective treatments:  Beta-agonist inhaler  Associated symptoms: fever (Subjective), rhinorrhea, sore throat and wheezing    Associated symptoms: no chest pain, no chills, no ear pain, no eye discharge, no rash and no shortness of breath        Prior to Admission Medications   Prescriptions Last Dose Informant Patient Reported? Taking?    Blood Glucose Calibration (ONETOUCH GLUCOSE CONTROL VI)  Self Yes No   Sig: use as directed   Lancets (ONETOUCH ULTRASOFT) lancets  Self Yes No   Sig: by Does not apply route   ONE TOUCH ULTRA TEST test strip  Self No No   Sig: TEST TWICE A DAY   acetaminophen (TYLENOL) 325 mg tablet  Self No Yes   Sig: Take 3 tablets (975 mg total) by mouth every 8 (eight) hours   albuterol (2 5 mg/3 mL) 0 083 % nebulizer solution   No Yes   Sig: Take 1 vial (2 5 mg total) by nebulization every 6 (six) hours as needed for wheezing or shortness of breath   albuterol (PROVENTIL HFA,VENTOLIN HFA) 90 mcg/act inhaler  Self No Yes   Sig: Inhale 2 puffs every 6 (six) hours as needed for wheezing   amLODIPine (NORVASC) 5 mg tablet More than a month at Unknown time Self No No   Sig: Take 1 tablet (5 mg total) by mouth daily   atorvastatin (LIPITOR) 20 mg tablet  Self Yes Yes   Si mg   candesartan (ATACAND) 32 MG tablet  Self No Yes   Sig: Take 1 tablet (32 mg total) by mouth daily hydrochlorothiazide (HYDRODIURIL) 12 5 mg tablet   No Yes   Sig: Take 1 tablet (12 5 mg total) by mouth daily   metFORMIN (GLUCOPHAGE) 850 mg tablet  Self No Yes   Sig: take 1 tablet by mouth twice a day WITH MEALS   metFORMIN (GLUCOPHAGE) 850 mg tablet   No No   Sig: take 1 tablet by mouth twice a day WITH MEALS   mometasone-formoterol (DULERA) 100-5 MCG/ACT inhaler  Self No Yes   Si puffs BID      Facility-Administered Medications: None       Past Medical History:   Diagnosis Date    Asthma     Diabetes mellitus ([facility] [address] )     Hypertension        Past Surgical History:   Procedure Laterality Date    BRONCHOSCOPY N/A 3/16/2016    Procedure: BRONCHOSCOPY FLEXIBLE/anesth ;  Surgeon: Segundo Camacho DO;  Location: [facility]; Service:     COLONOSCOPY      EYE SURGERY      OOPHORECTOMY Left     age 49       Family History   Problem Relation Age of Onset    Cancer Mother 45        uterine    Cancer Daughter 48        breast    Cancer Son 35        asbestosis related cancer     I have reviewed and agree with the history as documented  Social History     Tobacco Use    Smoking status: Never Smoker    Smokeless tobacco: Never Used   Substance Use Topics    Alcohol use: Yes     Frequency: 2-4 times a month     Drinks per session: 3 or 4     Comment: 1 pint rachele on weekends    Drug use: No        Review of Systems   Constitutional: Positive for fever (Subjective)  Negative for chills  HENT: Positive for rhinorrhea and sore throat  Negative for congestion, ear discharge, ear pain, postnasal drip, sinus pressure, sneezing and trouble swallowing  Eyes: Negative for discharge and redness  Respiratory: Positive for cough and wheezing  Negative for shortness of breath  Cardiovascular: Negative for chest pain  Gastrointestinal: Negative for abdominal pain, diarrhea, nausea and vomiting  Skin: Negative for rash  All other systems reviewed and are negative        Physical Exam  Physical Exam Constitutional: She is oriented to person, place, and time  She appears well-developed and well-nourished  Non-toxic appearance  She does not have a sickly appearance  She does not appear ill  HENT:   Head: Normocephalic and atraumatic  Right Ear: Tympanic membrane normal  No drainage  Tympanic membrane is not injected, not erythematous and not bulging  No middle ear effusion  Left Ear: Tympanic membrane normal  No drainage  Tympanic membrane is not injected, not erythematous and not bulging  No middle ear effusion  Nose: Nose normal    Mouth/Throat: Oropharynx is clear and moist and mucous membranes are normal  No oropharyngeal exudate  No tonsillar exudate  Eyes: Conjunctivae are normal  Right eye exhibits no discharge  Left eye exhibits no discharge  Right conjunctiva is not injected  Left conjunctiva is not injected  Neck: Normal range of motion  Neck supple  No neck rigidity  Cardiovascular: Normal rate, regular rhythm, normal heart sounds and intact distal pulses  Exam reveals no gallop and no friction rub  No murmur heard  Pulmonary/Chest: Effort normal  No stridor  No respiratory distress  She has wheezes  She has no rales  Abdominal: Soft  Bowel sounds are normal  She exhibits no distension  There is no tenderness  There is no rebound and no guarding  Lymphadenopathy:     She has no cervical adenopathy  Neurological: She is alert and oriented to person, place, and time  Skin: Skin is warm and dry  No rash noted  No pallor  Nursing note and vitals reviewed        Vital Signs  ED Triage Vitals [01/06/20 0824]   Temperature Pulse Respirations Blood Pressure SpO2   98 3 °F (36 8 °C) 89 20 167/73 97 %      Temp Source Heart Rate Source Patient Position - Orthostatic VS BP Location FiO2 (%)   Oral -- -- -- --      Pain Score       5           Vitals:    01/06/20 0824   BP: 167/73   Pulse: 89         Visual Acuity      ED Medications  Medications   albuterol inhalation solution 5 mg (5 mg Nebulization Given 1/6/20 0851)   ipratropium (ATROVENT) 0 02 % inhalation solution 0 5 mg (0 5 mg Nebulization Given 1/6/20 0851)       Diagnostic Studies  Results Reviewed     None                 XR chest 2 views   Final Result by Diego De La Cruz MD (01/06 1008)      No acute cardiopulmonary disease  Workstation performed: [id number]                    Procedures  Procedures         ED Course  ED Course as of Jan 06 1125   Mon Jan 06, 2020   0851 Pt given Duoneb      1013 Pt states she feels better after the neb tx  Updated pt on negative CXR read  Will give symptomatic tx  MDM  Number of Diagnoses or Management Options     Amount and/or Complexity of Data Reviewed  Tests in the radiology section of CPT®: ordered and reviewed          Disposition  Final diagnoses:   Cough   Nasal congestion   Sore throat     Time reflects when diagnosis was documented in both MDM as applicable and the Disposition within this note     Time User Action Codes Description Comment    1/6/2020 10:13 AM Caitlyn Adams [R05] Cough     1/6/2020 10:13 AM Caitlyn Adams [R09 81] Nasal congestion     1/6/2020 10:13 AM Caitlyn Adams [J02 9] Sore throat       ED Disposition     ED Disposition Condition Date/Time Comment    Discharge Stable Mon Jan 6, 2020 10:13 AM David Carpio discharge to home/self care              Follow-up Information    None         Discharge Medication List as of 1/6/2020 10:14 AM      START taking these medications    Details   Promethazine-DM (PHENERGAN-DM) 6 25-15 mg/5 mL oral syrup Take 5 mL by mouth 4 (four) times a day as needed for cough, Starting Mon 1/6/2020, Print         CONTINUE these medications which have NOT CHANGED    Details   acetaminophen (TYLENOL) 325 mg tablet Take 3 tablets (975 mg total) by mouth every 8 (eight) hours, Starting Sun 9/29/2019, Normal      albuterol (2 5 mg/3 mL) 0 083 % nebulizer solution Take 1 vial (2 5 mg total) by nebulization every 6 (six) hours as needed for wheezing or shortness of breath, Starting Fri 12/20/2019, Normal      albuterol (PROVENTIL HFA,VENTOLIN HFA) 90 mcg/act inhaler Inhale 2 puffs every 6 (six) hours as needed for wheezing, Starting Tue 7/9/2019, Normal      amLODIPine (NORVASC) 5 mg tablet Take 1 tablet (5 mg total) by mouth daily, Starting Wed 6/5/2019, Normal      atorvastatin (LIPITOR) 20 mg tablet 20 mg, Starting Thu 10/18/2018, Historical Med      Blood Glucose Calibration (ONETOUCH GLUCOSE CONTROL VI) use as directed, Historical Med      candesartan (ATACAND) 32 MG tablet Take 1 tablet (32 mg total) by mouth daily, Starting Mon 10/14/2019, Normal      hydrochlorothiazide (HYDRODIURIL) 12 5 mg tablet Take 1 tablet (12 5 mg total) by mouth daily, Starting Fri 12/20/2019, Normal      Lancets (ONETOUCH ULTRASOFT) lancets by Does not apply route, Starting Fri 2/3/2012, Historical Med      !! metFORMIN (GLUCOPHAGE) 850 mg tablet take 1 tablet by mouth twice a day WITH MEALS, Normal      !! metFORMIN (GLUCOPHAGE) 850 mg tablet take 1 tablet by mouth twice a day WITH MEALS, Normal      mometasone-formoterol (DULERA) 100-5 MCG/ACT inhaler 2 puffs BID, Normal      ONE TOUCH ULTRA TEST test strip TEST TWICE A DAY, Normal       !! - Potential duplicate medications found  Please discuss with provider  No discharge procedures on file      ED Provider  Electronically Signed by           Kory Salmeron,   01/06/20 1411

## 2020-01-06 NOTE — ED NOTES
Patient reports family member dx with Influenza A 2 days ago        Luisana Gilbert RN  01/06/20 9549

## 2020-01-08 ENCOUNTER — OFFICE VISIT (OUTPATIENT)
Dept: FAMILY MEDICINE CLINIC | Facility: CLINIC | Age: 75
End: 2020-01-08
Payer: COMMERCIAL

## 2020-01-08 VITALS
SYSTOLIC BLOOD PRESSURE: 112 MMHG | OXYGEN SATURATION: 97 % | HEART RATE: 102 BPM | BODY MASS INDEX: 22.86 KG/M2 | HEIGHT: 63 IN | WEIGHT: 129 LBS | DIASTOLIC BLOOD PRESSURE: 60 MMHG

## 2020-01-08 DIAGNOSIS — I71.2 THORACIC AORTIC ANEURYSM WITHOUT RUPTURE (HCC): ICD-10-CM
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
## 2020-01-08 DIAGNOSIS — J45.41 MODERATE PERSISTENT ASTHMA WITH ACUTE EXACERBATION: Primary | ICD-10-CM

## 2020-01-08 DIAGNOSIS — I10 ESSENTIAL HYPERTENSION: Chronic | ICD-10-CM

## 2020-01-08 DIAGNOSIS — E11.65 TYPE 2 DIABETES MELLITUS WITH HYPERGLYCEMIA, WITHOUT LONG-TERM CURRENT USE OF INSULIN (HCC): ICD-10-CM

## 2020-01-08 DIAGNOSIS — I50.32 CHRONIC DIASTOLIC (CONGESTIVE) HEART FAILURE (HCC): ICD-10-CM

## 2020-01-08 PROBLEM — J45.909 ASTHMA: Status: ACTIVE | Noted: 2019-07-09

## 2020-01-08 PROCEDURE — 94640 AIRWAY INHALATION TREATMENT: CPT | Performed by: FAMILY MEDICINE

## 2020-01-08 PROCEDURE — 3078F DIAST BP <80 MM HG: CPT | Performed by: FAMILY MEDICINE

## 2020-01-08 PROCEDURE — 99214 OFFICE O/P EST MOD 30 MIN: CPT | Performed by: FAMILY MEDICINE

## 2020-01-08 PROCEDURE — 3074F SYST BP LT 130 MM HG: CPT | Performed by: FAMILY MEDICINE

## 2020-01-08 RX ORDER — PROMETHAZINE HYDROCHLORIDE 6.25 MG/5ML
SYRUP ORAL
COMMUNITY
Start: 2020-01-06 | End: 2020-02-20 | Stop reason: ALTCHOICE

## 2020-01-08 RX ORDER — MONTELUKAST SODIUM 10 MG/1
10 TABLET ORAL
Qty: 30 TABLET | Refills: 5 | Status: SHIPPED | OUTPATIENT
Start: 2020-01-08 | End: 2020-01-30 | Stop reason: SDUPTHER

## 2020-01-08 RX ORDER — LEVALBUTEROL INHALATION SOLUTION 1.25 MG/3ML
1.25 SOLUTION RESPIRATORY (INHALATION) ONCE
Status: COMPLETED | OUTPATIENT
Start: 2020-01-08 | End: 2020-01-08

## 2020-01-08 RX ADMIN — LEVALBUTEROL INHALATION SOLUTION 1.25 MG: 1.25 SOLUTION RESPIRATORY (INHALATION) at 16:23

## 2020-01-08 NOTE — ASSESSMENT & PLAN NOTE
Patient did have a recent fall  It appears to be based on inattention  Unfortunately, she does have significant amount of facial changes, including bilateral black eyes, as well as some irritation across the bridge of the nose  Recommend x-ray of nasal bones and facial bones  This would rule out any fractures, especially as she has some significant discomfort on the left zygomatic arch  Of note, the eye does not appear to be trapped

## 2020-01-08 NOTE — ASSESSMENT & PLAN NOTE
Patient does appear to be having an exacerbation of her asthma at this point  Patient does have Dulera  She is using it 2 inhalations twice a day  She was only using it twice a day  Recommend that she increase that to every day twice a day  I would recommend that she use albuterol 2 puffs every 4 hours in the short run  I would recommend that she increase the nebulizer use to every 4 hours while she is awake for the next 2 weeks  Discussed briefly the possibility of adding steroids, but the patient does not wish to do that right away  She does report she was on prednisone recently for irritation of the right jaw  Recommend add Singulair 10 mg daily

## 2020-01-08 NOTE — PROGRESS NOTES
Assessment and Plan:    Problem List Items Addressed This Visit     Asthma - Primary     Patient does appear to be having an exacerbation of her asthma at this point  Patient does have Dulera  She is using it 2 inhalations twice a day  She was only using it twice a day  Recommend that she increase that to every day twice a day  I would recommend that she use albuterol 2 puffs every 4 hours in the short run  I would recommend that she increase the nebulizer use to every 4 hours while she is awake for the next 2 weeks  Discussed briefly the possibility of adding steroids, but the patient does not wish to do that right away  She does report she was on prednisone recently for irritation of the right jaw  Recommend add Singulair 10 mg daily  Relevant Medications    levalbuterol (XOPENEX) inhalation solution 1 25 mg (Completed)    montelukast (SINGULAIR) 10 mg tablet    Diabetes mellitus (HCC) (Chronic)       Lab Results   Component Value Date    HGBA1C 6 0 09/18/2019   Patient's last A1c was excellent at 6 0  With that information, and the fact that she is having significant issues with diarrhea, I recommend that she discontinue using the metformin  Recheck in the future as scheduled  I would not make any other adjustments at this time  Fall     Patient did have a recent fall  It appears to be based on inattention  Unfortunately, she does have significant amount of facial changes, including bilateral black eyes, as well as some irritation across the bridge of the nose  Recommend x-ray of nasal bones and facial bones  This would rule out any fractures, especially as she has some significant discomfort on the left zygomatic arch  Of note, the eye does not appear to be trapped  Relevant Orders    XR facial bones 3+ vw    HTN (hypertension) (Chronic)     Blood pressure peer to be doing well today  No changes           Thoracic aortic aneurysm without rupture [facility])     Patient does have a thoracic aneurysm  She has seen vascular in the past   They have ordered a repeat CT for the future  She will follow up with them in the future  Other Visit Diagnoses     Chronic diastolic (congestive) heart failure ([facility] )        CHF was listed in the chart from before, but patient appears to be doing relatively well  In May she saw Cardiology in the hospital                  Diagnoses and all orders for this visit:    Moderate persistent asthma with acute exacerbation  -     levalbuterol (XOPENEX) inhalation solution 1 25 mg  -     montelukast (SINGULAIR) 10 mg tablet; Take 1 tablet (10 mg total) by mouth daily at bedtime    Type 2 diabetes mellitus with hyperglycemia, without long-term current use of insulin (HCC)    Chronic diastolic (congestive) heart failure (HCC)  Comments:  CHF was listed in the chart from before, but patient appears to be doing relatively well  In May she saw Cardiology in the hospital     Thoracic aortic aneurysm without rupture ([facility] )    Fall, initial encounter  -     XR facial bones 3+ vw; Future    Essential hypertension    Other orders  -     promethazine (PHENERGAN) 12 5 mg/10 mL syrup; TAKE 5 ML BY MOUTH FOUR TIMES A DAY AS NEEDED FOR COUGH  Subjective:      Patient ID: Santosh Manzanares is a 69 y o  female  CC:    Chief Complaint   Patient presents with    Cough     Chest congestion and wheezing  She has been using her rescue inhaler with no relief  She has pain in the ribs from all the coughing  She was given Phenergan with little relief of cough   Fall     Pt feel the same day she was in the Hospital but after she was there  She is having pain in her right knee and and her face mostly above the left eye   Diarrhea     Pt states she has been having diarrhea x 2 months and since this started all her other symptoms keep coming      Nausea     Pt states she also has nausea and vomiting but only vomiting a " gel like substance"       HPI:    Patient is here for several reasons today  She did have a fall recently  She was trying to avoid the rain, but unfortunately when she pulled a berumen down over her eyes to block the rain, she was not able to see the curb, tripped, and fell and hit her head  She did not go to the emergency room, but does have a significant amount of swelling on the face  She has been using ice  She also is using an over-the-counter cream   She feels that the swelling has gone down significantly  Patient has been having diarrhea for the last 2 months or so  About 2 weeks ago it seemed to get worse  She does go quite frequently, and by later on it there is just mucus and loose stool  She did mention that she goes approximately 5 times per day  I did review her medication list, includes metformin, 875 mg 1 twice a day  Starting about 2 weeks ago, she had coughing, productive  She had some increased pain after that started  She must have Tylenol for the pain in the chest with this  She did go to the ER due to this, with SOB and wheezes  In the ER, she had XR, and albuterol nebulizer with ipratropium  She did have Phenergan from the ER  She felt that improved things, but the ER did not prescribe that for her  Patient does have a history of diabetes  Again, she is currently on metformin  Of note, she does use candesartan as well  Reviewed the chart  She did have an A1c done in September, it was 6 0  Patient has hypertension, currently using candesartan and Norvasc  The following portions of the patient's history were reviewed and updated as appropriate: allergies, current medications, past family history, past medical history, past social history, past surgical history and problem list       Review of Systems   HENT: Positive for congestion  Respiratory: Positive for cough, shortness of breath and wheezing  Musculoskeletal:        Facial pain   Skin: Positive for color change     All other systems reviewed and are negative  Data to review:       Objective:    Vitals:    01/08/20 1547   BP: 112/60   BP Location: Left arm   Patient Position: Sitting   Pulse: 102   SpO2: 97%   Weight: 58 5 kg (129 lb)   Height: 5' 3" (1 6 m)        Physical Exam   Constitutional: She appears well-developed and well-nourished  HENT:   Head: Normocephalic and atraumatic  Nose: Nose normal  Right sinus exhibits no maxillary sinus tenderness and no frontal sinus tenderness  Left sinus exhibits no maxillary sinus tenderness and no frontal sinus tenderness  Mouth/Throat: Uvula is midline, oropharynx is clear and moist and mucous membranes are normal  Tonsils are 0 on the right  Tonsils are 0 on the left  No tonsillar exudate  Eyes: Pupils are equal, round, and reactive to light  Conjunctivae and EOM are normal        Neck: Normal range of motion  Neck supple  Cardiovascular: Normal rate, regular rhythm and normal heart sounds  Pulses:       Carotid pulses are 2+ on the right side, and 2+ on the left side  Pulmonary/Chest: Effort normal  She has wheezes (Significant wheezes with inspiratory and expiratory flow)  She has no rales  She exhibits no tenderness  Nursing note and vitals reviewed  Mini neb  Performed by: Cora Traore MD  Authorized by: Cora Traore MD     Number of treatments:  1  Treatment 1:   Pre-Procedure     Symptoms:  Wheezing, shortness of breath and cough    Medication: xopenex    Post-Procedure     Symptoms:  Wheezing

## 2020-01-08 NOTE — PATIENT INSTRUCTIONS
Problem List Items Addressed This Visit     Asthma - Primary     Patient does appear to be having an exacerbation of her asthma at this point  Patient does have Dulera  She is using it 2 inhalations twice a day  She was only using it twice a day  Recommend that she increase that to every day twice a day  I would recommend that she use albuterol 2 puffs every 4 hours in the short run  I would recommend that she increase the nebulizer use to every 4 hours while she is awake for the next 2 weeks  Discussed briefly the possibility of adding steroids, but the patient does not wish to do that right away  She does report she was on prednisone recently for irritation of the right jaw  Recommend add Singulair 10 mg daily  Relevant Medications    levalbuterol (XOPENEX) inhalation solution 1 25 mg (Completed)    montelukast (SINGULAIR) 10 mg tablet    Diabetes mellitus (HCC) (Chronic)       Lab Results   Component Value Date    HGBA1C 6 0 09/18/2019   Patient's last A1c was excellent at 6 0  With that information, and the fact that she is having significant issues with diarrhea, I recommend that she discontinue using the metformin  Recheck in the future as scheduled  I would not make any other adjustments at this time  Fall     Patient did have a recent fall  It appears to be based on inattention  Unfortunately, she does have significant amount of facial changes, including bilateral black eyes, as well as some irritation across the bridge of the nose  Recommend x-ray of nasal bones and facial bones  This would rule out any fractures, especially as she has some significant discomfort on the left zygomatic arch  Of note, the eye does not appear to be trapped  Relevant Orders    XR facial bones 3+ vw    HTN (hypertension) (Chronic)     Blood pressure peer to be doing well today  No changes  Thoracic aortic aneurysm without rupture [facility])     Patient does have a thoracic aneurysm  She has seen vascular in the past   They have ordered a repeat CT for the future  She will follow up with them in the future  Other Visit Diagnoses     Chronic diastolic (congestive) heart failure ([facility] [address] )        CHF was listed in the chart from before, but patient appears to be doing relatively well    In May she saw Cardiology in the hospital

## 2020-01-08 NOTE — ASSESSMENT & PLAN NOTE
Patient does have a thoracic aneurysm  She has seen vascular in the past   They have ordered a repeat CT for the future  She will follow up with them in the future

## 2020-01-13 ENCOUNTER — HOSPITAL ENCOUNTER (OUTPATIENT)
Dept: RADIOLOGY | Facility: HOSPITAL | Age: 75
Discharge: HOME | End: 2020-01-13
Payer: COMMERCIAL
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
## 2020-01-13 PROCEDURE — 70150 X-RAY EXAM OF FACIAL BONES: CPT

## 2020-01-15 ENCOUNTER — TELEPHONE (OUTPATIENT)
Dept: FAMILY MEDICINE CLINIC | Facility: CLINIC | Age: 75
End: 2020-01-15

## 2020-01-16 DIAGNOSIS — J01.00 ACUTE MAXILLARY SINUSITIS, RECURRENCE NOT SPECIFIED: Primary | ICD-10-CM

## 2020-01-16 RX ORDER — AMOXICILLIN AND CLAVULANATE POTASSIUM 875; 125 MG/1; MG/1
1 TABLET, FILM COATED ORAL EVERY 12 HOURS SCHEDULED
Qty: 20 TABLET | Refills: 0 | Status: SHIPPED | OUTPATIENT
Start: 2020-01-16 | End: 2020-01-26

## 2020-01-17 DIAGNOSIS — J45.30 MILD PERSISTENT ASTHMA WITHOUT COMPLICATION: ICD-10-CM

## 2020-01-24 ENCOUNTER — APPOINTMENT (EMERGENCY)
Dept: RADIOLOGY | Facility: HOSPITAL | Age: 75
End: 2020-01-24
Payer: COMMERCIAL

## 2020-01-24 ENCOUNTER — HOSPITAL ENCOUNTER (EMERGENCY)
Facility: HOSPITAL | Age: 75
Discharge: HOME | End: 2020-01-24
Attending: EMERGENCY MEDICINE | Admitting: EMERGENCY MEDICINE
Payer: COMMERCIAL

## 2020-01-24 VITALS
OXYGEN SATURATION: 97 % | SYSTOLIC BLOOD PRESSURE: 176 MMHG | DIASTOLIC BLOOD PRESSURE: 81 MMHG | RESPIRATION RATE: 18 BRPM | HEART RATE: 89 BPM | TEMPERATURE: 98.2 F

## 2020-01-24 DIAGNOSIS — R19.7 DIARRHEA: ICD-10-CM

## 2020-01-24 DIAGNOSIS — R07.82 INTERCOSTAL PAIN: ICD-10-CM

## 2020-01-24 DIAGNOSIS — J40 WHEEZY BRONCHITIS: Primary | ICD-10-CM

## 2020-01-24 LAB
ANION GAP SERPL CALCULATED.3IONS-SCNC: 10 MMOL/L (ref 4–13)
ATRIAL RATE: 78 BPM
BACTERIA UR QL AUTO: ABNORMAL /HPF
BASOPHILS # BLD AUTO: 0.05 THOUSANDS/ΜL (ref 0–0.1)
BASOPHILS NFR BLD AUTO: 1 % (ref 0–1)
BILIRUB UR QL STRIP: NEGATIVE
BUN SERPL-MCNC: 11 MG/DL (ref 5–25)
CALCIUM SERPL-MCNC: 9.5 MG/DL (ref 8.3–10.1)
CHLORIDE SERPL-SCNC: 99 MMOL/L (ref 100–108)
CLARITY UR: CLEAR
CO2 SERPL-SCNC: 30 MMOL/L (ref 21–32)
COLOR UR: YELLOW
CREAT SERPL-MCNC: 0.52 MG/DL (ref 0.6–1.3)
EOSINOPHIL # BLD AUTO: 0.15 THOUSAND/ΜL (ref 0–0.61)
EOSINOPHIL NFR BLD AUTO: 2 % (ref 0–6)
ERYTHROCYTE [DISTWIDTH] IN BLOOD BY AUTOMATED COUNT: 14.5 % (ref 11.6–15.1)
GFR SERPL CREATININE-BSD FRML MDRD: 95 ML/MIN/1.73SQ M
GLUCOSE SERPL-MCNC: 117 MG/DL (ref 65–140)
GLUCOSE UR STRIP-MCNC: NEGATIVE MG/DL
HCT VFR BLD AUTO: 33.6 % (ref 34.8–46.1)
HGB BLD-MCNC: 11.1 G/DL (ref 11.5–15.4)
HGB UR QL STRIP.AUTO: ABNORMAL
IMM GRANULOCYTES # BLD AUTO: 0.03 THOUSAND/UL (ref 0–0.2)
IMM GRANULOCYTES NFR BLD AUTO: 0 % (ref 0–2)
KETONES UR STRIP-MCNC: NEGATIVE MG/DL
LEUKOCYTE ESTERASE UR QL STRIP: NEGATIVE
LYMPHOCYTES # BLD AUTO: 2.1 THOUSANDS/ΜL (ref 0.6–4.47)
LYMPHOCYTES NFR BLD AUTO: 24 % (ref 14–44)
MCH RBC QN AUTO: 29.4 PG (ref 26.8–34.3)
MCHC RBC AUTO-ENTMCNC: 33 G/DL (ref 31.4–37.4)
MCV RBC AUTO: 89 FL (ref 82–98)
MONOCYTES # BLD AUTO: 0.42 THOUSAND/ΜL (ref 0.17–1.22)
MONOCYTES NFR BLD AUTO: 5 % (ref 4–12)
NEUTROPHILS # BLD AUTO: 6.16 THOUSANDS/ΜL (ref 1.85–7.62)
NEUTS SEG NFR BLD AUTO: 68 % (ref 43–75)
NITRITE UR QL STRIP: NEGATIVE
NON-SQ EPI CELLS URNS QL MICRO: ABNORMAL /HPF
NRBC BLD AUTO-RTO: 0 /100 WBCS
P AXIS: 73 DEGREES
PH UR STRIP.AUTO: 6 [PH] (ref 4.5–8)
PLATELET # BLD AUTO: 302 THOUSANDS/UL (ref 149–390)
PMV BLD AUTO: 12 FL (ref 8.9–12.7)
POTASSIUM SERPL-SCNC: 4 MMOL/L (ref 3.5–5.3)
PR INTERVAL: 150 MS
PROT UR STRIP-MCNC: ABNORMAL MG/DL
QRS AXIS: 46 DEGREES
QRSD INTERVAL: 86 MS
QT INTERVAL: 360 MS
QTC INTERVAL: 410 MS
RBC # BLD AUTO: 3.78 MILLION/UL (ref 3.81–5.12)
RBC #/AREA URNS AUTO: ABNORMAL /HPF
SODIUM SERPL-SCNC: 139 MMOL/L (ref 136–145)
SP GR UR STRIP.AUTO: 1.01 (ref 1–1.03)
T WAVE AXIS: 84 DEGREES
TROPONIN I SERPL-MCNC: <0.02 NG/ML
UROBILINOGEN UR QL STRIP.AUTO: 0.2 E.U./DL
VENTRICULAR RATE: 78 BPM
WBC # BLD AUTO: 8.91 THOUSAND/UL (ref 4.31–10.16)
WBC #/AREA URNS AUTO: ABNORMAL /HPF

## 2020-01-24 PROCEDURE — 71046 X-RAY EXAM CHEST 2 VIEWS: CPT

## 2020-01-24 PROCEDURE — 93005 ELECTROCARDIOGRAM TRACING: CPT

## 2020-01-24 PROCEDURE — 94640 AIRWAY INHALATION TREATMENT: CPT

## 2020-01-24 PROCEDURE — 80048 BASIC METABOLIC PNL TOTAL CA: CPT | Performed by: PHYSICIAN ASSISTANT

## 2020-01-24 PROCEDURE — 36415 COLL VENOUS BLD VENIPUNCTURE: CPT | Performed by: PHYSICIAN ASSISTANT

## 2020-01-24 PROCEDURE — 99285 EMERGENCY DEPT VISIT HI MDM: CPT

## 2020-01-24 PROCEDURE — 99284 EMERGENCY DEPT VISIT MOD MDM: CPT | Performed by: EMERGENCY MEDICINE

## 2020-01-24 PROCEDURE — 93010 ELECTROCARDIOGRAM REPORT: CPT | Performed by: INTERNAL MEDICINE

## 2020-01-24 PROCEDURE — 85025 COMPLETE CBC W/AUTO DIFF WBC: CPT | Performed by: PHYSICIAN ASSISTANT

## 2020-01-24 PROCEDURE — 81001 URINALYSIS AUTO W/SCOPE: CPT

## 2020-01-24 PROCEDURE — 84484 ASSAY OF TROPONIN QUANT: CPT | Performed by: PHYSICIAN ASSISTANT

## 2020-01-24 RX ORDER — PREDNISONE 20 MG/1
40 TABLET ORAL DAILY
Qty: 10 TABLET | Refills: 0 | Status: SHIPPED | OUTPATIENT
Start: 2020-01-24 | End: 2020-01-29

## 2020-01-24 RX ORDER — ALBUTEROL SULFATE 2.5 MG/3ML
5 SOLUTION RESPIRATORY (INHALATION) ONCE
Status: COMPLETED | OUTPATIENT
Start: 2020-01-24 | End: 2020-01-24

## 2020-01-24 RX ORDER — ALBUTEROL SULFATE 2.5 MG/3ML
2.5 SOLUTION RESPIRATORY (INHALATION) EVERY 6 HOURS PRN
Qty: 84 ML | Refills: 0 | Status: SHIPPED | OUTPATIENT
Start: 2020-01-24 | End: 2020-01-30 | Stop reason: SDUPTHER

## 2020-01-24 RX ORDER — LIDOCAINE 50 MG/G
1 PATCH TOPICAL ONCE
Status: DISCONTINUED | OUTPATIENT
Start: 2020-01-24 | End: 2020-01-24 | Stop reason: HOSPADM

## 2020-01-24 RX ADMIN — LIDOCAINE 1 PATCH: 50 PATCH TOPICAL at 10:26

## 2020-01-24 RX ADMIN — ALBUTEROL SULFATE 5 MG: 2.5 SOLUTION RESPIRATORY (INHALATION) at 10:28

## 2020-01-24 RX ADMIN — IPRATROPIUM BROMIDE 0.5 MG: 0.5 SOLUTION RESPIRATORY (INHALATION) at 10:28

## 2020-01-24 NOTE — ED NOTES
Pt reports having diarrhea prior to antibiotics  States she was told it was the metformin   Pt is a poor historian regarding coordination of her care between her PCP and specialist       Maurice Amaral, RN  01/24/20 6322

## 2020-01-24 NOTE — ED PROVIDER NOTES
History  Chief Complaint   Patient presents with    Chest Pain     Pt repots fell in September and broke "ribs on the right side " States fell again on 12/31/2019, states was not evaluated at that time  Reports saw PCP recently, and was sent for "xray of my face "     Asthma     States worsening asthma symptoms since September  Reports went to PCP and given breathing treatment in office  States productive cough with white sputum  Pt denies fevers  Placed on abx  Pt is a poor historian   Fall     18-year-old female presents emergency room for evaluation of multiple complaints  States since September she has had this pain across the right side of her chest which comes and goes every 20 minutes  States it is getting worse  States this began after a fall where she broke her ribs and had fluid in her lung requiring a chest tube  Furthermore patient states over the past 2 weeks she has had a cough and has completed a course of antibiotics for bronchitis however has not helped  States this has increased her pain  She has a history of asthma and has been using her albuterol with minimal relief  States she has now ran out  States atrovent usually helps  Lastly she complains of ongoing diarrhea for the past 2 weeks since starting the antibiotics  Denies abdominal pain  States the diarrhea is watery and mucousy not bloody  Patient is taking Tylenol for her rib pain without relief  Upon digression she Also mentions new fall on new years marina which she is still getting better from after bumping her forehead and left lower leg  Was not seen immediately following this  History provided by:  Patient      Prior to Admission Medications   Prescriptions Last Dose Informant Patient Reported? Taking?    Blood Glucose Calibration (ONETOUCH GLUCOSE CONTROL VI)  Self Yes No   Sig: use as directed   Lancets (ONETOUCH ULTRASOFT) lancets  Self Yes No   Sig: by Does not apply route   ONE TOUCH ULTRA TEST test strip  Self No No   Sig: TEST TWICE A DAY   Promethazine-DM (PHENERGAN-DM) 6 25-15 mg/5 mL oral syrup   No No   Sig: Take 5 mL by mouth 4 (four) times a day as needed for cough   acetaminophen (TYLENOL) 325 mg tablet  Self No No   Sig: Take 3 tablets (975 mg total) by mouth every 8 (eight) hours   albuterol (2 5 mg/3 mL) 0 083 % nebulizer solution   No No   Sig: Take 1 vial (2 5 mg total) by nebulization every 6 (six) hours as needed for wheezing or shortness of breath   albuterol (PROVENTIL HFA,VENTOLIN HFA) 90 mcg/act inhaler  Self No No   Sig: Inhale 2 puffs every 6 (six) hours as needed for wheezing   amLODIPine (NORVASC) 5 mg tablet  Self No No   Sig: Take 1 tablet (5 mg total) by mouth daily   amoxicillin-clavulanate (AUGMENTIN) 875-125 mg per tablet   No No   Sig: Take 1 tablet by mouth every 12 (twelve) hours for 10 days   atorvastatin (LIPITOR) 20 mg tablet  Self Yes No   Si mg   candesartan (ATACAND) 32 MG tablet  Self No No   Sig: Take 1 tablet (32 mg total) by mouth daily   hydrochlorothiazide (HYDRODIURIL) 12 5 mg tablet   No No   Sig: Take 1 tablet (12 5 mg total) by mouth daily   mometasone-formoterol (DULERA) 100-5 MCG/ACT inhaler   No No   Sig: inhale 2 puffs by mouth and INTO THE LUNGS twice a day inhale 2 puffs by mouth and INTO THE LUNGS twice a day   montelukast (SINGULAIR) 10 mg tablet   No No   Sig: Take 1 tablet (10 mg total) by mouth daily at bedtime   promethazine (PHENERGAN) 12 5 mg/10 mL syrup   Yes No   Sig: TAKE 5 ML BY MOUTH FOUR TIMES A DAY AS NEEDED FOR COUGH  Facility-Administered Medications: None       Past Medical History:   Diagnosis Date    Asthma     Diabetes mellitus ([address] )     Hypertension        Past Surgical History:   Procedure Laterality Date    BRONCHOSCOPY N/A 3/16/2016    Procedure: BRONCHOSCOPY FLEXIBLE/anesth ;  Surgeon: Mando Hurtado DO;  Location: [facility];   Service:     COLONOSCOPY      EYE SURGERY      OOPHORECTOMY Left     age 49       Family History Problem Relation Age of Onset    Cancer Mother 45        uterine    Cancer Daughter 48        breast    Cancer Son 35        asbestosis related cancer     I have reviewed and agree with the history as documented  Social History     Tobacco Use    Smoking status: Never Smoker    Smokeless tobacco: Never Used   Substance Use Topics    Alcohol use: Yes     Frequency: 2-4 times a month     Drinks per session: 3 or 4     Comment: 1 pint rachele on weekends    Drug use: No        Review of Systems   Constitutional: Negative for chills and fever  HENT: Negative for congestion, ear pain and sore throat  Eyes: Negative for discharge  Respiratory: Positive for chest tightness and wheezing  Cardiovascular: Positive for chest pain  Gastrointestinal: Positive for diarrhea  Negative for abdominal pain, blood in stool and vomiting  Genitourinary: Negative for dysuria  Skin: Negative for rash and wound  Neurological: Negative for syncope  Physical Exam  Physical Exam   Constitutional: She appears well-developed and well-nourished  HENT:   Right Ear: External ear normal    Left Ear: External ear normal    Mouth/Throat: Oropharynx is clear and moist    Eyes: Conjunctivae are normal    Neck: Neck supple  Cardiovascular: Normal rate, regular rhythm and normal heart sounds  Pulmonary/Chest: Effort normal  She has wheezes  She has rhonchi  She exhibits tenderness (right lateral, Chest tube scar noted)  Abdominal: Soft  Bowel sounds are normal  She exhibits no distension  There is no tenderness  There is no CVA tenderness  Neurological: She is alert  Skin: Skin is warm and dry  No rash noted  Psychiatric: She has a normal mood and affect  Nursing note and vitals reviewed        Vital Signs  ED Triage Vitals   Temperature Pulse Respirations Blood Pressure SpO2   01/24/20 0956 01/24/20 0945 01/24/20 0945 01/24/20 0945 01/24/20 0945   98 2 °F (36 8 °C) 76 18 (!) 191/86 96 %      Temp Source Heart Rate Source Patient Position - Orthostatic VS BP Location FiO2 (%)   01/24/20 0956 01/24/20 0945 01/24/20 0945 01/24/20 0945 --   Oral Monitor Sitting Right arm       Pain Score       01/24/20 0945       7           Vitals:    01/24/20 0945 01/24/20 1145   BP: (!) 191/86 (!) 176/81   Pulse: 76 89   Patient Position - Orthostatic VS: Sitting Lying         Visual Acuity  Visual Acuity      Most Recent Value   L Pupil Size (mm)  2   R Pupil Size (mm)  2          ED Medications  Medications   lidocaine (LIDODERM) 5 % patch 1 patch (1 patch Topical Medication Applied 1/24/20 1026)   albuterol inhalation solution 5 mg (5 mg Nebulization Given 1/24/20 1028)   ipratropium (ATROVENT) 0 02 % inhalation solution 0 5 mg (0 5 mg Nebulization Given 1/24/20 1028)       Diagnostic Studies  Results Reviewed     Procedure Component Value Units Date/Time    Troponin I [[id number]]  (Normal) Collected:  01/24/20 1034    Lab Status:  Final result Specimen:  Blood from Arm, Right Updated:  01/24/20 1119     Troponin I <0 02 ng/mL     CBC and differential [[id number]]  (Abnormal) Collected:  01/24/20 1034    Lab Status:  Final result Specimen:  Blood from Arm, Right Updated:  01/24/20 1112     WBC 8 91 Thousand/uL      RBC 3 78 Million/uL      Hemoglobin 11 1 g/dL      Hematocrit 33 6 %      MCV 89 fL      MCH 29 4 pg      MCHC 33 0 g/dL      RDW 14 5 %      MPV 12 0 fL      Platelets 648 Thousands/uL      nRBC 0 /100 WBCs      Neutrophils Relative 68 %      Immat GRANS % 0 %      Lymphocytes Relative 24 %      Monocytes Relative 5 %      Eosinophils Relative 2 %      Basophils Relative 1 %      Neutrophils Absolute 6 16 Thousands/µL      Immature Grans Absolute 0 03 Thousand/uL      Lymphocytes Absolute 2 10 Thousands/µL      Monocytes Absolute 0 42 Thousand/µL      Eosinophils Absolute 0 15 Thousand/µL      Basophils Absolute 0 05 Thousands/µL     Basic metabolic panel [[id number]]  (Abnormal) Collected:  01/24/20 1034    Lab Status: Final result Specimen:  Blood from Arm, Right Updated:  01/24/20 1112     Sodium 139 mmol/L      Potassium 4 0 mmol/L      Chloride 99 mmol/L      CO2 30 mmol/L      ANION GAP 10 mmol/L      BUN 11 mg/dL      Creatinine 0 52 mg/dL      Glucose 117 mg/dL      Calcium 9 5 mg/dL      eGFR 95 ml/min/1 73sq m     Narrative:       [place] guidelines for Chronic Kidney Disease (CKD):     Stage 1 with normal or high GFR (GFR > 90 mL/min/1 73 square meters)    Stage 2 Mild CKD (GFR = 60-89 mL/min/1 73 square meters)    Stage 3A Moderate CKD (GFR = 45-59 mL/min/1 73 square meters)    Stage 3B Moderate CKD (GFR = 30-44 mL/min/1 73 square meters)    Stage 4 Severe CKD (GFR = 15-29 mL/min/1 73 square meters)    Stage 5 End Stage CKD (GFR <15 mL/min/1 73 square meters)  Note: GFR calculation is accurate only with a steady state creatinine    Urine Microscopic [[id number]]  (Abnormal) Collected:  01/24/20 1026    Lab Status:  Final result Specimen:  Urine, Clean Catch Updated:  01/24/20 1109     RBC, UA 1-2 /hpf      WBC, UA 0-1 /hpf      Epithelial Cells Occasional /hpf      Bacteria, UA Occasional /hpf     POCT urinalysis dipstick [[id number]]  (Abnormal) Resulted:  01/24/20 1028    Lab Status:  Final result Specimen:  Urine Updated:  01/24/20 1029    Urine Macroscopic, POC [[id number]]  (Abnormal) Collected:  01/24/20 1026    Lab Status:  Final result Specimen:  Urine Updated:  01/24/20 1028     Color, UA Yellow     Clarity, UA Clear     pH, UA 6 0     Leukocytes, UA Negative     Nitrite, UA Negative     Protein, UA Trace mg/dl      Glucose, UA Negative mg/dl      Ketones, UA Negative mg/dl      Urobilinogen, UA 0 2 E U /dl      Bilirubin, UA Negative     Blood, UA Trace     Specific [place], UA 1 015    Narrative:       CLINITEK RESULT    Clostridium difficile toxin by PCR with EIA [[id number]]     Lab Status:  No result Specimen:  Stool                  XR chest 2 views   Final Result by Lizz Chambers Fredy Calhoun MD (01/24 1204)      No acute cardiopulmonary disease  Workstation performed: [id number]                    Procedures  Procedures         ED Course  ED Course as of Jan 24 1237   Fri Jan 24, 2020   1154 Patient feels much better, pain decreased states she is able to move better, lung sounds also improved, wheezing and rhonchi decreased  Awaiting chest x-ray results                                  MDM  Number of Diagnoses or Management Options  Diarrhea:   Intercostal pain:   Wheezy bronchitis:      Amount and/or Complexity of Data Reviewed  Clinical lab tests: ordered and reviewed  Tests in the radiology section of CPT®: ordered and reviewed    Risk of Complications, Morbidity, and/or Mortality  General comments: Differential diagnosis includes but is not limited to: atypical acs, pna, bronchitis, asthma exacerbation, intercostal pain from previous chest tube and rib fractures, diarrhea - viral vs abx related - will culture for Cdiff    Patient Progress  Patient progress: improved        Disposition  Final diagnoses:   Wheezy bronchitis   Intercostal pain   Diarrhea     Time reflects when diagnosis was documented in both MDM as applicable and the Disposition within this note     Time User Action Codes Description Comment    1/24/2020 12:23 PM Vearl Boxer Add [J40] Wheezy bronchitis     1/24/2020 12:23 PM Magda BOYD Add [R07 82] Intercostal pain     1/24/2020 12:28 PM Vearl Boxer Add [R19 7] Diarrhea       ED Disposition     ED Disposition Condition Date/Time Comment    Discharge Stable Fri Jan 24, 2020 12:22 PM Robin Monahan discharge to home/self care              Follow-up Information     Follow up With Specialties Details Why Contact Info Additional Information    Andrei Guidry MD Family Medicine In 3 days  [address] [facility] [address]  [phone]       [facility] Emergency Medicine  If symptoms worsen [address] [facility] [place] [address]  [facility], [address]  , [place], [place], [address]          Discharge Medication List as of 1/24/2020 12:27 PM      START taking these medications    Details   !! albuterol (2 5 mg/3 mL) 0 083 % nebulizer solution Take 1 vial (2 5 mg total) by nebulization every 6 (six) hours as needed for wheezing or shortness of breath for up to 7 days, Starting Fri 1/24/2020, Until Fri 1/31/2020, Normal      predniSONE 20 mg tablet Take 2 tablets (40 mg total) by mouth daily for 5 days, Starting Fri 1/24/2020, Until Wed 1/29/2020, Normal       !! - Potential duplicate medications found  Please discuss with provider        CONTINUE these medications which have NOT CHANGED    Details   acetaminophen (TYLENOL) 325 mg tablet Take 3 tablets (975 mg total) by mouth every 8 (eight) hours, Starting Sun 9/29/2019, Normal      !! albuterol (2 5 mg/3 mL) 0 083 % nebulizer solution Take 1 vial (2 5 mg total) by nebulization every 6 (six) hours as needed for wheezing or shortness of breath, Starting Fri 12/20/2019, Normal      albuterol (PROVENTIL HFA,VENTOLIN HFA) 90 mcg/act inhaler Inhale 2 puffs every 6 (six) hours as needed for wheezing, Starting Tue 7/9/2019, Normal      amLODIPine (NORVASC) 5 mg tablet Take 1 tablet (5 mg total) by mouth daily, Starting Wed 6/5/2019, Normal      amoxicillin-clavulanate (AUGMENTIN) 875-125 mg per tablet Take 1 tablet by mouth every 12 (twelve) hours for 10 days, Starting Thu 1/16/2020, Until Sun 1/26/2020, Normal      atorvastatin (LIPITOR) 20 mg tablet 20 mg, Starting Thu 10/18/2018, Historical Med      Blood Glucose Calibration (ONETOUCH GLUCOSE CONTROL VI) use as directed, Historical Med      candesartan (ATACAND) 32 MG tablet Take 1 tablet (32 mg total) by mouth daily, Starting Mon 10/14/2019, Normal      hydrochlorothiazide (HYDRODIURIL) 12 5 mg tablet Take 1 tablet (12 5 mg total) by mouth daily, Starting Fri 12/20/2019, Normal      Lancets (ONETOUCH ULTRASOFT) lancets by Does not apply route, Starting Fri 2/3/2012, Historical Med      mometasone-formoterol (DULERA) 100-5 MCG/ACT inhaler inhale 2 puffs by mouth and INTO THE LUNGS twice a day inhale 2 puffs by mouth and INTO THE LUNGS twice a day, Normal      montelukast (SINGULAIR) 10 mg tablet Take 1 tablet (10 mg total) by mouth daily at bedtime, Starting Wed 1/8/2020, Normal      ONE TOUCH ULTRA TEST test strip TEST TWICE A DAY, Normal      promethazine (PHENERGAN) 12 5 mg/10 mL syrup TAKE 5 ML BY MOUTH FOUR TIMES A DAY AS NEEDED FOR COUGH , Historical Med      Promethazine-DM (PHENERGAN-DM) 6 25-15 mg/5 mL oral syrup Take 5 mL by mouth 4 (four) times a day as needed for cough, Starting Mon 1/6/2020, Print       !! - Potential duplicate medications found  Please discuss with provider  Outpatient Discharge Orders   Clostridium difficile toxin by PCR with EIA   Standing Status: Future Standing Exp   Date: 01/24/21       ED Provider  Electronically Signed by           Nhung Lucio PA-C  01/24/20 4167

## 2020-01-25 ENCOUNTER — APPOINTMENT (OUTPATIENT)
Dept: LAB | Facility: HOSPITAL | Age: 75
End: 2020-01-25
Payer: COMMERCIAL

## 2020-01-25 DIAGNOSIS — R19.7 DIARRHEA: ICD-10-CM

## 2020-01-25 LAB — C DIFF TOX GENS STL QL NAA+PROBE: NEGATIVE

## 2020-01-25 PROCEDURE — 87493 C DIFF AMPLIFIED PROBE: CPT

## 2020-01-30 ENCOUNTER — OFFICE VISIT (OUTPATIENT)
Dept: FAMILY MEDICINE CLINIC | Facility: CLINIC | Age: 75
End: 2020-01-30
Payer: COMMERCIAL

## 2020-01-30 VITALS
HEIGHT: 63 IN | HEART RATE: 70 BPM | SYSTOLIC BLOOD PRESSURE: 162 MMHG | TEMPERATURE: 97.6 F | RESPIRATION RATE: 18 BRPM | BODY MASS INDEX: 22.89 KG/M2 | DIASTOLIC BLOOD PRESSURE: 90 MMHG | WEIGHT: 129.2 LBS

## 2020-01-30 DIAGNOSIS — E83.42 HYPOMAGNESEMIA: ICD-10-CM

## 2020-01-30 DIAGNOSIS — J45.41 MODERATE PERSISTENT ASTHMA WITH ACUTE EXACERBATION: ICD-10-CM

## 2020-01-30 DIAGNOSIS — R07.81 RIB PAIN ON RIGHT SIDE: Primary | ICD-10-CM

## 2020-01-30 DIAGNOSIS — J45.30 MILD PERSISTENT ASTHMA WITHOUT COMPLICATION: ICD-10-CM

## 2020-01-30 DIAGNOSIS — E78.2 MIXED HYPERLIPIDEMIA: Chronic | ICD-10-CM

## 2020-01-30 DIAGNOSIS — E11.9 TYPE 2 DIABETES MELLITUS WITHOUT COMPLICATION, WITHOUT LONG-TERM CURRENT USE OF INSULIN (HCC): Chronic | ICD-10-CM

## 2020-01-30 PROCEDURE — 3008F BODY MASS INDEX DOCD: CPT | Performed by: FAMILY MEDICINE

## 2020-01-30 PROCEDURE — 99214 OFFICE O/P EST MOD 30 MIN: CPT | Performed by: FAMILY MEDICINE

## 2020-01-30 PROCEDURE — 1160F RVW MEDS BY RX/DR IN RCRD: CPT | Performed by: FAMILY MEDICINE

## 2020-01-30 RX ORDER — ALBUTEROL SULFATE 2.5 MG/3ML
2.5 SOLUTION RESPIRATORY (INHALATION) EVERY 6 HOURS PRN
Qty: 60 VIAL | Refills: 5 | Status: SHIPPED | OUTPATIENT
Start: 2020-01-30 | End: 2021-04-22

## 2020-01-30 RX ORDER — MONTELUKAST SODIUM 10 MG/1
10 TABLET ORAL
Qty: 30 TABLET | Refills: 5 | Status: SHIPPED | OUTPATIENT
Start: 2020-01-30 | End: 2020-09-15

## 2020-01-30 NOTE — PROGRESS NOTES
Assessment and Plan:    Problem List Items Addressed This Visit        Endocrine    Diabetes mellitus ([facility] [address] ) (Chronic)       Lab Results   Component Value Date    HGBA1C 6 0 09/18/2019   due for lab for BS         Relevant Medications    metFORMIN (GLUCOPHAGE) 850 mg tablet    Other Relevant Orders    Microalbumin / creatinine urine ratio    Comprehensive metabolic panel    Hemoglobin A1C       Respiratory    Mild persistent asthma without complication     Will change to 200/50 of Dulera,already on Singulair         Relevant Medications    albuterol (2 5 mg/3 mL) 0 083 % nebulizer solution    mometasone-formoterol (DULERA) 200-5 MCG/ACT inhaler       Other    Hyperlipidemia (Chronic)     Due for lab         Relevant Orders    Lipid panel      Other Visit Diagnoses     Rib pain on right side    -  Primary    Relevant Orders    XR ribs 2 vw right    Hypomagnesemia        Relevant Orders    Magnesium                 Diagnoses and all orders for this visit:    Rib pain on right side  -     XR ribs 2 vw right; Future    Mild persistent asthma without complication  -     albuterol (2 5 mg/3 mL) 0 083 % nebulizer solution; Take 1 vial (2 5 mg total) by nebulization every 6 (six) hours as needed for wheezing or shortness of breath  -     mometasone-formoterol (DULERA) 200-5 MCG/ACT inhaler; Inhale 2 puffs 2 (two) times a day Rinse mouth after use  Type 2 diabetes mellitus without complication, without long-term current use of insulin (HCC)  -     Microalbumin / creatinine urine ratio  -     Comprehensive metabolic panel; Future  -     Hemoglobin A1C; Future    Mixed hyperlipidemia  -     Lipid panel; Future    Hypomagnesemia  -     Magnesium; Future    Other orders  -     metFORMIN (GLUCOPHAGE) 850 mg tablet              Subjective:      Patient ID: Yelena Clinton is a 69 y o  female      CC:    Chief Complaint   Patient presents with    Follow-up     2 week follow up       HPI:    Angelina Dukes 12/31 not sure if reinjured r ribs, no xray taken, asthma acting up so this does not help pain, due for lab, not drinking alcohol anymore-boyfriend has cirrhosis      The following portions of the patient's history were reviewed and updated as appropriate: allergies, current medications, past family history, past medical history, past social history, past surgical history and problem list       Review of Systems   Constitutional: Negative for activity change, appetite change and unexpected weight change  Respiratory: Positive for cough, shortness of breath and wheezing  Cardiovascular: Negative for chest pain  Gastrointestinal: Negative for abdominal pain  Musculoskeletal: Positive for arthralgias  Rib pain   Neurological: Negative for dizziness and headaches  Data to review:       Objective:    Vitals:    01/30/20 0834   BP: 162/90   BP Location: Left arm   Patient Position: Sitting   Cuff Size: Adult   Pulse: 70   Resp: 18   Temp: 97 6 °F (36 4 °C)   TempSrc: Temporal   Weight: 58 6 kg (129 lb 3 2 oz)   Height: 5' 3" (1 6 m)        Physical Exam   Constitutional: She appears well-developed and well-nourished  Neck: No thyromegaly present  Cardiovascular: Normal rate, regular rhythm and normal heart sounds  Pulmonary/Chest: Effort normal and breath sounds normal    No wheezing at present   Musculoskeletal: She exhibits tenderness  Tender  Over  r ribs   Lymphadenopathy:     She has no cervical adenopathy  Vitals reviewed

## 2020-01-31 ENCOUNTER — HOSPITAL ENCOUNTER (OUTPATIENT)
Dept: RADIOLOGY | Facility: HOSPITAL | Age: 75
Discharge: HOME | End: 2020-01-31

## 2020-01-31 DIAGNOSIS — R07.81 RIB PAIN ON RIGHT SIDE: ICD-10-CM

## 2020-02-05 ENCOUNTER — OFFICE VISIT (OUTPATIENT)
Dept: PULMONOLOGY | Facility: CLINIC | Age: 75
End: 2020-02-05
Payer: COMMERCIAL

## 2020-02-05 VITALS
RESPIRATION RATE: 18 BRPM | OXYGEN SATURATION: 96 % | WEIGHT: 128 LBS | HEART RATE: 100 BPM | HEIGHT: 63 IN | SYSTOLIC BLOOD PRESSURE: 146 MMHG | TEMPERATURE: 97.9 F | DIASTOLIC BLOOD PRESSURE: 58 MMHG | BODY MASS INDEX: 22.68 KG/M2

## 2020-02-05 DIAGNOSIS — G47.33 OSA (OBSTRUCTIVE SLEEP APNEA): ICD-10-CM

## 2020-02-05 DIAGNOSIS — S20.211D CONTUSION OF RIB ON RIGHT SIDE, SUBSEQUENT ENCOUNTER: ICD-10-CM

## 2020-02-05 DIAGNOSIS — J45.40 MODERATE PERSISTENT ASTHMA WITHOUT COMPLICATION: Primary | ICD-10-CM

## 2020-02-05 PROBLEM — S20.211A CONTUSION OF RIB ON RIGHT SIDE: Status: ACTIVE | Noted: 2020-02-05

## 2020-02-05 PROCEDURE — 99213 OFFICE O/P EST LOW 20 MIN: CPT | Performed by: INTERNAL MEDICINE

## 2020-02-05 PROCEDURE — 3078F DIAST BP <80 MM HG: CPT | Performed by: INTERNAL MEDICINE

## 2020-02-05 PROCEDURE — 2022F DILAT RTA XM EVC RTNOPTHY: CPT | Performed by: INTERNAL MEDICINE

## 2020-02-05 PROCEDURE — 3077F SYST BP >= 140 MM HG: CPT | Performed by: INTERNAL MEDICINE

## 2020-02-05 PROCEDURE — 4040F PNEUMOC VAC/ADMIN/RCVD: CPT | Performed by: INTERNAL MEDICINE

## 2020-02-05 PROCEDURE — 3008F BODY MASS INDEX DOCD: CPT | Performed by: INTERNAL MEDICINE

## 2020-02-05 PROCEDURE — 1036F TOBACCO NON-USER: CPT | Performed by: INTERNAL MEDICINE

## 2020-02-05 PROCEDURE — 1160F RVW MEDS BY RX/DR IN RCRD: CPT | Performed by: INTERNAL MEDICINE

## 2020-02-05 NOTE — PROGRESS NOTES
Call pharmacy and confirm when pt filled her amlodipine and hctz last and whether there are refills-she told pulmonology todat  she was not taking but at her last ov with me she said she was taking these meds

## 2020-02-05 NOTE — Clinical Note
Vargas Garnett,Saw this patient today  Her BP has not been controlled and she has not been taking HCTZ and norvasc  She is not sure what she is taking and may not have refills  Can you follow up with her? Damon Spencer

## 2020-02-06 NOTE — PROGRESS NOTES
Progress note - Pulmonary Medicine   Beatrix Peabody 69 y o  female MRN: [id number]       Impression & Plan: Moderate persistent asthma without complication  · Asthma is controlled on Dulera and Singulair with as needed albuterol  · Medications recently adjusted by primary care physician  · Does not demonstrate any wheezing or signs or symptoms of poor control  · Continue present therapy for now  I am uncertain why the [facility] was increased from the 100 strength to the 200 strength  The patient reported increased symptoms but did not report wheezing or chest tightness  · Increased shortness of breath likely secondary to right chest wall discomfort from fall and rib contusion    Contusion of rib on right side  · Patient is reporting continued right lateral chest wall pain secondary to I fall that she sustained several weeks ago  · Chest x-ray without evidence of acute rib fracture  There is evidence of old rib fracture secondary to traumatic injury  ·  Dedicated rib series on the right was never performed  · Continue to observe symptoms  No specific intervention required at this time I did reassure her that rib injuries can take up to 3 months or more to resolve fully    I will see her back in 6 months for follow-up of her asthma  Encouraged to call me if she has new or worsening pulmonary symptoms  ______________________________________________________________________    HPI:    Beatrix Peabody presents today for follow-up of mild to moderate persistent asthma  She has had some recent difficulty with her breathing but this seems to have stemmed from a fall with a right chest wall injury  She did have a chest x-ray performed which showed no acute pulmonary change and evidence of old rib fracture leak but no new or acute rib fracture  Dedicated rib series x-rays were ordered subsequently but never performed      Patient does report that her breathing did improve with a recent increase in her Dulera to the 200/5 strength  She remains on Singulair and albuterol as well  She reports no increased wheezing, chest tightness, or productive cough  She has not had fever or chills or other infection symptoms  No sore throat or upper respiratory symptoms  No abdominal pain or GERD symptoms  She does have hypertension  I reviewed her regimen with her as her blood pressure has been elevated  She reported that she was not taking hydrochlorothiazide her Norvasc but had previously seen her primary care physician and indicated that these were medications she does take    She seems to have some confusion about this and I have reached out to her primary care physician to help clarify    Current Medications:    Current Outpatient Medications:     albuterol (2 5 mg/3 mL) 0 083 % nebulizer solution, Take 1 vial (2 5 mg total) by nebulization every 6 (six) hours as needed for wheezing or shortness of breath, Disp: 60 vial, Rfl: 5    albuterol (PROVENTIL HFA,VENTOLIN HFA) 90 mcg/act inhaler, Inhale 2 puffs every 6 (six) hours as needed for wheezing, Disp: 1 Inhaler, Rfl: 5    atorvastatin (LIPITOR) 20 mg tablet, 20 mg, Disp: , Rfl: 0    Blood Glucose Calibration (ONETOUCH GLUCOSE CONTROL VI), use as directed, Disp: , Rfl:     candesartan (ATACAND) 32 MG tablet, Take 1 tablet (32 mg total) by mouth daily, Disp: 90 tablet, Rfl: 1    Lancets (ONETOUCH ULTRASOFT) lancets, by Does not apply route, Disp: , Rfl:     metFORMIN (GLUCOPHAGE) 850 mg tablet, , Disp: , Rfl:     mometasone-formoterol (DULERA) 200-5 MCG/ACT inhaler, Inhale 2 puffs 2 (two) times a day Rinse mouth after use , Disp: 1 Inhaler, Rfl: 5    montelukast (SINGULAIR) 10 mg tablet, Take 1 tablet (10 mg total) by mouth daily at bedtime, Disp: 30 tablet, Rfl: 5    ONE TOUCH ULTRA TEST test strip, TEST TWICE A DAY, Disp: 200 each, Rfl: 1    acetaminophen (TYLENOL) 325 mg tablet, Take 3 tablets (975 mg total) by mouth every 8 (eight) hours, Disp: 30 tablet, Rfl: 0   amLODIPine (NORVASC) 5 mg tablet, Take 1 tablet (5 mg total) by mouth daily (Patient not taking: Reported on 2/5/2020), Disp: 90 tablet, Rfl: 1    hydrochlorothiazide (HYDRODIURIL) 12 5 mg tablet, Take 1 tablet (12 5 mg total) by mouth daily (Patient not taking: Reported on 2/5/2020), Disp: 30 tablet, Rfl: 0    promethazine (PHENERGAN) 12 5 mg/10 mL syrup, TAKE 5 ML BY MOUTH FOUR TIMES A DAY AS NEEDED FOR COUGH , Disp: , Rfl:     Review of Systems:  Aside from what is mentioned in the HPI, review of systems is otherwise negative  Past medical history, surgical history, and family history were reviewed and updated as appropriate    Social history updates:  Social History     Tobacco Use   Smoking Status Never Smoker   Smokeless Tobacco Never Used       PhysicalExamination:  Vitals:   /58   Pulse 100   Temp 97 9 °F (36 6 °C)   Resp 18   Ht 5' 3" (1 6 m)   Wt 58 1 kg (128 lb)   LMP  (LMP Unknown)   SpO2 96%   BMI 22 67 kg/m²   Gen: Comfortable  Non-labored  HEENT: PERRL  O/P: clear  moist   Slightly weathered and a flushed appearing  Neck: Trachea is midline  No JVD  No adenopathy  Chest:  Slightly distant but otherwise clear to auscultation  Cardiac:  Regular, no murmur  Abdomen: Soft and nontender  Bowel sounds are present  Extremities: No edema    Diagnostic Data:  Labs:   I personally reviewed the most recent laboratory data pertinent to today's visit    Lab Results   Component Value Date    WBC 8 91 01/24/2020    HGB 11 1 (L) 01/24/2020    HCT 33 6 (L) 01/24/2020    MCV 89 01/24/2020     01/24/2020     Lab Results   Component Value Date    SODIUM 139 01/24/2020    K 4 0 01/24/2020    CO2 30 01/24/2020    CL 99 (L) 01/24/2020    BUN 11 01/24/2020    CREATININE 0 52 (L) 01/24/2020    CALCIUM 9 5 01/24/2020       Imaging:  I personally reviewed the images on the [facility] system pertinent to today's visit  01/24/2020 chest x-ray was viewed on the [facility] system and shows clear lung fields without evidence of acute rib fracture    There is evidence of old rib fractures on the right    Bernabe Weathers MD

## 2020-02-06 NOTE — ASSESSMENT & PLAN NOTE
· Patient is reporting continued right lateral chest wall pain secondary to I fall that she sustained several weeks ago  · Chest x-ray without evidence of acute rib fracture  There is evidence of old rib fracture secondary to traumatic injury  ·  Dedicated rib series on the right was never performed  · Continue to observe symptoms    No specific intervention required at this time I did reassure her that rib injuries can take up to 3 months or more to resolve fully

## 2020-02-06 NOTE — ASSESSMENT & PLAN NOTE
· Asthma is controlled on Dulera and Singulair with as needed albuterol  · Medications recently adjusted by primary care physician  · Does not demonstrate any wheezing or signs or symptoms of poor control  · Continue present therapy for now  I am uncertain why the [facility] was increased from the 100 strength to the 200 strength  The patient reported increased symptoms but did not report wheezing or chest tightness      · Increased shortness of breath likely secondary to right chest wall discomfort from fall and rib contusion

## 2020-02-07 ENCOUNTER — TELEPHONE (OUTPATIENT)
Dept: FAMILY MEDICINE CLINIC | Facility: CLINIC | Age: 75
End: 2020-02-07

## 2020-02-07 DIAGNOSIS — I10 ESSENTIAL HYPERTENSION: Chronic | ICD-10-CM

## 2020-02-07 RX ORDER — AMLODIPINE BESYLATE 5 MG/1
5 TABLET ORAL DAILY
Qty: 90 TABLET | Refills: 1 | Status: SHIPPED | OUTPATIENT
Start: 2020-02-07 | End: 2021-11-10

## 2020-02-07 NOTE — TELEPHONE ENCOUNTER
----- Message from Bishnu Bahena MD sent at 2/5/2020  2:30 PM EST -----      ----- Message -----  From: Maria Guadalupe Montague MD  Sent: 2/5/2020   1:17 PM EST  To: Bishnu Bahena MD    [facility] this patient today  Her BP has not been controlled and she has not been taking HCTZ and norvasc  She is not sure what she is taking and may not have refills  Can you follow up with her?     Antoinette Lubin

## 2020-02-10 NOTE — TELEPHONE ENCOUNTER
[facility] requesting pt call back - name given on message did not exactly match pt so I did not leave detailed message

## 2020-02-12 ENCOUNTER — APPOINTMENT (OUTPATIENT)
Dept: LAB | Facility: HOSPITAL | Age: 75
End: 2020-02-12
Payer: COMMERCIAL

## 2020-02-12 ENCOUNTER — HOSPITAL ENCOUNTER (OUTPATIENT)
Dept: RADIOLOGY | Facility: HOSPITAL | Age: 75
Discharge: HOME | End: 2020-02-12
Payer: COMMERCIAL

## 2020-02-12 DIAGNOSIS — E83.42 HYPOMAGNESEMIA: ICD-10-CM

## 2020-02-12 DIAGNOSIS — E78.2 MIXED HYPERLIPIDEMIA: Chronic | ICD-10-CM

## 2020-02-12 DIAGNOSIS — E11.9 TYPE 2 DIABETES MELLITUS WITHOUT COMPLICATION, WITHOUT LONG-TERM CURRENT USE OF INSULIN (HCC): Chronic | ICD-10-CM

## 2020-02-12 LAB
ALBUMIN SERPL BCP-MCNC: 3.7 G/DL (ref 3.5–5)
ALP SERPL-CCNC: 86 U/L (ref 46–116)
ALT SERPL W P-5'-P-CCNC: 19 U/L (ref 12–78)
ANION GAP SERPL CALCULATED.3IONS-SCNC: 8 MMOL/L (ref 4–13)
AST SERPL W P-5'-P-CCNC: 11 U/L (ref 5–45)
BILIRUB SERPL-MCNC: 0.5 MG/DL (ref 0.2–1)
BUN SERPL-MCNC: 14 MG/DL (ref 5–25)
CALCIUM SERPL-MCNC: 8.7 MG/DL (ref 8.3–10.1)
CHLORIDE SERPL-SCNC: 101 MMOL/L (ref 100–108)
CHOLEST SERPL-MCNC: 200 MG/DL (ref 50–200)
CO2 SERPL-SCNC: 30 MMOL/L (ref 21–32)
CREAT SERPL-MCNC: 0.6 MG/DL (ref 0.6–1.3)
CREAT UR-MCNC: 44.4 MG/DL
EST. AVERAGE GLUCOSE BLD GHB EST-MCNC: 146 MG/DL
GFR SERPL CREATININE-BSD FRML MDRD: 91 ML/MIN/1.73SQ M
GLUCOSE P FAST SERPL-MCNC: 154 MG/DL (ref 65–99)
HBA1C MFR BLD: 6.7 %
HDLC SERPL-MCNC: 84 MG/DL
LDLC SERPL CALC-MCNC: 103 MG/DL (ref 0–100)
MAGNESIUM SERPL-MCNC: 1.4 MG/DL (ref 1.6–2.6)
MICROALBUMIN UR-MCNC: 96.4 MG/L (ref 0–20)
MICROALBUMIN/CREAT 24H UR: 217 MG/G CREATININE (ref 0–30)
NONHDLC SERPL-MCNC: 116 MG/DL
POTASSIUM SERPL-SCNC: 4.1 MMOL/L (ref 3.5–5.3)
PROT SERPL-MCNC: 7 G/DL (ref 6.4–8.2)
SODIUM SERPL-SCNC: 139 MMOL/L (ref 136–145)
TRIGL SERPL-MCNC: 67 MG/DL

## 2020-02-12 PROCEDURE — 82570 ASSAY OF URINE CREATININE: CPT | Performed by: FAMILY MEDICINE

## 2020-02-12 PROCEDURE — 80053 COMPREHEN METABOLIC PANEL: CPT

## 2020-02-12 PROCEDURE — 36415 COLL VENOUS BLD VENIPUNCTURE: CPT

## 2020-02-12 PROCEDURE — 83036 HEMOGLOBIN GLYCOSYLATED A1C: CPT

## 2020-02-12 PROCEDURE — 80061 LIPID PANEL: CPT

## 2020-02-12 PROCEDURE — 83735 ASSAY OF MAGNESIUM: CPT

## 2020-02-12 PROCEDURE — 71101 X-RAY EXAM UNILAT RIBS/CHEST: CPT

## 2020-02-12 PROCEDURE — 82043 UR ALBUMIN QUANTITATIVE: CPT | Performed by: FAMILY MEDICINE

## 2020-02-15 DIAGNOSIS — J45.40 MODERATE PERSISTENT ASTHMA WITHOUT COMPLICATION: Primary | ICD-10-CM

## 2020-02-17 DIAGNOSIS — I10 ESSENTIAL HYPERTENSION: Primary | ICD-10-CM

## 2020-02-17 RX ORDER — HYDROCHLOROTHIAZIDE 12.5 MG/1
TABLET ORAL
Qty: 90 TABLET | Refills: 1 | Status: SHIPPED | OUTPATIENT
Start: 2020-02-17 | End: 2020-03-17 | Stop reason: DRUGHIGH

## 2020-02-18 DIAGNOSIS — I10 ESSENTIAL HYPERTENSION: Chronic | ICD-10-CM

## 2020-02-18 RX ORDER — CANDESARTAN 32 MG/1
TABLET ORAL
Qty: 90 TABLET | Refills: 1 | Status: SHIPPED | OUTPATIENT
Start: 2020-02-18 | End: 2020-07-24

## 2020-02-19 NOTE — TELEPHONE ENCOUNTER
Pt notified of rx instructions  Did not see any result note  Pt has appt with [facility] tomorrow (2/20/20)

## 2020-02-20 ENCOUNTER — OFFICE VISIT (OUTPATIENT)
Dept: FAMILY MEDICINE CLINIC | Facility: CLINIC | Age: 75
End: 2020-02-20
Payer: COMMERCIAL

## 2020-02-20 VITALS
HEIGHT: 63 IN | DIASTOLIC BLOOD PRESSURE: 70 MMHG | BODY MASS INDEX: 22.68 KG/M2 | SYSTOLIC BLOOD PRESSURE: 130 MMHG | HEART RATE: 80 BPM | WEIGHT: 128 LBS | TEMPERATURE: 97.4 F | RESPIRATION RATE: 18 BRPM

## 2020-02-20 DIAGNOSIS — E78.2 MIXED HYPERLIPIDEMIA: Primary | Chronic | ICD-10-CM

## 2020-02-20 DIAGNOSIS — E11.9 TYPE 2 DIABETES MELLITUS WITHOUT COMPLICATION, WITHOUT LONG-TERM CURRENT USE OF INSULIN (HCC): Chronic | ICD-10-CM

## 2020-02-20 DIAGNOSIS — M81.0 AGE-RELATED OSTEOPOROSIS WITHOUT CURRENT PATHOLOGICAL FRACTURE: ICD-10-CM

## 2020-02-20 DIAGNOSIS — H25.092 AGE-RELATED INCIPIENT CATARACT OF LEFT EYE: ICD-10-CM

## 2020-02-20 PROBLEM — S22.41XA CLOSED FRACTURE OF MULTIPLE RIBS OF RIGHT SIDE: Status: RESOLVED | Noted: 2019-09-22 | Resolved: 2020-02-20

## 2020-02-20 PROBLEM — G89.11 ACUTE PAIN DUE TO TRAUMA: Status: RESOLVED | Noted: 2019-09-28 | Resolved: 2020-02-20

## 2020-02-20 PROCEDURE — 3008F BODY MASS INDEX DOCD: CPT | Performed by: FAMILY MEDICINE

## 2020-02-20 PROCEDURE — 1036F TOBACCO NON-USER: CPT | Performed by: FAMILY MEDICINE

## 2020-02-20 PROCEDURE — 2022F DILAT RTA XM EVC RTNOPTHY: CPT | Performed by: FAMILY MEDICINE

## 2020-02-20 PROCEDURE — 3075F SYST BP GE 130 - 139MM HG: CPT | Performed by: FAMILY MEDICINE

## 2020-02-20 PROCEDURE — 1160F RVW MEDS BY RX/DR IN RCRD: CPT | Performed by: FAMILY MEDICINE

## 2020-02-20 PROCEDURE — 3060F POS MICROALBUMINURIA REV: CPT | Performed by: FAMILY MEDICINE

## 2020-02-20 PROCEDURE — 3078F DIAST BP <80 MM HG: CPT | Performed by: FAMILY MEDICINE

## 2020-02-20 PROCEDURE — 99214 OFFICE O/P EST MOD 30 MIN: CPT | Performed by: FAMILY MEDICINE

## 2020-02-20 PROCEDURE — 3044F HG A1C LEVEL LT 7.0%: CPT | Performed by: FAMILY MEDICINE

## 2020-02-20 PROCEDURE — 4040F PNEUMOC VAC/ADMIN/RCVD: CPT | Performed by: FAMILY MEDICINE

## 2020-02-20 RX ORDER — IBANDRONATE SODIUM 150 MG/1
150 TABLET, FILM COATED ORAL
Qty: 1 TABLET | Refills: 5 | Status: SHIPPED | OUTPATIENT
Start: 2020-02-20 | End: 2020-08-17

## 2020-02-20 RX ORDER — ATORVASTATIN CALCIUM 20 MG/1
20 TABLET, FILM COATED ORAL 3 TIMES WEEKLY
Qty: 30 TABLET | Refills: 1
Start: 2020-02-21 | End: 2020-04-22

## 2020-02-20 NOTE — PROGRESS NOTES
Assessment and Plan:    Problem List Items Addressed This Visit        Endocrine    Diabetes mellitus ([facility] [address] ) (Chronic)       Lab Results   Component Value Date    HGBA1C 6 7 (H) 02/12/2020   sugar up a little from prednisone, will recheck 3  months            Other    Hyperlipidemia - Primary (Chronic)     Stopped atorvastatin-can restart three times/week         Relevant Medications    atorvastatin (LIPITOR) 20 mg tablet (Start on 2/21/2020)      Other Visit Diagnoses     Age-related incipient cataract of left eye                     Diagnoses and all orders for this visit:    Mixed hyperlipidemia  -     atorvastatin (LIPITOR) 20 mg tablet; Take 1 tablet (20 mg total) by mouth 3 (three) times a week    Type 2 diabetes mellitus without complication, without long-term current use of insulin (HCC)    Age-related incipient cataract of left eye              Subjective:      Patient ID: Betsy Werner is a 69 y o  female  CC:    Chief Complaint   Patient presents with    Pre-op Clearance     Patient present today for pre-op clearance  Patient is scheduled to have cataract surgery on March 17, 2020  HPI:    HPI    The following portions of the patient's history were reviewed and updated as appropriate: allergies, current medications, past family history, past medical history, past social history, past surgical history and problem list   Chief Complaint   Patient presents with    Pre-op Clearance     Patient present today for pre-op clearance  Patient is scheduled to have cataract surgery on March 17, 2020  Subjective:     Betsy Werner is a 69 y o  female who presents to the office today for a preoperative consultation at the request of surgeon Dr May Watts who plans on performing l cataract on March 17  Planned anesthesia: none and MAC  The patient has the following known anesthesia issues: no concerns  Patients bleeding risk: no recent abnormal bleeding and no remote history of abnormal bleeding   Patient does not have objections to receiving blood products if needed  The following portions of the patient's history were reviewed and updated as appropriate: allergies, current medications, past family history, past medical history, past social history, past surgical history and problem list       Exercise Capacity:   able to walk 4 blocks without symptoms and able to walk two flights of stairs without symptoms  Alcohol use: Yes Minimal   Tobacco use: The patient denies current or previous tobacco use  Illicit drugs: no history of illicit drug use    Past Medical History:   Diagnosis Date    Asthma     Diabetes mellitus ([address] )     Hypertension      Past Surgical History:   Procedure Laterality Date    BRONCHOSCOPY N/A 3/16/2016    Procedure: BRONCHOSCOPY FLEXIBLE/anesth ;  Surgeon: Mario Pepe DO;  Location: [facility];   Service:     COLONOSCOPY      EYE SURGERY      OOPHORECTOMY Left     age 49     Family History   Problem Relation Age of Onset    Cancer Mother 45        uterine    Cancer Daughter 48        breast    Cancer Son 35        asbestosis related cancer     Current Outpatient Medications on File Prior to Visit   Medication Sig Dispense Refill    albuterol (2 5 mg/3 mL) 0 083 % nebulizer solution Take 1 vial (2 5 mg total) by nebulization every 6 (six) hours as needed for wheezing or shortness of breath 60 vial 5    albuterol (PROVENTIL HFA,VENTOLIN HFA) 90 mcg/act inhaler Inhale 2 puffs every 6 (six) hours as needed for wheezing 1 Inhaler 5    amLODIPine (NORVASC) 5 mg tablet Take 1 tablet (5 mg total) by mouth daily 90 tablet 1    candesartan (ATACAND) 32 MG tablet take 1 tablet by mouth once daily 90 tablet 1    DULERA 100-5 MCG/ACT inhaler inhale 2 puffs by mouth and INTO THE LUNGS twice a day inhale 2 puffs by mouth and INTO THE LUNGS twice a day 13 g 6    hydrochlorothiazide (HYDRODIURIL) 12 5 mg tablet take 1 tablet by mouth once daily 90 tablet 1    metFORMIN (GLUCOPHAGE) 850 mg tablet       mometasone-formoterol (DULERA) 200-5 MCG/ACT inhaler Inhale 2 puffs 2 (two) times a day Rinse mouth after use  1 Inhaler 5    montelukast (SINGULAIR) 10 mg tablet Take 1 tablet (10 mg total) by mouth daily at bedtime 30 tablet 5    [DISCONTINUED] atorvastatin (LIPITOR) 20 mg tablet 20 mg  0    acetaminophen (TYLENOL) 325 mg tablet Take 3 tablets (975 mg total) by mouth every 8 (eight) hours (Patient not taking: Reported on 2/20/2020) 30 tablet 0    Blood Glucose Calibration (ONETOUCH GLUCOSE CONTROL VI) use as directed      Lancets (ONETOUCH ULTRASOFT) lancets by Does not apply route      ONE TOUCH ULTRA TEST test strip TEST TWICE A  each 1    [DISCONTINUED] promethazine (PHENERGAN) 12 5 mg/10 mL syrup TAKE 5 ML BY MOUTH FOUR TIMES A DAY AS NEEDED FOR COUGH  No current facility-administered medications on file prior to visit        Bactrim [sulfamethoxazole-trimethoprim]; Sulfamethoxazole; Trimethoprim; and Metformin      Review of Systems    Eyes: positive for cataracts  Gastrointestinal: positive for diarrhea  due to Metformin    Objective:     /70 (BP Location: Left arm, Patient Position: Sitting, Cuff Size: Standard)   Pulse 80   Temp (!) 97 4 °F (36 3 °C) (Temporal)   Resp 18   Ht 5' 3" (1 6 m)   Wt 58 1 kg (128 lb)   LMP  (LMP Unknown)   BMI 22 67 kg/m²     General Appearance:    Alert, cooperative, no distress, appears stated age   Head:    Normocephalic, without obvious abnormality, atraumatic   Eyes:    PERRL, conjunctiva/corneas clear, EOM's intact, fundi     benign, both eyes   Ears:    Normal TM's and external ear canals, both ears   Nose:   Nares normal, septum midline, mucosa normal, no drainage    or sinus tenderness   Throat:   Lips, mucosa, and tongue normal; teeth and gums normal   Neck:   Supple, symmetrical, trachea midline, no adenopathy;     thyroid:  no enlargement/tenderness/nodules; no carotid    bruit or JVD   Back:     Symmetric, no curvature, ROM normal, no CVA tenderness   Lungs:     Clear to auscultation bilaterally, respirations unlabored   Chest Wall:    No tenderness or deformity    Heart:    Regular rate and rhythm, S1 and S2 normal, no murmur, rub   or gallop   Breast Exam:     Abdomen:     Soft, non-tender, bowel sounds active all four quadrants,     no masses, no organomegaly   Genitalia:     Rectal:     Extremities:   Extremities normal, atraumatic, no cyanosis or edema   Pulses:   2+ and symmetric all extremities   Skin:   Skin color, texture, turgor normal, no rashes or lesions   Lymph nodes:   Cervical, supraclavicular, and axillary nodes normal   Neurologic:   CNII-XII intact, normal strength, sensation and reflexes     throughout         Cardiographics  ECG: none needed  Echocardiogram: not done      Lab Review   Appointment on 02/12/2020   Component Date Value    Cholesterol 02/12/2020 200     Triglycerides 02/12/2020 67     HDL, Direct 02/12/2020 84     LDL Calculated 02/12/2020 103*    Non-HDL-Chol (CHOL-HDL) 02/12/2020 116     Sodium 02/12/2020 139     Potassium 02/12/2020 4 1     Chloride 02/12/2020 101     CO2 02/12/2020 30     ANION GAP 02/12/2020 8     BUN 02/12/2020 14     Creatinine 02/12/2020 0 60     Glucose, Fasting 02/12/2020 154*    Calcium 02/12/2020 8 7     AST 02/12/2020 11     ALT 02/12/2020 19     Alkaline Phosphatase 02/12/2020 86     Total Protein 02/12/2020 7 0     Albumin 02/12/2020 3 7     Total Bilirubin 02/12/2020 0 50     eGFR 02/12/2020 91     Hemoglobin A1C 02/12/2020 6 7*    EAG 02/12/2020 146     Magnesium 02/12/2020 1 4*   Office Visit on 01/30/2020   Component Date Value    Creatinine, Ur 02/12/2020 44 4     Microalbum  ,U,Random 02/12/2020 96 4*    Microalb Creat Ratio 02/12/2020 217*   Appointment on 01/25/2020   Component Date Value    C difficile toxin by PCR 01/25/2020 Negative    Admission on 01/24/2020, Discharged on 01/24/2020   Component Date Value    WBC 01/24/2020 8 91     RBC 01/24/2020 3 78*    Hemoglobin 01/24/2020 11 1*    Hematocrit 01/24/2020 33 6*    MCV 01/24/2020 89     MCH 01/24/2020 29 4     MCHC 01/24/2020 33 0     RDW 01/24/2020 14 5     MPV 01/24/2020 12 0     Platelets 86/26/6235 302     nRBC 01/24/2020 0     Neutrophils Relative 01/24/2020 68     Immat GRANS % 01/24/2020 0     Lymphocytes Relative 01/24/2020 24     Monocytes Relative 01/24/2020 5     Eosinophils Relative 01/24/2020 2     Basophils Relative 01/24/2020 1     Neutrophils Absolute 01/24/2020 6 16     Immature Grans Absolute 01/24/2020 0 03     Lymphocytes Absolute 01/24/2020 2 10     Monocytes Absolute 01/24/2020 0 42     Eosinophils Absolute 01/24/2020 0 15     Basophils Absolute 01/24/2020 0 05     Sodium 01/24/2020 139     Potassium 01/24/2020 4 0     Chloride 01/24/2020 99*    CO2 01/24/2020 30     ANION GAP 01/24/2020 10     BUN 01/24/2020 11     Creatinine 01/24/2020 0 52*    Glucose 01/24/2020 117     Calcium 01/24/2020 9 5     eGFR 01/24/2020 95     Troponin I 01/24/2020 <0 02     Color, UA 01/24/2020 Yellow     Clarity, UA 01/24/2020 Clear     pH, UA 01/24/2020 6 0     Leukocytes, UA 01/24/2020 Negative     Nitrite, UA 01/24/2020 Negative     Protein, UA 01/24/2020 Trace*    Glucose, UA 01/24/2020 Negative     Ketones, UA 01/24/2020 Negative     Urobilinogen, UA 01/24/2020 0 2     Bilirubin, UA 01/24/2020 Negative     Blood, UA 01/24/2020 Trace*    Specific Gravity, UA 01/24/2020 1 015     RBC, UA 01/24/2020 1-2*    WBC, UA 01/24/2020 0-1*    Epithelial Cells 01/24/2020 Occasional     Bacteria, UA 01/24/2020 Occasional     Ventricular Rate 01/24/2020 78     Atrial Rate 01/24/2020 78     NE Interval 01/24/2020 150     QRSD Interval 01/24/2020 86     QT Interval 01/24/2020 360     QTC Interval 01/24/2020 410     P Axis 01/24/2020 73     QRS Axis 01/24/2020 46     T Wave [place] 01/24/2020 84    Appointment on 10/10/2019   Component Date Value    Sodium 10/10/2019 134*    Potassium 10/10/2019 4 6     Chloride 10/10/2019 98*    CO2 10/10/2019 28     ANION GAP 10/10/2019 8     BUN 10/10/2019 6     Creatinine 10/10/2019 0 48*    Glucose, Fasting 10/10/2019 104*    Calcium 10/10/2019 9 4     eGFR 10/10/2019 98    Lab Requisition on 10/02/2019   Component Date Value    Sodium 10/02/2019 131*    Potassium 10/02/2019 5 3     Chloride 10/02/2019 96*    CO2 10/02/2019 28     ANION GAP 10/02/2019 7     BUN 10/02/2019 12     Creatinine 10/02/2019 0 55*    Glucose 10/02/2019 191*    Calcium 10/02/2019 9 4     eGFR 10/02/2019 93    No results displayed because visit has over 200 results        Admission on 09/20/2019, Discharged on 09/21/2019   Component Date Value    WBC 09/20/2019 12 15*    RBC 09/20/2019 3 90     Hemoglobin 09/20/2019 12 9     Hematocrit 09/20/2019 38 4     MCV 09/20/2019 99*    MCH 09/20/2019 33 1     MCHC 09/20/2019 33 6     RDW 09/20/2019 12 7     MPV 09/20/2019 12 9*    Platelets 86/88/6073 251     nRBC 09/20/2019 0     Neutrophils Relative 09/20/2019 53     Immat GRANS % 09/20/2019 1     Lymphocytes Relative 09/20/2019 37     Monocytes Relative 09/20/2019 5     Eosinophils Relative 09/20/2019 3     Basophils Relative 09/20/2019 1     Neutrophils Absolute 09/20/2019 6 50     Immature Grans Absolute 09/20/2019 0 07     Lymphocytes Absolute 09/20/2019 4 52*    Monocytes Absolute 09/20/2019 0 58     Eosinophils Absolute 09/20/2019 0 39     Basophils Absolute 09/20/2019 0 09     Protime 09/20/2019 12 0     INR 09/20/2019 0 88     PTT 09/20/2019 23     Sodium 09/20/2019 133*    Potassium 09/20/2019 3 9     Chloride 09/20/2019 98*    CO2 09/20/2019 22     ANION GAP 09/20/2019 13     BUN 09/20/2019 16     Creatinine 09/20/2019 0 60     Glucose 09/20/2019 212*    Calcium 09/20/2019 8 6     AST 09/20/2019 15     ALT 09/20/2019 14     Alkaline Phosphatase 09/20/2019 91     Total Protein 09/20/2019 7 0     Albumin 09/20/2019 3 4*    Total Bilirubin 09/20/2019 0 14*    eGFR 09/20/2019 91     Lipase 09/20/2019 80     Troponin I 09/20/2019 <0 02     Ethanol Lvl 54/16/0499 161*    Salicylate Lvl 43/74/5736 <3*    Acetaminophen Level 09/20/2019 <2*   Appointment on 09/18/2019   Component Date Value    Sodium 09/18/2019 136     Potassium 09/18/2019 4 8     Chloride 09/18/2019 99*    CO2 09/18/2019 31     ANION GAP 09/18/2019 6     BUN 09/18/2019 9     Creatinine 09/18/2019 0 52*    Glucose, Fasting 09/18/2019 133*    Calcium 09/18/2019 9 6     AST 09/18/2019 11     ALT 09/18/2019 13     Alkaline Phosphatase 09/18/2019 96     Total Protein 09/18/2019 7 4     Albumin 09/18/2019 3 6     Total Bilirubin 09/18/2019 0 55     eGFR 09/18/2019 95     Cholesterol 09/18/2019 150     Triglycerides 09/18/2019 135     HDL, Direct 09/18/2019 53     LDL Calculated 09/18/2019 70     Non-HDL-Chol (CHOL-HDL) 09/18/2019 97     Hemoglobin A1C 09/18/2019 6 0     EAG 09/18/2019 126    Office Visit on 08/27/2019   Component Date Value    LEUKOCYTE ESTERASE,UA 08/27/2019 large     NITRITE,UA 08/27/2019 positive     SL AMB POCT UROBILINOGEN 08/27/2019 0 2     POCT URINE PROTEIN 08/27/2019 neg      PH,UA 08/27/2019 5 0     BLOOD,UA 08/27/2019 moderate     SPECIFIC GRAVITY,UA 08/27/2019 1 015     KETONES,UA 08/27/2019 neg     BILIRUBIN,UA 08/27/2019 neg     GLUCOSE, UA 08/27/2019 neg      COLOR,UA 08/27/2019 yellow     CLARITY,UA 08/27/2019 clear     POST-VOID RESIDUAL VOLUM* 08/27/2019 11     Urine Culture 08/27/2019 >100,000 cfu/ml Escherichia coli*   There may be more visits with results that are not included  Assessment:     69 y o  female with planned surgery as above  Known risk factors for perioperative complications: None        Cardiac Risk Estimation: low      Plan:    1  Mixed hyperlipidemia  atorvastatin (LIPITOR) 20 mg tablet   2   Type 2 diabetes mellitus without complication, without long-term current use of insulin ([address] )     3  Age-related incipient cataract of left eye            1  Preoperative workup as follows none  2  Change in medication regimen before surgery: none, continue medication regimen including morning of surgery, with sip of water      Review of Systems      Data to review:       Objective:    Vitals:    02/20/20 0920   BP: 130/70   BP Location: Left arm   Patient Position: Sitting   Cuff Size: Standard   Pulse: 80   Resp: 18   Temp: (!) 97 4 °F (36 3 °C)   TempSrc: Temporal   Weight: 58 1 kg (128 lb)   Height: 5' 3" (1 6 m)        Physical Exam

## 2020-02-20 NOTE — ASSESSMENT & PLAN NOTE
Lab Results   Component Value Date    HGBA1C 6 7 (H) 02/12/2020   sugar up a little from prednisone, will recheck 3  months

## 2020-03-04 ENCOUNTER — TELEPHONE (OUTPATIENT)
Dept: FAMILY MEDICINE CLINIC | Facility: CLINIC | Age: 75
End: 2020-03-04

## 2020-03-04 DIAGNOSIS — E11.9 TYPE 2 DIABETES MELLITUS WITHOUT COMPLICATION, WITHOUT LONG-TERM CURRENT USE OF INSULIN (HCC): Primary | ICD-10-CM

## 2020-03-04 NOTE — TELEPHONE ENCOUNTER
PT CAME IN THE OFFICE TO REQUEST REFILL ON HER METFORMIN PLEASE SEND IF POSSIBLE TO [facility] [address] [place] [address]

## 2020-03-17 ENCOUNTER — TELEPHONE (OUTPATIENT)
Dept: FAMILY MEDICINE CLINIC | Facility: CLINIC | Age: 75
End: 2020-03-17

## 2020-03-17 DIAGNOSIS — I10 ESSENTIAL HYPERTENSION: Primary | ICD-10-CM

## 2020-03-17 RX ORDER — BLOOD PRESSURE TEST KIT
KIT MISCELLANEOUS 2 TIMES DAILY
Qty: 1 EACH | Refills: 0 | Status: SHIPPED | OUTPATIENT
Start: 2020-03-17 | End: 2020-09-15

## 2020-03-17 RX ORDER — HYDROCHLOROTHIAZIDE 25 MG/1
25 TABLET ORAL DAILY
Qty: 90 TABLET | Refills: 5 | Status: ON HOLD | OUTPATIENT
Start: 2020-03-17 | End: 2020-09-23

## 2020-03-17 NOTE — TELEPHONE ENCOUNTER
PATIENT STATES SHE HAS A HEART PROBLEM AND HYPERTENSION  SHE HAS BEEN GOING OUT TO THE PHARMACY EVERY DAY TO CHECK HER BP AND IT HAS BEEN ELEVATED EVERY TIME SHE CHECKS IT- THE LOWEST IT HAS BEEN /83  STATES SHE CAN NOT CONTINUE TO GO OUT TO THE PHARMACY TO DO THIS  SHE CALLED THE INSURANCE AND THEY STATE THEY WOULD COVER A BP MONITOR AT HOME, REQUESTING THAT WE SEND RX OVER TO HER PHARMACY FOR IN HOME BP MONITOR  PATIENT USES [facility] [address] PHONE [phone]   PLEASE CALL PATIENT WITH ANY QUESTIONS [phone]

## 2020-04-22 DIAGNOSIS — E78.2 MIXED HYPERLIPIDEMIA: Chronic | ICD-10-CM

## 2020-04-22 RX ORDER — ATORVASTATIN CALCIUM 20 MG/1
TABLET, FILM COATED ORAL
Qty: 90 TABLET | Refills: 1 | Status: SHIPPED | OUTPATIENT
Start: 2020-04-22 | End: 2021-01-27

## 2020-04-27 DIAGNOSIS — E11.9 TYPE 2 DIABETES MELLITUS WITHOUT COMPLICATION, WITHOUT LONG-TERM CURRENT USE OF INSULIN (HCC): ICD-10-CM

## 2020-04-30 ENCOUNTER — OFFICE VISIT (OUTPATIENT)
Dept: FAMILY MEDICINE CLINIC | Facility: CLINIC | Age: 75
End: 2020-04-30
Payer: COMMERCIAL

## 2020-04-30 VITALS
TEMPERATURE: 98.4 F | HEIGHT: 63 IN | WEIGHT: 129 LBS | BODY MASS INDEX: 22.86 KG/M2 | HEART RATE: 82 BPM | DIASTOLIC BLOOD PRESSURE: 80 MMHG | SYSTOLIC BLOOD PRESSURE: 132 MMHG

## 2020-04-30 DIAGNOSIS — Z23 NEED FOR PNEUMOCOCCAL VACCINATION: ICD-10-CM

## 2020-04-30 DIAGNOSIS — R26.89 BALANCE DISORDER: ICD-10-CM

## 2020-04-30 DIAGNOSIS — Z00.00 MEDICARE ANNUAL WELLNESS VISIT, SUBSEQUENT: ICD-10-CM

## 2020-04-30 DIAGNOSIS — E83.42 HYPOMAGNESEMIA: ICD-10-CM

## 2020-04-30 DIAGNOSIS — E11.9 TYPE 2 DIABETES MELLITUS WITHOUT COMPLICATION, WITHOUT LONG-TERM CURRENT USE OF INSULIN (HCC): Primary | ICD-10-CM

## 2020-04-30 DIAGNOSIS — E78.2 MIXED HYPERLIPIDEMIA: Chronic | ICD-10-CM

## 2020-04-30 DIAGNOSIS — K64.9 HEMORRHOIDS, UNSPECIFIED HEMORRHOID TYPE: ICD-10-CM

## 2020-04-30 DIAGNOSIS — J45.30 MILD PERSISTENT ASTHMA WITHOUT COMPLICATION: ICD-10-CM

## 2020-04-30 DIAGNOSIS — M81.0 AGE-RELATED OSTEOPOROSIS WITHOUT CURRENT PATHOLOGICAL FRACTURE: ICD-10-CM

## 2020-04-30 DIAGNOSIS — Z12.31 ENCOUNTER FOR SCREENING MAMMOGRAM FOR BREAST CANCER: ICD-10-CM

## 2020-04-30 DIAGNOSIS — I10 ESSENTIAL HYPERTENSION: Chronic | ICD-10-CM

## 2020-04-30 PROCEDURE — G0439 PPPS, SUBSEQ VISIT: HCPCS | Performed by: FAMILY MEDICINE

## 2020-04-30 PROCEDURE — 3075F SYST BP GE 130 - 139MM HG: CPT | Performed by: FAMILY MEDICINE

## 2020-04-30 PROCEDURE — 3060F POS MICROALBUMINURIA REV: CPT | Performed by: FAMILY MEDICINE

## 2020-04-30 PROCEDURE — 1170F FXNL STATUS ASSESSED: CPT | Performed by: FAMILY MEDICINE

## 2020-04-30 PROCEDURE — 1125F AMNT PAIN NOTED PAIN PRSNT: CPT | Performed by: FAMILY MEDICINE

## 2020-04-30 PROCEDURE — 1160F RVW MEDS BY RX/DR IN RCRD: CPT | Performed by: FAMILY MEDICINE

## 2020-04-30 PROCEDURE — 4040F PNEUMOC VAC/ADMIN/RCVD: CPT | Performed by: FAMILY MEDICINE

## 2020-04-30 PROCEDURE — 1036F TOBACCO NON-USER: CPT | Performed by: FAMILY MEDICINE

## 2020-04-30 PROCEDURE — 3044F HG A1C LEVEL LT 7.0%: CPT | Performed by: FAMILY MEDICINE

## 2020-04-30 PROCEDURE — 2022F DILAT RTA XM EVC RTNOPTHY: CPT | Performed by: FAMILY MEDICINE

## 2020-04-30 PROCEDURE — 99214 OFFICE O/P EST MOD 30 MIN: CPT | Performed by: FAMILY MEDICINE

## 2020-04-30 PROCEDURE — G0009 ADMIN PNEUMOCOCCAL VACCINE: HCPCS | Performed by: FAMILY MEDICINE

## 2020-04-30 PROCEDURE — 3079F DIAST BP 80-89 MM HG: CPT | Performed by: FAMILY MEDICINE

## 2020-04-30 PROCEDURE — 90732 PPSV23 VACC 2 YRS+ SUBQ/IM: CPT | Performed by: FAMILY MEDICINE

## 2020-04-30 RX ORDER — HYDROCORTISONE 25 MG/G
CREAM TOPICAL 2 TIMES DAILY
Qty: 30 G | Refills: 1 | Status: SHIPPED | OUTPATIENT
Start: 2020-04-30 | End: 2020-06-04 | Stop reason: SDUPTHER

## 2020-05-26 ENCOUNTER — APPOINTMENT (OUTPATIENT)
Dept: LAB | Facility: HOSPITAL | Age: 75
End: 2020-05-26
Payer: COMMERCIAL

## 2020-05-26 DIAGNOSIS — E83.42 HYPOMAGNESEMIA: ICD-10-CM

## 2020-05-26 DIAGNOSIS — E11.9 TYPE 2 DIABETES MELLITUS WITHOUT COMPLICATION, WITHOUT LONG-TERM CURRENT USE OF INSULIN (HCC): ICD-10-CM

## 2020-05-26 DIAGNOSIS — E78.2 MIXED HYPERLIPIDEMIA: Chronic | ICD-10-CM

## 2020-05-26 LAB
ALBUMIN SERPL BCP-MCNC: 3.6 G/DL (ref 3.5–5)
ALP SERPL-CCNC: 92 U/L (ref 46–116)
ALT SERPL W P-5'-P-CCNC: 13 U/L (ref 12–78)
ANION GAP SERPL CALCULATED.3IONS-SCNC: 7 MMOL/L (ref 4–13)
AST SERPL W P-5'-P-CCNC: 11 U/L (ref 5–45)
BILIRUB SERPL-MCNC: 0.55 MG/DL (ref 0.2–1)
BUN SERPL-MCNC: 18 MG/DL (ref 5–25)
CALCIUM SERPL-MCNC: 8.9 MG/DL (ref 8.3–10.1)
CHLORIDE SERPL-SCNC: 101 MMOL/L (ref 100–108)
CHOLEST SERPL-MCNC: 192 MG/DL (ref 50–200)
CO2 SERPL-SCNC: 33 MMOL/L (ref 21–32)
CREAT SERPL-MCNC: 0.58 MG/DL (ref 0.6–1.3)
EST. AVERAGE GLUCOSE BLD GHB EST-MCNC: 117 MG/DL
GFR SERPL CREATININE-BSD FRML MDRD: 92 ML/MIN/1.73SQ M
GLUCOSE P FAST SERPL-MCNC: 134 MG/DL (ref 65–99)
HBA1C MFR BLD: 5.7 %
HDLC SERPL-MCNC: 80 MG/DL
LDLC SERPL CALC-MCNC: 86 MG/DL (ref 0–100)
MAGNESIUM SERPL-MCNC: 1.8 MG/DL (ref 1.6–2.6)
NONHDLC SERPL-MCNC: 112 MG/DL
POTASSIUM SERPL-SCNC: 5.3 MMOL/L (ref 3.5–5.3)
PROT SERPL-MCNC: 7 G/DL (ref 6.4–8.2)
SODIUM SERPL-SCNC: 141 MMOL/L (ref 136–145)
TRIGL SERPL-MCNC: 128 MG/DL

## 2020-05-26 PROCEDURE — 36415 COLL VENOUS BLD VENIPUNCTURE: CPT

## 2020-05-26 PROCEDURE — 83735 ASSAY OF MAGNESIUM: CPT

## 2020-05-26 PROCEDURE — 80061 LIPID PANEL: CPT

## 2020-05-26 PROCEDURE — 80053 COMPREHEN METABOLIC PANEL: CPT

## 2020-05-26 PROCEDURE — 83036 HEMOGLOBIN GLYCOSYLATED A1C: CPT

## 2020-06-04 ENCOUNTER — OFFICE VISIT (OUTPATIENT)
Dept: FAMILY MEDICINE CLINIC | Facility: CLINIC | Age: 75
End: 2020-06-04
Payer: COMMERCIAL

## 2020-06-04 VITALS
BODY MASS INDEX: 23.39 KG/M2 | DIASTOLIC BLOOD PRESSURE: 74 MMHG | TEMPERATURE: 97.8 F | HEART RATE: 76 BPM | HEIGHT: 63 IN | SYSTOLIC BLOOD PRESSURE: 130 MMHG | WEIGHT: 132 LBS

## 2020-06-04 DIAGNOSIS — R42 VERTIGO: ICD-10-CM

## 2020-06-04 DIAGNOSIS — J45.40 MODERATE PERSISTENT ASTHMA WITHOUT COMPLICATION: ICD-10-CM

## 2020-06-04 DIAGNOSIS — E78.2 MIXED HYPERLIPIDEMIA: Chronic | ICD-10-CM

## 2020-06-04 DIAGNOSIS — M81.0 AGE-RELATED OSTEOPOROSIS WITHOUT CURRENT PATHOLOGICAL FRACTURE: ICD-10-CM

## 2020-06-04 DIAGNOSIS — E11.9 TYPE 2 DIABETES MELLITUS WITHOUT COMPLICATION, WITHOUT LONG-TERM CURRENT USE OF INSULIN (HCC): Chronic | ICD-10-CM
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
## 2020-06-04 DIAGNOSIS — I10 ESSENTIAL HYPERTENSION: Chronic | ICD-10-CM

## 2020-06-04 DIAGNOSIS — K64.9 HEMORRHOIDS, UNSPECIFIED HEMORRHOID TYPE: ICD-10-CM

## 2020-06-04 DIAGNOSIS — H00.012 HORDEOLUM EXTERNUM OF RIGHT LOWER EYELID: Primary | ICD-10-CM

## 2020-06-04 PROBLEM — H00.019 STYE: Status: ACTIVE | Noted: 2020-06-04

## 2020-06-04 PROCEDURE — 3075F SYST BP GE 130 - 139MM HG: CPT | Performed by: FAMILY MEDICINE

## 2020-06-04 PROCEDURE — 2022F DILAT RTA XM EVC RTNOPTHY: CPT | Performed by: FAMILY MEDICINE

## 2020-06-04 PROCEDURE — 3288F FALL RISK ASSESSMENT DOCD: CPT | Performed by: FAMILY MEDICINE

## 2020-06-04 PROCEDURE — 3044F HG A1C LEVEL LT 7.0%: CPT | Performed by: FAMILY MEDICINE

## 2020-06-04 PROCEDURE — 4040F PNEUMOC VAC/ADMIN/RCVD: CPT | Performed by: FAMILY MEDICINE

## 2020-06-04 PROCEDURE — 1160F RVW MEDS BY RX/DR IN RCRD: CPT | Performed by: FAMILY MEDICINE

## 2020-06-04 PROCEDURE — 3060F POS MICROALBUMINURIA REV: CPT | Performed by: FAMILY MEDICINE

## 2020-06-04 PROCEDURE — 3008F BODY MASS INDEX DOCD: CPT | Performed by: FAMILY MEDICINE

## 2020-06-04 PROCEDURE — 1100F PTFALLS ASSESS-DOCD GE2>/YR: CPT | Performed by: FAMILY MEDICINE

## 2020-06-04 PROCEDURE — 1036F TOBACCO NON-USER: CPT | Performed by: FAMILY MEDICINE

## 2020-06-04 PROCEDURE — 99214 OFFICE O/P EST MOD 30 MIN: CPT | Performed by: FAMILY MEDICINE

## 2020-06-04 PROCEDURE — 3078F DIAST BP <80 MM HG: CPT | Performed by: FAMILY MEDICINE

## 2020-06-04 RX ORDER — TOBRAMYCIN AND DEXAMETHASONE 3; 1 MG/ML; MG/ML
1 SUSPENSION/ DROPS OPHTHALMIC
Qty: 5 ML | Refills: 0 | Status: SHIPPED | OUTPATIENT
Start: 2020-06-04 | End: 2020-09-15

## 2020-06-04 RX ORDER — HYDROCORTISONE 25 MG/G
CREAM TOPICAL 2 TIMES DAILY
Qty: 30 G | Refills: 1 | Status: SHIPPED | OUTPATIENT
Start: 2020-06-04 | End: 2020-07-09 | Stop reason: SDUPTHER

## 2020-06-04 RX ORDER — METFORMIN HYDROCHLORIDE 500 MG/1
1000 TABLET, EXTENDED RELEASE ORAL
Qty: 60 TABLET | Refills: 5 | Status: SHIPPED | OUTPATIENT
Start: 2020-06-04 | End: 2020-09-15

## 2020-06-04 RX ORDER — MECLIZINE HCL 12.5 MG/1
12.5 TABLET ORAL 3 TIMES DAILY PRN
Qty: 30 TABLET | Refills: 0 | Status: SHIPPED | OUTPATIENT
Start: 2020-06-04 | End: 2020-07-09 | Stop reason: SDUPTHER

## 2020-06-13 ENCOUNTER — HOSPITAL ENCOUNTER (OUTPATIENT)
Dept: RADIOLOGY | Facility: HOSPITAL | Age: 75
Discharge: HOME | End: 2020-06-13
Payer: COMMERCIAL

## 2020-06-13 DIAGNOSIS — S22.41XS CLOSED FRACTURE OF MULTIPLE RIBS OF RIGHT SIDE, SEQUELA: ICD-10-CM

## 2020-06-13 PROCEDURE — 71100 X-RAY EXAM RIBS UNI 2 VIEWS: CPT

## 2020-06-17 ENCOUNTER — HOSPITAL ENCOUNTER (OUTPATIENT)
Dept: MAMMOGRAPHY | Facility: MEDICAL CENTER | Age: 75
Discharge: HOME | End: 2020-06-17
Payer: COMMERCIAL

## 2020-06-17 VITALS — WEIGHT: 129 LBS | BODY MASS INDEX: 22.86 KG/M2 | HEIGHT: 63 IN

## 2020-06-17 DIAGNOSIS — Z12.31 ENCOUNTER FOR SCREENING MAMMOGRAM FOR BREAST CANCER: ICD-10-CM

## 2020-06-17 PROCEDURE — 77067 SCR MAMMO BI INCL CAD: CPT

## 2020-06-17 PROCEDURE — 77063 BREAST TOMOSYNTHESIS BI: CPT

## 2020-07-09 ENCOUNTER — TELEPHONE (OUTPATIENT)
Dept: FAMILY MEDICINE CLINIC | Facility: CLINIC | Age: 75
End: 2020-07-09

## 2020-07-09 DIAGNOSIS — K64.9 HEMORRHOIDS, UNSPECIFIED HEMORRHOID TYPE: ICD-10-CM

## 2020-07-09 DIAGNOSIS — R42 VERTIGO: ICD-10-CM

## 2020-07-09 RX ORDER — HYDROCORTISONE 25 MG/G
CREAM TOPICAL 2 TIMES DAILY
Qty: 30 G | Refills: 2 | Status: SHIPPED | OUTPATIENT
Start: 2020-07-09 | End: 2020-09-04 | Stop reason: SDUPTHER

## 2020-07-09 RX ORDER — MECLIZINE HCL 12.5 MG/1
12.5 TABLET ORAL 3 TIMES DAILY PRN
Qty: 30 TABLET | Refills: 2 | Status: SHIPPED | OUTPATIENT
Start: 2020-07-09 | End: 2020-09-15

## 2020-07-09 NOTE — TELEPHONE ENCOUNTER
PT IS ASKING FOR MECLIZINE AND HYDROCORTISONE TO BE CALLED INTO [facility] ON [address] [place]   SHE STATES SHE HAS HAD THESE MEDS FILLED THERE BEFORE    ANY QUESTIONS PT CAN BE REACHED [phone]

## 2020-07-14 ENCOUNTER — TELEPHONE (OUTPATIENT)
Dept: FAMILY MEDICINE CLINIC | Facility: CLINIC | Age: 75
End: 2020-07-14

## 2020-07-14 NOTE — TELEPHONE ENCOUNTER
Pateint called she is in pain due to her hemorrhoids  Pain level 10 out of 10, Dr Deven Finley not have any slots available for today  Patient was advise to got to  [facility] where futher evaluation can be done

## 2020-07-15 ENCOUNTER — HOSPITAL ENCOUNTER (EMERGENCY)
Facility: HOSPITAL | Age: 75
Discharge: HOME | End: 2020-07-15
Attending: EMERGENCY MEDICINE
Payer: COMMERCIAL

## 2020-07-15 VITALS
HEART RATE: 77 BPM | OXYGEN SATURATION: 96 % | TEMPERATURE: 98.2 F | WEIGHT: 135.8 LBS | SYSTOLIC BLOOD PRESSURE: 168 MMHG | BODY MASS INDEX: 24.06 KG/M2 | DIASTOLIC BLOOD PRESSURE: 80 MMHG | RESPIRATION RATE: 18 BRPM

## 2020-07-15 DIAGNOSIS — K62.3 RECTAL PROLAPSE: Primary | ICD-10-CM

## 2020-07-15 PROCEDURE — 99283 EMERGENCY DEPT VISIT LOW MDM: CPT

## 2020-07-15 PROCEDURE — 99284 EMERGENCY DEPT VISIT MOD MDM: CPT | Performed by: PHYSICIAN ASSISTANT

## 2020-07-15 PROCEDURE — 99242 OFF/OP CONSLTJ NEW/EST SF 20: CPT | Performed by: PHYSICIAN ASSISTANT

## 2020-07-15 NOTE — CONSULTS
Consultation - [address] [facility] 69 y o  female MRN: [id number]  Unit/Bed#: ED 30 Encounter: [id number]    Assessment/Plan     Assessment:  69 y o  Female with asthma, DM, HTN with rectal prolapse    Plan:  Refer to colorectal for surgical intervention, probably can be set up as an outpatient but should be seen relatively soon    History of Present Illness   Cc: rectal mass with incontinence  HPI:  Nikki Addison is a 69 y o  female who presents with  C/o rectal pain associated with mass that she has to push back in has progressively worsened over the past 3-4 days  She thought it was her hemorrhoids but has had no relief with OTC medication  She has recently become incontinent of stool with leakage and is using a depends  Denies nausea, vomiting, abdomina pain, bloody stool , fevers or chills    Consult to surgery general  Consult performed by: Brittney Frost PA-C  Consult ordered by: Ellen Romero PA-C          Review of Systems   Constitutional: Negative for activity change, appetite change, chills, fatigue and fever  HENT: Negative for congestion and trouble swallowing  Gastrointestinal: Positive for diarrhea and rectal pain  Negative for abdominal distention, blood in stool, constipation, nausea and vomiting  Genitourinary: Negative for difficulty urinating and dysuria  Musculoskeletal: Negative for arthralgias and back pain  Neurological: Negative for dizziness  Hematological: Negative for adenopathy  Psychiatric/Behavioral: Negative for agitation  Historical Information   Past Medical History:   Diagnosis Date    Asthma     Diabetes mellitus ([facility] [address] )     Hypertension      Past Surgical History:   Procedure Laterality Date    BRONCHOSCOPY N/A 3/16/2016    Procedure: BRONCHOSCOPY FLEXIBLE/anesth ;  Surgeon: Kervin Farley DO;  [facility]: [facility];   Service:     COLONOSCOPY      EYE SURGERY      OOPHORECTOMY Left     age 49     Social History   Social History Substance and Sexual Activity   Alcohol Use Yes    Frequency: 2-4 times a month    Drinks per session: 3 or 4    Comment: 1 pint rachele on weekends     Social History     Substance and Sexual Activity   Drug Use No     E-Cigarette/Vaping     E-Cigarette/Vaping Substances     Social History     Tobacco Use   Smoking Status Never Smoker   Smokeless Tobacco Never Used     Family History: non-contributory    Meds/Allergies   all current active meds have been reviewed  Allergies   Allergen Reactions    Bactrim [Sulfamethoxazole-Trimethoprim] Hives    Sulfamethoxazole     Trimethoprim     Metformin Diarrhea       Objective   First Vitals:   Blood Pressure: (!) 174/79 (07/15/20 0838)  Pulse: 77 (07/15/20 0838)  Temperature: 98 2 °F (36 8 °C) (07/15/20 0838)  Temp Source: Oral (07/15/20 0838)  Respirations: 16 (07/15/20 0838)  Weight - Scale: 61 6 kg (135 lb 12 9 oz) (07/15/20 0838)  SpO2: 99 % (07/15/20 0838)    Current Vitals:   Blood Pressure: (!) 174/79 (07/15/20 0838)  Pulse: 77 (07/15/20 0838)  Temperature: 98 2 °F (36 8 °C) (07/15/20 0838)  Temp Source: Oral (07/15/20 0838)  Respirations: 16 (07/15/20 0838)  Weight - Scale: 61 6 kg (135 lb 12 9 oz) (07/15/20 0838)  SpO2: 99 % (07/15/20 0838)    No intake or output data in the 24 hours ending 07/15/20 1030    Invasive Devices     Peripheral Intravenous Line            Peripheral IV 01/24/20 Right Antecubital 172 days                Physical Exam   Constitutional: She appears well-developed and well-nourished  HENT:   Head: Normocephalic and atraumatic  Eyes: Pupils are equal, round, and reactive to light  Neck: Normal range of motion  Neck supple  Cardiovascular: Normal rate and regular rhythm  Pulmonary/Chest: Effort normal and breath sounds normal    Abdominal: Soft  Bowel sounds are normal  She exhibits no distension  There is no tenderness     rectum with poor tone and easily prolapses  With pressure, easily reducible        Lab Results: CBC: No results found for: WBC, HGB, HCT, MCV, PLT, ADJUSTEDWBC, MCH, MCHC, RDW, MPV, NRBC, CMP: No results found for: SODIUM, K, CL, CO2, ANIONGAP, BUN, CREATININE, GLUCOSE, CALCIUM, AST, ALT, ALKPHOS, PROT, BILITOT, EGFR  Imaging: I have personally reviewed pertinent reports  EKG, Pathology, and Other Studies: I have personally reviewed pertinent reports        Counseling / Coordination of Care

## 2020-07-15 NOTE — ED PROVIDER NOTES
History  Chief Complaint   Patient presents with    Hemorrhoids     Pt reports has had "growht on rectum for years " Pt reports "growth" has become more painful and swollen over the last few days  69year old female with a history of asthma, diabetes, HTN presents emergency department for evaluation of concern for hemorrhage  Patient reports that the hemorrhoids have been problematic for approximately 5 years, but in the last several weeks has become more painful  Patient has been using topical hydrocortisone, without relief  Within the last week, she noted increased size of the area, followed by increasing pain, and 3 days ago incontinence of stool  She reports that the hemorrhoids have become larger, worse with position changes such as standing and sitting  She reports she feels like she has to reduce the hemorrhoid, but it comes back out  She has been using depends to help with the incontinence of stool  She denies any fevers, abdominal pain, nausea, vomiting, bloody stool  She reports some blood when she wipes  She reports that she has up to 3 bowel movements per day, which is increased for her  She also denies any chest pain, dysuria, dizziness, headache, weakness  By review of chart, patient had a colonoscopy in July 2019 whereby she had 8 polyps removed, but no mention of prolapse or hemorrhoids  Patient reports she has had 4 vaginal full term deliveries  History provided by:  Patient and medical records   used: No    Rectal Bleeding - Minor   Timing:  Intermittent  Context: defecation, hemorrhoids, rectal pain and spontaneously    Context: not anal penetration, not constipation and not rectal injury    Pain details:     Quality:  Pressure and sharp    Severity:  Severe    Duration:  3 days    Timing:  Constant    Progression:  Worsening  Similar prior episodes: no    Relieved by:  Nothing  Exacerbated by: posistion changes    Ineffective treatments:  Hemorrhoid cream, sitz baths, wipes and time  Associated symptoms: no abdominal pain, no dizziness, no epistaxis, no fever, no hematemesis, no light-headedness, no loss of consciousness, no recent illness and no vomiting    Risk factors: no hx of colorectal surgery and no hx of IBD        Prior to Admission Medications   Prescriptions Last Dose Informant Patient Reported? Taking?    Blood Glucose Calibration (ONETOUCH GLUCOSE CONTROL VI)  Self Yes No   Sig: use as directed   Blood Pressure KIT  Self No No   Sig: by Does not apply route 2 (two) times a day   Lancets (ONETOUCH ULTRASOFT) lancets  Self Yes No   Sig: by Does not apply route   acetaminophen (TYLENOL) 325 mg tablet  Self No No   Sig: Take 3 tablets (975 mg total) by mouth every 8 (eight) hours   albuterol (2 5 mg/3 mL) 0 083 % nebulizer solution  Self No No   Sig: Take 1 vial (2 5 mg total) by nebulization every 6 (six) hours as needed for wheezing or shortness of breath   albuterol (PROVENTIL HFA,VENTOLIN HFA) 90 mcg/act inhaler  Self No No   Sig: Inhale 2 puffs every 6 (six) hours as needed for wheezing   amLODIPine (NORVASC) 5 mg tablet  Self No No   Sig: Take 1 tablet (5 mg total) by mouth daily   atorvastatin (LIPITOR) 20 mg tablet  Self No No   Sig: take 1 tablet by mouth at bedtime   candesartan (ATACAND) 32 MG tablet  Self No No   Sig: take 1 tablet by mouth once daily   glucose blood (ONE TOUCH ULTRA TEST) test strip  Self No No   Sig: Use as instructed   hydrochlorothiazide (HYDRODIURIL) 25 mg tablet  Self No No   Sig: Take 1 tablet (25 mg total) by mouth daily   hydrocortisone (ANUSOL-HC) 2 5 % rectal cream   No No   Sig: Apply topically 2 (two) times a day   ibandronate (BONIVA) 150 MG tablet  Self No No   Sig: Take 1 tablet (150 mg total) by mouth every 30 (thirty) days Take with  2 full glasses of water   meclizine (ANTIVERT) 12 5 MG tablet   No No   Sig: Take 1 tablet (12 5 mg total) by mouth 3 (three) times a day as needed for dizziness for up to 10 days   metFORMIN (GLUCOPHAGE-XR) 500 mg 24 hr tablet   No No   Sig: Take 2 tablets (1,000 mg total) by mouth daily with dinner   mometasone-formoterol (DULERA) 200-5 MCG/ACT inhaler  Self No No   Sig: Inhale 2 puffs 2 (two) times a day Rinse mouth after use    montelukast (SINGULAIR) 10 mg tablet  Self No No   Sig: Take 1 tablet (10 mg total) by mouth daily at bedtime   tobramycin-dexamethasone (TOBRADEX) ophthalmic suspension   No No   Sig: Administer 1 drop to the right eye every 4 (four) hours while awake for 10 days      Facility-Administered Medications: None       Past Medical History:   Diagnosis Date    Asthma     Diabetes mellitus ([facility] [address] )     Hypertension        Past Surgical History:   Procedure Laterality Date    BRONCHOSCOPY N/A 3/16/2016    Procedure: BRONCHOSCOPY FLEXIBLE/anesth ;  Surgeon: Ivana Ramachandran DO;  Location: [facility]; Service:     COLONOSCOPY      EYE SURGERY      OOPHORECTOMY Left     age 49       Family History   Problem Relation Age of Onset    Endometrial cancer Mother 45    Cancer Daughter 48        breast    Breast cancer additional onset Daughter 48    Cancer Son 35        asbestosis related cancer    No Known Problems Father     No Known Problems Sister     No Known Problems Maternal Grandmother     No Known Problems Maternal Grandfather     No Known Problems Paternal Grandmother     No Known Problems Paternal Grandfather     No Known Problems Daughter     No Known Problems Maternal Aunt     No Known Problems Paternal Aunt      I have reviewed and agree with the history as documented      E-Cigarette/Vaping     E-Cigarette/Vaping Substances     Social History     Tobacco Use    Smoking status: Never Smoker    Smokeless tobacco: Never Used   Substance Use Topics    Alcohol use: Yes     Frequency: 2-4 times a month     Drinks per session: 3 or 4     Comment: 1 lilly jeffrey on weekends    Drug use: No       Review of Systems   Constitutional: Negative for chills and fever  HENT: Negative for congestion, nosebleeds and sore throat  Respiratory: Negative for cough and shortness of breath  Cardiovascular: Negative for chest pain  Gastrointestinal: Positive for anal bleeding, hematochezia and rectal pain  Negative for abdominal distention, abdominal pain, blood in stool, constipation, diarrhea, hematemesis, nausea and vomiting  Skin: Negative for rash  Neurological: Negative for dizziness, loss of consciousness, light-headedness and headaches  All other systems reviewed and are negative  Physical Exam  Physical Exam   Constitutional: She is oriented to person, place, and time  Vital signs are normal  She appears well-developed and well-nourished  Non-toxic appearance  No distress  HENT:   Head: Normocephalic and atraumatic  Right Ear: Hearing and external ear normal    Left Ear: Hearing and external ear normal    Nose: Nose normal    Mouth/Throat: Mucous membranes are normal    Eyes: Conjunctivae are normal    Neck: Full passive range of motion without pain  Cardiovascular: Normal rate, regular rhythm, S1 normal, S2 normal and normal heart sounds  Pulmonary/Chest: Effort normal and breath sounds normal  She has no decreased breath sounds  She has no wheezes  She has no rhonchi  She has no rales  Abdominal: Soft  Bowel sounds are normal  There is no tenderness  There is no rigidity, no rebound, no guarding and no CVA tenderness  Genitourinary: Rectal exam shows tenderness and anal tone abnormal  Rectal exam shows no external hemorrhoid, no internal hemorrhoid and no fissure  Pelvic exam was performed with patient in the knee-chest position  Genitourinary Comments: Rectal mucosa pink, but extends beyond the anal verge  Soft and reducible, but immediate prolapses again  No bleeding noted  Musculoskeletal:   Normal range of motion; no edema, tenderness, or deformities  Neurological: She is alert and oriented to person, place, and time  Skin: Skin is warm and dry  Capillary refill takes less than 2 seconds  No rash noted  Psychiatric: She has a normal mood and affect  Her speech is normal    Nursing note and vitals reviewed  Vital Signs  ED Triage Vitals   Temperature Pulse Respirations Blood Pressure SpO2   07/15/20 0838 07/15/20 0838 07/15/20 0838 07/15/20 0838 07/15/20 0838   98 2 °F (36 8 °C) 77 16 (!) 174/79 99 %      Temp Source Heart Rate Source Patient Position - Orthostatic VS BP Location FiO2 (%)   07/15/20 0838 07/15/20 0838 07/15/20 1042 07/15/20 0838 --   Oral Monitor Sitting Right arm       Pain Score       07/15/20 0838       Worst Possible Pain           Vitals:    07/15/20 0838 07/15/20 1042   BP: (!) 174/79 168/80   Pulse: 77 77   Patient Position - Orthostatic VS:  Sitting         Visual Acuity      ED Medications  Medications - No data to display    Diagnostic Studies  Results Reviewed     None                 No orders to display              Procedures  Procedures         ED Course  ED Course as of Jul 15 1437   Wed Jul 15, 2020   0917 Van text to surgery resident      [phone] Surgery evaluated patient; Dr Rhett Witt, general surgery on call at [facility] colorectal surgery      1026 Text to colorectal resident at [place] [address]  Discussed patient with colorectal PAJAVIER, who made follow up appointment with Dr Luis Manuel Robertson for tomorrow at 3:30pm                                                MDM  Number of Diagnoses or Management Options  Rectal prolapse:   Diagnosis management comments: 69year old female presents with rectal prolapse  It is reducible, but immediately prolapses again  Patient reports stool leakage  Surgery consulted, who recommended close office follow up with colorectal surgery  Discussed patient with colorectal surgery, who will see her in office tomorrow  No evidence of compromise to the colon, no bloody bowel movements  Continue symptomatic relief until followup    Patient expressed gratitude for care today  The management plan was discussed in detail with the patient at bedside and all questions were answered  The prior to discharge, we provided both verbal and written instructions  We discussed with the patient the signs and symptoms for which to return to the emergency department  All questions were answered and patient was comfortable with the plan of care and discharged to home  Instructed the patient to follow up with the primary care provider and/or special as provided and their written instructions  The patient verbalized understanding of our discussion and plan of care, and agrees to return to the Emergency Department for concerns and progression of illness  Disposition  Final diagnoses:   Rectal prolapse     Time reflects when diagnosis was documented in both MDM as applicable and the Disposition within this note     Time User Action Codes Description Comment    7/15/2020 10:31 AM Mahendra Plascencia Add [K62 3] Rectal prolapse       ED Disposition     ED Disposition Condition Date/Time Comment    Discharge Stable Wed Jul 15, 2020 10:30 AM Sergio Louis discharge to home/self care              Follow-up Information     Follow up With Specialties Details Why Contact Info    Maite Delgado MD Colon and Rectal Surgery Go to  7/16/2020 at 3:30pm [address] [facility]  [phone]            Discharge Medication List as of 7/15/2020 10:38 AM      CONTINUE these medications which have NOT CHANGED    Details   acetaminophen (TYLENOL) 325 mg tablet Take 3 tablets (975 mg total) by mouth every 8 (eight) hours, Starting Sun 9/29/2019, Normal      albuterol (2 5 mg/3 mL) 0 083 % nebulizer solution Take 1 vial (2 5 mg total) by nebulization every 6 (six) hours as needed for wheezing or shortness of breath, Starting Thu 1/30/2020, Normal      albuterol (PROVENTIL HFA,VENTOLIN HFA) 90 mcg/act inhaler Inhale 2 puffs every 6 (six) hours as needed for wheezing, Starting Tue 7/9/2019, Normal      amLODIPine (NORVASC) 5 mg tablet Take 1 tablet (5 mg total) by mouth daily, Starting Fri 2/7/2020, Normal      atorvastatin (LIPITOR) 20 mg tablet take 1 tablet by mouth at bedtime, Normal      Blood Glucose Calibration (ONETOUCH GLUCOSE CONTROL VI) use as directed, Historical Med      Blood Pressure KIT by Does not apply route 2 (two) times a day, Starting Tue 3/17/2020, Normal      candesartan (ATACAND) 32 MG tablet take 1 tablet by mouth once daily, Normal      glucose blood (ONE TOUCH ULTRA TEST) test strip Use as instructed, Normal      hydrochlorothiazide (HYDRODIURIL) 25 mg tablet Take 1 tablet (25 mg total) by mouth daily, Starting Tue 3/17/2020, Normal      hydrocortisone (ANUSOL-HC) 2 5 % rectal cream Apply topically 2 (two) times a day, Starting Thu 7/9/2020, Normal      ibandronate (BONIVA) 150 MG tablet Take 1 tablet (150 mg total) by mouth every 30 (thirty) days Take with  2 full glasses of water, Starting Thu 2/20/2020, Normal      Lancets (ONETOUCH ULTRASOFT) lancets by Does not apply route, Starting Fri 2/3/2012, Historical Med      meclizine (ANTIVERT) 12 5 MG tablet Take 1 tablet (12 5 mg total) by mouth 3 (three) times a day as needed for dizziness for up to 10 days, Starting Thu 7/9/2020, Until Sun 7/19/2020, Normal      metFORMIN (GLUCOPHAGE-XR) 500 mg 24 hr tablet Take 2 tablets (1,000 mg total) by mouth daily with dinner, Starting Thu 6/4/2020, Until Tue 12/1/2020, Normal      mometasone-formoterol (DULERA) 200-5 MCG/ACT inhaler Inhale 2 puffs 2 (two) times a day Rinse mouth after use , Starting Thu 4/30/2020, Normal      montelukast (SINGULAIR) 10 mg tablet Take 1 tablet (10 mg total) by mouth daily at bedtime, Starting Thu 1/30/2020, Normal      tobramycin-dexamethasone (TOBRADEX) ophthalmic suspension Administer 1 drop to the right eye every 4 (four) hours while awake for 10 days, Starting Thu 6/4/2020, Until Sun 6/14/2020, Normal           No discharge procedures on file      PDMP Review       Value Time User    PDMP Reviewed  Yes 10/14/2019  8:21 AM Bjorn Heard MD          ED Provider  Electronically Signed by           Kaila Chris PA-C  07/15/20 4938

## 2020-07-18 DIAGNOSIS — I10 ESSENTIAL HYPERTENSION: ICD-10-CM

## 2020-07-18 RX ORDER — HYDROCHLOROTHIAZIDE 12.5 MG/1
TABLET ORAL
Qty: 90 TABLET | Refills: 1 | Status: SHIPPED | OUTPATIENT
Start: 2020-07-18 | End: 2020-09-15

## 2020-07-23 ENCOUNTER — HOSPITAL ENCOUNTER (EMERGENCY)
Facility: HOSPITAL | Age: 75
Discharge: HOME | End: 2020-07-23
Attending: EMERGENCY MEDICINE | Admitting: EMERGENCY MEDICINE
Payer: COMMERCIAL

## 2020-07-23 VITALS
OXYGEN SATURATION: 97 % | BODY MASS INDEX: 23.2 KG/M2 | WEIGHT: 130.95 LBS | RESPIRATION RATE: 16 BRPM | TEMPERATURE: 98.1 F | DIASTOLIC BLOOD PRESSURE: 104 MMHG | SYSTOLIC BLOOD PRESSURE: 165 MMHG | HEART RATE: 90 BPM

## 2020-07-23 DIAGNOSIS — L23.7 POISON IVY DERMATITIS: Primary | ICD-10-CM

## 2020-07-23 DIAGNOSIS — I10 ESSENTIAL HYPERTENSION: Chronic | ICD-10-CM

## 2020-07-23 PROBLEM — K62.3 RECTAL PROLAPSE: Status: ACTIVE | Noted: 2020-07-23

## 2020-07-23 PROCEDURE — 99284 EMERGENCY DEPT VISIT MOD MDM: CPT | Performed by: PHYSICIAN ASSISTANT

## 2020-07-23 PROCEDURE — 99282 EMERGENCY DEPT VISIT SF MDM: CPT

## 2020-07-23 RX ORDER — TRIAMCINOLONE ACETONIDE 1 MG/G
CREAM TOPICAL 2 TIMES DAILY
Qty: 80 G | Refills: 0 | Status: SHIPPED | OUTPATIENT
Start: 2020-07-23 | End: 2020-09-15

## 2020-07-23 NOTE — ED NOTES
Pt reports red, itchy rash to bilateral legs and buttocks x 3 days        Nomi Gregory RN  07/23/20 [id number]

## 2020-07-23 NOTE — ED PROVIDER NOTES
History  Chief Complaint   Patient presents with    Rash     Itchy rash to bilateral legs, denies pain or fevers, unknown drainage  81-year-old female past medical history diabetes, hypertension, asthma presents today complaining of itchy rash to bilateral ankles and right buttock  It has been present for 3 days  She denies fevers or drainage  Denies headaches, URI symptoms, chest pain, shortness of breath, abdominal pain, nausea, vomiting, diarrhea  Nobody in her house has a similar rash  She has not taken any medications or tried applying anything to the area  Patient reports that she had poison ivy in the past and it was similar  Prior to Admission Medications   Prescriptions Last Dose Informant Patient Reported? Taking?    Blood Glucose Calibration (ONETOUCH GLUCOSE CONTROL VI)  Self Yes Yes   Sig: use as directed   Blood Pressure KIT  Self No Yes   Sig: by Does not apply route 2 (two) times a day   Lancets (ONETOUCH ULTRASOFT) lancets  Self Yes Yes   Sig: by Does not apply route   acetaminophen (TYLENOL) 325 mg tablet  Self No Yes   Sig: Take 3 tablets (975 mg total) by mouth every 8 (eight) hours   albuterol (2 5 mg/3 mL) 0 083 % nebulizer solution  Self No Yes   Sig: Take 1 vial (2 5 mg total) by nebulization every 6 (six) hours as needed for wheezing or shortness of breath   albuterol (PROVENTIL HFA,VENTOLIN HFA) 90 mcg/act inhaler  Self No Yes   Sig: Inhale 2 puffs every 6 (six) hours as needed for wheezing   amLODIPine (NORVASC) 5 mg tablet  Self No Yes   Sig: Take 1 tablet (5 mg total) by mouth daily   atorvastatin (LIPITOR) 20 mg tablet  Self No Yes   Sig: take 1 tablet by mouth at bedtime   candesartan (ATACAND) 32 MG tablet  Self No Yes   Sig: take 1 tablet by mouth once daily   glucose blood (ONE TOUCH ULTRA TEST) test strip  Self No Yes   Sig: Use as instructed   hydrochlorothiazide (HYDRODIURIL) 12 5 mg tablet   No Yes   Sig: take 1 tablet by mouth once daily   hydrochlorothiazide (HYDRODIURIL) 25 mg tablet  Self No Yes   Sig: Take 1 tablet (25 mg total) by mouth daily   hydrocortisone (ANUSOL-HC) 2 5 % rectal cream   No Yes   Sig: Apply topically 2 (two) times a day   ibandronate (BONIVA) 150 MG tablet  Self No Yes   Sig: Take 1 tablet (150 mg total) by mouth every 30 (thirty) days Take with  2 full glasses of water   meclizine (ANTIVERT) 12 5 MG tablet   No No   Sig: Take 1 tablet (12 5 mg total) by mouth 3 (three) times a day as needed for dizziness for up to 10 days   metFORMIN (GLUCOPHAGE-XR) 500 mg 24 hr tablet   No Yes   Sig: Take 2 tablets (1,000 mg total) by mouth daily with dinner   mometasone-formoterol (DULERA) 200-5 MCG/ACT inhaler  Self No Yes   Sig: Inhale 2 puffs 2 (two) times a day Rinse mouth after use    montelukast (SINGULAIR) 10 mg tablet  Self No Yes   Sig: Take 1 tablet (10 mg total) by mouth daily at bedtime   tobramycin-dexamethasone (TOBRADEX) ophthalmic suspension   No No   Sig: Administer 1 drop to the right eye every 4 (four) hours while awake for 10 days      Facility-Administered Medications: None       Past Medical History:   Diagnosis Date    Asthma     Diabetes mellitus ([facility] [address] )     Hypertension        Past Surgical History:   Procedure Laterality Date    BRONCHOSCOPY N/A 3/16/2016    Procedure: BRONCHOSCOPY FLEXIBLE/anesth ;  Surgeon: Tunde Kim DO;  Location: [facility];   Service:     COLONOSCOPY      EYE SURGERY      OOPHORECTOMY Left     age 49       Family History   Problem Relation Age of Onset    Endometrial cancer Mother 45    Cancer Daughter 48        breast    Breast cancer additional onset Daughter 48    Cancer Son 35        asbestosis related cancer    No Known Problems Father     No Known Problems Sister     No Known Problems Maternal Grandmother     No Known Problems Maternal Grandfather     No Known Problems Paternal Grandmother     No Known Problems Paternal Grandfather     No Known Problems Daughter     No Known Problems Maternal Aunt     No Known Problems Paternal Aunt      I have reviewed and agree with the history as documented  E-Cigarette/Vaping     E-Cigarette/Vaping Substances     Social History     Tobacco Use    Smoking status: Never Smoker    Smokeless tobacco: Never Used   Substance Use Topics    Alcohol use: Yes     Frequency: 2-4 times a month     Drinks per session: 3 or 4     Comment: 1 maggyt rachele on weekends    Drug use: No       Review of Systems   Skin: Positive for rash  All other systems reviewed and are negative  Physical Exam  Physical Exam   Constitutional: She appears well-developed and well-nourished  No distress  HENT:   Head: Normocephalic and atraumatic  Cardiovascular: Normal rate and regular rhythm  Pulmonary/Chest: Effort normal and breath sounds normal  No stridor  No respiratory distress  Neurological: She is alert  Skin: Skin is warm and dry  Capillary refill takes less than 2 seconds  Rash (small vesicles in a linear distribution on anterior aspect of bilateral ankles and right buttock  No drainage or weeping noted ) noted  She is not diaphoretic  Psychiatric: She has a normal mood and affect  Her behavior is normal        Vital Signs  ED Triage Vitals [07/23/20 1224]   Temperature Pulse Respirations Blood Pressure SpO2   98 1 °F (36 7 °C) 90 16 147/76 97 %      Temp src Heart Rate Source Patient Position - Orthostatic VS BP Location FiO2 (%)   -- Monitor Sitting Right arm --      Pain Score       No Pain           Vitals:    07/23/20 1224 07/23/20 1408   BP: 147/76 (!) 165/104   Pulse: 90    Patient Position - Orthostatic VS: Sitting          Visual Acuity      ED Medications  Medications - No data to display    Diagnostic Studies  Results Reviewed     None                 No orders to display              Procedures  Procedures         ED Course       US AUDIT      Most Recent Value   Initial Alcohol Screen: US AUDIT-C    1   How often do you have a drink containing alcohol?  0 Filed at: 07/23/2020 1223   2  How many drinks containing alcohol do you have on a typical day you are drinking? 0 Filed at: 07/23/2020 1223   3b  FEMALE Any Age, or MALE 65+: How often do you have 4 or more drinks on one occassion? 0 Filed at: 07/23/2020 1223   Audit-C Score  0 Filed at: 07/23/2020 1223                  KYE/DAST-10      Most Recent Value   How many times in the past year have you    Used an illegal drug or used a prescription medication for non-medical reasons? Never Filed at: 07/23/2020 1223                                MDM      Disposition  Final diagnoses:   Poison ivy dermatitis     Time reflects when diagnosis was documented in both MDM as applicable and the Disposition within this note     Time User Action Codes Description Comment    7/23/2020  2:07 PM Vanessa Martines Add [L23 7] Poison ivy dermatitis       ED Disposition     ED Disposition Condition Date/Time Comment    Discharge Stable Thu Jul 23, 2020  2:07 PM Chapis Hernandez discharge to home/self care  Follow-up Information     Follow up With Specialties Details Why Contact Info    Freida Ogden MD [address]  [phone]            Patient's Medications   Discharge Prescriptions    TRIAMCINOLONE (KENALOG) 0 1 % CREAM    Apply topically 2 (two) times a day for 10 days       Start Date: 7/23/2020 End Date: 8/2/2020       Order Dose: --       Quantity: 80 g    Refills: 0     No discharge procedures on file      PDMP Review       Value Time User    PDMP Reviewed  Yes 10/14/2019  8:21 AM Freida Ogden MD          ED Provider  Electronically Signed by           Emanuel Freedman PA-C  07/23/20 3059

## 2020-07-24 RX ORDER — CANDESARTAN 32 MG/1
TABLET ORAL
Qty: 90 TABLET | Refills: 1 | Status: SHIPPED | OUTPATIENT
Start: 2020-07-24 | End: 2021-02-20

## 2020-07-27 ENCOUNTER — TELEPHONE (OUTPATIENT)
Dept: FAMILY MEDICINE CLINIC | Facility: CLINIC | Age: 75
End: 2020-07-27

## 2020-07-27 DIAGNOSIS — L25.5 RHUS DERMATITIS: Primary | ICD-10-CM

## 2020-07-27 RX ORDER — METHYLPREDNISOLONE 4 MG/1
TABLET ORAL
Qty: 21 EACH | Refills: 0 | Status: SHIPPED | OUTPATIENT
Start: 2020-07-27 | End: 2020-09-15

## 2020-07-27 NOTE — TELEPHONE ENCOUNTER
Patient was in ER Friday for poison ivy  They gave her a cream  The poison ivy is spreading everywhere   She wanted to know if prednisone could be called into [facility] on [address]  Please call her at [phone]

## 2020-08-07 ENCOUNTER — ANESTHESIA EVENT (OUTPATIENT)
Dept: GASTROENTEROLOGY | Facility: MEDICAL CENTER | Age: 75
End: 2020-08-07

## 2020-08-13 ENCOUNTER — ANESTHESIA (OUTPATIENT)
Dept: GASTROENTEROLOGY | Facility: MEDICAL CENTER | Age: 75
End: 2020-08-13

## 2020-08-13 ENCOUNTER — HOSPITAL ENCOUNTER (OUTPATIENT)
Dept: GASTROENTEROLOGY | Facility: MEDICAL CENTER | Age: 75
Setting detail: OUTPATIENT SURGERY
Discharge: HOME | End: 2020-08-13
Attending: COLON & RECTAL SURGERY
Payer: COMMERCIAL

## 2020-08-13 VITALS
BODY MASS INDEX: 23.04 KG/M2 | RESPIRATION RATE: 22 BRPM | SYSTOLIC BLOOD PRESSURE: 168 MMHG | OXYGEN SATURATION: 98 % | HEIGHT: 63 IN | WEIGHT: 130 LBS | HEART RATE: 74 BPM | TEMPERATURE: 98.1 F | DIASTOLIC BLOOD PRESSURE: 74 MMHG

## 2020-08-13 DIAGNOSIS — K62.3 RECTAL PROLAPSE: ICD-10-CM

## 2020-08-13 DIAGNOSIS — Z12.11 COLON CANCER SCREENING: ICD-10-CM

## 2020-08-13 PROCEDURE — 45378 DIAGNOSTIC COLONOSCOPY: CPT | Performed by: COLON & RECTAL SURGERY

## 2020-08-13 RX ORDER — PROPOFOL 10 MG/ML
INJECTION, EMULSION INTRAVENOUS AS NEEDED
Status: DISCONTINUED | OUTPATIENT
Start: 2020-08-13 | End: 2020-08-13

## 2020-08-13 RX ORDER — ONDANSETRON 2 MG/ML
INJECTION INTRAMUSCULAR; INTRAVENOUS AS NEEDED
Status: DISCONTINUED | OUTPATIENT
Start: 2020-08-13 | End: 2020-08-13

## 2020-08-13 RX ORDER — SODIUM CHLORIDE 9 MG/ML
125 INJECTION, SOLUTION INTRAVENOUS CONTINUOUS
Status: DISCONTINUED | OUTPATIENT
Start: 2020-08-13 | End: 2020-08-17 | Stop reason: HOSPADM

## 2020-08-13 RX ADMIN — PROPOFOL 50 MG: 10 INJECTION, EMULSION INTRAVENOUS at 08:45

## 2020-08-13 RX ADMIN — PROPOFOL 100 MG: 10 INJECTION, EMULSION INTRAVENOUS at 08:38

## 2020-08-13 RX ADMIN — ONDANSETRON 4 MG: 2 INJECTION INTRAMUSCULAR; INTRAVENOUS at 08:50

## 2020-08-13 RX ADMIN — SODIUM CHLORIDE 125 ML/HR: 0.9 INJECTION, SOLUTION INTRAVENOUS at 08:27

## 2020-08-13 RX ADMIN — PROPOFOL 50 MG: 10 INJECTION, EMULSION INTRAVENOUS at 08:42

## 2020-08-13 NOTE — H&P
History and Physical   Colon and Rectal Surgery   Ramonita Abbott 69 y o  female MRN: [id number]  Unit/Bed#:  Encounter: [id number]  08/13/20   @NOW    No chief complaint on file  History of Present Illness   HPI:  Ramonita Abbott is a 69 y o  female who presents for evaluation of rectal prolapse  She also has a history of colon polyps and by her report incomplete colonoscopy in the past       Historical Information   Past Medical History:   Diagnosis Date    Asthma     Diabetes mellitus ([address] )     Hypertension      Past Surgical History:   Procedure Laterality Date    BRONCHOSCOPY N/A 3/16/2016    Procedure: BRONCHOSCOPY FLEXIBLE/anesth ;  Surgeon: Elza Kovacs DO;  Location: [facility];   Service:     COLONOSCOPY      EYE SURGERY      OOPHORECTOMY Left     age 49       Meds/Allergies     (Not in a hospital admission)        Current Outpatient Medications:     acetaminophen (TYLENOL) 325 mg tablet, Take 3 tablets (975 mg total) by mouth every 8 (eight) hours, Disp: 30 tablet, Rfl: 0    albuterol (2 5 mg/3 mL) 0 083 % nebulizer solution, Take 1 vial (2 5 mg total) by nebulization every 6 (six) hours as needed for wheezing or shortness of breath, Disp: 60 vial, Rfl: 5    albuterol (PROVENTIL HFA,VENTOLIN HFA) 90 mcg/act inhaler, Inhale 2 puffs every 6 (six) hours as needed for wheezing, Disp: 1 Inhaler, Rfl: 5    amLODIPine (NORVASC) 5 mg tablet, Take 1 tablet (5 mg total) by mouth daily, Disp: 90 tablet, Rfl: 1    atorvastatin (LIPITOR) 20 mg tablet, take 1 tablet by mouth at bedtime, Disp: 90 tablet, Rfl: 1    Blood Glucose Calibration (ONETOUCH GLUCOSE CONTROL VI), use as directed, Disp: , Rfl:     Blood Pressure KIT, by Does not apply route 2 (two) times a day, Disp: 1 each, Rfl: 0    candesartan (ATACAND) 32 MG tablet, take 1 tablet by mouth once daily, Disp: 90 tablet, Rfl: 1    glucose blood (ONE TOUCH ULTRA TEST) test strip, Use as instructed, Disp: 200 each, Rfl: 1    hydrochlorothiazide (HYDRODIURIL) 12 5 mg tablet, take 1 tablet by mouth once daily (Patient not taking: Reported on 8/6/2020), Disp: 90 tablet, Rfl: 1    hydrochlorothiazide (HYDRODIURIL) 25 mg tablet, Take 1 tablet (25 mg total) by mouth daily, Disp: 90 tablet, Rfl: 5    hydrocortisone (ANUSOL-HC) 2 5 % rectal cream, Apply topically 2 (two) times a day (Patient not taking: Reported on 8/6/2020), Disp: 30 g, Rfl: 2    ibandronate (BONIVA) 150 MG tablet, Take 1 tablet (150 mg total) by mouth every 30 (thirty) days Take with  2 full glasses of water, Disp: 1 tablet, Rfl: 5    Lancets (ONETOUCH ULTRASOFT) lancets, by Does not apply route, Disp: , Rfl:     meclizine (ANTIVERT) 12 5 MG tablet, Take 1 tablet (12 5 mg total) by mouth 3 (three) times a day as needed for dizziness for up to 10 days, Disp: 30 tablet, Rfl: 2    metFORMIN (GLUCOPHAGE-XR) 500 mg 24 hr tablet, Take 2 tablets (1,000 mg total) by mouth daily with dinner, Disp: 60 tablet, Rfl: 5    methylPREDNISolone 4 MG tablet therapy pack, Use as directed on package (Patient not taking: Reported on 8/6/2020), Disp: 21 each, Rfl: 0    mometasone-formoterol (DULERA) 200-5 MCG/ACT inhaler, Inhale 2 puffs 2 (two) times a day Rinse mouth after use , Disp: 1 Inhaler, Rfl: 5    montelukast (SINGULAIR) 10 mg tablet, Take 1 tablet (10 mg total) by mouth daily at bedtime, Disp: 30 tablet, Rfl: 5    tobramycin-dexamethasone (TOBRADEX) ophthalmic suspension, Administer 1 drop to the right eye every 4 (four) hours while awake for 10 days, Disp: 5 mL, Rfl: 0    triamcinolone (KENALOG) 0 1 % cream, Apply topically 2 (two) times a day for 10 days, Disp: 80 g, Rfl: 0    Allergies   Allergen Reactions    Bactrim [Sulfamethoxazole-Trimethoprim] Hives    Sulfamethoxazole     Trimethoprim     Metformin Diarrhea         Social History   Social History     Substance and Sexual Activity   Alcohol Use Yes    Frequency: 2-4 times a month    Drinks per session: 3 or 4    Comment: 1 lilly rachele on weekends     Social History     Substance and Sexual Activity   Drug Use No     Social History     Tobacco Use   Smoking Status Never Smoker   Smokeless Tobacco Never Used         Family History:   Family History   Problem Relation Age of Onset    Endometrial cancer Mother 45    Cancer Daughter 48        breast    Breast cancer additional onset Daughter 48    Cancer Son 35        asbestosis related cancer    No Known Problems Father     No Known Problems Sister     No Known Problems Maternal Grandmother     No Known Problems Maternal Grandfather     No Known Problems Paternal Grandmother     No Known Problems Paternal Grandfather     No Known Problems Daughter     No Known Problems Maternal Aunt     No Known Problems Paternal Aunt          Objective     Current Vitals:      No intake or output data in the 24 hours ending 08/13/20 0810    Physical Exam:  General:  Resting comfortably in bed   Eyes:Sclera anicteric  ENT: Trachea midline  Pulm:  Symmetric chest raise  No respiratory Distress  CV:  Regular on monitor  Abdomen:  Soft NT ND  Extremities:  No clubbing/ cyanosis/ edema    Lab Results: I have personally reviewed pertinent lab results  Imaging: I have personally reviewed pertinent reports  ASSESSMENT:  Marlena Valladares is a 69 y o  female who presents for outpatient colonoscopy  PLAN:  For colonoscopy    Risks/ Benefits reviewed to include but not limited to anesthesia, bleeding, missed lesions, and colonoscopic perforation requiring surgery

## 2020-08-13 NOTE — ANESTHESIA POSTPROCEDURE EVALUATION
Post-Op Assessment Note    CV Status:  Stable    Pain management: adequate     Mental Status:  Alert and awake   Hydration Status:  Euvolemic   PONV Controlled:  Controlled   Airway Patency:  Patent      Post Op Vitals Reviewed: Yes      Staff: Anesthesiologist         No complications documented      /66 (08/13/20 0858)    Temp      Pulse 75 (08/13/20 0858)   Resp 22 (08/13/20 0858)    SpO2 98 % (08/13/20 0858)

## 2020-08-13 NOTE — ANESTHESIA PREPROCEDURE EVALUATION
Procedure:  COLONOSCOPY    Relevant Problems   ANESTHESIA (within normal limits)      CARDIO   (+) HTN (hypertension)   (+) Hyperlipidemia   (+) Thoracic aortic aneurysm without rupture (HCC)      GI/HEPATIC (within normal limits)      /RENAL (within normal limits)      GYN (within normal limits)      HEMATOLOGY (within normal limits)      MUSCULOSKELETAL (within normal limits)      NEURO/PSYCH (within normal limits)      PULMONARY   (+) Moderate persistent asthma without complication   (+) NGOC (obstructive sleep apnea)      Other   (+) Diabetes mellitus (HCC)        Physical Exam    Airway    Mallampati score: III  TM Distance: <3 FB  Neck ROM: full     Dental       Cardiovascular  Rhythm: regular, Rate: normal,     Pulmonary  Breath sounds clear to auscultation,     Other Findings        Anesthesia Plan  ASA Score- 3     Anesthesia Type- IV sedation with anesthesia with ASA Monitors  Additional Monitors:   Airway Plan:           Plan Factors-Exercise tolerance (METS): >4 METS  Chart reviewed  Patient summary reviewed  Patient is not a current smoker  Patient not instructed to abstain from smoking on day of procedure  Patient did not smoke on day of surgery  Induction- intravenous  Postoperative Plan-     Informed Consent- Anesthetic plan and risks discussed with patient

## 2020-08-13 NOTE — DISCHARGE INSTRUCTIONS
Colonoscopy   WHAT YOU NEED TO KNOW:   A colonoscopy is a procedure to examine the inside of your colon (intestine) with a scope  Polyps or tissue growths may have been removed during your colonoscopy  It is normal to feel bloated and to have some abdominal discomfort  You should be passing gas  If you have hemorrhoids or you had polyps removed, you may have a small amount of bleeding  DISCHARGE INSTRUCTIONS:   Seek care immediately if:   · You have a large amount of bright red blood in your bowel movements  · Your abdomen is hard and firm and you have severe pain  · You have sudden trouble breathing  Contact your healthcare provider if:   · You develop a rash or hives  · You have a fever within 24 hours of your procedure  · You have not had a bowel movement for 3 days after your procedure  · You have questions or concerns about your condition or care  Activity:   · Do not lift, strain, or run  for 3 days after your procedure  · Rest after your procedure  You have been given medicine to relax you  Do not  drive or make important decisions until the day after your procedure  Return to your normal activity as directed  · Relieve gas and discomfort from bloating  by lying on your right side with a heating pad on your abdomen  You may need to take short walks to help the gas move out  Eat small meals until bloating is relieved  If you had polyps removed: For 7 days after your procedure:  · Do not  take aspirin  · Do not  go on long car rides  Help prevent constipation:   · Eat a variety of healthy foods  Healthy foods include fruit, vegetables, whole-grain breads, low-fat dairy products, beans, lean meat, and fish  Ask if you need to be on a special diet  Your healthcare provider may recommend that you eat high-fiber foods such as cooked beans  Fiber helps you have regular bowel movements  · Drink liquids as directed    Adults should drink between 9 and 13 eight-ounce cups of liquid every day  Ask what amount is best for you  For most people, good liquids to drink are water, juice, and milk  · Exercise as directed  Talk to your healthcare provider about the best exercise plan for you  Exercise can help prevent constipation, decrease your blood pressure and improve your health  Follow up with your healthcare provider as directed:  Write down your questions so you remember to ask them during your visits  © 2017 [address] [employer] Information is for End User's use only and may not be sold, redistributed or otherwise used for commercial purposes  All illustrations and images included in CareNotes® are the copyrighted property of [employer]  or [employer]  The above information is an  only  It is not intended as medical advice for individual conditions or treatments  Talk to your doctor, nurse or pharmacist before following any medical regimen to see if it is safe and effective for you  Diverticulosis   WHAT YOU NEED TO KNOW:   What is diverticulosis? Diverticulosis is a condition that causes small pockets called diverticula to form in your intestine  These pockets make it difficult for bowel movements to pass through your digestive system  What causes diverticulosis? Diverticula form when muscles have to work hard to move bowel movements through the intestine  The force causes bulges to form at weak areas in the intestine  This may happen if you eat foods that are low in fiber  Fiber helps give your bowel movements more bulk so they are larger and easier to move through your colon  The following may increase your risk of diverticulosis:  · A history of constipation    · Age 36 or older    · Obesity    · Lack of exercise  What are the signs and symptoms of diverticulosis? Diverticulosis usually does not cause any signs or symptoms   It may cause any of the following in some people:  · Pain or discomfort in your lower abdomen    · Abdominal bloating    · Constipation or diarrhea  How is diverticulosis diagnosed? Your healthcare provider will examine you and ask about your bowel movements, diet, and symptoms  He or she will also ask about any medical conditions you have or medicines you take  You may need any of the following:  · Blood tests  may be done to check for signs of inflammation  · A barium enema  is an x-ray of your colon that may show diverticula  A tube is put into your anus, and a liquid called barium is put through the tube  Barium is used so that healthcare providers can see your colon more clearly  · Flexible sigmoidoscopy  is a test to look for any changes in your lower intestines and rectum  It may also show the cause of any bleeding or pain  A soft, bendable tube with a light on the end will be put into your anus  It will then be moved forward into your intestine  · A colonoscopy  is used to look at your whole colon  A scope (long bendable tube with a light on the end) is used to take pictures  This test may show diverticula  · A CT scan , or CAT scan, may show diverticula  You may be given contrast liquid before the scan  Tell the healthcare provider if you have ever had an allergic reaction to contrast liquid  How is diverticulosis managed? The goal of treatment is to manage any symptoms you have and prevent other problems such as diverticulitis  Diverticulitis is swelling or infection of the diverticula  Your healthcare provider may recommend any of the following:  · Eat a variety of high-fiber foods  High-fiber foods help you have regular bowel movements  High-fiber foods include cooked beans, fruits, vegetables, and some cereals  Most adults need 25 to 35 grams of fiber each day  Your healthcare provider may recommend that you have more  Ask your healthcare provider how much fiber you need  Increase fiber slowly   You may have abdominal discomfort, bloating, and gas if you add fiber to your diet too quickly  You may need to take a fiber supplement if you are not getting enough fiber from food  · Medicines  to soften your bowel movements may be given  You may also need medicines to treat symptoms such as bloating and pain  · Drink liquids as directed  You may need to drink 2 to 3 liters (8 to 12 cups) of liquids every day  Ask your healthcare provider how much liquid to drink each day and which liquids are best for you  · Apply heat  on your abdomen for 20 to 30 minutes every 2 hours for as many days as directed  Heat helps decrease pain and muscle spasms  How can I help prevent diverticulitis or other symptoms? The following may help decrease your risk for diverticulitis or symptoms, such as bleeding  Talk to your provider about these or other things you can do to prevent problems that may occur with diverticulosis  · Exercise regularly  Ask your healthcare provider about the best exercise plan for you  Exercise can help you have regular bowel movements  Get 30 minutes of exercise on most days of the week  · Maintain a healthy weight  Ask your healthcare provider how much you should weigh  Ask him or her to help you create a weight loss plan if you are overweight  · Do not smoke  Nicotine and other chemicals in cigarettes increase your risk for diverticulitis  Ask your healthcare provider for information if you currently smoke and need help to quit  E-cigarettes or smokeless tobacco still contain nicotine  Talk to your healthcare provider before you use these products  · Ask your healthcare provider if it is safe to take NSAIDs  NSAIDs may increase your risk of diverticulitis  When should I seek immediate care? · You have severe pain on the left side of your lower abdomen  · Your bowel movements are bright or dark red  When should I contact my healthcare provider? · You have a fever and chills  · You feel dizzy or lightheaded       · You have nausea, or you are vomiting  · You have a change in your bowel movements  · You have questions or concerns about your condition or care  CARE AGREEMENT:   You have the right to help plan your care  Learn about your health condition and how it may be treated  Discuss treatment options with your caregivers to decide what care you want to receive  You always have the right to refuse treatment  The above information is an  only  It is not intended as medical advice for individual conditions or treatments  Talk to your doctor, nurse or pharmacist before following any medical regimen to see if it is safe and effective for you  © 2017 [address] [employer] Information is for End User's use only and may not be sold, redistributed or otherwise used for commercial purposes  All illustrations and images included in CareNotes® are the copyrighted property of [employer]  or [employer] Daniel  Rectal Prolapse   WHAT YOU NEED TO KNOW:   What is a rectal prolapse? A rectal prolapse is a condition that causes your rectum to come through your anus  The rectum is the end of your bowel  A prolapse may happen during a bowel movement  A prolapse may happen more often in women after childbirth or who are older than 50 years  What causes a rectal prolapse? · Damage to your anal sphincter  may cause your anus to open wider than it should  Your anal sphincter is a ring of muscle around your anus  This muscle controls the opening and closing of your anus  Damage to your anal sphincter may occur from trauma such as pelvic floor surgery  · Damage to the muscles and ligaments  of your rectum and anus may cause a rectal prolapse  Ligaments help hold your rectum and anus in place  Muscles control how your rectum moves bowel movements through your anus  These muscles may become damaged from trauma such as childbirth or pelvic floor surgery       · You may have more space  between your rectum and other organs in your pelvis  Your rectum may be longer than normal  These changes can cause your rectum to move out of place and through your anus  What increases my risk for a rectal prolapse? · Constipation  may cause you to push too hard during a bowel movement  The pressure from pushing may cause the rectum to come through the anus  · A chronic condition  can cause problems that lead to a prolapse  Cystic fibrosis can cause chronic constipation  Multiple sclerosis can weaken muscles that hold your rectum in place  · A condition  such as a stroke or spinal cord injury can damage nerves and muscles that make the rectum work  Rectal muscles may be too weak to hold your rectum in place  · Pelvic surgery  can damage the nerves, muscles or ligaments of the rectum  What are the signs and symptoms of a rectal prolapse? · Pain or discomfort during a bowel movement    · A swollen, red mass coming from your anus    · Bleeding or mucus from your rectum    · A small amount of blood in your bowel movement    · Feeling like you still need to have a bowel movement after you use the bathroom    · Trouble controlling your bowel movements  How is a rectal prolapse diagnosed? Your healthcare provider will ask when the prolapse happened  He will ask about any pain or other symptoms you had during and after the prolapse  He may ask about your bowel habits and foods you eat  Tell him about other medical conditions you have  If you are a woman, he may ask if you recently gave birth  You may also need any of the following tests:  · Your healthcare provider will examine your anus  to check for a rectal prolapse  He may also check for rectal polyps  A rectal polyp is a small growth of tissue in the lining of the rectum  He may also feel inside of your anus to check for bumps that he cannot see from the outside  He may have you push like you are having a bowel movement and watch for a rectal prolapse       · An x-ray, ultrasound, CT, or MRI  may show problems with your rectum or the muscles, nerves and ligaments that control it  You may be given contrast liquid to help the rectum show up better in the pictures  You may be given contrast dye into your rectum  Tell the healthcare provider if you have ever had an allergic reaction to contrast liquid  Do not enter the MRI room with anything metal  Metal can cause serious injury  Tell the healthcare provider if you have any metal in or on your body  How is a rectal prolapse treated? Treatment of rectal prolapse depends on the severity  You may need any of the following:  · Stool softeners  help prevent constipation  · Laxatives  help your intestines relax and loosen to prevent constipation  · Injections  may prevent your rectum from moving through your anus  You may be given one or more shots of numbing medicine  A needle will be inserted into the rectum and medicine will be given  You may feel some pushing or discomfort as the needle enters your rectum  · Surgery  may be needed if other treatments do not work  The type of surgery may depend on the cause of your rectal prolapse  Surgery can help position your rectum so that it does not come down through your anus  Surgery may include placing sutures or mesh to hold the rectum in place  It may also involve removing part of the rectum  How can I decrease my risk for a rectal prolapse? · Eat more high-fiber foods  This may help decrease constipation by adding bulk and softness to your bowel movements  Your healthcare provider can help you create a meal plan that includes high-fiber foods  High fiber foods include fruit, vegetables, whole-grain breads, and cooked beans  · Increase the amount of liquid you drink  Liquids can help keep your bowel movements soft and prevent constipation  Ask your healthcare provider how much liquid you should drink each day  · Exercise your pelvic muscles    Kegel exercises strengthen the pelvic muscles  These exercises involve tightening and relaxing vaginal and rectal muscles  Kegel exercises can make the rectal muscles stronger and improve bowel control  Ask your healthcare provider for more information on how to do kegel exercises  · Do not sit for long amounts of time  You may put too much pressure on your anus  Pressure on your anus may cause a rectal prolapse  What is manual reduction of a rectal prolapse? Manual reduction is a procedure you can do to place your rectum back inside of your anus  Your healthcare provider will show you how to do a manual reduction  You may need a family member to help you with manual reduction  The following are general steps to follow  Your healthcare provider may give you specific steps to follow  · Your healthcare provider may tell you to apply sugar to your rectum before manual reduction  This may help decrease the swelling of your rectum, and make it easier to put back inside your anus  Ask your healthcare provider about how to apply sugar to your rectum  · Lie on your back with your knees bent  · Wash your hands and put on gloves  Lubricate your glove with petroleum jelly  · Hold your rectum on both sides of the anus  Gently apply firm, steady pressure on your rectum and push it into your anus  You may need to apply pressure for several minutes if the bowel is swollen  Inspect your anus  You can use a mirror or have your family member inspect your anus  You should not see the rectum  If a prolapse happens again, you can repeat manual reduction  · You can hold the rectum in place with gauze and tape across your buttocks  Before you apply gauze, place a quarter size amount of petroleum jelly on the gauze  The petroleum jelly will prevent the gauze from sticking to your rectum  Remove the gauze as directed by your healthcare provider  Call 911 for any of the following:   · You have trouble breathing       · Your heart is beating faster than usual   When should I seek immediate care? · You have severe pain in your abdomen  · Your abdomen looks bigger than usual      · Blood from your rectum soaks through your underwear  When should I contact my healthcare provider? · You have a fever  · You have nausea or are vomiting  · You see larger amounts of blood in your bowel movement than before  · You have questions or concerns about your condition or care  CARE AGREEMENT:   You have the right to help plan your care  Learn about your health condition and how it may be treated  Discuss treatment options with your caregivers to decide what care you want to receive  You always have the right to refuse treatment  The above information is an  only  It is not intended as medical advice for individual conditions or treatments  Talk to your doctor, nurse or pharmacist before following any medical regimen to see if it is safe and effective for you  © 2017 [address] [employer] Information is for End User's use only and may not be sold, redistributed or otherwise used for commercial purposes  All illustrations and images included in CareNotes® are the copyrighted property of [employer]  or [employer]  High Fiber Diet   WHAT YOU NEED TO KNOW:   What is a high-fiber diet? A high-fiber diet includes foods that have a high amount of fiber  Fiber is the part of fruits, vegetables, and grains that is not broken down by your body  Fiber keeps your bowel movements regular  Fiber can also help lower your cholesterol level, control blood sugar in people with diabetes, and relieve constipation  Fiber can also help you control your weight because it helps you feel full faster  Most adults should eat 25 to 35 grams of fiber each day  Talk to your dietitian or healthcare provider about the amount of fiber you need  What foods are good sources of fiber?    · Foods with at least 4 grams of fiber per serving:      ¨ ? to ½ cup of high-fiber cereal (check the nutrition label on the box)    ¨ ½ cup of blackberries or raspberries    ¨ 4 dried prunes    ¨ 1 cooked artichoke    ¨ ½ cup of cooked legumes, such as lentils, or red, kidney, and juarez beans    · Foods with 1 to 3 grams of fiber per serving:      ¨ 1 slice of whole-wheat, pumpernickel, or rye bread    ¨ ½ cup of cooked brown rice    ¨ 4 whole-wheat crackers    ¨ 1 cup of oatmeal    ¨ ½ cup of cereal with 1 to 3 grams of fiber per serving (check the nutrition label on the box)    ¨ 1 small piece of fruit, such as an apple, banana, pear, kiwi, or orange    ¨ 3 dates    ¨ ½ cup of canned apricots, fruit cocktail, peaches, or pears    ¨ ½ cup of raw or cooked vegetables, such as carrots, cauliflower, cabbage, spinach, squash, or corn  What are some ways that I can increase fiber in my diet? · Choose brown or wild rice instead of white rice  · Use whole wheat flour in recipes instead of white or all-purpose flour  · Add beans and peas to casseroles or soups  · Choose fresh fruit and vegetables with peels or skins on instead of juices  What other guidelines should I follow? · Add fiber to your diet slowly  You may have abdominal discomfort, bloating, and gas if you add fiber to your diet too quickly  · Drink plenty of liquids as you add fiber to your diet  You may have nausea or develop constipation if you do not drink enough water  Ask how much liquid to drink each day and which liquids are best for you  CARE AGREEMENT:   You have the right to help plan your care  Discuss treatment options with your caregivers to decide what care you want to receive  You always have the right to refuse treatment  The above information is an  only  It is not intended as medical advice for individual conditions or treatments   Talk to your doctor, nurse or pharmacist before following any medical regimen to see if it is safe and effective for you   © 2017 [address] [facility] Information is for End User's use only and may not be sold, redistributed or otherwise used for commercial purposes  All illustrations and images included in CareNotes® are the copyrighted property of [employer]  or [employer]very Daniel

## 2020-08-17 DIAGNOSIS — M81.0 AGE-RELATED OSTEOPOROSIS WITHOUT CURRENT PATHOLOGICAL FRACTURE: ICD-10-CM

## 2020-08-17 RX ORDER — IBANDRONATE SODIUM 150 MG/1
TABLET, FILM COATED ORAL
Qty: 1 TABLET | Refills: 5 | Status: SHIPPED | OUTPATIENT
Start: 2020-08-17 | End: 2021-04-08

## 2020-09-02 NOTE — H&P (VIEW-ONLY)
Diagnoses and all orders for this visit:    Rectal prolapse       Rectal prolapse  Patient presents for follow-up of rectal prolapse  Clinically she is similar to prior  She has every day prolapse with every day reduction  She has pain when he has prolapse and rectal bleeding  Reviewed her colonoscopy results  She has no obvious lead point of concern and as such this is straight for full-thickness rectal prolapse  She presents today to discuss treatment  We discussed that non operative management is unlikely to be successful given her symptoms  We discussed operative management form of transabdominal or transperineal repair  Given her overall activity in state of health I recommended transabdominal repair  Specifically laparoscopic sigmoid resection with rectopexy, possible open procedure  She understands the risks and benefits and wished to proceed  OR for Colectomy  Risks of surgery, including but not limited to bleeding, infection, anastomotic leak, need for colostomy or enterostomy, injury or dysfunction of the bowel or urinary tract, injury or need for removal of other organs, sexual dysfunction, impotence, bowel obstruction in the future, hernia formation, bowel dysfunction, fecal incontinence, thromboembolic complications (deep vein clots or clots to the lungs), heart failure, heart attack, even death were reviewed  Questions were fully answered  HPI   Lacy Short is here today for follow up evaluation  She complains of rectal bleeding and pain  The bleeding is described as bright red blood upon wiping  The has rectal prolapse with and without bowel movement  She was last seen at colonoscopy on 8/13/2020, which showed full-thickness rectal prolapse seen on digital rectal exam  Mild sigmoid diverticulosis  Otherwise normal colon  No malignant or polypoid site serving as lead point for rectal prolapse  Recall colonoscopy 5 years due to personal history of colon polyps         Past Medical History:   Diagnosis Date    Asthma     Colon polyp     Diabetes mellitus ([facility] [address] )     Hyperlipidemia     Hypertension      Past Surgical History:   Procedure Laterality Date    BRONCHOSCOPY N/A 3/16/2016    Procedure: BRONCHOSCOPY FLEXIBLE/anesth ;  Surgeon: Yo Smallwood DO;  Location: [facility];   Service:     COLONOSCOPY      COLONOSCOPY      EYE SURGERY      OOPHORECTOMY Left     age 49       Current Outpatient Medications:     acetaminophen (TYLENOL) 325 mg tablet, Take 3 tablets (975 mg total) by mouth every 8 (eight) hours, Disp: 30 tablet, Rfl: 0    albuterol (2 5 mg/3 mL) 0 083 % nebulizer solution, Take 1 vial (2 5 mg total) by nebulization every 6 (six) hours as needed for wheezing or shortness of breath, Disp: 60 vial, Rfl: 5    albuterol (PROVENTIL HFA,VENTOLIN HFA) 90 mcg/act inhaler, Inhale 2 puffs every 6 (six) hours as needed for wheezing, Disp: 1 Inhaler, Rfl: 5    amLODIPine (NORVASC) 5 mg tablet, Take 1 tablet (5 mg total) by mouth daily, Disp: 90 tablet, Rfl: 1    atorvastatin (LIPITOR) 20 mg tablet, take 1 tablet by mouth at bedtime, Disp: 90 tablet, Rfl: 1    Blood Glucose Calibration (ONETOUCH GLUCOSE CONTROL VI), use as directed, Disp: , Rfl:     Blood Pressure KIT, by Does not apply route 2 (two) times a day, Disp: 1 each, Rfl: 0    candesartan (ATACAND) 32 MG tablet, take 1 tablet by mouth once daily, Disp: 90 tablet, Rfl: 1    glucose blood (ONE TOUCH ULTRA TEST) test strip, Use as instructed, Disp: 200 each, Rfl: 1    hydrochlorothiazide (HYDRODIURIL) 12 5 mg tablet, take 1 tablet by mouth once daily (Patient not taking: Reported on 8/6/2020), Disp: 90 tablet, Rfl: 1    hydrochlorothiazide (HYDRODIURIL) 25 mg tablet, Take 1 tablet (25 mg total) by mouth daily, Disp: 90 tablet, Rfl: 5    hydrocortisone (ANUSOL-HC) 2 5 % rectal cream, Apply topically 2 (two) times a day (Patient not taking: Reported on 8/6/2020), Disp: 30 g, Rfl: 2    ibandronate (BONIVA) 150 MG tablet, take 1 tablet by mouth every month, Disp: 1 tablet, Rfl: 5    Lancets (ONETOUCH ULTRASOFT) lancets, by Does not apply route, Disp: , Rfl:     meclizine (ANTIVERT) 12 5 MG tablet, Take 1 tablet (12 5 mg total) by mouth 3 (three) times a day as needed for dizziness for up to 10 days, Disp: 30 tablet, Rfl: 2    metFORMIN (GLUCOPHAGE-XR) 500 mg 24 hr tablet, Take 2 tablets (1,000 mg total) by mouth daily with dinner, Disp: 60 tablet, Rfl: 5    methylPREDNISolone 4 MG tablet therapy pack, Use as directed on package (Patient not taking: Reported on 8/6/2020), Disp: 21 each, Rfl: 0    mometasone-formoterol (DULERA) 200-5 MCG/ACT inhaler, Inhale 2 puffs 2 (two) times a day Rinse mouth after use , Disp: 1 Inhaler, Rfl: 5    montelukast (SINGULAIR) 10 mg tablet, Take 1 tablet (10 mg total) by mouth daily at bedtime, Disp: 30 tablet, Rfl: 5    tobramycin-dexamethasone (TOBRADEX) ophthalmic suspension, Administer 1 drop to the right eye every 4 (four) hours while awake for 10 days, Disp: 5 mL, Rfl: 0    triamcinolone (KENALOG) 0 1 % cream, Apply topically 2 (two) times a day for 10 days, Disp: 80 g, Rfl: 0  Allergies as of 09/03/2020 - Reviewed 09/03/2020   Allergen Reaction Noted    Bactrim [sulfamethoxazole-trimethoprim] Hives 08/15/2013    Sulfamethoxazole  01/16/2017    Trimethoprim  01/16/2017    Metformin Diarrhea 01/08/2020     Review of Systems   Gastrointestinal: Positive for anal bleeding and rectal pain  All other systems reviewed and are negative  There were no vitals filed for this visit  Physical Exam  Constitutional:       Appearance: Normal appearance  HENT:      Head: Normocephalic and atraumatic  Eyes:      Conjunctiva/sclera: Conjunctivae normal       Pupils: Pupils are equal, round, and reactive to light  Cardiovascular:      Rate and Rhythm: Normal rate and regular rhythm  Pulmonary:      Effort: Pulmonary effort is normal       Breath sounds: Normal breath sounds  Abdominal:      General: Abdomen is flat  Palpations: Abdomen is soft  Skin:     General: Skin is warm and dry  Neurological:      General: No focal deficit present  Mental Status: She is alert and oriented to person, place, and time  Psychiatric:         Mood and Affect: Mood normal          Behavior: Behavior normal          Thought Content:  Thought content normal          Judgment: Judgment normal

## 2020-09-04 ENCOUNTER — OFFICE VISIT (OUTPATIENT)
Dept: FAMILY MEDICINE CLINIC | Facility: CLINIC | Age: 75
End: 2020-09-04
Payer: COMMERCIAL

## 2020-09-04 ENCOUNTER — TELEPHONE (OUTPATIENT)
Dept: FAMILY MEDICINE CLINIC | Facility: CLINIC | Age: 75
End: 2020-09-04

## 2020-09-04 VITALS
HEIGHT: 63 IN | HEART RATE: 76 BPM | TEMPERATURE: 97.8 F | BODY MASS INDEX: 23.83 KG/M2 | SYSTOLIC BLOOD PRESSURE: 122 MMHG | WEIGHT: 134.5 LBS | DIASTOLIC BLOOD PRESSURE: 70 MMHG

## 2020-09-04 DIAGNOSIS — M54.50 LOW BACK PAIN, UNSPECIFIED BACK PAIN LATERALITY, UNSPECIFIED CHRONICITY, UNSPECIFIED WHETHER SCIATICA PRESENT: ICD-10-CM

## 2020-09-04 DIAGNOSIS — E11.9 TYPE 2 DIABETES MELLITUS WITHOUT COMPLICATION, WITHOUT LONG-TERM CURRENT USE OF INSULIN (HCC): Primary | ICD-10-CM

## 2020-09-04 DIAGNOSIS — Z78.0 POSTMENOPAUSAL: ICD-10-CM

## 2020-09-04 DIAGNOSIS — K62.3 RECTAL PROLAPSE: ICD-10-CM

## 2020-09-04 DIAGNOSIS — E11.9 TYPE 2 DIABETES MELLITUS WITHOUT COMPLICATION, WITHOUT LONG-TERM CURRENT USE OF INSULIN (HCC): Primary | Chronic | ICD-10-CM

## 2020-09-04 DIAGNOSIS — K64.9 HEMORRHOIDS, UNSPECIFIED HEMORRHOID TYPE: ICD-10-CM

## 2020-09-04 LAB
LEFT EYE DIABETIC RETINOPATHY: NORMAL
LEFT EYE IMAGE QUALITY: NORMAL
LEFT EYE MACULAR EDEMA: NORMAL
LEFT EYE OTHER RETINOPATHY: NORMAL
RIGHT EYE DIABETIC RETINOPATHY: NORMAL
RIGHT EYE IMAGE QUALITY: NORMAL
RIGHT EYE MACULAR EDEMA: NORMAL
RIGHT EYE OTHER RETINOPATHY: NORMAL
SEVERITY (EYE EXAM): NORMAL
SL AMB  POCT GLUCOSE, UA: NORMAL
SL AMB LEUKOCYTE ESTERASE,UA: NORMAL
SL AMB POCT BILIRUBIN,UA: NORMAL
SL AMB POCT BLOOD,UA: NORMAL
SL AMB POCT CLARITY,UA: CLEAR
SL AMB POCT COLOR,UA: YELLOW
SL AMB POCT KETONES,UA: NORMAL
SL AMB POCT NITRITE,UA: NORMAL
SL AMB POCT PH,UA: 8.5
SL AMB POCT SPECIFIC GRAVITY,UA: 1.01
SL AMB POCT URINE PROTEIN: NORMAL
SL AMB POCT UROBILINOGEN: 0.2

## 2020-09-04 PROCEDURE — 81002 URINALYSIS NONAUTO W/O SCOPE: CPT | Performed by: FAMILY MEDICINE

## 2020-09-04 PROCEDURE — G0008 ADMIN INFLUENZA VIRUS VAC: HCPCS | Performed by: FAMILY MEDICINE

## 2020-09-04 PROCEDURE — 1100F PTFALLS ASSESS-DOCD GE2>/YR: CPT | Performed by: FAMILY MEDICINE

## 2020-09-04 PROCEDURE — 1036F TOBACCO NON-USER: CPT | Performed by: FAMILY MEDICINE

## 2020-09-04 PROCEDURE — 90662 IIV NO PRSV INCREASED AG IM: CPT | Performed by: FAMILY MEDICINE

## 2020-09-04 PROCEDURE — 3074F SYST BP LT 130 MM HG: CPT | Performed by: FAMILY MEDICINE

## 2020-09-04 PROCEDURE — 3078F DIAST BP <80 MM HG: CPT | Performed by: FAMILY MEDICINE

## 2020-09-04 PROCEDURE — 3288F FALL RISK ASSESSMENT DOCD: CPT | Performed by: FAMILY MEDICINE

## 2020-09-04 PROCEDURE — 2025F 7 FLD RTA PHOTO W/O RTNOPTHY: CPT | Performed by: FAMILY MEDICINE

## 2020-09-04 PROCEDURE — 1160F RVW MEDS BY RX/DR IN RCRD: CPT | Performed by: FAMILY MEDICINE

## 2020-09-04 PROCEDURE — 92250 FUNDUS PHOTOGRAPHY W/I&R: CPT | Performed by: FAMILY MEDICINE

## 2020-09-04 PROCEDURE — 99214 OFFICE O/P EST MOD 30 MIN: CPT | Performed by: FAMILY MEDICINE

## 2020-09-04 RX ORDER — HYDROCORTISONE 25 MG/G
CREAM TOPICAL 2 TIMES DAILY
Qty: 30 G | Refills: 2 | Status: SHIPPED | OUTPATIENT
Start: 2020-09-04 | End: 2020-09-28 | Stop reason: HOSPADM

## 2020-09-04 NOTE — TELEPHONE ENCOUNTER
Pt was in for routine visit and was discussing with me possibility of rectal prolapse surgery   I took the liberty of doing a full exam that would qualify as pre op eval ( minus pre-op testing) today in the event she decides to have procedure in next 30 days

## 2020-09-04 NOTE — PROGRESS NOTES
Assessment and Plan:    Problem List Items Addressed This Visit        Digestive    Rectal prolapse     Trying to decide about surgery -did pre-op visit today            Endocrine    Diabetes mellitus ([facility] [address] ) - Primary (Chronic)    Relevant Orders    IRIS Diabetic eye exam      Other Visit Diagnoses     Hemorrhoids, unspecified hemorrhoid type        Relevant Medications    hydrocortisone (ANUSOL-HC) 2 5 % rectal cream    Other Relevant Orders    CBC and differential    Postmenopausal        Relevant Orders    DXA bone density spine hip and pelvis                 Diagnoses and all orders for this visit:    Type 2 diabetes mellitus without complication, without long-term current use of insulin (HCC)  -     IRIS Diabetic eye exam    Hemorrhoids, unspecified hemorrhoid type  -     hydrocortisone (ANUSOL-HC) 2 5 % rectal cream; Apply topically 2 (two) times a day  -     CBC and differential; Future    Postmenopausal  -     DXA bone density spine hip and pelvis; Future    Rectal prolapse              Subjective:      Patient ID: Sergio Louis is a 77 y o  female  CC:    Chief Complaint   Patient presents with    Follow-up     chronic conditions  mgb       HPI:    Saw colorectal yesterday who  Says she may need surgery for prolapse but in process of moving end of October so would prefer to wait, using cream,pull ups and girdle, so trying to decide on timing of surgery, last lab for diabetes stable, due end of September      The following portions of the patient's history were reviewed and updated as appropriate: allergies, current medications, past family history, past medical history, past social history, past surgical history and problem list         Review of Systems   Constitutional: Negative for activity change, appetite change and fatigue  HENT: Negative for ear pain, rhinorrhea and sore throat  Respiratory: Negative for shortness of breath  Cardiovascular: Negative for chest pain     Gastrointestinal: Positive for anal bleeding and rectal pain  Negative for abdominal pain and diarrhea  Genitourinary: Negative for difficulty urinating  Neurological: Negative for dizziness and headaches  Hematological: Negative for adenopathy  Data to review:       Objective:    Vitals:    09/04/20 1012   BP: 122/70   BP Location: Left arm   Patient Position: Sitting   Cuff Size: Standard   Pulse: 76   Temp: 97 8 °F (36 6 °C)   TempSrc: Temporal   Weight: 61 kg (134 lb 8 oz)   Height: 5' 3" (1 6 m)        Physical Exam  Vitals signs reviewed  Constitutional:       Appearance: Normal appearance  HENT:      Right Ear: Tympanic membrane normal       Left Ear: Tympanic membrane normal       Nose: No congestion or rhinorrhea  Mouth/Throat:      Mouth: Mucous membranes are moist       Pharynx: No oropharyngeal exudate or posterior oropharyngeal erythema  Eyes:      General:         Right eye: No discharge  Left eye: No discharge  Cardiovascular:      Rate and Rhythm: Normal rate and regular rhythm  Pulses: Normal pulses  no weak pulses          Dorsalis pedis pulses are 2+ on the right side and 2+ on the left side  Posterior tibial pulses are 2+ on the right side and 2+ on the left side  Heart sounds: Normal heart sounds  Pulmonary:      Effort: Pulmonary effort is normal       Breath sounds: Normal breath sounds  Abdominal:      General: Abdomen is flat  Bowel sounds are normal       Palpations: Abdomen is soft  Tenderness: There is no abdominal tenderness  Feet:      Right foot:      Skin integrity: No ulcer, skin breakdown, erythema, warmth, callus or dry skin  Left foot:      Skin integrity: No ulcer, skin breakdown, erythema, warmth, callus or dry skin  Lymphadenopathy:      Cervical: No cervical adenopathy  Skin:     Findings: No rash  Patient's shoes and socks removed  Right Foot/Ankle   Right Foot Inspection  Skin Exam: skin normal and skin intact no dry skin, no warmth, no callus, no erythema, no maceration, no abnormal color, no pre-ulcer, no ulcer and no callus                          Toe Exam: ROM and strength within normal limits  Sensory       Monofilament testing: intact  Vascular  Capillary refills: < 3 seconds  The right DP pulse is 2+  The right PT pulse is 2+  Left Foot/Ankle  Left Foot Inspection  Skin Exam: skin normal and skin intactno dry skin, no warmth, no erythema, no maceration, normal color, no pre-ulcer, no ulcer and no callus                         Toe Exam: ROM and strength within normal limits                   Sensory       Monofilament: intact  Vascular  Capillary refills: < 3 seconds  The left DP pulse is 2+  The left PT pulse is 2+  Assign Risk Category:  No deformity present; No loss of protective sensation;  No weak pulses       Risk: 0

## 2020-09-09 ENCOUNTER — HOSPITAL ENCOUNTER (EMERGENCY)
Facility: HOSPITAL | Age: 75
Discharge: HOME | End: 2020-09-09
Attending: EMERGENCY MEDICINE | Admitting: EMERGENCY MEDICINE
Payer: COMMERCIAL

## 2020-09-09 VITALS
BODY MASS INDEX: 25.7 KG/M2 | TEMPERATURE: 98 F | DIASTOLIC BLOOD PRESSURE: 78 MMHG | SYSTOLIC BLOOD PRESSURE: 150 MMHG | WEIGHT: 145.06 LBS | HEART RATE: 60 BPM | RESPIRATION RATE: 17 BRPM | OXYGEN SATURATION: 98 %

## 2020-09-09 DIAGNOSIS — S16.1XXA ACUTE STRAIN OF NECK MUSCLE, INITIAL ENCOUNTER: Primary | ICD-10-CM

## 2020-09-09 PROCEDURE — 99284 EMERGENCY DEPT VISIT MOD MDM: CPT | Performed by: EMERGENCY MEDICINE

## 2020-09-09 PROCEDURE — 96372 THER/PROPH/DIAG INJ SC/IM: CPT

## 2020-09-09 PROCEDURE — 99283 EMERGENCY DEPT VISIT LOW MDM: CPT

## 2020-09-09 RX ORDER — TRAMADOL HYDROCHLORIDE 50 MG/1
50 TABLET ORAL ONCE
Status: COMPLETED | OUTPATIENT
Start: 2020-09-09 | End: 2020-09-09

## 2020-09-09 RX ORDER — ORPHENADRINE CITRATE 30 MG/ML
60 INJECTION INTRAMUSCULAR; INTRAVENOUS ONCE
Status: COMPLETED | OUTPATIENT
Start: 2020-09-09 | End: 2020-09-09

## 2020-09-09 RX ORDER — TRAMADOL HYDROCHLORIDE 50 MG/1
50 TABLET ORAL EVERY 6 HOURS PRN
Qty: 10 TABLET | Refills: 0 | Status: SHIPPED | OUTPATIENT
Start: 2020-09-09 | End: 2020-09-19

## 2020-09-09 RX ORDER — KETOROLAC TROMETHAMINE 30 MG/ML
30 INJECTION, SOLUTION INTRAMUSCULAR; INTRAVENOUS ONCE
Status: COMPLETED | OUTPATIENT
Start: 2020-09-09 | End: 2020-09-09

## 2020-09-09 RX ORDER — NAPROXEN 500 MG/1
500 TABLET ORAL EVERY 12 HOURS PRN
Qty: 15 TABLET | Refills: 0 | Status: SHIPPED | OUTPATIENT
Start: 2020-09-09 | End: 2020-09-15

## 2020-09-09 RX ADMIN — ORPHENADRINE CITRATE 60 MG: 30 INJECTION INTRAMUSCULAR; INTRAVENOUS at 21:50

## 2020-09-09 RX ADMIN — KETOROLAC TROMETHAMINE 30 MG: 30 INJECTION, SOLUTION INTRAMUSCULAR at 21:50

## 2020-09-09 RX ADMIN — TRAMADOL HYDROCHLORIDE 50 MG: 50 TABLET, FILM COATED ORAL at 23:05

## 2020-09-10 NOTE — ED PROVIDER NOTES
History  Chief Complaint   Patient presents with    Neck Pain     dull neck pain x 2 days  worse today sharp and tight  reports moving boxes yesterday     71-year-old female with a history of asthma, diabetes, hypertension presents to the emergency department with right-sided neck pain  Patient states this started when she tried to lay down in bed and she had  sharp pain on the right side of her neck  She states every time she tries to turn her head to the right she experiences pain  She has no radiation of the pain  No numbness or weakness of the extremities  No visual changes, headache, slurred speech  She states she was lifting heavy boxes yesterday but did not experience any pain until tonight  She tried to take a hot shower and apply topical medications without relief  History provided by:  Patient   used: No    Neck Pain   Pain location:  R side  Quality:  Shooting  Pain radiates to:  Does not radiate  Pain severity:  Moderate  Pain is:  Same all the time  Onset quality:  Gradual  Duration:  1 hour  Timing:  Constant  Progression:  Unchanged  Chronicity:  New  Context: lifting a heavy object    Relieved by:  Nothing  Worsened by:  Position (Turning head to right)  Ineffective treatments: Topical medication  Associated symptoms: no bladder incontinence, no bowel incontinence, no chest pain, no fever, no headaches, no numbness, no paresis, no photophobia, no syncope, no tingling, no visual change, no weakness and no weight loss    Risk factors: no hx of spinal trauma, no recent epidural, no recent head injury and no recurrent falls        Prior to Admission Medications   Prescriptions Last Dose Informant Patient Reported? Taking?    Blood Glucose Calibration (ONETOUCH GLUCOSE CONTROL VI)  Self Yes No   Sig: use as directed   Blood Pressure KIT  Self No No   Sig: by Does not apply route 2 (two) times a day   Lancets (ONETOUCH ULTRASOFT) lancets  Self Yes No   Sig: by Does not apply route   acetaminophen (TYLENOL) 325 mg tablet  Self No No   Sig: Take 3 tablets (975 mg total) by mouth every 8 (eight) hours   albuterol (2 5 mg/3 mL) 0 083 % nebulizer solution  Self No No   Sig: Take 1 vial (2 5 mg total) by nebulization every 6 (six) hours as needed for wheezing or shortness of breath   albuterol (PROVENTIL HFA,VENTOLIN HFA) 90 mcg/act inhaler  Self No No   Sig: Inhale 2 puffs every 6 (six) hours as needed for wheezing   amLODIPine (NORVASC) 5 mg tablet  Self No No   Sig: Take 1 tablet (5 mg total) by mouth daily   atorvastatin (LIPITOR) 20 mg tablet  Self No No   Sig: take 1 tablet by mouth at bedtime   candesartan (ATACAND) 32 MG tablet  Self No No   Sig: take 1 tablet by mouth once daily   glucose blood (ONE TOUCH ULTRA TEST) test strip  Self No No   Sig: Use as instructed   hydrochlorothiazide (HYDRODIURIL) 12 5 mg tablet  Self No No   Sig: take 1 tablet by mouth once daily   Patient not taking: Reported on 8/6/2020   hydrochlorothiazide (HYDRODIURIL) 25 mg tablet  Self No No   Sig: Take 1 tablet (25 mg total) by mouth daily   hydrocortisone (ANUSOL-HC) 2 5 % rectal cream   No No   Sig: Apply topically 2 (two) times a day   ibandronate (BONIVA) 150 MG tablet  Self No No   Sig: take 1 tablet by mouth every month   Patient not taking: Reported on 9/4/2020   meclizine (ANTIVERT) 12 5 MG tablet   No No   Sig: Take 1 tablet (12 5 mg total) by mouth 3 (three) times a day as needed for dizziness for up to 10 days   metFORMIN (GLUCOPHAGE-XR) 500 mg 24 hr tablet  Self No No   Sig: Take 2 tablets (1,000 mg total) by mouth daily with dinner   methylPREDNISolone 4 MG tablet therapy pack  Self No No   Sig: Use as directed on package   metroNIDAZOLE (FLAGYL) 500 mg tablet   No No   Sig: Take 1 tablet at 1:00pm and 1 tablet at 10:00pm the day prior to surgery  mometasone-formoterol (DULERA) 200-5 MCG/ACT inhaler  Self No No   Sig: Inhale 2 puffs 2 (two) times a day Rinse mouth after use  montelukast (SINGULAIR) 10 mg tablet  Self No No   Sig: Take 1 tablet (10 mg total) by mouth daily at bedtime   neomycin (MYCIFRADIN) 500 mg tablet   No No   Sig: Take 2 tablets at 1:00pm and 2 tablets at 10:00pm the day prior to surgery  tobramycin-dexamethasone (TOBRADEX) ophthalmic suspension   No No   Sig: Administer 1 drop to the right eye every 4 (four) hours while awake for 10 days   triamcinolone (KENALOG) 0 1 % cream   No No   Sig: Apply topically 2 (two) times a day for 10 days      Facility-Administered Medications: None       Past Medical History:   Diagnosis Date    Asthma     Colon polyp     Diabetes mellitus ([facility] [address] )     Hyperlipidemia     Hypertension     Rectal prolapse 09/03/2020       Past Surgical History:   Procedure Laterality Date    BRONCHOSCOPY N/A 3/16/2016    Procedure: BRONCHOSCOPY FLEXIBLE/anesth ;  Surgeon: Crispin Montes DO;  Location: [facility]; Service:     COLONOSCOPY      COLONOSCOPY      EYE SURGERY      OOPHORECTOMY Left     age 49       Family History   Problem Relation Age of Onset    Endometrial cancer Mother 45    Cancer Daughter 48        breast    Breast cancer additional onset Daughter 48    Cancer Son 35        asbestosis related cancer    No Known Problems Father     No Known Problems Sister     No Known Problems Maternal Grandmother     No Known Problems Maternal Grandfather     No Known Problems Paternal Grandmother     No Known Problems Paternal Grandfather     No Known Problems Daughter     No Known Problems Maternal Aunt     No Known Problems Paternal Aunt      I have reviewed and agree with the history as documented      E-Cigarette/Vaping    E-Cigarette Use Never User      E-Cigarette/Vaping Substances     Social History     Tobacco Use    Smoking status: Never Smoker    Smokeless tobacco: Never Used   Substance Use Topics    Alcohol use: Not Currently     Frequency: 2-4 times a month     Drinks per session: 3 or 4     Comment: 1 lilly rachele on weekends    Drug use: No       Review of Systems   Constitutional: Negative  Negative for chills, diaphoresis, fatigue, fever and weight loss  HENT: Negative  Negative for congestion, rhinorrhea and sore throat  Eyes: Negative  Negative for photophobia, discharge, redness and itching  Respiratory: Negative  Negative for apnea, cough, chest tightness, shortness of breath and wheezing  Cardiovascular: Negative for chest pain, palpitations, leg swelling and syncope  Gastrointestinal: Negative  Negative for abdominal pain and bowel incontinence  Endocrine: Negative  Genitourinary: Negative  Negative for bladder incontinence, flank pain, frequency and urgency  Musculoskeletal: Positive for neck pain  Negative for back pain  Skin: Negative  Allergic/Immunologic: Negative  Neurological: Negative  Negative for dizziness, tingling, syncope, weakness, light-headedness, numbness and headaches  Hematological: Negative  All other systems reviewed and are negative  Physical Exam  Physical Exam  Vitals signs and nursing note reviewed  Constitutional:       General: She is awake  She is not in acute distress  Appearance: Normal appearance  She is well-developed and normal weight  She is not ill-appearing, toxic-appearing or diaphoretic  Interventions: Cervical collar in place  HENT:      Head: Normocephalic and atraumatic  Right Ear: External ear normal       Left Ear: External ear normal       Nose: Nose normal       Mouth/Throat:      Pharynx: No oropharyngeal exudate  Eyes:      General: No scleral icterus  Right eye: No discharge  Left eye: No discharge  Extraocular Movements: Extraocular movements intact  Conjunctiva/sclera: Conjunctivae normal       Pupils: Pupils are equal, round, and reactive to light  Neck:      Musculoskeletal: Neck supple  Decreased range of motion  Pain with movement and muscular tenderness present   No edema, erythema, neck rigidity, crepitus, injury, torticollis or spinous process tenderness  Thyroid: No thyroid mass, thyromegaly or thyroid tenderness  Vascular: Normal carotid pulses  No JVD  Trachea: Trachea and phonation normal  No tracheal deviation  Cardiovascular:      Rate and Rhythm: Normal rate and regular rhythm  Heart sounds: Normal heart sounds  No murmur  No friction rub  No gallop  Pulmonary:      Effort: Pulmonary effort is normal  No respiratory distress  Breath sounds: Normal breath sounds  No stridor  No wheezing or rales  Chest:      Chest wall: No tenderness  Lymphadenopathy:      Cervical: No cervical adenopathy  Right cervical: No superficial cervical adenopathy  Left cervical: No superficial cervical adenopathy  Skin:     General: Skin is warm and dry  Coloration: Skin is not jaundiced or pale  Findings: No bruising, erythema, lesion or rash  Neurological:      General: No focal deficit present  Mental Status: She is alert and oriented to person, place, and time  Cranial Nerves: No cranial nerve deficit  Motor: No abnormal muscle tone  Coordination: Coordination normal       Deep Tendon Reflexes: Reflexes are normal and symmetric  Reflexes normal    Psychiatric:         Mood and Affect: Mood normal          Behavior: Behavior is cooperative           Vital Signs  ED Triage Vitals   Temperature Pulse Respirations Blood Pressure SpO2   09/09/20 2134 09/09/20 2134 09/09/20 2134 09/09/20 2134 09/09/20 2134   98 °F (36 7 °C) 61 17 (!) 197/78 98 %      Temp Source Heart Rate Source Patient Position - Orthostatic VS BP Location FiO2 (%)   09/09/20 2134 09/09/20 2134 09/09/20 2134 09/09/20 2134 --   Oral Monitor Lying Right arm       Pain Score       09/09/20 2150       Worst Possible Pain           Vitals:    09/09/20 2134 09/09/20 2200   BP: (!) 197/78 150/78   Pulse: 61 60   Patient Position - Orthostatic VS: Lying Lying         Visual Acuity      ED Medications  Medications   ketorolac (TORADOL) injection 30 mg (30 mg Intramuscular Given 9/9/20 2150)   orphenadrine (NORFLEX) injection 60 mg (60 mg Intramuscular Given 9/9/20 2150)   traMADol (ULTRAM) tablet 50 mg (50 mg Oral Given 9/9/20 2305)       Diagnostic Studies  Results Reviewed     None                 No orders to display              Procedures  Procedures         ED Course  ED Course as of Sep 10 0002   Wed Sep 09, 2020   2301 Patient states she is starting to feel better but still has pain  She is able to move her head slightly to the right  Will give 1 dose of tramadol here and prescribed Naprosyn and tramadol for home use  SBIRT 22yo+      Most Recent Value   SBIRT (22 yo +)   In order to provide better care to our patients, we are screening all of our patients for alcohol and drug use  Would it be okay to ask you these screening questions? No Filed at: 09/09/2020 2137                  MDM  Number of Diagnoses or Management Options  Diagnosis management comments: 78-year-old female presents with right-sided neck pain that started tonight around 9:00 p m  When she tried to lay down to go to bed  She states the pain is to the right side of her neck and is described as sharp  No radiation to the arm no numbness or weakness of the extremities  No visual changes or speech deficits  She states she tried to take a shower and apply topical medications without relief  On exam she is alert and in no distress  She is in a cervical collar  She does have tenderness along the right sternocleidomastoid muscle  No midline bony tenderness  I suspect her symptoms are musculoskeletal as the pain is worse with movement and palpation and she also has a history of lifting heavy objects yesterday  Will treat with Toradol and muscle relaxant  Will reassess        Disposition  Final diagnoses:   Acute strain of neck muscle, initial encounter Time reflects when diagnosis was documented in both MDM as applicable and the Disposition within this note     Time User Action Codes Description Comment    9/9/2020 11:02 PM Rai Dear LUIS Add [S16  1XXA] Acute strain of neck muscle, initial encounter       ED Disposition     ED Disposition Condition Date/Time Comment    Discharge Good Wed Sep 9, 2020 11:02 PM Jessica Reed discharge to home/self care              Follow-up Information     Follow up With Specialties Details Why Contact Info    Kymberly Porter MD Family Medicine Schedule an appointment as soon as possible for a visit in 2 days If symptoms worsen [address] [place] [address] [employer]  [phone]            Discharge Medication List as of 9/9/2020 11:03 PM      START taking these medications    Details   naproxen (NAPROSYN) 500 mg tablet Take 1 tablet (500 mg total) by mouth every 12 (twelve) hours as needed for mild pain or moderate pain, Starting Wed 9/9/2020, Print      traMADol (ULTRAM) 50 mg tablet Take 1 tablet (50 mg total) by mouth every 6 (six) hours as needed for moderate pain or severe pain for up to 10 days, Starting Wed 9/9/2020, Until Sat 9/19/2020, Print         CONTINUE these medications which have NOT CHANGED    Details   acetaminophen (TYLENOL) 325 mg tablet Take 3 tablets (975 mg total) by mouth every 8 (eight) hours, Starting Sun 9/29/2019, Normal      albuterol (2 5 mg/3 mL) 0 083 % nebulizer solution Take 1 vial (2 5 mg total) by nebulization every 6 (six) hours as needed for wheezing or shortness of breath, Starting Thu 1/30/2020, Normal      albuterol (PROVENTIL HFA,VENTOLIN HFA) 90 mcg/act inhaler Inhale 2 puffs every 6 (six) hours as needed for wheezing, Starting Tue 7/9/2019, Normal      amLODIPine (NORVASC) 5 mg tablet Take 1 tablet (5 mg total) by mouth daily, Starting Fri 2/7/2020, Normal      atorvastatin (LIPITOR) 20 mg tablet take 1 tablet by mouth at bedtime, Normal      Blood Glucose Calibration (ONETOUCH GLUCOSE CONTROL VI) use as directed, Historical Med      Blood Pressure KIT by Does not apply route 2 (two) times a day, Starting Tue 3/17/2020, Normal      candesartan (ATACAND) 32 MG tablet take 1 tablet by mouth once daily, Normal      glucose blood (ONE TOUCH ULTRA TEST) test strip Use as instructed, Normal      !! hydrochlorothiazide (HYDRODIURIL) 12 5 mg tablet take 1 tablet by mouth once daily, Normal      !! hydrochlorothiazide (HYDRODIURIL) 25 mg tablet Take 1 tablet (25 mg total) by mouth daily, Starting Tue 3/17/2020, Normal      hydrocortisone (ANUSOL-HC) 2 5 % rectal cream Apply topically 2 (two) times a day, Starting Fri 9/4/2020, Normal      ibandronate (BONIVA) 150 MG tablet take 1 tablet by mouth every month, Normal      Lancets (ONETOUCH ULTRASOFT) lancets by Does not apply route, Starting Fri 2/3/2012, Historical Med      meclizine (ANTIVERT) 12 5 MG tablet Take 1 tablet (12 5 mg total) by mouth 3 (three) times a day as needed for dizziness for up to 10 days, Starting Thu 7/9/2020, Until Thu 8/13/2020, Normal      metFORMIN (GLUCOPHAGE-XR) 500 mg 24 hr tablet Take 2 tablets (1,000 mg total) by mouth daily with dinner, Starting Thu 6/4/2020, Until Tue 12/1/2020, Normal      methylPREDNISolone 4 MG tablet therapy pack Use as directed on package, Normal      metroNIDAZOLE (FLAGYL) 500 mg tablet Take 1 tablet at 1:00pm and 1 tablet at 10:00pm the day prior to surgery  , Normal      mometasone-formoterol (DULERA) 200-5 MCG/ACT inhaler Inhale 2 puffs 2 (two) times a day Rinse mouth after use , Starting Thu 4/30/2020, Normal      montelukast (SINGULAIR) 10 mg tablet Take 1 tablet (10 mg total) by mouth daily at bedtime, Starting Thu 1/30/2020, Normal      neomycin (MYCIFRADIN) 500 mg tablet Take 2 tablets at 1:00pm and 2 tablets at 10:00pm the day prior to surgery  , Normal      tobramycin-dexamethasone (TOBRADEX) ophthalmic suspension Administer 1 drop to the right eye every 4 (four) hours while awake for 10 days, Starting Thu 6/4/2020, Until Sun 6/14/2020, Normal      triamcinolone (KENALOG) 0 1 % cream Apply topically 2 (two) times a day for 10 days, Starting Thu 7/23/2020, Until Sun 8/2/2020, Normal       !! - Potential duplicate medications found  Please discuss with provider  No discharge procedures on file      PDMP Review       Value Time User    PDMP Reviewed  Yes 10/14/2019  8:21 AM Sharri Ryan MD          ED Provider  Electronically Signed by           Saintclair Crest, DO  09/10/20 0002

## 2020-09-14 ENCOUNTER — HOSPITAL ENCOUNTER (OUTPATIENT)
Dept: NON INVASIVE DIAGNOSTICS | Facility: HOSPITAL | Age: 75
Discharge: HOME | End: 2020-09-14
Attending: COLON & RECTAL SURGERY
Payer: COMMERCIAL

## 2020-09-14 ENCOUNTER — LAB (OUTPATIENT)
Dept: LAB | Facility: HOSPITAL | Age: 75
End: 2020-09-14
Attending: COLON & RECTAL SURGERY
Payer: COMMERCIAL

## 2020-09-14 ENCOUNTER — TRANSCRIBE ORDERS (OUTPATIENT)
Dept: ADMINISTRATIVE | Facility: HOSPITAL | Age: 75
End: 2020-09-14

## 2020-09-14 ENCOUNTER — ANESTHESIA EVENT (OUTPATIENT)
Dept: PERIOP | Facility: HOSPITAL | Age: 75
DRG: 331 | End: 2020-09-14
Payer: COMMERCIAL

## 2020-09-14 ENCOUNTER — APPOINTMENT (OUTPATIENT)
Dept: LAB | Facility: HOSPITAL | Age: 75
End: 2020-09-14
Payer: COMMERCIAL

## 2020-09-14 ENCOUNTER — LAB REQUISITION (OUTPATIENT)
Dept: LAB | Facility: HOSPITAL | Age: 75
End: 2020-09-14
Payer: COMMERCIAL

## 2020-09-14 DIAGNOSIS — K62.3 RECTAL PROLAPSE: ICD-10-CM

## 2020-09-14 DIAGNOSIS — K64.9 HEMORRHOIDS, UNSPECIFIED HEMORRHOID TYPE: ICD-10-CM

## 2020-09-14 DIAGNOSIS — Z01.818 OTHER SPECIFIED PRE-OPERATIVE EXAMINATION: Primary | ICD-10-CM

## 2020-09-14 DIAGNOSIS — Z01.818 OTHER SPECIFIED PRE-OPERATIVE EXAMINATION: ICD-10-CM

## 2020-09-14 LAB
ABO GROUP BLD: NORMAL
APTT PPP: 28 SECONDS (ref 23–37)
ATRIAL RATE: 65 BPM
BLD GP AB SCN SERPL QL: NEGATIVE
INR PPP: 1.05 (ref 0.84–1.19)
P AXIS: 51 DEGREES
PR INTERVAL: 160 MS
PROTHROMBIN TIME: 13.5 SECONDS (ref 11.6–14.5)
QRS AXIS: 46 DEGREES
QRSD INTERVAL: 86 MS
QT INTERVAL: 408 MS
QTC INTERVAL: 424 MS
RH BLD: POSITIVE
SPECIMEN EXPIRATION DATE: NORMAL
T WAVE AXIS: 51 DEGREES
VENTRICULAR RATE: 65 BPM

## 2020-09-14 PROCEDURE — 86850 RBC ANTIBODY SCREEN: CPT | Performed by: COLON & RECTAL SURGERY

## 2020-09-14 PROCEDURE — 85610 PROTHROMBIN TIME: CPT

## 2020-09-14 PROCEDURE — 86900 BLOOD TYPING SEROLOGIC ABO: CPT | Performed by: COLON & RECTAL SURGERY

## 2020-09-14 PROCEDURE — U0003 INFECTIOUS AGENT DETECTION BY NUCLEIC ACID (DNA OR RNA); SEVERE ACUTE RESPIRATORY SYNDROME CORONAVIRUS 2 (SARS-COV-2) (CORONAVIRUS DISEASE [COVID-19]), AMPLIFIED PROBE TECHNIQUE, MAKING USE OF HIGH THROUGHPUT TECHNOLOGIES AS DESCRIBED BY CMS-2020-01-R: HCPCS | Performed by: COLON & RECTAL SURGERY

## 2020-09-14 PROCEDURE — 36415 COLL VENOUS BLD VENIPUNCTURE: CPT

## 2020-09-14 PROCEDURE — 93010 ELECTROCARDIOGRAM REPORT: CPT | Performed by: INTERNAL MEDICINE

## 2020-09-14 PROCEDURE — 86901 BLOOD TYPING SEROLOGIC RH(D): CPT | Performed by: COLON & RECTAL SURGERY

## 2020-09-14 PROCEDURE — 85730 THROMBOPLASTIN TIME PARTIAL: CPT

## 2020-09-14 PROCEDURE — 93005 ELECTROCARDIOGRAM TRACING: CPT

## 2020-09-15 DIAGNOSIS — I10 ESSENTIAL HYPERTENSION: ICD-10-CM

## 2020-09-15 LAB — SARS-COV-2 RNA SPEC QL NAA+PROBE: NOT DETECTED

## 2020-09-15 RX ORDER — LANOLIN ALCOHOL/MO/W.PET/CERES
1 CREAM (GRAM) TOPICAL 3 TIMES DAILY
COMMUNITY

## 2020-09-15 RX ORDER — MECLIZINE HCL 12.5 MG/1
TABLET ORAL 3 TIMES DAILY PRN
COMMUNITY
End: 2021-07-18

## 2020-09-15 RX ORDER — HYDROCHLOROTHIAZIDE 12.5 MG/1
TABLET ORAL
Qty: 90 TABLET | Refills: 1 | Status: SHIPPED | OUTPATIENT
Start: 2020-09-15 | End: 2021-03-15

## 2020-09-16 ENCOUNTER — TRANSCRIBE ORDERS (OUTPATIENT)
Dept: ADMINISTRATIVE | Facility: HOSPITAL | Age: 75
End: 2020-09-16

## 2020-09-16 ENCOUNTER — LAB (OUTPATIENT)
Dept: LAB | Facility: HOSPITAL | Age: 75
End: 2020-09-16
Payer: COMMERCIAL

## 2020-09-16 ENCOUNTER — LAB REQUISITION (OUTPATIENT)
Dept: LAB | Facility: HOSPITAL | Age: 75
End: 2020-09-16
Payer: COMMERCIAL

## 2020-09-16 DIAGNOSIS — R82.81 PYURIA: Primary | ICD-10-CM

## 2020-09-16 DIAGNOSIS — K62.3 RECTAL PROLAPSE: ICD-10-CM

## 2020-09-16 DIAGNOSIS — E11.9 TYPE 2 DIABETES MELLITUS WITHOUT COMPLICATIONS (HCC): ICD-10-CM

## 2020-09-16 DIAGNOSIS — E11.9 TYPE 2 DIABETES MELLITUS WITHOUT COMPLICATION, WITHOUT LONG-TERM CURRENT USE OF INSULIN (HCC): Chronic | ICD-10-CM

## 2020-09-16 LAB
ABO GROUP BLD: NORMAL
ALBUMIN SERPL BCP-MCNC: 3.8 G/DL (ref 3.5–5)
ALP SERPL-CCNC: 78 U/L (ref 46–116)
ALT SERPL W P-5'-P-CCNC: 20 U/L (ref 12–78)
ANION GAP SERPL CALCULATED.3IONS-SCNC: 6 MMOL/L (ref 4–13)
AST SERPL W P-5'-P-CCNC: 13 U/L (ref 5–45)
BACTERIA UR QL AUTO: ABNORMAL /HPF
BASOPHILS # BLD AUTO: 0.05 THOUSANDS/ΜL (ref 0–0.1)
BASOPHILS NFR BLD AUTO: 1 % (ref 0–1)
BILIRUB SERPL-MCNC: 0.57 MG/DL (ref 0.2–1)
BILIRUB UR QL STRIP: NEGATIVE
BLD GP AB SCN SERPL QL: NEGATIVE
BUN SERPL-MCNC: 14 MG/DL (ref 5–25)
CALCIUM SERPL-MCNC: 9.2 MG/DL (ref 8.3–10.1)
CHLORIDE SERPL-SCNC: 96 MMOL/L (ref 100–108)
CLARITY UR: ABNORMAL
CO2 SERPL-SCNC: 31 MMOL/L (ref 21–32)
COLOR UR: YELLOW
CREAT SERPL-MCNC: 0.56 MG/DL (ref 0.6–1.3)
CREAT UR-MCNC: 41.4 MG/DL
EOSINOPHIL # BLD AUTO: 0.31 THOUSAND/ΜL (ref 0–0.61)
EOSINOPHIL NFR BLD AUTO: 4 % (ref 0–6)
ERYTHROCYTE [DISTWIDTH] IN BLOOD BY AUTOMATED COUNT: 12.4 % (ref 11.6–15.1)
EST. AVERAGE GLUCOSE BLD GHB EST-MCNC: 140 MG/DL
GFR SERPL CREATININE-BSD FRML MDRD: 92 ML/MIN/1.73SQ M
GLUCOSE P FAST SERPL-MCNC: 169 MG/DL (ref 65–99)
GLUCOSE UR STRIP-MCNC: NEGATIVE MG/DL
HBA1C MFR BLD: 6.5 %
HCT VFR BLD AUTO: 39.4 % (ref 34.8–46.1)
HGB BLD-MCNC: 12.8 G/DL (ref 11.5–15.4)
HGB UR QL STRIP.AUTO: ABNORMAL
IMM GRANULOCYTES # BLD AUTO: 0.03 THOUSAND/UL (ref 0–0.2)
IMM GRANULOCYTES NFR BLD AUTO: 0 % (ref 0–2)
KETONES UR STRIP-MCNC: NEGATIVE MG/DL
LEUKOCYTE ESTERASE UR QL STRIP: ABNORMAL
LYMPHOCYTES # BLD AUTO: 1.99 THOUSANDS/ΜL (ref 0.6–4.47)
LYMPHOCYTES NFR BLD AUTO: 26 % (ref 14–44)
MCH RBC QN AUTO: 31.1 PG (ref 26.8–34.3)
MCHC RBC AUTO-ENTMCNC: 32.5 G/DL (ref 31.4–37.4)
MCV RBC AUTO: 96 FL (ref 82–98)
MICROALBUMIN UR-MCNC: 46.2 MG/L (ref 0–20)
MICROALBUMIN/CREAT 24H UR: 112 MG/G CREATININE (ref 0–30)
MONOCYTES # BLD AUTO: 0.46 THOUSAND/ΜL (ref 0.17–1.22)
MONOCYTES NFR BLD AUTO: 6 % (ref 4–12)
NEUTROPHILS # BLD AUTO: 4.79 THOUSANDS/ΜL (ref 1.85–7.62)
NEUTS SEG NFR BLD AUTO: 63 % (ref 43–75)
NITRITE UR QL STRIP: POSITIVE
NON-SQ EPI CELLS URNS QL MICRO: ABNORMAL /HPF
NRBC BLD AUTO-RTO: 0 /100 WBCS
PH UR STRIP.AUTO: 7.5 [PH]
PLATELET # BLD AUTO: 199 THOUSANDS/UL (ref 149–390)
PMV BLD AUTO: 12.5 FL (ref 8.9–12.7)
POTASSIUM SERPL-SCNC: 4.2 MMOL/L (ref 3.5–5.3)
PROT SERPL-MCNC: 7.5 G/DL (ref 6.4–8.2)
PROT UR STRIP-MCNC: NEGATIVE MG/DL
RBC # BLD AUTO: 4.11 MILLION/UL (ref 3.81–5.12)
RBC #/AREA URNS AUTO: ABNORMAL /HPF
RH BLD: POSITIVE
SODIUM SERPL-SCNC: 133 MMOL/L (ref 136–145)
SP GR UR STRIP.AUTO: 1.01 (ref 1–1.03)
SPECIMEN EXPIRATION DATE: NORMAL
UROBILINOGEN UR QL STRIP.AUTO: 0.2 E.U./DL
WBC # BLD AUTO: 7.63 THOUSAND/UL (ref 4.31–10.16)
WBC #/AREA URNS AUTO: ABNORMAL /HPF

## 2020-09-16 PROCEDURE — 86850 RBC ANTIBODY SCREEN: CPT | Performed by: COLON & RECTAL SURGERY

## 2020-09-16 PROCEDURE — 82043 UR ALBUMIN QUANTITATIVE: CPT | Performed by: FAMILY MEDICINE

## 2020-09-16 PROCEDURE — 86901 BLOOD TYPING SEROLOGIC RH(D): CPT | Performed by: COLON & RECTAL SURGERY

## 2020-09-16 PROCEDURE — 85025 COMPLETE CBC W/AUTO DIFF WBC: CPT

## 2020-09-16 PROCEDURE — 36415 COLL VENOUS BLD VENIPUNCTURE: CPT

## 2020-09-16 PROCEDURE — 3060F POS MICROALBUMINURIA REV: CPT | Performed by: FAMILY MEDICINE

## 2020-09-16 PROCEDURE — 83036 HEMOGLOBIN GLYCOSYLATED A1C: CPT

## 2020-09-16 PROCEDURE — 82570 ASSAY OF URINE CREATININE: CPT | Performed by: FAMILY MEDICINE

## 2020-09-16 PROCEDURE — 80053 COMPREHEN METABOLIC PANEL: CPT

## 2020-09-16 PROCEDURE — 81001 URINALYSIS AUTO W/SCOPE: CPT | Performed by: FAMILY MEDICINE

## 2020-09-16 PROCEDURE — 3044F HG A1C LEVEL LT 7.0%: CPT | Performed by: FAMILY MEDICINE

## 2020-09-16 PROCEDURE — 86900 BLOOD TYPING SEROLOGIC ABO: CPT | Performed by: COLON & RECTAL SURGERY

## 2020-09-18 ENCOUNTER — APPOINTMENT (OUTPATIENT)
Dept: LAB | Facility: HOSPITAL | Age: 75
End: 2020-09-18
Payer: COMMERCIAL

## 2020-09-18 DIAGNOSIS — R82.81 PYURIA: ICD-10-CM

## 2020-09-18 PROCEDURE — 87086 URINE CULTURE/COLONY COUNT: CPT

## 2020-09-18 PROCEDURE — 87186 SC STD MICRODIL/AGAR DIL: CPT

## 2020-09-18 PROCEDURE — 87077 CULTURE AEROBIC IDENTIFY: CPT

## 2020-09-20 LAB
BACTERIA UR CULT: ABNORMAL
BACTERIA UR CULT: ABNORMAL

## 2020-09-21 DIAGNOSIS — R30.0 DYSURIA: Primary | ICD-10-CM

## 2020-09-21 RX ORDER — NITROFURANTOIN 25; 75 MG/1; MG/1
100 CAPSULE ORAL 2 TIMES DAILY
Qty: 10 CAPSULE | Refills: 0 | Status: SHIPPED | OUTPATIENT
Start: 2020-09-21 | End: 2020-09-28 | Stop reason: HOSPADM

## 2020-09-22 NOTE — ANESTHESIA PREPROCEDURE EVALUATION
Procedure:  RESECTION COLON SIGMOID LAPAROSCOPIC (N/A Abdomen)  RECTOPEXY/PROCTOPEXY LAPAROSCOPIC (N/A Abdomen)    Relevant Problems   CARDIO   (+) HTN (hypertension)   (+) Hyperlipidemia   (+) Thoracic aortic aneurysm without rupture (HCC)      PULMONARY   (+) Moderate persistent asthma without complication   (+) NGOC (obstructive sleep apnea)      Other   (+) Diabetes mellitus (HCC)        Physical Exam    Airway    Mallampati score: II  TM Distance: >3 FB  Neck ROM: full     Dental       Cardiovascular      Pulmonary      Other Findings        Anesthesia Plan  ASA Score- 2     Anesthesia Type- general with ASA Monitors  Additional Monitors:   Airway Plan: ETT  Plan Factors-    Induction- intravenous  Postoperative Plan-     Informed Consent- Anesthetic plan and risks discussed with patient  I personally reviewed this patient with the CRNA  Discussed and agreed on the Anesthesia Plan with the CRNA  Dara Soulier

## 2020-09-23 ENCOUNTER — ANESTHESIA (OUTPATIENT)
Dept: PERIOP | Facility: HOSPITAL | Age: 75
DRG: 331 | End: 2020-09-23
Payer: COMMERCIAL

## 2020-09-23 ENCOUNTER — HOSPITAL ENCOUNTER (INPATIENT)
Facility: HOSPITAL | Age: 75
LOS: 5 days | Discharge: LEFT BEFORE DISCHARGE | DRG: 331 | End: 2020-09-28
Attending: COLON & RECTAL SURGERY | Admitting: COLON & RECTAL SURGERY
Payer: COMMERCIAL

## 2020-09-23 VITALS — HEART RATE: 68 BPM

## 2020-09-23 DIAGNOSIS — K62.3 RECTAL PROLAPSE: Primary | ICD-10-CM

## 2020-09-23 DIAGNOSIS — Z01.818 PRE-OP TESTING: ICD-10-CM

## 2020-09-23 DIAGNOSIS — Z91.89 AT RISK FOR INFECTION: ICD-10-CM

## 2020-09-23 LAB
ERYTHROCYTE [DISTWIDTH] IN BLOOD BY AUTOMATED COUNT: 13 % (ref 11.6–15.1)
GLUCOSE SERPL-MCNC: 136 MG/DL (ref 65–140)
GLUCOSE SERPL-MCNC: 166 MG/DL (ref 65–140)
GLUCOSE SERPL-MCNC: 231 MG/DL (ref 65–140)
GLUCOSE SERPL-MCNC: 265 MG/DL (ref 65–140)
HCT VFR BLD AUTO: 35.4 % (ref 34.8–46.1)
HGB BLD-MCNC: 11.8 G/DL (ref 11.5–15.4)
MCH RBC QN AUTO: 31.6 PG (ref 26.8–34.3)
MCHC RBC AUTO-ENTMCNC: 33.3 G/DL (ref 31.4–37.4)
MCV RBC AUTO: 95 FL (ref 82–98)
PLATELET # BLD AUTO: 179 THOUSANDS/UL (ref 149–390)
PMV BLD AUTO: 12.6 FL (ref 8.9–12.7)
RBC # BLD AUTO: 3.73 MILLION/UL (ref 3.81–5.12)
WBC # BLD AUTO: 12.79 THOUSAND/UL (ref 4.31–10.16)

## 2020-09-23 PROCEDURE — 82948 REAGENT STRIP/BLOOD GLUCOSE: CPT

## 2020-09-23 PROCEDURE — 88307 TISSUE EXAM BY PATHOLOGIST: CPT | Performed by: PATHOLOGY

## 2020-09-23 PROCEDURE — 44207 L COLECTOMY/COLOPROCTOSTOMY: CPT | Performed by: COLON & RECTAL SURGERY

## 2020-09-23 PROCEDURE — 45402 LAP PROCTOPEXY W/SIG RESECT: CPT | Performed by: COLON & RECTAL SURGERY

## 2020-09-23 PROCEDURE — 85027 COMPLETE CBC AUTOMATED: CPT | Performed by: SURGERY

## 2020-09-23 PROCEDURE — 45330 DIAGNOSTIC SIGMOIDOSCOPY: CPT | Performed by: COLON & RECTAL SURGERY

## 2020-09-23 PROCEDURE — 0DSP0ZZ REPOSITION RECTUM, OPEN APPROACH: ICD-10-PCS | Performed by: COLON & RECTAL SURGERY

## 2020-09-23 PROCEDURE — 0DJD8ZZ INSPECTION OF LOWER INTESTINAL TRACT, VIA NATURAL OR ARTIFICIAL OPENING ENDOSCOPIC: ICD-10-PCS | Performed by: COLON & RECTAL SURGERY

## 2020-09-23 PROCEDURE — 0DQM0ZZ REPAIR DESCENDING COLON, OPEN APPROACH: ICD-10-PCS | Performed by: COLON & RECTAL SURGERY

## 2020-09-23 PROCEDURE — 0DTN0ZZ RESECTION OF SIGMOID COLON, OPEN APPROACH: ICD-10-PCS | Performed by: COLON & RECTAL SURGERY

## 2020-09-23 RX ORDER — NEOMYCIN SULFATE 500 MG/1
1000 TABLET ORAL 3 TIMES DAILY
Status: DISCONTINUED | OUTPATIENT
Start: 2020-09-23 | End: 2020-09-23

## 2020-09-23 RX ORDER — PROPOFOL 10 MG/ML
INJECTION, EMULSION INTRAVENOUS AS NEEDED
Status: DISCONTINUED | OUTPATIENT
Start: 2020-09-23 | End: 2020-09-23

## 2020-09-23 RX ORDER — LIDOCAINE HYDROCHLORIDE 10 MG/ML
0.5 INJECTION, SOLUTION EPIDURAL; INFILTRATION; INTRACAUDAL; PERINEURAL ONCE AS NEEDED
Status: COMPLETED | OUTPATIENT
Start: 2020-09-23 | End: 2020-09-23

## 2020-09-23 RX ORDER — SUCCINYLCHOLINE/SOD CL,ISO/PF 100 MG/5ML
SYRINGE (ML) INTRAVENOUS AS NEEDED
Status: DISCONTINUED | OUTPATIENT
Start: 2020-09-23 | End: 2020-09-23

## 2020-09-23 RX ORDER — LIDOCAINE HYDROCHLORIDE 10 MG/ML
INJECTION, SOLUTION EPIDURAL; INFILTRATION; INTRACAUDAL; PERINEURAL AS NEEDED
Status: DISCONTINUED | OUTPATIENT
Start: 2020-09-23 | End: 2020-09-23

## 2020-09-23 RX ORDER — HYDROMORPHONE HCL/PF 1 MG/ML
SYRINGE (ML) INJECTION AS NEEDED
Status: DISCONTINUED | OUTPATIENT
Start: 2020-09-23 | End: 2020-09-23

## 2020-09-23 RX ORDER — ALBUTEROL SULFATE 2.5 MG/3ML
2.5 SOLUTION RESPIRATORY (INHALATION) EVERY 6 HOURS PRN
Status: DISCONTINUED | OUTPATIENT
Start: 2020-09-23 | End: 2020-09-27

## 2020-09-23 RX ORDER — OXYCODONE HYDROCHLORIDE 5 MG/1
5 TABLET ORAL EVERY 4 HOURS PRN
Status: DISCONTINUED | OUTPATIENT
Start: 2020-09-23 | End: 2020-09-28 | Stop reason: HOSPADM

## 2020-09-23 RX ORDER — ACETAMINOPHEN 325 MG/1
650 TABLET ORAL EVERY 6 HOURS PRN
Status: DISCONTINUED | OUTPATIENT
Start: 2020-09-23 | End: 2020-09-28 | Stop reason: HOSPADM

## 2020-09-23 RX ORDER — ACETAMINOPHEN 325 MG/1
975 TABLET ORAL ONCE
Status: COMPLETED | OUTPATIENT
Start: 2020-09-23 | End: 2020-09-23

## 2020-09-23 RX ORDER — HYDROMORPHONE HCL/PF 1 MG/ML
0.5 SYRINGE (ML) INJECTION
Status: DISCONTINUED | OUTPATIENT
Start: 2020-09-23 | End: 2020-09-28 | Stop reason: HOSPADM

## 2020-09-23 RX ORDER — SODIUM CHLORIDE 9 MG/ML
INJECTION, SOLUTION INTRAVENOUS CONTINUOUS PRN
Status: DISCONTINUED | OUTPATIENT
Start: 2020-09-23 | End: 2020-09-23

## 2020-09-23 RX ORDER — ONDANSETRON 2 MG/ML
4 INJECTION INTRAMUSCULAR; INTRAVENOUS EVERY 6 HOURS PRN
Status: DISCONTINUED | OUTPATIENT
Start: 2020-09-23 | End: 2020-09-28 | Stop reason: HOSPADM

## 2020-09-23 RX ORDER — AMLODIPINE BESYLATE 5 MG/1
5 TABLET ORAL DAILY
Status: DISCONTINUED | OUTPATIENT
Start: 2020-09-24 | End: 2020-09-28 | Stop reason: HOSPADM

## 2020-09-23 RX ORDER — METOPROLOL TARTRATE 5 MG/5ML
5 INJECTION INTRAVENOUS EVERY 6 HOURS PRN
Status: DISCONTINUED | OUTPATIENT
Start: 2020-09-23 | End: 2020-09-28 | Stop reason: HOSPADM

## 2020-09-23 RX ORDER — LABETALOL 20 MG/4 ML (5 MG/ML) INTRAVENOUS SYRINGE
AS NEEDED
Status: DISCONTINUED | OUTPATIENT
Start: 2020-09-23 | End: 2020-09-23

## 2020-09-23 RX ORDER — ONDANSETRON 2 MG/ML
INJECTION INTRAMUSCULAR; INTRAVENOUS AS NEEDED
Status: DISCONTINUED | OUTPATIENT
Start: 2020-09-23 | End: 2020-09-23

## 2020-09-23 RX ORDER — HYDROMORPHONE HCL/PF 1 MG/ML
0.5 SYRINGE (ML) INJECTION
Status: DISCONTINUED | OUTPATIENT
Start: 2020-09-23 | End: 2020-09-23 | Stop reason: HOSPADM

## 2020-09-23 RX ORDER — HEPARIN SODIUM 5000 [USP'U]/ML
5000 INJECTION, SOLUTION INTRAVENOUS; SUBCUTANEOUS EVERY 8 HOURS SCHEDULED
Status: DISCONTINUED | OUTPATIENT
Start: 2020-09-23 | End: 2020-09-28 | Stop reason: HOSPADM

## 2020-09-23 RX ORDER — SODIUM CHLORIDE, SODIUM LACTATE, POTASSIUM CHLORIDE, CALCIUM CHLORIDE 600; 310; 30; 20 MG/100ML; MG/100ML; MG/100ML; MG/100ML
50 INJECTION, SOLUTION INTRAVENOUS CONTINUOUS
Status: DISCONTINUED | OUTPATIENT
Start: 2020-09-23 | End: 2020-09-23

## 2020-09-23 RX ORDER — DEXAMETHASONE SODIUM PHOSPHATE 10 MG/ML
INJECTION, SOLUTION INTRAMUSCULAR; INTRAVENOUS AS NEEDED
Status: DISCONTINUED | OUTPATIENT
Start: 2020-09-23 | End: 2020-09-23

## 2020-09-23 RX ORDER — FLUTICASONE FUROATE AND VILANTEROL 200; 25 UG/1; UG/1
1 POWDER RESPIRATORY (INHALATION)
Status: DISCONTINUED | OUTPATIENT
Start: 2020-09-24 | End: 2020-09-28 | Stop reason: HOSPADM

## 2020-09-23 RX ORDER — ROCURONIUM BROMIDE 10 MG/ML
INJECTION, SOLUTION INTRAVENOUS AS NEEDED
Status: DISCONTINUED | OUTPATIENT
Start: 2020-09-23 | End: 2020-09-23

## 2020-09-23 RX ORDER — MAGNESIUM HYDROXIDE 1200 MG/15ML
LIQUID ORAL AS NEEDED
Status: DISCONTINUED | OUTPATIENT
Start: 2020-09-23 | End: 2020-09-23 | Stop reason: HOSPADM

## 2020-09-23 RX ORDER — METRONIDAZOLE 500 MG/1
1000 TABLET ORAL 3 TIMES DAILY
Status: DISCONTINUED | OUTPATIENT
Start: 2020-09-23 | End: 2020-09-23

## 2020-09-23 RX ORDER — SODIUM CHLORIDE, SODIUM LACTATE, POTASSIUM CHLORIDE, CALCIUM CHLORIDE 600; 310; 30; 20 MG/100ML; MG/100ML; MG/100ML; MG/100ML
125 INJECTION, SOLUTION INTRAVENOUS CONTINUOUS
Status: DISCONTINUED | OUTPATIENT
Start: 2020-09-23 | End: 2020-09-24

## 2020-09-23 RX ORDER — ONDANSETRON 2 MG/ML
4 INJECTION INTRAMUSCULAR; INTRAVENOUS ONCE AS NEEDED
Status: DISCONTINUED | OUTPATIENT
Start: 2020-09-23 | End: 2020-09-23 | Stop reason: HOSPADM

## 2020-09-23 RX ORDER — FENTANYL CITRATE 50 UG/ML
INJECTION, SOLUTION INTRAMUSCULAR; INTRAVENOUS AS NEEDED
Status: DISCONTINUED | OUTPATIENT
Start: 2020-09-23 | End: 2020-09-23

## 2020-09-23 RX ORDER — OXYCODONE HYDROCHLORIDE 10 MG/1
10 TABLET ORAL EVERY 4 HOURS PRN
Status: DISCONTINUED | OUTPATIENT
Start: 2020-09-23 | End: 2020-09-28 | Stop reason: HOSPADM

## 2020-09-23 RX ORDER — ATORVASTATIN CALCIUM 20 MG/1
20 TABLET, FILM COATED ORAL
Status: DISCONTINUED | OUTPATIENT
Start: 2020-09-23 | End: 2020-09-28 | Stop reason: HOSPADM

## 2020-09-23 RX ADMIN — ROCURONIUM BROMIDE 30 MG: 10 INJECTION, SOLUTION INTRAVENOUS at 13:41

## 2020-09-23 RX ADMIN — OXYCODONE HYDROCHLORIDE 5 MG: 5 TABLET ORAL at 19:43

## 2020-09-23 RX ADMIN — Medication 2000 MG: at 13:41

## 2020-09-23 RX ADMIN — PROPOFOL 100 MG: 10 INJECTION, EMULSION INTRAVENOUS at 13:34

## 2020-09-23 RX ADMIN — HYDROMORPHONE HYDROCHLORIDE 0.5 MG: 1 INJECTION, SOLUTION INTRAMUSCULAR; INTRAVENOUS; SUBCUTANEOUS at 15:03

## 2020-09-23 RX ADMIN — ROCURONIUM BROMIDE 10 MG: 10 INJECTION, SOLUTION INTRAVENOUS at 15:52

## 2020-09-23 RX ADMIN — LIDOCAINE HYDROCHLORIDE 0.5 ML: 10 INJECTION, SOLUTION EPIDURAL; INFILTRATION; INTRACAUDAL; PERINEURAL at 12:04

## 2020-09-23 RX ADMIN — SUGAMMADEX 300 MG: 100 INJECTION, SOLUTION INTRAVENOUS at 16:33

## 2020-09-23 RX ADMIN — FENTANYL CITRATE 50 MCG: 50 INJECTION, SOLUTION INTRAMUSCULAR; INTRAVENOUS at 14:11

## 2020-09-23 RX ADMIN — SODIUM CHLORIDE: 0.9 INJECTION, SOLUTION INTRAVENOUS at 13:36

## 2020-09-23 RX ADMIN — SODIUM CHLORIDE, SODIUM LACTATE, POTASSIUM CHLORIDE, AND CALCIUM CHLORIDE 125 ML/HR: .6; .31; .03; .02 INJECTION, SOLUTION INTRAVENOUS at 17:45

## 2020-09-23 RX ADMIN — INSULIN LISPRO 2 UNITS: 100 INJECTION, SOLUTION INTRAVENOUS; SUBCUTANEOUS at 23:03

## 2020-09-23 RX ADMIN — SODIUM CHLORIDE, SODIUM LACTATE, POTASSIUM CHLORIDE, AND CALCIUM CHLORIDE 50 ML/HR: .6; .31; .03; .02 INJECTION, SOLUTION INTRAVENOUS at 12:04

## 2020-09-23 RX ADMIN — ROCURONIUM BROMIDE 20 MG: 10 INJECTION, SOLUTION INTRAVENOUS at 14:00

## 2020-09-23 RX ADMIN — METRONIDAZOLE 500 MG: 500 INJECTION, SOLUTION INTRAVENOUS at 13:26

## 2020-09-23 RX ADMIN — ROCURONIUM BROMIDE 20 MG: 10 INJECTION, SOLUTION INTRAVENOUS at 15:21

## 2020-09-23 RX ADMIN — FENTANYL CITRATE 100 MCG: 50 INJECTION, SOLUTION INTRAMUSCULAR; INTRAVENOUS at 13:34

## 2020-09-23 RX ADMIN — ATORVASTATIN CALCIUM 20 MG: 20 TABLET, FILM COATED ORAL at 23:03

## 2020-09-23 RX ADMIN — HYDROMORPHONE HYDROCHLORIDE 0.5 MG: 1 INJECTION, SOLUTION INTRAMUSCULAR; INTRAVENOUS; SUBCUTANEOUS at 15:41

## 2020-09-23 RX ADMIN — DEXAMETHASONE SODIUM PHOSPHATE 10 MG: 10 INJECTION, SOLUTION INTRAMUSCULAR; INTRAVENOUS at 13:44

## 2020-09-23 RX ADMIN — LIDOCAINE HYDROCHLORIDE 50 MG: 10 INJECTION, SOLUTION EPIDURAL; INFILTRATION; INTRACAUDAL; PERINEURAL at 13:34

## 2020-09-23 RX ADMIN — SODIUM CHLORIDE: 0.9 INJECTION, SOLUTION INTRAVENOUS at 14:39

## 2020-09-23 RX ADMIN — ROCURONIUM BROMIDE 20 MG: 10 INJECTION, SOLUTION INTRAVENOUS at 14:38

## 2020-09-23 RX ADMIN — Medication 100 MG: at 13:34

## 2020-09-23 RX ADMIN — HEPARIN SODIUM 5000 UNITS: 5000 INJECTION INTRAVENOUS; SUBCUTANEOUS at 23:03

## 2020-09-23 RX ADMIN — ACETAMINOPHEN 975 MG: 325 TABLET, FILM COATED ORAL at 11:51

## 2020-09-23 RX ADMIN — LABETALOL 20 MG/4 ML (5 MG/ML) INTRAVENOUS SYRINGE 10 MG: at 14:14

## 2020-09-23 RX ADMIN — ROCURONIUM BROMIDE 20 MG: 10 INJECTION, SOLUTION INTRAVENOUS at 15:05

## 2020-09-23 RX ADMIN — HYDROMORPHONE HYDROCHLORIDE 0.5 MG: 1 INJECTION, SOLUTION INTRAMUSCULAR; INTRAVENOUS; SUBCUTANEOUS at 17:35

## 2020-09-23 RX ADMIN — ONDANSETRON 4 MG: 2 INJECTION INTRAMUSCULAR; INTRAVENOUS at 16:17

## 2020-09-23 RX ADMIN — FENTANYL CITRATE 50 MCG: 50 INJECTION, SOLUTION INTRAMUSCULAR; INTRAVENOUS at 14:48

## 2020-09-23 RX ADMIN — LABETALOL 20 MG/4 ML (5 MG/ML) INTRAVENOUS SYRINGE 5 MG: at 14:56

## 2020-09-23 NOTE — OP NOTE
OPERATIVE REPORT  PATIENT NAME: Kwadwo Preston    :  [birth year]  MRN: [id number]  Pt Location: [facility] 09    SURGERY DATE: 2020    Surgeon(s) and Role:     * Kristen Bennett MD - Shirlene Ayala MD - Assisting    Preop Diagnosis:  Rectal prolapse [K62 3]    Post-Op Diagnosis Codes:     * Rectal prolapse [K62 3]    Procedure(s) (LRB):  RESECTION COLON SIGMOID LAPAROSCOPIC (N/A)  RECTOPEXY/PROCTOPEXY LAPAROSCOPIC (N/A)  SIGMOIDOSCOPY FLEXIBLE (N/A)    Specimen(s):  ID Type Source Tests Collected by Time Destination   1 :  Tissue Large Intestine, Sigmoid Colon TISSUE EXAM Kristen Bennett MD 2020 [id number]        Estimated Blood Loss:   50 mL    Drains:  Urethral Catheter Non-latex 16 Fr  (Active)   Number of days: 0       Anesthesia Type:   General    Operative Indications:  Rectal prolapse [K62 3]      Operative Findings: Moderate intra-abdominal adhesions from prior surgery  Significant redundancy of the sigmoid colon as expected for rectal prolapse    Complications:   None    Procedure and Technique:  After preoperative identification in the preoperative holding area the patient was taken the operating room placed in the supine position  After satisfactory induction of general endotracheal anesthesia appropriate placement of anesthesia monitors, patient was placed in a modified low lithotomy position  Patient was secured to the operating room table  Patient was prepped and draped in usual sterile fashion  Antibiotics were given prior to incision  Time-out was undertaken procedure begun  Supraumbilical incision was made  This taken down the level of the fascia  5 mm trocar was placed by Optiview technique  Abdomen is inspected for signs of injury upon entry  None were seen  Adhesions were noted to the patient's lower midline  This is essentially omental adhesions  There he is in his of the terminal ileum mesentery to the sigmoid colon mesentery    I suspect this was from the patient's appendectomy in the past   5 mm ports were placed in the right upper and right lower quadrant  The adhesions were completely lysed patient was placed in the steep Trendelenburg position for dissection  In the retrorectal space, starting from the inferior mesenteric artery, the retrorectal space was entered and dissected free  Posterior hiatus was dissected down to the levators distal to the coccyx  Left ureter was identified in a medial to lateral dissection  The left side was then of enough that a similar height and taken down towards the lateral stalks  The lateral stalks were dissected wide  The peritoneal reflection was opened along the peritoneum along the lateral and anterior surfaces to allow full mobilization of the rectum  With this we are able to be full an area largely from the peritoneal reflection up to the sacral promontory  This area was well mobilized and well-vascularized  We now cleared the mesorectum to prepare for for transection  Pfannenstiel incision was made  Hand port was placed within this  Contour was used to transect the rectum at our previously marked spot  This allowed the rectum to come up to the sacral promontory for suture rectopexy  There was no significant redundancy of the rectum at this level  The mesentery of the sigmoid colon was quite redundant  This was transected up to the descending colon mesentery  The descending colon without any significant mobilization could easily come down into the pelvis  This is done with minimal redundancy to try to prevent recurrent disease in the future  Pursestring was placed at the descending colon sigmoid junction using 2 0 PDS tied down around a 29 mm [facility] stapler anvil  It was noted approximately 5 cm proximal to anastomosis there is mild splaying of the tenia coli  This was oversewn using 3 0 PDS  This was not a full-thickness injury and would be considered inherent to the operation at hand    We now prepared for anastomosis  Allis clamps were used to elevate the rectal stalks  We now performed our suture rectopexy  Two 0 PDS was used to suture the lateral stalks to the periosteum of the sacral promontory  These were not tied down yet  Perineal  brought the stapler out to the staple line  Joselito was deployed just posterior to the staple line  This was opened until the orange portion could be seen  The proximal anvil was placed on the spike  This was done to audible click  Anastomosis was slowly closed  Care was taken not to involve any extraneous structures such as our sutures for rectopexy in this anastomosis  Care is taken not to twist the colon upon its mesentery  Stapler was closed and fired  As such well-vascularized descending colon was anastomosed to well-vascularized rectum  The sutures were then tied down  Flexible sigmoidoscopy was undertaken  The anastomosis was inspected  Anastomotic rings were noted be full thickness both proximally distally  Pink normal mucosa was seen proximal distal to anastomosis  Anastomosis was noted to be circumferentially intact  There was 1 small area of bleeding  This likely was inconsequential but as it was persistent I elected to treat this  Endoscopic clip was placed to cessation of bleeding  This area is inspected for another 3-5 minutes and no further bleeding was seen  Scope was withdrawn  Abdomen is inspected for hemostasis  This was noted to be excellent  As such we began to close  Gown and gloves were changed  Needle and sponge counts were correct  Ports and wound protector were removed Peritoneum was closed using 1  Single stranded Vicryl  1  PDS was used to close fascia  Four 0 Monocryl with skin glue was used to close skin  Patient was awakened from anesthesia and transferred to recovery room in stable condition     I was present for the entire procedure    Patient Disposition:  PACU     SIGNATURE: Aga Saravia Frannie Crenshaw MD  DATE: September 23, 2020  TIME: 4:31 PM

## 2020-09-23 NOTE — PLAN OF CARE
Problem: Potential for Falls  Goal: Patient will remain free of falls  Description: INTERVENTIONS:  - Assess patient frequently for physical needs  -  Identify cognitive and physical deficits and behaviors that affect risk of falls    -  [place] fall precautions as indicated by assessment   - Educate patient/family on patient safety including physical limitations  - Instruct patient to call for assistance with activity based on assessment  - Modify environment to reduce risk of injury  - Consider OT/PT consult to assist with strengthening/mobility  Outcome: Progressing     Problem: PAIN - ADULT  Goal: Verbalizes/displays adequate comfort level or baseline comfort level  Description: Interventions:  - Encourage patient to monitor pain and request assistance  - Assess pain using appropriate pain scale  - Administer analgesics based on type and severity of pain and evaluate response  - Implement non-pharmacological measures as appropriate and evaluate response  - Consider cultural and social influences on pain and pain management  - Notify physician/advanced practitioner if interventions unsuccessful or patient reports new pain  Outcome: Progressing     Problem: INFECTION - ADULT  Goal: Absence or prevention of progression during hospitalization  Description: INTERVENTIONS:  - Assess and monitor for signs and symptoms of infection  - Monitor lab/diagnostic results  - Monitor all insertion sites, i e  indwelling lines, tubes, and drains  - Monitor endotracheal if appropriate and nasal secretions for changes in amount and color  - [place] appropriate cooling/warming therapies per order  - Administer medications as ordered  - Instruct and encourage patient and family to use good hand hygiene technique  - Identify and instruct in appropriate isolation precautions for identified infection/condition  Outcome: Progressing  Goal: Absence of fever/infection during neutropenic period  Description: INTERVENTIONS:  - Monitor WBC    Outcome: Progressing     Problem: SAFETY ADULT  Goal: Patient will remain free of falls  Description: INTERVENTIONS:  - Assess patient frequently for physical needs  -  Identify cognitive and physical deficits and behaviors that affect risk of falls    -  [place] fall precautions as indicated by assessment   - Educate patient/family on patient safety including physical limitations  - Instruct patient to call for assistance with activity based on assessment  - Modify environment to reduce risk of injury  - Consider OT/PT consult to assist with strengthening/mobility  Outcome: Progressing  Goal: Maintain or return to baseline ADL function  Description: INTERVENTIONS:  -  Assess patient's ability to carry out ADLs; assess patient's baseline for ADL function and identify physical deficits which impact ability to perform ADLs (bathing, care of mouth/teeth, toileting, grooming, dressing, etc )  - Assess/evaluate cause of self-care deficits   - Assess range of motion  - Assess patient's mobility; develop plan if impaired  - Assess patient's need for assistive devices and provide as appropriate  - Encourage maximum independence but intervene and supervise when necessary  - Involve family in performance of ADLs  - Assess for home care needs following discharge   - Consider OT consult to assist with ADL evaluation and planning for discharge  - Provide patient education as appropriate  Outcome: Progressing  Goal: Maintain or return mobility status to optimal level  Description: INTERVENTIONS:  - Assess patient's baseline mobility status (ambulation, transfers, stairs, etc )    - Identify cognitive and physical deficits and behaviors that affect mobility  - Identify mobility aids required to assist with transfers and/or ambulation (gait belt, sit-to-stand, lift, walker, cane, etc )  - [place] fall precautions as indicated by assessment  - Record patient progress and toleration of activity level on Mobility SBAR; progress patient to next Phase/Stage  - Instruct patient to call for assistance with activity based on assessment  - Consider rehabilitation consult to assist with strengthening/weightbearing, etc   Outcome: Progressing     Problem: DISCHARGE PLANNING  Goal: Discharge to home or other facility with appropriate resources  Description: INTERVENTIONS:  - Identify barriers to discharge w/patient and caregiver  - Arrange for needed discharge resources and transportation as appropriate  - Identify discharge learning needs (meds, wound care, etc )  - Arrange for interpretive services to assist at discharge as needed  - Refer to Case Management Department for coordinating discharge planning if the patient needs post-hospital services based on physician/advanced practitioner order or complex needs related to functional status, cognitive ability, or social support system  Outcome: Progressing     Problem: Knowledge Deficit  Goal: Patient/family/caregiver demonstrates understanding of disease process, treatment plan, medications, and discharge instructions  Description: Complete learning assessment and assess knowledge base    Interventions:  - Provide teaching at level of understanding  - Provide teaching via preferred learning methods  Outcome: Progressing

## 2020-09-23 NOTE — ANESTHESIA POSTPROCEDURE EVALUATION
Post-Op Assessment Note    CV Status:  Stable  Pain Score: 0    Pain management: adequate     Mental Status:  Alert and awake   Hydration Status:  Euvolemic   PONV Controlled:  Controlled   Airway Patency:  Patent  Airway: intubated      Post Op Vitals Reviewed: Yes      Staff: Anesthesiologist, CRNA         No complications documented      BP   156/68   Temp   97 9   Pulse  68   Resp   16   SpO2   100

## 2020-09-24 LAB
ANION GAP SERPL CALCULATED.3IONS-SCNC: 5 MMOL/L (ref 4–13)
BASOPHILS # BLD AUTO: 0.01 THOUSANDS/ΜL (ref 0–0.1)
BASOPHILS NFR BLD AUTO: 0 % (ref 0–1)
BUN SERPL-MCNC: 12 MG/DL (ref 5–25)
CALCIUM SERPL-MCNC: 8.4 MG/DL (ref 8.3–10.1)
CHLORIDE SERPL-SCNC: 105 MMOL/L (ref 100–108)
CO2 SERPL-SCNC: 26 MMOL/L (ref 21–32)
CREAT SERPL-MCNC: 0.47 MG/DL (ref 0.6–1.3)
EOSINOPHIL # BLD AUTO: 0 THOUSAND/ΜL (ref 0–0.61)
EOSINOPHIL NFR BLD AUTO: 0 % (ref 0–6)
ERYTHROCYTE [DISTWIDTH] IN BLOOD BY AUTOMATED COUNT: 13.1 % (ref 11.6–15.1)
GFR SERPL CREATININE-BSD FRML MDRD: 98 ML/MIN/1.73SQ M
GLUCOSE SERPL-MCNC: 163 MG/DL (ref 65–140)
GLUCOSE SERPL-MCNC: 207 MG/DL (ref 65–140)
GLUCOSE SERPL-MCNC: 228 MG/DL (ref 65–140)
GLUCOSE SERPL-MCNC: 229 MG/DL (ref 65–140)
GLUCOSE SERPL-MCNC: 245 MG/DL (ref 65–140)
HCT VFR BLD AUTO: 32.6 % (ref 34.8–46.1)
HGB BLD-MCNC: 10.7 G/DL (ref 11.5–15.4)
IMM GRANULOCYTES # BLD AUTO: 0.04 THOUSAND/UL (ref 0–0.2)
IMM GRANULOCYTES NFR BLD AUTO: 1 % (ref 0–2)
LYMPHOCYTES # BLD AUTO: 0.83 THOUSANDS/ΜL (ref 0.6–4.47)
LYMPHOCYTES NFR BLD AUTO: 11 % (ref 14–44)
MAGNESIUM SERPL-MCNC: 1.7 MG/DL (ref 1.6–2.6)
MCH RBC QN AUTO: 31.2 PG (ref 26.8–34.3)
MCHC RBC AUTO-ENTMCNC: 32.8 G/DL (ref 31.4–37.4)
MCV RBC AUTO: 95 FL (ref 82–98)
MONOCYTES # BLD AUTO: 0.57 THOUSAND/ΜL (ref 0.17–1.22)
MONOCYTES NFR BLD AUTO: 8 % (ref 4–12)
NEUTROPHILS # BLD AUTO: 6.2 THOUSANDS/ΜL (ref 1.85–7.62)
NEUTS SEG NFR BLD AUTO: 80 % (ref 43–75)
NRBC BLD AUTO-RTO: 0 /100 WBCS
PLATELET # BLD AUTO: 192 THOUSANDS/UL (ref 149–390)
PMV BLD AUTO: 12.9 FL (ref 8.9–12.7)
POTASSIUM SERPL-SCNC: 4.1 MMOL/L (ref 3.5–5.3)
RBC # BLD AUTO: 3.43 MILLION/UL (ref 3.81–5.12)
SODIUM SERPL-SCNC: 136 MMOL/L (ref 136–145)
WBC # BLD AUTO: 7.65 THOUSAND/UL (ref 4.31–10.16)

## 2020-09-24 PROCEDURE — 83735 ASSAY OF MAGNESIUM: CPT | Performed by: SURGERY

## 2020-09-24 PROCEDURE — 85025 COMPLETE CBC W/AUTO DIFF WBC: CPT | Performed by: SURGERY

## 2020-09-24 PROCEDURE — 97167 OT EVAL HIGH COMPLEX 60 MIN: CPT

## 2020-09-24 PROCEDURE — 82948 REAGENT STRIP/BLOOD GLUCOSE: CPT

## 2020-09-24 PROCEDURE — 97163 PT EVAL HIGH COMPLEX 45 MIN: CPT

## 2020-09-24 PROCEDURE — 80048 BASIC METABOLIC PNL TOTAL CA: CPT | Performed by: SURGERY

## 2020-09-24 RX ORDER — DEXTROSE, SODIUM CHLORIDE, AND POTASSIUM CHLORIDE 5; .45; .15 G/100ML; G/100ML; G/100ML
75 INJECTION INTRAVENOUS CONTINUOUS
Status: DISCONTINUED | OUTPATIENT
Start: 2020-09-24 | End: 2020-09-25

## 2020-09-24 RX ORDER — MAGNESIUM SULFATE HEPTAHYDRATE 40 MG/ML
2 INJECTION, SOLUTION INTRAVENOUS ONCE
Status: COMPLETED | OUTPATIENT
Start: 2020-09-24 | End: 2020-09-24

## 2020-09-24 RX ADMIN — HEPARIN SODIUM 5000 UNITS: 5000 INJECTION INTRAVENOUS; SUBCUTANEOUS at 05:14

## 2020-09-24 RX ADMIN — OXYCODONE HYDROCHLORIDE 10 MG: 10 TABLET ORAL at 20:40

## 2020-09-24 RX ADMIN — HEPARIN SODIUM 5000 UNITS: 5000 INJECTION INTRAVENOUS; SUBCUTANEOUS at 14:15

## 2020-09-24 RX ADMIN — FLUTICASONE FUROATE AND VILANTEROL TRIFENATATE 1 PUFF: 200; 25 POWDER RESPIRATORY (INHALATION) at 08:08

## 2020-09-24 RX ADMIN — DEXTROSE, SODIUM CHLORIDE, AND POTASSIUM CHLORIDE 75 ML/HR: 5; .45; .15 INJECTION INTRAVENOUS at 20:44

## 2020-09-24 RX ADMIN — OXYCODONE HYDROCHLORIDE 5 MG: 5 TABLET ORAL at 14:14

## 2020-09-24 RX ADMIN — INSULIN LISPRO 2 UNITS: 100 INJECTION, SOLUTION INTRAVENOUS; SUBCUTANEOUS at 11:42

## 2020-09-24 RX ADMIN — ATORVASTATIN CALCIUM 20 MG: 20 TABLET, FILM COATED ORAL at 22:32

## 2020-09-24 RX ADMIN — DEXTROSE, SODIUM CHLORIDE, AND POTASSIUM CHLORIDE 75 ML/HR: 5; .45; .15 INJECTION INTRAVENOUS at 05:33

## 2020-09-24 RX ADMIN — HYDROMORPHONE HYDROCHLORIDE 0.5 MG: 1 INJECTION, SOLUTION INTRAMUSCULAR; INTRAVENOUS; SUBCUTANEOUS at 22:32

## 2020-09-24 RX ADMIN — INSULIN LISPRO 3 UNITS: 100 INJECTION, SOLUTION INTRAVENOUS; SUBCUTANEOUS at 17:02

## 2020-09-24 RX ADMIN — SODIUM CHLORIDE, SODIUM LACTATE, POTASSIUM CHLORIDE, AND CALCIUM CHLORIDE 125 ML/HR: .6; .31; .03; .02 INJECTION, SOLUTION INTRAVENOUS at 04:01

## 2020-09-24 RX ADMIN — HEPARIN SODIUM 5000 UNITS: 5000 INJECTION INTRAVENOUS; SUBCUTANEOUS at 22:32

## 2020-09-24 RX ADMIN — MAGNESIUM SULFATE IN WATER 2 G: 40 INJECTION, SOLUTION INTRAVENOUS at 10:15

## 2020-09-24 RX ADMIN — OXYCODONE HYDROCHLORIDE 5 MG: 5 TABLET ORAL at 08:08

## 2020-09-24 RX ADMIN — INSULIN LISPRO 1 UNITS: 100 INJECTION, SOLUTION INTRAVENOUS; SUBCUTANEOUS at 08:08

## 2020-09-24 RX ADMIN — ACETAMINOPHEN 650 MG: 325 TABLET, FILM COATED ORAL at 04:04

## 2020-09-24 RX ADMIN — AMLODIPINE BESYLATE 5 MG: 5 TABLET ORAL at 08:08

## 2020-09-24 NOTE — PROGRESS NOTES
Progress Note - Colorectal   Ayesha Johnson 77 y o  female MRN: [id number]  Unit/Bed#: [facility] 807-01 Encounter: [id number]      Objective:  Doing well this morning  Is alert and awake  Has incisional pain when coughing  Fox has extremely light stephani urine  No flatus  States she somewhat hungry  Tolerating a diabetic clear liquids last evening  Pain is controlled with pain medication we're giving  Patient's blood sugars are 231, 265, 166     1160 Fox    Blood pressure 107/61, pulse 72, temperature 98 °F (36 7 °C), resp  rate 20, height 5' 3" (1 6 m), weight 59 4 kg (131 lb), SpO2 98 %  ,Body mass index is 23 21 kg/m²  Intake/Output Summary (Last 24 hours) at 9/24/2020 0528  Last data filed at 9/24/2020 5870  Gross per 24 hour   Intake 3875 ml   Output 1210 ml   Net 2665 ml       Invasive Devices     Peripheral Intravenous Line            Peripheral IV 09/23/20 Left Hand less than 1 day    Peripheral IV 09/23/20 Right Arm less than 1 day          Drain            Urethral Catheter Non-latex 16 Fr  less than 1 day                Physical Exam:   Abdomen:  Soft, does not appear distended, small trocar sites are clean and dry, Pfannenstiel incision is clean and dry, no ecchymosis seen  Extremities: There is no calf tenderness, no pedal edema, venadynes on and functioning    Lab, Imaging and other studies:  Pending  VTE Pharmacologic Prophylaxis: Heparin  VTE Mechanical Prophylaxis: sequential compression device    Assessment:  POD # 1 laparoscopic sigmoidectomy, rectopexy    Plan:  1  Will change IV fluids to maintenance fluids at 75 mL an hour  2  Will add toast and crackers to her diabetic clear liquids  3  Check a m  Labs  4  To be out of bed ambulating the halls today  5  ?  Fox removal today versus tomorrow  6  Continue incentive spirometry  7   Continue to monitor blood sugars

## 2020-09-24 NOTE — PLAN OF CARE
Problem: Potential for Falls  Goal: Patient will remain free of falls  Description: INTERVENTIONS:  - Assess patient frequently for physical needs  -  Identify cognitive and physical deficits and behaviors that affect risk of falls    -  [place] fall precautions as indicated by assessment   - Educate patient/family on patient safety including physical limitations  - Instruct patient to call for assistance with activity based on assessment  - Modify environment to reduce risk of injury  - Consider OT/PT consult to assist with strengthening/mobility  Outcome: Progressing     Problem: PAIN - ADULT  Goal: Verbalizes/displays adequate comfort level or baseline comfort level  Description: Interventions:  - Encourage patient to monitor pain and request assistance  - Assess pain using appropriate pain scale  - Administer analgesics based on type and severity of pain and evaluate response  - Implement non-pharmacological measures as appropriate and evaluate response  - Consider cultural and social influences on pain and pain management  - Notify physician/advanced practitioner if interventions unsuccessful or patient reports new pain  Outcome: Progressing     Problem: INFECTION - ADULT  Goal: Absence or prevention of progression during hospitalization  Description: INTERVENTIONS:  - Assess and monitor for signs and symptoms of infection  - Monitor lab/diagnostic results  - Monitor all insertion sites, i e  indwelling lines, tubes, and drains  - Monitor endotracheal if appropriate and nasal secretions for changes in amount and color  - [place] appropriate cooling/warming therapies per order  - Administer medications as ordered  - Instruct and encourage patient and family to use good hand hygiene technique  - Identify and instruct in appropriate isolation precautions for identified infection/condition  Outcome: Progressing  Goal: Absence of fever/infection during neutropenic period  Description: INTERVENTIONS:  - Monitor WBC    Outcome: Progressing     Problem: SAFETY ADULT  Goal: Patient will remain free of falls  Description: INTERVENTIONS:  - Assess patient frequently for physical needs  -  Identify cognitive and physical deficits and behaviors that affect risk of falls    -  [place] fall precautions as indicated by assessment   - Educate patient/family on patient safety including physical limitations  - Instruct patient to call for assistance with activity based on assessment  - Modify environment to reduce risk of injury  - Consider OT/PT consult to assist with strengthening/mobility  Outcome: Progressing  Goal: Maintain or return to baseline ADL function  Description: INTERVENTIONS:  -  Assess patient's ability to carry out ADLs; assess patient's baseline for ADL function and identify physical deficits which impact ability to perform ADLs (bathing, care of mouth/teeth, toileting, grooming, dressing, etc )  - Assess/evaluate cause of self-care deficits   - Assess range of motion  - Assess patient's mobility; develop plan if impaired  - Assess patient's need for assistive devices and provide as appropriate  - Encourage maximum independence but intervene and supervise when necessary  - Involve family in performance of ADLs  - Assess for home care needs following discharge   - Consider OT consult to assist with ADL evaluation and planning for discharge  - Provide patient education as appropriate  Outcome: Progressing  Goal: Maintain or return mobility status to optimal level  Description: INTERVENTIONS:  - Assess patient's baseline mobility status (ambulation, transfers, stairs, etc )    - Identify cognitive and physical deficits and behaviors that affect mobility  - Identify mobility aids required to assist with transfers and/or ambulation (gait belt, sit-to-stand, lift, walker, cane, etc )  - [place] fall precautions as indicated by assessment  - Record patient progress and toleration of activity level on Mobility SBAR; progress patient to next Phase/Stage  - Instruct patient to call for assistance with activity based on assessment  - Consider rehabilitation consult to assist with strengthening/weightbearing, etc   Outcome: Progressing     Problem: DISCHARGE PLANNING  Goal: Discharge to home or other facility with appropriate resources  Description: INTERVENTIONS:  - Identify barriers to discharge w/patient and caregiver  - Arrange for needed discharge resources and transportation as appropriate  - Identify discharge learning needs (meds, wound care, etc )  - Arrange for interpretive services to assist at discharge as needed  - Refer to Case Management Department for coordinating discharge planning if the patient needs post-hospital services based on physician/advanced practitioner order or complex needs related to functional status, cognitive ability, or social support system  Outcome: Progressing     Problem: Knowledge Deficit  Goal: Patient/family/caregiver demonstrates understanding of disease process, treatment plan, medications, and discharge instructions  Description: Complete learning assessment and assess knowledge base    Interventions:  - Provide teaching at level of understanding  - Provide teaching via preferred learning methods  Outcome: Progressing     Problem: GASTROINTESTINAL - ADULT  Goal: Minimal or absence of nausea and/or vomiting  Description: INTERVENTIONS:  - Administer IV fluids if ordered to ensure adequate hydration  - Maintain NPO status until nausea and vomiting are resolved  - Nasogastric tube if ordered  - Administer ordered antiemetic medications as needed  - Provide nonpharmacologic comfort measures as appropriate  - Advance diet as tolerated, if ordered  - Consider nutrition services referral to assist patient with adequate nutrition and appropriate food choices  Outcome: Progressing  Goal: Maintains or returns to baseline bowel function  Description: INTERVENTIONS:  - Assess bowel function  - Encourage oral fluids to ensure adequate hydration  - Administer IV fluids if ordered to ensure adequate hydration  - Administer ordered medications as needed  - Encourage mobilization and activity  - Consider nutritional services referral to assist patient with adequate nutrition and appropriate food choices  Outcome: Progressing  Goal: Maintains adequate nutritional intake  Description: INTERVENTIONS:  - Monitor percentage of each meal consumed  - Identify factors contributing to decreased intake, treat as appropriate  - Assist with meals as needed  - Monitor I&O, weight, and lab values if indicated  - Obtain nutrition services referral as needed  Outcome: Progressing     Problem: GENITOURINARY - ADULT  Goal: Urinary catheter remains patent  Description: INTERVENTIONS:  - Assess patency of urinary catheter  - If patient has a chronic bahena, consider changing catheter if non-functioning  - Follow guidelines for intermittent irrigation of non-functioning urinary catheter  Outcome: Progressing     Problem: METABOLIC, FLUID AND ELECTROLYTES - ADULT  Goal: Electrolytes maintained within normal limits  Description: INTERVENTIONS:  - Monitor labs and assess patient for signs and symptoms of electrolyte imbalances  - Administer electrolyte replacement as ordered  - Monitor response to electrolyte replacements, including repeat lab results as appropriate  - Instruct patient on fluid and nutrition as appropriate  Outcome: Progressing  Goal: Fluid balance maintained  Description: INTERVENTIONS:  - Monitor labs   - Monitor I/O and WT  - Instruct patient on fluid and nutrition as appropriate  - Assess for signs & symptoms of volume excess or deficit  Outcome: Progressing     Problem: SKIN/TISSUE INTEGRITY - ADULT  Goal: Incision(s), wounds(s) or drain site(s) healing without S/S of infection  Description: INTERVENTIONS  - Assess and document risk factors for skin impairment   - Assess and document dressing, incision, wound bed, drain sites and surrounding tissue  - Consider nutrition services referral as needed  - Oral mucous membranes remain intact  - Provide patient/ family education  Outcome: Progressing

## 2020-09-24 NOTE — RESPIRATORY THERAPY NOTE
RT Protocol Note  Larissa Alexander 77 y o  female MRN: [id number]  Unit/Bed#: [facility] 807-01 Encounter: [id number]    Assessment    Principal Problem:    Rectal prolapse      Home Pulmonary Medications:  Albuterol MDI for PRN Usage  Albuterol minineb Rxs Q 6 (unsure as to when last used)       Past Medical History:   Diagnosis Date    Asthma     Colon polyp     Diabetes mellitus ([address] )     E coli infection     Fall     Hyperlipidemia     Hypertension     Pneumothorax     Rectal prolapse 09/03/2020    Sleep apnea     no CPAP    Subarachnoid hemorrhage (HCC)     Thoracic aortic aneurysm (HCC)     Vertigo      Social History     Socioeconomic History    Marital status: /Civil Union     Spouse name: None    Number of children: None    Years of education: None    Highest education level: None   Occupational History    None   Social Needs    Financial resource strain: None    Food insecurity     Worry: None     Inability: None    Transportation needs     Medical: None     Non-medical: None   Tobacco Use    Smoking status: Never Smoker    Smokeless tobacco: Never Used   Substance and Sexual Activity    Alcohol use: Yes     Frequency: 2-4 times a month     Comment: weekends     Drug use: No    Sexual activity: None   Lifestyle    Physical activity     Days per week: None     Minutes per session: None    Stress: None   Relationships    Social connections     Talks on phone: None     Gets together: None     Attends Rastafarian service: None     Active member of club or organization: None     Attends meetings of clubs or organizations: None     Relationship status: None    Intimate partner violence     Fear of current or ex partner: None     Emotionally abused: None     Physically abused: None     Forced sexual activity: None   Other Topics Concern    None   Social History Narrative    Lives with daughter and son and boyfriend    4  Children,3 alive 25 grandcchildren       Subjective Objective    Physical Exam:   Assessment Type: Assess only  General Appearance: Awake, Alert  Respiratory Pattern: Normal  Chest Assessment: Chest expansion symmetrical  Bilateral Breath Sounds: Diminished, Clear, Other (Comment)(no wheezing heard at the present time)  Cough: None  O2 Device: 3 lpm NC    Vitals:  Blood pressure 107/61, pulse 72, temperature 98 °F (36 7 °C), resp  rate 20, height 5' 3" (1 6 m), weight 59 4 kg (131 lb), SpO2 98 %  Imaging and other studies: No recent CXR    O2 Device: 3 lpm NC     Plan    Respiratory Plan: Home Bronchodilator Patient pathway        Resp Comments: Pt here for non pulmonary non cardiac admission  Hs of asthma  Has two bronchodilators for home usage though not clear has consistently they have been taken  Breo inhaler already ordered with a PRN Albuterol inhaler  Assessed per our protocol  No other recommendations to suggest at this time  Has started on her IS per nursing

## 2020-09-24 NOTE — OCCUPATIONAL THERAPY NOTE
Occupational Therapy Evaluation     Patient Name: Thomas Estrada  TLAAB'C Date: 9/24/2020  Problem List  Principal Problem:    Rectal prolapse    Past Medical History  Past Medical History:   Diagnosis Date    Asthma     Colon polyp     Diabetes mellitus ([address] )     E coli infection     Fall     Hyperlipidemia     Hypertension     Pneumothorax     Rectal prolapse 09/03/2020    Sleep apnea     no CPAP    Subarachnoid hemorrhage (HCC)     Thoracic aortic aneurysm (HCC)     Vertigo      Past Surgical History  Past Surgical History:   Procedure Laterality Date    BONE GRAFT      R arm    BRONCHOSCOPY N/A 3/16/2016    Procedure: BRONCHOSCOPY FLEXIBLE/anesth ;  Surgeon: Shirlene Rebolledo DO;  [facility];   Service:     COLONOSCOPY      COLONOSCOPY      EYE SURGERY      OOPHORECTOMY Left     age 49    CO LAP,SURG,COLECTOMY, PARTIAL, W/ANAST N/A 9/23/2020    Procedure: RESECTION COLON SIGMOID LAPAROSCOPIC;  Surgeon: Kedar Woodson MD;  [facility];  Service: Colorectal    CO SIGMOIDOSCOPY FLX DX W/COLLJ SPEC BR/WA IF PFRMD N/A 9/23/2020    Procedure: SIGMOIDOSCOPY FLEXIBLE;  Surgeon: Kedar Woodson MD;  [facility];  Service: Colorectal    RECTAL PROLAPSE REPAIR LAPAROSCOPIC N/A 9/23/2020    Procedure: Karlie Rivera;  Surgeon: Kedar Woodson MD;  [facility];  Service: Colorectal             09/24/20 0908   OT Last Visit   OT Visit Date 09/24/20   Note Type   Note type Eval only   Restrictions/Precautions   Weight Bearing Precautions Per Order No   Other Precautions Fall Risk;Pain;Telemetry;Multiple lines  (+uretheral catheter)   Pain Assessment   Pain Assessment Tool 0-10   Pain Score 7   Pain Location/Orientation Orientation: Bilateral;Location: Abdomen   Hospital Pain Intervention(s) Ambulation/increased activity;Repositioned;Elevated;Rest;Emotional support   Home Living   Type of Home House  (3STE with no rails)   Home Layout Two level  (+first floor set-up)   Bathroom Shower/Tub Tub/shower unit  (and shower stall)   Bathroom Toilet Standard   Bathroom Equipment Grab bars in shower;Commode  (pt reports 2 commodes (one is daughters, other is spouse's))   [address] Walker;Cane   Prior Function   Level of [place] Independent with ADLs and functional mobility   Lives With Son;Daughter;Spouse   Receives Help From Family   ADL Assistance Independent   IADLs Independent   Falls in the last 6 months 0   Vocational Retired   Comments Pt lives with son (who works during the day) and disabled daughter who requires supervision throughout day (can clean, and perform personal self care tasks), spouse per pt is currently in [facility]  Pt has second daughter who works but can also assist as needed  Lifestyle   Autonomy I with ADL's/I ADL's with no AD with functional ambulation,  +RW in community occassionally if knee pain, +drives   Reciprocal Relationships daughter, son   Service to Others retired   Intrinsic Gratification staying active   Psychosocial   Psychosocial (WDL) WDL   ADL   Eating Assistance Other (Comment)   Eating Deficit NPO   Grooming Assistance 5  Supervision/Setup   UB Bathing Assistance 5  Supervision/Setup   LB Bathing Assistance 3  Moderate Assistance   UB Dressing Assistance 3  Moderate Assistance   LB Dressing Assistance 2  Maximal Assistance   LB Dressing Deficit Don/doff L sock; Don/doff R sock  (unable to reach feet 2* to surgical pain)   Toileting Assistance  4  Minimal Assistance   Toileting Deficit Perineal hygiene;Steadying  (steadying assist)   Bed Mobility   Supine to Sit 2  Maximal assistance   Additional items Assist x 1;Verbal cues;LE management; Increased time required  (significant increased amount of time to complete 2* to surgical site pain)   Sit to Supine Unable to assess   Transfers   Sit to Stand 4  Minimal assistance   Additional items Assist x 1   Stand to Sit 4  Minimal assistance   Additional items Assist x 1   Toilet transfer 3  Moderate assistance   Additional items Assist x 1; Increased time required;Standard toilet  (+verbal cues for safe hand placement using grab bar/RW to increase I and ease of transfer)   Additional Comments verbal cues for safe hand placement, RW management   Functional Mobility   Functional Mobility 4  Minimal assistance   Additional Comments min a with RW short distance functional mobility, mild SARGENT, vitals WFL SP02 97-98% throughout session on room air  Additional items Rolling walker   Balance   Static Sitting Fair +   Dynamic Sitting Fair -   Static Standing Fair -   Dynamic Standing Poor +   Ambulatory Poor +   Activity Tolerance   Activity Tolerance Patient limited by fatigue;Patient limited by pain   Medical Staff Made Aware PT Macey Smith   Nurse Made Aware RN cleared pt for therapy   RUE Assessment   RUE Assessment WFL   LUE Assessment   LUE Assessment WFL   Hand Function   Gross Motor Coordination Functional   Fine Motor Coordination Functional   Cognition   Overall Cognitive Status WFL   Arousal/Participation Alert; Cooperative   Attention Attends with cues to redirect   Orientation Level Oriented X4   Memory Within functional limits   Following Commands Follows multistep commands with increased time or repetition   Comments Pt alert and oriented x 4, motivated and cooperative, min anxious, fearful in anticipation of pain with encouragement, reassurance, and education on rehab process, pt with increased confidence with mobility  Assessment   Limitation Decreased ADL status; Decreased endurance;Decreased self-care trans;Decreased high-level ADLs   Prognosis Good   Assessment Pt is a 77 y o  female who was admitted to [facility] on 9/23/2020 with Rectal prolapse  S/p resection colon sigmoid laparoscopic, rectopexy/proctopxy laparoscopic, sigmoidoscopy flexible on 9/23/20   Pt's problem list also includes PMH of fall, HTN, diabetes, hyperlipidemia, hearing loss, NGOC, urge incontinence of urine, osteoporosis, vertigo, stye, contusion on right side, thoracic aortic aneurysm without rupture  At baseline pt was completing I with ADL's/IADL's, no AD with functional ambulation within home, occasional use of RW in community if knees are painful  Pt lives with son , daughter, and spouse in a [facility] with bed/bathroom on 2nd with a first floor set-up with commode if needed  Currently pt requires min a UB, max a LB for overall ADLS and min a with RW for functional mobility/transfers  Pt currently presents with impairments in the following categories -steps to enter environment, limited home support, difficulty performing ADLS and difficulty performing IADLS  activity tolerance, endurance and standing balance/tolerance  These impairments, as well as pt's fatigue, pain, decreased caregiver support and risk for falls  limit pt's ability to safely engage in all baseline areas of occupation, includingbathing, dressing, toileting, functional mobility/transfers, community mobility, laundry , driving, house maintenance, medication management, meal prep, cleaning and leisure activities  From OT standpoint, recommend home with increased support and home OT upon D/C  OT will continue to follow to address the below stated goals  Goals   Patient Goals go home   LTG Time Frame 10-14   Long Term Goal #1 see goals below   Plan   Treatment Interventions ADL retraining;Functional transfer training; Endurance training;Patient/family training;Equipment evaluation/education; Compensatory technique education; Energy conservation; Activityengagement   Goal Expiration Date 10/08/20   OT Frequency 3-5x/wk   Recommendation   OT Discharge Recommendation Home with skilled therapy   OT - OK to Discharge No   Additional Comments  Home OT services   Barthel Index   Feeding 0   Bathing 0   Grooming Score 5   Dressing Score 5   Bladder Score 10   Bowels Score 10   Toilet Use Score 5   Transfers (Bed/Chair) Score 10   Mobility (Level Surface) Score 10   Stairs Score 0   Barthel Index Score 55   Modified Boyd Scale   Modified Candido Scale 4      Occupational Therapy Goals:    *Mod I with bed mobility to engage in functional tasks  *Mod I Adl's after setup with use of AE PRN  *Mod I toileting and clothing management   *Mod I functional mobility and transfers to/from all surfaces with Fair + dynamic balance and safety for participation in dynamic adls and iadl tasks   *Demonstrate good carryover with safe use of RW during functional tasks   *Assess DME needs   *Increase activity tolerance to 25-30 minutes for participation in adls and enjoyable activities  *Mod I with Simulated IADL management task      Nuzhat Matthews MOT, OTR/L

## 2020-09-24 NOTE — PHYSICAL THERAPY NOTE
Physical Therapy Eval     Patient Name: Gaston Zavala    YBNZJ'G Date: 9/24/2020     Problem List  Principal Problem:    Rectal prolapse       Past Medical History  Past Medical History:   Diagnosis Date    Asthma     Colon polyp     Diabetes mellitus ([address] )     E coli infection     Fall     Hyperlipidemia     Hypertension     Pneumothorax     Rectal prolapse 09/03/2020    Sleep apnea     no CPAP    Subarachnoid hemorrhage (HCC)     Thoracic aortic aneurysm (HCC)     Vertigo         Past Surgical History  Past Surgical History:   Procedure Laterality Date    BONE GRAFT      R arm    BRONCHOSCOPY N/A 3/16/2016    Procedure: BRONCHOSCOPY FLEXIBLE/anesth ;  Surgeon: Siddhartha Paniagua DO;  [facility];   Service:     COLONOSCOPY      COLONOSCOPY      EYE SURGERY      OOPHORECTOMY Left     age 49    PA LAP,SURG,COLECTOMY, PARTIAL, W/ANAST N/A 9/23/2020    Procedure: RESECTION COLON SIGMOID LAPAROSCOPIC;  Surgeon: Cindy Carlos MD;  [facility];  Service: Colorectal    PA SIGMOIDOSCOPY FLX DX W/COLLJ SPEC BR/WA IF PFRMD N/A 9/23/2020    Procedure: SIGMOIDOSCOPY FLEXIBLE;  Surgeon: Cindy Carlos MD;  [facility];  Service: Colorectal    RECTAL PROLAPSE REPAIR LAPAROSCOPIC N/A 9/23/2020    Procedure: Erlene Edu;  Surgeon: Cindy Carlos MD;  [facility];  Service: Colorectal         09/24/20 0920   PT Last Visit   PT Visit Date 09/24/20   Note Type   Note type Eval only   Pain Assessment   Pain Assessment Tool 0-10   Pain Score 7   Pain Location/Orientation Orientation: Bilateral;Location: Abdomen   Effect of Pain on Daily Activities LIMITED MOBILITY    Patient's Stated Pain Goal No pain   Home Living   Type of Home House  (3 RADHA w/ rails )   Home Layout Two level  (1st setup available )   Bathroom Shower/Tub Tub/shower unit   [address] [facility];Cane   Prior Function   Level of [place] Independent with ADLs and functional mobility   Lives With Spouse;Son;Daughter   Receives Help From Family   ADL Assistance Independent   IADLs Independent  (drives )   Falls in the last 6 months 0   Vocational Retired   Comments pt report ing living w/ family in a 2 SH w/ bed and bath on 2nd floor- 2-3 RADHA  ambualtes in home w/o AD w/ occasional use of RW in community 0 falls- has supportive family to assist on d/c- however not 24/7 as they work- pt reports she can stay on the 1st floor on d/c- but would prefer not to ;    Restrictions/Precautions   Weight Bearing Precautions Per Order No   Other Precautions Pain; Fall Risk;O2;Telemetry;Multiple lines   General   Additional Pertinent History pt reporing her spouse is currently in ICU in [place]    Family/Caregiver Present No   Cognition   Overall Cognitive Status WFL   Arousal/Participation Alert   Attention Within functional limits   Orientation Level Oriented X4   Memory Within functional limits   Following Commands Follows all commands and directions without difficulty   Comments pt is alert and cooperative   RUE Assessment   RUE Assessment WFL   LUE Assessment   LUE Assessment WFL   RLE Assessment   RLE Assessment WFL   LLE Assessment   LLE Assessment WFL   Coordination   Movements are Fluid and Coordinated 0   Coordination and Movement Description limited 2* pain-    Sensation WFL   Light Touch   RLE Light Touch Grossly intact   LLE Light Touch Grossly intact   Bed Mobility   Supine to Sit 2  Maximal assistance   Additional items Assist x 1; Increased time required;Verbal cues  (pain limiting mobility )   Transfers   Sit to Stand 4  Minimal assistance  (CGA for balance)   Stand to Sit 4  Minimal assistance   Stand pivot 4  Minimal assistance   Additional Comments all xfers w/ RW- V/C for breathing and pacing    Ambulation/Elevation   Gait pattern Excessively slow; Foward flexed   Gait Assistance 4  Minimal assist  (CGA for safety progressing to SBA by completion of session )   Additional items Assist x 1;Verbal cues; Tactile cues   Assistive Device Rolling walker   Distance 35'x1 10'x2 w/ RW   Balance   Static Sitting Fair +   Dynamic Sitting Fair   Static Standing Fair   Dynamic Standing Fair -   Ambulatory Poor +  (rw)   Activity Tolerance   Medical Staff Made Aware OT Beverly/ care coordination w/ RN and OT/ CM    Nurse Made Aware RN clears pt for mobilization and OOB    Assessment   Prognosis Good   Problem List Decreased strength;Decreased range of motion;Decreased endurance; Impaired balance;Decreased mobility; Decreased coordination;Obesity; Decreased skin integrity;Pain   Assessment Pt is 77 y o  female seen for PT evaluation s/p admit [facility] on 9/23/2020  Pt presenting w/ dx rectal prolapse and underwent elective RESECTION COLON SIGMOID LAPAROSCOPIC (N/A)Comorbidities affecting pt's physical performance at time of assessment listed above  Personal factors affecting pt at time of IE include: steps to enter environment, limited home support, advanced age, past experience, inability to perform IADLs, inability to perform ADLs, inability to ambulate household distance    Due to acute medical issues, ongoing medical workup for primary dx; pain, fall risk, increased reliance on more restrictive AD compared to baseline;  decreased activity tolerance compared to baseline, increased assistance needed from caregiver at current time, continuous telemetry monitoring, multiple lines, decline in overall functional mobility status; health management issues; note unstable clinical picture (high complexity)   Prior to admission, pt reports living w/ family in a [facility] w/ 3 STE and was I in home and occasinal use of RW in community- pt w/o falls and was I w/ ADL's reports her spouse is currently in ICU in [facility] but daughters and LIN can assist on d/c but not 24/7   Currently pt  is requiring mod/ maxA for bed skills 2* abdominal pain; Le functional transfers and Le for ambulation w/ RW  Pt presents functioning below baseline and currently w/ overall mobility deficits 2* to: post op pain; limited flexibility;  generalized weakness/ deconditioning; decreased endurance; decreased activity tolerance; impaired balance; gait deviations;  fatigue; multiple lines; (Please find additional objective findings from PT assessment regarding body systems outlined above ) Pt will continue to benefit from skilled PT interventions to address stated impairments; to maximize functional potential; for ongoing pt/ family training; and DME needs  Anticipate that with continued progress pt to d/c home with family support and [facility] PT OT and RW when medically cleared  Pt/ family agreeable to plan and goals as stated on evaluation  Barriers to Discharge Inaccessible home environment;Decreased caregiver support   Barriers to Discharge Comments RADHA- alone at times    Goals   Patient Goals go home    STG Expiration Date 10/04/20   Short Term Goal #1 In 10 days pt will complete: 1) Bed mobility skills with MI to facilitate safe return to previous living environment 2) Functional transfers with MI  to facilitate safe return to previous living environment  3) Ambulation with least restrictive AD MI> 300' without LOB and stable vitals for safe ambulation home/ community distances  4) Stair training up/ down 10 step/s with 1 rail/s  and S for safe access to previous living environment  5) Improve balance grades to Good 6) Improve LE strength grades by 1 to increase independence w/ transfers and gait  7) LE HEP independently  8) PT for ongoing pt and family education; DME needs and D/C planning to promote highest level of function in least restrictive environment     PT Treatment Day 0   Plan   Treatment/Interventions Functional transfer training;LE strengthening/ROM; Elevations; Therapeutic exercise; Endurance training;Patient/family training;Equipment eval/education; Bed mobility;Gait training;Spoke to MD;Spoke to nursing;Spoke to case management   PT Frequency   (3-5x/wk )   Recommendation   PT Discharge Recommendation Home with skilled therapy; Return to previous environment with social support   Equipment Recommended Walker   PT - OK to Discharge No   Additional Comments pt would benefit from progression of gait and stairs prior to d/c home-   Of note- Pt reports she is not agreeable to rehab- will accept  [facility] care PT   Modified Alger Scale   Modified Alger Scale 4   Barthel Index   Feeding 0   Bathing 0   Grooming Score 5   Dressing Score 5   Bladder Score 10   Bowels Score 5   Toilet Use Score 5   Transfers (Bed/Chair) Score 10   Mobility (Level Surface) Score 0   Stairs Score 0   Barthel Index Score 40   Antonia Landon, PT

## 2020-09-24 NOTE — UTILIZATION REVIEW
Initial Clinical Review    Elective IP surgical procedure    Age/Sex: 77 y o  female     Surgery Date: 9/23/20    Procedure:   Preop Diagnosis:  Rectal prolapse [K62 3]     Post-Op Diagnosis Codes:     * Rectal prolapse [K62 3]     Procedure(s) (LRB):  RESECTION COLON SIGMOID LAPAROSCOPIC (N/A)  RECTOPEXY/PROCTOPEXY LAPAROSCOPIC (N/A)  SIGMOIDOSCOPY FLEXIBLE (N/A)     Anesthesia: General     Operative Findings: Moderate intra-abdominal adhesions from prior surgery  Significant redundancy of the sigmoid colon as expected for rectal prolapse  POD#1 Progress Note:   Doing well with pain control, OOB and ambulate, bahena draining clear urine, no flatus, is hungry, blood glucose not well controlled, decrease IV fluids, add toast and crackers to cl liquid diet, bahena out 9/25, ambulate in halls  Admission Orders: Date/Time/Statement:   Admission Orders (From admission, onward)     Ordered        09/23/20 1648  Inpatient Admission  Once                   Orders Placed This Encounter   Procedures    Inpatient Admission     Standing Status:   Standing     Number of Occurrences:   1     Order Specific Question:   Admitting Physician     Answer:   Allegra Cardoso [[id number]]     Order Specific Question:   Level of Care     Answer:   Med Surg [[id number]]     Order Specific Question:   Estimated length of stay     Answer:   More than 2 Midnights     Order Specific Question:   Certification     Answer:   I certify that inpatient services are medically necessary for this patient for a duration of greater than two midnights  See H&P and MD Progress Notes for additional information about the patient's course of treatment       Vital Signs: /64   Pulse 72   Temp 97 7 °F (36 5 °C)   Resp 20   Ht 5' 3" (1 6 m)   Wt 59 4 kg (131 lb)   LMP  (LMP Unknown)   SpO2 98% Comment: HR 74  BMI 23 21 kg/m²      Diet: diabetic cl liq with toast and crackers    Mobility: OOB as dheeraj     DVT Prophylaxis: SCDs, Heparin Sq Medications/Pain Control:   Scheduled Medications:  amLODIPine, 5 mg, Oral, Daily  atorvastatin, 20 mg, Oral, HS  fluticasone-vilanterol, 1 puff, Inhalation, Daily  heparin (porcine), 5,000 Units, Subcutaneous, Q8H DINORAH  insulin lispro, 1-6 Units, Subcutaneous, TID AC    Continuous IV Infusions:  dextrose 5 % and sodium chloride 0 45 % with KCl 20 mEq/L, 75 mL/hr, Intravenous, Continuous      PRN Meds:  acetaminophen, 650 mg, Oral, Q6H PRN -x 1 9/24  albuterol, 2 5 mg, Nebulization, Q6H PRN  HYDROmorphone, 0 5 mg, Intravenous, Q3H PRN  lactated ringers, 1,000 mL, Intravenous, Once PRN    And  lactated ringers, 1,000 mL, Intravenous, Once PRN  metoprolol, 5 mg, Intravenous, Q6H PRN  ondansetron, 4 mg, Intravenous, Q6H PRN  oxyCODONE, 10 mg, Oral, Q4H PRN  oxyCODONE, 5 mg, Oral, Q4H PRN - x 1 9/23, 9/24  sodium chloride, 1,000 mL, Intravenous, Once PRN    And  sodium chloride, 1,000 mL, Intravenous, Once PRN    Network Utilization Review Department  [email or web address]  ATTENTION: Please call with any questions or concerns to [phone] and carefully listen to the prompts so that you are directed to the right person  All voicemails are confidential   Candia Siemens all requests for admission clinical reviews, approved or denied determinations and any other requests to dedicated fax number below belonging to the campus where the patient is receiving treatment   List of dedicated fax numbers for the Facilities:  FACILITY NAME UR FAX NUMBER   ADMISSION DENIALS (Administrative/Medical Necessity) [phone]   [address] (Maternity/NICU/Pediatrics) [phone]   Rupert Mai [phone]   Yolande Candelaria [phone]   [facility] [phone]   [facility] [address] [place] [address] [address] [address] [place] [phone]   [address] [facility] Hannah Duffy [phone]

## 2020-09-25 LAB
ANION GAP SERPL CALCULATED.3IONS-SCNC: 8 MMOL/L (ref 4–13)
BUN SERPL-MCNC: 5 MG/DL (ref 5–25)
CALCIUM SERPL-MCNC: 8.7 MG/DL (ref 8.3–10.1)
CHLORIDE SERPL-SCNC: 102 MMOL/L (ref 100–108)
CO2 SERPL-SCNC: 27 MMOL/L (ref 21–32)
CREAT SERPL-MCNC: 0.39 MG/DL (ref 0.6–1.3)
GFR SERPL CREATININE-BSD FRML MDRD: 104 ML/MIN/1.73SQ M
GLUCOSE SERPL-MCNC: 172 MG/DL (ref 65–140)
GLUCOSE SERPL-MCNC: 181 MG/DL (ref 65–140)
GLUCOSE SERPL-MCNC: 189 MG/DL (ref 65–140)
GLUCOSE SERPL-MCNC: 193 MG/DL (ref 65–140)
GLUCOSE SERPL-MCNC: 207 MG/DL (ref 65–140)
MAGNESIUM SERPL-MCNC: 1.9 MG/DL (ref 1.6–2.6)
POTASSIUM SERPL-SCNC: 4 MMOL/L (ref 3.5–5.3)
SODIUM SERPL-SCNC: 137 MMOL/L (ref 136–145)

## 2020-09-25 PROCEDURE — 80048 BASIC METABOLIC PNL TOTAL CA: CPT | Performed by: PHYSICIAN ASSISTANT

## 2020-09-25 PROCEDURE — 94760 N-INVAS EAR/PLS OXIMETRY 1: CPT

## 2020-09-25 PROCEDURE — 82948 REAGENT STRIP/BLOOD GLUCOSE: CPT

## 2020-09-25 PROCEDURE — 83735 ASSAY OF MAGNESIUM: CPT | Performed by: PHYSICIAN ASSISTANT

## 2020-09-25 RX ORDER — ALBUTEROL SULFATE 90 UG/1
2 AEROSOL, METERED RESPIRATORY (INHALATION) EVERY 6 HOURS PRN
Status: DISCONTINUED | OUTPATIENT
Start: 2020-09-25 | End: 2020-09-28 | Stop reason: HOSPADM

## 2020-09-25 RX ORDER — LANOLIN ALCOHOL/MO/W.PET/CERES
6 CREAM (GRAM) TOPICAL
Status: DISCONTINUED | OUTPATIENT
Start: 2020-09-25 | End: 2020-09-28 | Stop reason: HOSPADM

## 2020-09-25 RX ADMIN — OXYCODONE HYDROCHLORIDE 5 MG: 5 TABLET ORAL at 03:38

## 2020-09-25 RX ADMIN — AMLODIPINE BESYLATE 5 MG: 5 TABLET ORAL at 08:43

## 2020-09-25 RX ADMIN — INSULIN LISPRO 2 UNITS: 100 INJECTION, SOLUTION INTRAVENOUS; SUBCUTANEOUS at 08:39

## 2020-09-25 RX ADMIN — ATORVASTATIN CALCIUM 20 MG: 20 TABLET, FILM COATED ORAL at 21:12

## 2020-09-25 RX ADMIN — ACETAMINOPHEN 650 MG: 325 TABLET, FILM COATED ORAL at 14:45

## 2020-09-25 RX ADMIN — ACETAMINOPHEN 650 MG: 325 TABLET, FILM COATED ORAL at 23:40

## 2020-09-25 RX ADMIN — HEPARIN SODIUM 5000 UNITS: 5000 INJECTION INTRAVENOUS; SUBCUTANEOUS at 14:45

## 2020-09-25 RX ADMIN — MELATONIN 3 MG: at 01:32

## 2020-09-25 RX ADMIN — OXYCODONE HYDROCHLORIDE 10 MG: 10 TABLET ORAL at 00:47

## 2020-09-25 RX ADMIN — FLUTICASONE FUROATE AND VILANTEROL TRIFENATATE 1 PUFF: 200; 25 POWDER RESPIRATORY (INHALATION) at 08:39

## 2020-09-25 RX ADMIN — HEPARIN SODIUM 5000 UNITS: 5000 INJECTION INTRAVENOUS; SUBCUTANEOUS at 21:12

## 2020-09-25 RX ADMIN — INSULIN LISPRO 1 UNITS: 100 INJECTION, SOLUTION INTRAVENOUS; SUBCUTANEOUS at 17:35

## 2020-09-25 RX ADMIN — HEPARIN SODIUM 5000 UNITS: 5000 INJECTION INTRAVENOUS; SUBCUTANEOUS at 05:38

## 2020-09-25 RX ADMIN — INSULIN LISPRO 1 UNITS: 100 INJECTION, SOLUTION INTRAVENOUS; SUBCUTANEOUS at 12:21

## 2020-09-25 NOTE — PROGRESS NOTES
Pt very drowsy this am   She is having a hard time staying awake to have a conversation  She states she has a great deal of back pain but I do not feel comfortable at this point giving her any pain meds  She did swallow all her am pills without difficulty but fell right back to sleep  VSS    I will monitor closely

## 2020-09-25 NOTE — CASE MANAGEMENT
CM spoke with pt and confirmed information from a note completed on 9/21/19:     CM met with Pt with an introduction and explanation of role  Pt reported residing with her daughter Dawood Perez and son-in-law in a 2 story home with the use of a cane, roller walker and 4 steps to enter  Pt reported being independent with ADLs with no hx of VNA, SNF, mental health or drug/alcohol placements  Pt denied having a living will, reported the use of [facility] on [address] and has Barclay Osler as a PCP      CM reviewed d/c planning process including the following: identifying help at home, patient preference for d/c planning needs, Discharge Lounge, [facility] Meds to Bed program, availability of treatment team to discuss questions or concerns patient and/or family may have regarding understanding medications and recognizing signs and symptoms once discharged  CM also encouraged patient to follow up with all recommended appointments after discharge  Patient advised of importance for patient and family to participate in managing patients medical well being

## 2020-09-25 NOTE — PROGRESS NOTES
Progress Note -    Gigi Almonte 77 y o  female MRN: [id number]  Unit/Bed#: [facility] 807-01 Encounter: [id number]    Assessment:  69-yr old female ; s/p RESECTION COLON SIGMOID LAPAROSCOPIC (N/A)  RECTOPEXY/PROCTOPEXY LAPAROSCOPIC (N/A)  SIGMOIDOSCOPY FLEXIBLE 9/23    Overall, satisfactory post-operative course  Tolerating clears with toast     PLAN:  - advance diet as tolerated today  - discontinue IV fluids  - discharge planning   - PT/OT/IS  Subjective:   - no new complaints  Objective:     Vitals: Temp:  [97 7 °F (36 5 °C)-113 °F (45 °C)] 99 9 °F (37 7 °C)  HR:  [67-78] 78  Resp:  [18-20] 18  BP: (140-145)/(64-72) 145/72  Body mass index is 23 21 kg/m²  I/O       09/23 0701 - 09/24 0700 09/24 0701 - 09/25 0700    P  O   950    I V  (mL/kg) 3910 4 (65 8) 1160 (19 5)    Total Intake(mL/kg) 3910 4 (65 8) 2110 (35 5)    Urine (mL/kg/hr) 1160 4500 (3 2)    Stool  0    Blood 50     Total Output 1210 4500    Net +2700 4 -2390          Unmeasured Urine Occurrence  1 x    Unmeasured Stool Occurrence  1 x          Physical Exam:  GEN: NAD  HEENT: MMM  CV: RRR  Lung: Normal effort  Ab: Soft, ND, appropriately tender to palpation; incisions cdi  Extrem: No CCE  Neuro: A+Ox3    Lab, Imaging and other studies: I have personally reviewed pertinent reports      VTE Pharmacologic Prophylaxis: Heparin  VTE Mechanical Prophylaxis: sequential compression device

## 2020-09-25 NOTE — RESTORATIVE TECHNICIAN NOTE
Restorative Specialist Mobility Note       Activity: Ambulate in [place], [address] [facility], Chair     Assistive Device: Front wheel walker

## 2020-09-25 NOTE — PROGRESS NOTES
Pt complaining of a lot of back pain again  She rates it a #10 but since pt is finally not as drowsy as she has been all day - I only gave her tylenol    I will monitor for effectiveness

## 2020-09-25 NOTE — RESTORATIVE TECHNICIAN NOTE
Restorative Specialist Mobility Note       [facility]     Assistive Device: Front wheel walker

## 2020-09-25 NOTE — PLAN OF CARE
Problem: Potential for Falls  Goal: Patient will remain free of falls  Description: INTERVENTIONS:  - Assess patient frequently for physical needs  -  Identify cognitive and physical deficits and behaviors that affect risk of falls    -  [place] fall precautions as indicated by assessment   - Educate patient/family on patient safety including physical limitations  - Instruct patient to call for assistance with activity based on assessment  - Modify environment to reduce risk of injury  - Consider OT/PT consult to assist with strengthening/mobility  Outcome: Progressing     Problem: PAIN - ADULT  Goal: Verbalizes/displays adequate comfort level or baseline comfort level  Description: Interventions:  - Encourage patient to monitor pain and request assistance  - Assess pain using appropriate pain scale  - Administer analgesics based on type and severity of pain and evaluate response  - Implement non-pharmacological measures as appropriate and evaluate response  - Consider cultural and social influences on pain and pain management  - Notify physician/advanced practitioner if interventions unsuccessful or patient reports new pain  Outcome: Progressing     Problem: INFECTION - ADULT  Goal: Absence or prevention of progression during hospitalization  Description: INTERVENTIONS:  - Assess and monitor for signs and symptoms of infection  - Monitor lab/diagnostic results  - Monitor all insertion sites, i e  indwelling lines, tubes, and drains  - Monitor endotracheal if appropriate and nasal secretions for changes in amount and color  - [place] appropriate cooling/warming therapies per order  - Administer medications as ordered  - Instruct and encourage patient and family to use good hand hygiene technique  - Identify and instruct in appropriate isolation precautions for identified infection/condition  Outcome: Progressing  Goal: Absence of fever/infection during neutropenic period  Description: INTERVENTIONS:  - Monitor WBC    Outcome: Progressing     Problem: SAFETY ADULT  Goal: Patient will remain free of falls  Description: INTERVENTIONS:  - Assess patient frequently for physical needs  -  Identify cognitive and physical deficits and behaviors that affect risk of falls    -  [place] fall precautions as indicated by assessment   - Educate patient/family on patient safety including physical limitations  - Instruct patient to call for assistance with activity based on assessment  - Modify environment to reduce risk of injury  - Consider OT/PT consult to assist with strengthening/mobility  Outcome: Progressing  Goal: Maintain or return to baseline ADL function  Description: INTERVENTIONS:  -  Assess patient's ability to carry out ADLs; assess patient's baseline for ADL function and identify physical deficits which impact ability to perform ADLs (bathing, care of mouth/teeth, toileting, grooming, dressing, etc )  - Assess/evaluate cause of self-care deficits   - Assess range of motion  - Assess patient's mobility; develop plan if impaired  - Assess patient's need for assistive devices and provide as appropriate  - Encourage maximum independence but intervene and supervise when necessary  - Involve family in performance of ADLs  - Assess for home care needs following discharge   - Consider OT consult to assist with ADL evaluation and planning for discharge  - Provide patient education as appropriate  Outcome: Progressing  Goal: Maintain or return mobility status to optimal level  Description: INTERVENTIONS:  - Assess patient's baseline mobility status (ambulation, transfers, stairs, etc )    - Identify cognitive and physical deficits and behaviors that affect mobility  - Identify mobility aids required to assist with transfers and/or ambulation (gait belt, sit-to-stand, lift, walker, cane, etc )  - [place] fall precautions as indicated by assessment  - Record patient progress and toleration of activity level on Mobility SBAR; progress patient to next Phase/Stage  - Instruct patient to call for assistance with activity based on assessment  - Consider rehabilitation consult to assist with strengthening/weightbearing, etc   Outcome: Progressing     Problem: DISCHARGE PLANNING  Goal: Discharge to home or other facility with appropriate resources  Description: INTERVENTIONS:  - Identify barriers to discharge w/patient and caregiver  - Arrange for needed discharge resources and transportation as appropriate  - Identify discharge learning needs (meds, wound care, etc )  - Arrange for interpretive services to assist at discharge as needed  - Refer to Case Management Department for coordinating discharge planning if the patient needs post-hospital services based on physician/advanced practitioner order or complex needs related to functional status, cognitive ability, or social support system  Outcome: Progressing     Problem: Knowledge Deficit  Goal: Patient/family/caregiver demonstrates understanding of disease process, treatment plan, medications, and discharge instructions  Description: Complete learning assessment and assess knowledge base    Interventions:  - Provide teaching at level of understanding  - Provide teaching via preferred learning methods  Outcome: Progressing     Problem: GASTROINTESTINAL - ADULT  Goal: Minimal or absence of nausea and/or vomiting  Description: INTERVENTIONS:  - Administer IV fluids if ordered to ensure adequate hydration  - Maintain NPO status until nausea and vomiting are resolved  - Nasogastric tube if ordered  - Administer ordered antiemetic medications as needed  - Provide nonpharmacologic comfort measures as appropriate  - Advance diet as tolerated, if ordered  - Consider nutrition services referral to assist patient with adequate nutrition and appropriate food choices  Outcome: Progressing  Goal: Maintains or returns to baseline bowel function  Description: INTERVENTIONS:  - Assess bowel function  - Encourage oral fluids to ensure adequate hydration  - Administer IV fluids if ordered to ensure adequate hydration  - Administer ordered medications as needed  - Encourage mobilization and activity  - Consider nutritional services referral to assist patient with adequate nutrition and appropriate food choices  Outcome: Progressing  Goal: Maintains adequate nutritional intake  Description: INTERVENTIONS:  - Monitor percentage of each meal consumed  - Identify factors contributing to decreased intake, treat as appropriate  - Assist with meals as needed  - Monitor I&O, weight, and lab values if indicated  - Obtain nutrition services referral as needed  Outcome: Progressing     Problem: METABOLIC, FLUID AND ELECTROLYTES - ADULT  Goal: Electrolytes maintained within normal limits  Description: INTERVENTIONS:  - Monitor labs and assess patient for signs and symptoms of electrolyte imbalances  - Administer electrolyte replacement as ordered  - Monitor response to electrolyte replacements, including repeat lab results as appropriate  - Instruct patient on fluid and nutrition as appropriate  Outcome: Progressing  Goal: Fluid balance maintained  Description: INTERVENTIONS:  - Monitor labs   - Monitor I/O and WT  - Instruct patient on fluid and nutrition as appropriate  - Assess for signs & symptoms of volume excess or deficit  Outcome: Progressing     Problem: SKIN/TISSUE INTEGRITY - ADULT  Goal: Incision(s), wounds(s) or drain site(s) healing without S/S of infection  Description: INTERVENTIONS  - Assess and document risk factors for skin impairment   - Assess and document dressing, incision, wound bed, drain sites and surrounding tissue  - Consider nutrition services referral as needed  - Oral mucous membranes remain intact  - Provide patient/ family education  Outcome: Progressing

## 2020-09-25 NOTE — PROGRESS NOTES
Pt continues to complain of back pain but ambulated with assistance to bathroom and passed a large amount of gas    She felt some relief from this

## 2020-09-26 LAB
BACTERIA UR QL AUTO: NORMAL /HPF
BILIRUB UR QL STRIP: NEGATIVE
CLARITY UR: CLEAR
COLOR UR: YELLOW
GLUCOSE SERPL-MCNC: 207 MG/DL (ref 65–140)
GLUCOSE SERPL-MCNC: 232 MG/DL (ref 65–140)
GLUCOSE SERPL-MCNC: 232 MG/DL (ref 65–140)
GLUCOSE SERPL-MCNC: 270 MG/DL (ref 65–140)
GLUCOSE UR STRIP-MCNC: ABNORMAL MG/DL
HGB UR QL STRIP.AUTO: ABNORMAL
HYALINE CASTS #/AREA URNS LPF: NORMAL /LPF
KETONES UR STRIP-MCNC: NEGATIVE MG/DL
LEUKOCYTE ESTERASE UR QL STRIP: ABNORMAL
NITRITE UR QL STRIP: NEGATIVE
NON-SQ EPI CELLS URNS QL MICRO: NORMAL /HPF
PH UR STRIP.AUTO: 6.5 [PH]
PROT UR STRIP-MCNC: ABNORMAL MG/DL
RBC #/AREA URNS AUTO: NORMAL /HPF
SP GR UR STRIP.AUTO: 1.01 (ref 1–1.03)
UROBILINOGEN UR QL STRIP.AUTO: 0.2 E.U./DL
WBC #/AREA URNS AUTO: NORMAL /HPF

## 2020-09-26 PROCEDURE — 82948 REAGENT STRIP/BLOOD GLUCOSE: CPT

## 2020-09-26 PROCEDURE — 81001 URINALYSIS AUTO W/SCOPE: CPT | Performed by: COLON & RECTAL SURGERY

## 2020-09-26 RX ORDER — MAGNESIUM SULFATE 1 G/100ML
1 INJECTION INTRAVENOUS ONCE
Status: COMPLETED | OUTPATIENT
Start: 2020-09-26 | End: 2020-09-26

## 2020-09-26 RX ADMIN — INSULIN LISPRO 2 UNITS: 100 INJECTION, SOLUTION INTRAVENOUS; SUBCUTANEOUS at 08:38

## 2020-09-26 RX ADMIN — INSULIN LISPRO 3 UNITS: 100 INJECTION, SOLUTION INTRAVENOUS; SUBCUTANEOUS at 17:16

## 2020-09-26 RX ADMIN — HEPARIN SODIUM 5000 UNITS: 5000 INJECTION INTRAVENOUS; SUBCUTANEOUS at 14:02

## 2020-09-26 RX ADMIN — ATORVASTATIN CALCIUM 20 MG: 20 TABLET, FILM COATED ORAL at 21:09

## 2020-09-26 RX ADMIN — AMLODIPINE BESYLATE 5 MG: 5 TABLET ORAL at 08:37

## 2020-09-26 RX ADMIN — OXYCODONE HYDROCHLORIDE 5 MG: 5 TABLET ORAL at 08:36

## 2020-09-26 RX ADMIN — INSULIN LISPRO 4 UNITS: 100 INJECTION, SOLUTION INTRAVENOUS; SUBCUTANEOUS at 12:16

## 2020-09-26 RX ADMIN — FLUTICASONE FUROATE AND VILANTEROL TRIFENATATE 1 PUFF: 200; 25 POWDER RESPIRATORY (INHALATION) at 08:38

## 2020-09-26 RX ADMIN — MAGNESIUM SULFATE HEPTAHYDRATE 1 G: 1 INJECTION, SOLUTION INTRAVENOUS at 09:51

## 2020-09-26 RX ADMIN — HEPARIN SODIUM 5000 UNITS: 5000 INJECTION INTRAVENOUS; SUBCUTANEOUS at 05:35

## 2020-09-26 RX ADMIN — OXYCODONE HYDROCHLORIDE 5 MG: 5 TABLET ORAL at 18:32

## 2020-09-26 RX ADMIN — HEPARIN SODIUM 5000 UNITS: 5000 INJECTION INTRAVENOUS; SUBCUTANEOUS at 21:09

## 2020-09-26 NOTE — PLAN OF CARE
Problem: Potential for Falls  Goal: Patient will remain free of falls  Description: INTERVENTIONS:  - Assess patient frequently for physical needs  -  Identify cognitive and physical deficits and behaviors that affect risk of falls    -  [place] fall precautions as indicated by assessment   - Educate patient/family on patient safety including physical limitations  - Instruct patient to call for assistance with activity based on assessment  - Modify environment to reduce risk of injury  - Consider OT/PT consult to assist with strengthening/mobility  Outcome: Progressing     Problem: PAIN - ADULT  Goal: Verbalizes/displays adequate comfort level or baseline comfort level  Description: Interventions:  - Encourage patient to monitor pain and request assistance  - Assess pain using appropriate pain scale  - Administer analgesics based on type and severity of pain and evaluate response  - Implement non-pharmacological measures as appropriate and evaluate response  - Consider cultural and social influences on pain and pain management  - Notify physician/advanced practitioner if interventions unsuccessful or patient reports new pain  Outcome: Progressing     Problem: INFECTION - ADULT  Goal: Absence or prevention of progression during hospitalization  Description: INTERVENTIONS:  - Assess and monitor for signs and symptoms of infection  - Monitor lab/diagnostic results  - Monitor all insertion sites, i e  indwelling lines, tubes, and drains  - Monitor endotracheal if appropriate and nasal secretions for changes in amount and color  - [place] appropriate cooling/warming therapies per order  - Administer medications as ordered  - Instruct and encourage patient and family to use good hand hygiene technique  - Identify and instruct in appropriate isolation precautions for identified infection/condition  Outcome: Progressing  Goal: Absence of fever/infection during neutropenic period  Description: INTERVENTIONS:  - Monitor WBC    Outcome: Progressing     Problem: SAFETY ADULT  Goal: Patient will remain free of falls  Description: INTERVENTIONS:  - Assess patient frequently for physical needs  -  Identify cognitive and physical deficits and behaviors that affect risk of falls    -  [place] fall precautions as indicated by assessment   - Educate patient/family on patient safety including physical limitations  - Instruct patient to call for assistance with activity based on assessment  - Modify environment to reduce risk of injury  - Consider OT/PT consult to assist with strengthening/mobility  Outcome: Progressing  Goal: Maintain or return to baseline ADL function  Description: INTERVENTIONS:  -  Assess patient's ability to carry out ADLs; assess patient's baseline for ADL function and identify physical deficits which impact ability to perform ADLs (bathing, care of mouth/teeth, toileting, grooming, dressing, etc )  - Assess/evaluate cause of self-care deficits   - Assess range of motion  - Assess patient's mobility; develop plan if impaired  - Assess patient's need for assistive devices and provide as appropriate  - Encourage maximum independence but intervene and supervise when necessary  - Involve family in performance of ADLs  - Assess for home care needs following discharge   - Consider OT consult to assist with ADL evaluation and planning for discharge  - Provide patient education as appropriate  Outcome: Progressing  Goal: Maintain or return mobility status to optimal level  Description: INTERVENTIONS:  - Assess patient's baseline mobility status (ambulation, transfers, stairs, etc )    - Identify cognitive and physical deficits and behaviors that affect mobility  - Identify mobility aids required to assist with transfers and/or ambulation (gait belt, sit-to-stand, lift, walker, cane, etc )  - [place] fall precautions as indicated by assessment  - Record patient progress and toleration of activity level on Mobility SBAR; progress patient to next Phase/Stage  - Instruct patient to call for assistance with activity based on assessment  - Consider rehabilitation consult to assist with strengthening/weightbearing, etc   Outcome: Progressing     Problem: DISCHARGE PLANNING  Goal: Discharge to home or other facility with appropriate resources  Description: INTERVENTIONS:  - Identify barriers to discharge w/patient and caregiver  - Arrange for needed discharge resources and transportation as appropriate  - Identify discharge learning needs (meds, wound care, etc )  - Arrange for interpretive services to assist at discharge as needed  - Refer to Case Management Department for coordinating discharge planning if the patient needs post-hospital services based on physician/advanced practitioner order or complex needs related to functional status, cognitive ability, or social support system  Outcome: Progressing     Problem: Knowledge Deficit  Goal: Patient/family/caregiver demonstrates understanding of disease process, treatment plan, medications, and discharge instructions  Description: Complete learning assessment and assess knowledge base    Interventions:  - Provide teaching at level of understanding  - Provide teaching via preferred learning methods  Outcome: Progressing     Problem: GASTROINTESTINAL - ADULT  Goal: Minimal or absence of nausea and/or vomiting  Description: INTERVENTIONS:  - Administer IV fluids if ordered to ensure adequate hydration  - Maintain NPO status until nausea and vomiting are resolved  - Nasogastric tube if ordered  - Administer ordered antiemetic medications as needed  - Provide nonpharmacologic comfort measures as appropriate  - Advance diet as tolerated, if ordered  - Consider nutrition services referral to assist patient with adequate nutrition and appropriate food choices  Outcome: Progressing  Goal: Maintains or returns to baseline bowel function  Description: INTERVENTIONS:  - Assess bowel function  - Encourage oral fluids to ensure adequate hydration  - Administer IV fluids if ordered to ensure adequate hydration  - Administer ordered medications as needed  - Encourage mobilization and activity  - Consider nutritional services referral to assist patient with adequate nutrition and appropriate food choices  Outcome: Progressing  Goal: Maintains adequate nutritional intake  Description: INTERVENTIONS:  - Monitor percentage of each meal consumed  - Identify factors contributing to decreased intake, treat as appropriate  - Assist with meals as needed  - Monitor I&O, weight, and lab values if indicated  - Obtain nutrition services referral as needed  Outcome: Progressing     Problem: METABOLIC, FLUID AND ELECTROLYTES - ADULT  Goal: Electrolytes maintained within normal limits  Description: INTERVENTIONS:  - Monitor labs and assess patient for signs and symptoms of electrolyte imbalances  - Administer electrolyte replacement as ordered  - Monitor response to electrolyte replacements, including repeat lab results as appropriate  - Instruct patient on fluid and nutrition as appropriate  Outcome: Progressing  Goal: Fluid balance maintained  Description: INTERVENTIONS:  - Monitor labs   - Monitor I/O and WT  - Instruct patient on fluid and nutrition as appropriate  - Assess for signs & symptoms of volume excess or deficit  Outcome: Progressing     Problem: SKIN/TISSUE INTEGRITY - ADULT  Goal: Incision(s), wounds(s) or drain site(s) healing without S/S of infection  Description: INTERVENTIONS  - Assess and document risk factors for skin impairment   - Assess and document dressing, incision, wound bed, drain sites and surrounding tissue  - Consider nutrition services referral as needed  - Oral mucous membranes remain intact  - Provide patient/ family education  Outcome: Progressing

## 2020-09-26 NOTE — PROGRESS NOTES
Patient complaining of lower back pain  Offered to ambulate the patient in the hallway, but she refused stating "I'm in too much pain to do anything but lie here " Patient educated on the importance of early ambulation post surgery

## 2020-09-26 NOTE — PROGRESS NOTES
Progress Note -  Colorectal surgery  Thedobisi Wong 77 y o  female MRN: [id number]  Unit/Bed#: [facility] 807-01 Encounter: [id number]    Assessment:  69-yr old female ; s/p RESECTION COLON SIGMOID LAPAROSCOPIC (N/A)  RECTOPEXY/PROCTOPEXY LAPAROSCOPIC (N/A)  SIGMOIDOSCOPY FLEXIBLE 9/23    Complains back pain  Somewhat weak    PLAN:  Diet [place]/CHO Controlled; Consistent Carbohydrate Diet Level 2 (5 carb servings/75 grams CHO/meal); Lo Fiber/Lo Residue   Continue current diet  OOBTC, AAT, need more immobilization  Pulmonary Toilet, IS  PPx: SQH, PPI, Colace  Pain and Nausea control PRN    Subjective:   Complains pain mostly in her back, denies nausea or vomiting, +ve flatus and BM, tolerating diet      Objective:     Vitals: Temp:  [98 °F (36 7 °C)-100 8 °F (38 2 °C)] 99 8 °F (37 7 °C)  HR:  [82] 82  Resp:  [15-16] 15  BP: (136-164)/(64-73) 164/73  Body mass index is 23 21 kg/m²  I/O       09/23 0701 - 09/24 0700 09/24 0701 - 09/25 0700    P  O   950    I V  (mL/kg) 3910 4 (65 8) 1160 (19 5)    Total Intake(mL/kg) 3910 4 (65 8) 2110 (35 5)    Urine (mL/kg/hr) 1160 4500 (3 2)    Stool  0    Blood 50     Total Output 1210 4500    Net +2700 4 -2390          Unmeasured Urine Occurrence  1 x    Unmeasured Stool Occurrence  1 x          Physical Exam:  Gen: NAD, Comfortable  Neuro: A&O, No focal deficits  Head: Normal Cephalic, Atraumatic  Eye: EOMI, PERRLA, No scleral icterus  Neck: Supple, No JVD, Midline trachea  CV: RRR, Cap refill <2 sec  Pulm: Normal work of breathing, no respiratory distress  Abd: Soft, Non-Distended, tenderness in RLQ, no rebound, incision cdi  Ext: No edema, Non-tender  Skin: warm, dry, intact    Lab, Imaging and other studies: I have personally reviewed pertinent reports      VTE Pharmacologic Prophylaxis: Heparin  VTE Mechanical Prophylaxis: sequential compression device

## 2020-09-26 NOTE — PLAN OF CARE
Problem: Potential for Falls  Goal: Patient will remain free of falls  Description: INTERVENTIONS:  - Assess patient frequently for physical needs  -  Identify cognitive and physical deficits and behaviors that affect risk of falls    -  [place] fall precautions as indicated by assessment   - Educate patient/family on patient safety including physical limitations  - Instruct patient to call for assistance with activity based on assessment  - Modify environment to reduce risk of injury  - Consider OT/PT consult to assist with strengthening/mobility  Outcome: Progressing     Problem: PAIN - ADULT  Goal: Verbalizes/displays adequate comfort level or baseline comfort level  Description: Interventions:  - Encourage patient to monitor pain and request assistance  - Assess pain using appropriate pain scale  - Administer analgesics based on type and severity of pain and evaluate response  - Implement non-pharmacological measures as appropriate and evaluate response  - Consider cultural and social influences on pain and pain management  - Notify physician/advanced practitioner if interventions unsuccessful or patient reports new pain  Outcome: Progressing     Problem: INFECTION - ADULT  Goal: Absence or prevention of progression during hospitalization  Description: INTERVENTIONS:  - Assess and monitor for signs and symptoms of infection  - Monitor lab/diagnostic results  - Monitor all insertion sites, i e  indwelling lines, tubes, and drains  - Monitor endotracheal if appropriate and nasal secretions for changes in amount and color  - [place] appropriate cooling/warming therapies per order  - Administer medications as ordered  - Instruct and encourage patient and family to use good hand hygiene technique  - Identify and instruct in appropriate isolation precautions for identified infection/condition  Outcome: Progressing  Goal: Absence of fever/infection during neutropenic period  Description: INTERVENTIONS:  - Monitor WBC    Outcome: Progressing     Problem: SAFETY ADULT  Goal: Patient will remain free of falls  Description: INTERVENTIONS:  - Assess patient frequently for physical needs  -  Identify cognitive and physical deficits and behaviors that affect risk of falls    -  [place] fall precautions as indicated by assessment   - Educate patient/family on patient safety including physical limitations  - Instruct patient to call for assistance with activity based on assessment  - Modify environment to reduce risk of injury  - Consider OT/PT consult to assist with strengthening/mobility  Outcome: Progressing  Goal: Maintain or return to baseline ADL function  Description: INTERVENTIONS:  -  Assess patient's ability to carry out ADLs; assess patient's baseline for ADL function and identify physical deficits which impact ability to perform ADLs (bathing, care of mouth/teeth, toileting, grooming, dressing, etc )  - Assess/evaluate cause of self-care deficits   - Assess range of motion  - Assess patient's mobility; develop plan if impaired  - Assess patient's need for assistive devices and provide as appropriate  - Encourage maximum independence but intervene and supervise when necessary  - Involve family in performance of ADLs  - Assess for home care needs following discharge   - Consider OT consult to assist with ADL evaluation and planning for discharge  - Provide patient education as appropriate  Outcome: Progressing  Goal: Maintain or return mobility status to optimal level  Description: INTERVENTIONS:  - Assess patient's baseline mobility status (ambulation, transfers, stairs, etc )    - Identify cognitive and physical deficits and behaviors that affect mobility  - Identify mobility aids required to assist with transfers and/or ambulation (gait belt, sit-to-stand, lift, walker, cane, etc )  - [place] fall precautions as indicated by assessment  - Record patient progress and toleration of activity level on Mobility SBAR; progress patient to next Phase/Stage  - Instruct patient to call for assistance with activity based on assessment  - Consider rehabilitation consult to assist with strengthening/weightbearing, etc   Outcome: Progressing     Problem: DISCHARGE PLANNING  Goal: Discharge to home or other facility with appropriate resources  Description: INTERVENTIONS:  - Identify barriers to discharge w/patient and caregiver  - Arrange for needed discharge resources and transportation as appropriate  - Identify discharge learning needs (meds, wound care, etc )  - Arrange for interpretive services to assist at discharge as needed  - Refer to Case Management Department for coordinating discharge planning if the patient needs post-hospital services based on physician/advanced practitioner order or complex needs related to functional status, cognitive ability, or social support system  Outcome: Progressing     Problem: Knowledge Deficit  Goal: Patient/family/caregiver demonstrates understanding of disease process, treatment plan, medications, and discharge instructions  Description: Complete learning assessment and assess knowledge base    Interventions:  - Provide teaching at level of understanding  - Provide teaching via preferred learning methods  Outcome: Progressing     Problem: GASTROINTESTINAL - ADULT  Goal: Minimal or absence of nausea and/or vomiting  Description: INTERVENTIONS:  - Administer IV fluids if ordered to ensure adequate hydration  - Maintain NPO status until nausea and vomiting are resolved  - Nasogastric tube if ordered  - Administer ordered antiemetic medications as needed  - Provide nonpharmacologic comfort measures as appropriate  - Advance diet as tolerated, if ordered  - Consider nutrition services referral to assist patient with adequate nutrition and appropriate food choices  Outcome: Progressing  Goal: Maintains or returns to baseline bowel function  Description: INTERVENTIONS:  - Assess bowel function  - Encourage oral fluids to ensure adequate hydration  - Administer IV fluids if ordered to ensure adequate hydration  - Administer ordered medications as needed  - Encourage mobilization and activity  - Consider nutritional services referral to assist patient with adequate nutrition and appropriate food choices  Outcome: Progressing  Goal: Maintains adequate nutritional intake  Description: INTERVENTIONS:  - Monitor percentage of each meal consumed  - Identify factors contributing to decreased intake, treat as appropriate  - Assist with meals as needed  - Monitor I&O, weight, and lab values if indicated  - Obtain nutrition services referral as needed  Outcome: Progressing     Problem: METABOLIC, FLUID AND ELECTROLYTES - ADULT  Goal: Electrolytes maintained within normal limits  Description: INTERVENTIONS:  - Monitor labs and assess patient for signs and symptoms of electrolyte imbalances  - Administer electrolyte replacement as ordered  - Monitor response to electrolyte replacements, including repeat lab results as appropriate  - Instruct patient on fluid and nutrition as appropriate  Outcome: Progressing  Goal: Fluid balance maintained  Description: INTERVENTIONS:  - Monitor labs   - Monitor I/O and WT  - Instruct patient on fluid and nutrition as appropriate  - Assess for signs & symptoms of volume excess or deficit  Outcome: Progressing     Problem: SKIN/TISSUE INTEGRITY - ADULT  Goal: Incision(s), wounds(s) or drain site(s) healing without S/S of infection  Description: INTERVENTIONS  - Assess and document risk factors for skin impairment   - Assess and document dressing, incision, wound bed, drain sites and surrounding tissue  - Consider nutrition services referral as needed  - Oral mucous membranes remain intact  - Provide patient/ family education  Outcome: Progressing

## 2020-09-27 LAB
ANION GAP SERPL CALCULATED.3IONS-SCNC: 6 MMOL/L (ref 4–13)
BASOPHILS # BLD AUTO: 0.03 THOUSANDS/ΜL (ref 0–0.1)
BASOPHILS NFR BLD AUTO: 0 % (ref 0–1)
BUN SERPL-MCNC: 7 MG/DL (ref 5–25)
CALCIUM SERPL-MCNC: 9.1 MG/DL (ref 8.3–10.1)
CHLORIDE SERPL-SCNC: 98 MMOL/L (ref 100–108)
CO2 SERPL-SCNC: 29 MMOL/L (ref 21–32)
CREAT SERPL-MCNC: 0.47 MG/DL (ref 0.6–1.3)
EOSINOPHIL # BLD AUTO: 0.18 THOUSAND/ΜL (ref 0–0.61)
EOSINOPHIL NFR BLD AUTO: 2 % (ref 0–6)
ERYTHROCYTE [DISTWIDTH] IN BLOOD BY AUTOMATED COUNT: 12.3 % (ref 11.6–15.1)
GFR SERPL CREATININE-BSD FRML MDRD: 98 ML/MIN/1.73SQ M
GLUCOSE SERPL-MCNC: 225 MG/DL (ref 65–140)
GLUCOSE SERPL-MCNC: 227 MG/DL (ref 65–140)
GLUCOSE SERPL-MCNC: 242 MG/DL (ref 65–140)
GLUCOSE SERPL-MCNC: 266 MG/DL (ref 65–140)
GLUCOSE SERPL-MCNC: 269 MG/DL (ref 65–140)
HCT VFR BLD AUTO: 31.5 % (ref 34.8–46.1)
HGB BLD-MCNC: 10.7 G/DL (ref 11.5–15.4)
IMM GRANULOCYTES # BLD AUTO: 0.02 THOUSAND/UL (ref 0–0.2)
IMM GRANULOCYTES NFR BLD AUTO: 0 % (ref 0–2)
LYMPHOCYTES # BLD AUTO: 1.4 THOUSANDS/ΜL (ref 0.6–4.47)
LYMPHOCYTES NFR BLD AUTO: 18 % (ref 14–44)
MCH RBC QN AUTO: 31.4 PG (ref 26.8–34.3)
MCHC RBC AUTO-ENTMCNC: 34 G/DL (ref 31.4–37.4)
MCV RBC AUTO: 92 FL (ref 82–98)
MONOCYTES # BLD AUTO: 0.61 THOUSAND/ΜL (ref 0.17–1.22)
MONOCYTES NFR BLD AUTO: 8 % (ref 4–12)
NEUTROPHILS # BLD AUTO: 5.41 THOUSANDS/ΜL (ref 1.85–7.62)
NEUTS SEG NFR BLD AUTO: 72 % (ref 43–75)
NRBC BLD AUTO-RTO: 0 /100 WBCS
PLATELET # BLD AUTO: 155 THOUSANDS/UL (ref 149–390)
PMV BLD AUTO: 13.3 FL (ref 8.9–12.7)
POTASSIUM SERPL-SCNC: 3.7 MMOL/L (ref 3.5–5.3)
RBC # BLD AUTO: 3.41 MILLION/UL (ref 3.81–5.12)
SODIUM SERPL-SCNC: 133 MMOL/L (ref 136–145)
WBC # BLD AUTO: 7.65 THOUSAND/UL (ref 4.31–10.16)

## 2020-09-27 PROCEDURE — 94760 N-INVAS EAR/PLS OXIMETRY 1: CPT

## 2020-09-27 PROCEDURE — 80048 BASIC METABOLIC PNL TOTAL CA: CPT | Performed by: SURGERY

## 2020-09-27 PROCEDURE — 82948 REAGENT STRIP/BLOOD GLUCOSE: CPT

## 2020-09-27 PROCEDURE — 85025 COMPLETE CBC W/AUTO DIFF WBC: CPT | Performed by: COLON & RECTAL SURGERY

## 2020-09-27 RX ORDER — POTASSIUM CHLORIDE 20 MEQ/1
40 TABLET, EXTENDED RELEASE ORAL ONCE
Status: COMPLETED | OUTPATIENT
Start: 2020-09-27 | End: 2020-09-27

## 2020-09-27 RX ORDER — MUSCLE RUB CREAM 100; 150 MG/G; MG/G
CREAM TOPICAL 4 TIMES DAILY PRN
Status: DISCONTINUED | OUTPATIENT
Start: 2020-09-27 | End: 2020-09-28 | Stop reason: HOSPADM

## 2020-09-27 RX ADMIN — INSULIN LISPRO 3 UNITS: 100 INJECTION, SOLUTION INTRAVENOUS; SUBCUTANEOUS at 16:29

## 2020-09-27 RX ADMIN — HEPARIN SODIUM 5000 UNITS: 5000 INJECTION INTRAVENOUS; SUBCUTANEOUS at 14:10

## 2020-09-27 RX ADMIN — HEPARIN SODIUM 5000 UNITS: 5000 INJECTION INTRAVENOUS; SUBCUTANEOUS at 05:32

## 2020-09-27 RX ADMIN — ATORVASTATIN CALCIUM 20 MG: 20 TABLET, FILM COATED ORAL at 21:39

## 2020-09-27 RX ADMIN — INSULIN LISPRO 2 UNITS: 100 INJECTION, SOLUTION INTRAVENOUS; SUBCUTANEOUS at 07:52

## 2020-09-27 RX ADMIN — FLUTICASONE FUROATE AND VILANTEROL TRIFENATATE 1 PUFF: 200; 25 POWDER RESPIRATORY (INHALATION) at 08:42

## 2020-09-27 RX ADMIN — HEPARIN SODIUM 5000 UNITS: 5000 INJECTION INTRAVENOUS; SUBCUTANEOUS at 21:39

## 2020-09-27 RX ADMIN — MENTHOL, METHYL SALICYLATE: 10; 15 CREAM TOPICAL at 19:52

## 2020-09-27 RX ADMIN — ACETAMINOPHEN 650 MG: 325 TABLET, FILM COATED ORAL at 23:30

## 2020-09-27 RX ADMIN — ACETAMINOPHEN 650 MG: 325 TABLET, FILM COATED ORAL at 16:26

## 2020-09-27 RX ADMIN — POTASSIUM CHLORIDE 40 MEQ: 1500 TABLET, EXTENDED RELEASE ORAL at 08:42

## 2020-09-27 RX ADMIN — AMLODIPINE BESYLATE 5 MG: 5 TABLET ORAL at 08:42

## 2020-09-27 RX ADMIN — MENTHOL, METHYL SALICYLATE: 10; 15 CREAM TOPICAL at 11:45

## 2020-09-27 RX ADMIN — INSULIN LISPRO 3 UNITS: 100 INJECTION, SOLUTION INTRAVENOUS; SUBCUTANEOUS at 11:44

## 2020-09-27 RX ADMIN — OXYCODONE HYDROCHLORIDE 5 MG: 5 TABLET ORAL at 02:55

## 2020-09-27 RX ADMIN — MENTHOL, METHYL SALICYLATE: 10; 15 CREAM TOPICAL at 15:23

## 2020-09-27 RX ADMIN — MELATONIN 6 MG: at 23:30

## 2020-09-27 NOTE — PROGRESS NOTES
Progress Note -  Colorectal surgery  Jefferson Solis 77 y o  female MRN: [id number]  Unit/Bed#: [facility] 807-01 Encounter: [id number]    Assessment:  69-yr old female ; s/p RESECTION COLON SIGMOID LAPAROSCOPIC (N/A)  RECTOPEXY/PROCTOPEXY LAPAROSCOPIC (N/A)  SIGMOIDOSCOPY FLEXIBLE 9/23    UA was negative, back pain is better  Had low grade fever yesterday  Incontinent    PLAN:  Diet B[place]/CHO Controlled; Consistent Carbohydrate Diet Level 2 (5 carb servings/75 grams CHO/meal); Lo Fiber/Lo Residue   Continue current diet  F/u AM labs  OOBTC, AAT, need more immobilization  Pulmonary Toilet, IS  PPx: SQH, PPI, Colace  Pain and Nausea control PRN    Subjective:   Pain is better today, denies nausea, +Ve flatus no bm, tolerating diet, urinating well but incontinent    Objective:     Vitals: Temp:  [100 7 °F (38 2 °C)] 100 7 °F (38 2 °C)  Resp:  [18] 18  BP: (120-139)/(57-72) 133/72  Body mass index is 23 21 kg/m²  I/O       09/23 0701 - 09/24 0700 09/24 0701 - 09/25 0700    P  O   950    I V  (mL/kg) 3910 4 (65 8) 1160 (19 5)    Total Intake(mL/kg) 3910 4 (65 8) 2110 (35 5)    Urine (mL/kg/hr) 1160 4500 (3 2)    Stool  0    Blood 50     Total Output 1210 4500    Net +2700 4 -2390          Unmeasured Urine Occurrence  1 x    Unmeasured Stool Occurrence  1 x          Physical Exam:  Gen: NAD, Comfortable  Neuro: A&O, No focal deficits  Head: Normal Cephalic, Atraumatic  Eye: EOMI, PERRLA, No scleral icterus  Neck: Supple, No JVD, Midline trachea  CV: RRR, Cap refill <2 sec  Pulm: Normal work of breathing, no respiratory distress  Abd: Soft, Non-Distended, tenderness in RLQ, incision cdi  Ext: No edema, Non-tender  Skin: warm, dry, intact    Lab, Imaging and other studies: I have personally reviewed pertinent reports      VTE Pharmacologic Prophylaxis: Heparin  VTE Mechanical Prophylaxis: sequential compression device

## 2020-09-27 NOTE — RESTORATIVE TECHNICIAN NOTE
Restorative Specialist Mobility Note       Activity: Ambulate in zurita, Chair     Assistive Device: Front wheel walker

## 2020-09-27 NOTE — UTILIZATION REVIEW
/Continued Stay Review    Date:              9/27           Current Patient Class: IP  Current Level of Care: MS    HPI:75 y o  female initially admitted on 9/23    Assessment/Plan:s/p RESECTION COLON SIGMOID LAPAROSCOPIC (N/A)  RECTOPEXY/PROCTOPEXY LAPAROSCOPIC   SIGMOIDOSCOPY FLEXIBLE 9/23 back pain improved , abd pain cont to improve   Tolerating po intake , passing gas  Plan to recheck labs in am , OOB to chair   Cont PPI and colace   Pain and nausea control prn       Pertinent Labs/Diagnostic Results:     Results from last 7 days   Lab Units 09/27/20  0536 09/24/20  0508 09/23/20  1728   WBC Thousand/uL 7 65 7 65 12 79*   HEMOGLOBIN g/dL 10 7* 10 7* 11 8   HEMATOCRIT % 31 5* 32 6* 35 4   PLATELETS Thousands/uL 155 192 179   NEUTROS ABS Thousands/µL 5 41 6 20  --      Results from last 7 days   Lab Units 09/27/20  0536 09/25/20  0521 09/24/20  0508   SODIUM mmol/L 133* 137 136   POTASSIUM mmol/L 3 7 4 0 4 1   CHLORIDE mmol/L 98* 102 105   CO2 mmol/L 29 27 26   ANION GAP mmol/L 6 8 5   BUN mg/dL 7 5 12   CREATININE mg/dL 0 47* 0 39* 0 47*   EGFR ml/min/1 73sq m 98 104 98   CALCIUM mg/dL 9 1 8 7 8 4   MAGNESIUM mg/dL  --  1 9 1 7     Results from last 7 days   Lab Units 09/27/20  1136 09/27/20  0732 09/26/20  2049 09/26/20  1626 09/26/20  1102 09/26/20  0733 09/25/20  2046 09/25/20  1625 09/25/20  1158 09/25/20  0711 09/24/20  2115 09/24/20  1604   POC GLUCOSE mg/dl 242* 225* 232* 232* 270* 207* 207* 172* 189* 193* 163* 245*     Results from last 7 days   Lab Units 09/27/20  0536 09/25/20  0521 09/24/20  0508   GLUCOSE RANDOM mg/dL 227* 181* 229*     Results from last 7 days   Lab Units 09/26/20  1228   CLARITY UA  Clear   COLOR UA  Yellow   SPEC GRAV UA  1 014   PH UA  6 5   GLUCOSE UA mg/dl >=1000 (1%)*   KETONES UA mg/dl Negative   BLOOD UA  Small*   PROTEIN UA mg/dl Trace*   NITRITE UA  Negative   BILIRUBIN UA  Negative   UROBILINOGEN UA E U /dl 0 2   LEUKOCYTES UA  Elevated glucose may cause decreased leukocyte values  See urine microscopic for UWBC result/*   WBC UA /hpf 2-4   RBC UA /hpf 2-4   BACTERIA UA /hpf None Seen   EPITHELIAL CELLS WET PREP /hpf None Seen       Vital Signs:  09/27/20 1216   --   --   --   --   --   --   None (Room air)    09/27/20 07:34:53   98 4 °F (36 9 °C)   --   18   127/72   90   --   --    09/27/20 0533   --   --   --   --   --   --   None (Room air         Medications:   Scheduled Medications:  amLODIPine, 5 mg, Oral, Daily  atorvastatin, 20 mg, Oral, HS  fluticasone-vilanterol, 1 puff, Inhalation, Daily  heparin (porcine), 5,000 Units, Subcutaneous, Q8H DINORAH  insulin lispro, 1-6 Units, Subcutaneous, TID AC      Continuous IV Infusions:     PRN Meds:  acetaminophen, 650 mg, Oral, Q6H PRN  albuterol, 2 puff, Inhalation, Q6H PRN  albuterol, 2 5 mg, Nebulization, Q6H PRN  HYDROmorphone, 0 5 mg, Intravenous, Q3H PRN  melatonin, 6 mg, Oral, HS PRN  menthol-methyl salicylate, , Apply externally, 4x Daily PRN  metoprolol, 5 mg, Intravenous, Q6H PRN  ondansetron, 4 mg, Intravenous, Q6H PRN  oxyCODONE, 10 mg, Oral, Q4H PRN  oxyCODONE, 5 mg, Oral, Q4H PRN        Discharge Plan: D    Network Utilization Review Department  [email or web address]  ATTENTION: Please call with any questions or concerns to [phone] and carefully listen to the prompts so that you are directed to the right person  All voicemails are confidential   Nelida Quintana all requests for admission clinical reviews, approved or denied determinations and any other requests to dedicated fax number below belonging to the campus where the patient is receiving treatment   List of dedicated fax numbers for the Facilities:  [address] DENIALS (Administrative/Medical Necessity) [phone]   [address] (Maternity/NICU/Pediatrics) [phone]   [facility] [address] [facility] [phone]   Dede Umana [phone]   ST  [address] [phone]   [facility] [place]  [address] [phone]   ='gf

## 2020-09-27 NOTE — RESPIRATORY THERAPY NOTE
pt currently in no resp distress, no resp UDN needed at this time  Pt has not needed resp UDN treatment during admission 4 days ago   Will D/C resp protocol and D/C prn UDN treatments

## 2020-09-28 ENCOUNTER — TELEPHONE (OUTPATIENT)
Dept: FAMILY MEDICINE CLINIC | Facility: CLINIC | Age: 75
End: 2020-09-28

## 2020-09-28 VITALS
OXYGEN SATURATION: 98 % | RESPIRATION RATE: 20 BRPM | DIASTOLIC BLOOD PRESSURE: 67 MMHG | HEIGHT: 63 IN | HEART RATE: 86 BPM | WEIGHT: 131 LBS | TEMPERATURE: 98.4 F | SYSTOLIC BLOOD PRESSURE: 126 MMHG | BODY MASS INDEX: 23.21 KG/M2

## 2020-09-28 LAB
ANION GAP SERPL CALCULATED.3IONS-SCNC: 5 MMOL/L (ref 4–13)
BUN SERPL-MCNC: 11 MG/DL (ref 5–25)
CALCIUM SERPL-MCNC: 9.9 MG/DL (ref 8.3–10.1)
CHLORIDE SERPL-SCNC: 98 MMOL/L (ref 100–108)
CO2 SERPL-SCNC: 29 MMOL/L (ref 21–32)
CREAT SERPL-MCNC: 0.44 MG/DL (ref 0.6–1.3)
GFR SERPL CREATININE-BSD FRML MDRD: 100 ML/MIN/1.73SQ M
GLUCOSE SERPL-MCNC: 221 MG/DL (ref 65–140)
GLUCOSE SERPL-MCNC: 228 MG/DL (ref 65–140)
GLUCOSE SERPL-MCNC: 247 MG/DL (ref 65–140)
POTASSIUM SERPL-SCNC: 4.3 MMOL/L (ref 3.5–5.3)
SODIUM SERPL-SCNC: 132 MMOL/L (ref 136–145)

## 2020-09-28 PROCEDURE — 82948 REAGENT STRIP/BLOOD GLUCOSE: CPT

## 2020-09-28 PROCEDURE — 97535 SELF CARE MNGMENT TRAINING: CPT

## 2020-09-28 PROCEDURE — 97116 GAIT TRAINING THERAPY: CPT

## 2020-09-28 PROCEDURE — 80048 BASIC METABOLIC PNL TOTAL CA: CPT | Performed by: ORTHOPAEDIC SURGERY

## 2020-09-28 RX ADMIN — INSULIN LISPRO 2 UNITS: 100 INJECTION, SOLUTION INTRAVENOUS; SUBCUTANEOUS at 11:52

## 2020-09-28 RX ADMIN — INSULIN LISPRO 3 UNITS: 100 INJECTION, SOLUTION INTRAVENOUS; SUBCUTANEOUS at 08:23

## 2020-09-28 RX ADMIN — ACETAMINOPHEN 650 MG: 325 TABLET, FILM COATED ORAL at 09:41

## 2020-09-28 RX ADMIN — AMLODIPINE BESYLATE 5 MG: 5 TABLET ORAL at 08:23

## 2020-09-28 RX ADMIN — FLUTICASONE FUROATE AND VILANTEROL TRIFENATATE 1 PUFF: 200; 25 POWDER RESPIRATORY (INHALATION) at 08:23

## 2020-09-28 RX ADMIN — HEPARIN SODIUM 5000 UNITS: 5000 INJECTION INTRAVENOUS; SUBCUTANEOUS at 05:19

## 2020-09-28 NOTE — PLAN OF CARE
Problem: PHYSICAL THERAPY ADULT  Goal: Performs mobility at highest level of function for planned discharge setting  See evaluation for individualized goals  Description: Treatment/Interventions: Functional transfer training, LE strengthening/ROM, Elevations, Therapeutic exercise, Endurance training, Patient/family training, Equipment eval/education, Bed mobility, Gait training, Spoke to MD, Spoke to nursing, Spoke to case management  Equipment Recommended: Raf Taylor       See flowsheet documentation for full assessment, interventions and recommendations  Outcome: Progressing  Note: Prognosis: Good  Problem List: Decreased strength, Decreased endurance, Impaired balance, Decreased mobility, Pain  Assessment: Pt with improved activity tolerance this session, able to increase ambulation distance and perform stair training with supervision  Pt reports only concern regarding potential discharge home is obtaining DME and gas pains  CM updated on DME recommendations  Pt with good insight to impairments  Improved balance able to  SLS with 1 UE support to fix shoe, OT present and encouraged performance in sitting for safety  Pt will benefit from continued skilled PT intervention during course of hospital stay to improve strength, endurance and balance to improve safety with functional mobility  Recommend home with family care and HHPT upon hospital D/C  Barriers to Discharge: Inaccessible home environment, Decreased caregiver support  Barriers to Discharge Comments: RADHA- alone at times   PT Discharge Recommendation: Home with skilled therapy(home with family support and HHPT)     PT - OK to Discharge: Yes    See flowsheet documentation for full assessment

## 2020-09-28 NOTE — DISCHARGE INSTRUCTIONS
Continue low residue diet  ABSOLUTELY NOTHING PER RECTUM     Low Fiber Diet   WHAT YOU NEED TO KNOW:   A low-fiber diet limits foods that are high in fiber  Fiber is the part of fruits, vegetables, and grains that is not broken down by your body  You may need to follow this diet after surgery on your intestines  You may also need to follow this diet for certain conditions such as Crohn disease or ulcerative colitis  Ask your healthcare provider or dietitian how much fiber you can have each day  DISCHARGE INSTRUCTIONS:   Foods to include:  Read food labels to check the amount of fiber that are found in foods  Some foods that are low in fiber include the following:  · Grains:  Choose grains that have less than 2 grams of fiber in each serving   Examples include the following:     ¨ Cream of wheat and finely ground grits    ¨ Dry cereal made from rice     ¨ White bread, white pasta, and white rice    ¨ Crackers, bagels, and rolls made from white or refined flour    · Other foods:      ¨ Canned and well-cooked fruit without skins or seeds, and juice without pulp    ¨ Ripe bananas and melons    ¨ Canned and well-cooked vegetables without skins or seeds, and vegetable juice    ¨ Cow's milk, lactose-free milk, soy milk, and rice milk    ¨ Yogurt without nuts, fruit, or granola    ¨ Eggs, poultry (such as chicken and turkey), fish, and tender, ground, well-cooked beef     ¨ Tofu and smooth peanut butter    ¨ Broth and strained soups made of low-fiber foods  Foods to avoid:   · Breads, cereals, crackers, and pasta made with whole wheat or whole grains (such as whole oats)    · [employer], wild rice, quinoa, kasha, and barley    · All fresh fruit with skin, except banana and melons     · Dried fruits and fruit juice with pulp    · Canned pineapple    · Raw vegetables    · Nuts, seeds, and popcorn    · Beans, nuts, peas, and lentils    · Tough meats    · Coconut and avocado  What else you should know about a low-fiber diet: A low-fiber diet can decrease the amount of bowel movements you have  Drink liquids as directed to avoid constipation  Ask how much liquid to drink each day and which liquids are best for you  © 2017 [address] Information is for End User's use only and may not be sold, redistributed or otherwise used for commercial purposes  All illustrations and images included in CareNotes® are the copyrighted property of A D A M , Inc  or [employer]  The above information is an  only  It is not intended as medical advice for individual conditions or treatments  Talk to your doctor, nurse or pharmacist before following any medical regimen to see if it is safe and effective for you

## 2020-09-28 NOTE — CASE MANAGEMENT
A post acute care recommendation was made by your care team for [facility]  Discussed [place] [facility] with patient  Pt would like [facility] for PT and OT  CM sent referral via [address]  PT/OT therapists recommended walker, commode, and bench transfer  Pt agrees for script to be sent to [facility] DME  CM sent referral via [address]  [facility] to ship DME to pt's home  No other CM needs noted

## 2020-09-28 NOTE — QUICK NOTE
Paged by patient's nurse that patient would like to shower and have her IV removed  Okay to shower and d/c IV, however discussed with nurse that patient needs to wait for attending rounds to be cleared for discharge  Paged a second time by nurse that patient has left prior to attending rounds  Although patient would've been cleared for discharge today, left without discharge instructions or scripts  Will discuss with CM to make sure patient can receive home PT/VNA referral for care as an outpatient  Will set up delivery of recommended supplies to patient's home  Discussed with Dr Casper Busby PA-C

## 2020-09-28 NOTE — PHYSICAL THERAPY NOTE
Physical Therapy Treatment Note    Patient's Name: Aniceto Faith  :        20 0955   PT Last Visit   PT Visit Date 20   Pain Assessment   Pain Assessment Tool 0-10   Pain Score 5   Pain Location/Orientation Orientation: Mid;Location: Abdomen   Hospital Pain Intervention(s) Repositioned; Ambulation/increased activity   Restrictions/Precautions   Weight Bearing Precautions Per Order No   Other Precautions Pain; Fall Risk   General   Chart Reviewed Yes   Family/Caregiver Present No   Cognition   Overall Cognitive Status WFL   Arousal/Participation Alert   Attention Within functional limits   Orientation Level Oriented X4   Memory Within functional limits   Following Commands Follows all commands and directions without difficulty   Comments Pt pleasant and motivated to participate  Bed Mobility   Additional Comments Pt sitting up in chair upon PT arrival    Transfers   Sit to Stand 5  Supervision   Stand to Sit 5  Supervision   Additional Comments no AD   Ambulation/Elevation   Gait pattern Foward flexed; Excessively slow   Gait Assistance 5  Supervision   Additional items Assist x 1   Assistive Device Rolling walker   Distance 120 ft, limited by abdominal "gas" pain  VC's for erect posture, proximity to RW, pacing with rotational turns  Stair Management Assistance 5  Supervision   Additional items Assist x 1   Stair Management Technique One rail L;Step to pattern   Number of Stairs 7  (VC's for step to sequencing, safety with 1 HR)   Balance   Static Sitting Normal   Dynamic Sitting Fair +   Static Standing Fair +   Dynamic Standing Fair   Ambulatory Fair -   Activity Tolerance   Activity Tolerance Patient limited by pain   Medical Staff Made Aware OT, Beverly   Nurse Made Aware RN updated  Assessment   Prognosis Good   Problem List Decreased strength;Decreased endurance; Impaired balance;Decreased mobility;Pain   Assessment Pt with improved activity tolerance this session, able to increase ambulation distance and perform stair training with supervision  Pt reports only concern regarding potential discharge home is obtaining DME and gas pains  CM updated on DME recommendations  Pt with good insight to impairments  Improved balance able to  SLS with 1 UE support to fix shoe, OT present and encouraged performance in sitting for safety  Pt will benefit from continued skilled PT intervention during course of hospital stay to improve strength, endurance and balance to improve safety with functional mobility  Recommend home with family care and HHPT upon hospital D/C  Goals   Patient Goals go home   STG Expiration Date 10/04/20   PT Treatment Day 1   Plan   Treatment/Interventions Functional transfer training;LE strengthening/ROM; Elevations; Endurance training; Therapeutic exercise;Patient/family training;Equipment eval/education; Bed mobility;Gait training   Progress Progressing toward goals   PT Frequency Other (Comment)  (3-5x/week)   Recommendation   PT Discharge Recommendation Home with skilled therapy  (home with family support and HHPT)   Equipment Recommended Meron Reid   PT - OK to Discharge Yes       Pratik Nuñez, PT, DPT

## 2020-09-28 NOTE — NURSING NOTE
Patient requesting IV to come out in order to shower at 1130  OK'd by Reinier oChen  Verbal order taken  Patient instructed that Dr Alejandra Saucedo would be up later today to speak with patient in regards to clearing patient to be discharged home  Patient in agreement  Upon entering room at 1230, patient dressed with son in room  States she is leaving to go home  Patient instructed that Dr Alejandra Saucedo would be up to see her and that I would gladly page the [facility] Surgery team to speak with her  She states she "does not care" and she's "leaving regardless " Patient educated on leaving against medical advice and without clearance from the providers  She is adamant about leaving and refuses to wait for a doctor  [facility] surgery made aware, Perfecto Penta

## 2020-09-28 NOTE — PROGRESS NOTES
Progress Note -  Colorectal surgery  Adelita Tsang 77 y o  female MRN: [id number]  Unit/Bed#: [facility] 807-01 Encounter: [id number]    Assessment:  77 y o  female 5 Days Post-Op s/p Procedure(s) (LRB):  RESECTION COLON SIGMOID LAPAROSCOPIC (N/A)  RECTOPEXY/PROCTOPEXY LAPAROSCOPIC (N/A)  SIGMOIDOSCOPY FLEXIBLE (N/A)      PLAN:  Diet Bebeto/CHO Controlled; Consistent Carbohydrate Diet Level 2 (5 carb servings/75 grams CHO/meal); Lo Fiber/Lo Residue   Continue current diet  Patient needs ongoing assistance with ambulation  PT/OT  OOBTC, AAT  Pulmonary Toilet, IS  PPx: SQH  Pain and Nausea control PRN    Subjective:  No acute events overnight  Patient ambulated a few times with a wheeled walker and PT yesterday  Afebrile  Pain controlled with medication  Objective:     Vitals: Temp:  [98 4 °F (36 9 °C)-99 9 °F (37 7 °C)] 99 3 °F (37 4 °C)  Resp:  [18-20] 18  BP: (124-127)/(68-72) 126/68  Body mass index is 23 21 kg/m²  I/O       09/26 0701 - 09/27 0700 09/27 0701 - 09/28 0700    P  O  120 240    Total Intake(mL/kg) 120 (2) 240 (4)    Urine (mL/kg/hr) 950 (0 7) 2341 (1 6)    Total Output 950 2341    Net -830 -2101          Unmeasured Urine Occurrence 2 x 1 x            Physical Exam:  Gen: NAD, Comfortable  Neuro: A&O, No focal deficits  Head: Normal Cephalic, Atraumatic  Eye: EOMI, PERRLA, No scleral icterus  Neck: Supple, No JVD, Midline trachea  CV: RRR, Cap refill <2 sec  Pulm: Normal work of breathing, no respiratory distress  Abd: Soft, Non-Distended, appropriately tender near incisions which are CDI, no rectal prolapse  Ext: No edema, Non-tender  Skin: warm, dry, intact      Lab, Imaging and other studies:   Recent Labs     09/27/20  0536   WBC 7 65   HGB 10 7*        Recent Labs     09/27/20  0536   SODIUM 133*   K 3 7   CL 98*   CO2 29   BUN 7   CREATININE 0 47*   CALCIUM 9 1           VTE Pharmacologic Prophylaxis: Heparin  VTE Mechanical Prophylaxis: sequential compression device

## 2020-09-28 NOTE — DISCHARGE SUMMARY
Discharge Summary - Colorectal Surgery   Jordan Main 77 y o  female MRN: [id number]  [facility] Encounter: [id number]        Admitting Diagnosis: Rectal Prolapse     Admit Date: 9/23/2020    Discharge Diagnosis: Rectal prolapse s/p lap sigmoidectomy, rectopexy    Discharge Date: 9/28/2020    HPI:  16-year-old female with a past medical history of thoracic aortic aneurysm, SAH, HTN, HLD, DM 2, oophorectomy who presented to the outpatient office of Dr Sangeeta Morales for rectal bleeding and pain  She described the bleeding as bright red blood with wiping  Her last colonoscopy was done on 08/13/2020 which showed full-thickness rectal prolapse which was also evident on digital rectal exam and mild sigmoid diverticulosis  She was being seen in Dr Sangeeta Morales office to review her colonoscopy results  Non operative management versus operative management were both discussed  Dr Sangeeta Morales recommended transabdominal repair, specifically laparoscopic sigmoid resection with rectopexy  Risks and benefits were discussed with the patient, patient was in agreement with plan to proceed to the operating room for elective surgery  She presents today on 09/23/2020 for laparoscopic sigmoidectomy with rectopexy  Procedures Performed:   9/23/2020:  Laparoscopic sigmoid colon resection with rectopexy (Dr Sangeeta Morales)    Hospital Course:  HPI as mentioned above  Patient tolerated operative intervention without complications  She recovered quickly however required extended hospital stay secondary to pain control and PT/OT for assistance with ambulation  By postoperative day 3 she was tolerating her low-fiber low residue diet without nausea or vomiting, was passing flatus, minimal ambulation throughout the hallways, and complained of significant back and neck pain    By postoperative day 5 patient was tolerating diet without nausea or vomiting, passing flatus, out of bed and ambulating throughout halls, pain well controlled on oral pain control regimen, and was surgically approved for home discharge with PT/OT/VNA services    Patient was cleared for discharge on 09/28/2020, however was instructed to wait until attending rounds for official discharge order  Patient stated she was unable to weight  Patient decided to leave without discharge instructions or scripts  Discussed with case management to make sure patient can receive home PT/VNA services and referral for care as an outpatient  Case management to set up delivery of recommended supplies to patient's home  Case was discussed with Dr Rudy Art  Lab Results:   I have personally reviewed pertinent lab results  , CBC: No results found for: WBC, HGB, HCT, MCV, PLT, ADJUSTEDWBC, MCH, MCHC, RDW, MPV, NRBC, CMP:   Lab Results   Component Value Date    SODIUM 132 (L) 09/28/2020    K 4 3 09/28/2020    CL 98 (L) 09/28/2020    CO2 29 09/28/2020    BUN 11 09/28/2020    CREATININE 0 44 (L) 09/28/2020    CALCIUM 9 9 09/28/2020    EGFR 100 09/28/2020   , Magnesium: No components found for: MAG, Phosphorous: No results found for: PHOS, CEA: No results found for: CEA, CA 19-9: No results found for: , AFP: No results found for: AFP    Complications: Pain control and limited ambulation without assistance    Condition at Discharge: good     Discharge instructions/Information to patient and family:   See after visit summary for information provided to patient and family  Provisions for Follow-Up Care:  See after visit summary for information related to follow-up care and any pertinent home health orders  Disposition: Home    Planned Readmission: No    Discharge Statement   I spent 35 minutes discharging the patient  This time was spent on the day of discharge  I had direct contact with the patient on the day of discharge  Additional documentation is required if more than 30 minutes were spent on discharge       Discharge Medications:  See after visit summary for reconciled discharge medications provided to patient and family        Jyoti Casanova PA-C

## 2020-09-29 ENCOUNTER — TRANSITIONAL CARE MANAGEMENT (OUTPATIENT)
Dept: FAMILY MEDICINE CLINIC | Facility: CLINIC | Age: 75
End: 2020-09-29

## 2020-09-30 ENCOUNTER — TELEPHONE (OUTPATIENT)
Dept: FAMILY MEDICINE CLINIC | Facility: CLINIC | Age: 75
End: 2020-09-30

## 2020-10-01 DIAGNOSIS — E11.9 TYPE 2 DIABETES MELLITUS WITHOUT COMPLICATION, WITHOUT LONG-TERM CURRENT USE OF INSULIN (HCC): Primary | ICD-10-CM

## 2020-12-02 LAB
LEFT EYE DIABETIC RETINOPATHY: NORMAL
RIGHT EYE DIABETIC RETINOPATHY: NORMAL

## 2020-12-16 ENCOUNTER — TELEMEDICINE (OUTPATIENT)
Dept: FAMILY MEDICINE CLINIC | Facility: CLINIC | Age: 75
End: 2020-12-16
Payer: COMMERCIAL

## 2020-12-16 DIAGNOSIS — J40 BRONCHITIS: Primary | ICD-10-CM

## 2020-12-16 DIAGNOSIS — J45.20 MILD INTERMITTENT ASTHMA WITHOUT COMPLICATION: ICD-10-CM

## 2020-12-16 DIAGNOSIS — E11.9 TYPE 2 DIABETES MELLITUS WITHOUT COMPLICATION, WITHOUT LONG-TERM CURRENT USE OF INSULIN (HCC): ICD-10-CM

## 2020-12-16 DIAGNOSIS — E78.2 MIXED HYPERLIPIDEMIA: ICD-10-CM

## 2020-12-16 PROBLEM — S20.211A CONTUSION OF RIB ON RIGHT SIDE: Status: RESOLVED | Noted: 2020-02-05 | Resolved: 2020-12-16

## 2020-12-16 PROBLEM — R42 VERTIGO: Status: RESOLVED | Noted: 2020-06-04 | Resolved: 2020-12-16

## 2020-12-16 PROBLEM — H00.019 STYE: Status: RESOLVED | Noted: 2020-06-04 | Resolved: 2020-12-16

## 2020-12-16 PROBLEM — J45.40 MODERATE PERSISTENT ASTHMA WITHOUT COMPLICATION: Status: RESOLVED | Noted: 2019-07-09 | Resolved: 2020-12-16

## 2020-12-16 PROCEDURE — 1160F RVW MEDS BY RX/DR IN RCRD: CPT | Performed by: FAMILY MEDICINE

## 2020-12-16 PROCEDURE — 1036F TOBACCO NON-USER: CPT | Performed by: FAMILY MEDICINE

## 2020-12-16 PROCEDURE — 99213 OFFICE O/P EST LOW 20 MIN: CPT | Performed by: FAMILY MEDICINE

## 2020-12-16 RX ORDER — ALBUTEROL SULFATE 90 UG/1
2 AEROSOL, METERED RESPIRATORY (INHALATION) EVERY 6 HOURS PRN
Qty: 1 INHALER | Refills: 5 | Status: SHIPPED | OUTPATIENT
Start: 2020-12-16

## 2020-12-16 RX ORDER — AZITHROMYCIN 250 MG/1
TABLET, FILM COATED ORAL
Qty: 6 TABLET | Refills: 0 | Status: SHIPPED | OUTPATIENT
Start: 2020-12-16 | End: 2020-12-20

## 2020-12-16 RX ORDER — KETOROLAC TROMETHAMINE 5 MG/ML
SOLUTION OPHTHALMIC
COMMUNITY
Start: 2020-12-02 | End: 2022-08-08

## 2020-12-16 RX ORDER — PREDNISOLONE ACETATE 10 MG/ML
SUSPENSION/ DROPS OPHTHALMIC
COMMUNITY
Start: 2020-12-02 | End: 2021-09-10

## 2020-12-16 RX ORDER — GATIFLOXACIN 5 MG/ML
SOLUTION/ DROPS OPHTHALMIC
COMMUNITY
Start: 2020-12-02 | End: 2021-09-10

## 2020-12-16 RX ORDER — ATROPINE SULFATE 10 MG/ML
SOLUTION/ DROPS OPHTHALMIC
COMMUNITY
Start: 2020-12-03 | End: 2021-09-10

## 2021-01-07 ENCOUNTER — LAB (OUTPATIENT)
Dept: LAB | Facility: HOSPITAL | Age: 76
End: 2021-01-07
Payer: COMMERCIAL

## 2021-01-07 DIAGNOSIS — E11.9 TYPE 2 DIABETES MELLITUS WITHOUT COMPLICATION, WITHOUT LONG-TERM CURRENT USE OF INSULIN (HCC): ICD-10-CM

## 2021-01-07 DIAGNOSIS — E78.2 MIXED HYPERLIPIDEMIA: ICD-10-CM

## 2021-01-07 LAB
ALBUMIN SERPL BCP-MCNC: 3.6 G/DL (ref 3.5–5)
ALP SERPL-CCNC: 85 U/L (ref 46–116)
ALT SERPL W P-5'-P-CCNC: 21 U/L (ref 12–78)
ANION GAP SERPL CALCULATED.3IONS-SCNC: 5 MMOL/L (ref 4–13)
AST SERPL W P-5'-P-CCNC: 17 U/L (ref 5–45)
BILIRUB SERPL-MCNC: 0.49 MG/DL (ref 0.2–1)
BUN SERPL-MCNC: 17 MG/DL (ref 5–25)
CALCIUM SERPL-MCNC: 9.3 MG/DL (ref 8.3–10.1)
CHLORIDE SERPL-SCNC: 100 MMOL/L (ref 100–108)
CHOLEST SERPL-MCNC: 235 MG/DL (ref 50–200)
CO2 SERPL-SCNC: 34 MMOL/L (ref 21–32)
CREAT SERPL-MCNC: 0.41 MG/DL (ref 0.6–1.3)
EST. AVERAGE GLUCOSE BLD GHB EST-MCNC: 146 MG/DL
GFR SERPL CREATININE-BSD FRML MDRD: 102 ML/MIN/1.73SQ M
GLUCOSE P FAST SERPL-MCNC: 126 MG/DL (ref 65–99)
HBA1C MFR BLD: 6.7 %
HDLC SERPL-MCNC: 72 MG/DL
LDLC SERPL CALC-MCNC: 144 MG/DL (ref 0–100)
NONHDLC SERPL-MCNC: 163 MG/DL
POTASSIUM SERPL-SCNC: 4.3 MMOL/L (ref 3.5–5.3)
PROT SERPL-MCNC: 7.1 G/DL (ref 6.4–8.2)
SODIUM SERPL-SCNC: 139 MMOL/L (ref 136–145)
TRIGL SERPL-MCNC: 95 MG/DL
TSH SERPL DL<=0.05 MIU/L-ACNC: 1.45 UIU/ML (ref 0.36–3.74)

## 2021-01-07 PROCEDURE — 80061 LIPID PANEL: CPT

## 2021-01-07 PROCEDURE — 3044F HG A1C LEVEL LT 7.0%: CPT | Performed by: NURSE PRACTITIONER

## 2021-01-07 PROCEDURE — 84443 ASSAY THYROID STIM HORMONE: CPT

## 2021-01-07 PROCEDURE — 80053 COMPREHEN METABOLIC PANEL: CPT

## 2021-01-07 PROCEDURE — 83036 HEMOGLOBIN GLYCOSYLATED A1C: CPT

## 2021-01-07 PROCEDURE — 36415 COLL VENOUS BLD VENIPUNCTURE: CPT

## 2021-01-13 ENCOUNTER — TELEMEDICINE (OUTPATIENT)
Dept: FAMILY MEDICINE CLINIC | Facility: CLINIC | Age: 76
End: 2021-01-13
Payer: COMMERCIAL

## 2021-01-13 DIAGNOSIS — R22.2 CLAVICULAR AREA FULLNESS: ICD-10-CM

## 2021-01-13 DIAGNOSIS — E78.00 PURE HYPERCHOLESTEROLEMIA: ICD-10-CM

## 2021-01-13 DIAGNOSIS — E11.9 TYPE 2 DIABETES MELLITUS WITHOUT COMPLICATION, WITHOUT LONG-TERM CURRENT USE OF INSULIN (HCC): Primary | Chronic | ICD-10-CM

## 2021-01-13 PROCEDURE — 99214 OFFICE O/P EST MOD 30 MIN: CPT | Performed by: FAMILY MEDICINE

## 2021-01-13 NOTE — ASSESSMENT & PLAN NOTE
Lab Results   Component Value Date    HGBA1C 6 7 (H) 01/07/2021   sugar up a tad, did slack over holidays

## 2021-01-13 NOTE — PROGRESS NOTES
Virtual Regular Visit      Assessment/Plan:    Problem List Items Addressed This Visit        Endocrine    Diabetes mellitus ([facility] [address] ) - Primary (Chronic)       Lab Results   Component Value Date    HGBA1C 6 7 (H) 01/07/2021   sugar up a tad, did slack over holidays         Relevant Orders    Comprehensive metabolic panel    Hemoglobin A1C       Other    Pure hypercholesterolemia    Relevant Orders    Lipid panel      Other Visit Diagnoses     Clavicular area fullness        Relevant Orders    XR clavicle right    XR sternoclavicular joints 4+ vws    XR shoulder 2+ vw right               Reason for visit is   Chief Complaint   Patient presents with    Virtual Regular Visit        Encounter provider Jayla Ko MD    Provider located at [address] [place] [address] [id number]      Recent Visits  No visits were found meeting these conditions  Showing recent visits within past 7 days and meeting all other requirements     Today's Visits  Date Type Provider Dept   01/13/21 Telemedicine Jayla Ko MD [facility]   Showing today's visits and meeting all other requirements     Future Appointments  No visits were found meeting these conditions  Showing future appointments within next 150 days and meeting all other requirements        The patient was identified by name and date of birth  Arun Parker was informed that this is a telemedicine visit and that the visit is being conducted through [employer] and patient was informed that this is not a secure, HIPAA-compliant platform  She agrees to proceed     My office door was closed  No one else was in the room  She acknowledged consent and understanding of privacy and security of the video platform  The patient has agreed to participate and understands they can discontinue the visit at any time  Patient is aware this is a billable service       Subjective  Arun Parker is a 77 y o  female       F/u video visit, did cheat over holidays a little so lipids and sugar a tad up, c/o asymmetery/fullness r shoulder/clavcle, feels bone is "sticking out" , no hx injury, no cp, sob, no pain over that area but concerned because not the same as l       Past Medical History:   Diagnosis Date    Asthma     Colon polyp     Diabetes mellitus ([facility] [address] )     E coli infection     Fall     Hyperlipidemia     Hypertension     Pneumothorax     Rectal prolapse 09/03/2020    Sleep apnea     no CPAP    Subarachnoid hemorrhage ([facility] )     Thoracic aortic aneurysm (HCC)     Vertigo        Past Surgical History:   Procedure Laterality Date    BONE GRAFT      R arm    BRONCHOSCOPY N/A 3/16/2016    Procedure: BRONCHOSCOPY FLEXIBLE/anesth ;  Surgeon: Heber Parmar DO;  Location: [facility];   Service:     COLONOSCOPY      COLONOSCOPY      EYE SURGERY      OOPHORECTOMY Left     age 49    MI LAP,SURG,COLECTOMY, PARTIAL, W/ANAST N/A 9/23/2020    Procedure: RESECTION COLON SIGMOID LAPAROSCOPIC;  Surgeon: Navi Velasco MD;  [facility];  Service: Colorectal    MI SIGMOIDOSCOPY FLX DX W/COLLJ SPEC BR/WA IF PFRMD N/A 9/23/2020    Procedure: SIGMOIDOSCOPY FLEXIBLE;  Surgeon: Navi Velasco MD;  [facility];  Service: Colorectal    RECTAL PROLAPSE REPAIR LAPAROSCOPIC N/A 9/23/2020    Procedure: Emily Aguilar;  Surgeon: Navi Velasco MD;  [facility];  Service: Colorectal       Current Outpatient Medications   Medication Sig Dispense Refill    acetaminophen (TYLENOL) 325 mg tablet Take 3 tablets (975 mg total) by mouth every 8 (eight) hours 30 tablet 0    albuterol (2 5 mg/3 mL) 0 083 % nebulizer solution Take 1 vial (2 5 mg total) by nebulization every 6 (six) hours as needed for wheezing or shortness of breath 60 vial 5    albuterol (PROVENTIL HFA,VENTOLIN HFA) 90 mcg/act inhaler Inhale 2 puffs every 6 (six) hours as needed for wheezing 1 Inhaler 5    amLODIPine (NORVASC) 5 mg tablet Take 1 tablet (5 mg total) by mouth daily 90 tablet 1    atorvastatin (LIPITOR) 20 mg tablet take 1 tablet by mouth at bedtime 90 tablet 1    atropine (ISOPTO ATROPINE) 1 % ophthalmic solution PLACE 1 DROP INTO LEFT EYE 2 TIMES A DAY FOR 3 DAYS, START 3 DAYS PRIOR TO SURGERY      candesartan (ATACAND) 32 MG tablet take 1 tablet by mouth once daily (Patient taking differently: Take 32 mg by mouth daily ) 90 tablet 1    gatifloxacin (ZYMAXID) 0 5 % PLACE 1 DROP INTO LEFT EYE 4 TIMES A DAY FOR 10 DAYS, START 3 DAYS PRIOR TO SURGERY      glucosamine-chondroitin 500-400 MG tablet Take 1 tablet by mouth 3 (three) times a day      hydrochlorothiazide (HYDRODIURIL) 12 5 mg tablet take 1 tablet by mouth once daily 90 tablet 1    ibandronate (BONIVA) 150 MG tablet take 1 tablet by mouth every month (Patient taking differently: Take 150 mg by mouth every 30 (thirty) days ) 1 tablet 5    ketorolac (ACULAR) 0 5 % ophthalmic solution PLACE 1 DROP INTO LEFT EYE 4 TIMES A DAY  START 3 DAYS PRIOR TO SURGERY      MAGNESIUM PO Take by mouth daily       meclizine (ANTIVERT) 12 5 MG tablet Take by mouth 3 (three) times a day as needed for dizziness      metFORMIN (GLUCOPHAGE) 850 mg tablet Take 1 tablet (850 mg total) by mouth 2 (two) times a day with meals 60 tablet 5    mometasone-formoterol (DULERA) 200-5 MCG/ACT inhaler Inhale 2 puffs 2 (two) times a day Rinse mouth after use  1 Inhaler 5    prednisoLONE acetate (PRED FORTE) 1 % ophthalmic suspension PLACE 1 DROP INTO LEFT EYE 4 TIMES A DAY  START AFTER SURGERY AND TAPER AS DIRECTED      Semaglutide 7 MG TABS Take 1 tablet by mouth daily 30 tablet 5    VITAMIN E PO Take by mouth daily        No current facility-administered medications for this visit           Allergies   Allergen Reactions    Bactrim [Sulfamethoxazole-Trimethoprim] Hives    Sulfamethoxazole Rash    Trimethoprim Rash       Review of Systems   Constitutional: Negative for activity change, appetite change, chills, diaphoresis, fatigue and fever  Respiratory: Negative for shortness of breath  Cardiovascular: Negative for chest pain and palpitations  Musculoskeletal: Positive for joint swelling and neck pain  R clavicle sticking out   Neurological: Negative for dizziness and headaches  Video Exam    There were no vitals filed for this visit  Physical Exam  Constitutional:       Appearance: Normal appearance  She is normal weight  Comments: Looks tired   HENT:      Nose: No congestion or rhinorrhea  Eyes:      General:         Right eye: No discharge  Left eye: No discharge  Neck:      Comments: Asymmetry of clavicles  Pulmonary:      Effort: Pulmonary effort is normal    Musculoskeletal:         General: Deformity present  Comments: Asymmetry r clavicle as compared with l   Lymphadenopathy:      Cervical: No cervical adenopathy  Neurological:      Mental Status: She is alert  I spent 10 minutes directly with the patient during this visit      [address] [place] [address] acknowledges that she has consented to an online visit or consultation  She understands that the online visit is based solely on information provided by her, and that, in the absence of a face-to-face physical evaluation by the physician, the diagnosis she receives is both limited and provisional in terms of accuracy and completeness  This is not intended to replace a full medical face-to-face evaluation by the physician  Zeina Barragan understands and accepts these terms

## 2021-01-15 ENCOUNTER — HOSPITAL ENCOUNTER (OUTPATIENT)
Dept: RADIOLOGY | Facility: HOSPITAL | Age: 76
Discharge: HOME | End: 2021-01-15
Payer: COMMERCIAL

## 2021-01-15 DIAGNOSIS — R22.2 CLAVICULAR AREA FULLNESS: ICD-10-CM

## 2021-01-15 PROCEDURE — 73030 X-RAY EXAM OF SHOULDER: CPT

## 2021-01-15 PROCEDURE — 71130 X-RAY STRENOCLAVIC JT 3/>VWS: CPT

## 2021-01-15 PROCEDURE — 73000 X-RAY EXAM OF COLLAR BONE: CPT

## 2021-01-18 ENCOUNTER — TELEPHONE (OUTPATIENT)
Dept: FAMILY MEDICINE CLINIC | Facility: CLINIC | Age: 76
End: 2021-01-18

## 2021-01-18 NOTE — TELEPHONE ENCOUNTER
Pt called office stating she has a missed called from our office? I do not see any kind of documentation in her chart? She did mention she is waiting for clearance for sx? Not sure if you called her?  Thanks

## 2021-01-21 NOTE — TELEPHONE ENCOUNTER
Pt states she had OV back in Dec where she was supposed to be cleared for surgery but pt ended have Bronchitis  Pt is wondering is she will be cleared for surgery  Please advise

## 2021-01-27 ENCOUNTER — OFFICE VISIT (OUTPATIENT)
Dept: FAMILY MEDICINE CLINIC | Facility: CLINIC | Age: 76
End: 2021-01-27
Payer: COMMERCIAL

## 2021-01-27 VITALS
BODY MASS INDEX: 22.53 KG/M2 | SYSTOLIC BLOOD PRESSURE: 122 MMHG | HEIGHT: 64 IN | TEMPERATURE: 97.8 F | DIASTOLIC BLOOD PRESSURE: 68 MMHG | WEIGHT: 132 LBS | HEART RATE: 76 BPM

## 2021-01-27 DIAGNOSIS — J40 BRONCHITIS: Primary | ICD-10-CM

## 2021-01-27 DIAGNOSIS — J45.20 MILD INTERMITTENT ASTHMA WITHOUT COMPLICATION: ICD-10-CM

## 2021-01-27 DIAGNOSIS — E78.2 MIXED HYPERLIPIDEMIA: Chronic | ICD-10-CM

## 2021-01-27 DIAGNOSIS — J45.30 MILD PERSISTENT ASTHMA WITHOUT COMPLICATION: ICD-10-CM

## 2021-01-27 DIAGNOSIS — E11.9 TYPE 2 DIABETES MELLITUS WITHOUT COMPLICATION, WITHOUT LONG-TERM CURRENT USE OF INSULIN (HCC): Chronic | ICD-10-CM

## 2021-01-27 DIAGNOSIS — M75.101 TEAR OF RIGHT ROTATOR CUFF, UNSPECIFIED TEAR EXTENT, UNSPECIFIED WHETHER TRAUMATIC: ICD-10-CM

## 2021-01-27 DIAGNOSIS — I10 ESSENTIAL HYPERTENSION: Chronic | ICD-10-CM

## 2021-01-27 DIAGNOSIS — K62.3 RECTAL PROLAPSE: ICD-10-CM

## 2021-01-27 PROCEDURE — 99214 OFFICE O/P EST MOD 30 MIN: CPT | Performed by: NURSE PRACTITIONER

## 2021-01-27 PROCEDURE — 3078F DIAST BP <80 MM HG: CPT | Performed by: NURSE PRACTITIONER

## 2021-01-27 PROCEDURE — 3074F SYST BP LT 130 MM HG: CPT | Performed by: NURSE PRACTITIONER

## 2021-01-27 PROCEDURE — 3008F BODY MASS INDEX DOCD: CPT | Performed by: NURSE PRACTITIONER

## 2021-01-27 PROCEDURE — 1160F RVW MEDS BY RX/DR IN RCRD: CPT | Performed by: NURSE PRACTITIONER

## 2021-01-27 PROCEDURE — 1036F TOBACCO NON-USER: CPT | Performed by: NURSE PRACTITIONER

## 2021-01-27 RX ORDER — ATORVASTATIN CALCIUM 40 MG/1
40 TABLET, FILM COATED ORAL DAILY
Qty: 30 TABLET | Refills: 3 | Status: SHIPPED | OUTPATIENT
Start: 2021-01-27 | End: 2021-08-25

## 2021-01-27 RX ORDER — MONTELUKAST SODIUM 10 MG/1
10 TABLET ORAL
Qty: 30 TABLET | Refills: 5 | Status: SHIPPED | OUTPATIENT
Start: 2021-01-27 | End: 2022-02-07

## 2021-01-27 NOTE — PROGRESS NOTES
Assessment and Plan:    Problem List Items Addressed This Visit        Digestive    Rectal prolapse     Referral made for patient to see surgeon who completed her prior rectal prolapse surgery  Patient also advised to increase water intake as well as add Metamucil to hopefully cause more full bowel movements  Relevant Orders    Ambulatory referral to Colorectal Surgery       Endocrine    Diabetes mellitus ([address] ) (Chronic)       Lab Results   Component Value Date    HGBA1C 6 7 (H) 01/07/2021     Adequate control on current therapy  Respiratory    Mild intermittent asthma without complication     Dulera reordered  Singular added to current therapy as patient states that she feels most of her asthma is allergy related  Relevant Medications    mometasone-formoterol (DULERA) 200-5 MCG/ACT inhaler    montelukast (SINGULAIR) 10 mg tablet       Cardiovascular and Mediastinum    HTN (hypertension) (Chronic)     Well controlled on current therapy  Musculoskeletal and Integument    Tear of right rotator cuff     Referral made to Orthopedic surgery  Relevant Orders    Ambulatory referral to Orthopedic Surgery       Other    Hyperlipidemia (Chronic)     Lipitor increased to 40 mg  Patient advised to increase her amount of exercise and to add more fresh fruits and vegetables diet  Relevant Medications    atorvastatin (LIPITOR) 40 mg tablet      Other Visit Diagnoses     Bronchitis    -  Primary    Relevant Medications    mometasone-formoterol (DULERA) 200-5 MCG/ACT inhaler    montelukast (SINGULAIR) 10 mg tablet    Mild persistent asthma without complication        Relevant Medications    mometasone-formoterol (DULERA) 200-5 MCG/ACT inhaler    montelukast (SINGULAIR) 10 mg tablet                 Diagnoses and all orders for this visit:    Bronchitis    Mild persistent asthma without complication  -     mometasone-formoterol (DULERA) 200-5 MCG/ACT inhaler;  Inhale 2 puffs 2 (two) times a day Rinse mouth after use  -     montelukast (SINGULAIR) 10 mg tablet; Take 1 tablet (10 mg total) by mouth daily at bedtime    Tear of right rotator cuff, unspecified tear extent, unspecified whether traumatic  -     Ambulatory referral to Orthopedic Surgery; Future    Rectal prolapse  -     Ambulatory referral to Colorectal Surgery; Future    Type 2 diabetes mellitus without complication, without long-term current use of insulin (HCC)    Essential hypertension    Mixed hyperlipidemia  -     atorvastatin (LIPITOR) 40 mg tablet; Take 1 tablet (40 mg total) by mouth daily    Mild intermittent asthma without complication              Subjective:      Patient ID: Laurel Dinh is a 77 y o  female  CC:    Chief Complaint   Patient presents with    Bronchitis     Pt here for a f/u to Bronchitis, Pt states she will be having Cataract   HPI:    Patient is here for follow up regarding a diagnosis of bronchitis that she received around September 2020  Her eye doctor was requesting that she be cleared of this issue prior to having her left eye cataract removed  Patient reports having a history of mild intermittent asthma  She states that she does have frequent SOB but she uses Dulera two times per day and she states that this relives her symptoms  She reports having to use the Albuterol nebulizer solution once a week however, this exhibits adequate control of symptoms  Denies frequent coughing, fevers, or SARGENT  At this point I can clear the patient to safely have surgery on her eye as I feel she is completely recovered from her bronchitis  Rectal prolapse: She had surgery to correct this on 9/23/20  She states she feels as though it is starting to prolapse somewhat again  She states that she has frequent small BM's  She states that this not diarrhea and is not painful but she frequently has to wipe after and it causes irritation  Patient advised to follow up with Dr Ina Nunez following these issues  Type II diabetes: Most recent HbA1C was 6 7  Patient reports that she has been eating more since her  passed away  Advised on importance of balanced diet and exercise  Currently takes Metformin  Hypertension: Well controlled with Candesartan, HCTZ, and Amlodipine  Denies lightheadedness, dizziness, or changes in vision  Hyperlipidemia: Most recent cholesterol and LDL were elevated  They have steadily increased over the past 6 months  She reports she had not been taking the medication every day but has been lately  Denies myalgias  The following portions of the patient's history were reviewed and updated as appropriate: allergies, current medications, past family history, past medical history, past social history, past surgical history and problem list       Review of Systems   Constitutional: Negative for chills and fever  HENT: Negative for ear pain and sore throat  Eyes: Negative for pain and visual disturbance  Respiratory: Positive for chest tightness, shortness of breath (intermittent) and wheezing  Negative for cough  Cardiovascular: Negative for chest pain, palpitations and leg swelling  Gastrointestinal: Negative for abdominal pain, constipation, diarrhea, nausea and vomiting  Endocrine: Negative for cold intolerance, heat intolerance, polydipsia, polyphagia and polyuria  Genitourinary: Negative for decreased urine volume, dysuria and hematuria  Musculoskeletal: Positive for arthralgias (right shoulder )  Negative for back pain and myalgias  Skin: Negative for color change and rash  Allergic/Immunologic: Negative for environmental allergies  Neurological: Negative for dizziness, seizures, syncope, weakness, light-headedness, numbness and headaches  Hematological: Negative for adenopathy  Psychiatric/Behavioral: Negative for confusion  The patient is not nervous/anxious  All other systems reviewed and are negative          Data to review: Objective:    Vitals:    01/27/21 1046   BP: 122/68   Pulse: 76   Temp: 97 8 °F (36 6 °C)   Weight: 59 9 kg (132 lb)   Height: 5' 3 6" (1 615 m)        Physical Exam  Vitals signs and nursing note reviewed  Constitutional:       General: She is not in acute distress  Appearance: She is well-developed  HENT:      Head: Normocephalic and atraumatic  Eyes:      Conjunctiva/sclera: Conjunctivae normal    Neck:      Musculoskeletal: Normal range of motion and neck supple  Cardiovascular:      Rate and Rhythm: Normal rate and regular rhythm  Pulses: Normal pulses  Heart sounds: Normal heart sounds  No murmur  Pulmonary:      Effort: Pulmonary effort is normal  No respiratory distress  Breath sounds: Decreased air movement present  Examination of the right-upper field reveals decreased breath sounds and wheezing  Examination of the left-upper field reveals decreased breath sounds  Examination of the right-middle field reveals decreased breath sounds  Examination of the left-middle field reveals decreased breath sounds and wheezing  Examination of the right-lower field reveals decreased breath sounds  Examination of the left-lower field reveals decreased breath sounds and wheezing  Decreased breath sounds and wheezing present  No rhonchi  Abdominal:      General: Abdomen is flat  Bowel sounds are normal  There is no distension  Palpations: Abdomen is soft  Tenderness: There is no abdominal tenderness  There is no guarding  Musculoskeletal:         General: Signs of injury present  No swelling  Right shoulder: She exhibits decreased range of motion, tenderness, bony tenderness and pain  She exhibits no swelling, no effusion, no crepitus, no deformity, no laceration, no spasm, normal pulse and normal strength  Right lower leg: No edema  Left lower leg: No edema  Comments: Limited ROM of right shoulder    Skin:     General: Skin is warm and dry  Capillary Refill: Capillary refill takes less than 2 seconds  Neurological:      General: No focal deficit present  Mental Status: She is alert and oriented to person, place, and time  Psychiatric:         Mood and Affect: Mood normal          Behavior: Behavior normal          Thought Content:  Thought content normal          Judgment: Judgment normal

## 2021-01-27 NOTE — ASSESSMENT & PLAN NOTE
Referral made for patient to see surgeon who completed her prior rectal prolapse surgery  Patient also advised to increase water intake as well as add Metamucil to hopefully cause more full bowel movements

## 2021-01-27 NOTE — ASSESSMENT & PLAN NOTE
Lipitor increased to 40 mg  Patient advised to increase her amount of exercise and to add more fresh fruits and vegetables diet

## 2021-01-27 NOTE — PATIENT INSTRUCTIONS
Problem List Items Addressed This Visit        Digestive    Rectal prolapse     Referral made for patient to see surgeon who completed her prior rectal prolapse surgery  Patient also advised to increase water intake as well as add Metamucil to hopefully cause more full bowel movements  Relevant Orders    Ambulatory referral to Colorectal Surgery       Endocrine    Diabetes mellitus ([address] ) (Chronic)       Lab Results   Component Value Date    HGBA1C 6 7 (H) 01/07/2021     Adequate control on current therapy  Respiratory    Mild intermittent asthma without complication     Dulera reordered  Singular added to current therapy as patient states that she feels most of her asthma is allergy related  Relevant Medications    mometasone-formoterol (DULERA) 200-5 MCG/ACT inhaler    montelukast (SINGULAIR) 10 mg tablet       Cardiovascular and Mediastinum    HTN (hypertension) (Chronic)     Well controlled on current therapy  Musculoskeletal and Integument    Tear of right rotator cuff     Referral made to Orthopedic surgery  Relevant Orders    Ambulatory referral to Orthopedic Surgery       Other    Hyperlipidemia (Chronic)     Lipitor increased to 40 mg  Patient advised to increase her amount of exercise and to add more fresh fruits and vegetables diet  Relevant Medications    atorvastatin (LIPITOR) 40 mg tablet      Other Visit Diagnoses     Bronchitis    -  Primary    Relevant Medications    mometasone-formoterol (DULERA) 200-5 MCG/ACT inhaler    montelukast (SINGULAIR) 10 mg tablet    Mild persistent asthma without complication        Relevant Medications    mometasone-formoterol (DULERA) 200-5 MCG/ACT inhaler    montelukast (SINGULAIR) 10 mg tablet        COVID-19 Home Care Guidelines    Your healthcare provider and/or public health staff have evaluated you and have determined that you do not need to remain in the hospital at this time    At this time you can be isolated at home where you will be monitored by staff from your local or state health department  You should carefully follow the prevention and isolation steps below until a healthcare provider or local or state health department says that you can return to your normal activities  Stay home except to get medical care    People who are mildly ill with COVID-19 are able to isolate at home during their illness  You should restrict activities outside your home, except for getting medical care  Do not go to work, school, or public areas  Avoid using public transportation, ride-sharing, or taxis  Separate yourself from other people and animals in your home    People: As much as possible, you should stay in a specific room and away from other people in your home  Also, you should use a separate bathroom, if available  Animals: You should restrict contact with pets and other animals while you are sick with COVID-19, just like you would around other people  Although there have not been reports of pets or other animals becoming sick with COVID-19, it is still recommended that people sick with COVID-19 limit contact with animals until more information is known about the virus  When possible, have another member of your household care for your animals while you are sick  If you are sick with COVID-19, avoid contact with your pet, including petting, snuggling, being kissed or licked, and sharing food  If you must care for your pet or be around animals while you are sick, wash your hands before and after you interact with pets and wear a facemask  See COVID-19 and Animals for more information  Call ahead before visiting your doctor    If you have a medical appointment, call the healthcare provider and tell them that you have or may have COVID-19  This will help the healthcare providers office take steps to keep other people from getting infected or exposed      Wear a facemask    You should wear a facemask when you are around other people (e g , sharing a room or vehicle) or pets and before you enter a healthcare providers office  If you are not able to wear a facemask (for example, because it causes trouble breathing), then people who live with you should not stay in the same room with you, or they should wear a facemask if they enter your room  Cover your coughs and sneezes    Cover your mouth and nose with a tissue when you cough or sneeze  Throw used tissues in a lined trash can  Immediately wash your hands with soap and water for at least 20 seconds or, if soap and water are not available, clean your hands with an alcohol-based hand  that contains at least 60% alcohol  Clean your hands often    Wash your hands often with soap and water for at least 20 seconds, especially after blowing your nose, coughing, or sneezing; going to the bathroom; and before eating or preparing food  If soap and water are not readily available, use an alcohol-based hand  with at least 60% alcohol, covering all surfaces of your hands and rubbing them together until they feel dry  Soap and water are the best option if hands are visibly dirty  Avoid touching your eyes, nose, and mouth with unwashed hands  Avoid sharing personal household items    You should not share dishes, drinking glasses, cups, eating utensils, towels, or bedding with other people or pets in your home  After using these items, they should be washed thoroughly with soap and water  Clean all high-touch surfaces everyday    High touch surfaces include counters, tabletops, doorknobs, bathroom fixtures, toilets, phones, keyboards, tablets, and bedside tables  Also, clean any surfaces that may have blood, stool, or body fluids on them  Use a household cleaning spray or wipe, according to the label instructions   Labels contain instructions for safe and effective use of the cleaning product including precautions you should take when applying the product, such as wearing gloves and making sure you have good ventilation during use of the product  Monitor your symptoms    Seek prompt medical attention if your illness is worsening (e g , difficulty breathing)  Before seeking care, call your healthcare provider and tell them that you have, or are being evaluated for, COVID-19  Put on a facemask before you enter the facility  These steps will help the healthcare providers office to keep other people in the office or waiting room from getting infected or exposed  Ask your healthcare provider to call the local or [facility] health department  Persons who are placed under active monitoring or facilitated self-monitoring should follow instructions provided by their local health department or occupational health professionals, as appropriate  If you have a medical emergency and need to call 911, notify the dispatch personnel that you have, or are being evaluated for COVID-19  If possible, put on a facemask before emergency medical services arrive  Discontinuing home isolation    Patients with confirmed COVID-19 should remain under home isolation precautions until the following conditions are met:   - They have had no fever for at least 24 hours (that is one full day of no fever without the use medicine that reduces fevers)  AND  - other symptoms have improved (for example, when their cough or shortness of breath have improved)  AND  - If had mild or moderate illness, at least 10 days have passed since their symptoms first appeared or if severe illness (needed oxygen) or immunosuppressed, at least 20 days have passed since symptoms first appeared  Patients with confirmed COVID-19 should also notify close contacts (including their workplace) and ask that they self-quarantine   Currently, close contact is defined as being within 6 feet for 15 minutes or more from the period 24 hours starting 48 hours before symptom onset to the time at which the patient went into isolation  Close contacts of patients diagnosed with COVID-19 should be instructed by the patient to self-quarantine for 14 days from the last time of their last contact with the patient       Source: [employer]

## 2021-01-27 NOTE — ASSESSMENT & PLAN NOTE
Dulera reordered  Singular added to current therapy as patient states that she feels most of her asthma is allergy related

## 2021-01-27 NOTE — ASSESSMENT & PLAN NOTE
Lab Results   Component Value Date    HGBA1C 6 7 (H) 01/07/2021     Adequate control on current therapy

## 2021-02-12 ENCOUNTER — IMMUNIZATIONS (OUTPATIENT)
Dept: FAMILY MEDICINE CLINIC | Facility: HOSPITAL | Age: 76
End: 2021-02-12

## 2021-02-12 DIAGNOSIS — Z23 ENCOUNTER FOR IMMUNIZATION: Primary | ICD-10-CM

## 2021-02-12 PROCEDURE — 91301 SARS-COV-2 / COVID-19 MRNA VACCINE (MODERNA) 100 MCG: CPT

## 2021-02-12 PROCEDURE — 0011A SARS-COV-2 / COVID-19 MRNA VACCINE (MODERNA) 100 MCG: CPT

## 2021-02-19 DIAGNOSIS — I10 ESSENTIAL HYPERTENSION: Chronic | ICD-10-CM

## 2021-02-20 PROCEDURE — 4010F ACE/ARB THERAPY RXD/TAKEN: CPT | Performed by: FAMILY MEDICINE

## 2021-02-20 RX ORDER — CANDESARTAN 32 MG/1
32 TABLET ORAL DAILY
Qty: 30 TABLET | Refills: 5 | Status: SHIPPED | OUTPATIENT
Start: 2021-02-20 | End: 2022-01-13

## 2021-03-11 ENCOUNTER — IMMUNIZATIONS (OUTPATIENT)
Dept: FAMILY MEDICINE CLINIC | Facility: HOSPITAL | Age: 76
End: 2021-03-11

## 2021-03-11 DIAGNOSIS — Z23 ENCOUNTER FOR IMMUNIZATION: Primary | ICD-10-CM

## 2021-03-11 PROCEDURE — 91301 SARS-COV-2 / COVID-19 MRNA VACCINE (MODERNA) 100 MCG: CPT

## 2021-03-11 PROCEDURE — 0012A SARS-COV-2 / COVID-19 MRNA VACCINE (MODERNA) 100 MCG: CPT

## 2021-03-15 DIAGNOSIS — I10 ESSENTIAL HYPERTENSION: ICD-10-CM

## 2021-03-15 RX ORDER — HYDROCHLOROTHIAZIDE 12.5 MG/1
TABLET ORAL
Qty: 90 TABLET | Refills: 1 | Status: SHIPPED | OUTPATIENT
Start: 2021-03-15 | End: 2022-08-08 | Stop reason: SDUPTHER

## 2021-03-26 ENCOUNTER — OFFICE VISIT (OUTPATIENT)
Dept: FAMILY MEDICINE CLINIC | Facility: CLINIC | Age: 76
End: 2021-03-26
Payer: COMMERCIAL

## 2021-03-26 VITALS
HEART RATE: 80 BPM | HEIGHT: 64 IN | DIASTOLIC BLOOD PRESSURE: 60 MMHG | SYSTOLIC BLOOD PRESSURE: 160 MMHG | BODY MASS INDEX: 22.53 KG/M2 | TEMPERATURE: 99 F | WEIGHT: 132 LBS

## 2021-03-26 DIAGNOSIS — I10 ESSENTIAL HYPERTENSION: Chronic | ICD-10-CM

## 2021-03-26 DIAGNOSIS — E11.9 TYPE 2 DIABETES MELLITUS WITHOUT COMPLICATION, WITHOUT LONG-TERM CURRENT USE OF INSULIN (HCC): Primary | Chronic | ICD-10-CM

## 2021-03-26 DIAGNOSIS — E78.00 PURE HYPERCHOLESTEROLEMIA: ICD-10-CM

## 2021-03-26 DIAGNOSIS — J45.20 MILD INTERMITTENT ASTHMA WITHOUT COMPLICATION: ICD-10-CM

## 2021-03-26 DIAGNOSIS — M75.101 TEAR OF RIGHT ROTATOR CUFF, UNSPECIFIED TEAR EXTENT, UNSPECIFIED WHETHER TRAUMATIC: ICD-10-CM

## 2021-03-26 PROCEDURE — 99214 OFFICE O/P EST MOD 30 MIN: CPT | Performed by: FAMILY MEDICINE

## 2021-03-26 PROCEDURE — 1100F PTFALLS ASSESS-DOCD GE2>/YR: CPT | Performed by: FAMILY MEDICINE

## 2021-03-26 PROCEDURE — 3077F SYST BP >= 140 MM HG: CPT | Performed by: FAMILY MEDICINE

## 2021-03-26 PROCEDURE — 3288F FALL RISK ASSESSMENT DOCD: CPT | Performed by: FAMILY MEDICINE

## 2021-03-26 PROCEDURE — 3008F BODY MASS INDEX DOCD: CPT | Performed by: FAMILY MEDICINE

## 2021-03-26 PROCEDURE — 1160F RVW MEDS BY RX/DR IN RCRD: CPT | Performed by: FAMILY MEDICINE

## 2021-03-26 PROCEDURE — 3078F DIAST BP <80 MM HG: CPT | Performed by: FAMILY MEDICINE

## 2021-03-26 PROCEDURE — 1036F TOBACCO NON-USER: CPT | Performed by: FAMILY MEDICINE

## 2021-03-26 NOTE — PROGRESS NOTES
Assessment and Plan:    Problem List Items Addressed This Visit        Endocrine    Diabetes mellitus ([facility] [address] ) - Primary (Chronic)       Lab Results   Component Value Date    HGBA1C 6 7 (H) 01/07/2021   lab stable         Relevant Medications    glucose blood test strip       Respiratory    Mild intermittent asthma without complication     Uses nebulizer as needed            Cardiovascular and Mediastinum    HTN (hypertension) (Chronic)     Has missed some doses of Amlodipine            Musculoskeletal and Integument    Tear of right rotator cuff    Relevant Orders    Ambulatory referral to Orthopedic Surgery       Other    Pure hypercholesterolemia     Last lipids up due to holidays                      Diagnoses and all orders for this visit:    Type 2 diabetes mellitus without complication, without long-term current use of insulin ([facility])  -     glucose blood test strip; Use 1 each 2 (two) times a day Use as instructed    Pure hypercholesterolemia    Mild intermittent asthma without complication    Essential hypertension    Tear of right rotator cuff, unspecified tear extent, unspecified whether traumatic  -     Ambulatory referral to Orthopedic Surgery; Future    Other orders  -     Discontinue: glucose blood test strip; Use 1 each 2 (two) times a day Use as instructed              Subjective:      Patient ID: Tommie Cerda is a 77 y o  female  CC:    Chief Complaint   Patient presents with    Follow-up     To chronic conditions  mjs     Falls Plan of Care: Balance, strength, and gait training instructions were provided    HPI:    Here for follow up diabetes, HTN, lipids, got both vaccines, cholesterol had bumped due to missing some meds and some holiday cheating, due next lab 1st week May, no cp, some dyspnea due to asthma,slight headache when woke up this am-possible elevation BP, r shoulder really bothering her, does not wish to travel to see ortho in [place], wants to stay in [facility]      The following portions of the patient's history were reviewed and updated as appropriate: allergies, current medications, past family history, past medical history, past social history, past surgical history and problem list       Review of Systems   Constitutional: Negative for activity change, appetite change and fatigue  Respiratory: Positive for shortness of breath  Cardiovascular: Negative for chest pain  Musculoskeletal: Positive for arthralgias  R shoulder pain   Neurological: Positive for headaches  Negative for dizziness  Psychiatric/Behavioral: Negative for dysphoric mood  The patient is not nervous/anxious  Data to review:       Objective:    Vitals:    03/26/21 0736   BP: 160/60   Pulse: 80   Temp: 99 °F (37 2 °C)   Weight: 59 9 kg (132 lb)   Height: 5' 3 5" (1 613 m)        Physical Exam  Vitals signs reviewed  Constitutional:       Appearance: Normal appearance  Neck:      Vascular: No carotid bruit  Cardiovascular:      Rate and Rhythm: Normal rate and regular rhythm  Pulses: Normal pulses  Heart sounds: Normal heart sounds  Pulmonary:      Effort: Pulmonary effort is normal       Breath sounds: Normal breath sounds  Musculoskeletal:         General: Tenderness present  Right lower leg: No edema  Left lower leg: No edema  Comments: r shoulder pain   Lymphadenopathy:      Cervical: No cervical adenopathy  Neurological:      Mental Status: She is alert

## 2021-04-08 DIAGNOSIS — E11.9 TYPE 2 DIABETES MELLITUS WITHOUT COMPLICATION, WITHOUT LONG-TERM CURRENT USE OF INSULIN (HCC): ICD-10-CM

## 2021-04-08 DIAGNOSIS — M81.0 AGE-RELATED OSTEOPOROSIS WITHOUT CURRENT PATHOLOGICAL FRACTURE: ICD-10-CM

## 2021-04-08 RX ORDER — IBANDRONATE SODIUM 150 MG/1
TABLET, FILM COATED ORAL
Qty: 1 TABLET | Refills: 5 | Status: SHIPPED | OUTPATIENT
Start: 2021-04-08 | End: 2021-09-08

## 2021-04-22 DIAGNOSIS — J45.30 MILD PERSISTENT ASTHMA WITHOUT COMPLICATION: ICD-10-CM

## 2021-04-22 RX ORDER — ALBUTEROL SULFATE 2.5 MG/3ML
SOLUTION RESPIRATORY (INHALATION)
Qty: 150 ML | Refills: 3 | Status: SHIPPED | OUTPATIENT
Start: 2021-04-22

## 2021-04-27 ENCOUNTER — OFFICE VISIT (OUTPATIENT)
Dept: OBGYN CLINIC | Facility: MEDICAL CENTER | Age: 76
End: 2021-04-27
Payer: COMMERCIAL

## 2021-04-27 VITALS
SYSTOLIC BLOOD PRESSURE: 162 MMHG | HEART RATE: 71 BPM | HEIGHT: 64 IN | BODY MASS INDEX: 22.67 KG/M2 | WEIGHT: 132.8 LBS | DIASTOLIC BLOOD PRESSURE: 87 MMHG

## 2021-04-27 DIAGNOSIS — M25.811 MASS OF JOINT OF RIGHT SHOULDER: ICD-10-CM

## 2021-04-27 DIAGNOSIS — M25.511 ACUTE PAIN OF RIGHT SHOULDER: Primary | ICD-10-CM

## 2021-04-27 DIAGNOSIS — M75.101 TEAR OF RIGHT ROTATOR CUFF, UNSPECIFIED TEAR EXTENT, UNSPECIFIED WHETHER TRAUMATIC: ICD-10-CM

## 2021-04-27 PROCEDURE — 99204 OFFICE O/P NEW MOD 45 MIN: CPT | Performed by: ORTHOPAEDIC SURGERY

## 2021-04-27 PROCEDURE — 3008F BODY MASS INDEX DOCD: CPT | Performed by: ORTHOPAEDIC SURGERY

## 2021-04-27 PROCEDURE — 1160F RVW MEDS BY RX/DR IN RCRD: CPT | Performed by: ORTHOPAEDIC SURGERY

## 2021-04-27 PROCEDURE — 3079F DIAST BP 80-89 MM HG: CPT | Performed by: ORTHOPAEDIC SURGERY

## 2021-04-27 NOTE — PROGRESS NOTES
Orthopaedic Surgery - Office Note  Chapis Hernandez (77 y o  female)   : 1945   MRN: [id number]  Encounter Date: 2021    Chief Complaint   Patient presents with    Right Shoulder - Pain       Assessment / Plan  Right shoulder  mass    · MRI of right shoulder, to assess right shoulder mass  · Activity as tolerated  · After completion of MRI we will consider conservative treatment such as physical therapy, heat, ice and cortisone injections, and possibly aspiration of right shoulder mass for treatment of her right shoulder pain  · We did briefly discuss at today's appointment that if it is appropriate for surgical intervention we will move forward with a right shoulder replacement  Return for Recheck after mri  History of Present Illness  Qeuntin Kristal Einar Prader is a 77 y o  Right-hand-dominant female who presents for initial evaluation of right shoulder  She states approximately 2 months ago she noticed a lump on her right shoulder which was not painful  Last week she began experiencing severe pain in her right shoulder with insidious onset  She is experiencing a constant severe pain which is increased at night  She does use heat and Tylenol for pain relief which provides her with minimal relief of her symptoms  She did have an ORIF of her right humerus when she was 16 with hardware removal when she was 26, she does have decreased range of motion due to this injury  She denies any further injury or trauma to her right shoulder  She denies any distal paresthesias  Review of Systems  Pertinent items are noted in HPI  All other systems were reviewed and are negative  Physical Exam  /87   Pulse 71   Ht 5' 3 5" (1 613 m)   Wt 60 2 kg (132 lb 12 8 oz)   LMP  (LMP Unknown)   BMI 23 16 kg/m²   Cons: Appears well  No apparent distress  Psych: Alert  Oriented x3  Mood and affect normal   Eyes: PERRLA, EOMI  Resp: Normal effort  No audible wheezing or stridor  CV: Palpable pulse    No discernable arrhythmia  No LE edema  Lymph:  No palpable cervical, axillary, or inguinal lymphadenopathy  Skin: Warm  No palpable masses  No visible lesions  Neuro: Normal muscle tone  Normal and symmetric DTR's  Right Shoulder Exam  Alignment / Posture:  Normal shoulder posture  Inspection:  No swelling  No edema  No erythema  No ecchymosis  3-4 cm in diameter subcutaneous mass which is tender and firm deformity  Palpation:  Bicipital groove tenderness  No effusion  No warmth  ROM:  Shoulder   Shoulder ER 50  Shoulder IR T4   Strength:  Supraspinatus 4/5  Infraspinatus 3/5  Stability:  No objective shoulder instability  Tests: (-) Belly press  Neurovascular:  Sensation intact in Ax/R/M/U nerve distributions  2+ radial pulse  Studies Reviewed  XR of right shoulder - Personally reviewed the x-ray obtained on January 15, 2021 which demonstrates glenohumeral osteophytes and rotator cuff pathology  Procedures  No procedures today  Medical, Surgical, Family, and Social History  The patient's medical history, family history, and social history, were reviewed and updated as appropriate  Past Medical History:   Diagnosis Date    Asthma     Colon polyp     Diabetes mellitus ([facility] [address] )     E coli infection     Fall     Hyperlipidemia     Hypertension     Pneumothorax     Rectal prolapse 09/03/2020    Sleep apnea     no CPAP    Subarachnoid hemorrhage (HCC)     Thoracic aortic aneurysm (HCC)     Vertigo        Past Surgical History:   Procedure Laterality Date    BONE GRAFT      R arm    BRONCHOSCOPY N/A 3/16/2016    Procedure: BRONCHOSCOPY FLEXIBLE/anesth ;  Surgeon: Isaias Maldonado DO;  Location: [facility];   Service:     COLONOSCOPY      COLONOSCOPY      EYE SURGERY      OOPHORECTOMY Left     age 49    IN LAP,SURG,COLECTOMY, PARTIAL, W/ANAST N/A 9/23/2020    Procedure: RESECTION COLON SIGMOID LAPAROSCOPIC;  Surgeon: David Polk MD;  [facility];  Service: Colorectal    NV SIGMOIDOSCOPY FLX DX W/COLLJ SPEC BR/WA IF PFRMD N/A 9/23/2020    Procedure: Yonathan Christianson;  Surgeon: Birgit Estrada MD;  [facility];  Service: Colorectal    RECTAL PROLAPSE REPAIR LAPAROSCOPIC N/A 9/23/2020    Procedure: Cherie Hood;  Surgeon: Birgit Estrada MD;  [facility];  Service: Colorectal       Family History   Problem Relation Age of Onset    Endometrial cancer Mother 45    Cancer Daughter 48        breast    Breast cancer additional onset Daughter 48    Cancer Son 35        asbestosis related cancer    No Known Problems Father     No Known Problems Sister     No Known Problems Maternal Grandmother     No Known Problems Maternal Grandfather     No Known Problems Paternal Grandmother     No Known Problems Paternal Grandfather     No Known Problems Daughter     No Known Problems Maternal Aunt     No Known Problems Paternal Aunt        Social History     Occupational History    Not on file   Tobacco Use    Smoking status: Never Smoker    Smokeless tobacco: Never Used   Substance and Sexual Activity    Alcohol use: Yes     Frequency: 2-4 times a month     Drinks per session: 1 or 2     Binge frequency: Never     Comment: weekends     Drug use: No    Sexual activity: Not on file       Allergies   Allergen Reactions    Bactrim [Sulfamethoxazole-Trimethoprim] Hives    Sulfamethoxazole Rash    Trimethoprim Rash         Current Outpatient Medications:     acetaminophen (TYLENOL) 325 mg tablet, Take 3 tablets (975 mg total) by mouth every 8 (eight) hours, Disp: 30 tablet, Rfl: 0    albuterol (2 5 mg/3 mL) 0 083 % nebulizer solution, TAKE 1 VIAL BY NEBULIZATION EVERY 6 HOURS AS NEEDED FOR WHEEZING OR SHORTNESS OF BREATH, Disp: 150 mL, Rfl: 3    albuterol (PROVENTIL HFA,VENTOLIN HFA) 90 mcg/act inhaler, Inhale 2 puffs every 6 (six) hours as needed for wheezing, Disp: 1 Inhaler, Rfl: 5    amLODIPine (NORVASC) 5 mg tablet, Take 1 tablet (5 mg total) by mouth daily, Disp: 90 tablet, Rfl: 1    atorvastatin (LIPITOR) 40 mg tablet, Take 1 tablet (40 mg total) by mouth daily, Disp: 30 tablet, Rfl: 3    atropine (ISOPTO ATROPINE) 1 % ophthalmic solution, PLACE 1 DROP INTO LEFT EYE 2 TIMES A DAY FOR 3 DAYS, START 3 DAYS PRIOR TO SURGERY, Disp: , Rfl:     candesartan (ATACAND) 32 MG tablet, Take 1 tablet (32 mg total) by mouth daily, Disp: 30 tablet, Rfl: 5    gatifloxacin (ZYMAXID) 0 5 %, PLACE 1 DROP INTO LEFT EYE 4 TIMES A DAY FOR 10 DAYS, START 3 DAYS PRIOR TO SURGERY, Disp: , Rfl:     glucosamine-chondroitin 500-400 MG tablet, Take 1 tablet by mouth 3 (three) times a day, Disp: , Rfl:     glucose blood test strip, Use 1 each 2 (two) times a day Use as instructed, Disp: 200 each, Rfl: 3    hydrochlorothiazide (HYDRODIURIL) 12 5 mg tablet, take 1 tablet by mouth once daily, Disp: 90 tablet, Rfl: 1    ibandronate (BONIVA) 150 MG tablet, take 1 tablet by mouth every month, Disp: 1 tablet, Rfl: 5    ketorolac (ACULAR) 0 5 % ophthalmic solution, PLACE 1 DROP INTO LEFT EYE 4 TIMES A DAY  START 3 DAYS PRIOR TO SURGERY, Disp: , Rfl:     MAGNESIUM PO, Take by mouth daily , Disp: , Rfl:     meclizine (ANTIVERT) 12 5 MG tablet, Take by mouth 3 (three) times a day as needed for dizziness, Disp: , Rfl:     metFORMIN (GLUCOPHAGE) 850 mg tablet, take 1 tablet by mouth twice a day with meals, Disp: 60 tablet, Rfl: 5    mometasone-formoterol (DULERA) 200-5 MCG/ACT inhaler, Inhale 2 puffs 2 (two) times a day Rinse mouth after use , Disp: 1 Inhaler, Rfl: 5    montelukast (SINGULAIR) 10 mg tablet, Take 1 tablet (10 mg total) by mouth daily at bedtime, Disp: 30 tablet, Rfl: 5    prednisoLONE acetate (PRED FORTE) 1 % ophthalmic suspension, PLACE 1 DROP INTO LEFT EYE 4 TIMES A DAY   START AFTER SURGERY AND TAPER AS DIRECTED, Disp: , Rfl:     VITAMIN E PO, Take by mouth daily , Disp: , Rfl:       [facility]    Scribe Attestation I,:  Monica Lee am acting as a scribe while in the presence of the attending physician :       I,:  Shant Han MD personally performed the services described in this documentation    as scribed in my presence :

## 2021-05-10 ENCOUNTER — HOSPITAL ENCOUNTER (OUTPATIENT)
Dept: MRI IMAGING | Facility: HOSPITAL | Age: 76
Discharge: HOME | End: 2021-05-10
Attending: ORTHOPAEDIC SURGERY
Payer: COMMERCIAL

## 2021-05-10 DIAGNOSIS — M25.811 MASS OF JOINT OF RIGHT SHOULDER: ICD-10-CM

## 2021-05-10 DIAGNOSIS — M25.511 ACUTE PAIN OF RIGHT SHOULDER: ICD-10-CM

## 2021-05-10 PROCEDURE — G1004 CDSM NDSC: HCPCS

## 2021-05-10 PROCEDURE — 73221 MRI JOINT UPR EXTREM W/O DYE: CPT

## 2021-05-14 ENCOUNTER — OFFICE VISIT (OUTPATIENT)
Dept: OBGYN CLINIC | Facility: MEDICAL CENTER | Age: 76
End: 2021-05-14
Payer: COMMERCIAL

## 2021-05-14 VITALS
HEIGHT: 64 IN | DIASTOLIC BLOOD PRESSURE: 77 MMHG | HEART RATE: 64 BPM | WEIGHT: 134.8 LBS | SYSTOLIC BLOOD PRESSURE: 169 MMHG | BODY MASS INDEX: 23.01 KG/M2

## 2021-05-14 DIAGNOSIS — M75.101 TEAR OF RIGHT ROTATOR CUFF, UNSPECIFIED TEAR EXTENT, UNSPECIFIED WHETHER TRAUMATIC: Primary | ICD-10-CM

## 2021-05-14 PROCEDURE — 3078F DIAST BP <80 MM HG: CPT | Performed by: ORTHOPAEDIC SURGERY

## 2021-05-14 PROCEDURE — 1036F TOBACCO NON-USER: CPT | Performed by: ORTHOPAEDIC SURGERY

## 2021-05-14 PROCEDURE — 3077F SYST BP >= 140 MM HG: CPT | Performed by: ORTHOPAEDIC SURGERY

## 2021-05-14 PROCEDURE — 20610 DRAIN/INJ JOINT/BURSA W/O US: CPT | Performed by: ORTHOPAEDIC SURGERY

## 2021-05-14 PROCEDURE — 1160F RVW MEDS BY RX/DR IN RCRD: CPT | Performed by: ORTHOPAEDIC SURGERY

## 2021-05-14 PROCEDURE — 99213 OFFICE O/P EST LOW 20 MIN: CPT | Performed by: ORTHOPAEDIC SURGERY

## 2021-05-14 PROCEDURE — 3008F BODY MASS INDEX DOCD: CPT | Performed by: ORTHOPAEDIC SURGERY

## 2021-05-14 RX ORDER — BUPIVACAINE HYDROCHLORIDE 2.5 MG/ML
4 INJECTION, SOLUTION INFILTRATION; PERINEURAL
Status: COMPLETED | OUTPATIENT
Start: 2021-05-14 | End: 2021-05-14

## 2021-05-14 RX ORDER — METHYLPREDNISOLONE ACETATE 40 MG/ML
1 INJECTION, SUSPENSION INTRA-ARTICULAR; INTRALESIONAL; INTRAMUSCULAR; SOFT TISSUE
Status: COMPLETED | OUTPATIENT
Start: 2021-05-14 | End: 2021-05-14

## 2021-05-14 RX ADMIN — BUPIVACAINE HYDROCHLORIDE 4 ML: 2.5 INJECTION, SOLUTION INFILTRATION; PERINEURAL at 08:56

## 2021-05-14 RX ADMIN — METHYLPREDNISOLONE ACETATE 1 ML: 40 INJECTION, SUSPENSION INTRA-ARTICULAR; INTRALESIONAL; INTRAMUSCULAR; SOFT TISSUE at 08:56

## 2021-05-14 NOTE — PROGRESS NOTES
Orthopaedic Surgery - Office Note  Ramonita Abbott (77 y o  female)   : 1945   MRN: [id number]  Encounter Date: 2021    Chief Complaint   Patient presents with    Right Shoulder - Follow-up       Assessment / Plan  Right shoulder mass and chronic rotator cuff tearing    · CSI of left shoulder, directly into mass over [facility] joint was performed  · Aspiration of left mass over [facility] joint  was performed, 9cc of   · If cysts returns we can surgically remove the cyst  We did discuss that this may not be a lasting treatment due to her glenohumeral arthritis  · Continue to use Tylenol and heat PRN for pain  Return if symptoms worsen or fail to improve  History of Present Illness  Polly Pandya is a RHD 77 y o  female who presents for follow up of right shoulder  She has been having pain with insidious onset for about 3 months, she also noticed a lump over her AC joint at tat time which was not painful  She is experiencing a constant ache in her shoulder  The lump has remained unchanged in size and has continued to remain not painful  She has continued to use tylenol and heat for pain relief  Of note, she did have an ORIF of her right humerus when she was 16 with hardware removal when she was 26  She denies any radiating symptoms  Review of Systems  Pertinent items are noted in HPI  All other systems were reviewed and are negative  Physical Exam  /77   Pulse 64   Ht 5' 3 5" (1 613 m)   Wt 61 1 kg (134 lb 12 8 oz)   LMP  (LMP Unknown)   BMI 23 50 kg/m²   Cons: Appears well  No apparent distress  Psych: Alert  Oriented x3  Mood and affect normal   Eyes: PERRLA, EOMI  Resp: Normal effort  No audible wheezing or stridor  CV: Palpable pulse  No discernable arrhythmia  No LE edema  Lymph:  No palpable cervical, axillary, or inguinal lymphadenopathy  Skin: Warm  No palpable masses  No visible lesions  Neuro: Normal muscle tone  Normal and symmetric DTR's       Right Shoulder Exam  Alignment / Posture:  Normal shoulder posture  Inspection:  No swelling  No edema  No erythema  3-4 cm in diameter subcutaneous mass which is tender and firm deformity  Palpation:  mild bicipital groove tenderness  No effusion  ROM:  Shoulder   Shoulder ER 50  Shoulder IR T4   Strength:  Supraspinatus 4/5  Infraspinatus 3/5  Stability:  No objective shoulder instability  Tests: (-) Belly press  Neurovascular:  Sensation intact in Ax/R/M/U nerve distributions  2+ radial pulse  Studies Reviewed  I have personally reviewed pertinent films in PACS  MRI of right shoulder - images from 5/10/2021 which demonstrate chronic complete tear of supraspinatus, subscapularis and infraspinatus  Cystic lesion adjacent to the acromioclavciular joint measuring 3 1x2 5x2 2cm  Advanced glenohumeral degenerative changes with diffuse degeneration and tearing throughout labrum   XR of right shoulder- images from 1/15/2021 which demonstrate glenohumeral osteophytes and rotator cuff pathology  Large joint arthrocentesis  Universal Protocol:  Consent given by: patient  Time out: Immediately prior to procedure a "time out" was called to verify the correct patient, procedure, equipment, support staff and site/side marked as required    Site marked: the operative site was marked  Supporting Documentation  Indications: pain and diagnostic evaluation   Procedure Details  Location: shoulder (over mass directly over [facility] [place], operated by [facility] joint) -   Preparation: Patient was prepped and draped in the usual sterile fashion  Needle size: 22 G  Ultrasound guidance: no  Approach: lateral  Medications administered: 4 mL bupivacaine 0 25 %; 1 mL methylPREDNISolone acetate 40 mg/mL    Aspirate amount: 9 mL  Aspirate: yellow    Patient tolerance: patient tolerated the procedure well with no immediate complications  Dressing:  Sterile dressing applied        Medical, Surgical, Family, and Social History  The patient's medical history, family history, and social history, were reviewed and updated as appropriate  Past Medical History:   Diagnosis Date    Asthma     Colon polyp     Diabetes mellitus ([facility] [address] )     E coli infection     Fall     Hyperlipidemia     Hypertension     Pneumothorax     Rectal prolapse 09/03/2020    Sleep apnea     no CPAP    Subarachnoid hemorrhage (HCC)     Thoracic aortic aneurysm (HCC)     Vertigo        Past Surgical History:   Procedure Laterality Date    BONE GRAFT      R arm    BRONCHOSCOPY N/A 3/16/2016    Procedure: BRONCHOSCOPY FLEXIBLE/anesth ;  Surgeon: Steven Boyle DO;  [facility]: [facility];   Service:     COLONOSCOPY      COLONOSCOPY      EYE SURGERY      OOPHORECTOMY Left     age 49    VT LAP,SURG,COLECTOMY, PARTIAL, W/ANAST N/A 9/23/2020    Procedure: RESECTION COLON SIGMOID LAPAROSCOPIC;  Surgeon: Adam House MD;  [facility];  Service: Colorectal    VT SIGMOIDOSCOPY FLX DX W/COLLJ SPEC BR/WA IF PFRMD N/A 9/23/2020    Procedure: Prema Stephens;  Surgeon: Adam House MD;  [facility];  Service: Colorectal    RECTAL PROLAPSE REPAIR LAPAROSCOPIC N/A 9/23/2020    Procedure: Wood Mackenzie;  Surgeon: Adam House MD;  [facility];  Service: Colorectal       Family History   Problem Relation Age of Onset    Endometrial cancer Mother 45    Cancer Daughter 48        breast    Breast cancer additional onset Daughter 48    Cancer Son 35        asbestosis related cancer    No Known Problems Father     No Known Problems Sister     No Known Problems Maternal Grandmother     No Known Problems Maternal Grandfather     No Known Problems Paternal Grandmother     No Known Problems Paternal Grandfather     No Known Problems Daughter     No Known Problems Maternal Aunt     No Known Problems Paternal Aunt        Social History     Occupational History    Not on file   Tobacco Use    Smoking status: Never Smoker    Smokeless tobacco: Never Used Substance and Sexual Activity    Alcohol use: Yes     Frequency: 2-4 times a month     Drinks per session: 1 or 2     Binge frequency: Never     Comment: weekends     Drug use: No    Sexual activity: Not on file       Allergies   Allergen Reactions    Bactrim [Sulfamethoxazole-Trimethoprim] Hives    Sulfamethoxazole Rash    Trimethoprim Rash         Current Outpatient Medications:     acetaminophen (TYLENOL) 325 mg tablet, Take 3 tablets (975 mg total) by mouth every 8 (eight) hours, Disp: 30 tablet, Rfl: 0    albuterol (2 5 mg/3 mL) 0 083 % nebulizer solution, TAKE 1 VIAL BY NEBULIZATION EVERY 6 HOURS AS NEEDED FOR WHEEZING OR SHORTNESS OF BREATH, Disp: 150 mL, Rfl: 3    albuterol (PROVENTIL HFA,VENTOLIN HFA) 90 mcg/act inhaler, Inhale 2 puffs every 6 (six) hours as needed for wheezing, Disp: 1 Inhaler, Rfl: 5    amLODIPine (NORVASC) 5 mg tablet, Take 1 tablet (5 mg total) by mouth daily, Disp: 90 tablet, Rfl: 1    atorvastatin (LIPITOR) 40 mg tablet, Take 1 tablet (40 mg total) by mouth daily, Disp: 30 tablet, Rfl: 3    atropine (ISOPTO ATROPINE) 1 % ophthalmic solution, PLACE 1 DROP INTO LEFT EYE 2 TIMES A DAY FOR 3 DAYS, START 3 DAYS PRIOR TO SURGERY, Disp: , Rfl:     candesartan (ATACAND) 32 MG tablet, Take 1 tablet (32 mg total) by mouth daily, Disp: 30 tablet, Rfl: 5    gatifloxacin (ZYMAXID) 0 5 %, PLACE 1 DROP INTO LEFT EYE 4 TIMES A DAY FOR 10 DAYS, START 3 DAYS PRIOR TO SURGERY, Disp: , Rfl:     glucosamine-chondroitin 500-400 MG tablet, Take 1 tablet by mouth 3 (three) times a day, Disp: , Rfl:     glucose blood test strip, Use 1 each 2 (two) times a day Use as instructed, Disp: 200 each, Rfl: 3    hydrochlorothiazide (HYDRODIURIL) 12 5 mg tablet, take 1 tablet by mouth once daily, Disp: 90 tablet, Rfl: 1    ibandronate (BONIVA) 150 MG tablet, take 1 tablet by mouth every month, Disp: 1 tablet, Rfl: 5    ketorolac (ACULAR) 0 5 % ophthalmic solution, PLACE 1 DROP INTO LEFT EYE 4 TIMES A DAY  START 3 DAYS PRIOR TO SURGERY, Disp: , Rfl:     MAGNESIUM PO, Take by mouth daily , Disp: , Rfl:     meclizine (ANTIVERT) 12 5 MG tablet, Take by mouth 3 (three) times a day as needed for dizziness, Disp: , Rfl:     metFORMIN (GLUCOPHAGE) 850 mg tablet, take 1 tablet by mouth twice a day with meals, Disp: 60 tablet, Rfl: 5    mometasone-formoterol (DULERA) 200-5 MCG/ACT inhaler, Inhale 2 puffs 2 (two) times a day Rinse mouth after use , Disp: 1 Inhaler, Rfl: 5    montelukast (SINGULAIR) 10 mg tablet, Take 1 tablet (10 mg total) by mouth daily at bedtime, Disp: 30 tablet, Rfl: 5    prednisoLONE acetate (PRED FORTE) 1 % ophthalmic suspension, PLACE 1 DROP INTO LEFT EYE 4 TIMES A DAY   START AFTER SURGERY AND TAPER AS DIRECTED, Disp: , Rfl:     VITAMIN E PO, Take by mouth daily , Disp: , Rfl:       [employer]    I,:  Kiera Lynch am acting as a scribe while in the presence of the attending physician :       I,:  Yousuf Blankenship MD personally performed the services described in this documentation    as scribed in my presence :

## 2021-05-18 ENCOUNTER — LAB (OUTPATIENT)
Dept: LAB | Facility: HOSPITAL | Age: 76
End: 2021-05-18
Payer: COMMERCIAL

## 2021-05-18 DIAGNOSIS — E11.9 TYPE 2 DIABETES MELLITUS WITHOUT COMPLICATION, WITHOUT LONG-TERM CURRENT USE OF INSULIN (HCC): Chronic | ICD-10-CM

## 2021-05-18 DIAGNOSIS — E78.00 PURE HYPERCHOLESTEROLEMIA: ICD-10-CM

## 2021-05-18 LAB
ALBUMIN SERPL BCP-MCNC: 4 G/DL (ref 3.5–5)
ALP SERPL-CCNC: 74 U/L (ref 46–116)
ALT SERPL W P-5'-P-CCNC: 25 U/L (ref 12–78)
ANION GAP SERPL CALCULATED.3IONS-SCNC: 6 MMOL/L (ref 4–13)
AST SERPL W P-5'-P-CCNC: 12 U/L (ref 5–45)
BILIRUB SERPL-MCNC: 0.84 MG/DL (ref 0.2–1)
BUN SERPL-MCNC: 19 MG/DL (ref 5–25)
CALCIUM SERPL-MCNC: 9.1 MG/DL (ref 8.3–10.1)
CHLORIDE SERPL-SCNC: 100 MMOL/L (ref 100–108)
CHOLEST SERPL-MCNC: 172 MG/DL (ref 50–200)
CO2 SERPL-SCNC: 32 MMOL/L (ref 21–32)
CREAT SERPL-MCNC: 0.56 MG/DL (ref 0.6–1.3)
GFR SERPL CREATININE-BSD FRML MDRD: 92 ML/MIN/1.73SQ M
GLUCOSE SERPL-MCNC: 122 MG/DL (ref 65–140)
HDLC SERPL-MCNC: 84 MG/DL
LDLC SERPL CALC-MCNC: 68 MG/DL (ref 0–100)
NONHDLC SERPL-MCNC: 88 MG/DL
POTASSIUM SERPL-SCNC: 5.2 MMOL/L (ref 3.5–5.3)
PROT SERPL-MCNC: 7.3 G/DL (ref 6.4–8.2)
SODIUM SERPL-SCNC: 138 MMOL/L (ref 136–145)
TRIGL SERPL-MCNC: 102 MG/DL

## 2021-05-18 PROCEDURE — 36415 COLL VENOUS BLD VENIPUNCTURE: CPT

## 2021-05-18 PROCEDURE — 80061 LIPID PANEL: CPT

## 2021-05-18 PROCEDURE — 80053 COMPREHEN METABOLIC PANEL: CPT

## 2021-06-23 ENCOUNTER — TELEPHONE (OUTPATIENT)
Dept: OTHER | Facility: OTHER | Age: 76
End: 2021-06-23

## 2021-06-23 DIAGNOSIS — Z12.31 ENCOUNTER FOR SCREENING MAMMOGRAM FOR BREAST CANCER: Primary | ICD-10-CM

## 2021-06-23 NOTE — TELEPHONE ENCOUNTER
Patient went to the wrong location for her appointment  She is going to be a little bit late for her appointment at [facility]

## 2021-06-28 ENCOUNTER — TELEPHONE (OUTPATIENT)
Dept: FAMILY MEDICINE CLINIC | Facility: CLINIC | Age: 76
End: 2021-06-28

## 2021-06-28 DIAGNOSIS — I10 ESSENTIAL HYPERTENSION: Primary | ICD-10-CM

## 2021-06-28 RX ORDER — MEDICAL SUPPLY, MISCELLANEOUS
EACH MISCELLANEOUS DAILY
Qty: 1 EACH | Refills: 0 | Status: SHIPPED | OUTPATIENT
Start: 2021-06-28 | End: 2021-06-28

## 2021-06-28 NOTE — TELEPHONE ENCOUNTER
PT CALLED IN TO REQUEST A BLOOD PRESSURE MACHINE, TO CHECK HER BLOOD PRESSURE AT HOME, IF POSSIBLE PLEASE PLACE ORDER AND CALL PT WHEN DONE SO SHE CAN  THE ORDER, THANK YOU!

## 2021-07-13 ENCOUNTER — HOSPITAL ENCOUNTER (OUTPATIENT)
Dept: MAMMOGRAPHY | Facility: CLINIC | Age: 76
Discharge: HOME | End: 2021-07-13
Payer: COMMERCIAL

## 2021-07-13 VITALS — BODY MASS INDEX: 22.88 KG/M2 | WEIGHT: 134 LBS | HEIGHT: 64 IN

## 2021-07-13 DIAGNOSIS — I10 ESSENTIAL HYPERTENSION: Primary | ICD-10-CM

## 2021-07-13 DIAGNOSIS — Z12.31 ENCOUNTER FOR SCREENING MAMMOGRAM FOR BREAST CANCER: ICD-10-CM

## 2021-07-13 PROCEDURE — 77063 BREAST TOMOSYNTHESIS BI: CPT

## 2021-07-13 PROCEDURE — 77067 SCR MAMMO BI INCL CAD: CPT

## 2021-07-13 RX ORDER — BENZOCAINE/MENTHOL 6 MG-10 MG
LOZENGE MUCOUS MEMBRANE AS NEEDED
Qty: 1 EACH | Refills: 0 | Status: SHIPPED | OUTPATIENT
Start: 2021-07-13 | End: 2022-08-08

## 2021-07-13 NOTE — TELEPHONE ENCOUNTER
[place], [place] [phone], STATING THAT HE RECEIVED A CALL FROM PT ASKING TO FILL A SCRIPT THAT WAS GIVEN TO HER BY DR Carolina Aguilar FOR A BP MONITOR  MARITA NEEDS OUR OFFICE TO FAX ([phone]) THE SCRIPT AND THE OFFICE NOTES THAT WILL COVER THE BP MONITOR  ANY QUESTIONS PLEASE CALL MARITA [phone]

## 2021-07-15 ENCOUNTER — TELEPHONE (OUTPATIENT)
Dept: FAMILY MEDICINE CLINIC | Facility: CLINIC | Age: 76
End: 2021-07-15

## 2021-07-15 NOTE — TELEPHONE ENCOUNTER
LAYA, FROM [facility] WOULD LIKE A CALL BACK CONCEARNING AN ORDER THEY RECEIVED FROM PATIENT WRITTEN BY DR Ana Nunez    PLEASE CALL LAYA [phone]

## 2021-07-17 DIAGNOSIS — R26.89 BALANCE DISORDER: Primary | ICD-10-CM

## 2021-07-18 RX ORDER — MECLIZINE HCL 12.5 MG/1
TABLET ORAL
Qty: 30 TABLET | Refills: 3 | Status: SHIPPED | OUTPATIENT
Start: 2021-07-18 | End: 2022-08-08 | Stop reason: SDUPTHER

## 2021-07-29 ENCOUNTER — APPOINTMENT (EMERGENCY)
Dept: RADIOLOGY | Facility: HOSPITAL | Age: 76
End: 2021-07-29
Payer: COMMERCIAL

## 2021-07-29 ENCOUNTER — HOSPITAL ENCOUNTER (EMERGENCY)
Facility: HOSPITAL | Age: 76
Discharge: HOME | End: 2021-07-29
Attending: EMERGENCY MEDICINE | Admitting: EMERGENCY MEDICINE
Payer: COMMERCIAL

## 2021-07-29 ENCOUNTER — TELEPHONE (OUTPATIENT)
Dept: FAMILY MEDICINE CLINIC | Facility: CLINIC | Age: 76
End: 2021-07-29

## 2021-07-29 VITALS
HEART RATE: 88 BPM | RESPIRATION RATE: 16 BRPM | DIASTOLIC BLOOD PRESSURE: 62 MMHG | SYSTOLIC BLOOD PRESSURE: 129 MMHG | OXYGEN SATURATION: 97 % | TEMPERATURE: 98.4 F

## 2021-07-29 DIAGNOSIS — J45.909 ASTHMA: ICD-10-CM

## 2021-07-29 DIAGNOSIS — R53.83 FATIGUE: ICD-10-CM

## 2021-07-29 DIAGNOSIS — E11.65 HYPERGLYCEMIA DUE TO TYPE 2 DIABETES MELLITUS (HCC): Primary | ICD-10-CM

## 2021-07-29 LAB
ALBUMIN SERPL BCP-MCNC: 3.6 G/DL (ref 3.5–5)
ALP SERPL-CCNC: 72 U/L (ref 46–116)
ALT SERPL W P-5'-P-CCNC: 24 U/L (ref 12–78)
ANION GAP SERPL CALCULATED.3IONS-SCNC: 10 MMOL/L (ref 4–13)
AST SERPL W P-5'-P-CCNC: 9 U/L (ref 5–45)
ATRIAL RATE: 67 BPM
BASOPHILS # BLD AUTO: 0.01 THOUSANDS/ΜL (ref 0–0.1)
BASOPHILS NFR BLD AUTO: 0 % (ref 0–1)
BILIRUB SERPL-MCNC: 0.4 MG/DL (ref 0.2–1)
BUN SERPL-MCNC: 28 MG/DL (ref 5–25)
CALCIUM SERPL-MCNC: 9.3 MG/DL (ref 8.3–10.1)
CHLORIDE SERPL-SCNC: 99 MMOL/L (ref 100–108)
CO2 SERPL-SCNC: 27 MMOL/L (ref 21–32)
CREAT SERPL-MCNC: 1.03 MG/DL (ref 0.6–1.3)
EOSINOPHIL # BLD AUTO: 0 THOUSAND/ΜL (ref 0–0.61)
EOSINOPHIL NFR BLD AUTO: 0 % (ref 0–6)
ERYTHROCYTE [DISTWIDTH] IN BLOOD BY AUTOMATED COUNT: 13 % (ref 11.6–15.1)
GFR SERPL CREATININE-BSD FRML MDRD: 54 ML/MIN/1.73SQ M
GLUCOSE SERPL-MCNC: 187 MG/DL (ref 65–140)
GLUCOSE SERPL-MCNC: 217 MG/DL (ref 65–140)
HCT VFR BLD AUTO: 38.3 % (ref 34.8–46.1)
HGB BLD-MCNC: 13.5 G/DL (ref 11.5–15.4)
IMM GRANULOCYTES # BLD AUTO: 0.04 THOUSAND/UL (ref 0–0.2)
IMM GRANULOCYTES NFR BLD AUTO: 0 % (ref 0–2)
LIPASE SERPL-CCNC: 103 U/L (ref 73–393)
LYMPHOCYTES # BLD AUTO: 1.7 THOUSANDS/ΜL (ref 0.6–4.47)
LYMPHOCYTES NFR BLD AUTO: 18 % (ref 14–44)
MCH RBC QN AUTO: 33.3 PG (ref 26.8–34.3)
MCHC RBC AUTO-ENTMCNC: 35.2 G/DL (ref 31.4–37.4)
MCV RBC AUTO: 94 FL (ref 82–98)
MONOCYTES # BLD AUTO: 0.68 THOUSAND/ΜL (ref 0.17–1.22)
MONOCYTES NFR BLD AUTO: 7 % (ref 4–12)
NEUTROPHILS # BLD AUTO: 7.13 THOUSANDS/ΜL (ref 1.85–7.62)
NEUTS SEG NFR BLD AUTO: 75 % (ref 43–75)
NRBC BLD AUTO-RTO: 0 /100 WBCS
NT-PROBNP SERPL-MCNC: 183 PG/ML
P AXIS: 76 DEGREES
PLATELET # BLD AUTO: 212 THOUSANDS/UL (ref 149–390)
PMV BLD AUTO: 12 FL (ref 8.9–12.7)
POTASSIUM SERPL-SCNC: 4.8 MMOL/L (ref 3.5–5.3)
PR INTERVAL: 142 MS
PROT SERPL-MCNC: 6.9 G/DL (ref 6.4–8.2)
QRS AXIS: 61 DEGREES
QRSD INTERVAL: 86 MS
QT INTERVAL: 390 MS
QTC INTERVAL: 412 MS
RBC # BLD AUTO: 4.06 MILLION/UL (ref 3.81–5.12)
SODIUM SERPL-SCNC: 136 MMOL/L (ref 136–145)
T WAVE AXIS: 76 DEGREES
TROPONIN I SERPL-MCNC: <0.02 NG/ML
TSH SERPL DL<=0.05 MIU/L-ACNC: 2.5 UIU/ML (ref 0.36–3.74)
VENTRICULAR RATE: 67 BPM
WBC # BLD AUTO: 9.56 THOUSAND/UL (ref 4.31–10.16)

## 2021-07-29 PROCEDURE — 83880 ASSAY OF NATRIURETIC PEPTIDE: CPT | Performed by: PHYSICIAN ASSISTANT

## 2021-07-29 PROCEDURE — 84443 ASSAY THYROID STIM HORMONE: CPT | Performed by: PHYSICIAN ASSISTANT

## 2021-07-29 PROCEDURE — 84484 ASSAY OF TROPONIN QUANT: CPT | Performed by: PHYSICIAN ASSISTANT

## 2021-07-29 PROCEDURE — 71046 X-RAY EXAM CHEST 2 VIEWS: CPT

## 2021-07-29 PROCEDURE — 36415 COLL VENOUS BLD VENIPUNCTURE: CPT | Performed by: PHYSICIAN ASSISTANT

## 2021-07-29 PROCEDURE — 83690 ASSAY OF LIPASE: CPT | Performed by: PHYSICIAN ASSISTANT

## 2021-07-29 PROCEDURE — 80053 COMPREHEN METABOLIC PANEL: CPT | Performed by: PHYSICIAN ASSISTANT

## 2021-07-29 PROCEDURE — 93005 ELECTROCARDIOGRAM TRACING: CPT

## 2021-07-29 PROCEDURE — 82948 REAGENT STRIP/BLOOD GLUCOSE: CPT

## 2021-07-29 PROCEDURE — 99285 EMERGENCY DEPT VISIT HI MDM: CPT

## 2021-07-29 PROCEDURE — 93010 ELECTROCARDIOGRAM REPORT: CPT | Performed by: INTERNAL MEDICINE

## 2021-07-29 PROCEDURE — 99285 EMERGENCY DEPT VISIT HI MDM: CPT | Performed by: PHYSICIAN ASSISTANT

## 2021-07-29 PROCEDURE — 94640 AIRWAY INHALATION TREATMENT: CPT

## 2021-07-29 PROCEDURE — 85025 COMPLETE CBC W/AUTO DIFF WBC: CPT | Performed by: PHYSICIAN ASSISTANT

## 2021-07-29 RX ORDER — ALBUTEROL SULFATE 2.5 MG/3ML
5 SOLUTION RESPIRATORY (INHALATION) ONCE
Status: COMPLETED | OUTPATIENT
Start: 2021-07-29 | End: 2021-07-29

## 2021-07-29 RX ADMIN — ALBUTEROL SULFATE 5 MG: 2.5 SOLUTION RESPIRATORY (INHALATION) at 13:51

## 2021-07-29 RX ADMIN — IPRATROPIUM BROMIDE 0.5 MG: 0.5 SOLUTION RESPIRATORY (INHALATION) at 13:51

## 2021-07-29 NOTE — ED NOTES
Pt checked bs at home [facility], [address] [facility]  07/29/21 1134

## 2021-07-29 NOTE — TELEPHONE ENCOUNTER
AMISH   PT CALLED IN AND STATED SHE WASN'T FEELING WELL SHE STATED HER SUGARS ARE HIGH, THE HIGHEST WAS AROUND 600 AND WHEN SHE CHECKED TODAY IT , SHE ALSO FEELS WEAK AND HAS NO ENERGY, I OFFERED HER AN OV WITH JUWAN OR DANA SINCE THEY WERE THE ONLY TWO PROVIDERS WITH OPENING TODAY, PT REFUSED AND STATED SHE WILL GO TO THE ER INSTEAD

## 2021-07-29 NOTE — ED PROVIDER NOTES
History  Chief Complaint   Patient presents with    Hyperglycemia - Symptomatic     high blood sugars at home >600 x 4 days  pt states she has been taking extra metformin to lower her bs at home  vomiting and weakness called pcp advised to come to ED     Patient is a 28-year-old female with a past medical history of DM, HTN, HLD, asthma who presents with fatigue, and hyperglycemia for the last 4 days  Patient states she ate a snack her friend made and shortly after noted her BG was 600  She states it remained elevated that day when she rechecked it repeatedly within hours of ingesting the snack  When she recked it the next 2 days it was in the 280s  Over 2 5 days patient took 7 metformin, instead of the 5 she should have taken in the time frame as she takes 850mg BID  Yesterday she noted 2 episodes of nonbloody, nonbilious vomiting with nausea, but states that has resolved and denies any associated abdominal pain  She has been able to eat and drink without issue since vomiting  Patient reports generally feeling fatigued, but denies any focal weakness, fevers, chills, diaphoresis, headaches, dizziness, lightheadedness, numbness, tingling, weakness, neck pain or stiffness, congestion, cough  Patient notes a history of asthma and has required her albuterol a few extra times over the last 3 days and notes her typical shortness of breath, but denies any shortness of breath at rest, chest pain, palpitations, SARGENT, leg pain or swelling, recent travel, known sick contacts or COVID-19 exposure, history of DVT/PE  Patient has been vaccinated for COVID-19  Patient denies any diarrhea, constipation, urinary changes  Prior to Admission Medications   Prescriptions Last Dose Informant Patient Reported? Taking? Blood Pressure Monitor MISC   No No   Sig: Use as needed (Pt to use as needed  )   MAGNESIUM PO  Self Yes No   Sig: Take by mouth daily    VITAMIN E PO  Self Yes No   Sig: Take by mouth daily    acetaminophen (TYLENOL) 325 mg tablet  Self No No   Sig: Take 3 tablets (975 mg total) by mouth every 8 (eight) hours   albuterol (2 5 mg/3 mL) 0 083 % nebulizer solution   No No   Sig: TAKE 1 VIAL BY NEBULIZATION EVERY 6 HOURS AS NEEDED FOR WHEEZING OR SHORTNESS OF BREATH   albuterol (PROVENTIL HFA,VENTOLIN HFA) 90 mcg/act inhaler  Self No No   Sig: Inhale 2 puffs every 6 (six) hours as needed for wheezing   amLODIPine (NORVASC) 5 mg tablet  Self No No   Sig: Take 1 tablet (5 mg total) by mouth daily   atorvastatin (LIPITOR) 40 mg tablet 7/28/2021 at 2000 Self No Yes   Sig: Take 1 tablet (40 mg total) by mouth daily   Patient taking differently: Take 40 mg by mouth every evening    atropine (ISOPTO ATROPINE) 1 % ophthalmic solution  Self Yes No   Sig: PLACE 1 DROP INTO LEFT EYE 2 TIMES A DAY FOR 3 DAYS, START 3 DAYS PRIOR TO SURGERY   candesartan (ATACAND) 32 MG tablet  Self No No   Sig: Take 1 tablet (32 mg total) by mouth daily   gatifloxacin (ZYMAXID) 0 5 %  Self Yes No   Sig: PLACE 1 DROP INTO LEFT EYE 4 TIMES A DAY FOR 10 DAYS, START 3 DAYS PRIOR TO SURGERY   glucosamine-chondroitin 500-400 MG tablet  Self Yes No   Sig: Take 1 tablet by mouth 3 (three) times a day   glucose blood test strip   No No   Sig: Use 1 each 2 (two) times a day Use as instructed   hydrochlorothiazide (HYDRODIURIL) 12 5 mg tablet  Self No No   Sig: take 1 tablet by mouth once daily   ibandronate (BONIVA) 150 MG tablet   No No   Sig: take 1 tablet by mouth every month   ketorolac (ACULAR) 0 5 % ophthalmic solution  Self Yes No   Sig: PLACE 1 DROP INTO LEFT EYE 4 TIMES A DAY  START 3 DAYS PRIOR TO SURGERY   meclizine (ANTIVERT) 12 5 MG tablet   No No   Sig: TAKE 1 TABLET BY MOUTH 3 TIMES A DAY AS NEEDED FOR DIZZINESS FOR UP TO 10 DAYS     metFORMIN (GLUCOPHAGE) 850 mg tablet 7/29/2021 at 0900  No Yes   Sig: take 1 tablet by mouth twice a day with meals   mometasone-formoterol (DULERA) 200-5 MCG/ACT inhaler  Self No No   Sig: Inhale 2 puffs 2 (two) times a day Rinse mouth after use    montelukast (SINGULAIR) 10 mg tablet  Self No No   Sig: Take 1 tablet (10 mg total) by mouth daily at bedtime   prednisoLONE acetate (PRED FORTE) 1 % ophthalmic suspension  Self Yes No   Sig: PLACE 1 DROP INTO LEFT EYE 4 TIMES A DAY  START AFTER SURGERY AND TAPER AS DIRECTED      Facility-Administered Medications: None       Past Medical History:   Diagnosis Date    Asthma     Colon polyp     Diabetes mellitus ([place] [address] )     E coli infection     Fall     Hyperlipidemia     Hypertension     Pneumothorax     Rectal prolapse 09/03/2020    Sleep apnea     no CPAP    [facility] )     Thoracic aortic aneurysm (HCC)     Vertigo        Past Surgical History:   Procedure Laterality Date    BONE GRAFT      R arm    BRONCHOSCOPY N/A 3/16/2016    Procedure: BRONCHOSCOPY FLEXIBLE/anesth ;  Surgeon: Kody Castro DO;  [facility]: [facility];   Service:     COLONOSCOPY      COLONOSCOPY      EYE SURGERY      OOPHORECTOMY Left     age 49    FL LAP,SURG,COLECTOMY, PARTIAL, W/ANAST N/A 9/23/2020    Procedure: RESECTION COLON SIGMOID LAPAROSCOPIC;  Surgeon: Rhoda Chavez MD;  [facility];  Service: Colorectal    FL SIGMOIDOSCOPY FLX DX W/COLLJ SPEC BR/WA IF PFRMD N/A 9/23/2020    Procedure: Bria Arriola;  Surgeon: Rhoda Chavez MD;  [facility];  Service: Colorectal    RECTAL PROLAPSE REPAIR LAPAROSCOPIC N/A 9/23/2020    Procedure: Usha Navarrete;  Surgeon: Rhoda Chavez MD;  [facility];  Service: Colorectal       Family History   Problem Relation Age of Onset    Endometrial cancer Mother 45    Cancer Daughter 48        breast    Breast cancer additional onset Daughter 48    Cancer Son 35        asbestosis related cancer    No Known Problems Father     No Known Problems Sister     No Known Problems Maternal Grandmother     No Known Problems Maternal Grandfather     No Known Problems Paternal Grandmother     No Known Problems Paternal Grandfather     No Known Problems Daughter     No Known Problems Maternal Aunt     No Known Problems Paternal Aunt      I have reviewed and agree with the history as documented  E-Cigarette/Vaping    E-Cigarette Use Never User      E-Cigarette/Vaping Substances     Social History     Tobacco Use    Smoking status: Never Smoker    Smokeless tobacco: Never Used   Vaping Use    Vaping Use: Never used   Substance Use Topics    Alcohol use: Yes     Comment: weekends     Drug use: No       Review of Systems   Constitutional: Positive for fatigue  Negative for chills, diaphoresis and fever  HENT: Negative for congestion and rhinorrhea  Eyes: Negative for visual disturbance  Respiratory: Positive for shortness of breath  Negative for cough, wheezing and stridor  Cardiovascular: Negative for chest pain, palpitations and leg swelling  Gastrointestinal: Positive for vomiting (Now resolved)  Negative for abdominal pain, constipation, diarrhea and nausea  Genitourinary: Negative for difficulty urinating, dysuria, frequency, hematuria and urgency  Musculoskeletal: Negative for myalgias, neck pain and neck stiffness  Skin: Negative for color change, pallor and rash  Neurological: Negative for dizziness, weakness, light-headedness, numbness and headaches  All other systems reviewed and are negative  Physical Exam  Physical Exam  Vitals and nursing note reviewed  Constitutional:       General: She is awake  She is not in acute distress  Appearance: She is well-developed  She is not ill-appearing, toxic-appearing or diaphoretic  HENT:      Head: Normocephalic and atraumatic  Right Ear: External ear normal       Left Ear: External ear normal       Nose: Nose normal    Eyes:      Conjunctiva/sclera: Conjunctivae normal       Pupils: Pupils are equal, round, and reactive to light     Cardiovascular:      Rate and Rhythm: Normal rate and regular rhythm  Pulses: Normal pulses  Heart sounds: Normal heart sounds, S1 normal and S2 normal    Pulmonary:      Effort: Pulmonary effort is normal  No respiratory distress  Breath sounds: No stridor  Wheezing (Diffuse expiratory wheezes) present  No decreased breath sounds, rhonchi or rales  Abdominal:      General: Bowel sounds are normal  There is no distension  Palpations: Abdomen is soft  Tenderness: There is no abdominal tenderness  Musculoskeletal:         General: Normal range of motion  Cervical back: Normal range of motion and neck supple  Right lower leg: No edema  Left lower leg: No edema  Comments: Neurovascularly intact, 5/5 strength in bilateral upper and lower extremities   Skin:     General: Skin is warm and dry  Capillary Refill: Capillary refill takes less than 2 seconds  Neurological:      General: No focal deficit present  Mental Status: She is alert and oriented to person, place, and time  GCS: GCS eye subscore is 4  GCS verbal subscore is 5  GCS motor subscore is 6  Psychiatric:         Behavior: Behavior is cooperative           Vital Signs  ED Triage Vitals [07/29/21 1259]   Temperature Pulse Respirations Blood Pressure SpO2   98 4 °F (36 9 °C) 72 14 154/67 96 %      Temp Source Heart Rate Source Patient Position - Orthostatic VS BP Location FiO2 (%)   Oral Monitor Sitting Right arm --      Pain Score       No Pain           Vitals:    07/29/21 1259 07/29/21 1440   BP: 154/67 129/62   Pulse: 72 88   Patient Position - Orthostatic VS: Sitting          Visual Acuity      ED Medications  Medications   albuterol inhalation solution 5 mg (5 mg Nebulization Given 7/29/21 1351)   ipratropium (ATROVENT) 0 02 % inhalation solution 0 5 mg (0 5 mg Nebulization Given 7/29/21 1351)       Diagnostic Studies  Results Reviewed     Procedure Component Value Units Date/Time    TSH [[id number]]  (Normal) Collected: 07/29/21 1348    Lab Status: Final result Specimen: Blood from Arm, Left Updated: 07/29/21 1536     TSH 3RD GENERATON 2 504 uIU/mL     Narrative:      Patients undergoing fluorescein dye angiography may retain small amounts of fluorescein in the body for 48-72 hours post procedure  Samples containing fluorescein can produce falsely depressed TSH values  If the patient had this procedure,a specimen should be resubmitted post fluorescein clearance        NT-BNP PRO [[id number]]  (Abnormal) Collected: 07/29/21 1348    Lab Status: Final result Specimen: Blood from Arm, Left Updated: 07/29/21 1453     NT-proBNP 183 pg/mL     Lipase [[id number]]  (Normal) Collected: 07/29/21 1348    Lab Status: Final result Specimen: Blood from Arm, Left Updated: 07/29/21 1419     Lipase 103 u/L     Comprehensive metabolic panel [[id number]]  (Abnormal) Collected: 07/29/21 1348    Lab Status: Final result Specimen: Blood from Arm, Left Updated: 07/29/21 1419     Sodium 136 mmol/L      Potassium 4 8 mmol/L      Chloride 99 mmol/L      CO2 27 mmol/L      ANION GAP 10 mmol/L      BUN 28 mg/dL      Creatinine 1 03 mg/dL      Glucose 217 mg/dL      Calcium 9 3 mg/dL      AST 9 U/L      ALT 24 U/L      Alkaline Phosphatase 72 U/L      Total Protein 6 9 g/dL      Albumin 3 6 g/dL      Total Bilirubin 0 40 mg/dL      eGFR 54 ml/min/1 73sq m     Narrative:      [facility] guidelines for Chronic Kidney Disease (CKD):     Stage 1 with normal or high GFR (GFR > 90 mL/min/1 73 square meters)    Stage 2 Mild CKD (GFR = 60-89 mL/min/1 73 square meters)    Stage 3A Moderate CKD (GFR = 45-59 mL/min/1 73 square meters)    Stage 3B Moderate CKD (GFR = 30-44 mL/min/1 73 square meters)    Stage 4 Severe CKD (GFR = 15-29 mL/min/1 73 square meters)    Stage 5 End Stage CKD (GFR <15 mL/min/1 73 square meters)  Note: GFR calculation is accurate only with a steady state creatinine    Troponin I [[id number]]  (Normal) Collected: 07/29/21 1348    Lab Status: Final result Specimen: Blood from Arm, Left Updated: 07/29/21 1411     Troponin I <0 02 ng/mL     CBC and differential [[id number]] Collected: 07/29/21 1348    Lab Status: Final result Specimen: Blood from Arm, Left Updated: 07/29/21 1355     WBC 9 56 Thousand/uL      RBC 4 06 Million/uL      Hemoglobin 13 5 g/dL      Hematocrit 38 3 %      MCV 94 fL      MCH 33 3 pg      MCHC 35 2 g/dL      RDW 13 0 %      MPV 12 0 fL      Platelets 636 Thousands/uL      nRBC 0 /100 WBCs      Neutrophils Relative 75 %      Immat GRANS % 0 %      Lymphocytes Relative 18 %      Monocytes Relative 7 %      Eosinophils Relative 0 %      Basophils Relative 0 %      Neutrophils Absolute 7 13 Thousands/µL      Immature Grans Absolute 0 04 Thousand/uL      Lymphocytes Absolute 1 70 Thousands/µL      Monocytes Absolute 0 68 Thousand/µL      Eosinophils Absolute 0 00 Thousand/µL      Basophils Absolute 0 01 Thousands/µL     Fingerstick Glucose (POCT) [[id number]]  (Abnormal) Collected: 07/29/21 1305    Lab Status: Final result Updated: 07/29/21 1307     POC Glucose 187 mg/dl                  XR chest 2 views   Final Result by Amparo Melo MD (07/29 1509)      No acute cardiopulmonary disease                    Workstation performed: [id number]                    Procedures  ECG 12 Lead Documentation Only    Date/Time: 7/29/2021 2:03 PM  Performed by: Pacheco Pepe PA-C  Authorized by: Pacheco Pepe PA-C     Indications / Diagnosis:  SOB  ECG reviewed by me, the ED Provider: yes    Patient location:  ED  Previous ECG:     Previous ECG:  Compared to current    Comparison ECG info:  [id number]  Rate:     ECG rate:  67    ECG rate assessment: normal    Rhythm:     Rhythm: sinus rhythm    Ectopy:     Ectopy: none    QRS:     QRS axis:  Normal  Conduction:     Conduction: normal    ST segments:     ST segments:  Normal  T waves:     T waves: normal               ED Course  ED Course as of Jul 29 1603   Thu Jul 29, 2021   1341 POC Glucose(!): 187   1453 Glucose, Random(!): 217   1453 Anion Gap: 10   1453 CO2: 27   1453 Troponin I: <0 02   1519 IMPRESSION:     No acute cardiopulmonary disease  XR chest 2 views                             SBIRT 22yo+      Most Recent Value   SBIRT (22 yo +)   In order to provide better care to our patients, we are screening all of our patients for alcohol and drug use  Would it be okay to ask you these screening questions? Yes Filed at: 07/29/2021 0490   Initial Alcohol Screen: US AUDIT-C    1  How often do you have a drink containing alcohol?  0 Filed at: 07/29/2021 3851   2  How many drinks containing alcohol do you have on a typical day you are drinking? 0 Filed at: 07/29/2021 4780   3a  Male UNDER 65: How often do you have five or more drinks on one occasion? 0 Filed at: 07/29/2021 6158   3b  FEMALE Any Age, or MALE 65+: How often do you have 4 or more drinks on one occassion? 0 Filed at: 07/29/2021 7420   Audit-C Score  0 Filed at: 07/29/2021 9060   KYE: How many times in the past year have you    Used an illegal drug or used a prescription medication for non-medical reasons? Never Filed at: 07/29/2021 2485                    MDM  Number of Diagnoses or Management Options  Asthma  Fatigue  Hyperglycemia due to type 2 diabetes mellitus ([facility] )  Diagnosis management comments: Reviewed all results with patient, answered questions  Patient states she feels great and is requesting to leave  Lungs clear to auscultation bilaterally on reassessment  Patient provided extensive education and precautions regarding medications and appropriate dosages  Patient instructed to continue all previously prescribed medications as prescribed  Recommended follow-up with PCP for monitoring of symptoms  The management plan was discussed in detail with the patient at bedside and all questions were answered  Provided both verbal and written instructions  Reviewed red flag symptoms and strict return to ED instructions   Patient notes understanding and agrees to plan  Disposition  Final diagnoses:   Hyperglycemia due to type 2 diabetes mellitus ([facility] )   Fatigue   Asthma     Time reflects when diagnosis was documented in both MDM as applicable and the Disposition within this note     Time User Action Codes Description Comment    7/29/2021  3:53 PM Soto, [address] [E11 [id number]] Hyperglycemia due to type 2 diabetes mellitus ([facility] )     7/29/2021  3:54 PM Sonali Hair Add [R53 [id number]] Fatigue     7/29/2021  3:54 PM Quan Naylor, [address] [R72 [id number]] Asthma       ED Disposition     ED Disposition Condition Date/Time Comment    Discharge Stable Thu Jul 29, 2021  3:54 PM Smiley Roup discharge to home/self care              Follow-up Information     Follow up With Specialties Details Why Contact Info Additional Information    Sam Bell MD Family Medicine In 2 days  [address] [facility] [id number] [facility] Emergency Department Emergency Medicine  If symptoms worsen [facility] [phone] [address] [facility] Emergency Department, [address] [place] [address], [facility]          Discharge Medication List as of 7/29/2021  3:57 PM      CONTINUE these medications which have NOT CHANGED    Details   atorvastatin (LIPITOR) 40 mg tablet Take 1 tablet (40 mg total) by mouth daily, Starting Wed 1/27/2021, Normal      metFORMIN (GLUCOPHAGE) 850 mg tablet take 1 tablet by mouth twice a day with meals, Normal      acetaminophen (TYLENOL) 325 mg tablet Take 3 tablets (975 mg total) by mouth every 8 (eight) hours, Starting Sun 9/29/2019, Normal      albuterol (2 5 mg/3 mL) 0 083 % nebulizer solution TAKE 1 VIAL BY NEBULIZATION EVERY 6 HOURS AS NEEDED FOR WHEEZING OR SHORTNESS OF BREATH, Normal      albuterol (PROVENTIL HFA,VENTOLIN HFA) 90 mcg/act inhaler Inhale 2 puffs every 6 (six) hours as needed for wheezing, Starting Wed 12/16/2020, Normal amLODIPine (NORVASC) 5 mg tablet Take 1 tablet (5 mg total) by mouth daily, Starting Fri 2/7/2020, Normal      atropine (ISOPTO ATROPINE) 1 % ophthalmic solution PLACE 1 DROP INTO LEFT EYE 2 TIMES A DAY FOR 3 DAYS, START 3 DAYS PRIOR TO SURGERY, Historical Med      Blood Pressure Monitor MISC Use as needed (Pt to use as needed ), Starting Tue 7/13/2021, Print      candesartan (ATACAND) 32 MG tablet Take 1 tablet (32 mg total) by mouth daily, Starting Sat 2/20/2021, Normal      gatifloxacin (ZYMAXID) 0 5 % PLACE 1 DROP INTO LEFT EYE 4 TIMES A DAY FOR 10 DAYS, START 3 DAYS PRIOR TO SURGERY, Historical Med      glucosamine-chondroitin 500-400 MG tablet Take 1 tablet by mouth 3 (three) times a day, Historical Med      glucose blood test strip Use 1 each 2 (two) times a day Use as instructed, Starting Fri 3/26/2021, Normal      hydrochlorothiazide (HYDRODIURIL) 12 5 mg tablet take 1 tablet by mouth once daily, Normal      ibandronate (BONIVA) 150 MG tablet take 1 tablet by mouth every month, Normal      ketorolac (ACULAR) 0 5 % ophthalmic solution PLACE 1 DROP INTO LEFT EYE 4 TIMES A DAY  START 3 DAYS PRIOR TO SURGERY, Historical Med      MAGNESIUM PO Take by mouth daily , Historical Med      meclizine (ANTIVERT) 12 5 MG tablet TAKE 1 TABLET BY MOUTH 3 TIMES A DAY AS NEEDED FOR DIZZINESS FOR UP TO 10 DAYS , Normal      mometasone-formoterol (DULERA) 200-5 MCG/ACT inhaler Inhale 2 puffs 2 (two) times a day Rinse mouth after use , Starting Wed 1/27/2021, Normal      montelukast (SINGULAIR) 10 mg tablet Take 1 tablet (10 mg total) by mouth daily at bedtime, Starting Wed 1/27/2021, Normal      prednisoLONE acetate (PRED FORTE) 1 % ophthalmic suspension PLACE 1 DROP INTO LEFT EYE 4 TIMES A DAY  START AFTER SURGERY AND TAPER AS DIRECTED, Historical Med      VITAMIN E PO Take by mouth daily , Historical Med           No discharge procedures on file      PDMP Review       Value Time User    PDMP Reviewed  Yes 10/14/2019  8:21 Markos Jacobsno MD          ED Provider  Electronically Signed by           Roberto Perez PA-C  07/29/21 0304

## 2021-07-29 NOTE — DISCHARGE INSTRUCTIONS
Continue all previously prescribed medications at home as prescribed  Continuing a asthma management at home as previously prescribed  Follow-up with PCP for further evaluation and monitoring of chronic conditions  Return to ED if symptoms worsen including chest pain, difficulty breathing, fevers, abdominal pain, inability tolerate food or fluid, persistently elevated blood sugar, dizziness, lightheadedness, numbness, tingling

## 2021-08-04 ENCOUNTER — OFFICE VISIT (OUTPATIENT)
Dept: FAMILY MEDICINE CLINIC | Facility: CLINIC | Age: 76
End: 2021-08-04
Payer: COMMERCIAL

## 2021-08-04 VITALS
HEIGHT: 64 IN | DIASTOLIC BLOOD PRESSURE: 66 MMHG | SYSTOLIC BLOOD PRESSURE: 120 MMHG | TEMPERATURE: 97.8 F | HEART RATE: 72 BPM | WEIGHT: 131 LBS | BODY MASS INDEX: 22.36 KG/M2

## 2021-08-04 DIAGNOSIS — E11.9 TYPE 2 DIABETES MELLITUS WITHOUT COMPLICATION, WITHOUT LONG-TERM CURRENT USE OF INSULIN (HCC): Primary | ICD-10-CM

## 2021-08-04 DIAGNOSIS — J45.20 MILD INTERMITTENT ASTHMA WITHOUT COMPLICATION: ICD-10-CM

## 2021-08-04 DIAGNOSIS — R79.89 ELEVATED BRAIN NATRIURETIC PEPTIDE (BNP) LEVEL: ICD-10-CM

## 2021-08-04 DIAGNOSIS — R53.83 OTHER FATIGUE: ICD-10-CM

## 2021-08-04 DIAGNOSIS — E78.2 MIXED HYPERLIPIDEMIA: Chronic | ICD-10-CM

## 2021-08-04 DIAGNOSIS — I10 ESSENTIAL HYPERTENSION: Chronic | ICD-10-CM

## 2021-08-04 LAB — SL AMB POCT HEMOGLOBIN AIC: 6.5 (ref ?–6.5)

## 2021-08-04 PROCEDURE — 3008F BODY MASS INDEX DOCD: CPT | Performed by: NURSE PRACTITIONER

## 2021-08-04 PROCEDURE — 3078F DIAST BP <80 MM HG: CPT | Performed by: NURSE PRACTITIONER

## 2021-08-04 PROCEDURE — 3074F SYST BP LT 130 MM HG: CPT | Performed by: NURSE PRACTITIONER

## 2021-08-04 PROCEDURE — 1036F TOBACCO NON-USER: CPT | Performed by: NURSE PRACTITIONER

## 2021-08-04 PROCEDURE — 83036 HEMOGLOBIN GLYCOSYLATED A1C: CPT | Performed by: NURSE PRACTITIONER

## 2021-08-04 PROCEDURE — 99214 OFFICE O/P EST MOD 30 MIN: CPT | Performed by: NURSE PRACTITIONER

## 2021-08-04 PROCEDURE — 3044F HG A1C LEVEL LT 7.0%: CPT | Performed by: NURSE PRACTITIONER

## 2021-08-04 PROCEDURE — 1160F RVW MEDS BY RX/DR IN RCRD: CPT | Performed by: NURSE PRACTITIONER

## 2021-08-04 NOTE — ASSESSMENT & PLAN NOTE
Lab Results   Component Value Date    HGBA1C 6 5 08/04/2021     Her type 2 diabetes is currently very well controlled evidenced by her hemoglobin A1c in the office today  No changes will be made to her medication regimen

## 2021-08-04 NOTE — PATIENT INSTRUCTIONS
Begin Melatonin every night 1 hours before bed for the next 6 weeks to help with insomnia  Diabetes and Exercise   WHAT YOU NEED TO KNOW:   Physical activity, such as exercise, can help keep your blood sugar level steady or improve insulin resistance  Activity can help decrease your risk for heart disease, and help you lose weight  Exercise can also help lower your A1c  Your diabetes care team will help you create an exercise plan  The plan will be based on the type of diabetes you have and your starting fitness level  DISCHARGE INSTRUCTIONS:   Call your local emergency number (911 in the [address] [facility] [facility]) if:   · You have chest pain or shortness of breath  Return to the emergency department if:   · You have a low blood sugar level and it does not improve with treatment  Symptoms are trouble thinking, a pounding heartbeat, and sweating  · Your blood sugar level is above 240 mg/dL and does not come down within 15 minutes of treatment  · You have blurred or double vision  · Your breath has a fruity, sweet smell, or your breathing is shallow  Call your doctor or diabetes care team if:   · You have ketones in your blood or urine  · You have a fever  · Your blood sugar levels are higher than your target goals  · You often have low blood sugar levels  · Your skin is red, dry, warm, or swollen  · You have a wound that does not heal     · You have trouble coping with diabetes, or you feel anxious or depressed  · You have questions or concerns about your condition or care  Tips to help you create and meet your exercise goals:   · Set a goal for 150 minutes (2 5 hours) of moderate to vigorous aerobic activity each week  Aerobic activity helps your heart stay strong  Aerobic activity includes walking, bicycling, dancing, swimming, and raking leaves  Spread aerobic activity over 3 to 5 days  Do not take more than 2 days off in a row   It is best to do at least 10 minutes at a time and 30 minutes each day  You can work up to these goals  Remember that any activity is better than no activity  Over time, you can make exercise more intense or last longer  You can also add more days of exercise as your fitness level improves  Your diabetes care team can help you make a step-by-step plan to achieve your goals  · Set a strength training goal of 2 to 3 times a week  Take at least 1 day off in between strength training sessions  Strength training helps you keep the muscles you have and build new muscles  Strength training includes lifting weights, climbing stairs, yoga, and gabriela chi          · Older adults should include balance training 2 to 3 times each week  These include walking backwards, standing on one foot, and walking heel to toe in a straight line  Other healthy exercise tips:   · Stretch before and after you exercise to prevent injury  · Drink water or liquids that do not contain sugar before, during, and after exercise  Ask your dietitian or healthcare provider which liquids you should drink when you exercise  · Do not sit for longer than 30 minutes at a time during your day  If you cannot walk around, at least stand up  This will help you stay active and keep your blood circulating  Exercise and blood sugar levels:  Check your blood sugar level before and after exercise, if you use insulin  Healthcare providers may tell you to change the amount of insulin you take or food you eat  · If your blood sugar level is high, check your blood or urine for ketones before you exercise  Do not exercise if your blood sugar level is high and you have ketones  · If your blood sugar level is less than 100 mg/dL, have a carbohydrate snack before you exercise  Examples are 4 to 6 crackers, ½ banana, 8 ounces (1 cup) of milk, or 4 ounces (½ cup) of juice         Follow up with your doctor or diabetes care team as directed:  Write down your questions so you remember to ask them during your visits  © Copyright [address] 2021 Information is for End User's use only and may not be sold, redistributed or otherwise used for commercial purposes  All illustrations and images included in CareNotes® are the copyrighted property of A D A M [employer]  or Jason Thurman  The above information is an  only  It is not intended as medical advice for individual conditions or treatments  Talk to your doctor, nurse or pharmacist before following any medical regimen to see if it is safe and effective for you

## 2021-08-04 NOTE — PROGRESS NOTES
Assessment and Plan:    Problem List Items Addressed This Visit        Endocrine    Diabetes mellitus ([facility] ) - Primary (Chronic)       Lab Results   Component Value Date    HGBA1C 6 5 08/04/2021     Her type 2 diabetes is currently very well controlled evidenced by her hemoglobin A1c in the office today  No changes will be made to her medication regimen  Relevant Orders    POCT hemoglobin A1c (Completed)       Respiratory    Mild intermittent asthma without complication     Well controlled on current regimen  Cardiovascular and Mediastinum    HTN (hypertension) (Chronic)     Well controlled on current regimen  Other    Hyperlipidemia (Chronic)     Well controlled on current regimen  Elevated brain natriuretic peptide (BNP) level     Patient does not have any signs of acute CHF on PE however, due to her recent elevated BNP level and her reports of orthopnea I would like to repeat her echo to assess for any worsening cardiac function  Relevant Orders    Echo complete with contrast if indicated    Other fatigue     Patient advised to begin melatonin HS to help with insomnia  Vitamin-D level will also be checked  Patient's most recent TSH was normal          Relevant Orders    Vitamin D 25 hydroxy                 Diagnoses and all orders for this visit:    Type 2 diabetes mellitus without complication, without long-term current use of insulin ([facility])  -     POCT hemoglobin A1c    Other fatigue  -     Vitamin D 25 hydroxy; Future    Elevated brain natriuretic peptide (BNP) level  -     Echo complete with contrast if indicated; Future    Mild intermittent asthma without complication    Essential hypertension    Mixed hyperlipidemia    Other orders  -     bismuth subsalicylate (PEPTO BISMOL) 524 mg/30 mL oral suspension; Take 30 mL by mouth every 6 (six) hours as needed  -     esomeprazole (NexIUM) 20 mg capsule;  Take 20 mg by mouth  -     Methylsulfonylmethane 1000 MG CAPS; Take 1,000 mg by mouth 2 (two) times a day              Subjective:      Patient ID: Gerardo Kaur is a 77 y o  female  CC:    Chief Complaint   Patient presents with    Follow-up     f/u from ed on 7/29/21     Diabetes    Hyperglycemia     pt presented at ed for high blood sugar readings with vomitting and weakeness  states she is still feeling weak  Pt states it was 254 this am, states ed did not change any of her medications  HPI:    Type II diabetes: Patient was seen in the ED yesterday for symptoms of hyperglycemia  She stated that over the previous 4 days her blood sugars at home were noted to be over 600  She also reported vomiting and weakness  Patient had an EKG performed which was noted to be normal sinus rhythm  Her fingerstick in the ED yesterday was noted to be 187  Her glucose level on the CMP that was drawn was 217  The patient was discharged and advised to follow-up with her PCP  The patient is currently managed on metformin 850 mg b i d  The patient reports she is currently checking her BS 3 times per day normally and she states that last week these episodes of hyperglycemia are unusual for her  She states she normally runs in the 130's  The patient's HbA1C in the office was 6 5  The patient does report that she did have a slight runny nose last week and her neighbor also brought her a sugary treat  I feel it is possible that these 2 factors may have both contributed to her transient hyperglycemia  Patient denies any polydipsia, polyuria, or polyphagia  Fatigue: Patient reports she has been having increased fatigue and weakness over the past 2 weeks  The patient reports she has been having issues sleeping at night  She states she normally goes to bed around 7 pm and will usually only sleep until around 10 pm and then she wakes up and tosses and turns  She states for many years she has gotten up around 3 am to start her day    The patient states she does currently take an over-the-counter vitamin-D supplement  Mild intermittent asthma: The patient was noted to have expiratory wheezes in the ED yesterday and she was given albuterol and Atrovent nebulizer solutions  Patient is prescribed Dulera b i d , singular HS, and albuterol p r n  She states she normally only needs to use her Albuterol inhaler once per week and she denies any current shortness of breath or chest pain  Hypertension: Well controlled on current doses of amlodipine, Candesartan, and hydrochlorothiazide  Patient denies any lightheadedness, dizziness, or orthostasis  Hyperlipidemia:  Well controlled on current dose of Lipitor  Elevated BNP: Noted in ED  It was only slightly elevated  She states she has been having increased SOB over the past week  She denies any increased edema in her BLE  She does report some orthopnea  She denies any chest pain  The patient's last ECHO was in 2016 and it was 60%  The following portions of the patient's history were reviewed and updated as appropriate: allergies, current medications, past family history, past medical history, past social history, past surgical history and problem list       Review of Systems   Constitutional: Positive for fatigue  Negative for chills and fever  HENT: Negative for ear pain and sore throat  Eyes: Negative for pain and visual disturbance  Respiratory: Negative for cough, chest tightness, shortness of breath and wheezing  Cardiovascular: Negative for chest pain, palpitations and leg swelling  Gastrointestinal: Positive for diarrhea (intermittent)  Negative for abdominal pain, constipation, nausea and vomiting  Endocrine: Negative for cold intolerance, heat intolerance, polydipsia, polyphagia and polyuria  Genitourinary: Negative for decreased urine volume, dysuria and hematuria  Musculoskeletal: Negative for arthralgias, back pain and myalgias  Skin: Negative for color change and rash     Allergic/Immunologic: Negative for environmental allergies  Neurological: Negative for dizziness, seizures, syncope, weakness, light-headedness, numbness and headaches  Hematological: Negative for adenopathy  Psychiatric/Behavioral: Negative for confusion  The patient is not nervous/anxious  All other systems reviewed and are negative  Data to review:       Objective:    Vitals:    08/04/21 1018   BP: 120/66   BP Location: Right arm   Patient Position: Sitting   Cuff Size: Adult   Pulse: 72   Temp: 97 8 °F (36 6 °C)   TempSrc: Temporal   Weight: 59 4 kg (131 lb)   Height: 5' 3 5" (1 613 m)        Physical Exam  Vitals and nursing note reviewed  Constitutional:       General: She is not in acute distress  Appearance: Normal appearance  She is well-developed  She is not ill-appearing  HENT:      Head: Normocephalic and atraumatic  Eyes:      Conjunctiva/sclera: Conjunctivae normal    Cardiovascular:      Rate and Rhythm: Normal rate and regular rhythm  Pulses: Normal pulses  Carotid pulses are 2+ on the right side and 2+ on the left side  Dorsalis pedis pulses are 2+ on the right side and 2+ on the left side  Posterior tibial pulses are 2+ on the right side and 2+ on the left side  Heart sounds: Normal heart sounds  No murmur heard  Pulmonary:      Effort: Pulmonary effort is normal  No respiratory distress  Breath sounds: Normal breath sounds  No wheezing, rhonchi or rales  Abdominal:      General: Abdomen is flat  Bowel sounds are normal  There is no distension  Palpations: Abdomen is soft  Tenderness: There is no abdominal tenderness  There is no guarding  Musculoskeletal:         General: Normal range of motion  Cervical back: Normal range of motion and neck supple  Right lower leg: No edema  Left lower leg: No edema  Skin:     General: Skin is warm and dry  Capillary Refill: Capillary refill takes less than 2 seconds  Neurological:      General: No focal deficit present  Mental Status: She is alert and oriented to person, place, and time  Psychiatric:         Mood and Affect: Mood normal          Behavior: Behavior normal          Thought Content:  Thought content normal          Judgment: Judgment normal

## 2021-08-04 NOTE — ASSESSMENT & PLAN NOTE
Patient does not have any signs of acute CHF on PE however, due to her recent elevated BNP level and her reports of orthopnea I would like to repeat her echo to assess for any worsening cardiac function

## 2021-08-04 NOTE — ASSESSMENT & PLAN NOTE
Patient advised to begin melatonin HS to help with insomnia  Vitamin-D level will also be checked    Patient's most recent TSH was normal

## 2021-08-05 ENCOUNTER — APPOINTMENT (OUTPATIENT)
Dept: LAB | Facility: HOSPITAL | Age: 76
End: 2021-08-05
Payer: COMMERCIAL

## 2021-08-05 ENCOUNTER — HOSPITAL ENCOUNTER (OUTPATIENT)
Dept: NON INVASIVE DIAGNOSTICS | Facility: HOSPITAL | Age: 76
Discharge: HOME | End: 2021-08-05
Payer: COMMERCIAL

## 2021-08-05 ENCOUNTER — TELEPHONE (OUTPATIENT)
Dept: FAMILY MEDICINE CLINIC | Facility: CLINIC | Age: 76
End: 2021-08-05

## 2021-08-05 DIAGNOSIS — R79.89 ELEVATED BRAIN NATRIURETIC PEPTIDE (BNP) LEVEL: ICD-10-CM

## 2021-08-05 DIAGNOSIS — R53.83 OTHER FATIGUE: ICD-10-CM

## 2021-08-05 LAB — 25(OH)D3 SERPL-MCNC: 43.1 NG/ML (ref 30–100)

## 2021-08-05 PROCEDURE — 36415 COLL VENOUS BLD VENIPUNCTURE: CPT

## 2021-08-05 PROCEDURE — 93306 TTE W/DOPPLER COMPLETE: CPT

## 2021-08-05 PROCEDURE — 82306 VITAMIN D 25 HYDROXY: CPT

## 2021-08-05 NOTE — TELEPHONE ENCOUNTER
[id number] office received a call from a  Lab stating pt was there to have BW done and pt requested a Magnesium level  Per Dr Miya Bell there is no reason to be checking that particular test  I was unable to contact the lab to let them know as I was not given the information to the Lab that called

## 2021-08-25 DIAGNOSIS — E78.2 MIXED HYPERLIPIDEMIA: Chronic | ICD-10-CM

## 2021-08-25 RX ORDER — ATORVASTATIN CALCIUM 40 MG/1
TABLET, FILM COATED ORAL
Qty: 30 TABLET | Refills: 3 | Status: SHIPPED | OUTPATIENT
Start: 2021-08-25 | End: 2022-01-03

## 2021-09-08 DIAGNOSIS — M81.0 AGE-RELATED OSTEOPOROSIS WITHOUT CURRENT PATHOLOGICAL FRACTURE: ICD-10-CM

## 2021-09-08 RX ORDER — IBANDRONATE SODIUM 150 MG/1
TABLET, FILM COATED ORAL
Qty: 1 TABLET | Refills: 5 | Status: SHIPPED | OUTPATIENT
Start: 2021-09-08 | End: 2022-06-28

## 2021-09-10 ENCOUNTER — OFFICE VISIT (OUTPATIENT)
Dept: FAMILY MEDICINE CLINIC | Facility: CLINIC | Age: 76
End: 2021-09-10
Payer: COMMERCIAL

## 2021-09-10 VITALS
HEIGHT: 64 IN | HEART RATE: 66 BPM | BODY MASS INDEX: 23.05 KG/M2 | SYSTOLIC BLOOD PRESSURE: 132 MMHG | OXYGEN SATURATION: 98 % | DIASTOLIC BLOOD PRESSURE: 80 MMHG | RESPIRATION RATE: 16 BRPM | WEIGHT: 135 LBS | TEMPERATURE: 98 F

## 2021-09-10 DIAGNOSIS — H01.004 BLEPHARITIS OF LEFT UPPER EYELID, UNSPECIFIED TYPE: Primary | ICD-10-CM

## 2021-09-10 DIAGNOSIS — R79.89 ELEVATED BRAIN NATRIURETIC PEPTIDE (BNP) LEVEL: ICD-10-CM

## 2021-09-10 DIAGNOSIS — R25.2 LEG CRAMPING: ICD-10-CM

## 2021-09-10 DIAGNOSIS — J30.81 ALLERGIC RHINITIS DUE TO ANIMAL HAIR AND DANDER: ICD-10-CM

## 2021-09-10 DIAGNOSIS — R19.7 DIARRHEA OF PRESUMED INFECTIOUS ORIGIN: ICD-10-CM

## 2021-09-10 PROCEDURE — 3008F BODY MASS INDEX DOCD: CPT | Performed by: NURSE PRACTITIONER

## 2021-09-10 PROCEDURE — 3725F SCREEN DEPRESSION PERFORMED: CPT | Performed by: NURSE PRACTITIONER

## 2021-09-10 PROCEDURE — 1036F TOBACCO NON-USER: CPT | Performed by: NURSE PRACTITIONER

## 2021-09-10 PROCEDURE — 1160F RVW MEDS BY RX/DR IN RCRD: CPT | Performed by: NURSE PRACTITIONER

## 2021-09-10 PROCEDURE — 3079F DIAST BP 80-89 MM HG: CPT | Performed by: NURSE PRACTITIONER

## 2021-09-10 PROCEDURE — 3075F SYST BP GE 130 - 139MM HG: CPT | Performed by: NURSE PRACTITIONER

## 2021-09-10 PROCEDURE — 99214 OFFICE O/P EST MOD 30 MIN: CPT | Performed by: NURSE PRACTITIONER

## 2021-09-10 RX ORDER — FLUTICASONE PROPIONATE 50 MCG
1 SPRAY, SUSPENSION (ML) NASAL DAILY
Qty: 11.1 ML | Refills: 3 | Status: SHIPPED | OUTPATIENT
Start: 2021-09-10 | End: 2022-08-08 | Stop reason: SDUPTHER

## 2021-09-10 RX ORDER — LORATADINE 10 MG/1
10 TABLET ORAL DAILY
Qty: 30 TABLET | Refills: 3 | Status: SHIPPED | OUTPATIENT
Start: 2021-09-10 | End: 2021-11-29 | Stop reason: SDUPTHER

## 2021-09-10 RX ORDER — TOBRAMYCIN AND DEXAMETHASONE 3; 1 MG/ML; MG/ML
1 SUSPENSION/ DROPS OPHTHALMIC
Qty: 5 ML | Refills: 0 | Status: SHIPPED | OUTPATIENT
Start: 2021-09-10 | End: 2022-08-08

## 2021-09-10 NOTE — ASSESSMENT & PLAN NOTE
I feel patient's symptoms were most likely caused by recent exposure to dog  To hopefully prevent this type of reaction in the future the patient was prescribed Claritin and Flonase to begin using daily

## 2021-09-10 NOTE — ASSESSMENT & PLAN NOTE
Patient has no clinical signs of CHF and recent echo did show stable cardiac function  This was most likely a transient rise in her BNP level

## 2021-09-10 NOTE — PROGRESS NOTES
Assessment and Plan:    Problem List Items Addressed This Visit        Digestive    Diarrhea of presumed infectious origin     This is most likely viral in nature after eating out this morning  Patient advised to increase her water intake and to use Imodium as needed  Respiratory    Allergic rhinitis due to animal hair and dander     I feel patient's symptoms were most likely caused by recent exposure to dog  To hopefully prevent this type of reaction in the future the patient was prescribed Claritin and Flonase to begin using daily  Relevant Medications    loratadine (CLARITIN) 10 mg tablet    fluticasone (FLONASE) 50 mcg/act nasal spray    tobramycin-dexamethasone (TOBRADEX) ophthalmic suspension       Other    Elevated brain natriuretic peptide (BNP) level     Patient has no clinical signs of CHF and recent echo did show stable cardiac function  This was most likely a transient rise in her BNP level  Blepharitis of left upper eyelid - Primary     TobraDex eyedrops ordered to be used every 4 hours while awake to treat blepharitis  Patient also advised to begin taking Claritin Flonase daily to help with symptoms  Relevant Medications    loratadine (CLARITIN) 10 mg tablet    fluticasone (FLONASE) 50 mcg/act nasal spray    tobramycin-dexamethasone (TOBRADEX) ophthalmic suspension    Leg cramping     Magnesium and phosphorus levels ordered to assess if these are deficient  Patient also advised to drink at least 8 8 oz glasses of water per day to prevent dehydration  Relevant Orders    Magnesium    Phosphorus                 Diagnoses and all orders for this visit:    Blepharitis of left upper eyelid, unspecified type  -     loratadine (CLARITIN) 10 mg tablet;  Take 1 tablet (10 mg total) by mouth daily  -     fluticasone (FLONASE) 50 mcg/act nasal spray; 1 spray into each nostril daily  -     tobramycin-dexamethasone (TOBRADEX) ophthalmic suspension; Administer 1 drop into the left eye every 4 (four) hours while awake    Allergic rhinitis due to animal hair and dander  -     loratadine (CLARITIN) 10 mg tablet; Take 1 tablet (10 mg total) by mouth daily  -     fluticasone (FLONASE) 50 mcg/act nasal spray; 1 spray into each nostril daily  -     tobramycin-dexamethasone (TOBRADEX) ophthalmic suspension; Administer 1 drop into the left eye every 4 (four) hours while awake    Elevated brain natriuretic peptide (BNP) level    Leg cramping  -     Magnesium; Future  -     Phosphorus; Future    Diarrhea of presumed infectious origin              Subjective:      Patient ID: Elli Moya is a 77 y o  female  CC:    Chief Complaint   Patient presents with    Eye Problem     left eye, 5 days       HPI:    Left eye pain: The patient reports her left upper eyelid has been red, swollen, and painful for the past 5 days  She denies any changes in her vision  She does report increased tearing of her eye but denies any other types of drainage  She reports her upper eyelid is itchy  She reports she does have an allergy to dogs and she did recently hold a dog around the time that her symptoms started  The patient left nostril is very congested and she also has rhinorrhea  She denies any sinus pressure, sore throat, or PND  Patient does have a history of asthma and she currently takes Singulair HS for this but is not on any other types of antihistamines or other allergy medications  Elevated BNP level: This was noted on previous lab work and patient did report orthopnea at last office visit so an echocardiogram was ordered  The echocardiogram was normal and did show an EF of 60% which was consistent with her past echocardiograms  Patient denies any current shortness of breath, chest pain, or swelling of her bilateral lower extremities  I feel this was most likely a transient elevation due to dehydration or another cause      Leg cramping:  Patient reports when waking in the morning she frequently has bilateral leg cramps  She states she does currently take over-the-counter magnesium this has not improved her symptoms very much  She states she does try to drink water throughout the day but she is unsure if she is drinking enough  Diarrhea:  Patient reports she has had 2 bouts of diarrhea so far this morning however, she states she ate breakfast at [place] prior to coming here and her stomach has been bothering her since  She states she has taken 1 dose of Imodium so far today  The following portions of the patient's history were reviewed and updated as appropriate: allergies, current medications, past family history, past medical history, past social history, past surgical history and problem list       Review of Systems   Constitutional: Negative for chills and fever  HENT: Positive for congestion and rhinorrhea  Negative for ear pain and sore throat  Eyes: Positive for pain (left), discharge (left) and itching (left)  Negative for photophobia, redness and visual disturbance  Left upper eyelid pain, redness, and edema noted    Respiratory: Negative for cough, chest tightness, shortness of breath and wheezing  Cardiovascular: Negative for chest pain, palpitations and leg swelling  Gastrointestinal: Positive for diarrhea (began today after eating out )  Negative for abdominal pain, constipation, nausea and vomiting  Endocrine: Negative for cold intolerance and heat intolerance  Genitourinary: Negative for decreased urine volume, dysuria and hematuria  Musculoskeletal: Negative for arthralgias and back pain  Skin: Negative for color change and rash  Allergic/Immunologic: Positive for environmental allergies  Neurological: Negative for dizziness, seizures, syncope, weakness, light-headedness, numbness and headaches  Hematological: Negative for adenopathy  Psychiatric/Behavioral: Negative for confusion  The patient is not nervous/anxious      All other systems reviewed and are negative  Data to review:       Objective:    Vitals:    09/10/21 0952   BP: 132/80   BP Location: Left arm   Patient Position: Sitting   Cuff Size: Adult   Pulse: 66   Resp: 16   Temp: 98 °F (36 7 °C)   TempSrc: Tympanic   SpO2: 98%   Weight: 61 2 kg (135 lb)   Height: 5' 3 5" (1 613 m)        Physical Exam  Vitals and nursing note reviewed  Constitutional:       General: She is not in acute distress  Appearance: Normal appearance  She is well-developed  She is not ill-appearing  HENT:      Head: Normocephalic and atraumatic  Eyes:      General:         Left eye: Discharge present  Extraocular Movements: Extraocular movements intact  Right eye: Normal extraocular motion and no nystagmus  Left eye: Normal extraocular motion and no nystagmus  Conjunctiva/sclera: Conjunctivae normal       Left eye: Left conjunctiva is not injected  No chemosis, exudate or hemorrhage  Pupils: Pupils are equal, round, and reactive to light  Comments: Left upper eyelid redness, edema, and irritation noted  Conjunctiva was normal   Slight yellow clear drainage noted from left tear duct  Cardiovascular:      Rate and Rhythm: Normal rate and regular rhythm  Pulses: Normal pulses  Carotid pulses are 2+ on the right side and 2+ on the left side  Posterior tibial pulses are 2+ on the right side and 2+ on the left side  Heart sounds: Normal heart sounds  No murmur heard  Pulmonary:      Effort: Pulmonary effort is normal  No respiratory distress  Breath sounds: Normal breath sounds  No wheezing or rhonchi  Abdominal:      General: Abdomen is flat  Bowel sounds are normal  There is no distension  Palpations: Abdomen is soft  Tenderness: There is no abdominal tenderness  There is no guarding  Musculoskeletal:         General: Normal range of motion  Cervical back: Normal range of motion and neck supple        Right lower leg: No edema  Left lower leg: No edema  Skin:     General: Skin is warm and dry  Capillary Refill: Capillary refill takes less than 2 seconds  Neurological:      General: No focal deficit present  Mental Status: She is alert and oriented to person, place, and time  Psychiatric:         Mood and Affect: Mood normal          Behavior: Behavior normal          Thought Content:  Thought content normal          Judgment: Judgment normal

## 2021-09-10 NOTE — ASSESSMENT & PLAN NOTE
This is most likely viral in nature after eating out this morning  Patient advised to increase her water intake and to use Imodium as needed

## 2021-09-10 NOTE — ASSESSMENT & PLAN NOTE
Magnesium and phosphorus levels ordered to assess if these are deficient  Patient also advised to drink at least 8 8 oz glasses of water per day to prevent dehydration

## 2021-09-10 NOTE — PATIENT INSTRUCTIONS
Blepharitis   WHAT YOU NEED TO KNOW:   Blepharitis is inflammation of one or both eyelids  Your eyelid, eyelashes, oil glands, or whites of the eye may be affected  DISCHARGE INSTRUCTIONS:   Return to the emergency department if:   · You have severe pain  · You have vision loss  Call your doctor or ophthalmologist if:   · Your vision changes  · Your signs and symptoms return or get worse, even after treatment  · You have a lump on your eyelid  · You have a pus-filled sore on your eyelid  · You have questions or concerns about your condition or care  Medicines:   · Medicines  can help decrease pain and swelling, or treat an infection  · Take your medicine as directed  Contact your healthcare provider if you think your medicine is not helping or if you have side effects  Tell him or her if you are allergic to any medicine  Keep a list of the medicines, vitamins, and herbs you take  Include the amounts, and when and why you take them  Bring the list or the pill bottles to follow-up visits  Carry your medicine list with you in case of an emergency  Manage blepharitis:  Always wash your hands with soap and water before and after eye care:     · Use artificial tears  2 times each day if you have dry eye  · Apply a warm compress  for 10 minutes 1 to 2 times each day, or as directed  This will loosen crusts and decrease itching and burning  · Gently scrub your upper and lower eyelid  2 times each day  This will help open your clogged oil glands and remove pus or other material stuck to your eyelid  Your healthcare provider may recommend a cleaning solution to buy  You can instead make one by putting 2 to 3 drops of baby shampoo in ½ cup warm water  · Massage your upper and lower eyelid in small circles for 5 seconds  to loosen oil plugs and decrease inflammation  · Do not wear contact lenses or eye makeup  until your eye has healed    Follow up with your doctor or ophthalmologist as directed:  Write down your questions so you remember to ask them during your visits  © Copyright [employer] 2021 Information is for End User's use only and may not be sold, redistributed or otherwise used for commercial purposes  All illustrations and images included in CareNotes® are the copyrighted property of A [employer] M [employer]  or Jason Thurman  The above information is an  only  It is not intended as medical advice for individual conditions or treatments  Talk to your doctor, nurse or pharmacist before following any medical regimen to see if it is safe and effective for you

## 2021-09-10 NOTE — ASSESSMENT & PLAN NOTE
TobraDex eyedrops ordered to be used every 4 hours while awake to treat blepharitis  Patient also advised to begin taking Claritin Flonase daily to help with symptoms

## 2021-09-15 ENCOUNTER — HOSPITAL ENCOUNTER (EMERGENCY)
Facility: HOSPITAL | Age: 76
Discharge: HOME | End: 2021-09-15
Attending: EMERGENCY MEDICINE | Admitting: EMERGENCY MEDICINE
Payer: COMMERCIAL

## 2021-09-15 ENCOUNTER — APPOINTMENT (EMERGENCY)
Dept: RADIOLOGY | Facility: HOSPITAL | Age: 76
End: 2021-09-15
Payer: COMMERCIAL

## 2021-09-15 VITALS
SYSTOLIC BLOOD PRESSURE: 201 MMHG | HEIGHT: 64 IN | RESPIRATION RATE: 19 BRPM | WEIGHT: 137 LBS | HEART RATE: 72 BPM | BODY MASS INDEX: 23.39 KG/M2 | OXYGEN SATURATION: 98 % | DIASTOLIC BLOOD PRESSURE: 86 MMHG

## 2021-09-15 DIAGNOSIS — S69.92XA INJURY OF LEFT WRIST, INITIAL ENCOUNTER: ICD-10-CM

## 2021-09-15 DIAGNOSIS — H00.014 HORDEOLUM EXTERNUM OF LEFT UPPER EYELID: Primary | ICD-10-CM

## 2021-09-15 PROCEDURE — 99284 EMERGENCY DEPT VISIT MOD MDM: CPT | Performed by: PHYSICIAN ASSISTANT

## 2021-09-15 PROCEDURE — 29125 APPL SHORT ARM SPLINT STATIC: CPT | Performed by: PHYSICIAN ASSISTANT

## 2021-09-15 PROCEDURE — 73110 X-RAY EXAM OF WRIST: CPT

## 2021-09-15 PROCEDURE — 99283 EMERGENCY DEPT VISIT LOW MDM: CPT

## 2021-09-15 RX ORDER — NAPROXEN 250 MG/1
500 TABLET ORAL ONCE
Status: COMPLETED | OUTPATIENT
Start: 2021-09-15 | End: 2021-09-15

## 2021-09-15 RX ORDER — ERYTHROMYCIN 5 MG/G
OINTMENT OPHTHALMIC
Qty: 3.5 G | Refills: 0 | Status: SHIPPED | OUTPATIENT
Start: 2021-09-15 | End: 2022-08-08

## 2021-09-15 RX ADMIN — NAPROXEN 500 MG: 250 TABLET ORAL at 10:21

## 2021-09-15 NOTE — ED PROVIDER NOTES
History  Chief Complaint   Patient presents with    Wrist Injury     fell while changing curtains on saturday, complains of left wrist and 5th digit pain    Stye     left eye, using warm compresses with minimal relief     Patient is a 78 y/o female, right hand dominant, presents to the ED for evaluation of wrist injury and stye  Pt states Saturday she was hanging curtains when she fell off the chair, denies dizziness, chest pain, SOB, vision changes or focal weakness prior to fall  Pt states she fell onto her left wrist, denies head injury or LOC  Pt states localized swelling and pain to left wrist, has been using ice and taking tylenol without significant relief  Pt also reports she has had a left upper eye stye for the past 2 weeks, was placed on abx eye drops without significant improvement and using warm compresses for only a few minutes at a time  Pt has not followed up with Opthalmology yet  Pt without fever, chills, pain with EOM, periorbital swelling/erythema  Prior to Admission Medications   Prescriptions Last Dose Informant Patient Reported? Taking? Blood Pressure Monitor MISC  Self No No   Sig: Use as needed (Pt to use as needed  )   MAGNESIUM PO  Self Yes No   Sig: Take by mouth daily    Methylsulfonylmethane 1000 MG CAPS  Self Yes No   Sig: Take 1,000 mg by mouth 2 (two) times a day   VITAMIN E PO  Self Yes No   Sig: Take by mouth daily    acetaminophen (TYLENOL) 325 mg tablet  Self No No   Sig: Take 3 tablets (975 mg total) by mouth every 8 (eight) hours   albuterol (2 5 mg/3 mL) 0 083 % nebulizer solution  Self No No   Sig: TAKE 1 VIAL BY NEBULIZATION EVERY 6 HOURS AS NEEDED FOR WHEEZING OR SHORTNESS OF BREATH   albuterol (PROVENTIL HFA,VENTOLIN HFA) 90 mcg/act inhaler  Self No No   Sig: Inhale 2 puffs every 6 (six) hours as needed for wheezing   amLODIPine (NORVASC) 5 mg tablet  Self No No   Sig: Take 1 tablet (5 mg total) by mouth daily   atorvastatin (LIPITOR) 40 mg tablet  Self No No   Sig: take 1 tablet by mouth once daily   bismuth subsalicylate (PEPTO BISMOL) 524 mg/30 mL oral suspension  Self Yes No   Sig: Take 30 mL by mouth every 6 (six) hours as needed   candesartan (ATACAND) 32 MG tablet  Self No No   Sig: Take 1 tablet (32 mg total) by mouth daily   esomeprazole (NexIUM) 20 mg capsule  Self Yes No   Sig: Take 20 mg by mouth   fluticasone (FLONASE) 50 mcg/act nasal spray   No No   Si spray into each nostril daily   glucosamine-chondroitin 500-400 MG tablet  Self Yes No   Sig: Take 1 tablet by mouth 3 (three) times a day   glucose blood test strip  Self No No   Sig: Use 1 each 2 (two) times a day Use as instructed   hydrochlorothiazide (HYDRODIURIL) 12 5 mg tablet  Self No No   Sig: take 1 tablet by mouth once daily   ibandronate (BONIVA) 150 MG tablet  Self No No   Sig: take 1 tablet by mouth every month   ketorolac (ACULAR) 0 5 % ophthalmic solution  Self Yes No   Sig: PLACE 1 DROP INTO LEFT EYE 4 TIMES A DAY  START 3 DAYS PRIOR TO SURGERY   loratadine (CLARITIN) 10 mg tablet   No No   Sig: Take 1 tablet (10 mg total) by mouth daily   meclizine (ANTIVERT) 12 5 MG tablet  Self No No   Sig: TAKE 1 TABLET BY MOUTH 3 TIMES A DAY AS NEEDED FOR DIZZINESS FOR UP TO 10 DAYS     metFORMIN (GLUCOPHAGE) 850 mg tablet  Self No No   Sig: take 1 tablet by mouth twice a day with meals   mometasone-formoterol (DULERA) 200-5 MCG/ACT inhaler  Self No No   Sig: Inhale 2 puffs 2 (two) times a day Rinse mouth after use    montelukast (SINGULAIR) 10 mg tablet  Self No No   Sig: Take 1 tablet (10 mg total) by mouth daily at bedtime   tobramycin-dexamethasone (TOBRADEX) ophthalmic suspension   No No   Sig: Administer 1 drop into the left eye every 4 (four) hours while awake      Facility-Administered Medications: None       Past Medical History:   Diagnosis Date    Asthma     Colon polyp     [facility] mellitus [facility] [address] [facility]     E coli infection     Fall     Hyperlipidemia     Hypertension     Pneumothorax     Rectal prolapse 09/03/2020    Sleep apnea     no CPAP    Subarachnoid hemorrhage ([address] )     Thoracic aortic aneurysm (HCC)     Vertigo        Past Surgical History:   Procedure Laterality Date    BONE GRAFT      R arm    BRONCHOSCOPY N/A 3/16/2016    Procedure: BRONCHOSCOPY FLEXIBLE/anesth ;  Surgeon: Fortino Goyal DO;  [facility]; Service:     COLONOSCOPY      COLONOSCOPY      EYE SURGERY      OOPHORECTOMY Left     age 49    CT LAP,SURG,COLECTOMY, PARTIAL, W/ANAST N/A 9/23/2020    Procedure: RESECTION COLON SIGMOID LAPAROSCOPIC;  Surgeon: Tiffani Thomson MD;  [facility];  Service: Colorectal    CT SIGMOIDOSCOPY FLX DX W/COLLJ SPEC BR/WA IF PFRMD N/A 9/23/2020    Procedure: Shalom Mo;  Surgeon: Tiffani Thomson MD;  [facility];  Service: Colorectal    RECTAL PROLAPSE REPAIR LAPAROSCOPIC N/A 9/23/2020    Procedure: Indiana Wang;  Surgeon: Tiffani Thomson MD;  [facility];  Service: Colorectal       Family History   Problem Relation Age of Onset    Endometrial cancer Mother 45    Cancer Daughter 48        breast    Breast cancer additional onset Daughter 48    Cancer Son 35        asbestosis related cancer    No Known Problems Father     No Known Problems Sister     No Known Problems Maternal Grandmother     No Known Problems Maternal Grandfather     No Known Problems Paternal Grandmother     No Known Problems Paternal Grandfather     No Known Problems Daughter     No Known Problems Maternal Aunt     No Known Problems Paternal Aunt      I have reviewed and agree with the history as documented      E-Cigarette/Vaping    E-Cigarette Use Never User      E-Cigarette/Vaping Substances     Social History     Tobacco Use    Smoking status: Never Smoker    Smokeless tobacco: Never Used   Vaping Use    Vaping Use: Never used   Substance Use Topics    Alcohol use: Yes     Comment: weekends  Drug use: No       Review of Systems   Eyes:        Stye   Musculoskeletal: Positive for arthralgias  All other systems reviewed and are negative  Physical Exam  Physical Exam  Constitutional:       Appearance: Normal appearance  HENT:      Head: Normocephalic and atraumatic  Right Ear: External ear normal       Left Ear: External ear normal       Nose: Nose normal       Mouth/Throat:      Lips: Pink  Mouth: Mucous membranes are moist    Eyes:      Extraocular Movements: Extraocular movements intact  Conjunctiva/sclera: Conjunctivae normal    Pulmonary:      Effort: No tachypnea or respiratory distress  Musculoskeletal:      Left wrist: Swelling, deformity, tenderness and bony tenderness present  Decreased range of motion  Normal pulse  Cervical back: Normal range of motion and neck supple  Comments: Neurovascularly intact distally  Radial pulse 2 +  Cap refill <2 seconds  Sensation intact  Skin:     General: Skin is warm  Capillary Refill: Capillary refill takes less than 2 seconds  Neurological:      Mental Status: She is alert and oriented to person, place, and time  GCS: GCS eye subscore is 4  GCS verbal subscore is 5  GCS motor subscore is 6     Psychiatric:         Mood and Affect: Mood and affect normal          Speech: Speech normal          Vital Signs  ED Triage Vitals [09/15/21 0945]   Temp Pulse Respirations Blood Pressure SpO2   -- 72 19 (!) 201/86 98 %      Temp src Heart Rate Source Patient Position - Orthostatic VS BP Location FiO2 (%)   -- Monitor -- Right arm --      Pain Score       Worst Possible Pain           Vitals:    09/15/21 0945   BP: (!) 201/86   Pulse: 72         Visual Acuity      ED Medications  Medications   naproxen (NAPROSYN) tablet 500 mg (500 mg Oral Given 9/15/21 1021)       Diagnostic Studies  Results Reviewed     None                 XR wrist 3+ views LEFT   Final Result by Madi Trotter MD (09/15 1052) Degenerative arthritis, subluxation first CMC joint      No acute osseous abnormality  Workstation performed: [id number]                    Procedures  Splint application    Date/Time: 9/15/2021 11:10 AM  Performed by: Rikki Kussmaul, PA-C  Authorized by: Rikki Kussmaul, PA-C   Universal Protocol:  Consent: Verbal consent obtained  Risks and benefits: risks, benefits and alternatives were discussed  Consent given by: patient  Time out: Immediately prior to procedure a "time out" was called to verify the correct patient, procedure, equipment, support staff and site/side marked as required  Patient identity confirmed: verbally with patient and arm band      Pre-procedure details:     Sensation:  Normal  Procedure details:     Laterality:  Left    Location:  Wrist    Wrist:  L wristCast type:  Short arm      Splint type:  Volar short arm    Supplies:  Cotton padding, elastic bandage and Ortho-Glass  Post-procedure details:     Pain:  Improved    Sensation:  Normal    Patient tolerance of procedure: Tolerated well, no immediate complications             ED Course                             SBIRT 20yo+      Most Recent Value   SBIRT (24 yo +)   In order to provide better care to our patients, we are screening all of our patients for alcohol and drug use  Would it be okay to ask you these screening questions? No Filed at: 09/15/2021 0950                    MDM  Number of Diagnoses or Management Options  Hordeolum externum of left upper eyelid: established and worsening  Injury of left wrist, initial encounter: new and does not require workup  Diagnosis management comments: Patient is a 76 y/o female, right hand dominant, presents to the ED for evaluation of wrist injury and stye  Pt states Saturday she was hanging curtains when she fell off the chair, denies dizziness, chest pain, SOB, vision changes or focal weakness prior to fall  Pt states she fell onto her left wrist, denies head injury or LOC   Pt states localized swelling and pain to left wrist     X-ray left wrist shows Degenerative arthritis, subluxation first CMC joint  No acute osseous abnormality  Volar splint applied for symptomatic relief, information for orthopedics provided if sx do not improve, recommended motrin/tylenol and ice    Left upper eye stye noted, no pain with EOM or periorbital edema/erythema concerning for cellulitis  Will change abx to erythromycin ointment and recommended warm compresses 15 mins 3 times a day  Recommended she f/u with Ophthalmology for further workup given 2 weeks of stye  Patient's blood pressure was noted to be elevated while in the ED  Pt did not take home BP medications PTA, advised to take when she gets home  Patient denies chest pain, headache, vision changes, dizziness or urinary symptoms    Advised patient they will need to f/u with PCP in 1 week for re-check of blood pressure for further evaluation and management of HTN  Discussed return precautions regarding blood pressure as well  Disposition  Final diagnoses:   Hordeolum externum of left upper eyelid   Injury of left wrist, initial encounter     Time reflects when diagnosis was documented in both MDM as applicable and the Disposition within this note     Time User Action Codes Description Comment    9/15/2021 11:11 AM Yue Elpidio Add [T42 [id number]] Hordeolum externum of left upper eyelid     9/15/2021 11:11 AM Yue Elpidio Add [[id number] [id number]] Injury of left wrist, initial encounter       ED Disposition     ED Disposition Condition Date/Time Comment    Discharge Stable Wed Sep 15, 2021 [facility] discharge to home/self care              Follow-up Information     Follow up With Specialties Details Why Contact Info Additional [address] [facility] [place] [facility] Schedule an appointment as soon as possible for a visit  If symptoms worsen [address]  [address] [facility] [address]  [address] [facility], [address] [place] [address] Rd, [address], [place], [place], [address]   [address] [facility] Schedule an appointment as soon as possible for a visit   [address] [facility]  [phone]             Discharge Medication List as of 9/15/2021 11:12 AM      START taking these medications    Details   erythromycin (ILOTYCIN) ophthalmic ointment Place a 1/2 inch ribbon of ointment into both lower eyelids 4-6 times daily for 5-7 days  , Print         CONTINUE these medications which have NOT CHANGED    Details   acetaminophen (TYLENOL) 325 mg tablet Take 3 tablets (975 mg total) by mouth every 8 (eight) hours, Starting Sun 9/29/2019, Normal      albuterol (2 5 mg/3 mL) 0 083 % nebulizer solution TAKE 1 VIAL BY NEBULIZATION EVERY 6 HOURS AS NEEDED FOR WHEEZING OR SHORTNESS OF BREATH, Normal      albuterol (PROVENTIL HFA,VENTOLIN HFA) 90 mcg/act inhaler Inhale 2 puffs every 6 (six) hours as needed for wheezing, Starting Wed 12/16/2020, Normal      amLODIPine (NORVASC) 5 mg tablet Take 1 tablet (5 mg total) by mouth daily, Starting Fri 2/7/2020, Normal      atorvastatin (LIPITOR) 40 mg tablet take 1 tablet by mouth once daily, Normal      bismuth subsalicylate (PEPTO BISMOL) 524 mg/30 mL oral suspension Take 30 mL by mouth every 6 (six) hours as needed, Historical Med      Blood Pressure Monitor MISC Use as needed (Pt to use as needed ), Starting Tue 7/13/2021, Print      candesartan (ATACAND) 32 MG tablet Take 1 tablet (32 mg total) by mouth daily, Starting Sat 2/20/2021, Normal      esomeprazole (NexIUM) 20 mg capsule Take 20 mg by mouth, Historical Med      fluticasone (FLONASE) 50 mcg/act nasal spray 1 spray into each nostril daily, Starting Fri 9/10/2021, Normal      glucosamine-chondroitin 500-400 MG tablet Take 1 tablet by mouth 3 (three) times a day, Historical Med      glucose blood test strip Use 1 each 2 (two) times a day Use as instructed, Starting Fri 3/26/2021, Normal      hydrochlorothiazide (HYDRODIURIL) 12 5 mg tablet take 1 tablet by mouth once daily, Normal      ibandronate (BONIVA) 150 MG tablet take 1 tablet by mouth every month, Normal      ketorolac (ACULAR) 0 5 % ophthalmic solution PLACE 1 DROP INTO LEFT EYE 4 TIMES A DAY  START 3 DAYS PRIOR TO SURGERY, Historical Med      loratadine (CLARITIN) 10 mg tablet Take 1 tablet (10 mg total) by mouth daily, Starting Fri 9/10/2021, Normal      MAGNESIUM PO Take by mouth daily , Historical Med      meclizine (ANTIVERT) 12 5 MG tablet TAKE 1 TABLET BY MOUTH 3 TIMES A DAY AS NEEDED FOR DIZZINESS FOR UP TO 10 DAYS , Normal      metFORMIN (GLUCOPHAGE) 850 mg tablet take 1 tablet by mouth twice a day with meals, Normal      Methylsulfonylmethane 1000 MG CAPS Take 1,000 mg by mouth 2 (two) times a day, Historical Med      mometasone-formoterol (DULERA) 200-5 MCG/ACT inhaler Inhale 2 puffs 2 (two) times a day Rinse mouth after use , Starting Wed 1/27/2021, Normal      montelukast (SINGULAIR) 10 mg tablet Take 1 tablet (10 mg total) by mouth daily at bedtime, Starting Wed 1/27/2021, Normal      tobramycin-dexamethasone (TOBRADEX) ophthalmic suspension Administer 1 drop into the left eye every 4 (four) hours while awake, Starting Fri 9/10/2021, Normal      VITAMIN E PO Take by mouth daily , Historical Med           No discharge procedures on file      PDMP Review       Value Time User    PDMP Reviewed  Yes 10/14/2019  8:21 AM Js Casiano MD          ED Provider  Electronically Signed by           Ron Luu PA-C  09/16/21 [id number]

## 2021-10-04 ENCOUNTER — APPOINTMENT (OUTPATIENT)
Dept: LAB | Facility: HOSPITAL | Age: 76
End: 2021-10-04
Payer: COMMERCIAL

## 2021-10-04 DIAGNOSIS — R25.2 LEG CRAMPING: ICD-10-CM

## 2021-10-04 LAB
ABO GROUP BLD: NORMAL
BLD GP AB SCN SERPL QL: NEGATIVE
EST. AVERAGE GLUCOSE BLD GHB EST-MCNC: 143 MG/DL
HBA1C MFR BLD: 6.6 %
MAGNESIUM SERPL-MCNC: 1.6 MG/DL (ref 1.6–2.6)
PHOSPHATE SERPL-MCNC: 4.2 MG/DL (ref 2.3–4.1)
RH BLD: POSITIVE
SPECIMEN EXPIRATION DATE: NORMAL

## 2021-10-04 PROCEDURE — 86850 RBC ANTIBODY SCREEN: CPT

## 2021-10-04 PROCEDURE — 84100 ASSAY OF PHOSPHORUS: CPT

## 2021-10-04 PROCEDURE — 3044F HG A1C LEVEL LT 7.0%: CPT | Performed by: NURSE PRACTITIONER

## 2021-10-04 PROCEDURE — 86901 BLOOD TYPING SEROLOGIC RH(D): CPT

## 2021-10-04 PROCEDURE — 36415 COLL VENOUS BLD VENIPUNCTURE: CPT

## 2021-10-04 PROCEDURE — 83735 ASSAY OF MAGNESIUM: CPT

## 2021-10-04 PROCEDURE — 86900 BLOOD TYPING SEROLOGIC ABO: CPT

## 2021-10-04 PROCEDURE — 83036 HEMOGLOBIN GLYCOSYLATED A1C: CPT

## 2021-10-20 ENCOUNTER — TELEPHONE (OUTPATIENT)
Dept: FAMILY MEDICINE CLINIC | Facility: CLINIC | Age: 76
End: 2021-10-20

## 2021-10-21 DIAGNOSIS — J45.30 MILD PERSISTENT ASTHMA WITHOUT COMPLICATION: Primary | ICD-10-CM

## 2021-11-07 ENCOUNTER — HOSPITAL ENCOUNTER (OUTPATIENT)
Dept: CT IMAGING | Facility: HOSPITAL | Age: 76
Discharge: HOME | End: 2021-11-07
Payer: COMMERCIAL

## 2021-11-07 DIAGNOSIS — I71.2 THORACIC AORTIC ANEURYSM WITHOUT RUPTURE (HCC): ICD-10-CM

## 2021-11-07 PROCEDURE — 71250 CT THORAX DX C-: CPT

## 2021-11-08 ENCOUNTER — TELEPHONE (OUTPATIENT)
Dept: CARDIAC SURGERY | Facility: CLINIC | Age: 76
End: 2021-11-08

## 2021-11-08 PROBLEM — R91.1 RIGHT UPPER LOBE PULMONARY NODULE: Status: ACTIVE | Noted: 2021-11-08

## 2021-11-13 ENCOUNTER — HOSPITAL ENCOUNTER (EMERGENCY)
Facility: HOSPITAL | Age: 76
Discharge: LEFT BEFORE DISCHARGE | End: 2021-11-13
Payer: COMMERCIAL

## 2021-11-13 VITALS
OXYGEN SATURATION: 97 % | WEIGHT: 137.35 LBS | BODY MASS INDEX: 24.34 KG/M2 | DIASTOLIC BLOOD PRESSURE: 74 MMHG | TEMPERATURE: 98.4 F | RESPIRATION RATE: 18 BRPM | SYSTOLIC BLOOD PRESSURE: 165 MMHG | HEART RATE: 72 BPM | HEIGHT: 63 IN

## 2021-11-13 LAB — GLUCOSE SERPL-MCNC: 314 MG/DL (ref 65–140)

## 2021-11-13 PROCEDURE — 82948 REAGENT STRIP/BLOOD GLUCOSE: CPT

## 2021-11-15 ENCOUNTER — TELEMEDICINE (OUTPATIENT)
Dept: FAMILY MEDICINE CLINIC | Facility: CLINIC | Age: 76
End: 2021-11-15
Payer: COMMERCIAL

## 2021-11-15 VITALS
HEIGHT: 64 IN | SYSTOLIC BLOOD PRESSURE: 159 MMHG | DIASTOLIC BLOOD PRESSURE: 79 MMHG | WEIGHT: 133 LBS | BODY MASS INDEX: 22.71 KG/M2

## 2021-11-15 DIAGNOSIS — Z20.822 ENCOUNTER BY TELEHEALTH FOR SUSPECTED COVID-19: Primary | ICD-10-CM

## 2021-11-15 DIAGNOSIS — I10 ESSENTIAL HYPERTENSION: Chronic | ICD-10-CM

## 2021-11-15 PROCEDURE — 3077F SYST BP >= 140 MM HG: CPT | Performed by: NURSE PRACTITIONER

## 2021-11-15 PROCEDURE — 3078F DIAST BP <80 MM HG: CPT | Performed by: NURSE PRACTITIONER

## 2021-11-15 PROCEDURE — 3008F BODY MASS INDEX DOCD: CPT | Performed by: NURSE PRACTITIONER

## 2021-11-15 PROCEDURE — 0241U HB NFCT DS VIR RESP RNA 4 TRGT: CPT | Performed by: NURSE PRACTITIONER

## 2021-11-15 PROCEDURE — 99213 OFFICE O/P EST LOW 20 MIN: CPT | Performed by: NURSE PRACTITIONER

## 2021-11-15 RX ORDER — AMLODIPINE BESYLATE 5 MG/1
TABLET ORAL
Qty: 30 TABLET | Refills: 0 | Status: SHIPPED | OUTPATIENT
Start: 2021-11-15 | End: 2021-12-06

## 2021-11-15 RX ORDER — GUAIFENESIN 600 MG
1200 TABLET, EXTENDED RELEASE 12 HR ORAL EVERY 12 HOURS SCHEDULED
Qty: 60 TABLET | Refills: 0 | Status: SHIPPED | OUTPATIENT
Start: 2021-11-15 | End: 2022-08-08

## 2021-11-15 RX ORDER — BENZONATATE 100 MG/1
100 CAPSULE ORAL 3 TIMES DAILY PRN
Qty: 20 CAPSULE | Refills: 0 | Status: SHIPPED | OUTPATIENT
Start: 2021-11-15 | End: 2021-11-29

## 2021-11-15 RX ORDER — AMLODIPINE BESYLATE 5 MG/1
5 TABLET ORAL DAILY
Qty: 30 TABLET | Refills: 1 | Status: CANCELLED | OUTPATIENT
Start: 2021-11-15

## 2021-11-24 ENCOUNTER — TELEPHONE (OUTPATIENT)
Dept: FAMILY MEDICINE CLINIC | Facility: CLINIC | Age: 76
End: 2021-11-24

## 2021-11-29 ENCOUNTER — TELEMEDICINE (OUTPATIENT)
Dept: FAMILY MEDICINE CLINIC | Facility: CLINIC | Age: 76
End: 2021-11-29
Payer: COMMERCIAL

## 2021-11-29 DIAGNOSIS — R05.9 COUGH: Primary | ICD-10-CM

## 2021-11-29 DIAGNOSIS — J30.81 ALLERGIC RHINITIS DUE TO ANIMAL HAIR AND DANDER: ICD-10-CM

## 2021-11-29 DIAGNOSIS — J01.40 ACUTE NON-RECURRENT PANSINUSITIS: Primary | ICD-10-CM

## 2021-11-29 PROCEDURE — 1160F RVW MEDS BY RX/DR IN RCRD: CPT | Performed by: NURSE PRACTITIONER

## 2021-11-29 PROCEDURE — 99213 OFFICE O/P EST LOW 20 MIN: CPT | Performed by: NURSE PRACTITIONER

## 2021-11-29 PROCEDURE — 1036F TOBACCO NON-USER: CPT | Performed by: NURSE PRACTITIONER

## 2021-11-29 RX ORDER — DEXTROMETHORPHAN HYDROBROMIDE AND PROMETHAZINE HYDROCHLORIDE 15; 6.25 MG/5ML; MG/5ML
2.5 SOLUTION ORAL 4 TIMES DAILY PRN
Qty: 118 ML | Refills: 0 | Status: SHIPPED | OUTPATIENT
Start: 2021-11-29 | End: 2022-08-08

## 2021-11-29 RX ORDER — PREDNISONE 10 MG/1
TABLET ORAL
Qty: 30 TABLET | Refills: 0 | Status: SHIPPED | OUTPATIENT
Start: 2021-11-29 | End: 2022-08-08

## 2021-11-29 RX ORDER — LORATADINE 10 MG/1
10 TABLET ORAL DAILY
Qty: 30 TABLET | Refills: 3 | Status: SHIPPED | OUTPATIENT
Start: 2021-11-29 | End: 2022-08-08

## 2021-11-29 RX ORDER — AZITHROMYCIN 250 MG/1
TABLET, FILM COATED ORAL
Qty: 8 TABLET | Refills: 0 | Status: SHIPPED | OUTPATIENT
Start: 2021-11-29 | End: 2021-12-06

## 2021-12-06 ENCOUNTER — TELEPHONE (OUTPATIENT)
Dept: CARDIAC SURGERY | Facility: CLINIC | Age: 76
End: 2021-12-06

## 2021-12-06 DIAGNOSIS — I10 ESSENTIAL HYPERTENSION: Chronic | ICD-10-CM

## 2021-12-06 RX ORDER — AMLODIPINE BESYLATE 5 MG/1
TABLET ORAL
Qty: 30 TABLET | Refills: 0 | Status: SHIPPED | OUTPATIENT
Start: 2021-12-06 | End: 2022-01-04

## 2021-12-08 ENCOUNTER — OFFICE VISIT (OUTPATIENT)
Dept: CARDIAC SURGERY | Facility: CLINIC | Age: 76
End: 2021-12-08
Payer: COMMERCIAL

## 2021-12-08 VITALS
SYSTOLIC BLOOD PRESSURE: 138 MMHG | WEIGHT: 129.19 LBS | RESPIRATION RATE: 16 BRPM | DIASTOLIC BLOOD PRESSURE: 60 MMHG | HEIGHT: 64 IN | BODY MASS INDEX: 22.06 KG/M2 | TEMPERATURE: 97.5 F | OXYGEN SATURATION: 96 % | HEART RATE: 90 BPM

## 2021-12-08 DIAGNOSIS — R91.1 RIGHT UPPER LOBE PULMONARY NODULE: Primary | ICD-10-CM

## 2021-12-08 PROCEDURE — 1160F RVW MEDS BY RX/DR IN RCRD: CPT | Performed by: THORACIC SURGERY (CARDIOTHORACIC VASCULAR SURGERY)

## 2021-12-08 PROCEDURE — 3078F DIAST BP <80 MM HG: CPT | Performed by: THORACIC SURGERY (CARDIOTHORACIC VASCULAR SURGERY)

## 2021-12-08 PROCEDURE — 3008F BODY MASS INDEX DOCD: CPT | Performed by: THORACIC SURGERY (CARDIOTHORACIC VASCULAR SURGERY)

## 2021-12-08 PROCEDURE — 99215 OFFICE O/P EST HI 40 MIN: CPT | Performed by: THORACIC SURGERY (CARDIOTHORACIC VASCULAR SURGERY)

## 2021-12-08 PROCEDURE — 1036F TOBACCO NON-USER: CPT | Performed by: THORACIC SURGERY (CARDIOTHORACIC VASCULAR SURGERY)

## 2021-12-08 PROCEDURE — 3075F SYST BP GE 130 - 139MM HG: CPT | Performed by: THORACIC SURGERY (CARDIOTHORACIC VASCULAR SURGERY)

## 2022-01-03 DIAGNOSIS — E78.2 MIXED HYPERLIPIDEMIA: Chronic | ICD-10-CM

## 2022-01-03 RX ORDER — ATORVASTATIN CALCIUM 40 MG/1
TABLET, FILM COATED ORAL
Qty: 30 TABLET | Refills: 3 | Status: SHIPPED | OUTPATIENT
Start: 2022-01-03 | End: 2022-05-24 | Stop reason: SDUPTHER

## 2022-01-04 DIAGNOSIS — I10 ESSENTIAL HYPERTENSION: Chronic | ICD-10-CM

## 2022-01-04 RX ORDER — AMLODIPINE BESYLATE 5 MG/1
TABLET ORAL
Qty: 30 TABLET | Refills: 0 | Status: SHIPPED | OUTPATIENT
Start: 2022-01-04 | End: 2022-05-24 | Stop reason: SDUPTHER

## 2022-01-13 DIAGNOSIS — I10 ESSENTIAL HYPERTENSION: Chronic | ICD-10-CM

## 2022-01-13 PROCEDURE — 4010F ACE/ARB THERAPY RXD/TAKEN: CPT | Performed by: THORACIC SURGERY (CARDIOTHORACIC VASCULAR SURGERY)

## 2022-01-13 RX ORDER — CANDESARTAN 32 MG/1
32 TABLET ORAL DAILY
Qty: 30 TABLET | Refills: 5 | Status: SHIPPED | OUTPATIENT
Start: 2022-01-13 | End: 2022-08-08 | Stop reason: SDUPTHER

## 2022-01-31 PROBLEM — Z20.822 ENCOUNTER BY TELEHEALTH FOR SUSPECTED COVID-19: Status: RESOLVED | Noted: 2021-11-15 | Resolved: 2022-01-31

## 2022-01-31 PROBLEM — R19.7 DIARRHEA OF PRESUMED INFECTIOUS ORIGIN: Status: RESOLVED | Noted: 2021-09-10 | Resolved: 2022-01-31

## 2022-01-31 PROBLEM — J01.40 ACUTE NON-RECURRENT PANSINUSITIS: Status: RESOLVED | Noted: 2021-11-29 | Resolved: 2022-01-31

## 2022-01-31 PROBLEM — R79.89 ELEVATED BRAIN NATRIURETIC PEPTIDE (BNP) LEVEL: Status: RESOLVED | Noted: 2021-08-04 | Resolved: 2022-01-31

## 2022-02-01 ENCOUNTER — RA CDI HCC (OUTPATIENT)
Dept: OTHER | Facility: HOSPITAL | Age: 77
End: 2022-02-01

## 2022-02-01 NOTE — PROGRESS NOTES
[facility]  coding opportunities       Chart reviewed, no opportunity found: CHART REVIEWED, NO OPPORTUNITY FOUND                        Patients insurance company: [employer]

## 2022-02-07 DIAGNOSIS — J45.30 MILD PERSISTENT ASTHMA WITHOUT COMPLICATION: ICD-10-CM

## 2022-02-07 RX ORDER — MONTELUKAST SODIUM 10 MG/1
TABLET ORAL
Qty: 30 TABLET | Refills: 5 | Status: SHIPPED | OUTPATIENT
Start: 2022-02-07 | End: 2022-08-08 | Stop reason: SDUPTHER

## 2022-02-24 PROBLEM — E78.00 PURE HYPERCHOLESTEROLEMIA: Status: RESOLVED | Noted: 2021-01-13 | Resolved: 2022-02-24

## 2022-03-07 ENCOUNTER — HOSPITAL ENCOUNTER (OUTPATIENT)
Dept: CT IMAGING | Facility: HOSPITAL | Age: 77
Discharge: HOME | End: 2022-03-07
Payer: COMMERCIAL

## 2022-03-07 DIAGNOSIS — R91.1 RIGHT UPPER LOBE PULMONARY NODULE: ICD-10-CM

## 2022-03-07 PROCEDURE — G1004 CDSM NDSC: HCPCS

## 2022-03-07 PROCEDURE — 71250 CT THORAX DX C-: CPT

## 2022-03-17 ENCOUNTER — OFFICE VISIT (OUTPATIENT)
Dept: CARDIAC SURGERY | Facility: CLINIC | Age: 77
End: 2022-03-17
Payer: COMMERCIAL

## 2022-03-17 VITALS
DIASTOLIC BLOOD PRESSURE: 80 MMHG | OXYGEN SATURATION: 97 % | SYSTOLIC BLOOD PRESSURE: 122 MMHG | TEMPERATURE: 98.6 F | BODY MASS INDEX: 21.95 KG/M2 | HEART RATE: 87 BPM | WEIGHT: 128.6 LBS | HEIGHT: 64 IN

## 2022-03-17 DIAGNOSIS — R91.1 RIGHT UPPER LOBE PULMONARY NODULE: Primary | ICD-10-CM

## 2022-03-17 PROCEDURE — 1036F TOBACCO NON-USER: CPT | Performed by: THORACIC SURGERY (CARDIOTHORACIC VASCULAR SURGERY)

## 2022-03-17 PROCEDURE — 1160F RVW MEDS BY RX/DR IN RCRD: CPT | Performed by: THORACIC SURGERY (CARDIOTHORACIC VASCULAR SURGERY)

## 2022-03-17 PROCEDURE — 99213 OFFICE O/P EST LOW 20 MIN: CPT | Performed by: THORACIC SURGERY (CARDIOTHORACIC VASCULAR SURGERY)

## 2022-03-17 NOTE — PROGRESS NOTES
Thoracic Follow-Up  Assessment/Plan:   78-year-old female with no major risk factors for lung cancer had a previous 1 9 cm right upper lobe lung mass that resolved with time  Fortunately her right upper lobe mass/consolidation went away in the last 3 months  I think this was most likely an infectious or inflammatory process the appearance  No need for further CT imaging of the lungs     I discussed her left leg swelling with her  She does not recall any specific trauma to this area  I have asked her to elevate lysis  If this should persist I have asked her to reach out to her primary care physician  She voiced understanding  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  No scheduled thoracic surgical follow-up this time    Monse Crhistopher MD  And    There are no diagnoses linked to this encounter  Thoracic History   Problem:  Right upper lobe lung nodule    Procedure:         Subjective:    Patient ID: Belgica Reed is a 77 y o  female  ARNALDO West comes back to our office with a 3 month interval scan for her right upper lobe lung nodule  She notes no major changes in her breathing  No significant cough  No hemoptysis  No fevers, chills or unintentional weight loss     Of note she does have asymmetrical left foot swelling  This is new over the past few days  She notes some pain with ambulation and unsteadiness  The following portions of the patient's history were reviewed and updated as appropriate: allergies, current medications, past family history, past medical history, past social history, past surgical history and problem list     Review of Systems   Constitutional: Negative for activity change, appetite change, chills, diaphoresis, fatigue, fever and unexpected weight change  HENT: Negative  Eyes: Negative  Respiratory: Negative for apnea, cough, chest tightness, shortness of breath, wheezing and stridor      Cardiovascular: Negative for chest pain, palpitations and leg swelling  Gastrointestinal: Negative for abdominal distention, abdominal pain, blood in stool, constipation, diarrhea, nausea and vomiting  Endocrine: Negative  Genitourinary: Negative  Musculoskeletal: Positive for gait problem and joint swelling  Skin: Negative  Allergic/Immunologic: Negative  Hematological: Negative  Psychiatric/Behavioral: Negative  Objective:   Physical Exam  Vitals and nursing note reviewed  Constitutional:       General: She is not in acute distress  Appearance: Normal appearance  She is well-developed  She is not diaphoretic  HENT:      Head: Normocephalic and atraumatic  Mouth/Throat:      Pharynx: No oropharyngeal exudate  Eyes:      General: No scleral icterus  Conjunctiva/sclera: Conjunctivae normal       Pupils: Pupils are equal, round, and reactive to light  Neck:      Thyroid: No thyromegaly  Vascular: No JVD  Trachea: No tracheal deviation  Cardiovascular:      Rate and Rhythm: Normal rate and regular rhythm  Heart sounds: Normal heart sounds  No murmur heard  No friction rub  No gallop  Pulmonary:      Effort: Pulmonary effort is normal  No respiratory distress  Breath sounds: Normal breath sounds  No wheezing or rales  Comments: Lungs clear to auscultation bilaterally  Normal work breathing on room air  Chest:      Chest wall: No tenderness  Abdominal:      General: Bowel sounds are normal  There is no distension  Palpations: Abdomen is soft  There is no mass  Tenderness: There is no abdominal tenderness  There is no guarding or rebound  Musculoskeletal:         General: Swelling present  No tenderness or deformity  Normal range of motion  Cervical back: Normal range of motion and neck supple  Left lower leg: Edema present  Comments: Asymmetrical left foot swelling to the ankle  No calf pain or swelling  Able to ambulate with a cane     Lymphadenopathy: Cervical: No cervical adenopathy  Skin:     General: Skin is warm and dry  Coloration: Skin is not pale  Findings: No erythema or rash  Neurological:      Mental Status: She is alert and oriented to person, place, and time  Psychiatric:         Behavior: Behavior normal          Thought Content: Thought content normal          Judgment: Judgment normal      /80 (BP Location: Right arm, Patient Position: Sitting, Cuff Size: Standard)   Pulse 87   Temp 98 6 °F (37 °C) (Tympanic)   Ht 5' 3 5" (1 613 m)   Wt 58 3 kg (128 lb 9 6 oz)   LMP  (LMP Unknown)   SpO2 97%   BMI 22 42 kg/m²     XR chest 2 views    Result Date: 7/29/2021  Impression No acute cardiopulmonary disease  Workstation performed: [id number]      CT chest wo contrast    Result Date: 3/10/2022  Narrative CT CHEST WITHOUT IV CONTRAST INDICATION:   R91 1: Solitary pulmonary nodule  COMPARISON: Multiple prior CT examinations of the chest commencing  February 27, 2016 with direct comparison made to November 7, 2021  TECHNIQUE: CT examination of the chest was performed without intravenous contrast   Axial, sagittal, and coronal 2D reformatted images were created from the source data and submitted for interpretation  Radiation dose length product (DLP) for this visit:  178 mGy-cm   This examination, like all CT scans performed in the [facility], was performed utilizing techniques to minimize radiation dose exposure, including the use of iterative reconstruction and automated exposure control  FINDINGS: LUNGS:  0 4 cm solid right lower lobe nodule is unchanged from March 15, 2016 (series 3, image 72)  Prior right upper lobe mixed solid and groundglass consolidation has resolved (series 3, image 27 on prior study)  No new or enlarging pulmonary nodules  PLEURA:  Unremarkable  HEART/GREAT VESSELS: Normal heart size  There is fusiform ectasia of the ascending thoracic aorta measuring up to 40 mm    Recommendation is for follow-up low radiation dose chest CT in one year  Atherosclerotic calcifications of the aorta MEDIASTINUM AND MATEUSZ:  Unremarkable  CHEST WALL AND LOWER NECK:   Unremarkable  VISUALIZED STRUCTURES IN THE UPPER ABDOMEN:  Unremarkable  OSSEOUS STRUCTURES:  Similar-appearing chronic bilateral rib and left clavicular fractures  Multilevel degenerative changes of the spine  Mild height loss of the T9 vertebral body and Schmorl node along the superior endplate of Q63 are similar in appearance to prior study  No suspicious osseous lesion  Impression Since November 7, 2021, resolved right upper lobe consolidation  Unchanged ectasia of ascending thoracic aorta measuring 45 mm or less in greatest caliber  Recommendation is for follow-up low radiation dose noncontrast chest CT in one year  Workstation performed: [id number]     CT chest wo contrast    Result Date: 11/8/2021  Narrative CT CHEST WITHOUT IV CONTRAST INDICATION:   I71 2: Thoracic aortic aneurysm, without rupture  COMPARISON:  CT chest 9/20/2019 TECHNIQUE: CT examination of the chest was performed without intravenous contrast   Axial, sagittal, and coronal 2D reformatted images were created from the source data and submitted for interpretation  Radiation dose length product (DLP) for this visit:  154 mGy-cm   This examination, like all CT scans performed in the [facility], was performed utilizing techniques to minimize radiation dose exposure, including the use of iterative reconstruction and automated exposure control  FINDINGS: LUNGS:  Scattered pulmonary nodular opacities; representative opacities will be measured on on series 3: Image 27, right upper lobe, 1 9 cm patchy semisolid nodular opacity with branching solid component measuring between 4 to 6 mm, new  Image 69, right lower lobe, 4 mm juxtapleural solid nodule, likely new; this part of the lung was partially obscured by airspace disease on the prior study   Image 87, left lower lobe, 9 mm subpleural solid nodule, new  Punctate calcified granuloma right lower lobe  Scattered areas of minimal subpleural pulmonary scarring  Central airways are clear  PLEURA:  Unremarkable  HEART/GREAT VESSELS:  Heart is not enlarged  No pericardial effusion  Aortic and coronary artery calcification  Stable ectasia of the ascending aorta maximum diameter 4 1 cm  MEDIASTINUM AND MATEUSZ:  Unremarkable  CHEST WALL AND LOWER NECK:   Unremarkable  VISUALIZED STRUCTURES IN THE UPPER ABDOMEN:  Unremarkable  OSSEOUS STRUCTURES:  No acute fracture or osseous destructive lesion identified  Mild degenerative changes of the spine  Stable mild chronic compression of T9 and T11  Multiple old bilateral rib fractures  Impression 1 9 cm semisolid nodular opacity right upper lobe and few additional solid nodules, the largest of which measures 9 mm in the left lower lobe new since September 2019  Based on current Fleischner Society 2017 Guidelines on incidental pulmonary nodule, either PET/CT scan evaluation, tissue sampling or short term interval followup non-contrast CT followup (initially in 3 months) may be considered appropriate  Stable ectasia of the ascending aorta maximum diameter 4 1 cm  No other significant interval change  This study demonstrates a significant  finding and was documented as such in [facility] for liaison and referring practitioner notification  This study demonstrates a finding requiring imaging follow-up and was documented as such in Epic  Workstation performed: [id number]     I reviewed her CT scan from 03/07/2022 with her  The previous right upper lobe nodule is completely resolved  There are no other suspicious findings or changes  She has some healing left-sided rib fractures      Herson Tucker MD  Thoracic Surgeon    (Available by [facility] Text)

## 2022-03-22 ENCOUNTER — HOSPITAL ENCOUNTER (EMERGENCY)
Facility: HOSPITAL | Age: 77
Discharge: HOME | End: 2022-03-22
Attending: EMERGENCY MEDICINE | Admitting: EMERGENCY MEDICINE
Payer: COMMERCIAL

## 2022-03-22 ENCOUNTER — APPOINTMENT (EMERGENCY)
Dept: RADIOLOGY | Facility: HOSPITAL | Age: 77
End: 2022-03-22
Payer: COMMERCIAL

## 2022-03-22 VITALS
RESPIRATION RATE: 17 BRPM | OXYGEN SATURATION: 97 % | BODY MASS INDEX: 22.7 KG/M2 | DIASTOLIC BLOOD PRESSURE: 83 MMHG | HEART RATE: 98 BPM | HEIGHT: 63 IN | WEIGHT: 128.1 LBS | TEMPERATURE: 97.8 F | SYSTOLIC BLOOD PRESSURE: 191 MMHG

## 2022-03-22 DIAGNOSIS — S93.409A ANKLE SPRAIN: Primary | ICD-10-CM

## 2022-03-22 PROCEDURE — 73610 X-RAY EXAM OF ANKLE: CPT

## 2022-03-22 PROCEDURE — 73630 X-RAY EXAM OF FOOT: CPT

## 2022-03-22 PROCEDURE — 99284 EMERGENCY DEPT VISIT MOD MDM: CPT | Performed by: EMERGENCY MEDICINE

## 2022-03-22 PROCEDURE — 99283 EMERGENCY DEPT VISIT LOW MDM: CPT

## 2022-03-22 RX ORDER — ACETAMINOPHEN 325 MG/1
650 TABLET ORAL ONCE
Status: COMPLETED | OUTPATIENT
Start: 2022-03-22 | End: 2022-03-22

## 2022-03-22 RX ADMIN — ACETAMINOPHEN 650 MG: 325 TABLET ORAL at 08:35

## 2022-03-22 NOTE — ED PROVIDER NOTES
History  Chief Complaint   Patient presents with    Ankle Pain     pt reports dropping jar onto foot on saturday  Pain on R ankle  Pt iced, elevated and has ace wrap on ankle iwth no relief  Patient is a 76 YO F, PMHx of DM, HTN, and HLD, who presents to the ED for left ankle pain  Patient states the pain started 9 days ago after accidentally dropping a glass jar of strawberry perserves jar on the top of her left foot  She states since then she has tried using ice, epsom salt soaks, wrapping it, and tylenol with minimal relief  She states she has had to use a cane in order to ambulate because she is unable to place a lot of pressure on it  There are no other modifying factors  There are no associated symptoms  Patient states she attempted to see her PCP yesterday for the pain and had an appointment, but when she got there she realized the office had complete closed down  She denies any other new or concerning symptoms  Prior to Admission Medications   Prescriptions Last Dose Informant Patient Reported? Taking?    Blood Pressure Monitor MISC  Self No No   Sig: Use as needed (Pt to use as needed )   Patient not taking: Reported on 11/29/2021    MAGNESIUM PO  Self Yes No   Sig: Take by mouth daily    Methylsulfonylmethane 1000 MG CAPS  Self Yes No   Sig: Take 1,000 mg by mouth 2 (two) times a day   Patient not taking: Reported on 11/15/2021    Promethazine-DM (PHENERGAN-DM) 6 25-15 mg/5 mL oral syrup  Self No No   Sig: Take 2 5 mL by mouth 4 (four) times a day as needed for cough   VITAMIN E PO  Self Yes No   Sig: Take by mouth daily    Patient not taking: Reported on 12/8/2021    acetaminophen (TYLENOL) 325 mg tablet  Self No No   Sig: Take 3 tablets (975 mg total) by mouth every 8 (eight) hours   albuterol (2 5 mg/3 mL) 0 083 % nebulizer solution  Self No No   Sig: TAKE 1 VIAL BY NEBULIZATION EVERY 6 HOURS AS NEEDED FOR WHEEZING OR SHORTNESS OF BREATH   albuterol (PROVENTIL HFA,VENTOLIN HFA) 90 mcg/act inhaler  Self No No   Sig: Inhale 2 puffs every 6 (six) hours as needed for wheezing   amLODIPine (NORVASC) 5 mg tablet   No No   Sig: take 1 tablet by mouth once daily   atorvastatin (LIPITOR) 40 mg tablet   No No   Sig: take 1 tablet by mouth once daily   bismuth subsalicylate (PEPTO BISMOL) 524 mg/30 mL oral suspension  Self Yes No   Sig: Take 30 mL by mouth every 6 (six) hours as needed   Patient not taking: Reported on 2021    candesartan (ATACAND) 32 MG tablet   No No   Sig: Take 1 tablet (32 mg total) by mouth daily   erythromycin (ILOTYCIN) ophthalmic ointment  Self No No   Sig: Place a 1/2 inch ribbon of ointment into both lower eyelids 4-6 times daily for 5-7 days  Patient not taking: Reported on 11/15/2021    esomeprazole (NexIUM) 20 mg capsule  Self Yes No   Sig: Take 20 mg by mouth   Patient not taking: Reported on 11/15/2021    fluticasone (FLONASE) 50 mcg/act nasal spray  Self No No   Si spray into each nostril daily   fluticasone-salmeterol (Advair Diskus) 250-50 mcg/dose inhaler  Self No No   Sig: Inhale 1 puff 2 (two) times a day Rinse mouth after use  glucosamine-chondroitin 500-400 MG tablet  Self Yes No   Sig: Take 1 tablet by mouth 3 (three) times a day   glucose blood test strip  Self No No   Sig: Use 1 each 2 (two) times a day Use as instructed   guaiFENesin (MUCINEX) 600 mg 12 hr tablet  Self No No   Sig: Take 2 tablets (1,200 mg total) by mouth every 12 (twelve) hours   hydrochlorothiazide (HYDRODIURIL) 12 5 mg tablet  Self No No   Sig: take 1 tablet by mouth once daily   ibandronate (BONIVA) 150 MG tablet  Self No No   Sig: take 1 tablet by mouth every month   ketorolac (ACULAR) 0 5 % ophthalmic solution  Self Yes No   Sig: PLACE 1 DROP INTO LEFT EYE 4 TIMES A DAY   START 3 DAYS PRIOR TO SURGERY   loratadine (CLARITIN) 10 mg tablet  Self No No   Sig: Take 1 tablet (10 mg total) by mouth daily   meclizine (ANTIVERT) 12 5 MG tablet  Self No No   Sig: TAKE 1 TABLET BY MOUTH 3 TIMES A DAY AS NEEDED FOR DIZZINESS FOR UP TO 10 DAYS  metFORMIN (GLUCOPHAGE) 850 mg tablet   No No   Sig: take 1 tablet by mouth twice a day with meals   montelukast (SINGULAIR) 10 mg tablet   No No   Sig: take 1 tablet by mouth at bedtime   predniSONE 10 mg tablet  Self No No   Sig: Use 5 tablets for 2 days  Use 4 tablets for 2 days  Use 3 tablets for 2 days  Use 2 tablets for 2 days  Use 1 tablet for 2 days  tobramycin-dexamethasone (TOBRADEX) ophthalmic suspension  Self No No   Sig: Administer 1 drop into the left eye every 4 (four) hours while awake      Facility-Administered Medications: None       Past Medical History:   Diagnosis Date    Asthma     Colon polyp     [facility]     E coli infection     Fall     Hyperlipidemia     Hypertension     Pneumothorax     Rectal prolapse 09/03/2020    Sleep apnea     no CPAP    Sub[facility]noid hemorrhage ([facility] )     Thoracic aortic aneurysm (HCC)     Vertigo        Past Surgical History:   Procedure Laterality Date    BONE GRAFT      R arm    BRONCHOSCOPY N/A 3/16/2016    Procedure: BRONCHOSCOPY FLEXIBLE/anesth ;  Surgeon: Elena Russell DO;  [facility];   Service:     COLONOSCOPY      COLONOSCOPY      EYE SURGERY      OOPHORECTOMY Left     age 49    MN LAP,SURG,COLECTOMY, PARTIAL, W/ANAST N/A 9/23/2020    Procedure: RESECTION COLON SIGMOID LAPAROSCOPIC;  Surgeon: Maite Delgado MD;  [facility];  Service: Colorectal    MN SIGMOIDOSCOPY FLX DX W/COLLJ SPEC BR/WA IF PFRMD N/A 9/23/2020    Procedure: Jarred Cameron;  Surgeon: Maite Delgado MD;  [facility];  Service: Colorectal    RECTAL PROLAPSE REPAIR LAPAROSCOPIC N/A 9/23/2020    Procedure: Merla Matinicus;  Surgeon: Maite Delgado MD;  [facility];  Service: Colorectal       Family History   Problem Relation Age of Onset    Endometrial cancer Mother 45    Cancer Daughter 48        breast    Breast cancer additional onset Daughter 48    Cancer Son 35        asbestosis related cancer    No Known Problems Father     No Known Problems Sister     No Known Problems Maternal Grandmother     No Known Problems Maternal Grandfather     No Known Problems Paternal Grandmother     No Known Problems Paternal Grandfather     No Known Problems Daughter     No Known Problems Maternal Aunt     No Known Problems Paternal Aunt      I have reviewed and agree with the history as documented  E-Cigarette/Vaping    E-Cigarette Use Never User      E-Cigarette/Vaping Substances    Nicotine No     THC No     CBD No     Flavoring No     Other No     Unknown No      Social History     Tobacco Use    Smoking status: Former Smoker     Years: 2 00     Types: Cigarettes    Smokeless tobacco: Never Used    Tobacco comment: only smoked a couple a day for a couple years in her 25s   Vaping Use    Vaping Use: Never used   Substance Use Topics    Alcohol use: Yes     Comment: weekends     Drug use: No        Review of Systems   Constitutional: Negative for chills and fever  Respiratory: Negative for cough and shortness of breath  Cardiovascular: Negative for chest pain and leg swelling  Gastrointestinal: Negative for abdominal pain, diarrhea, nausea and vomiting  Musculoskeletal: Positive for gait problem  Negative for back pain and neck pain  Left ankle pain   Skin: Positive for color change  Negative for rash and wound  All other systems reviewed and are negative        Physical Exam  ED Triage Vitals   Temperature Pulse Respirations Blood Pressure SpO2   03/22/22 0833 03/22/22 0821 03/22/22 0821 03/22/22 0821 03/22/22 0821   97 8 °F (36 6 °C) 98 17 (!) 191/83 97 %      Temp Source Heart Rate Source Patient Position - Orthostatic VS BP Location FiO2 (%)   03/22/22 0833 03/22/22 0821 03/22/22 0821 03/22/22 0821 --   Oral Monitor Sitting Right arm       Pain Score       03/22/22 0821       10 - Worst Possible Pain             Orthostatic Vital Signs  Vitals:    03/22/22 0821   BP: (!) 191/83   Pulse: 98   Patient Position - Orthostatic VS: Sitting       Physical Exam  Vitals and nursing note reviewed  Constitutional:       General: She is not in acute distress  Appearance: She is well-developed  She is not diaphoretic  HENT:      Head: Normocephalic and atraumatic  Right Ear: External ear normal       Left Ear: External ear normal       Nose: Nose normal    Eyes:      General: Lids are normal  No scleral icterus  Cardiovascular:      Rate and Rhythm: Normal rate and regular rhythm  Heart sounds: Normal heart sounds  No murmur heard  No friction rub  No gallop  Pulmonary:      Effort: Pulmonary effort is normal  No respiratory distress  Breath sounds: Normal breath sounds  No wheezing or rales  Musculoskeletal:         General: Swelling, tenderness and signs of injury present  No deformity  Normal range of motion  Cervical back: Normal range of motion and neck supple  Left lower leg: No deformity, tenderness or bony tenderness  Left ankle: Swelling and ecchymosis present  No deformity  Tenderness present over the medial malleolus  Left foot: Tenderness present  Legs:       Comments: There is tenderness to palpation over the L medial malleoli and proximal talus  No obvious deformities  Moderate associated swelling of the proximal forefoot and medial malloli  FROM  Small amount of discoloration/bruising  2+ DP pulse   Skin:     General: Skin is warm and dry  Neurological:      General: No focal deficit present  Mental Status: She is alert     Psychiatric:         Mood and Affect: Mood normal          Behavior: Behavior normal          ED Medications  Medications   acetaminophen (TYLENOL) tablet 650 mg (650 mg Oral Given 3/22/22 0835)       Diagnostic Studies  Results Reviewed     None                 XR ankle 3+ views LEFT   ED Interpretation by Florencia Kelly DO (03/22 0920)   No acute osseous abnormality      Final Result by Sherri Willett MD (03/22 1134)      No acute osseous abnormality  Workstation performed: [id number]         XR foot 3+ vw left   ED Interpretation by Florenica Kelly DO (03/22 0919)   No acute osseous abnormality      Final Result by Sherri Willett MD (03/22 1134)      No acute osseous abnormality  Workstation performed: [id number]               Procedures  Procedures      ED Course  ED Course as of 03/22/22 1236   Tue Mar 22, 2022   0919 Patient re-evaluated  Resting comfortably  Discussed x-rays  Explained no obvious fractures on our read  If a fracture was missed, she will receive a call with further instructions  Will discharge  Will place in air cast prior to discharge  Recommended primary care provider follow-up  Number for [facility] given  Explained if her pain persists, she can follow-up with orthopedics  The number for orthopedics was given  Recommended she use Tylenol or Motrin as needed for pain  Explained she should not take more than advised per packaging label  Return precautions discussed  Patient verbalized understanding and agreed to plan of care  SBIRT 20yo+      Most Recent Value   SBIRT (22 yo +)    In order to provide better care to our patients, we are screening all of our patients for alcohol and drug use  Would it be okay to ask you these screening questions? No Filed at: 03/22/2022 0640                MDM  Number of Diagnoses or Management Options  Ankle sprain  Diagnosis management comments: Patient is a 77 y o  female who presents to the ED for left ankle pain after accidentally dropping a glass jar on it  Differential for pain includes fracture versus strain/sprain    Plan:  Plain films, pain control, D/C                 Portions of the record may have been created with voice recognition software   Occasional wrong word or "sound a like" substitutions may have occurred due to the inherent limitations of voice recognition software  Read the chart carefully and recognize, using context, where substitutions have occurred  Amount and/or Complexity of Data Reviewed  Tests in the radiology section of CPT®: ordered  Independent visualization of images, tracings, or specimens: yes    Risk of Complications, Morbidity, and/or Mortality  Presenting problems: low  Diagnostic procedures: moderate  Management options: low    Patient Progress  Patient progress: stable      Disposition  Final diagnoses: Ankle sprain     Time reflects when diagnosis was documented in both MDM as applicable and the Disposition within this note     Time User Action Codes Description Comment    3/22/2022  9:11 AM Jessy Starks Add [[id number] [id number]] Ankle sprain       ED Disposition     ED Disposition Condition Date/Time Comment    Discharge Stable Tu Mar 22, 2022  [address]  [facility] discharge to home/self care              Follow-up Information     Follow up With Specialties Details Why Contact Info Additional Information    [facility]  Call in 1 day  [address] Orthopedic Surgery   [address] [id number]  [phone] [address] [place] [address], [address] [facility]   [address] [place] [phone]  [facility], [address] [facility]          Discharge Medication List as of 3/22/2022  9:15 AM      CONTINUE these medications which have NOT CHANGED    Details   acetaminophen (TYLENOL) 325 mg tablet Take 3 tablets (975 mg total) by mouth every 8 (eight) hours, Starting Sun 9/29/2019, Normal      albuterol (2 5 mg/3 mL) 0 083 % nebulizer solution TAKE 1 VIAL BY NEBULIZATION EVERY 6 HOURS AS NEEDED FOR WHEEZING OR SHORTNESS OF BREATH, Normal      albuterol (PROVENTIL HFA,VENTOLIN HFA) 90 mcg/act inhaler Inhale 2 puffs every 6 (six) hours as needed for wheezing, Starting Wed 12/16/2020, Normal      amLODIPine (NORVASC) 5 mg tablet take 1 tablet by mouth once daily, Normal      atorvastatin (LIPITOR) 40 mg tablet take 1 tablet by mouth once daily, Normal      bismuth subsalicylate (PEPTO BISMOL) 524 mg/30 mL oral suspension Take 30 mL by mouth every 6 (six) hours as needed, Historical Med      Blood Pressure Monitor MISC Use as needed (Pt to use as needed ), Starting Tue 7/13/2021, Print      candesartan (ATACAND) 32 MG tablet Take 1 tablet (32 mg total) by mouth daily, Starting Thu 1/13/2022, Normal      erythromycin (ILOTYCIN) ophthalmic ointment Place a 1/2 inch ribbon of ointment into both lower eyelids 4-6 times daily for 5-7 days  , Print      esomeprazole (NexIUM) 20 mg capsule Take 20 mg by mouth, Historical Med      fluticasone (FLONASE) 50 mcg/act nasal spray 1 spray into each nostril daily, Starting Fri 9/10/2021, Normal      fluticasone-salmeterol (Advair Diskus) 250-50 mcg/dose inhaler Inhale 1 puff 2 (two) times a day Rinse mouth after use , Starting Thu 10/21/2021, Until Sun 10/16/2022, Normal      glucosamine-chondroitin 500-400 MG tablet Take 1 tablet by mouth 3 (three) times a day, Historical Med      glucose blood test strip Use 1 each 2 (two) times a day Use as instructed, Starting Fri 3/26/2021, Normal      guaiFENesin (MUCINEX) 600 mg 12 hr tablet Take 2 tablets (1,200 mg total) by mouth every 12 (twelve) hours, Starting Mon 11/15/2021, Normal      hydrochlorothiazide (HYDRODIURIL) 12 5 mg tablet take 1 tablet by mouth once daily, Normal      ibandronate (BONIVA) 150 MG tablet take 1 tablet by mouth every month, Normal      ketorolac (ACULAR) 0 5 % ophthalmic solution PLACE 1 DROP INTO LEFT EYE 4 TIMES A DAY   START 3 DAYS PRIOR TO SURGERY, Historical Med      loratadine (CLARITIN) 10 mg tablet Take 1 tablet (10 mg total) by mouth daily, Starting Mon 11/29/2021, Normal      MAGNESIUM PO Take by mouth daily , Historical Med      meclizine (ANTIVERT) 12 5 MG tablet TAKE 1 TABLET BY MOUTH 3 TIMES A DAY AS NEEDED FOR DIZZINESS FOR UP TO 10 DAYS , Normal      metFORMIN (GLUCOPHAGE) 850 mg tablet take 1 tablet by mouth twice a day with meals, Normal      Methylsulfonylmethane 1000 MG CAPS Take 1,000 mg by mouth 2 (two) times a day, Historical Med      montelukast (SINGULAIR) 10 mg tablet take 1 tablet by mouth at bedtime, Normal      predniSONE 10 mg tablet Use 5 tablets for 2 days  Use 4 tablets for 2 days  Use 3 tablets for 2 days  Use 2 tablets for 2 days  Use 1 tablet for 2 days  , Normal      Promethazine-DM (PHENERGAN-DM) 6 25-15 mg/5 mL oral syrup Take 2 5 mL by mouth 4 (four) times a day as needed for cough, Starting Mon 11/29/2021, Normal      tobramycin-dexamethasone (TOBRADEX) ophthalmic suspension Administer 1 drop into the left eye every 4 (four) hours while awake, Starting Fri 9/10/2021, Normal      VITAMIN E PO Take by mouth daily , Historical Med           No discharge procedures on file  PDMP Review       Value Time User    PDMP Reviewed  Yes 10/14/2019  8:21 AM Rosemary Bill MD           ED Provider  Attending physically available and evaluated Anabelle Goodwin  MARK managed the patient along with the ED Attending      Electronically Signed by         Alec Ball DO  03/22/22 2981

## 2022-03-22 NOTE — ED ATTENDING ATTESTATION
3/22/2022  IShelly DO, saw and evaluated the patient  I have discussed the patient with the resident/non-physician practitioner and agree with the resident's/non-physician practitioner's findings, Plan of Care, and MDM as documented in the resident's/non-physician practitioner's note, except where noted  All available labs and Radiology studies were reviewed  I was present for key portions of any procedure(s) performed by the resident/non-physician practitioner and I was immediately available to provide assistance  At this point I agree with the current assessment done in the Emergency Department  I have conducted an independent evaluation of this patient a history and physical is as follows:    Patient is a 20-year-old female, says about 9 days ago she dropped a heavy jar of strawberry preserved on her left ankle and foot, did not fall down, since him and having some mild pain in the area, worse with weight-bearing, better with remaining still  No numbness or tingling, no radiation to the pain  She has been occasionally taking a 1000 mg of Tylenol twice a day for pain, with some mild but not complete relief  No Tylenol taken today  Says that she normally ambulates independently but she has been using a cane to help with ambulation because of the pain with weight-bearing  Yesterday she went to her primary care physician's office for a appointment however learned that office has closed permanently  General:  Patient is well-appearing  Head:  Atraumatic  Eyes:  Conjunctiva pink  ENT:  Mucous membranes are moist  Neck:  Supple  Extremities:  Patient's left leg has some slight ecchymosis around the medial malleolus and in her midfoot  Leg is otherwise atraumatic  No tenderness at the knee, tibia or fibula with the exception of some slight medial malleolar tenderness  No fibular head tenderness    Mild tenderness to the midfoot, no forefoot tenderness including no metatarsal tenderness or toe tenderness  No ligamentous laxity or instability at the ankle  Sensation is normal, DP and PT pulses are intact  She can plantar flex and dorsiflex at the ankle and toes but hurts to do so  Neurologic:  Awake, fluent speech, normal comprehension, AAOx3  Skin:  Pink warm and dry  Psychiatric:  Alert, pleasant, cooperative      ED Course     Left ankle and left foot x-ray interpreted by me shows no fracture, no dislocation  Supportive care, importance of follow-up and return precautions were discussed with the patient, who expressed understanding        Critical Care Time  Procedures

## 2022-03-22 NOTE — DISCHARGE INSTRUCTIONS
You have been evaluated in the Emergency Department today for left ankle pain  Your evaluation did not find evidence of medical conditions requiring emergent intervention at this time  Please rest, ice, and elevate your ankle, and resume normal activities as tolerated  You may take ibuprofen every 6 hours or tylenol every 6 hours as needed for pain  If needed, you can alternate these medications so that you take one medication every 3 hours  For instance, at noon take ibuprofen, then at 3pm take tylenol, then at 6pm take ibuprofen  Please schedule an appointment for follow up with your primary care physician this week  If you need a new primary are provider, please call "[facility]" or [address]  If your pain persists, please follow up with orthopedics  The number is listed above  Return to the Emergency Department if you experience worsening pain, numbness, tingling, change of color in your toes, or any other concerning symptoms

## 2022-03-25 ENCOUNTER — RA CDI HCC (OUTPATIENT)
Dept: OTHER | Facility: HOSPITAL | Age: 77
End: 2022-03-25

## 2022-03-25 NOTE — PROGRESS NOTES
[facility]  coding opportunities       Chart reviewed, no opportunity found:   [address]        Patients Insurance     Medicare Insurance: [employer]

## 2022-05-24 DIAGNOSIS — E11.9 TYPE 2 DIABETES MELLITUS WITHOUT COMPLICATION, WITHOUT LONG-TERM CURRENT USE OF INSULIN (HCC): ICD-10-CM

## 2022-05-24 DIAGNOSIS — E78.2 MIXED HYPERLIPIDEMIA: Chronic | ICD-10-CM

## 2022-05-24 DIAGNOSIS — I10 ESSENTIAL HYPERTENSION: Chronic | ICD-10-CM

## 2022-05-24 RX ORDER — AMLODIPINE BESYLATE 5 MG/1
5 TABLET ORAL DAILY
Qty: 30 TABLET | Refills: 0 | Status: SHIPPED | OUTPATIENT
Start: 2022-05-24 | End: 2022-06-20 | Stop reason: SDUPTHER

## 2022-05-24 RX ORDER — ATORVASTATIN CALCIUM 40 MG/1
40 TABLET, FILM COATED ORAL DAILY
Qty: 30 TABLET | Refills: 0 | Status: SHIPPED | OUTPATIENT
Start: 2022-05-24 | End: 2022-06-20 | Stop reason: SDUPTHER

## 2022-06-07 ENCOUNTER — TELEPHONE (OUTPATIENT)
Dept: FAMILY MEDICINE CLINIC | Facility: CLINIC | Age: 77
End: 2022-06-07

## 2022-06-07 NOTE — TELEPHONE ENCOUNTER
Pt called [facility], requesting a refill for as pt stated her breathing meds did not leave a name but she needed this asap because she is having a hard time breathing, Returned patients call and left a message for pt to return call, What medication does pt need?

## 2022-06-20 DIAGNOSIS — E11.9 TYPE 2 DIABETES MELLITUS WITHOUT COMPLICATION, WITHOUT LONG-TERM CURRENT USE OF INSULIN (HCC): ICD-10-CM

## 2022-06-20 DIAGNOSIS — I10 ESSENTIAL HYPERTENSION: Chronic | ICD-10-CM

## 2022-06-20 DIAGNOSIS — E78.2 MIXED HYPERLIPIDEMIA: Chronic | ICD-10-CM

## 2022-06-20 RX ORDER — AMLODIPINE BESYLATE 5 MG/1
5 TABLET ORAL DAILY
Qty: 30 TABLET | Refills: 0 | Status: SHIPPED | OUTPATIENT
Start: 2022-06-20 | End: 2022-08-08 | Stop reason: SDUPTHER

## 2022-06-20 RX ORDER — ATORVASTATIN CALCIUM 40 MG/1
40 TABLET, FILM COATED ORAL DAILY
Qty: 30 TABLET | Refills: 0 | Status: SHIPPED | OUTPATIENT
Start: 2022-06-20 | End: 2022-08-08 | Stop reason: SDUPTHER

## 2022-06-20 NOTE — ED NOTES
----- Message from Liyah Vidal MD sent at 6/20/2022  8:56 AM EDT -----  Please notify - normal pelvic US  No biopsy needed at this time  She should be in touch with continued episodes of bleeding  Patient transported to [address] [place] [address] Drive  01/06/20 4172

## 2022-06-20 NOTE — TELEPHONE ENCOUNTER
Pt has OV scheduled on 6/28  She will need a 30 day until then  RX's added as she will be a few days short

## 2022-06-24 ENCOUNTER — TELEPHONE (OUTPATIENT)
Dept: OTHER | Facility: OTHER | Age: 77
End: 2022-06-24

## 2022-06-24 NOTE — TELEPHONE ENCOUNTER
Pt called in to request a refill on her Metformin 850 mg / tablet  Confirmed medication dosage and pharmacy and did make pt aware that it already appears that the medication was sent in for her on Monday 6/20/22  Pt verbalized understanding and will call the [facility] [address] now

## 2022-07-26 ENCOUNTER — RA CDI HCC (OUTPATIENT)
Dept: OTHER | Facility: HOSPITAL | Age: 77
End: 2022-07-26

## 2022-07-27 NOTE — PROGRESS NOTES
[facility]  coding opportunities       Chart reviewed, no opportunity found:   [address]        Patients Insurance     Medicare Insurance: [employer]

## 2022-07-29 DIAGNOSIS — E78.2 MIXED HYPERLIPIDEMIA: ICD-10-CM

## 2022-07-29 DIAGNOSIS — E11.9 TYPE 2 DIABETES MELLITUS WITHOUT COMPLICATION, WITHOUT LONG-TERM CURRENT USE OF INSULIN (HCC): Primary | ICD-10-CM

## 2022-07-29 DIAGNOSIS — I10 ESSENTIAL HYPERTENSION: ICD-10-CM

## 2022-08-08 ENCOUNTER — OFFICE VISIT (OUTPATIENT)
Dept: FAMILY MEDICINE CLINIC | Facility: CLINIC | Age: 77
End: 2022-08-08
Payer: COMMERCIAL

## 2022-08-08 VITALS
HEIGHT: 64 IN | OXYGEN SATURATION: 98 % | DIASTOLIC BLOOD PRESSURE: 68 MMHG | BODY MASS INDEX: 22.77 KG/M2 | HEART RATE: 89 BPM | SYSTOLIC BLOOD PRESSURE: 138 MMHG | WEIGHT: 133.38 LBS | TEMPERATURE: 96.6 F

## 2022-08-08 DIAGNOSIS — I83.813 VARICOSE VEINS OF BOTH LOWER EXTREMITIES WITH PAIN: ICD-10-CM

## 2022-08-08 DIAGNOSIS — K62.3 RECTAL PROLAPSE: ICD-10-CM

## 2022-08-08 DIAGNOSIS — I71.20 THORACIC AORTIC ANEURYSM WITHOUT RUPTURE: ICD-10-CM

## 2022-08-08 DIAGNOSIS — J45.30 MILD PERSISTENT ASTHMA WITHOUT COMPLICATION: ICD-10-CM

## 2022-08-08 DIAGNOSIS — Z00.00 MEDICARE ANNUAL WELLNESS VISIT, SUBSEQUENT: Primary | ICD-10-CM

## 2022-08-08 DIAGNOSIS — I10 PRIMARY HYPERTENSION: Chronic | ICD-10-CM

## 2022-08-08 DIAGNOSIS — E11.9 TYPE 2 DIABETES MELLITUS WITHOUT COMPLICATION, WITHOUT LONG-TERM CURRENT USE OF INSULIN (HCC): ICD-10-CM

## 2022-08-08 DIAGNOSIS — R26.89 BALANCE DISORDER: ICD-10-CM

## 2022-08-08 DIAGNOSIS — E78.2 MIXED HYPERLIPIDEMIA: Chronic | ICD-10-CM

## 2022-08-08 DIAGNOSIS — M81.0 AGE-RELATED OSTEOPOROSIS WITHOUT CURRENT PATHOLOGICAL FRACTURE: ICD-10-CM

## 2022-08-08 DIAGNOSIS — J30.81 ALLERGIC RHINITIS DUE TO ANIMAL HAIR AND DANDER: ICD-10-CM

## 2022-08-08 DIAGNOSIS — N39.41 URGE INCONTINENCE OF URINE: ICD-10-CM

## 2022-08-08 DIAGNOSIS — I10 ESSENTIAL HYPERTENSION: Chronic | ICD-10-CM

## 2022-08-08 PROBLEM — R91.1 RIGHT UPPER LOBE PULMONARY NODULE: Status: RESOLVED | Noted: 2021-11-08 | Resolved: 2022-08-08

## 2022-08-08 PROBLEM — H01.004 BLEPHARITIS OF LEFT UPPER EYELID: Status: RESOLVED | Noted: 2021-09-10 | Resolved: 2022-08-08

## 2022-08-08 PROCEDURE — 4010F ACE/ARB THERAPY RXD/TAKEN: CPT | Performed by: FAMILY MEDICINE

## 2022-08-08 PROCEDURE — 1160F RVW MEDS BY RX/DR IN RCRD: CPT | Performed by: FAMILY MEDICINE

## 2022-08-08 PROCEDURE — 3288F FALL RISK ASSESSMENT DOCD: CPT | Performed by: FAMILY MEDICINE

## 2022-08-08 PROCEDURE — 99215 OFFICE O/P EST HI 40 MIN: CPT | Performed by: FAMILY MEDICINE

## 2022-08-08 PROCEDURE — G0439 PPPS, SUBSEQ VISIT: HCPCS | Performed by: FAMILY MEDICINE

## 2022-08-08 PROCEDURE — 1170F FXNL STATUS ASSESSED: CPT | Performed by: FAMILY MEDICINE

## 2022-08-08 PROCEDURE — 1125F AMNT PAIN NOTED PAIN PRSNT: CPT | Performed by: FAMILY MEDICINE

## 2022-08-08 RX ORDER — MECLIZINE HCL 12.5 MG/1
12.5 TABLET ORAL EVERY 8 HOURS PRN
Qty: 30 TABLET | Refills: 3 | Status: SHIPPED | OUTPATIENT
Start: 2022-08-08

## 2022-08-08 RX ORDER — FLUTICASONE PROPIONATE 50 MCG
1 SPRAY, SUSPENSION (ML) NASAL DAILY
Qty: 11.1 ML | Refills: 3 | Status: SHIPPED | OUTPATIENT
Start: 2022-08-08 | End: 2022-09-16 | Stop reason: ALTCHOICE

## 2022-08-08 RX ORDER — BLOOD SUGAR DIAGNOSTIC
1 STRIP MISCELLANEOUS 2 TIMES DAILY
Qty: 200 STRIP | Refills: 1 | Status: SHIPPED | OUTPATIENT
Start: 2022-08-08

## 2022-08-08 RX ORDER — SOLIFENACIN SUCCINATE 5 MG/1
5 TABLET, FILM COATED ORAL DAILY
Qty: 30 TABLET | Refills: 5 | Status: SHIPPED | OUTPATIENT
Start: 2022-08-08

## 2022-08-08 RX ORDER — ATORVASTATIN CALCIUM 40 MG/1
40 TABLET, FILM COATED ORAL DAILY
Qty: 30 TABLET | Refills: 0 | Status: SHIPPED | OUTPATIENT
Start: 2022-08-08 | End: 2022-09-07 | Stop reason: SDUPTHER

## 2022-08-08 RX ORDER — AMLODIPINE BESYLATE 5 MG/1
5 TABLET ORAL DAILY
Qty: 30 TABLET | Refills: 0 | Status: SHIPPED | OUTPATIENT
Start: 2022-08-08

## 2022-08-08 RX ORDER — IBANDRONATE SODIUM 150 MG/1
150 TABLET, FILM COATED ORAL
Qty: 1 TABLET | Refills: 6 | Status: SHIPPED | OUTPATIENT
Start: 2022-08-08

## 2022-08-08 RX ORDER — CANDESARTAN 32 MG/1
32 TABLET ORAL DAILY
Qty: 30 TABLET | Refills: 5 | Status: SHIPPED | OUTPATIENT
Start: 2022-08-08 | End: 2022-10-17 | Stop reason: SDUPTHER

## 2022-08-08 RX ORDER — DOCUSATE SODIUM 100 MG/1
100 CAPSULE, LIQUID FILLED ORAL 2 TIMES DAILY PRN
Qty: 60 CAPSULE | Refills: 3 | Status: SHIPPED | OUTPATIENT
Start: 2022-08-08

## 2022-08-08 RX ORDER — HYDROCHLOROTHIAZIDE 12.5 MG/1
12.5 TABLET ORAL DAILY
Qty: 90 TABLET | Refills: 1 | Status: SHIPPED | OUTPATIENT
Start: 2022-08-08

## 2022-08-08 RX ORDER — MONTELUKAST SODIUM 10 MG/1
10 TABLET ORAL
Qty: 90 TABLET | Refills: 1 | Status: SHIPPED | OUTPATIENT
Start: 2022-08-08

## 2022-08-08 NOTE — PATIENT INSTRUCTIONS
Await lab, await dexa and mammo, trial Vesicare and stool softener, await colorectal and vascular  eval

## 2022-08-08 NOTE — PROGRESS NOTES
Assessment and Plan:     Problem List Items Addressed This Visit        Digestive    Rectal prolapse     Having small hard stools, rec stool softener to help          Relevant Medications    docusate sodium (COLACE) 100 mg capsule    Other Relevant Orders    Ambulatory Referral to Colorectal Surgery       Endocrine    Diabetes mellitus ([facility] [address] ) (Chronic)       Lab Results   Component Value Date    HGBA1C 6 6 (H) 10/04/2021   stable on meds, counseled about diet            Relevant Medications    metFORMIN (GLUCOPHAGE) 850 mg tablet    glucose blood (OneTouch Ultra) test strip       Respiratory    Allergic rhinitis due to animal hair and dander     Refill meds         Relevant Medications    fluticasone (FLONASE) 50 mcg/act nasal spray       Cardiovascular and Mediastinum    HTN (hypertension) (Chronic)     Stable on meds         Relevant Medications    amLODIPine (NORVASC) 5 mg tablet    candesartan (ATACAND) 32 MG tablet    hydrochlorothiazide (HYDRODIURIL) 12 5 mg tablet    Thoracic aortic aneurysm without rupture (HCC)     utd  CT chest            Musculoskeletal and Integument    Osteoporosis     Await dexA         Relevant Medications    ibandronate (BONIVA) 150 MG tablet    Other Relevant Orders    DXA bone density spine hip and pelvis       Other    Hyperlipidemia (Chronic)     Labs from previous visit stable, will await repeat         Relevant Medications    atorvastatin (LIPITOR) 40 mg tablet    Urge incontinence of urine     Urge incontinence getting worse, will rec detrol         Relevant Medications    solifenacin (VESICARE) 5 mg tablet      Other Visit Diagnoses     Medicare annual wellness visit, subsequent    -  Primary    Essential hypertension  (Chronic)       Relevant Medications    amLODIPine (NORVASC) 5 mg tablet    candesartan (ATACAND) 32 MG tablet    hydrochlorothiazide (HYDRODIURIL) 12 5 mg tablet    Mild persistent asthma without complication        Relevant Medications fluticasone-salmeterol (Advair) 250-50 mcg/dose inhaler    montelukast (SINGULAIR) 10 mg tablet    Balance disorder        Relevant Medications    meclizine (ANTIVERT) 12 5 MG tablet    Varicose veins of both lower extremities with pain        Relevant Orders    Ambulatory Referral to Vascular Surgery           Preventive health issues were discussed with patient, and age appropriate screening tests were ordered as noted in patient's After Visit Summary  Personalized health advice and appropriate referrals for health education or preventive services given if needed, as noted in patient's After Visit Summary  History of Present Illness:     Patient presents for a Medicare Wellness Visit    Pt here for annual medicare wellness visit  Pt reports feeling well  Pt reports she is wearing depends due to urge incontinence and is interested in meds to help with this  rec starting Vesicare  to help with symptoms  Pt reports her rectal prolapse is getting worse, pt reports she is having small frequent bowel movement that are hard  Rec stool softener to help with symptoms and if it does not help will rec colorectal eval   Pt concerned for intermittently painful varicose veins, offered referral to vascular  Patient Care Team:  Jasbir Ortiz MD as PCP - General (Family Medicine)  Jd Leonardo MD (Inactive)     Review of Systems:     Review of Systems   Constitutional: Negative for activity change, appetite change and unexpected weight change  Respiratory: Negative for shortness of breath  Cardiovascular: Negative for chest pain  Gastrointestinal: Positive for diarrhea and rectal pain  Negative for abdominal distention, abdominal pain, blood in stool and nausea  Genitourinary: Positive for urgency  Musculoskeletal: Negative for arthralgias  Skin: Positive for color change  Dark varicose veins present in BLE  Neurological: Positive for dizziness   Negative for syncope, weakness and light-headedness  Psychiatric/Behavioral: Negative for agitation  Problem List:     Patient Active Problem List   Diagnosis    HTN (hypertension)    Diabetes mellitus ([place] [address] )    Hyperlipidemia    Hearing loss    NGOC (obstructive sleep apnea)    Urge incontinence of urine    Thoracic aortic aneurysm without rupture ([place] [address] )    Osteoporosis    Rectal prolapse    Mild intermittent asthma without complication    Tear of right rotator cuff    Other fatigue    Allergic rhinitis due to animal hair and dander    Leg cramping      Past Medical and Surgical History:     Past Medical History:   Diagnosis Date    Asthma     Colon polyp     Diabetes mellitus ([place] [address] )     E coli infection     Fall     Hyperlipidemia     Hypertension     Pneumothorax     Rectal prolapse 09/03/2020    Sleep apnea     no CPAP    Subarachnoid hemorrhage ([place] [address] )     Thoracic aortic aneurysm (HCC)     Vertigo      Past Surgical History:   Procedure Laterality Date    BONE GRAFT      R arm    BRONCHOSCOPY N/A 3/16/2016    Procedure: BRONCHOSCOPY FLEXIBLE/anesth ;  Surgeon: Avi Rucker DO;  Location: [facility];   Service:     COLONOSCOPY      COLONOSCOPY      EYE SURGERY      OOPHORECTOMY Left     age 49    WI LAP,SURG,COLECTOMY, PARTIAL, W/ANAST N/A 9/23/2020    Procedure: RESECTION COLON SIGMOID LAPAROSCOPIC;  Surgeon: Giancarlo Nolen MD;  [facility];  Service: Colorectal    WI SIGMOIDOSCOPY FLX DX W/COLLJ SPEC BR/WA IF PFRMD N/A 9/23/2020    Procedure: Brenda Sauceda;  Surgeon: Giancarlo Nolen MD;  [facility];  Service: Colorectal    RECTAL PROLAPSE REPAIR LAPAROSCOPIC N/A 9/23/2020    Procedure: Milan Guzmán;  Surgeon: Giancarlo Nolen MD;  [facility];  Service: Colorectal      Family History:     Family History   Problem Relation Age of Onset    Endometrial cancer Mother 45    Cancer Daughter 48        breast    Breast cancer additional onset Daughter 48    Cancer Son 35        asbestosis related cancer    No Known Problems Father     No Known Problems Sister     No Known Problems Maternal Grandmother     No Known Problems Maternal Grandfather     No Known Problems Paternal Grandmother     No Known Problems Paternal Grandfather     No Known Problems Daughter     No Known Problems Maternal Aunt     No Known Problems Paternal Aunt       Social History:     Social History     Socioeconomic History    Marital status: /Civil Union     Spouse name: None    Number of children: None    Years of education: None    Highest education level: None   Occupational History    None   Tobacco Use    Smoking status: Former Smoker     Years: 2 00     Types: Cigarettes    Smokeless tobacco: Never Used    Tobacco comment: only smoked a couple a day for a couple years in her 25s   Vaping Use    Vaping Use: Never used   Substance and Sexual Activity    Alcohol use: Yes     Comment: weekends     Drug use: No    Sexual activity: None   Other Topics Concern    None   Social History Narrative    Lives with daughter and son and boyfriend    4  Children,3 alive 25 grandcchildren     Social Determinants of Health     Financial Resource Strain: Not on file   Food Insecurity: Not on file   Transportation Needs: Not on file   Physical Activity: Not on file   Stress: Not on file   Social Connections: Not on file   Intimate Partner Violence: Not on file   Housing Stability: Not on file      Medications and Allergies:     Current Outpatient Medications   Medication Sig Dispense Refill    acetaminophen (TYLENOL) 325 mg tablet Take 3 tablets (975 mg total) by mouth every 8 (eight) hours 30 tablet 0    albuterol (2 5 mg/3 mL) 0 083 % nebulizer solution TAKE 1 VIAL BY NEBULIZATION EVERY 6 HOURS AS NEEDED FOR WHEEZING OR SHORTNESS OF BREATH 150 mL 3    albuterol (PROVENTIL HFA,VENTOLIN HFA) 90 mcg/act inhaler Inhale 2 puffs every 6 (six) hours as needed for wheezing 1 Inhaler 5    amLODIPine (NORVASC) 5 mg tablet Take 1 tablet (5 mg total) by mouth daily 30 tablet 0    atorvastatin (LIPITOR) 40 mg tablet Take 1 tablet (40 mg total) by mouth daily 30 tablet 0    candesartan (ATACAND) 32 MG tablet Take 1 tablet (32 mg total) by mouth daily 30 tablet 5    docusate sodium (COLACE) 100 mg capsule Take 1 capsule (100 mg total) by mouth 2 (two) times a day as needed for constipation 60 capsule 3    fluticasone (FLONASE) 50 mcg/act nasal spray 1 spray into each nostril daily 11 1 mL 3    fluticasone-salmeterol (Advair) 250-50 mcg/dose inhaler Inhale 1 puff 2 (two) times a day Rinse mouth after use  180 blister 1    glucosamine-chondroitin 500-400 MG tablet Take 1 tablet by mouth 3 (three) times a day      glucose blood (OneTouch Ultra) test strip Use 1 each 2 (two) times a day Test 200 strip 1    hydrochlorothiazide (HYDRODIURIL) 12 5 mg tablet Take 1 tablet (12 5 mg total) by mouth daily 90 tablet 1    ibandronate (BONIVA) 150 MG tablet Take 1 tablet (150 mg total) by mouth every 30 (thirty) days 1 tablet 6    MAGNESIUM PO Take by mouth daily       meclizine (ANTIVERT) 12 5 MG tablet Take 1 tablet (12 5 mg total) by mouth every 8 (eight) hours as needed for dizziness 30 tablet 3    metFORMIN (GLUCOPHAGE) 850 mg tablet Take 1 tablet (850 mg total) by mouth 2 (two) times a day with meals 14 tablet 0    montelukast (SINGULAIR) 10 mg tablet Take 1 tablet (10 mg total) by mouth daily at bedtime 90 tablet 1    solifenacin (VESICARE) 5 mg tablet Take 1 tablet (5 mg total) by mouth daily 30 tablet 5     No current facility-administered medications for this visit       Allergies   Allergen Reactions    Bactrim [Sulfamethoxazole-Trimethoprim] Hives    Sulfamethoxazole Rash    Trimethoprim Rash      Immunizations:     Immunization History   Administered Date(s) Administered    COVID-19 MODERNA VACC 0 5 ML IM 02/12/2021, 03/11/2021, 01/08/2022    H1N1, All Formulations 12/09/2009, 02/22/2010    INFLUENZA 09/20/2014, 08/07/2015, 09/25/2018, 09/28/2021    Influenza, high dose seasonal 0 7 mL 10/14/2019, 09/04/2020    Pneumococcal Conjugate 13-Valent 02/21/2019    Pneumococcal Polysaccharide PPV23 04/30/2020    Tdap 08/15/2013, 07/02/2021      Health Maintenance:         Topic Date Due    DXA SCAN  08/06/2020    Breast Cancer Screening: Mammogram  07/13/2022    Colorectal Cancer Screening  08/13/2025    Hepatitis C Screening  Completed         Topic Date Due    COVID-19 Vaccine (4 - Booster for Climmie Julia series) 05/08/2022    Influenza Vaccine (1) 09/01/2022      Medicare Screening Tests and Risk Assessments:     Trang Ro is here for her Subsequent Wellness visit  Health Risk Assessment:   Patient rates overall health as good  Patient feels that their physical health rating is same  Patient is satisfied with their life  Eyesight was rated as same  Hearing was rated as same  Patient feels that their emotional and mental health rating is same  Patients states they are never, rarely angry  Patient states they are never, rarely unusually tired/fatigued  Pain experienced in the last 7 days has been some  Patient's pain rating has been 2/10  Patient states that she has experienced no weight loss or gain in last 6 months  Fall Risk Screening: In the past year, patient has experienced: no history of falling in past year      Urinary Incontinence Screening:   Patient has leaked urine accidently in the last six months  Home Safety:  Patient does not have trouble with stairs inside or outside of their home  Patient has working smoke alarms and has working carbon monoxide detector  Home safety hazards include: none  Nutrition:   Current diet is Regular  Medications:   Patient is currently taking over-the-counter supplements  OTC medications include: see medication list  Patient is able to manage medications       Activities of Daily Living (ADLs)/Instrumental Activities of Daily Living (IADLs):   Walk and transfer into and out of bed and chair?: Yes  Dress and groom yourself?: Yes    Bathe or shower yourself?: Yes    Feed yourself? Yes  Do your laundry/housekeeping?: Yes  Manage your money, pay your bills and track your expenses?: Yes  Make your own meals?: Yes    Do your own shopping?: Yes    Previous Hospitalizations:   Any hospitalizations or ED visits within the last 12 months?: No      Advance Care Planning:   Living will: No    Durable POA for healthcare: Yes    Advanced directive: Yes      Comments: POA daughter patti    Cognitive Screening:   Provider or family/friend/caregiver concerned regarding cognition?: No    PREVENTIVE SCREENINGS      Cardiovascular Screening:    General: History Lipid Disorder and Risks and Benefits Discussed    Due for: Lipid Panel      Diabetes Screening:     General: History Diabetes, Screening Current and Risks and Benefits Discussed      Colorectal Cancer Screening:     General: Screening Current      Breast Cancer Screening:     General: Screening Current    Due for: Mammogram        Cervical Cancer Screening:    General: Screening Not Indicated      Osteoporosis Screening:    General: History Osteoporosis and Screening Not Indicated      Lung Cancer Screening:     General: Screening Not Indicated      Hepatitis C Screening:    General: Screening Current    Screening, Brief Intervention, and Referral to Treatment (SBIRT)    Screening  Typical number of drinks in a day: 0  Typical number of drinks in a week: 0  Interpretation: Low risk drinking behavior      Single Item Drug Screening:  How often have you used an illegal drug (including marijuana) or a prescription medication for non-medical reasons in the past year? never    Single Item Drug Screen Score: 0  Interpretation: Negative screen for possible drug use disorder    Other Counseling Topics:   Car/seat belt/driving safety and skin self-exam  More than 35  Minutes with pt-1/2 of  Visit with counseling for preventive services and multiple medical problems needing referrals addressed    No exam data present     Physical Exam:     /68 (BP Location: Left arm, Patient Position: Sitting, Cuff Size: Standard)   Pulse 89   Temp (!) 96 6 °F (35 9 °C) (Temporal)   Ht 5' 3 5" (1 613 m)   Wt 60 5 kg (133 lb 6 oz)   LMP  (LMP Unknown)   SpO2 98%   BMI 23 26 kg/m²     Physical Exam  Vitals reviewed  Constitutional:       General: She is not in acute distress  Appearance: Normal appearance  She is normal weight  HENT:      Right Ear: Tympanic membrane normal       Left Ear: Tympanic membrane normal       Nose: No congestion  Mouth/Throat:      Mouth: Mucous membranes are moist    Neck:      Vascular: No carotid bruit  Cardiovascular:      Rate and Rhythm: Normal rate and regular rhythm  Pulses: Normal pulses  no weak pulses          Dorsalis pedis pulses are 2+ on the right side and 2+ on the left side  Posterior tibial pulses are 2+ on the right side and 2+ on the left side  Heart sounds: Normal heart sounds  Pulmonary:      Effort: Pulmonary effort is normal       Breath sounds: Normal breath sounds  Abdominal:      General: Abdomen is flat  Bowel sounds are normal  There is no distension  Palpations: Abdomen is soft  Tenderness: There is no abdominal tenderness  Musculoskeletal:         General: Normal range of motion  Cervical back: Normal range of motion  No tenderness  Feet:      Right foot:      Skin integrity: No ulcer, skin breakdown, erythema, warmth, callus or dry skin  Lymphadenopathy:      Cervical: No cervical adenopathy  Skin:     General: Skin is warm  Comments: Dark varicose veins present in lower extremities  Neurological:      General: No focal deficit present  Mental Status: She is alert and oriented to person, place, and time     Psychiatric:         Mood and Affect: Mood normal          Behavior: Behavior normal         Patient's shoes and socks removed  Right Foot/Ankle   Right Foot Inspection  Skin Exam: skin normal and skin intact  No dry skin, no warmth, no callus, no erythema, no maceration, no abnormal color, no pre-ulcer, no ulcer and no callus  Toe Exam: ROM and strength within normal limits  Sensory   Monofilament testing: intact    Vascular  Capillary refills: < 3 seconds  The right DP pulse is 2+  The right PT pulse is 2+  Left Foot/Ankle  Left Foot Inspection      Sensory   Monofilament testing: intact    Vascular  Capillary refills: < 3 seconds  The left DP pulse is 2+  The left PT pulse is 2+       Assign Risk Category  No deformity present  No loss of protective sensation  No weak pulses  Risk: 0    Caleb Chavez MD

## 2022-08-08 NOTE — ASSESSMENT & PLAN NOTE
Lab Results   Component Value Date    HGBA1C 6 6 (H) 10/04/2021   stable on meds, counseled about diet

## 2022-08-11 ENCOUNTER — APPOINTMENT (OUTPATIENT)
Dept: LAB | Facility: HOSPITAL | Age: 77
End: 2022-08-11
Payer: COMMERCIAL

## 2022-08-11 DIAGNOSIS — I10 ESSENTIAL HYPERTENSION: ICD-10-CM

## 2022-08-11 DIAGNOSIS — E78.2 MIXED HYPERLIPIDEMIA: ICD-10-CM

## 2022-08-11 DIAGNOSIS — E11.9 TYPE 2 DIABETES MELLITUS WITHOUT COMPLICATION, WITHOUT LONG-TERM CURRENT USE OF INSULIN (HCC): ICD-10-CM

## 2022-08-11 LAB
ALBUMIN SERPL BCP-MCNC: 4.2 G/DL (ref 3.5–5)
ALP SERPL-CCNC: 75 U/L (ref 43–122)
ALT SERPL W P-5'-P-CCNC: 34 U/L
ANION GAP SERPL CALCULATED.3IONS-SCNC: 7 MMOL/L (ref 5–14)
AST SERPL W P-5'-P-CCNC: 32 U/L (ref 14–36)
BASOPHILS # BLD AUTO: 0.05 THOUSANDS/ΜL (ref 0–0.1)
BASOPHILS NFR BLD AUTO: 1 % (ref 0–1)
BILIRUB SERPL-MCNC: 0.81 MG/DL (ref 0.2–1)
BUN SERPL-MCNC: 29 MG/DL (ref 5–25)
CALCIUM SERPL-MCNC: 8.9 MG/DL (ref 8.4–10.2)
CHLORIDE SERPL-SCNC: 97 MMOL/L (ref 96–108)
CHOLEST SERPL-MCNC: 157 MG/DL
CO2 SERPL-SCNC: 32 MMOL/L (ref 21–32)
CREAT SERPL-MCNC: 0.63 MG/DL (ref 0.6–1.2)
CREAT UR-MCNC: 37.9 MG/DL
EOSINOPHIL # BLD AUTO: 0.43 THOUSAND/ΜL (ref 0–0.61)
EOSINOPHIL NFR BLD AUTO: 5 % (ref 0–6)
ERYTHROCYTE [DISTWIDTH] IN BLOOD BY AUTOMATED COUNT: 12.6 % (ref 11.6–15.1)
EST. AVERAGE GLUCOSE BLD GHB EST-MCNC: 143 MG/DL
GFR SERPL CREATININE-BSD FRML MDRD: 88 ML/MIN/1.73SQ M
GLUCOSE P FAST SERPL-MCNC: 186 MG/DL (ref 70–99)
HBA1C MFR BLD: 6.6 %
HCT VFR BLD AUTO: 38.2 % (ref 34.8–46.1)
HDLC SERPL-MCNC: 58 MG/DL
HGB BLD-MCNC: 13.1 G/DL (ref 11.5–15.4)
IMM GRANULOCYTES # BLD AUTO: 0.02 THOUSAND/UL (ref 0–0.2)
IMM GRANULOCYTES NFR BLD AUTO: 0 % (ref 0–2)
LDLC SERPL CALC-MCNC: 66 MG/DL
LYMPHOCYTES # BLD AUTO: 2.63 THOUSANDS/ΜL (ref 0.6–4.47)
LYMPHOCYTES NFR BLD AUTO: 30 % (ref 14–44)
MCH RBC QN AUTO: 32.2 PG (ref 26.8–34.3)
MCHC RBC AUTO-ENTMCNC: 34.3 G/DL (ref 31.4–37.4)
MCV RBC AUTO: 94 FL (ref 82–98)
MICROALBUMIN UR-MCNC: 29 MG/L (ref 0–20)
MICROALBUMIN/CREAT 24H UR: 77 MG/G CREATININE (ref 0–30)
MONOCYTES # BLD AUTO: 0.56 THOUSAND/ΜL (ref 0.17–1.22)
MONOCYTES NFR BLD AUTO: 7 % (ref 4–12)
NEUTROPHILS # BLD AUTO: 4.99 THOUSANDS/ΜL (ref 1.85–7.62)
NEUTS SEG NFR BLD AUTO: 57 % (ref 43–75)
NONHDLC SERPL-MCNC: 99 MG/DL
NRBC BLD AUTO-RTO: 0 /100 WBCS
PLATELET # BLD AUTO: 174 THOUSANDS/UL (ref 149–390)
PMV BLD AUTO: 12.7 FL (ref 8.9–12.7)
POTASSIUM SERPL-SCNC: 4.5 MMOL/L (ref 3.5–5.3)
PROT SERPL-MCNC: 7 G/DL (ref 6.4–8.4)
RBC # BLD AUTO: 4.07 MILLION/UL (ref 3.81–5.12)
SODIUM SERPL-SCNC: 136 MMOL/L (ref 135–147)
TRIGL SERPL-MCNC: 164 MG/DL
TSH SERPL DL<=0.05 MIU/L-ACNC: 2.44 UIU/ML (ref 0.45–4.5)
WBC # BLD AUTO: 8.68 THOUSAND/UL (ref 4.31–10.16)

## 2022-08-11 PROCEDURE — 85025 COMPLETE CBC W/AUTO DIFF WBC: CPT

## 2022-08-11 PROCEDURE — 82043 UR ALBUMIN QUANTITATIVE: CPT | Performed by: FAMILY MEDICINE

## 2022-08-11 PROCEDURE — 83036 HEMOGLOBIN GLYCOSYLATED A1C: CPT

## 2022-08-11 PROCEDURE — 80061 LIPID PANEL: CPT

## 2022-08-11 PROCEDURE — 82570 ASSAY OF URINE CREATININE: CPT | Performed by: FAMILY MEDICINE

## 2022-08-11 PROCEDURE — 80053 COMPREHEN METABOLIC PANEL: CPT

## 2022-08-11 PROCEDURE — 36415 COLL VENOUS BLD VENIPUNCTURE: CPT

## 2022-08-11 PROCEDURE — 3044F HG A1C LEVEL LT 7.0%: CPT | Performed by: FAMILY MEDICINE

## 2022-08-11 PROCEDURE — 3060F POS MICROALBUMINURIA REV: CPT | Performed by: FAMILY MEDICINE

## 2022-08-11 PROCEDURE — 84443 ASSAY THYROID STIM HORMONE: CPT

## 2022-08-15 DIAGNOSIS — E11.9 TYPE 2 DIABETES MELLITUS WITHOUT COMPLICATION, WITHOUT LONG-TERM CURRENT USE OF INSULIN (HCC): ICD-10-CM

## 2022-08-19 ENCOUNTER — HOSPITAL ENCOUNTER (OUTPATIENT)
Dept: MAMMOGRAPHY | Facility: MEDICAL CENTER | Age: 77
Discharge: HOME | End: 2022-08-19
Payer: COMMERCIAL

## 2022-08-19 VITALS — BODY MASS INDEX: 22.77 KG/M2 | HEIGHT: 64 IN | WEIGHT: 133.38 LBS

## 2022-08-19 DIAGNOSIS — Z12.31 ENCOUNTER FOR SCREENING MAMMOGRAM FOR MALIGNANT NEOPLASM OF BREAST: ICD-10-CM

## 2022-08-19 PROCEDURE — 77063 BREAST TOMOSYNTHESIS BI: CPT

## 2022-08-19 PROCEDURE — 77067 SCR MAMMO BI INCL CAD: CPT

## 2022-08-24 ENCOUNTER — TELEPHONE (OUTPATIENT)
Dept: FAMILY MEDICINE CLINIC | Facility: CLINIC | Age: 77
End: 2022-08-24

## 2022-08-24 NOTE — TELEPHONE ENCOUNTER
Patient called regarding mammogram results, per patient she was returning [phone] call

## 2022-08-25 NOTE — TELEPHONE ENCOUNTER
Patient called back to speak to Dr Candido Robertson she can reach her at [phone]  She said she will have her phone on her all day

## 2022-08-25 NOTE — TELEPHONE ENCOUNTER
Cell cannot be completed  Home mailbox full  Other ([phone]) LMOM to call us back with appropriate phone number for Dr Gianni Cortes to call her back

## 2022-08-30 ENCOUNTER — TELEPHONE (OUTPATIENT)
Dept: FAMILY MEDICINE CLINIC | Facility: CLINIC | Age: 77
End: 2022-08-30

## 2022-08-30 DIAGNOSIS — L25.5 RHUS DERMATITIS: Primary | ICD-10-CM

## 2022-08-30 DIAGNOSIS — H10.31 ACUTE CONJUNCTIVITIS OF RIGHT EYE, UNSPECIFIED ACUTE CONJUNCTIVITIS TYPE: ICD-10-CM

## 2022-08-30 RX ORDER — METHYLPREDNISOLONE 4 MG/1
TABLET ORAL
Qty: 21 EACH | Refills: 0 | Status: SHIPPED | OUTPATIENT
Start: 2022-08-30 | End: 2022-09-16 | Stop reason: ALTCHOICE

## 2022-08-30 RX ORDER — OLOPATADINE HYDROCHLORIDE 1 MG/ML
1 SOLUTION/ DROPS OPHTHALMIC 2 TIMES DAILY
Qty: 5 ML | Refills: 1 | Status: SHIPPED | OUTPATIENT
Start: 2022-08-30

## 2022-08-30 NOTE — TELEPHONE ENCOUNTER
Patient called wants a call from [address] [place] [address] Regarding her mammogram results and if she needs to see a specialist because of her results, please call patient regarding this matter

## 2022-08-30 NOTE — TELEPHONE ENCOUNTER
Call cannot  be completed, should have asked while at daughter's appt, would wait  on specialist until we  see what ultrasound shows

## 2022-08-31 ENCOUNTER — HOSPITAL ENCOUNTER (OUTPATIENT)
Dept: BONE DENSITY | Facility: CLINIC | Age: 77
Discharge: HOME | End: 2022-08-31
Payer: COMMERCIAL

## 2022-08-31 DIAGNOSIS — Z13.820 ENCOUNTER FOR SCREENING FOR OSTEOPOROSIS: ICD-10-CM

## 2022-08-31 DIAGNOSIS — M81.0 AGE-RELATED OSTEOPOROSIS WITHOUT CURRENT PATHOLOGICAL FRACTURE: ICD-10-CM

## 2022-08-31 PROCEDURE — 77080 DXA BONE DENSITY AXIAL: CPT

## 2022-09-07 ENCOUNTER — CONSULT (OUTPATIENT)
Dept: VASCULAR SURGERY | Facility: CLINIC | Age: 77
End: 2022-09-07
Payer: COMMERCIAL

## 2022-09-07 VITALS
SYSTOLIC BLOOD PRESSURE: 134 MMHG | OXYGEN SATURATION: 98 % | HEIGHT: 64 IN | WEIGHT: 131 LBS | BODY MASS INDEX: 22.36 KG/M2 | HEART RATE: 72 BPM | DIASTOLIC BLOOD PRESSURE: 76 MMHG

## 2022-09-07 DIAGNOSIS — E78.2 MIXED HYPERLIPIDEMIA: Chronic | ICD-10-CM

## 2022-09-07 DIAGNOSIS — I83.813 VARICOSE VEINS OF BOTH LOWER EXTREMITIES WITH PAIN: ICD-10-CM

## 2022-09-07 PROCEDURE — 99203 OFFICE O/P NEW LOW 30 MIN: CPT

## 2022-09-07 RX ORDER — ATORVASTATIN CALCIUM 40 MG/1
40 TABLET, FILM COATED ORAL DAILY
Qty: 30 TABLET | Refills: 5 | Status: SHIPPED | OUTPATIENT
Start: 2022-09-07

## 2022-09-07 NOTE — ASSESSMENT & PLAN NOTE
Patient is a 77yo female with PMHx significant for DM, NGOC, HTN, Thoracic aortic aneurysm, HLD, presents today for consultation regarding BLE symptomatic varicose veins  -Reports having sclerotherapy done in [facility] >30 years ago  Denies any other vascular interventions  -Multiple scattered reticular and superficial veins across BLE  L>R  -Reports tired, heavy, aching legs with prolonged standing  Worsened over the past year  -Denies use of compression  Elevates her legs when able  -Denies rest pain, claudication or tissue loss  Patients symptoms are consistent with venous insufficiency    -We discussed the pathophysiology of venous insufficiency, conservative management, and indications for surgical intervention   -Recommend 3 month trial of conservative measures with use of daily compression hose, lower extremity elevation, regular exercise, and diligent skin care  -Script for graded 15-20 mmHg compression hose provided today  - Obtain LEVR duplex in 3 months  - Return to office with surgeon in 3 months with LEVDR for re-evaluation and discussion of surgical options  - Instructed to call the office in the interim with any questions, concerns, or new symptoms

## 2022-09-07 NOTE — PATIENT INSTRUCTIONS
-Recommend 3 month trial of conservative measures with use of daily compression hose (20-30 mmHg), lower extremity elevation, regular exercise, and diligent skin care  - We will get an ultrasound (LEVR) duplex in 3 months to assess for venous reflux   -I gave a script for compression stockings today  Please wear these daily ( on in AM, off in PM)  -I will review your ultrasound results and call you with the results  - Instructed to call the office in the interim with any questions, concerns, or new symptoms  Varicose Veins   AMBULATORY CARE:   Varicose veins  are veins that become large, twisted, and swollen  They are common on the back of the calves, knees, and thighs  Varicose veins are caused by valves in your veins that do not work properly  This causes blood to collect and increase pressure in the veins of your legs  The increased pressure causes your veins to stretch, get larger, swell, and twist        Common symptoms include the following: Your symptoms may be worse after you stand or sit for long periods of time  You may have any of the following:  Blue, purple, or bulging veins in your legs     Pain, swelling, or muscle cramps in your legs    Feeling of fatigue or heaviness in your legs    Cramping in your legs    Seek care immediately if:   You have a wound that does not heal or is infected  You have an injury that has broken your skin and caused your varicose veins to bleed  Your leg is swollen and hard  You notice that your legs or feet are turning blue or black  Your leg feels warm, tender, and painful  It may look swollen and red  Contact your healthcare provider if:   You have pain in your leg that does not go away or gets worse  You notice sudden large bruising on your legs  You have a rash on your leg  Your symptoms keep you from doing your daily activities  You have questions or concerns about your condition or care      Treatment of varicose veins  aims to decrease symptoms, improve appearance, and prevent further problems  Treatment will depend on which veins are affected and how severe your condition is  You may need procedures to treat or remove your varicose veins  For example, your healthcare provider may inject a solution or use a laser to close the varicose veins  Surgery to remove long veins may also be done  Ask your healthcare provider for more information about procedures used to treat varicose veins  Manage varicose veins:   Do not sit or stand for long periods of time  This can cause the blood to collect in your legs and make your symptoms worse  Bend or rotate your ankles several times every hour  Walk around for a few minutes every hour to get blood moving in your legs  Do not cross your legs when you sit  This decreases blood flow to your feet and can make your symptoms worse  Do not wear tight clothing or shoes  Do not wear high-heeled shoes  Do not wear clothes that are tight around the waist or knees  Maintain a healthy weight  Being overweight or obese can make your varicose veins worse  Ask your healthcare provider how much you should weigh  Ask him or her to help you create a weight loss plan if you are overweight  Wear pressure stockings as directed  The stockings are tight and put pressure on your legs  They improve blood flow and help prevent clots  Elevate your legs  Keep them above the level of your heart for 15 to 30 minutes several times a day  You can also prop the end of your bed up slightly to elevate your legs while you sleep  This will help blood to flow back to your heart  Get regular exercise  Talk to your healthcare provider about the best exercise plan for you  Exercise can improve blood flow to your legs and feet  Follow up with your doctor as directed:  Write down your questions so you remember to ask them during your visits     © Copyright [employer] 2022 Information is for End User's use only and may not be sold, redistributed or otherwise used for commercial purposes  All illustrations and images included in CareNotes® are the copyrighted property of [employer] M [employer]  or Jason Thurman  The above information is an  only  It is not intended as medical advice for individual conditions or treatments  Talk to your doctor, nurse or pharmacist before following any medical regimen to see if it is safe and effective for you

## 2022-09-07 NOTE — PROGRESS NOTES
Assessment/Plan:    Varicose veins of both lower extremities with pain  Patient is a 75yo female with PMHx significant for DM, NGOC, HTN, Thoracic aortic aneurysm, HLD, presents today for consultation regarding BLE symptomatic varicose veins  She reports tired, heavy, aching legs with intermittent leg swelling  Symptoms have worsened over the past year  -Reports having sclerotherapy done in [facility] >30 years ago  Denies any other vascular interventions  -Multiple scattered reticular and superficial veins across BLE  L>R  -Denies use of compression  Elevates her legs when able  -Denies rest pain, claudication or tissue loss  Patients symptoms are consistent with venous insufficiency    -We discussed the pathophysiology of venous insufficiency, conservative management, and indications for surgical intervention   -Recommend 3 month trial of conservative measures with use of daily compression hose, lower extremity elevation, regular exercise, and diligent skin care  -Script for graded 15-20 mmHg compression hose provided today  - Obtain LEVR duplex in 3 months  - Return to office with surgeon in 3 months with LEVDR for re-evaluation and discussion of surgical options  - Cosmetic sclerotherapy for spider and reticular veins if desired  - Instructed to call the office in the interim with any questions, concerns, or new symptoms  Diagnoses and all orders for this visit:    Varicose veins of both lower extremities with pain  -     Ambulatory Referral to Vascular Surgery  -     Compression Stocking  -     VAS reflux lower limb venous duplex study with reflux assessment, complete bilateral; Future          Subjective:      Patient ID: So Figueroa is a 69 y o  female  HPI  Patient is new to our office  She was referred here by Dr Saima Harrison for symptomatic varicose veins   Patient had previous sclerotherapy in both legs about 30 years ago in [facility]  Pt says that she first started noticing the veins in her legs about 1 year ago  She c/o bulging, painful veins that causes her left leg to burn, cramp, swell, and ache  She reports that she spends most of her time in her kitchen on her feet and this aggravates her symptoms  Her left leg is worse than the right leg  She has not been wearing compression stockings and states that they are "too tight", however elevates her legs daily and gets regular exercise  She takes tylenol prn for pain relief  She denies any open wounds  Patient takes Atorvastatin  She is a former smoker  The following portions of the patient's history were reviewed and updated as appropriate: allergies, current medications, past family history, past medical history, past social history, past surgical history and problem list     Review of Systems   Constitutional: Negative  HENT: Negative  Eyes: Positive for redness  Respiratory: Positive for shortness of breath  Cardiovascular: Positive for leg swelling  Painful veins   Gastrointestinal: Negative  Endocrine: Negative  Genitourinary: Negative  Musculoskeletal: Negative  Leg pain   Skin: Negative  Allergic/Immunologic: Negative  Neurological: Negative  Hematological: Negative  Psychiatric/Behavioral: Negative  I have personally reviewed and made appropriate changes to the ROS that was input by the medical assistant         Objective:      Vitals:    09/07/22 0948   BP: 134/76   BP Location: Left arm   Patient Position: Sitting   Cuff Size: Standard   Pulse: 72   SpO2: 98%   Weight: 59 4 kg (131 lb)   Height: 5' 3 5" (1 613 m)       Patient Active Problem List   Diagnosis    HTN (hypertension)    Diabetes mellitus ([facility] [address] )    Hyperlipidemia    Hearing loss    NGOC (obstructive sleep apnea)    Urge incontinence of urine    Thoracic aortic aneurysm without rupture (HCC)    Osteoporosis    Rectal prolapse    Mild intermittent asthma without complication    Tear of right rotator cuff    Other fatigue    Allergic rhinitis due to animal hair and dander    Leg cramping    Varicose veins of both lower extremities with pain       Past Surgical History:   Procedure Laterality Date    BONE GRAFT      R arm    BRONCHOSCOPY N/A 3/16/2016    Procedure: BRONCHOSCOPY FLEXIBLE/anesth ;  Surgeon: Crispin Montes DO;  [facility]: [facility];   Service:     COLONOSCOPY      COLONOSCOPY      EYE SURGERY      OOPHORECTOMY Left     age 49    MS LAP,SURG,COLECTOMY, PARTIAL, W/ANAST N/A 9/23/2020    Procedure: RESECTION COLON SIGMOID LAPAROSCOPIC;  Surgeon: Nicolás Sloan MD;  [facility];  Service: Colorectal    MS SIGMOIDOSCOPY FLX DX W/COLLJ SPEC BR/WA IF PFRMD N/A 9/23/2020    Procedure: Harshil John;  Surgeon: Nicolás Sloan MD;  [facility];  Service: Colorectal    RECTAL PROLAPSE REPAIR LAPAROSCOPIC N/A 9/23/2020    Procedure: Tha Bettencourt;  Surgeon: Nicolás Sloan MD;  [facility];  Service: Colorectal       Family History   Problem Relation Age of Onset    Endometrial cancer Mother 45    No Known Problems Father     No Known Problems Sister     Breast cancer Daughter 52    No Known Problems Daughter     No Known Problems Maternal Grandmother     No Known Problems Maternal Grandfather     No Known Problems Paternal Grandmother     No Known Problems Paternal Grandfather     Cancer Son 35        asbestosis related cancer    No Known Problems Maternal Aunt     No Known Problems Paternal Aunt        Social History     Socioeconomic History    Marital status: /Civil Union     Spouse name: Not on file    Number of children: Not on file    Years of education: Not on file    Highest education level: Not on file   Occupational History    Not on file   Tobacco Use    Smoking status: Former Smoker     Years: 2 00     Types: Cigarettes    Smokeless tobacco: Never Used    Tobacco comment: only smoked a couple a day for a couple years in her 25s   Vaping Use    Vaping Use: Never used   Substance and Sexual Activity    Alcohol use: Yes     Comment: weekends     Drug use: No    Sexual activity: Not on file   Other Topics Concern    Not on file   Social History Narrative    Lives with daughter and son and boyfriend    [facility]     Social Determinants of Health     Financial Resource Strain: Not on file   Food Insecurity: Not on file   Transportation Needs: Not on file   Physical Activity: Not on file   Stress: Not on file   Social Connections: Not on file   Intimate Partner Violence: Not on file   Housing Stability: Not on file       Allergies   Allergen Reactions    Bactrim [Sulfamethoxazole-Trimethoprim] Hives    Sulfamethoxazole Rash    Trimethoprim Rash         Current Outpatient Medications:     acetaminophen (TYLENOL) 325 mg tablet, Take 3 tablets (975 mg total) by mouth every 8 (eight) hours, Disp: 30 tablet, Rfl: 0    albuterol (PROVENTIL HFA,VENTOLIN HFA) 90 mcg/act inhaler, Inhale 2 puffs every 6 (six) hours as needed for wheezing, Disp: 1 Inhaler, Rfl: 5    amLODIPine (NORVASC) 5 mg tablet, Take 1 tablet (5 mg total) by mouth daily, Disp: 30 tablet, Rfl: 0    atorvastatin (LIPITOR) 40 mg tablet, Take 1 tablet (40 mg total) by mouth daily, Disp: 30 tablet, Rfl: 5    candesartan (ATACAND) 32 MG tablet, Take 1 tablet (32 mg total) by mouth daily, Disp: 30 tablet, Rfl: 5    docusate sodium (COLACE) 100 mg capsule, Take 1 capsule (100 mg total) by mouth 2 (two) times a day as needed for constipation, Disp: 60 capsule, Rfl: 3    fluticasone-salmeterol (Advair) 250-50 mcg/dose inhaler, Inhale 1 puff 2 (two) times a day Rinse mouth after use , Disp: 180 blister, Rfl: 1    glucosamine-chondroitin 500-400 MG tablet, Take 1 tablet by mouth 3 (three) times a day, Disp: , Rfl:     glucose blood (OneTouch Ultra) test strip, Use 1 each 2 (two) times a day Test, Disp: 200 strip, Rfl: 1    hydrochlorothiazide (HYDRODIURIL) 12 5 mg tablet, Take 1 tablet (12 5 mg total) by mouth daily, Disp: 90 tablet, Rfl: 1    ibandronate (BONIVA) 150 MG tablet, Take 1 tablet (150 mg total) by mouth every 30 (thirty) days, Disp: 1 tablet, Rfl: 6    meclizine (ANTIVERT) 12 5 MG tablet, Take 1 tablet (12 5 mg total) by mouth every 8 (eight) hours as needed for dizziness, Disp: 30 tablet, Rfl: 3    metFORMIN (GLUCOPHAGE) 850 mg tablet, Take 1 tablet (850 mg total) by mouth 2 (two) times a day with meals, Disp: 60 tablet, Rfl: 5    montelukast (SINGULAIR) 10 mg tablet, Take 1 tablet (10 mg total) by mouth daily at bedtime, Disp: 90 tablet, Rfl: 1    olopatadine (PATANOL) 0 1 % ophthalmic solution, Administer 1 drop to the right eye 2 (two) times a day, Disp: 5 mL, Rfl: 1    solifenacin (VESICARE) 5 mg tablet, Take 1 tablet (5 mg total) by mouth daily, Disp: 30 tablet, Rfl: 5    albuterol (2 5 mg/3 mL) 0 083 % nebulizer solution, TAKE 1 VIAL BY NEBULIZATION EVERY 6 HOURS AS NEEDED FOR WHEEZING OR SHORTNESS OF BREATH (Patient not taking: Reported on 9/7/2022), Disp: 150 mL, Rfl: 3    fluticasone (FLONASE) 50 mcg/act nasal spray, 1 spray into each nostril daily (Patient not taking: No sig reported), Disp: 11 1 mL, Rfl: 3    MAGNESIUM PO, Take by mouth daily  (Patient not taking: Reported on 9/7/2022), Disp: , Rfl:     methylPREDNISolone 4 MG tablet therapy pack, Use as directed on package (Patient not taking: No sig reported), Disp: 21 each, Rfl: 0      /76 (BP Location: Left arm, Patient Position: Sitting, Cuff Size: Standard)   Pulse 72   Ht 5' 3 5" (1 613 m)   Wt 59 4 kg (131 lb)   LMP  (LMP Unknown)   SpO2 98%   BMI 22 84 kg/m²          Physical Exam  Vitals and nursing note reviewed  Constitutional:       Appearance: Normal appearance  HENT:      Head: Normocephalic and atraumatic  Neck:      Vascular: No carotid bruit     Cardiovascular:      Rate and Rhythm: Normal rate and regular rhythm  Pulses:           Carotid pulses are 2+ on the right side and 2+ on the left side  Radial pulses are 2+ on the right side and 2+ on the left side  Femoral pulses are 2+ on the right side and 2+ on the left side  Popliteal pulses are 2+ on the right side and 2+ on the left side  Dorsalis pedis pulses are 2+ on the right side and 2+ on the left side  Posterior tibial pulses are 2+ on the right side and 2+ on the left side  Heart sounds: Normal heart sounds  Comments: No carotid bruit   Pulmonary:      Effort: Pulmonary effort is normal  No respiratory distress  Breath sounds: Normal breath sounds  Abdominal:      General: Bowel sounds are normal  There is no distension  Palpations: Abdomen is soft  Comments: No abdominal bruit or pulsatile masses  Musculoskeletal:         General: Tenderness present  No swelling  Normal range of motion  Cervical back: Normal range of motion and neck supple  Right lower leg: No edema  Left lower leg: No edema  Comments: Tenderness to palpation over vein clusters at left lateral calf and right medial shin  Skin:     General: Skin is warm  Capillary Refill: Capillary refill takes less than 2 seconds  Findings: No erythema  Neurological:      General: No focal deficit present  Mental Status: She is alert and oriented to person, place, and time     Psychiatric:         Mood and Affect: Mood normal          Behavior: Behavior normal        Jose Guadalupe Sood PA-C  [facility]  [phone]

## 2022-09-08 ENCOUNTER — TELEPHONE (OUTPATIENT)
Dept: FAMILY MEDICINE CLINIC | Facility: CLINIC | Age: 77
End: 2022-09-08

## 2022-09-08 NOTE — TELEPHONE ENCOUNTER
Patient called in and stated that he medicatiom  gave her for her posion ivy is not working  Patient stated that it is spreading  Patient wants to know what MF would like her to do  Please advise   Thank you

## 2022-09-15 ENCOUNTER — TELEPHONE (OUTPATIENT)
Dept: MAMMOGRAPHY | Facility: CLINIC | Age: 77
End: 2022-09-15

## 2022-09-15 NOTE — TELEPHONE ENCOUNTER
Spoke with pt, aware she need to scheduled right breast ultrasound and OV with MF scheduled to tomorrow 9/16

## 2022-09-16 ENCOUNTER — OFFICE VISIT (OUTPATIENT)
Dept: FAMILY MEDICINE CLINIC | Facility: CLINIC | Age: 77
End: 2022-09-16
Payer: COMMERCIAL

## 2022-09-16 ENCOUNTER — TELEPHONE (OUTPATIENT)
Dept: FAMILY MEDICINE CLINIC | Facility: CLINIC | Age: 77
End: 2022-09-16

## 2022-09-16 VITALS
BODY MASS INDEX: 22.61 KG/M2 | HEIGHT: 64 IN | SYSTOLIC BLOOD PRESSURE: 132 MMHG | DIASTOLIC BLOOD PRESSURE: 82 MMHG | HEART RATE: 72 BPM | WEIGHT: 132.4 LBS

## 2022-09-16 DIAGNOSIS — M81.0 AGE-RELATED OSTEOPOROSIS WITHOUT CURRENT PATHOLOGICAL FRACTURE: Primary | ICD-10-CM

## 2022-09-16 DIAGNOSIS — E11.9 TYPE 2 DIABETES MELLITUS WITHOUT COMPLICATION, WITHOUT LONG-TERM CURRENT USE OF INSULIN (HCC): ICD-10-CM

## 2022-09-16 PROCEDURE — 1160F RVW MEDS BY RX/DR IN RCRD: CPT | Performed by: FAMILY MEDICINE

## 2022-09-16 PROCEDURE — 99213 OFFICE O/P EST LOW 20 MIN: CPT | Performed by: FAMILY MEDICINE

## 2022-09-16 PROCEDURE — 3725F SCREEN DEPRESSION PERFORMED: CPT | Performed by: FAMILY MEDICINE

## 2022-09-16 NOTE — TELEPHONE ENCOUNTER
Dr Latonia Lisa, I called procedure scheduling to schedule pt's ultrasound of the breast  They are having technical difficulties so I had to leave a call back number  Per the pt she wants them to call her daughter Leia Clark  I gave them evi's phone number

## 2022-09-16 NOTE — PROGRESS NOTES
Assessment and Plan:    Problem List Items Addressed This Visit        Endocrine    Diabetes mellitus ([facility] [address] ) (Chronic)    Relevant Orders    Hemoglobin A1C    Comprehensive metabolic panel       Musculoskeletal and Integument    Osteoporosis - Primary    Relevant Orders    DXA bone density spine hip and pelvis    Vitamin D 25 hydroxy                 Diagnoses and all orders for this visit:    Age-related osteoporosis without current pathological fracture  -     DXA bone density spine hip and pelvis; Future  -     Vitamin D 25 hydroxy; Future    Type 2 diabetes mellitus without complication, without long-term current use of insulin ([facility])  -     Hemoglobin A1C; Future  -     Comprehensive metabolic panel; Future            Subjective:      Patient ID: Ilene Morton is a 69 y o  female  CC:    Chief Complaint   Patient presents with    Follow-up     Pt is present today for dexa and mammogram follow up       HPI:    Taking boniva  About  A year, no GI side effects, had Dexa  Done on different machine than previous  So difficult  To compare results, perhaps slight  Improvement, is  Worried about  Falling because backyard on slant, gets dizziness occ and does take Meclizine, emphasized fall prevention, trouble scheduling  mammo      The following portions of the patient's history were reviewed and updated as appropriate: allergies, current medications, past family history, past medical history, past social history, past surgical history and problem list       Review of Systems   Constitutional: Negative for activity change, appetite change and fatigue  Respiratory: Negative for shortness of breath  Cardiovascular: Negative for chest pain  Musculoskeletal: Positive for arthralgias and myalgias  Still in morning   Neurological: Positive for dizziness  Hematological: Negative for adenopathy           Data to review:       Objective:    Vitals:    09/16/22 1107   BP: 132/82   BP Location: Left arm   Patient Position: Sitting   Cuff Size: Standard   Pulse: 72   Weight: 60 1 kg (132 lb 6 4 oz)   Height: 5' 3 5" (1 613 m)        Physical Exam  Vitals reviewed  Constitutional:       Appearance: Normal appearance  Cardiovascular:      Rate and Rhythm: Normal rate and regular rhythm  Pulses: Normal pulses  Heart sounds: Normal heart sounds  Pulmonary:      Effort: Pulmonary effort is normal       Breath sounds: Normal breath sounds  Musculoskeletal:         General: No tenderness  Comments: No tenderness of back   Lymphadenopathy:      Cervical: No cervical adenopathy  Neurological:      Mental Status: She is alert

## 2022-10-11 ENCOUNTER — TELEPHONE (OUTPATIENT)
Dept: FAMILY MEDICINE CLINIC | Facility: CLINIC | Age: 77
End: 2022-10-11

## 2022-10-11 NOTE — TELEPHONE ENCOUNTER
Patient contacted the office with complaints of elevated Blood pressure of 182/92 this morning  She states she has taken all of her Blood pressure medicine already this morning and wonders if she should double up on the Amlodipine  I went through the medication for Hypertension with patient and she states she took both  I placed pt on hold for a short time to check with a MD and there was no one available at the time so I resumed call and pt at that time told me she did not take her HCTZ this morning  I advised she take the HCTZ as this is part of her daily regiment and I will check with a Provider and contact her back with any changes if needed  Please review and advise if there is anything else we should do for patient

## 2022-10-11 NOTE — TELEPHONE ENCOUNTER
Attempted to call back patient 3 times  "Call can not be completed  Try again later "  This has happened another time recently

## 2022-10-17 DIAGNOSIS — I10 ESSENTIAL HYPERTENSION: Chronic | ICD-10-CM

## 2022-10-17 RX ORDER — CANDESARTAN 32 MG/1
32 TABLET ORAL DAILY
Qty: 90 TABLET | Refills: 1 | Status: SHIPPED | OUTPATIENT
Start: 2022-10-17

## 2022-11-16 LAB
CREAT ?TM UR-SCNC: 25 UMOL/L
EXT MICROALBUMIN URINE RANDOM: 1.7
MICROALBUMIN/CREAT UR: 68 MG/G{CREAT}

## 2022-11-17 ENCOUNTER — APPOINTMENT (OUTPATIENT)
Dept: LAB | Facility: HOSPITAL | Age: 77
End: 2022-11-17

## 2022-11-17 DIAGNOSIS — E11.9 TYPE 2 DIABETES MELLITUS WITHOUT COMPLICATION, WITHOUT LONG-TERM CURRENT USE OF INSULIN (HCC): ICD-10-CM

## 2022-11-17 DIAGNOSIS — M81.0 AGE-RELATED OSTEOPOROSIS WITHOUT CURRENT PATHOLOGICAL FRACTURE: ICD-10-CM

## 2022-11-17 LAB
25(OH)D3 SERPL-MCNC: 33.8 NG/ML (ref 30–100)
ALBUMIN SERPL BCP-MCNC: 4.2 G/DL (ref 3.5–5)
ALP SERPL-CCNC: 64 U/L (ref 43–122)
ALT SERPL W P-5'-P-CCNC: 17 U/L
ANION GAP SERPL CALCULATED.3IONS-SCNC: 5 MMOL/L (ref 5–14)
AST SERPL W P-5'-P-CCNC: 18 U/L (ref 14–36)
BILIRUB SERPL-MCNC: 0.71 MG/DL (ref 0.2–1)
BUN SERPL-MCNC: 17 MG/DL (ref 5–25)
CALCIUM SERPL-MCNC: 9.7 MG/DL (ref 8.4–10.2)
CHLORIDE SERPL-SCNC: 96 MMOL/L (ref 96–108)
CO2 SERPL-SCNC: 33 MMOL/L (ref 21–32)
CREAT SERPL-MCNC: 0.54 MG/DL (ref 0.6–1.2)
EST. AVERAGE GLUCOSE BLD GHB EST-MCNC: 140 MG/DL
GFR SERPL CREATININE-BSD FRML MDRD: 91 ML/MIN/1.73SQ M
GLUCOSE P FAST SERPL-MCNC: 166 MG/DL (ref 70–99)
HBA1C MFR BLD: 6.5 %
POTASSIUM SERPL-SCNC: 4.8 MMOL/L (ref 3.5–5.3)
PROT SERPL-MCNC: 6.9 G/DL (ref 6.4–8.4)
SODIUM SERPL-SCNC: 134 MMOL/L (ref 135–147)

## 2022-11-22 ENCOUNTER — HOSPITAL ENCOUNTER (EMERGENCY)
Facility: HOSPITAL | Age: 77
Discharge: HOME | End: 2022-11-22

## 2022-11-22 ENCOUNTER — APPOINTMENT (EMERGENCY)
Dept: CT IMAGING | Facility: HOSPITAL | Age: 77
End: 2022-11-22

## 2022-11-22 VITALS
SYSTOLIC BLOOD PRESSURE: 155 MMHG | DIASTOLIC BLOOD PRESSURE: 74 MMHG | HEART RATE: 66 BPM | OXYGEN SATURATION: 98 % | TEMPERATURE: 97.1 F | WEIGHT: 136.91 LBS | RESPIRATION RATE: 22 BRPM | BODY MASS INDEX: 23.87 KG/M2

## 2022-11-22 DIAGNOSIS — K86.2 PANCREATIC CYST: ICD-10-CM

## 2022-11-22 DIAGNOSIS — R93.89 ABNORMAL CT SCAN: ICD-10-CM

## 2022-11-22 DIAGNOSIS — N39.0 UTI (URINARY TRACT INFECTION): Primary | ICD-10-CM

## 2022-11-22 LAB
ALBUMIN SERPL BCP-MCNC: 4 G/DL (ref 3.5–5)
ALP SERPL-CCNC: 62 U/L (ref 43–122)
ALT SERPL W P-5'-P-CCNC: 19 U/L
ANION GAP SERPL CALCULATED.3IONS-SCNC: 6 MMOL/L (ref 5–14)
AST SERPL W P-5'-P-CCNC: 23 U/L (ref 14–36)
BACTERIA UR QL AUTO: ABNORMAL /HPF
BASOPHILS # BLD AUTO: 0.06 THOUSANDS/ÂΜL (ref 0–0.1)
BASOPHILS NFR BLD AUTO: 1 % (ref 0–1)
BILIRUB SERPL-MCNC: 0.68 MG/DL (ref 0.2–1)
BILIRUB UR QL STRIP: NEGATIVE
BUN SERPL-MCNC: 17 MG/DL (ref 5–25)
CALCIUM SERPL-MCNC: 8.9 MG/DL (ref 8.4–10.2)
CHLORIDE SERPL-SCNC: 100 MMOL/L (ref 96–108)
CLARITY UR: ABNORMAL
CO2 SERPL-SCNC: 27 MMOL/L (ref 21–32)
COLOR UR: ABNORMAL
CREAT SERPL-MCNC: 0.53 MG/DL (ref 0.6–1.2)
EOSINOPHIL # BLD AUTO: 0.52 THOUSAND/ÂΜL (ref 0–0.61)
EOSINOPHIL NFR BLD AUTO: 6 % (ref 0–6)
ERYTHROCYTE [DISTWIDTH] IN BLOOD BY AUTOMATED COUNT: 12.3 % (ref 11.6–15.1)
GFR SERPL CREATININE-BSD FRML MDRD: 92 ML/MIN/1.73SQ M
GLUCOSE SERPL-MCNC: 124 MG/DL (ref 70–99)
GLUCOSE UR STRIP-MCNC: NEGATIVE MG/DL
HCT VFR BLD AUTO: 36.7 % (ref 34.8–46.1)
HGB BLD-MCNC: 12.4 G/DL (ref 11.5–15.4)
HGB UR QL STRIP.AUTO: 25
IMM GRANULOCYTES # BLD AUTO: 0.02 THOUSAND/UL (ref 0–0.2)
IMM GRANULOCYTES NFR BLD AUTO: 0 % (ref 0–2)
KETONES UR STRIP-MCNC: NEGATIVE MG/DL
LEUKOCYTE ESTERASE UR QL STRIP: 100
LIPASE SERPL-CCNC: 81 U/L (ref 23–300)
LYMPHOCYTES # BLD AUTO: 2.03 THOUSANDS/ÂΜL (ref 0.6–4.47)
LYMPHOCYTES NFR BLD AUTO: 24 % (ref 14–44)
MCH RBC QN AUTO: 31.9 PG (ref 26.8–34.3)
MCHC RBC AUTO-ENTMCNC: 33.8 G/DL (ref 31.4–37.4)
MCV RBC AUTO: 94 FL (ref 82–98)
MONOCYTES # BLD AUTO: 0.45 THOUSAND/ÂΜL (ref 0.17–1.22)
MONOCYTES NFR BLD AUTO: 5 % (ref 4–12)
NEUTROPHILS # BLD AUTO: 5.56 THOUSANDS/ÂΜL (ref 1.85–7.62)
NEUTS SEG NFR BLD AUTO: 64 % (ref 43–75)
NITRITE UR QL STRIP: POSITIVE
NON-SQ EPI CELLS URNS QL MICRO: ABNORMAL /HPF
NRBC BLD AUTO-RTO: 0 /100 WBCS
PH UR STRIP.AUTO: 7 [PH]
PLATELET # BLD AUTO: 170 THOUSANDS/UL (ref 149–390)
PMV BLD AUTO: 12.1 FL (ref 8.9–12.7)
POTASSIUM SERPL-SCNC: 4.4 MMOL/L (ref 3.5–5.3)
PROT SERPL-MCNC: 6.7 G/DL (ref 6.4–8.4)
PROT UR STRIP-MCNC: ABNORMAL MG/DL
RBC # BLD AUTO: 3.89 MILLION/UL (ref 3.81–5.12)
RBC #/AREA URNS AUTO: ABNORMAL /HPF
SODIUM SERPL-SCNC: 133 MMOL/L (ref 135–147)
SP GR UR STRIP.AUTO: 1.01 (ref 1–1.04)
UROBILINOGEN UA: NEGATIVE MG/DL
WBC # BLD AUTO: 8.64 THOUSAND/UL (ref 4.31–10.16)
WBC #/AREA URNS AUTO: ABNORMAL /HPF

## 2022-11-22 RX ORDER — KETOROLAC TROMETHAMINE 30 MG/ML
30 INJECTION, SOLUTION INTRAMUSCULAR; INTRAVENOUS ONCE
Status: DISCONTINUED | OUTPATIENT
Start: 2022-11-22 | End: 2022-11-22

## 2022-11-22 RX ORDER — LIDOCAINE 50 MG/G
1 PATCH TOPICAL ONCE
Status: DISCONTINUED | OUTPATIENT
Start: 2022-11-22 | End: 2022-11-22 | Stop reason: HOSPADM

## 2022-11-22 RX ORDER — CEPHALEXIN 500 MG/1
500 CAPSULE ORAL EVERY 6 HOURS SCHEDULED
Qty: 28 CAPSULE | Refills: 0 | Status: SHIPPED | OUTPATIENT
Start: 2022-11-22 | End: 2022-11-29

## 2022-11-22 RX ORDER — KETOROLAC TROMETHAMINE 30 MG/ML
15 INJECTION, SOLUTION INTRAMUSCULAR; INTRAVENOUS ONCE
Status: COMPLETED | OUTPATIENT
Start: 2022-11-22 | End: 2022-11-22

## 2022-11-22 RX ADMIN — KETOROLAC TROMETHAMINE 15 MG: 30 INJECTION, SOLUTION INTRAMUSCULAR; INTRAVENOUS at 10:04

## 2022-11-22 RX ADMIN — LIDOCAINE 1 PATCH: 50 PATCH TOPICAL at 10:03

## 2022-11-22 RX ADMIN — IOHEXOL 85 ML: 350 INJECTION, SOLUTION INTRAVENOUS at 10:52

## 2022-11-22 NOTE — ED PROVIDER NOTES
History  Chief Complaint   Patient presents with   • Groin Pain     Left groin pain that started four days ago     Jenna comes emergency department accompanied by her daughter after persistent left lower quadrant/groin pain  She states that she is a very active individual in his “always cleaning house” and expressed that she had an exacerbation of left lower quadrant abdominal pain/groin pain after moving large objects at home  She states that she is only receiving minimal improved bit with utilization of Aleve at home  She states that these symptoms began 4 days prior (11/18/2022)  She states that there is minimal improvement when she rests at home but was still feel the discomfort  Patient denies any fevers, chills, nausea, vomiting, chest pain, shortness of breath, or new rashes  Patient denies any changes in her urination or bowel movements  Patient states that her appetite has remained the same  Pain does not change with ingestion of foods or liquids  Patient states that she wishes to come to the emergency department for continued evaluation given the persistence of the symptoms  History provided by:  Patient   used: No    Groin Pain  Location:  Left lower quadrant  Quality:  Throbbing, sharp  Severity:  Mild  Onset quality:  Gradual  Duration:  4 days  Timing:  Constant  Progression:  Waxing and waning  Chronicity:  New  Context:  Was moving furniture 4 days ago, pain has persisted, only mild improvement with rest   Relieved by:  Aleve  Worsened by: Movement  Associated symptoms: no abdominal pain, no chest pain, no congestion, no cough, no diarrhea, no ear pain, no fatigue, no fever, no headaches, no loss of consciousness, no myalgias, no nausea, no rash, no rhinorrhea, no shortness of breath, no sore throat, no vomiting and no wheezing        Prior to Admission Medications   Prescriptions Last Dose Informant Patient Reported? Taking?    MAGNESIUM PO   Yes No   Sig: Take by mouth daily   acetaminophen (TYLENOL) 325 mg tablet   No No   Sig: Take 3 tablets (975 mg total) by mouth every 8 (eight) hours   albuterol (2 5 mg/3 mL) 0 083 % nebulizer solution   No No   Sig: TAKE 1 VIAL BY NEBULIZATION EVERY 6 HOURS AS NEEDED FOR WHEEZING OR SHORTNESS OF BREATH   albuterol (PROVENTIL HFA,VENTOLIN HFA) 90 mcg/act inhaler   No No   Sig: Inhale 2 puffs every 6 (six) hours as needed for wheezing   amLODIPine (NORVASC) 5 mg tablet   No No   Sig: Take 1 tablet (5 mg total) by mouth daily   atorvastatin (LIPITOR) 40 mg tablet   No No   Sig: Take 1 tablet (40 mg total) by mouth daily   candesartan (ATACAND) 32 MG tablet   No No   Sig: Take 1 tablet (32 mg total) by mouth daily   docusate sodium (COLACE) 100 mg capsule   No No   Sig: Take 1 capsule (100 mg total) by mouth 2 (two) times a day as needed for constipation   fluticasone-salmeterol (Advair) 250-50 mcg/dose inhaler   No No   Sig: Inhale 1 puff 2 (two) times a day Rinse mouth after use     glucosamine-chondroitin 500-400 MG tablet   Yes No   Sig: Take 1 tablet by mouth 3 (three) times a day   glucose blood (OneTouch Ultra) test strip   No No   Sig: Use 1 each 2 (two) times a day Test   hydrochlorothiazide (HYDRODIURIL) 12 5 mg tablet   No No   Sig: Take 1 tablet (12 5 mg total) by mouth daily   ibandronate (BONIVA) 150 MG tablet   No No   Sig: Take 1 tablet (150 mg total) by mouth every 30 (thirty) days   meclizine (ANTIVERT) 12 5 MG tablet   No No   Sig: Take 1 tablet (12 5 mg total) by mouth every 8 (eight) hours as needed for dizziness   metFORMIN (GLUCOPHAGE) 850 mg tablet   No No   Sig: Take 1 tablet (850 mg total) by mouth 2 (two) times a day with meals   montelukast (SINGULAIR) 10 mg tablet   No No   Sig: Take 1 tablet (10 mg total) by mouth daily at bedtime   olopatadine (PATANOL) 0 1 % ophthalmic solution   No No   Sig: Administer 1 drop to the right eye 2 (two) times a day   solifenacin (VESICARE) 5 mg tablet   No No   Sig: Take 1 tablet (5 mg total) by mouth daily      Facility-Administered Medications: None       Past Medical History:   Diagnosis Date   • Asthma    • Colon polyp    • Diabetes mellitus ([facility] [address] )    • E coli infection    • Fall    • Hyperlipidemia    • Hypertension    • Pneumothorax    • Rectal prolapse 09/03/2020   • Sleep apnea     no CPAP   • Subarachnoid hemorrhage (HCC)    • Thoracic aortic aneurysm    • Vertigo        Past Surgical History:   Procedure Laterality Date   • BONE GRAFT      R arm   • BRONCHOSCOPY N/A 3/16/2016    Procedure: BRONCHOSCOPY FLEXIBLE/anesth ;  Surgeon: Jason Pozo DO;  Location: [facility]; Service:    • COLONOSCOPY     • COLONOSCOPY     • EYE SURGERY     • OOPHORECTOMY Left     age 49   • CT LAP,SURG,COLECTOMY, PARTIAL, W/ANAST N/A 9/23/2020    Procedure: RESECTION COLON SIGMOID LAPAROSCOPIC;  Surgeon: Jonathan Glover MD;  Location: [facility];  Service: Colorectal   • CT SIGMOIDOSCOPY FLX DX W/COLLJ SPEC BR/WA IF PFRMD N/A 9/23/2020    Procedure: Lisa All;  Surgeon: Jonathan Glover MD;  [facility];  Service: Colorectal   • RECTAL PROLAPSE REPAIR LAPAROSCOPIC N/A 9/23/2020    Procedure: Tomma Pellet;  Surgeon: Jonathan Glover MD;  [facility];  Service: Colorectal       Family History   Problem Relation Age of Onset   • Endometrial cancer Mother 45   • No Known Problems Father    • No Known Problems Sister    • Breast cancer Daughter 52   • No Known Problems Daughter    • No Known Problems Maternal Grandmother    • No Known Problems Maternal Grandfather    • No Known Problems Paternal Grandmother    • No Known Problems Paternal Grandfather    • Cancer Son 35        asbestosis related cancer   • No Known Problems Maternal Aunt    • No Known Problems Paternal Aunt      I have reviewed and agree with the history as documented      E-Cigarette/Vaping   • E-Cigarette Use Never User      E-Cigarette/Vaping Substances   • Nicotine No    • THC No    • CBD No    • Flavoring No    • Other No    • Unknown No      Social History     Tobacco Use   • Smoking status: Former     Years: 2 00     Types: Cigarettes   • Smokeless tobacco: Never   • Tobacco comments:     only smoked a couple a day for a couple years in her 25s   Vaping Use   • Vaping Use: Never used   Substance Use Topics   • Alcohol use: Yes     Comment: weekends    • Drug use: No        Review of Systems   Constitutional: Negative for chills, fatigue and fever  HENT: Negative for congestion, ear pain, rhinorrhea and sore throat  Eyes: Negative for pain and visual disturbance  Respiratory: Negative for cough, shortness of breath and wheezing  Cardiovascular: Negative for chest pain and palpitations  Gastrointestinal: Negative for abdominal pain, diarrhea, nausea and vomiting  Genitourinary: Negative for dysuria and hematuria  Musculoskeletal: Negative for arthralgias, back pain and myalgias  Skin: Negative for color change and rash  Neurological: Negative for seizures, loss of consciousness, syncope and headaches  All other systems reviewed and are negative  Physical Exam  ED Triage Vitals   Temperature Pulse Respirations Blood Pressure SpO2   11/22/22 0852 11/22/22 0852 11/22/22 0852 11/22/22 0852 11/22/22 0852   (!) 97 1 °F (36 2 °C) 66 22 155/74 98 %      Temp Source Heart Rate Source Patient Position - Orthostatic VS BP Location FiO2 (%)   11/22/22 0852 11/22/22 0852 11/22/22 0852 11/22/22 0852 --   Tympanic Monitor Sitting Left arm       Pain Score       11/22/22 1004       5             Orthostatic Vital Signs  Vitals:    11/22/22 0852   BP: 155/74   Pulse: 66   Patient Position - Orthostatic VS: Sitting       Physical Exam  Vitals and nursing note reviewed  Constitutional:       General: She is not in acute distress  Appearance: Normal appearance  She is well-developed  She is not ill-appearing     HENT:      Head: Normocephalic and atraumatic  Right Ear: External ear normal       Left Ear: External ear normal       Nose: Nose normal    Eyes:      General:         Right eye: No discharge  Left eye: No discharge  Extraocular Movements: Extraocular movements intact  Conjunctiva/sclera: Conjunctivae normal    Cardiovascular:      Rate and Rhythm: Normal rate and regular rhythm  Heart sounds: No murmur heard  Pulmonary:      Effort: Pulmonary effort is normal  No respiratory distress  Breath sounds: Normal breath sounds  Abdominal:      General: Abdomen is flat  Palpations: Abdomen is soft  Tenderness: There is abdominal tenderness  There is no guarding or rebound  Comments: Tenderness on palpation of the left lower quadrant  Musculoskeletal:         General: No swelling, deformity or signs of injury  Cervical back: Neck supple  Right lower leg: No edema  Left lower leg: No edema  Comments: Reproduction of left lower quadrant abdominal pain on straight leg raise of the left leg  Skin:     General: Skin is warm and dry  Capillary Refill: Capillary refill takes less than 2 seconds  Neurological:      General: No focal deficit present  Mental Status: She is alert and oriented to person, place, and time  Motor: No weakness        Gait: Gait normal       Deep Tendon Reflexes: Reflexes normal    Psychiatric:         Mood and Affect: Mood normal          ED Medications  Medications   lidocaine (LIDODERM) 5 % patch 1 patch (1 patch Topical Medication Applied 11/22/22 1003)   ketorolac (TORADOL) injection 15 mg (15 mg Intravenous Given 11/22/22 1004)   iohexol (OMNIPAQUE) 350 MG/ML injection (SINGLE-DOSE) 85 mL (85 mL Intravenous Given 11/22/22 1052)       Diagnostic Studies  Results Reviewed     Procedure Component Value Units Date/Time    Urine Microscopic [[id number]]  (Abnormal) Collected: 11/22/22 1206    Lab Status: Final result Specimen: Urine, Clean Catch Updated: 11/22/22 1253     RBC, UA None Seen /hpf      WBC, UA Innumerable /hpf      Epithelial Cells Occasional /hpf      Bacteria, UA Innumerable /hpf     Urine culture [[id number]] Collected: 11/22/22 1206    Lab Status:  In process Specimen: Urine, Clean Catch Updated: 11/22/22 1252    UA w Reflex to Microscopic w Reflex to Culture [[id number]]  (Abnormal) Collected: 11/22/22 1206    Lab Status: Final result Specimen: Urine, Clean Catch Updated: 11/22/22 1244     Color, UA Straw     Clarity, UA Cloudy     Specific [place], UA 1 010     pH, UA 7 0     Leukocytes,  0     Nitrite, UA Positive     Protein, UA 15 (Trace) mg/dl      Glucose, UA Negative mg/dl      Ketones, UA Negative mg/dl      Bilirubin, UA Negative     Occult Blood, UA 25 0     UROBILINOGEN UA Negative mg/dL     Lipase [[id number]]  (Normal) Collected: 11/22/22 1002    Lab Status: Final result Specimen: Blood from Arm, Left Updated: 11/22/22 1029     Lipase 81 u/L     Comprehensive metabolic panel [[id number]]  (Abnormal) Collected: 11/22/22 1002    Lab Status: Final result Specimen: Blood from Arm, Left Updated: 11/22/22 1029     Sodium 133 mmol/L      Potassium 4 4 mmol/L      Chloride 100 mmol/L      CO2 27 mmol/L      ANION GAP 6 mmol/L      BUN 17 mg/dL      Creatinine 0 53 mg/dL      Glucose 124 mg/dL      Calcium 8 9 mg/dL      AST 23 U/L      ALT 19 U/L      Alkaline Phosphatase 62 U/L      Total Protein 6 7 g/dL      Albumin 4 0 g/dL      Total Bilirubin 0 68 mg/dL      eGFR 92 ml/min/1 73sq m     Narrative:      [place] guidelines for Chronic Kidney Disease (CKD):   •  Stage 1 with normal or high GFR (GFR > 90 mL/min/1 73 square meters)  •  Stage 2 Mild CKD (GFR = 60-89 mL/min/1 73 square meters)  •  Stage 3A Moderate CKD (GFR = 45-59 mL/min/1 73 square meters)  •  Stage 3B Moderate CKD (GFR = 30-44 mL/min/1 73 square meters)  •  Stage 4 Severe CKD (GFR = 15-29 mL/min/1 73 square meters)  •  Stage 5 End Stage CKD (GFR <15 mL/min/1 73 square meters)  Note: GFR calculation is accurate only with a steady state creatinine    CBC and differential [[id number]] Collected: 11/22/22 1002    Lab Status: Final result Specimen: Blood from Arm, Left Updated: 11/22/22 1012     WBC 8 64 Thousand/uL      RBC 3 89 Million/uL      Hemoglobin 12 4 g/dL      Hematocrit 36 7 %      MCV 94 fL      MCH 31 9 pg      MCHC 33 8 g/dL      RDW 12 3 %      MPV 12 1 fL      Platelets 063 Thousands/uL      nRBC 0 /100 WBCs      Neutrophils Relative 64 %      Immat GRANS % 0 %      Lymphocytes Relative 24 %      Monocytes Relative 5 %      Eosinophils Relative 6 %      Basophils Relative 1 %      Neutrophils Absolute 5 56 Thousands/µL      Immature Grans Absolute 0 02 Thousand/uL      Lymphocytes Absolute 2 03 Thousands/µL      Monocytes Absolute 0 45 Thousand/µL      Eosinophils Absolute 0 52 Thousand/µL      Basophils Absolute 0 06 Thousands/µL                  CT abdomen pelvis with contrast   ED Interpretation by Patel Crespo MD (11/22 9462)   FINDINGS:     ABDOMEN     LOWER CHEST:  Small focal subpleural opacity in right middle lobe, likely represents parenchymal scarring given close proximity to chronic healed fracture deformity of right 7th rib  Subsegmental atelectasis in lingula and left lower lobe      LIVER/BILIARY TREE:  Unremarkable      GALLBLADDER:  There are gallstone(s) within the gallbladder, without pericholecystic inflammatory changes      SPLEEN:  Unremarkable      PANCREAS:  0 8 cm hypodense lesion in pancreatic tail (2:26)  No main pancreatic duct dilation      ADRENAL GLANDS:  Unchanged nonspecific thickening of left adrenal gland  Right adrenal gland is unremarkable      KIDNEYS/URETERS:  Symmetric renal enhancement  No enhancing soft tissue renal mass  No hydronephrosis or urinary tract calculus    One or more sharply circumscribed subcentimeter renal hypodensities are present, too small to accurately characterize, and   statistically most likely benign findings  According to recent literature (Rad   iology 2019) no further workup of these findings is recommended      STOMACH AND BOWEL:  Small hiatal hernia  Small bowel fecalization is present, suggestive of delayed small bowel transit time  Mildly dilated loop of small bowel in left lower quadrant measuring 3 0 cm (2:53)  No small bowel obstruction  Postsurgical   changes of partial sigmoid colectomy  No significant colonic diverticulosis  No acute inflammatory bowel change      APPENDIX:  No findings to suggest appendicitis      ABDOMINOPELVIC CAVITY:  No ascites  No pneumoperitoneum  No lymphadenopathy      VESSELS:  Mild scattered calcified atherosclerotic disease  No abdominal aortic aneurysm  IVC is normal in caliber  Hepatic veins, portal vein, SMV, and splenic vein are patent      PELVIS     REPRODUCTIVE ORGANS:  Basilar calcifications in the uterus  Small calcifications in atrophic right ovary  Left ovary not well seen        URINARY BLADDER:  Mild circumferential bladder wall thickening with mucosal hyperemia         ABDOMINAL WALL/INGUINAL REGIONS:  Unremarkable      OSSEOUS STRUCTURES:  No acute fracture or destructive osseous lesion  Chronic healed fracture deformities of right 7th-9th ribs  Chronic displaced fracture deformity left 8th rib with healed fracture deformities of left 9th-11th ribs  Deformity of   left 8th rib  Unchanged chronic T11 superior endplate compression deformity with associated intravertebral herniation and mild height loss  Mild spinal degenerative changes      IMPRESSION:     Findings suspicious for cystitis  Recommend correlation with urinalysis      Small bowel fecalization with mildly dilated loop of small bowel in left lower quadrant (measuring 3 0 cm), likely due to delayed small bowel transit time  No small bowel obstruction      0 8 cm hypodense lesion in pancreatic tail   For simple cyst(s) less than 1 5 cm, recommend yearly followup 5 times, then every 2 year for 2 times  If cyst(s) stable after 9 years, no further followups  Recommend next followup    in 1 year  Preferred imaging   modality: abdomen MRI and MRCP with and without IV contrast, or triple phase abdomen CT with IV contrast, or abdomen MRI and MRCP without IV contrast  The recommendations regarding pancreatic findings assumes that patient does not have family history of   pancreatic cancer nor have any symptoms potentially attributable to pancreatic cystic lesions (hyperamylasemia, recent-onset diabetes, severe epigastric pain, weight loss, steatorrhea, or jaundice ) If these conditions are not true, then management   should be deferred to judgement of specialists such as gastroenterologists or oncologic surgeons      Small focal subpleural opacity in right middle lobe, likely represents parenchymal scarring given close proximity to chronic healed fracture deformity of right 7th rib  Attention on follow-up      Additional chronic/incidental findings as detailed above       The study was marked in [facility] for immediate notification            Workstation performed: [id number]   [id number]      Final Result by Cass Suh MD (11/22 1148)      Findings suspicious for cystitis  Recommend correlation with urinalysis  Small bowel fecalization with mildly dilated loop of small bowel in left lower quadrant (measuring 3 0 cm), likely due to delayed small bowel transit time  No small bowel obstruction  0 8 cm hypodense lesion in pancreatic tail  For simple cyst(s) less than 1 5 cm, recommend yearly followup 5 times, then every 2 year for 2 times  If cyst(s) stable after 9 years, no further followups  Recommend next followup in 1 year   Preferred imaging    modality: abdomen MRI and MRCP with and without IV contrast, or triple phase abdomen CT with IV contrast, or abdomen MRI and MRCP without IV contrast  The recommendations regarding pancreatic findings assumes that patient does not have family history of    pancreatic cancer nor have any symptoms potentially attributable to pancreatic cystic lesions (hyperamylasemia, recent-onset diabetes, severe epigastric pain, weight loss, steatorrhea, or jaundice ) If these conditions are not true, then management    should be deferred to judgement of specialists such as gastroenterologists or oncologic surgeons  Small focal subpleural opacity in right middle lobe, likely represents parenchymal scarring given close proximity to chronic healed fracture deformity of right 7th rib  Attention on follow-up  Additional chronic/incidental findings as detailed above  The study was marked in [facility] for immediate notification  Workstation performed: [id number]               Procedures  Procedures      ED Course                             SBIRT 22yo+    Flowsheet Row Most Recent Value   SBIRT (23 yo +)    In order to provide better care to our patients, we are screening all of our patients for alcohol and drug use  Would it be okay to ask you these screening questions? No Filed at: 11/22/2022 1003                MDM  Number of Diagnoses or Management Options  Abnormal CT scan: new and requires workup  Pancreatic cyst: new and requires workup  UTI (urinary tract infection): new and requires workup  Diagnosis management comments: Patient comes to the emergency department with persistent left lower quadrant abdominal pain  Based on initial presenting complaints, initial laboratory evaluation and imaging studies were conducted:  CBC, CMP, lipase were all unremarkable  CT abdomen pelvis with contrast was notable for incidental findings of a pancreatic cyst as well as parenchymal scarring in the right lung  These findings were discussed with the patient at the bedside  Was also noted that patient had findings consistent with cystitis      Urinalysis was collected and conducted while in the emergency department which was notable for the presence of leukocyte and nitrite positive urine  Microscopic was also notable for innumerable bacteria  Patient was provided with antibiotic therapy for treatment of urinary tract infection  CT imaging was discussed with the patient at the bedside and expressed that she should follow-up with her primary care provider for continued monitoring and repeat imaging in the future  Patient expressed understanding with this as well as having to have further conversations with her PCP with regards to evaluation of her breast cyst     Red flag symptoms that would warrant continued evaluation in the emergency department were discussed at the bedside and patient expressed understanding with this plan  Daughter who was also a caregiver for this patient also expressed understanding with this plan  Disposition:  Discharged home on antibiotic therapy for urinary tract infection, continued follow-up with PCP for continued monitoring of pancreatic cyst and lung scarring  Strict return precautions discussed at bedside         Amount and/or Complexity of Data Reviewed  Clinical lab tests: ordered and reviewed  Tests in the radiology section of CPT®: ordered and reviewed  Obtain history from someone other than the patient: yes  Review and summarize past medical records: yes  Discuss the patient with other providers: yes  Independent visualization of images, tracings, or specimens: yes    Risk of Complications, Morbidity, and/or Mortality  Presenting problems: moderate  Diagnostic procedures: moderate  Management options: moderate    Patient Progress  Patient progress: stable      Disposition  Final diagnoses:   Pancreatic cyst   Abnormal CT scan   UTI (urinary tract infection)     Time reflects when diagnosis was documented in both MDM as applicable and the Disposition within this note     Time User Action Codes Description Comment    11/22/2022 12:06 PM Emeli Gagnon Add [K86 2] Pancreatic cyst     11/22/2022 12:07 PM Mariela Dos Santos Add [R93 89] Abnormal CT scan     11/22/2022 12:46 PM Mariela Dos Santos Add [N39 0] UTI (urinary tract infection)     11/22/2022 12:49 PM Mariela Dos Santos Modify [K86 2] Pancreatic cyst     11/22/2022 12:49 PM Mariela Dos Santos Modify [N39 0] UTI (urinary tract infection)       ED Disposition     ED Disposition   Discharge    Condition   Stable    Date/Time   Tue Nov 22, 2022 12:46 PM    [address] discharge to home/self care                 Follow-up Information     Follow up With Specialties Details Why Contact Info Additional Information    Manjinder Arboleda MD Family Medicine Schedule an appointment as soon as possible for a visit  As needed [address] [place] [phone]  [facility] Emergency Department Emergency Medicine  As needed, If symptoms worsen [id number] [facility] [id number] [facility] Emergency Department          Discharge Medication List as of 11/22/2022 12:49 PM      START taking these medications    Details   cephalexin (KEFLEX) 500 mg capsule Take 1 capsule (500 mg total) by mouth every 6 (six) hours for 7 days, Starting Tue 11/22/2022, Until Tue 11/29/2022, Normal         CONTINUE these medications which have NOT CHANGED    Details   acetaminophen (TYLENOL) 325 mg tablet Take 3 tablets (975 mg total) by mouth every 8 (eight) hours, Starting Sun 9/29/2019, Normal      albuterol (2 5 mg/3 mL) 0 083 % nebulizer solution TAKE 1 VIAL BY NEBULIZATION EVERY 6 HOURS AS NEEDED FOR WHEEZING OR SHORTNESS OF BREATH, Normal      albuterol (PROVENTIL HFA,VENTOLIN HFA) 90 mcg/act inhaler Inhale 2 puffs every 6 (six) hours as needed for wheezing, Starting Wed 12/16/2020, Normal      amLODIPine (NORVASC) 5 mg tablet Take 1 tablet (5 mg total) by mouth daily, Starting Mon 8/8/2022, Normal      atorvastatin (LIPITOR) 40 mg tablet Take 1 tablet (40 mg total) by mouth daily, Starting Wed 9/7/2022, Normal      candesartan (ATACAND) 32 MG tablet Take 1 tablet (32 mg total) by mouth daily, Starting Mon 10/17/2022, Normal      docusate sodium (COLACE) 100 mg capsule Take 1 capsule (100 mg total) by mouth 2 (two) times a day as needed for constipation, Starting Mon 8/8/2022, Normal      fluticasone-salmeterol (Advair) 250-50 mcg/dose inhaler Inhale 1 puff 2 (two) times a day Rinse mouth after use , Starting Mon 8/8/2022, Until Thu 8/3/2023, Normal      glucosamine-chondroitin 500-400 MG tablet Take 1 tablet by mouth 3 (three) times a day, Historical Med      glucose blood (OneTouch Ultra) test strip Use 1 each 2 (two) times a day Test, Starting Mon 8/8/2022, Normal      hydrochlorothiazide (HYDRODIURIL) 12 5 mg tablet Take 1 tablet (12 5 mg total) by mouth daily, Starting Mon 8/8/2022, Normal      ibandronate (BONIVA) 150 MG tablet Take 1 tablet (150 mg total) by mouth every 30 (thirty) days, Starting Mon 8/8/2022, Normal      MAGNESIUM PO Take by mouth daily, Historical Med      meclizine (ANTIVERT) 12 5 MG tablet Take 1 tablet (12 5 mg total) by mouth every 8 (eight) hours as needed for dizziness, Starting Mon 8/8/2022, Normal      metFORMIN (GLUCOPHAGE) 850 mg tablet Take 1 tablet (850 mg total) by mouth 2 (two) times a day with meals, Starting Mon 8/15/2022, Normal      montelukast (SINGULAIR) 10 mg tablet Take 1 tablet (10 mg total) by mouth daily at bedtime, Starting Mon 8/8/2022, Normal      olopatadine (PATANOL) 0 1 % ophthalmic solution Administer 1 drop to the right eye 2 (two) times a day, Starting Tue 8/30/2022, Normal      solifenacin (VESICARE) 5 mg tablet Take 1 tablet (5 mg total) by mouth daily, Starting Mon 8/8/2022, Normal           No discharge procedures on file      PDMP Review       Value Time User    PDMP Reviewed  Yes 10/14/2019  8:21 AM Allegra Woody MD           ED Provider  Attending physically available and sourav Manzanares I managed the patient along with the ED Attending      Electronically Signed by         Marya Schilder, MD  11/22/22 [id number]

## 2022-11-22 NOTE — ED ATTENDING ATTESTATION
11/22/2022  IAngelina DO, saw and evaluated the patient  I have discussed the patient with the resident/non-physician practitioner and agree with the resident's/non-physician practitioner's findings, Plan of Care, and MDM as documented in the resident's/non-physician practitioner's note, except where noted  All available labs and Radiology studies were reviewed  I was present for key portions of any procedure(s) performed by the resident/non-physician practitioner and I was immediately available to provide assistance  At this point I agree with the current assessment done in the Emergency Department  I have conducted an independent evaluation of this patient a history and physical is as follows:    History provided by:  Patient  Groin Pain  Location:  Left groin, pain with movement of left LE   Severity:  Moderate  Onset quality:  Gradual  Timing:  Constant  Progression:  Worsening  Chronicity:  New  Associated symptoms: no abdominal pain, no chest pain, no cough, no diarrhea, no fever, no headaches, no myalgias, no nausea, no rash, no rhinorrhea, no shortness of breath, no sore throat and no wheezing     are as follows /74 (BP Location: Left arm)   Pulse 66   Temp (!) 97 1 °F (36 2 °C) (Tympanic)   Resp 22   Wt 62 1 kg (136 lb 14 5 oz)   LMP  (LMP Unknown)   SpO2 98%   BMI 23 87 kg/m²   Review of Systems Review of Systems   Constitutional: Negative for chills and fever  HENT: Negative for rhinorrhea, sore throat and trouble swallowing  Eyes: Negative for pain  Respiratory: Negative for cough, shortness of breath, wheezing and stridor  Cardiovascular: Negative for chest pain and leg swelling  Gastrointestinal: Negative for abdominal pain, diarrhea and nausea  Endocrine: Negative for polyuria  Genitourinary: Negative for dysuria, flank pain and urgency  Musculoskeletal: Negative for joint swelling, myalgias and neck stiffness  Skin: Negative for rash  Allergic/Immunologic: Negative for immunocompromised state  Neurological: Negative for dizziness, syncope, weakness, numbness and headaches  Psychiatric/Behavioral: Negative for confusion and suicidal ideas  All other systems reviewed and are negative  Physical Exam remarkable for Physical Exam  Vitals and nursing note reviewed  Constitutional:       Appearance: Normal appearance  She is well-developed  HENT:      Head: Normocephalic and atraumatic  Nose: Nose normal       Mouth/Throat:      Mouth: Mucous membranes are moist    Eyes:      Extraocular Movements: Extraocular movements intact  Pupils: Pupils are equal, round, and reactive to light  Cardiovascular:      Rate and Rhythm: Normal rate and regular rhythm  Heart sounds: No murmur heard  No friction rub  Pulmonary:      Effort: No respiratory distress  Breath sounds: Normal breath sounds  No wheezing or rales  Abdominal:      General: Bowel sounds are normal  There is no distension  Palpations: Abdomen is soft  Tenderness: There is abdominal tenderness in the left lower quadrant  Comments: Tenderness along the left inguinal area, no masses, no pulsatile masses or soft tissue swelling noted  Neurovascularly intact   Musculoskeletal:         General: No tenderness  Normal range of motion  Cervical back: Normal range of motion and neck supple  Skin:     General: Skin is warm  Findings: No rash  Neurological:      Mental Status: She is alert and oriented to person, place, and time  Psychiatric:         Mood and Affect: Mood normal       Work up and treatment plan discussed with Treatment Team: Registered Nurse: Calvin Jimenez RN; Resident: Britany Frankel MD and agreed upon plan               MDM  Number of Diagnoses or Management Options  Abnormal CT scan: new, needed workup  Pancreatic cyst: new, needed workup  UTI (urinary tract infection): new, needed workup  Diagnosis management comments: This is a 78-year-old female presents emergency department with symptoms of left inguinal pain  Patient states the pain has been present for several days duration  Pain with movement of the left lower extremity, tenderness to palpation in left lower quadrant, neurovascular intact, labs unremarkable vital signs stable plan will be for CT scan and evaluation for lab work  CT scan incidental findings noted and discussed with the patient the need for close follow-up  Amount and/or Complexity of Data Reviewed  Clinical lab tests: reviewed and ordered  Tests in the radiology section of CPT®: ordered and reviewed  Review and summarize past medical records: yes  Independent visualization of images, tracings, or specimens: yes         Lab Results: Lab Results: I have personally reviewed pertinent lab results  Imaging: I have personally reviewed pertinent reports  EKG, Pathology, and Other Studies: I have personally reviewed pertinent films in PACS    Clinical Impression:    Final diagnoses:   Pancreatic cyst   Abnormal CT scan   UTI (urinary tract infection)         Disposition    discharged           New Prescriptions:    Discharge Medication List as of 11/22/2022 12:49 PM      START taking these medications    Details   cephalexin (KEFLEX) 500 mg capsule Take 1 capsule (500 mg total) by mouth every 6 (six) hours for 7 days, Starting Tue 11/22/2022, Until Tue 11/29/2022, Normal                  Follow-up Instructions:     Naila Blank MD  [address]  [address] [facility]  [place]  [address]  [id number] [phone]    Schedule an appointment as soon as possible for a visit   As needed    [facility] Emergency Department  [address] [address] [phone]    As needed, If symptoms worsen        ED Course         Critical Care Time  Procedures

## 2022-11-22 NOTE — DISCHARGE INSTRUCTIONS
Your CT scan was noted to possess the following findings from a read from our radiologists:   0 8 cm hypodense lesion in pancreatic tail  For simple cyst(s) less than 1 5 cm, recommend yearly followup 5 times, then every 2 year for 2 times  If cyst(s) stable after 9 years, no further followups  Recommend next followup in 1 year  Preferred imaging,  modality: abdomen MRI and MRCP with and without IV contrast, or triple phase abdomen CT with IV contrast, or abdomen MRI and MRCP without IV contrast  The recommendations regarding pancreatic findings assumes that patient does not have family history of,  pancreatic cancer nor have any symptoms potentially attributable to pancreatic cystic lesions (hyperamylasemia, recent-onset diabetes, severe epigastric pain, weight loss, steatorrhea, or jaundice ) If these conditions are not true, then management , should be deferred to judgement of specialists such as gastroenterologists or oncologic surgeons  , Small focal subpleural opacity in right middle lobe, likely represents parenchymal scarring given close proximity to chronic healed fracture deformity of right 7th rib  Please follow-up with your PCP for continued evaluation of the symptoms  His important for you to establish care with your PCP in order to be further managed for the CT findings as well as to have additional imaging modalities/pictures taken at a future date  Based off of the evaluation of urine, you have been provided with an antibiotic regiment for treatment of your symptoms  Please take this antibiotic as prescribed until completion  Please return to the emergency department if you experience worsening of symptoms that include but are not limited to: Loss of consciousness, inability urinate, inability to pass bowel movements, nausea, vomiting, or persistent abdominal pain

## 2022-11-25 LAB
BACTERIA UR CULT: ABNORMAL
BACTERIA UR CULT: ABNORMAL

## 2022-11-30 DIAGNOSIS — E78.2 MIXED HYPERLIPIDEMIA: Chronic | ICD-10-CM

## 2022-11-30 RX ORDER — ATORVASTATIN CALCIUM 40 MG/1
40 TABLET, FILM COATED ORAL DAILY
Qty: 30 TABLET | Refills: 5 | Status: SHIPPED | OUTPATIENT
Start: 2022-11-30

## 2022-12-07 ENCOUNTER — HOSPITAL ENCOUNTER (OUTPATIENT)
Dept: NON INVASIVE DIAGNOSTICS | Facility: CLINIC | Age: 77
Discharge: HOME | End: 2022-12-07

## 2022-12-07 DIAGNOSIS — I83.813 VARICOSE VEINS OF BOTH LOWER EXTREMITIES WITH PAIN: ICD-10-CM

## 2022-12-16 ENCOUNTER — TELEPHONE (OUTPATIENT)
Dept: FAMILY MEDICINE CLINIC | Facility: CLINIC | Age: 77
End: 2022-12-16

## 2022-12-16 NOTE — TELEPHONE ENCOUNTER
Patient called  She would like a referral to see someone regarding her lump under her breast , and she is having pain in her spine can't stand long also she has a small cyst in her bladder   Please give her a call at [phone]

## 2022-12-16 NOTE — TELEPHONE ENCOUNTER
Called pt back, explained to pt that she would need to make appt  Pt states he back is getting worse  Offered pt appt but she only wants dr Melissa Borrero   Gave pt appt for Saturday appt at 10am

## 2022-12-17 ENCOUNTER — OFFICE VISIT (OUTPATIENT)
Dept: FAMILY MEDICINE CLINIC | Facility: CLINIC | Age: 77
End: 2022-12-17

## 2022-12-17 VITALS
TEMPERATURE: 98.5 F | WEIGHT: 136 LBS | HEIGHT: 63 IN | SYSTOLIC BLOOD PRESSURE: 142 MMHG | DIASTOLIC BLOOD PRESSURE: 82 MMHG | HEART RATE: 82 BPM | BODY MASS INDEX: 24.1 KG/M2

## 2022-12-17 DIAGNOSIS — M54.50 ACUTE BILATERAL LOW BACK PAIN WITHOUT SCIATICA: ICD-10-CM

## 2022-12-17 DIAGNOSIS — E11.9 TYPE 2 DIABETES MELLITUS WITHOUT COMPLICATION, WITHOUT LONG-TERM CURRENT USE OF INSULIN (HCC): Chronic | ICD-10-CM

## 2022-12-17 DIAGNOSIS — J45.30 MILD PERSISTENT ASTHMA WITHOUT COMPLICATION: ICD-10-CM

## 2022-12-17 DIAGNOSIS — J45.20 MILD INTERMITTENT ASTHMA WITHOUT COMPLICATION: ICD-10-CM

## 2022-12-17 DIAGNOSIS — K86.2 PANCREATIC CYST: Primary | ICD-10-CM

## 2022-12-17 RX ORDER — ALBUTEROL SULFATE 90 UG/1
2 AEROSOL, METERED RESPIRATORY (INHALATION) EVERY 6 HOURS PRN
Qty: 8.5 G | Refills: 5 | Status: SHIPPED | OUTPATIENT
Start: 2022-12-17

## 2022-12-17 RX ORDER — ALBUTEROL SULFATE 2.5 MG/3ML
2.5 SOLUTION RESPIRATORY (INHALATION) EVERY 6 HOURS PRN
Qty: 150 ML | Refills: 3 | Status: SHIPPED | OUTPATIENT
Start: 2022-12-17

## 2022-12-17 RX ORDER — METHOCARBAMOL 500 MG/1
500 TABLET, FILM COATED ORAL 3 TIMES DAILY PRN
Qty: 30 TABLET | Refills: 1 | Status: SHIPPED | OUTPATIENT
Start: 2022-12-17

## 2022-12-17 NOTE — PROGRESS NOTES
Name: Mansi Wong      : [id number]      MRN: [id number]  Encounter Provider: Ktahy Perez MD  Encounter Date: 2022   Encounter department: [facility]    Assessment & Plan     1  Pancreatic cyst  -     Ambulatory Referral to Gastroenterology; Future    2  Acute bilateral low back pain without sciatica  Comments:  taking  aleve  Orders:  -     methocarbamol (ROBAXIN) 500 mg tablet; Take 1 tablet (500 mg total) by mouth 3 (three) times a day as needed for muscle spasms    3  Type 2 diabetes mellitus without complication, without long-term current use of insulin ([facility])  Assessment & Plan:    Lab Results   Component Value Date    HGBA1C 6 5 (H) 2022   sugar stable      4  Mild intermittent asthma without complication  -     albuterol (PROVENTIL HFA,VENTOLIN HFA) 90 mcg/act inhaler; Inhale 2 puffs every 6 (six) hours as needed for wheezing           Subjective      Has  Lived  At  [facility]  For  2 years a nd  Was  Moving  Stuff in basement and noticed pain last week, taking Aleve with some relief    Review of Systems   Constitutional: Negative for activity change, appetite change and fatigue  Respiratory: Negative for shortness of breath  Cardiovascular: Negative for chest pain  Gastrointestinal: Negative for abdominal pain  Musculoskeletal: Positive for back pain  Neurological: Negative for dizziness, numbness and headaches         Current Outpatient Medications on File Prior to Visit   Medication Sig   • acetaminophen (TYLENOL) 325 mg tablet Take 3 tablets (975 mg total) by mouth every 8 (eight) hours   • albuterol (2 5 mg/3 mL) 0 083 % nebulizer solution TAKE 1 VIAL BY NEBULIZATION EVERY 6 HOURS AS NEEDED FOR WHEEZING OR SHORTNESS OF BREATH   • amLODIPine (NORVASC) 5 mg tablet Take 1 tablet (5 mg total) by mouth daily   • atorvastatin (LIPITOR) 40 mg tablet Take 1 tablet (40 mg total) by mouth daily   • candesartan (ATACAND) 32 MG tablet Take 1 tablet (32 mg total) by mouth daily   • docusate sodium (COLACE) 100 mg capsule Take 1 capsule (100 mg total) by mouth 2 (two) times a day as needed for constipation   • fluticasone-salmeterol (Advair) 250-50 mcg/dose inhaler Inhale 1 puff 2 (two) times a day Rinse mouth after use  • glucosamine-chondroitin 500-400 MG tablet Take 1 tablet by mouth 3 (three) times a day   • glucose blood (OneTouch Ultra) test strip Use 1 each 2 (two) times a day Test   • hydrochlorothiazide (HYDRODIURIL) 12 5 mg tablet Take 1 tablet (12 5 mg total) by mouth daily   • ibandronate (BONIVA) 150 MG tablet Take 1 tablet (150 mg total) by mouth every 30 (thirty) days   • MAGNESIUM PO Take by mouth daily   • meclizine (ANTIVERT) 12 5 MG tablet Take 1 tablet (12 5 mg total) by mouth every 8 (eight) hours as needed for dizziness   • metFORMIN (GLUCOPHAGE) 850 mg tablet Take 1 tablet (850 mg total) by mouth 2 (two) times a day with meals   • montelukast (SINGULAIR) 10 mg tablet Take 1 tablet (10 mg total) by mouth daily at bedtime   • olopatadine (PATANOL) 0 1 % ophthalmic solution Administer 1 drop to the right eye 2 (two) times a day   • solifenacin (VESICARE) 5 mg tablet Take 1 tablet (5 mg total) by mouth daily   • [DISCONTINUED] albuterol (PROVENTIL HFA,VENTOLIN HFA) 90 mcg/act inhaler Inhale 2 puffs every 6 (six) hours as needed for wheezing       Objective     /82   Pulse 82   Temp 98 5 °F (36 9 °C)   Ht 5' 3" (1 6 m)   Wt 61 7 kg (136 lb)   LMP  (LMP Unknown)   BMI 24 09 kg/m²     Physical Exam  Vitals reviewed  Constitutional:       Appearance: Normal appearance  Cardiovascular:      Rate and Rhythm: Normal rate and regular rhythm  Pulses: Normal pulses  Heart sounds: Normal heart sounds  Pulmonary:      Effort: Pulmonary effort is normal    Lymphadenopathy:      Cervical: No cervical adenopathy  Neurological:      Mental Status: She is alert         Rosemary Bill MD

## 2023-02-06 DIAGNOSIS — J45.30 MILD PERSISTENT ASTHMA WITHOUT COMPLICATION: ICD-10-CM

## 2023-02-06 DIAGNOSIS — E11.9 TYPE 2 DIABETES MELLITUS WITHOUT COMPLICATION, WITHOUT LONG-TERM CURRENT USE OF INSULIN (HCC): Primary | Chronic | ICD-10-CM

## 2023-02-06 RX ORDER — BLOOD-GLUCOSE METER
1 KIT MISCELLANEOUS DAILY
Qty: 1 EACH | Refills: 0 | Status: SHIPPED | OUTPATIENT
Start: 2023-02-06

## 2023-02-06 RX ORDER — LANCETS 28 GAUGE
EACH MISCELLANEOUS
Qty: 200 EACH | Refills: 1 | Status: SHIPPED | OUTPATIENT
Start: 2023-02-06

## 2023-02-06 RX ORDER — BLOOD SUGAR DIAGNOSTIC
STRIP MISCELLANEOUS
Qty: 200 STRIP | Refills: 1 | Status: SHIPPED | OUTPATIENT
Start: 2023-02-06

## 2023-02-06 RX ORDER — FLUTICASONE PROPIONATE AND SALMETEROL 250; 50 UG/1; UG/1
1 POWDER RESPIRATORY (INHALATION) 2 TIMES DAILY
Qty: 60 BLISTER | Refills: 3 | Status: SHIPPED | OUTPATIENT
Start: 2023-02-06

## 2023-02-07 ENCOUNTER — TELEPHONE (OUTPATIENT)
Dept: FAMILY MEDICINE CLINIC | Facility: CLINIC | Age: 78
End: 2023-02-07

## 2023-02-09 ENCOUNTER — TELEPHONE (OUTPATIENT)
Dept: FAMILY MEDICINE CLINIC | Facility: CLINIC | Age: 78
End: 2023-02-09

## 2023-02-09 NOTE — TELEPHONE ENCOUNTER
Pt call about results mammogram from 8/30/22 to know what to do next, there was telephone encounter with information from that time  I informed pt she need to schedule diagnostic ultrasound right breast and offer her to schedule which she stated, she will call

## 2023-02-17 ENCOUNTER — TELEMEDICINE (OUTPATIENT)
Dept: FAMILY MEDICINE CLINIC | Facility: CLINIC | Age: 78
End: 2023-02-17

## 2023-02-17 DIAGNOSIS — K86.2 PANCREATIC CYST: ICD-10-CM

## 2023-02-17 DIAGNOSIS — E11.9 TYPE 2 DIABETES MELLITUS WITHOUT COMPLICATION, WITHOUT LONG-TERM CURRENT USE OF INSULIN (HCC): ICD-10-CM

## 2023-02-17 DIAGNOSIS — N39.0 URINARY TRACT INFECTION WITHOUT HEMATURIA, SITE UNSPECIFIED: Primary | ICD-10-CM

## 2023-02-17 DIAGNOSIS — R92.8 ABNORMAL MAMMOGRAM: ICD-10-CM

## 2023-02-17 NOTE — PROGRESS NOTES
Virtual Brief Visit    Patient is located in the following state in which I hold an active license [place]      Assessment/Plan:    Problem List Items Addressed This Visit        Digestive    Pancreatic cyst     Repeat MRI  abd  11/23            Endocrine    Diabetes mellitus ([facility] [address] ) (Chronic)    Relevant Orders    Lipid panel    Comprehensive metabolic panel    Hemoglobin A1C       Other    Abnormal mammogram     Needs us  To be scheduled        Other Visit Diagnoses     Urinary tract infection without hematuria, site unspecified    -  Primary    Relevant Orders    Urine culture          Recent Visits  No visits were found meeting these conditions  Showing recent visits within past 7 days and meeting all other requirements  Today's Visits  Date Type Provider Dept   02/17/23 Telemedicine Emma Alvarez MD [facility]   Showing today's visits and meeting all other requirements  Future Appointments  No visits were found meeting these conditions  Showing future appointments within next 150 days and meeting all other requirements         Visit Time    Visit Start Time:10"00 am  Visit Stop Time: 10:05  Total Visit Duration: 5 minutes          Virtual Brief Visit    Patient is located in the following state in which I hold an active license [place]      Assessment/Plan:    Problem List Items Addressed This Visit        Digestive    Pancreatic cyst     Repeat MRI  abd  11/23            Endocrine    Diabetes mellitus ([facility] [address] ) (Chronic)    Relevant Orders    Lipid panel    Comprehensive metabolic panel    Hemoglobin A1C       Other    Abnormal mammogram     Needs us  To be scheduled        Other Visit Diagnoses     Urinary tract infection without hematuria, site unspecified    -  Primary    Relevant Orders    Urine culture          Recent Visits  No visits were found meeting these conditions    Showing recent visits within past 7 days and meeting all other requirements  Today's Visits  Date Type Provider Dept   02/17/23 Keisha Alarcon MD [facility]   Showing today's visits and meeting all other requirements  Future Appointments  No visits were found meeting these conditions  Showing future appointments within next 150 days and meeting all other requirements         Visit Time    Visit Start Time: 10 AM  Visit Stop Time: 10:05  am  Total Visit Duration: 5 minutes Assessment/Plan:    Pancreatic cyst  Repeat MRI  abd  11/23    Abnormal mammogram  Needs us  To be scheduled       Diagnoses and all orders for this visit:    Urinary tract infection without hematuria, site unspecified  -     Urine culture; Future    Type 2 diabetes mellitus without complication, without long-term current use of insulin ([facility]CC)  -     Lipid panel; Future  -     Comprehensive metabolic panel; Future  -     Hemoglobin A1C; Future    Pancreatic cyst    Abnormal mammogram          Subjective:      Patient ID: Morgan Bales is a 69 y o  female  Had  Been scheduled in office but then requested  Video,unable to  Connect to video so  Did telephone  Visit, wanted to know  What f/u for abnl CT scan abd  11/22 was-she did  Urine which was abnl, needs f/u MRI abd  For pancreatic  Cyst  1yr no repeat urine obtained-needs, has  appt  Next  Month and needs lab prior, never  Scheduled breast us, needs help doing this      The following portions of the patient's history were reviewed and updated as appropriate: allergies, current medications, past family history, past medical history, past social history, past surgical history and problem list     Review of Systems   Constitutional: Negative for activity change, appetite change and fatigue  Respiratory: Negative for shortness of breath  Cardiovascular: Negative for chest pain  Gastrointestinal: Negative for abdominal pain  Neurological: Negative for dizziness and headaches  Psychiatric/Behavioral: The patient is nervous/anxious            Objective:               Physical Exam

## 2023-02-24 ENCOUNTER — TELEPHONE (OUTPATIENT)
Dept: FAMILY MEDICINE CLINIC | Facility: CLINIC | Age: 78
End: 2023-02-24

## 2023-02-24 DIAGNOSIS — I71.20 THORACIC AORTIC ANEURYSM WITHOUT RUPTURE, UNSPECIFIED PART: Primary | ICD-10-CM

## 2023-02-24 NOTE — TELEPHONE ENCOUNTER
On CT chest done 3/7/22  A small aneurysm seen that xray docs rec 1 yr  Follow up, so that is due next week, order entered

## 2023-02-27 DIAGNOSIS — E11.9 TYPE 2 DIABETES MELLITUS WITHOUT COMPLICATION, WITHOUT LONG-TERM CURRENT USE OF INSULIN (HCC): ICD-10-CM

## 2023-03-16 ENCOUNTER — OFFICE VISIT (OUTPATIENT)
Dept: FAMILY MEDICINE CLINIC | Facility: CLINIC | Age: 78
End: 2023-03-16

## 2023-03-16 VITALS
WEIGHT: 137 LBS | HEIGHT: 63 IN | HEART RATE: 101 BPM | DIASTOLIC BLOOD PRESSURE: 80 MMHG | SYSTOLIC BLOOD PRESSURE: 140 MMHG | BODY MASS INDEX: 24.27 KG/M2 | OXYGEN SATURATION: 97 %

## 2023-03-16 DIAGNOSIS — I71.20 THORACIC AORTIC ANEURYSM WITHOUT RUPTURE, UNSPECIFIED PART: ICD-10-CM

## 2023-03-16 DIAGNOSIS — R26.89 BALANCE DISORDER: ICD-10-CM

## 2023-03-16 DIAGNOSIS — J45.30 MILD PERSISTENT ASTHMA WITHOUT COMPLICATION: ICD-10-CM

## 2023-03-16 DIAGNOSIS — J45.20 MILD INTERMITTENT ASTHMA WITHOUT COMPLICATION: ICD-10-CM

## 2023-03-16 DIAGNOSIS — I10 ESSENTIAL HYPERTENSION: Chronic | ICD-10-CM

## 2023-03-16 DIAGNOSIS — E78.2 MIXED HYPERLIPIDEMIA: Chronic | ICD-10-CM

## 2023-03-16 DIAGNOSIS — E11.9 TYPE 2 DIABETES MELLITUS WITHOUT COMPLICATION, WITHOUT LONG-TERM CURRENT USE OF INSULIN (HCC): Primary | Chronic | ICD-10-CM

## 2023-03-16 DIAGNOSIS — I10 PRIMARY HYPERTENSION: Chronic | ICD-10-CM

## 2023-03-16 DIAGNOSIS — R07.81 RIB PAIN ON LEFT SIDE: ICD-10-CM

## 2023-03-16 LAB
DME PARACHUTE DELIVERY DATE REQUESTED: NORMAL
DME PARACHUTE ITEM DESCRIPTION: NORMAL
DME PARACHUTE ORDER STATUS: NORMAL
DME PARACHUTE SUPPLIER NAME: NORMAL
DME PARACHUTE SUPPLIER PHONE: NORMAL

## 2023-03-16 RX ORDER — MECLIZINE HCL 12.5 MG/1
12.5 TABLET ORAL EVERY 8 HOURS PRN
Qty: 30 TABLET | Refills: 3 | Status: SHIPPED | OUTPATIENT
Start: 2023-03-16

## 2023-03-16 RX ORDER — HYDROCHLOROTHIAZIDE 12.5 MG/1
12.5 TABLET ORAL DAILY
Qty: 90 TABLET | Refills: 1 | Status: SHIPPED | OUTPATIENT
Start: 2023-03-16

## 2023-03-16 RX ORDER — ALBUTEROL SULFATE 90 UG/1
2 AEROSOL, METERED RESPIRATORY (INHALATION) EVERY 6 HOURS PRN
Qty: 8.5 G | Refills: 5 | Status: SHIPPED | OUTPATIENT
Start: 2023-03-16

## 2023-03-16 RX ORDER — MONTELUKAST SODIUM 10 MG/1
10 TABLET ORAL
Qty: 90 TABLET | Refills: 1 | Status: SHIPPED | OUTPATIENT
Start: 2023-03-16

## 2023-03-16 RX ORDER — AMLODIPINE BESYLATE 5 MG/1
5 TABLET ORAL DAILY
Qty: 30 TABLET | Refills: 0 | Status: SHIPPED | OUTPATIENT
Start: 2023-03-16

## 2023-03-16 NOTE — PROGRESS NOTES
Name: Harman Charles      :       MRN: [id number]  Encounter Provider: Yolanda Arnold MD  Encounter Date: 3/16/2023   Encounter department: [facility]    Assessment & Plan     1  Type 2 diabetes mellitus without complication, without long-term current use of insulin [facility])  Assessment & Plan:    Lab Results   Component Value Date    HGBA1C 6 5 (H) 2022   last  Lab good      2  Primary hypertension  Assessment & Plan:  BP stable      3  Thoracic aortic aneurysm without rupture, unspecified part  Assessment & Plan:  Await CT scan      4  Mixed hyperlipidemia  Assessment & Plan:  Await lab      5  Balance disorder    6  Mild persistent asthma without complication    7  Essential hypertension    8  Mild intermittent asthma without complication  Assessment & Plan:  Refill meds, emphasized  Discus for prevention           Subjective      Her e for  Recheck  Multiple  Medical problems, needs  Mask for  Nebulizer, refill on several meds, took fall Tuesday night  And hurt  l  Ribs-had  Dizzy  Spell and  Was  Out of  Meclizine, stayed  Up to late and was overtired, taking 800 mg  Ibuprofen for pain    Review of Systems   Constitutional: Positive for fatigue  Negative for activity change and appetite change  Respiratory: Positive for chest tightness and wheezing  Negative for shortness of breath  Cardiovascular: Negative for chest pain  Musculoskeletal: Positive for arthralgias  L rib pain   Neurological: Negative for dizziness and headaches  Hematological: Negative for adenopathy  Psychiatric/Behavioral: The patient is not nervous/anxious          Current Outpatient Medications on File Prior to Visit   Medication Sig   • acetaminophen (TYLENOL) 325 mg tablet Take 3 tablets (975 mg total) by mouth every 8 (eight) hours   • albuterol (2 5 mg/3 mL) 0 083 % nebulizer solution Take 3 mL (2 5 mg total) by nebulization every 6 (six) hours as needed for wheezing or shortness of breath   • albuterol (PROVENTIL HFA,VENTOLIN HFA) 90 mcg/act inhaler Inhale 2 puffs every 6 (six) hours as needed for wheezing   • amLODIPine (NORVASC) 5 mg tablet Take 1 tablet (5 mg total) by mouth daily   • atorvastatin (LIPITOR) 40 mg tablet Take 1 tablet (40 mg total) by mouth daily   • candesartan (ATACAND) 32 MG tablet Take 1 tablet (32 mg total) by mouth daily   • docusate sodium (COLACE) 100 mg capsule Take 1 capsule (100 mg total) by mouth 2 (two) times a day as needed for constipation   • Fluticasone-Salmeterol (Advair) 250-50 mcg/dose inhaler Inhale 1 puff 2 (two) times a day Rinse mouth after use     • glucosamine-chondroitin 500-400 MG tablet Take 1 tablet by mouth 3 (three) times a day   • glucose blood (FREESTYLE TEST STRIPS) test strip Use as instructed   • glucose blood (OneTouch Ultra) test strip Use 1 each 2 (two) times a day Test   • glucose monitoring kit (FREESTYLE) monitoring kit Use 1 each in the morning   • hydrochlorothiazide (HYDRODIURIL) 12 5 mg tablet Take 1 tablet (12 5 mg total) by mouth daily   • ibandronate (BONIVA) 150 MG tablet Take 1 tablet (150 mg total) by mouth every 30 (thirty) days   • Lancets (freestyle) lancets Test bid   • MAGNESIUM PO Take by mouth daily   • meclizine (ANTIVERT) 12 5 MG tablet Take 1 tablet (12 5 mg total) by mouth every 8 (eight) hours as needed for dizziness   • metFORMIN (GLUCOPHAGE) 850 mg tablet Take 1 tablet (850 mg total) by mouth 2 (two) times a day with meals   • methocarbamol (ROBAXIN) 500 mg tablet Take 1 tablet (500 mg total) by mouth 3 (three) times a day as needed for muscle spasms   • montelukast (SINGULAIR) 10 mg tablet Take 1 tablet (10 mg total) by mouth daily at bedtime   • olopatadine (PATANOL) 0 1 % ophthalmic solution Administer 1 drop to the right eye 2 (two) times a day   • solifenacin (VESICARE) 5 mg tablet Take 1 tablet (5 mg total) by mouth daily       Objective     /80 (BP Location: Right arm, Patient Position: Sitting, Cuff Size: Standard)   Pulse 101   Ht 5' 3" (1 6 m)   Wt 62 1 kg (137 lb)   LMP  (LMP Unknown)   SpO2 97%   BMI 24 27 kg/m²     Physical Exam  Vitals reviewed  Constitutional:       Appearance: Normal appearance  Cardiovascular:      Rate and Rhythm: Normal rate and regular rhythm  Pulses: Normal pulses  Heart sounds: Normal heart sounds  Pulmonary:      Effort: Pulmonary effort is normal       Breath sounds: Normal breath sounds  Musculoskeletal:         General: Tenderness present  Right lower leg: No edema  Left lower leg: No edema  Comments: l ribs   Neurological:      Mental Status: She is alert         Saúl Kovacs MD

## 2023-03-16 NOTE — PATIENT INSTRUCTIONS
Await CT scan, can take  methocarbamol for  spasms of rib  with Ibuprofen, can take  1/2 tab during  day and  whole tab at  bedtime, refill meds, blood work due  after 3/222/23

## 2023-03-16 NOTE — TELEPHONE ENCOUNTER
Pt called in because she is in the hospital and her diabetes still not good, she will like Dr Marylu Isaac to increase metformin to 850 again 
Pt notified this issue can be discussed at hospital follow up visit 
When is pt being discharged-can discuss at hospital f/u?
10 (severe pain)

## 2023-03-20 DIAGNOSIS — M81.0 AGE-RELATED OSTEOPOROSIS WITHOUT CURRENT PATHOLOGICAL FRACTURE: ICD-10-CM

## 2023-03-20 RX ORDER — IBANDRONATE SODIUM 150 MG/1
150 TABLET, FILM COATED ORAL
Qty: 1 TABLET | Refills: 6 | Status: SHIPPED | OUTPATIENT
Start: 2023-03-20

## 2023-03-21 LAB
DME PARACHUTE DELIVERY DATE ACTUAL: NORMAL
DME PARACHUTE DELIVERY DATE REQUESTED: NORMAL
DME PARACHUTE ITEM DESCRIPTION: NORMAL
DME PARACHUTE ORDER STATUS: NORMAL
DME PARACHUTE SUPPLIER NAME: NORMAL
DME PARACHUTE SUPPLIER PHONE: NORMAL

## 2023-03-23 ENCOUNTER — HOSPITAL ENCOUNTER (OUTPATIENT)
Dept: CT IMAGING | Facility: HOSPITAL | Age: 78
End: 2023-03-23

## 2023-03-23 ENCOUNTER — TELEPHONE (OUTPATIENT)
Dept: FAMILY MEDICINE CLINIC | Facility: CLINIC | Age: 78
End: 2023-03-23

## 2023-03-23 DIAGNOSIS — I71.20 THORACIC AORTIC ANEURYSM WITHOUT RUPTURE, UNSPECIFIED PART (HCC): ICD-10-CM

## 2023-03-23 DIAGNOSIS — S22.42XD CLOSED FRACTURE OF MULTIPLE RIBS OF LEFT SIDE WITH ROUTINE HEALING, SUBSEQUENT ENCOUNTER: Primary | ICD-10-CM

## 2023-03-23 NOTE — TELEPHONE ENCOUNTER
[facility] called to let Dr Reema Boone know that the CT Scan was in [facility] and it had significant finding

## 2023-03-24 ENCOUNTER — TELEPHONE (OUTPATIENT)
Dept: FAMILY MEDICINE CLINIC | Facility: CLINIC | Age: 78
End: 2023-03-24

## 2023-03-24 NOTE — TELEPHONE ENCOUNTER
Pt called us back for her CT Results  I gave her the results, but she does have questions for clinical  If someone can please call her back to discuss in further detail, she would appreciate it   I did let her know that her voicemail was full and that's why we could not leave a message at first

## 2023-03-27 ENCOUNTER — TELEPHONE (OUTPATIENT)
Dept: GASTROENTEROLOGY | Facility: CLINIC | Age: 78
End: 2023-03-27

## 2023-03-28 ENCOUNTER — TELEPHONE (OUTPATIENT)
Dept: CARDIAC SURGERY | Facility: CLINIC | Age: 78
End: 2023-03-28

## 2023-03-28 NOTE — TELEPHONE ENCOUNTER
DEBBIE for the patient to contact the office to follow up again in our aneurysm clinic w/ M  Jose M Lee

## 2023-04-03 ENCOUNTER — TELEPHONE (OUTPATIENT)
Dept: FAMILY MEDICINE CLINIC | Facility: CLINIC | Age: 78
End: 2023-04-03

## 2023-04-03 DIAGNOSIS — I71.20 THORACIC AORTIC ANEURYSM WITHOUT RUPTURE, UNSPECIFIED PART (HCC): Primary | ICD-10-CM

## 2023-04-03 NOTE — TELEPHONE ENCOUNTER
Pt called because she got a letter from the hospital with her CT results  Pt was already notified by our office of the results  I gave her MF's recommendations  She said thoracic surgery is supposed to call her back, but the referral is closed    can she please enter a new one? It does not say why it was closed but I do not know if we can re-open that  Also, she wants to get her x-rays done before she comes back to see MF on 4/12 but they are dated for 5/4  Please call pt to advise, thank you

## 2023-04-04 ENCOUNTER — TELEPHONE (OUTPATIENT)
Dept: SURGICAL ONCOLOGY | Facility: CLINIC | Age: 78
End: 2023-04-04

## 2023-04-12 PROBLEM — G89.29 CHRONIC RIGHT SHOULDER PAIN: Status: ACTIVE | Noted: 2023-04-12

## 2023-04-12 PROBLEM — M25.511 CHRONIC RIGHT SHOULDER PAIN: Status: ACTIVE | Noted: 2023-04-12

## 2023-04-25 ENCOUNTER — OFFICE VISIT (OUTPATIENT)
Dept: CARDIAC SURGERY | Facility: CLINIC | Age: 78
End: 2023-04-25

## 2023-04-25 VITALS
BODY MASS INDEX: 24.26 KG/M2 | TEMPERATURE: 98.3 F | OXYGEN SATURATION: 98 % | WEIGHT: 136.9 LBS | DIASTOLIC BLOOD PRESSURE: 65 MMHG | SYSTOLIC BLOOD PRESSURE: 144 MMHG | HEIGHT: 63 IN | HEART RATE: 70 BPM

## 2023-04-25 DIAGNOSIS — I77.810 THORACIC AORTIC ECTASIA (HCC): ICD-10-CM

## 2023-04-25 DIAGNOSIS — I71.20 THORACIC AORTIC ANEURYSM WITHOUT RUPTURE, UNSPECIFIED PART (HCC): Primary | ICD-10-CM

## 2023-04-25 NOTE — PROGRESS NOTES
"[facility]  Ngozi Dhaliwal 69 y o  female MRN: [id number]      Reason for Consult / Principal Problem: Ascending aortic ectasia    History of Present Illness: Ngozi Dhaliwal is a 69y o  year old female who presents today for overdue surveillance of ascending aortic ectasia  This was initially identified as an incidental finding on chest CTA in 2016 and measured 4 0 cm in maximal diamter  Echo in August 2021 demonstrated a normal trileaflet aortic valve with mild AI  She underwent cardiac cath May 2019 to evaluate SOB and she was found to have normal coronary arteries  Patient's PMHx is notable for HTN, HLD, DM2, NGOC, Asthma, h/o SAH, colon polyps, pancreatic cyst and OA (knees)  Patient doesnot smoke  FH is noncontributory  Upon interview today patient reports she is doing well  She deenis angina like chest pain but has rib pain  She reports she falls and fell recently and sustained some right rib fractures  She states she has significant arthritis in her knees and her gait is unsteady as a result  She has been offered TKR's but declines and she \"feels old\" if she uses a cane to ambulate  She reports stable SOB and attributes that to her asthma  She denies ghtheadedness, palpitations or fluid retention  Past Medical History:  Past Medical History:   Diagnosis Date    Asthma     Colon polyp     Diabetes mellitus ([address] )     E coli infection     Fall     Hyperlipidemia     Hypertension     Pneumothorax     Rectal prolapse 09/03/2020    Sleep apnea     no CPAP    Subarachnoid hemorrhage (HCC)     Thoracic aortic aneurysm (HCC)     Vertigo          Past Surgical History:   Past Surgical History:   Procedure Laterality Date    BONE GRAFT      R arm    BRONCHOSCOPY N/A 3/16/2016    Procedure: BRONCHOSCOPY FLEXIBLE/anesth ;  Surgeon: Shonda Brooke DO;  Location: [facility];   Service:     COLONOSCOPY      COLONOSCOPY      EYE SURGERY      OOPHORECTOMY Left     age 49    DC LAPAROSCOPY COLECTOMY " PARTIAL W/ANASTOMOSIS N/A 9/23/2020    Procedure: RESECTION COLON SIGMOID LAPAROSCOPIC;  Surgeon: Yamel Oliveira MD;  [facility];  Service: Colorectal   • MN SIGMOIDOSCOPY FLX DX W/COLLJ SPEC BR/WA IF PFRMD N/A 9/23/2020    Procedure: Haily Cortez;  Surgeon: Yamel Oliveira MD;  [facility]: [facility];  Service: Colorectal   • RECTAL PROLAPSE REPAIR LAPAROSCOPIC N/A 9/23/2020    Procedure: Laura Bessie;  Surgeon: Yamel Oliveira MD;  [facility]: [facility];  Service: Colorectal         Family History:  Family History   Problem Relation Age of Onset   • Endometrial cancer Mother 45   • No Known Problems Father    • No Known Problems Sister    • Breast cancer Daughter 52   • No Known Problems Daughter    • No Known Problems Maternal Grandmother    • No Known Problems Maternal Grandfather    • No Known Problems Paternal Grandmother    • No Known Problems Paternal Grandfather    • Cancer Son 35        asbestosis related cancer   • No Known Problems Maternal Aunt    • No Known Problems Paternal Aunt          Social History:    Social History     Substance and Sexual Activity   Alcohol Use Yes    Comment: weekends      Social History     Substance and Sexual Activity   Drug Use No     Social History     Tobacco Use   Smoking Status Former   • Years: 2 00   • Types: Cigarettes   Smokeless Tobacco Never   Tobacco Comments    only smoked a couple a day for a couple years in her 25s         Home Medications:   Prior to Admission medications    Medication Sig Start Date End Date Taking?  Authorizing Provider   albuterol (2 5 mg/3 mL) 0 083 % nebulizer solution Take 3 mL (2 5 mg total) by nebulization every 6 (six) hours as needed for wheezing or shortness of breath 12/17/22  Yes Severiano Aures, MD   albuterol (PROVENTIL HFA,VENTOLIN HFA) 90 mcg/act inhaler Inhale 2 puffs every 6 (six) hours as needed for wheezing 3/16/23  Yes Severiano Aures, MD   amLODIPine (NORVASC) 5 mg tablet Take 1 tablet (5 mg total) by mouth daily 4/13/23  Yes Lam Hernandez MD   atorvastatin (LIPITOR) 40 mg tablet Take 1 tablet (40 mg total) by mouth daily 4/10/23  Yes Lam Hernandez MD   candesartan (ATACAND) 32 MG tablet Take 1 tablet (32 mg total) by mouth daily 10/17/22  Yes Lam Hernandez MD   docusate sodium (COLACE) 100 mg capsule Take 1 capsule (100 mg total) by mouth 2 (two) times a day as needed for constipation 8/8/22  Yes Lam Hernandez MD   Fluticasone-Salmeterol (Advair) 250-50 mcg/dose inhaler Inhale 1 puff 2 (two) times a day Rinse mouth after use   2/6/23  Yes Lam Hernandez MD   glucosamine-chondroitin 500-400 MG tablet Take 1 tablet by mouth 3 (three) times a day   Yes Historical Provider, MD   glucose blood (FREESTYLE TEST STRIPS) test strip Use as instructed 2/6/23  Yes Lam Hernandez MD   glucose blood (OneTouch Ultra) test strip Use 1 each 2 (two) times a day Test 8/8/22  Yes Lam Hernandez MD   glucose monitoring kit (FREESTYLE) monitoring kit Use 1 each in the morning 2/6/23  Yes Lam Hernandez MD   hydrochlorothiazide (HYDRODIURIL) 12 5 mg tablet Take 1 tablet (12 5 mg total) by mouth daily 3/16/23  Yes Lam Hernandez MD   ibandronate (BONIVA) 150 MG tablet Take 1 tablet (150 mg total) by mouth every 30 (thirty) days 3/20/23  Yes Lam Hernandez MD   ibuprofen (MOTRIN) 800 mg tablet Take by mouth every 6 (six) hours as needed for mild pain   Yes Historical Provider, MD   Lancets (freestyle) lancets Test bid 2/6/23  Yes Lam Hernandez MD   MAGNESIUM PO Take by mouth daily   Yes Historical Provider, MD   meclizine (ANTIVERT) 12 5 MG tablet Take 1 tablet (12 5 mg total) by mouth every 8 (eight) hours as needed for dizziness 3/16/23  Yes Lam Hernandez MD   metFORMIN (GLUCOPHAGE) 850 mg tablet Take 1 tablet (850 mg total) by mouth 2 (two) times a day with meals 2/27/23  Yes Lam Hernandez MD   montelukast (SINGULAIR) 10 mg tablet Take 1 tablet (10 mg total) by mouth daily at bedtime 3/16/23  Yes Lam Hernandez MD solifenacin (VESICARE) 5 mg tablet Take 1 tablet (5 mg total) by mouth daily 8/8/22  Yes Karol Carbajal MD   acetaminophen (TYLENOL) 325 mg tablet Take 3 tablets (975 mg total) by mouth every 8 (eight) hours  Patient not taking: Reported on 4/14/2023 9/29/19   Imtiaz Roth PA-C   methocarbamol (ROBAXIN) 500 mg tablet Take 1 tablet (500 mg total) by mouth 3 (three) times a day as needed for muscle spasms  Patient not taking: Reported on 4/14/2023 12/17/22   Karol Carbajal MD   nitrofurantoin (MACROBID) 100 mg capsule Take 1 capsule (100 mg total) by mouth 2 (two) times a day for 7 days  Patient not taking: Reported on 4/25/2023 4/18/23 4/25/23  Karol Carbajal MD   olopatadine (PATANOL) 0 1 % ophthalmic solution Administer 1 drop to the right eye 2 (two) times a day  Patient not taking: Reported on 4/14/2023 8/30/22   Karol Carbajal MD       Allergies:   Allergies   Allergen Reactions   • Bactrim [Sulfamethoxazole-Trimethoprim] Hives   • Sulfamethoxazole Rash   • Trimethoprim Rash       Review of Systems:   Review of Systems - History obtained from chart review and the patient  General ROS: negative  Psychological ROS: negative  Ophthalmic ROS: negative  ENT ROS: negative  Allergy and Immunology ROS: negative  Hematological and Lymphatic ROS: negative  Endocrine ROS: negative  Breast ROS: negative  Respiratory ROS: positive for - shortness of breath  negative for - cough, hemoptysis, orthopnea or pleuritic pain  Cardiovascular ROS: no chest pain or dyspnea on exertion  Gastrointestinal ROS: no abdominal pain, change in bowel habits, or black or bloody stools  Genito-Urinary ROS: no dysuria, trouble voiding, or hematuria  Musculoskeletal ROS: positive for - gait disturbance, joint pain and instability (knees) and right rib pain  Neurological ROS: no TIA or stroke symptoms  Dermatological ROS: negative    Vital Signs:   Vitals:    04/25/23 1112 04/25/23 1115   BP: 155/66 144/65   BP Location: Left arm Right arm "  Patient Position: Sitting Sitting   Cuff Size: Standard Standard   Pulse: 70    Temp: 98 3 °F (36 8 °C)    TempSrc: Tympanic    SpO2: 98%    Weight: 62 1 kg (136 lb 14 4 oz)    Height: 5' 3\" (1 6 m)        Physical Exam:  General: Alert, oriented, well developed, no acute distress  HEENT/NECK:  PERRLA  No jugular venous distention  Cardiac:Regular rate and rhythm, no murmurs rubs or gallops  Carotid arteries: 2+ pulses, no bruits  Pulmonary:  Breath sounds clear bilaterally  Abdomen:  Non-tender, Non-distended  Positive bowel sounds  Upper extremities: 2+ radial pulses; brisk capillary refill; hands warm  Lower extremities: Extremities warm/dry  PT/DP pulses 2+ bilaterally  No edema B/L  Neuro: Alert and oriented X 3  Sensation is grossly intact  No focal deficits  Musculoskeletal: MAEE, stable gait  Skin: Warm/Dry, without rashes or lesions      Lab Results:   Lab Results   Component Value Date    WBC 8 64 11/22/2022    HGB 12 4 11/22/2022    HCT 36 7 11/22/2022    MCV 94 11/22/2022     11/22/2022     Lab Results   Component Value Date    SODIUM 138 04/14/2023    K 4 5 04/14/2023    CL 98 04/14/2023    CO2 30 04/14/2023    AGAP 10 04/14/2023    BUN 25 04/14/2023    CREATININE 0 67 04/14/2023    GLUC 124 (H) 11/22/2022    GLUF 172 (H) 04/14/2023    CALCIUM 9 4 04/14/2023    AST 14 04/14/2023    ALT 13 04/14/2023    ALKPHOS 67 04/14/2023    TP 6 8 04/14/2023    TBILI 0 66 04/14/2023    EGFR 85 04/14/2023     Lab Results   Component Value Date    CHOLESTEROL 165 04/14/2023    CHOLESTEROL 157 08/11/2022    CHOLESTEROL 172 05/18/2021     Lab Results   Component Value Date    HDL 51 04/14/2023    HDL 58 08/11/2022    HDL 84 05/18/2021     Lab Results   Component Value Date    TRIG 130 04/14/2023    TRIG 164 (H) 08/11/2022    TRIG 102 05/18/2021     Lab Results   Component Value Date    [place] 114 04/14/2023    [place] 99 08/11/2022    [place] 88 05/18/2021     Lab Results   Component Value Date    " HGBA1C 6 4 (H) 04/14/2023     Lab Results   Component Value Date    TROPONINI <0 02 07/29/2021       Imaging Studies:     CT Chest w/o contrast: 3/23/23    Ascending aortic ectasia 3 9 - 4 0 cm    Echocardiogram: 8/5/21    EF 60%  AORTIC VALVE: The valve was trileaflet  Leaflets exhibited normal thickness and normal cuspal separation  DOPPLER: Transaortic velocity was within the normal range  There was no evidence for stenosis  There was mild regurgitation        I have personally reviewed pertinent films in PACS     PCP notes reviewed    Assessment:  Patient Active Problem List    Diagnosis Date Noted   • Chronic right shoulder pain 04/12/2023   • Mild persistent asthma without complication 31/71/5706   • Pancreatic cyst 02/17/2023   • Abnormal mammogram 02/17/2023   • Varicose veins of both lower extremities with pain 09/07/2022   • Allergic rhinitis due to animal hair and dander 09/10/2021   • Leg cramping 09/10/2021   • Other fatigue 08/04/2021   • Tear of right rotator cuff 01/27/2021   • Mild intermittent asthma without complication 06/41/5339   • Rectal prolapse 07/23/2020   • Dizziness 06/04/2020   • [facility] 10/14/2019   • Osteoporosis 10/14/2019   • Urge incontinence of urine 08/27/2019   • NGOC (obstructive sleep apnea) 07/09/2019   • Hyperlipidemia 12/09/2018   • Essential hypertension 08/25/2017   • Diabetes mellitus ([facility] [address] ) 08/25/2017   • Hearing loss 04/11/2013       Impression/Plan:    Ascending Aortic Ectasia 3 9-4 0 cm   1) Stable  2) Does not meet surgical criteria  3) Given patients age, modest dilatation of the ascending aorta, normal trileafelt aortic valve and no FH aneurysms, no further f/u in aortic clinic is necessary  4) Continue with routine medical care with PCP with management of HTN, HLD and DM2        Chest CT scan  findings were confirmed and shared with the patient today, her questions were answered and she agrees with treatment plan as stated above  Aortic Aneurysm Instructions were provided to the patient as follows:    1  No lifting more than 50 pounds  Regular aerobic exercise permitted and recommended  2  Maintain a controlled blood pressure with a goal of less than 120/80  3  Follow up in Aortic Clinic as recommended with radiology follow up as instructed  4  Report to the ER or call 911 immediately with the following signs / symptoms: sudden onset of back pain, chest pain or shortness of breath      Routine referral to gastroenterology for colonoscopy screening was not indicated, as the patient is over 77years old    SIGNATURE: JAQUAN August  DATE: April 25, 2023  TIME: 11:34 AM

## 2023-04-25 NOTE — LETTER
April 25, 2023     Masha Gonzalez, [address]  [place]  [address]  [id number]    Patient: Alyssia Escobar   YOB: 1945   Date of Visit: 4/25/2023       Dear Dr Agnes Whitfield:    Thank you for referring Alyssia Escobar back to aortic clinic for evaluation  She was last seen in our office November 2019  Below are my notes for her visit  Her recent Chest CT scan demonstrates stable ascending aortic dilatation as compared to imaging dating back to 2016  Given her age, normal aortic valve morphology, stable imaging revealing modest ascending aortic ectasia and non-contributory FH, no further f/u in aortic clinic is necessary  If, in the future, CT imaging demonstrates a change in her ascending aortic size to greater than 4 5 cm, then referral back to aortic clinic is recommended  If you have questions, contact our office  Sincerely,        JAQUAN Birmingham        CC: No Recipients  JAQUAN Birmingham  4/25/2023 11:51 AM  Sign when Signing Visit  Aortic [id number] [facility] 69 y o  female MRN: [id number]      Reason for Consult / Principal Problem: Ascending aortic ectasia    History of Present Illness: Alyssia Escobar is a 69y o  year old female who presents today for overdue surveillance of ascending aortic ectasia  This was initially identified as an incidental finding on chest CTA in 2016 and measured 4 0 cm in maximal diamter  Echo in August 2021 demonstrated a normal trileaflet aortic valve with mild AI  She underwent cardiac cath May 2019 to evaluate SOB and she was found to have normal coronary arteries  Patient's PMHx is notable for HTN, HLD, DM2, NGOC, Asthma, h/o SAH, colon polyps, pancreatic cyst and OA (knees)  Patient doesnot smoke  FH is noncontributory  Upon interview today patient reports she is doing well  She deenis angina like chest pain but has rib pain  She reports she falls and fell recently and sustained some right rib fractures   She states she has "significant arthritis in her knees and her gait is unsteady as a result  She has been offered TKR's but declines and she \"feels old\" if she uses a cane to ambulate  She reports stable SOB and attributes that to her asthma  She denies ghtheadedness, palpitations or fluid retention  Past Medical History:  Past Medical History:   Diagnosis Date   • Asthma    • Colon polyp    • Diabetes mellitus ([facility] [address] )    • E coli infection    • Fall    • Hyperlipidemia    • Hypertension    • Pneumothorax    • Rectal prolapse 09/03/2020   • Sleep apnea     no CPAP   • Subarachnoid hemorrhage (HCC)    • Thoracic aortic aneurysm (HCC)    • Vertigo          Past Surgical History:   Past Surgical History:   Procedure Laterality Date   • BONE GRAFT      R arm   • BRONCHOSCOPY N/A 3/16/2016    Procedure: BRONCHOSCOPY FLEXIBLE/anesth ;  Surgeon: Vanessa Grace DO;  Location: [facility];   Service:    • COLONOSCOPY     • COLONOSCOPY     • EYE SURGERY     • OOPHORECTOMY Left     age 49   • NC LAPAROSCOPY COLECTOMY PARTIAL W/ANASTOMOSIS N/A 9/23/2020    Procedure: RESECTION COLON SIGMOID LAPAROSCOPIC;  Surgeon: Yamel Oliveira MD;  Location: [facility];  Service: Colorectal   • NC SIGMOIDOSCOPY FLX DX W/COLLJ SPEC BR/WA IF PFRMD N/A 9/23/2020    Procedure: Haily Cortez;  Surgeon: Yamel Oliveira MD;  Location: [facility];  Service: Colorectal   • RECTAL PROLAPSE REPAIR LAPAROSCOPIC N/A 9/23/2020    Procedure: Laura La;  Surgeon: Yamel Oliveira MD;  Location: [facility];  Service: Colorectal         Family History:  Family History   Problem Relation Age of Onset   • Endometrial cancer Mother 45   • No Known Problems Father    • No Known Problems Sister    • Breast cancer Daughter 52   • No Known Problems Daughter    • No Known Problems Maternal Grandmother    • No Known Problems Maternal Grandfather    • No Known Problems Paternal Grandmother    • No Known Problems Paternal Grandfather    • " Cancer Son 35        asbestosis related cancer   • No Known Problems Maternal Aunt    • No Known Problems Paternal Aunt          Social History:    Social History     Substance and Sexual Activity   Alcohol Use Yes    Comment: weekends      Social History     Substance and Sexual Activity   Drug Use No     Social History     Tobacco Use   Smoking Status Former   • Years: 2 00   • Types: Cigarettes   Smokeless Tobacco Never   Tobacco Comments    only smoked a couple a day for a couple years in her 25s         Home Medications:   Prior to Admission medications    Medication Sig Start Date End Date Taking? Authorizing Provider   albuterol (2 5 mg/3 mL) 0 083 % nebulizer solution Take 3 mL (2 5 mg total) by nebulization every 6 (six) hours as needed for wheezing or shortness of breath 12/17/22  Yes Paulo Forrester MD   albuterol (PROVENTIL HFA,VENTOLIN HFA) 90 mcg/act inhaler Inhale 2 puffs every 6 (six) hours as needed for wheezing 3/16/23  Yes Paulo Forrester MD   amLODIPine (NORVASC) 5 mg tablet Take 1 tablet (5 mg total) by mouth daily 4/13/23  Yes Paulo Forrester MD   atorvastatin (LIPITOR) 40 mg tablet Take 1 tablet (40 mg total) by mouth daily 4/10/23  Yes Paulo Forrester MD   candesartan (ATACAND) 32 MG tablet Take 1 tablet (32 mg total) by mouth daily 10/17/22  Yes Paulo Forrester MD   docusate sodium (COLACE) 100 mg capsule Take 1 capsule (100 mg total) by mouth 2 (two) times a day as needed for constipation 8/8/22  Yes Paulo Forrester MD   Fluticasone-Salmeterol (Advair) 250-50 mcg/dose inhaler Inhale 1 puff 2 (two) times a day Rinse mouth after use   2/6/23  Yes Paulo Forrester MD   glucosamine-chondroitin 500-400 MG tablet Take 1 tablet by mouth 3 (three) times a day   Yes Historical Provider, MD   glucose blood (FREESTYLE TEST STRIPS) test strip Use as instructed 2/6/23  Yes Paulo Forrester MD   glucose blood (OneTouch Ultra) test strip Use 1 each 2 (two) times a day Test 8/8/22  Yes Paulo Forrester MD   glucose monitoring kit (FREESTYLE) monitoring kit Use 1 each in the morning 2/6/23  Yes Paulo Forrester MD   hydrochlorothiazide (HYDRODIURIL) 12 5 mg tablet Take 1 tablet (12 5 mg total) by mouth daily 3/16/23  Yes Paulo Forrester MD   ibandronate (BONIVA) 150 MG tablet Take 1 tablet (150 mg total) by mouth every 30 (thirty) days 3/20/23  Yes Paulo Forrester MD   ibuprofen (MOTRIN) 800 mg tablet Take by mouth every 6 (six) hours as needed for mild pain   Yes Historical Provider, MD   Lancets (freestyle) lancets Test bid 2/6/23  Yes Paulo Forrester MD   MAGNESIUM PO Take by mouth daily   Yes Historical Provider, MD   meclizine (ANTIVERT) 12 5 MG tablet Take 1 tablet (12 5 mg total) by mouth every 8 (eight) hours as needed for dizziness 3/16/23  Yes Paulo Forrester MD   metFORMIN (GLUCOPHAGE) 850 mg tablet Take 1 tablet (850 mg total) by mouth 2 (two) times a day with meals 2/27/23  Yes Paulo Forrester MD   montelukast (SINGULAIR) 10 mg tablet Take 1 tablet (10 mg total) by mouth daily at bedtime 3/16/23  Yes Paulo Forrester MD   solifenacin (VESICARE) 5 mg tablet Take 1 tablet (5 mg total) by mouth daily 8/8/22  Yes Paulo Forrester MD   acetaminophen (TYLENOL) 325 mg tablet Take 3 tablets (975 mg total) by mouth every 8 (eight) hours  Patient not taking: Reported on 4/14/2023 9/29/19   Fabiana Roth PA-C   methocarbamol (ROBAXIN) 500 mg tablet Take 1 tablet (500 mg total) by mouth 3 (three) times a day as needed for muscle spasms  Patient not taking: Reported on 4/14/2023 12/17/22   Paulo Forrester MD   nitrofurantoin (MACROBID) 100 mg capsule Take 1 capsule (100 mg total) by mouth 2 (two) times a day for 7 days  Patient not taking: Reported on 4/25/2023 4/18/23 4/25/23  Paulo Forrester MD   olopatadine (PATANOL) 0 1 % ophthalmic solution Administer 1 drop to the right eye 2 (two) times a day  Patient not taking: Reported on 4/14/2023 8/30/22   Paulo Forrester MD       Allergies:   Allergies   Allergen Reactions   • Bactrim [Sulfamethoxazole-Trimethoprim] Hives "  • Sulfamethoxazole Rash   • Trimethoprim Rash       Review of Systems:   Review of Systems - History obtained from chart review and the patient  General ROS: negative  Psychological ROS: negative  Ophthalmic ROS: negative  ENT ROS: negative  Allergy and Immunology ROS: negative  Hematological and Lymphatic ROS: negative  Endocrine ROS: negative  Breast ROS: negative  Respiratory ROS: positive for - shortness of breath  negative for - cough, hemoptysis, orthopnea or pleuritic pain  Cardiovascular ROS: no chest pain or dyspnea on exertion  Gastrointestinal ROS: no abdominal pain, change in bowel habits, or black or bloody stools  Genito-Urinary ROS: no dysuria, trouble voiding, or hematuria  Musculoskeletal ROS: positive for - gait disturbance, joint pain and instability (knees) and right rib pain  Neurological ROS: no TIA or stroke symptoms  Dermatological ROS: negative    Vital Signs:   Vitals:    04/25/23 1112 04/25/23 1115   BP: 155/66 144/65   BP Location: Left arm Right arm   Patient Position: Sitting Sitting   Cuff Size: Standard Standard   Pulse: 70    Temp: 98 3 °F (36 8 °C)    TempSrc: Tympanic    SpO2: 98%    Weight: 62 1 kg (136 lb 14 4 oz)    Height: 5' 3\" (1 6 m)        Physical Exam:  General: Alert, oriented, well developed, no acute distress  HEENT/NECK:  PERRLA  No jugular venous distention  Cardiac:Regular rate and rhythm, no murmurs rubs or gallops  Carotid arteries: 2+ pulses, no bruits  Pulmonary:  Breath sounds clear bilaterally  Abdomen:  Non-tender, Non-distended  Positive bowel sounds  Upper extremities: 2+ radial pulses; brisk capillary refill; hands warm  Lower extremities: Extremities warm/dry  PT/DP pulses 2+ bilaterally  No edema B/L  Neuro: Alert and oriented X 3  Sensation is grossly intact  No focal deficits  Musculoskeletal: MAEE, stable gait  Skin: Warm/Dry, without rashes or lesions      Lab Results:   Lab Results   Component Value Date    WBC 8 64 11/22/2022    HGB " 12 4 11/22/2022    HCT 36 7 11/22/2022    MCV 94 11/22/2022     11/22/2022     Lab Results   Component Value Date    SODIUM 138 04/14/2023    K 4 5 04/14/2023    CL 98 04/14/2023    CO2 30 04/14/2023    AGAP 10 04/14/2023    BUN 25 04/14/2023    CREATININE 0 67 04/14/2023    GLUC 124 (H) 11/22/2022    GLUF 172 (H) 04/14/2023    CALCIUM 9 4 04/14/2023    AST 14 04/14/2023    ALT 13 04/14/2023    ALKPHOS 67 04/14/2023    TP 6 8 04/14/2023    TBILI 0 66 04/14/2023    EGFR 85 04/14/2023     Lab Results   Component Value Date    CHOLESTEROL 165 04/14/2023    CHOLESTEROL 157 08/11/2022    CHOLESTEROL 172 05/18/2021     Lab Results   Component Value Date    HDL 51 04/14/2023    HDL 58 08/11/2022    HDL 84 05/18/2021     Lab Results   Component Value Date    TRIG 130 04/14/2023    TRIG 164 (H) 08/11/2022    TRIG 102 05/18/2021     Lab Results   Component Value Date    NONHDLC 114 04/14/2023    [place] 99 08/11/2022    [place] 88 05/18/2021     Lab Results   Component Value Date    HGBA1C 6 4 (H) 04/14/2023     Lab Results   Component Value Date    TROPONINI <0 02 07/29/2021       Imaging Studies:     CT Chest w/o contrast: 3/23/23    Ascending aortic ectasia 3 9 - 4 0 cm    Echocardiogram: 8/5/21    EF 60%  AORTIC VALVE: The valve was trileaflet  Leaflets exhibited normal thickness and normal cuspal separation  DOPPLER: Transaortic velocity was within the normal range  There was no evidence for stenosis   There was mild regurgitation        I have personally reviewed pertinent films in PACS     PCP notes reviewed    Assessment:  Patient Active Problem List    Diagnosis Date Noted   • Chronic right shoulder pain 04/12/2023   • Mild persistent asthma without complication 89/30/9964   • Pancreatic cyst 02/17/2023   • Abnormal mammogram 02/17/2023   • Varicose veins of both lower extremities with pain 09/07/2022   • Allergic rhinitis due to animal hair and dander 09/10/2021   • Leg cramping 09/10/2021   • Other fatigue 08/04/2021   • Tear of right rotator cuff 01/27/2021   • Mild intermittent asthma without complication 25/00/6971   • Rectal prolapse 07/23/2020   • Dizziness 06/04/2020   • Thoracic aortic aneurysm without rupture ([facility] ) 10/14/2019   • Osteoporosis 10/14/2019   • Urge incontinence of urine 08/27/2019   • NGOC (obstructive sleep apnea) 07/09/2019   • Hyperlipidemia 12/09/2018   • Essential hypertension 08/25/2017   • Diabetes mellitus ([facility] ) 08/25/2017   • Hearing loss 04/11/2013       Impression/Plan:    Ascending Aortic Ectasia 3 9-4 0 cm   1) Stable  2) Does not meet surgical criteria  3) Given patients age, modest dilatation of the ascending aorta, normal trileafelt aortic valve and no FH aneurysms, no further f/u in aortic clinic is necessary  4) Continue with routine medical care with PCP with management of HTN, HLD and DM2  Chest CT scan  findings were confirmed and shared with the patient today, her questions were answered and she agrees with treatment plan as stated above  Aortic Aneurysm Instructions were provided to the patient as follows:    1  No lifting more than 50 pounds  Regular aerobic exercise permitted and recommended  2  Maintain a controlled blood pressure with a goal of less than 120/80  3  Follow up in Aortic Clinic as recommended with radiology follow up as instructed  4  Report to the ER or call 911 immediately with the following signs / symptoms: sudden onset of back pain, chest pain or shortness of breath      Routine referral to gastroenterology for colonoscopy screening was not indicated, as the patient is over 76years old    SIGNATURE: JAQUAN Nolasco  DATE: April 25, 2023  TIME: 11:34 AM

## 2023-04-27 ENCOUNTER — TELEPHONE (OUTPATIENT)
Dept: VASCULAR SURGERY | Facility: CLINIC | Age: 78
End: 2023-04-27

## 2023-04-28 ENCOUNTER — TELEPHONE (OUTPATIENT)
Dept: FAMILY MEDICINE CLINIC | Facility: CLINIC | Age: 78
End: 2023-04-28

## 2023-04-28 NOTE — TELEPHONE ENCOUNTER
Patient called in regarding her Meclzine and it not being cover  She needs something else  She is all out of it and she is feeling a little dizzy today

## 2023-04-28 NOTE — TELEPHONE ENCOUNTER
Gabriel JARQUIN Case ID: [id number] - Rx #: [id number]  Need help? Call us at [phone]  Status Sent to Plan today  Drug Meclizine HCl 12  5MG tablets   Form [employer] [place] [address] Prior Authorization Request Form

## 2023-05-02 DIAGNOSIS — N39.41 URGE INCONTINENCE OF URINE: ICD-10-CM

## 2023-05-02 RX ORDER — SOLIFENACIN SUCCINATE 5 MG/1
5 TABLET, FILM COATED ORAL DAILY
Qty: 30 TABLET | Refills: 5 | Status: SHIPPED | OUTPATIENT
Start: 2023-05-02

## 2023-05-04 NOTE — TELEPHONE ENCOUNTER
Denied on April 29  Denied  The drug you requested, MECLIZINE HCL Tablet, is on our formulary but requires prior authorization for members over 72years old  Based on the information submitted, you have not met the following prior authorization criteria: Your doctor must tell us that the benefits of you being on this drug are greater than the risks because it is considered a high risk medication  Your request may be approved if you or your doctor tell us that you have met this criteria or your doctor tells us why you are not able to meet the criteria

## 2023-05-31 DIAGNOSIS — I10 ESSENTIAL HYPERTENSION: Chronic | ICD-10-CM

## 2023-05-31 RX ORDER — CANDESARTAN 32 MG/1
32 TABLET ORAL DAILY
Qty: 90 TABLET | Refills: 1 | Status: SHIPPED | OUTPATIENT
Start: 2023-05-31

## 2023-06-15 ENCOUNTER — OFFICE VISIT (OUTPATIENT)
Dept: VASCULAR SURGERY | Facility: CLINIC | Age: 78
End: 2023-06-15
Payer: MEDICARE

## 2023-06-15 VITALS
BODY MASS INDEX: 23.39 KG/M2 | OXYGEN SATURATION: 97 % | WEIGHT: 132 LBS | HEIGHT: 63 IN | HEART RATE: 70 BPM | DIASTOLIC BLOOD PRESSURE: 70 MMHG | SYSTOLIC BLOOD PRESSURE: 118 MMHG

## 2023-06-15 DIAGNOSIS — I83.813 VARICOSE VEINS OF BOTH LOWER EXTREMITIES WITH PAIN: Primary | ICD-10-CM

## 2023-06-15 PROCEDURE — 99213 OFFICE O/P EST LOW 20 MIN: CPT | Performed by: SURGERY

## 2023-06-15 NOTE — ASSESSMENT & PLAN NOTE
LEVDR was reviewed which did show some reflux in the left GSV however patient's symptoms have resolved and patient stopped wearing compression stockings within the past couple months  She does have some swelling but her biggest complaint is arthritis  We discussed if her symptoms do come back to begin wearing compression again  Recommend the patient remain active and for her swelling to elevate her legs at night  Patient can follow-up as needed

## 2023-06-15 NOTE — PROGRESS NOTES
Assessment/Plan:    Varicose veins of both lower extremities with pain  LEVDR was reviewed which did show some reflux in the left GSV however patient's symptoms have resolved and patient stopped wearing compression stockings within the past couple months  She does have some swelling but her biggest complaint is arthritis  We discussed if her symptoms do come back to begin wearing compression again  Recommend the patient remain active and for her swelling to elevate her legs at night  Patient can follow-up as needed  Diagnoses and all orders for this visit:    Varicose veins of both lower extremities with pain          Subjective:      Patient ID: Camacho Muir is a 69 y o  female  Patient presents for review of LEVDR done 12/07/2022  She c/o occasional BLE weakness, but state overall she has improved  She states she did wear compression, but says they did not benefit her  She states she gets swelling during spring and fall  She is taking Atorvastatin  HPI  71-year-old female presented back in September with left worse than right aching pain and leg swelling here to discuss results of her LE VDR done in December  Patient since her visit in September has been wearing compression stockings  She seems to have stopped in the past couple months  She states her symptoms have resolved other than some swelling  Her biggest complaint is her arthritis      The following portions of the patient's history were reviewed and updated as appropriate: allergies, current medications, past family history, past medical history, past social history, past surgical history and problem list     I have spent a total time of 25 minutes on 06/15/23 in caring for this patient including Diagnostic results, Prognosis, Risks and benefits of tx options, Instructions for management, Patient and family education, Importance of tx compliance, Risk factor reductions, Impressions, Counseling / Coordination of care, Documenting in "the medical record, Reviewing / ordering tests, medicine, procedures   and Obtaining or reviewing history    Review of Systems   Constitutional: Negative  HENT: Negative  Eyes: Negative  Respiratory: Negative  Cardiovascular: Negative  Gastrointestinal: Negative  Endocrine: Negative  Genitourinary: Negative  Musculoskeletal: Negative  Skin: Negative  Allergic/Immunologic: Negative  Neurological: Negative  Hematological: Negative  Psychiatric/Behavioral: Negative  Objective:      /70 (BP Location: Left arm, Patient Position: Sitting, Cuff Size: Standard)   Pulse 70   Ht 5' 3\" (1 6 m)   Wt 59 9 kg (132 lb)   LMP  (LMP Unknown)   SpO2 97%   BMI 23 38 kg/m²          Physical Exam  Constitutional:       Appearance: Normal appearance  HENT:      Head: Normocephalic  Mouth/Throat:      Mouth: Mucous membranes are moist       Pharynx: Oropharynx is clear  Eyes:      Extraocular Movements: Extraocular movements intact  Pupils: Pupils are equal, round, and reactive to light  Cardiovascular:      Rate and Rhythm: Normal rate and regular rhythm  Pulmonary:      Effort: Pulmonary effort is normal    Abdominal:      General: Abdomen is flat  Tenderness: There is no abdominal tenderness  Musculoskeletal:      Cervical back: Normal range of motion  Right lower leg: No edema  Left lower leg: No edema  Skin:     General: Skin is warm and dry  Neurological:      General: No focal deficit present  Mental Status: She is alert and oriented to person, place, and time     Psychiatric:         Mood and Affect: Mood normal          Behavior: Behavior normal          "

## 2023-07-20 LAB
LEFT EYE DIABETIC RETINOPATHY: NORMAL
RIGHT EYE DIABETIC RETINOPATHY: NORMAL

## 2023-07-24 ENCOUNTER — TELEPHONE (OUTPATIENT)
Dept: FAMILY MEDICINE CLINIC | Facility: CLINIC | Age: 78
End: 2023-07-24

## 2023-07-24 DIAGNOSIS — Z12.31 SCREENING MAMMOGRAM FOR BREAST CANCER: Primary | ICD-10-CM

## 2023-08-16 ENCOUNTER — OFFICE VISIT (OUTPATIENT)
Dept: FAMILY MEDICINE CLINIC | Facility: CLINIC | Age: 78
End: 2023-08-16
Payer: MEDICARE

## 2023-08-16 VITALS
OXYGEN SATURATION: 97 % | WEIGHT: 130 LBS | DIASTOLIC BLOOD PRESSURE: 76 MMHG | SYSTOLIC BLOOD PRESSURE: 144 MMHG | HEIGHT: 63 IN | BODY MASS INDEX: 23.04 KG/M2 | HEART RATE: 76 BPM | TEMPERATURE: 97.7 F

## 2023-08-16 DIAGNOSIS — Z12.31 ENCOUNTER FOR SCREENING MAMMOGRAM FOR BREAST CANCER: ICD-10-CM

## 2023-08-16 DIAGNOSIS — I10 ESSENTIAL HYPERTENSION: ICD-10-CM

## 2023-08-16 DIAGNOSIS — E78.2 MIXED HYPERLIPIDEMIA: Chronic | ICD-10-CM

## 2023-08-16 DIAGNOSIS — M81.0 AGE-RELATED OSTEOPOROSIS WITHOUT CURRENT PATHOLOGICAL FRACTURE: ICD-10-CM

## 2023-08-16 DIAGNOSIS — E11.9 TYPE 2 DIABETES MELLITUS WITHOUT COMPLICATION, WITHOUT LONG-TERM CURRENT USE OF INSULIN (HCC): Chronic | ICD-10-CM

## 2023-08-16 DIAGNOSIS — Z00.00 MEDICARE ANNUAL WELLNESS VISIT, SUBSEQUENT: Primary | ICD-10-CM

## 2023-08-16 PROBLEM — J45.20 MILD INTERMITTENT ASTHMA WITHOUT COMPLICATION: Status: RESOLVED | Noted: 2020-12-16 | Resolved: 2023-08-16

## 2023-08-16 PROCEDURE — 99214 OFFICE O/P EST MOD 30 MIN: CPT | Performed by: FAMILY MEDICINE

## 2023-08-16 PROCEDURE — G0439 PPPS, SUBSEQ VISIT: HCPCS | Performed by: FAMILY MEDICINE

## 2023-08-16 NOTE — PROGRESS NOTES
Assessment and Plan:     Problem List Items Addressed This Visit        Endocrine    Diabetes mellitus ([address]) (Chronic)       Lab Results   Component Value Date    HGBA1C 6.4 (H) 04/14/2023   await lab         Relevant Medications    metFORMIN (GLUCOPHAGE) 850 mg tablet    Other Relevant Orders    Comprehensive metabolic panel    Hemoglobin A1C    Albumin / creatinine urine ratio       Cardiovascular and Mediastinum    Essential hypertension     BP up a  Tad, under a  Lot  Of stress with harassment  From son's  boss            Musculoskeletal and Integument    Osteoporosis    Relevant Orders    DXA bone density spine hip and pelvis       Other    Hyperlipidemia (Chronic)     Await lab         Relevant Orders    Lipid panel    TSH, 3rd generation   Other Visit Diagnoses     Medicare annual wellness visit, subsequent    -  Primary    Encounter for screening mammogram for breast cancer        Relevant Orders    Mammo screening bilateral w 3d & cad          Depression Screening and Follow-up Plan: Patient was screened for depression during today's encounter. They screened negative with a PHQ-2 score of 0. Preventive health issues were discussed with patient, and age appropriate screening tests were ordered as noted in patient's After Visit Summary. Personalized health advice and appropriate referrals for health education or preventive services given if needed, as noted in patient's After Visit Summary. History of Present Illness:     Patient presents for a Medicare Wellness Visit    Here  For f/u diabetes, lipids. Awv, has  Been under  Stress with issues  From son's girlfriend and  Son's  Boss harassing family     Patient Care Team:  Suleiman Card MD as PCP - General (Family Medicine)  Norman Rizvi MD (Inactive)     Review of Systems:     Review of Systems   Constitutional: Negative for activity change, appetite change and fatigue. Respiratory: Negative for shortness of breath.     Cardiovascular: Negative for chest pain. Neurological: Positive for numbness. Negative for dizziness and headaches. Occ numbness in legs, better  With magnesium   Psychiatric/Behavioral: Negative for dysphoric mood. The patient is nervous/anxious. Problem List:     Patient Active Problem List   Diagnosis   • Essential hypertension   • Diabetes mellitus ([address])   • Hyperlipidemia   • Hearing loss   • NGOC (obstructive sleep apnea)   • Urge incontinence of urine   • Thoracic aortic aneurysm without rupture (HCC)   • Osteoporosis   • Dizziness   • Rectal prolapse   • Tear of right rotator cuff   • Other fatigue   • Allergic rhinitis due to animal hair and dander   • Leg cramping   • Varicose veins of both lower extremities with pain   • Pancreatic cyst   • Abnormal mammogram   • Mild persistent asthma without complication   • Chronic right shoulder pain      Past Medical and Surgical History:     Past Medical History:   Diagnosis Date   • Asthma    • Colon polyp    • Diabetes mellitus ([address])    • E coli infection    • Fall    • Hyperlipidemia    • Hypertension    • Pneumothorax    • Rectal prolapse 09/03/2020   • Sleep apnea     no CPAP   • Subarachnoid hemorrhage (HCC)    • Thoracic aortic aneurysm ([address])    • Vertigo      Past Surgical History:   Procedure Laterality Date   • BONE GRAFT      R arm   • BRONCHOSCOPY N/A 3/16/2016    Procedure: BRONCHOSCOPY FLEXIBLE/anesth.;  Surgeon: Candi Gonzalez DO;  Location: [facility];   Service:    • COLONOSCOPY     • COLONOSCOPY     • EYE SURGERY     • OOPHORECTOMY Left     age 49   • FL LAPAROSCOPY COLECTOMY PARTIAL W/ANASTOMOSIS N/A 9/23/2020    Procedure: RESECTION COLON SIGMOID LAPAROSCOPIC;  Surgeon: Rosmery Sandoval MD;  Location: [facility];  Service: Colorectal   • FL SIGMOIDOSCOPY FLX DX W/COLLJ SPEC BR/WA IF PFRMD N/A 9/23/2020    Procedure: SIGMOIDOSCOPY FLEXIBLE;  Surgeon: Rosmery Sandoval MD;  Location: [facility];  Service: Colorectal   • RECTAL PROLAPSE REPAIR LAPAROSCOPIC N/A 9/23/2020    Procedure: Lorraine Diaz;  Surgeon: Dorian Ha MD;  Location: [facility];  Service: Colorectal      Family History:     Family History   Problem Relation Age of Onset   • Endometrial cancer Mother 45   • No Known Problems Father    • No Known Problems Sister    • Breast cancer Daughter 52   • No Known Problems Daughter    • No Known Problems Maternal Grandmother    • No Known Problems Maternal Grandfather    • No Known Problems Paternal Grandmother    • No Known Problems Paternal Grandfather    • Cancer Son 35        asbestosis related cancer   • No Known Problems Maternal Aunt    • No Known Problems Paternal Aunt       Social History:     Social History     Socioeconomic History   • Marital status: /Civil Union     Spouse name: None   • Number of children: None   • Years of education: None   • Highest education level: None   Occupational History   • None   Tobacco Use   • Smoking status: Former     Years: 2.00     Types: Cigarettes   • Smokeless tobacco: Never   • Tobacco comments:     only smoked a couple a day for a couple years in her 25s   Vaping Use   • Vaping Use: Never used   Substance and Sexual Activity   • Alcohol use: Yes     Comment: weekends    • Drug use: No   • Sexual activity: None   Other Topics Concern   • None   Social History Narrative    Lives with daughter and son and boyfriend    4  Children,3 alive 25 grandcchildren     Social Determinants of Health     Financial Resource Strain: Low Risk  (8/16/2023)    Overall Financial Resource Strain (CARDIA)    • Difficulty of Paying Living Expenses: Not hard at all   Food Insecurity: Not on file   Transportation Needs: No Transportation Needs (8/16/2023)    PRAPARE - Transportation    • Lack of Transportation (Medical): No    • Lack of Transportation (Non-Medical):  No   Physical Activity: Not on file   Stress: Not on file   Social Connections: Not on file   Intimate Partner Violence: Not on file   Housing Stability: Not on file      Medications and Allergies:     Current Outpatient Medications   Medication Sig Dispense Refill   • albuterol (2.5 mg/3 mL) 0.083 % nebulizer solution Take 3 mL (2.5 mg total) by nebulization every 6 (six) hours as needed for wheezing or shortness of breath 150 mL 3   • albuterol (PROVENTIL HFA,VENTOLIN HFA) 90 mcg/act inhaler Inhale 2 puffs every 6 (six) hours as needed for wheezing 8.5 g 5   • amLODIPine (NORVASC) 5 mg tablet Take 1 tablet (5 mg total) by mouth daily 90 tablet 1   • atorvastatin (LIPITOR) 40 mg tablet Take 1 tablet (40 mg total) by mouth daily 30 tablet 5   • candesartan (ATACAND) 32 MG tablet Take 1 tablet (32 mg total) by mouth daily 90 tablet 1   • docusate sodium (COLACE) 100 mg capsule Take 1 capsule (100 mg total) by mouth 2 (two) times a day as needed for constipation 60 capsule 3   • Fluticasone-Salmeterol (Advair) 250-50 mcg/dose inhaler Inhale 1 puff 2 (two) times a day Rinse mouth after use.  60 blister 3   • glucosamine-chondroitin 500-400 MG tablet Take 1 tablet by mouth 3 (three) times a day     • glucose blood (FREESTYLE TEST STRIPS) test strip Use as instructed 200 strip 1   • glucose blood (OneTouch Ultra) test strip Use 1 each 2 (two) times a day Test 200 strip 1   • glucose monitoring kit (FREESTYLE) monitoring kit Use 1 each in the morning 1 each 0   • hydrochlorothiazide (HYDRODIURIL) 12.5 mg tablet Take 1 tablet (12.5 mg total) by mouth daily 90 tablet 1   • ibandronate (BONIVA) 150 MG tablet Take 1 tablet (150 mg total) by mouth every 30 (thirty) days 1 tablet 6   • ibuprofen (MOTRIN) 800 mg tablet Take by mouth every 6 (six) hours as needed for mild pain     • Lancets (freestyle) lancets Test bid 200 each 1   • MAGNESIUM PO Take by mouth daily     • metFORMIN (GLUCOPHAGE) 850 mg tablet Take 1 tablet (850 mg total) by mouth 2 (two) times a day with meals 60 tablet 5   • solifenacin (VESICARE) 5 mg tablet Take 1 tablet (5 mg total) by mouth daily 30 tablet 5     No current facility-administered medications for this visit. Allergies   Allergen Reactions   • Bactrim [Sulfamethoxazole-Trimethoprim] Hives   • Sulfamethoxazole Rash   • Trimethoprim Rash      Immunizations:     Immunization History   Administered Date(s) Administered   • COVID-19 MODERNA VACC 0.5 ML IM 02/12/2021, 03/11/2021, 01/08/2022, 01/08/2022   • COVID-19 Pfizer Vac BIVALENT [place]-sucrose 12 Yr+ IM (BOOSTER ONLY) 11/04/2022   • H1N1, All Formulations 12/09/2009, 02/22/2010   • INFLUENZA 09/20/2014, 08/07/2015, 09/25/2018, 09/28/2021, 11/04/2022   • Influenza, high dose seasonal 0.7 mL 10/14/2019, 09/04/2020   • Pneumococcal Conjugate 13-Valent 02/21/2019   • Pneumococcal Polysaccharide PPV23 04/30/2020   • Tdap 08/15/2013, 07/02/2021      Health Maintenance:         Topic Date Due   • Breast Cancer Screening: Mammogram  08/19/2023   • DXA SCAN  08/31/2023   • Colorectal Cancer Screening  08/13/2025   • Hepatitis C Screening  Completed         Topic Date Due   • COVID-19 Vaccine (6 - Moderna series) 03/04/2023   • Influenza Vaccine (1) 09/01/2023      Medicare Screening Tests and Risk Assessments:     Tamela Georges is here for her Subsequent Wellness visit. Health Risk Assessment:   Patient rates overall health as very good. Patient feels that their physical health rating is same. Patient is satisfied with their life. Eyesight was rated as same. Hearing was rated as same. Patient feels that their emotional and mental health rating is same. Patients states they are never, rarely angry. Pain experienced in the last 7 days has been none. Patient states that she has experienced no weight loss or gain in last 6 months. Depression Screening:   PHQ-2 Score: 0      Fall Risk Screening: In the past year, patient has experienced: no history of falling in past year      Urinary Incontinence Screening:   Patient has leaked urine accidently in the last six months.      Home Safety:  Patient does not have trouble with stairs inside or outside of their home. Patient has working smoke alarms and has working carbon monoxide detector. Home safety hazards include: none. Nutrition:   Current diet is Regular. Medications:   Patient is currently taking over-the-counter supplements. OTC medications include: see medication list. Patient is able to manage medications. Activities of Daily Living (ADLs)/Instrumental Activities of Daily Living (IADLs):   Walk and transfer into and out of bed and chair?: Yes  Dress and groom yourself?: Yes    Bathe or shower yourself?: Yes    Feed yourself?  Yes  Do your laundry/housekeeping?: Yes  Manage your money, pay your bills and track your expenses?: Yes  Make your own meals?: Yes    Do your own shopping?: Yes    Previous Hospitalizations:   Any hospitalizations or ED visits within the last 12 months?: No      Advance Care Planning:   Living will: No    Durable POA for healthcare: No    Five wishes given: Yes      Cognitive Screening:   Provider or family/friend/caregiver concerned regarding cognition?: No    PREVENTIVE SCREENINGS      Cardiovascular Screening:    General: History Lipid Disorder and Screening Current      Diabetes Screening:     General: History Diabetes and Screening Current      Colorectal Cancer Screening:     General: Screening Current      Breast Cancer Screening:     General: Screening Current    Due for: Mammogram        Cervical Cancer Screening:    General: Screening Not Indicated      Osteoporosis Screening:    General: History Osteoporosis and Screening Current      Abdominal Aortic Aneurysm (AAA) Screening:        General: Screening Not Indicated      Lung Cancer Screening:     General: Screening Not Indicated      Hepatitis C Screening:    General: Screening Current    Screening, Brief Intervention, and Referral to Treatment (SBIRT)    Screening  Typical number of drinks in a day: 0  Typical number of drinks in a week: 0  Interpretation: Low risk drinking behavior. Single Item Drug Screening:  How often have you used an illegal drug (including marijuana) or a prescription medication for non-medical reasons in the past year? never    Single Item Drug Screen Score: 0  Interpretation: Negative screen for possible drug use disorder    No results found. Physical Exam:   Diabetic Foot Exam    Patient's shoes and socks removed. Right Foot/Ankle   Right Foot Inspection  Skin Exam: skin normal and skin intact. No dry skin, no warmth, no callus, no erythema, no maceration, no abnormal color, no pre-ulcer, no ulcer and no callus. Toe Exam: ROM and strength within normal limits. Sensory   Monofilament testing: intact    Vascular  Capillary refills: < 3 seconds  The right DP pulse is 2+. The right PT pulse is 2+. Left Foot/Ankle  Left Foot Inspection  Skin Exam: skin normal and skin intact. No dry skin, no warmth, no erythema, no maceration, normal color, no pre-ulcer, no ulcer and no callus. Toe Exam: ROM and strength within normal limits. Sensory   Monofilament testing: intact    Vascular  Capillary refills: < 3 seconds  The left DP pulse is 2+. The left PT pulse is 2+. Assign Risk Category  No deformity present  No loss of protective sensation  No weak pulses  Risk: 0    /76 (BP Location: Right arm, Patient Position: Sitting, Cuff Size: Standard)   Pulse 76   Temp 97.7 °F (36.5 °C) (Temporal)   Ht 5' 3" (1.6 m)   Wt 59 kg (130 lb)   LMP  (LMP Unknown)   SpO2 97%   BMI 23.03 kg/m²     Physical Exam  Vitals reviewed. Constitutional:       Appearance: Normal appearance. Neck:      Vascular: No carotid bruit. Cardiovascular:      Rate and Rhythm: Normal rate and regular rhythm. Pulses: Normal pulses. no weak pulses          Dorsalis pedis pulses are 2+ on the right side and 2+ on the left side. Posterior tibial pulses are 2+ on the right side and 2+ on the left side.       Heart sounds: Normal heart sounds. Pulmonary:      Effort: Pulmonary effort is normal.      Breath sounds: Normal breath sounds. Musculoskeletal:      Right lower leg: No edema. Left lower leg: No edema. Feet:      Right foot:      Skin integrity: No ulcer, skin breakdown, erythema, warmth, callus or dry skin. Left foot:      Skin integrity: No ulcer, skin breakdown, erythema, warmth, callus or dry skin. Lymphadenopathy:      Cervical: No cervical adenopathy. Neurological:      Mental Status: She is alert.           Shorty Garcia MD

## 2023-08-22 ENCOUNTER — APPOINTMENT (OUTPATIENT)
Dept: LAB | Facility: HOSPITAL | Age: 78
End: 2023-08-22
Payer: MEDICARE

## 2023-08-22 DIAGNOSIS — E11.9 TYPE 2 DIABETES MELLITUS WITHOUT COMPLICATION, WITHOUT LONG-TERM CURRENT USE OF INSULIN (HCC): Chronic | ICD-10-CM

## 2023-08-22 DIAGNOSIS — E78.2 MIXED HYPERLIPIDEMIA: Chronic | ICD-10-CM

## 2023-08-22 LAB
ALBUMIN SERPL BCP-MCNC: 4.2 G/DL (ref 3.5–5)
ALP SERPL-CCNC: 68 U/L (ref 34–104)
ALT SERPL W P-5'-P-CCNC: 9 U/L (ref 7–52)
ANION GAP SERPL CALCULATED.3IONS-SCNC: 8 MMOL/L
AST SERPL W P-5'-P-CCNC: 13 U/L (ref 13–39)
BILIRUB SERPL-MCNC: 0.74 MG/DL (ref 0.2–1)
BUN SERPL-MCNC: 13 MG/DL (ref 5–25)
CALCIUM SERPL-MCNC: 9.4 MG/DL (ref 8.4–10.2)
CHLORIDE SERPL-SCNC: 95 MMOL/L (ref 96–108)
CHOLEST SERPL-MCNC: 126 MG/DL
CO2 SERPL-SCNC: 32 MMOL/L (ref 21–32)
CREAT SERPL-MCNC: 0.53 MG/DL (ref 0.6–1.3)
CREAT UR-MCNC: 18.9 MG/DL
EST. AVERAGE GLUCOSE BLD GHB EST-MCNC: 137 MG/DL
GFR SERPL CREATININE-BSD FRML MDRD: 91 ML/MIN/1.73SQ M
GLUCOSE P FAST SERPL-MCNC: 142 MG/DL (ref 65–99)
HBA1C MFR BLD: 6.4 %
HDLC SERPL-MCNC: 55 MG/DL
LDLC SERPL CALC-MCNC: 52 MG/DL (ref 0–100)
MICROALBUMIN UR-MCNC: 46 MG/L (ref 0–20)
MICROALBUMIN/CREAT 24H UR: 243 MG/G CREATININE (ref 0–30)
NONHDLC SERPL-MCNC: 71 MG/DL
POTASSIUM SERPL-SCNC: 4.2 MMOL/L (ref 3.5–5.3)
PROT SERPL-MCNC: 6.5 G/DL (ref 6.4–8.4)
SODIUM SERPL-SCNC: 135 MMOL/L (ref 135–147)
TRIGL SERPL-MCNC: 95 MG/DL
TSH SERPL DL<=0.05 MIU/L-ACNC: 1.45 UIU/ML (ref 0.45–4.5)

## 2023-08-22 PROCEDURE — 83036 HEMOGLOBIN GLYCOSYLATED A1C: CPT

## 2023-08-22 PROCEDURE — 80053 COMPREHEN METABOLIC PANEL: CPT

## 2023-08-22 PROCEDURE — 36415 COLL VENOUS BLD VENIPUNCTURE: CPT

## 2023-08-22 PROCEDURE — 84443 ASSAY THYROID STIM HORMONE: CPT

## 2023-08-22 PROCEDURE — 80061 LIPID PANEL: CPT

## 2023-08-22 PROCEDURE — 82043 UR ALBUMIN QUANTITATIVE: CPT

## 2023-08-22 PROCEDURE — 82570 ASSAY OF URINE CREATININE: CPT

## 2023-08-23 DIAGNOSIS — E11.9 TYPE 2 DIABETES MELLITUS WITHOUT COMPLICATION, WITHOUT LONG-TERM CURRENT USE OF INSULIN (HCC): ICD-10-CM

## 2023-08-23 DIAGNOSIS — R80.9 PROTEINURIA, UNSPECIFIED TYPE: ICD-10-CM

## 2023-08-23 DIAGNOSIS — I10 ESSENTIAL HYPERTENSION: Primary | ICD-10-CM

## 2023-08-23 RX ORDER — IRBESARTAN 300 MG/1
300 TABLET ORAL
Qty: 90 TABLET | Refills: 1 | Status: SHIPPED | OUTPATIENT
Start: 2023-08-23

## 2023-09-08 DIAGNOSIS — I10 ESSENTIAL HYPERTENSION: Chronic | ICD-10-CM

## 2023-09-08 RX ORDER — HYDROCHLOROTHIAZIDE 12.5 MG/1
12.5 TABLET ORAL DAILY
Qty: 90 TABLET | Refills: 1 | Status: SHIPPED | OUTPATIENT
Start: 2023-09-08

## 2023-10-03 DIAGNOSIS — J45.30 MILD PERSISTENT ASTHMA WITHOUT COMPLICATION: ICD-10-CM

## 2023-10-03 RX ORDER — FLUTICASONE PROPIONATE AND SALMETEROL 250; 50 UG/1; UG/1
1 POWDER RESPIRATORY (INHALATION) 2 TIMES DAILY
Qty: 60 BLISTER | Refills: 3 | Status: SHIPPED | OUTPATIENT
Start: 2023-10-03

## 2023-10-18 DIAGNOSIS — I10 ESSENTIAL HYPERTENSION: Chronic | ICD-10-CM

## 2023-10-18 DIAGNOSIS — M81.0 AGE-RELATED OSTEOPOROSIS WITHOUT CURRENT PATHOLOGICAL FRACTURE: ICD-10-CM

## 2023-10-18 RX ORDER — AMLODIPINE BESYLATE 5 MG/1
5 TABLET ORAL DAILY
Qty: 90 TABLET | Refills: 1 | Status: SHIPPED | OUTPATIENT
Start: 2023-10-18

## 2023-10-18 RX ORDER — IBANDRONATE SODIUM 150 MG/1
150 TABLET, FILM COATED ORAL
Qty: 1 TABLET | Refills: 6 | Status: SHIPPED | OUTPATIENT
Start: 2023-10-18

## 2023-11-20 ENCOUNTER — TELEPHONE (OUTPATIENT)
Dept: FAMILY MEDICINE CLINIC | Facility: CLINIC | Age: 78
End: 2023-11-20

## 2023-11-20 DIAGNOSIS — J45.30 MILD PERSISTENT ASTHMA WITHOUT COMPLICATION: Primary | ICD-10-CM

## 2023-11-20 RX ORDER — MONTELUKAST SODIUM 10 MG/1
10 TABLET ORAL
Qty: 90 TABLET | Refills: 1 | Status: SHIPPED | OUTPATIENT
Start: 2023-11-20

## 2023-11-20 NOTE — TELEPHONE ENCOUNTER
Patient LVM requesting a refill on Montelukast. I do not see it on her med list.  Please review and send if ok.

## 2023-11-21 ENCOUNTER — APPOINTMENT (EMERGENCY)
Dept: RADIOLOGY | Facility: HOSPITAL | Age: 78
End: 2023-11-21
Payer: MEDICARE

## 2023-11-21 ENCOUNTER — TELEPHONE (OUTPATIENT)
Dept: FAMILY MEDICINE CLINIC | Facility: CLINIC | Age: 78
End: 2023-11-21

## 2023-11-21 ENCOUNTER — HOSPITAL ENCOUNTER (EMERGENCY)
Facility: HOSPITAL | Age: 78
Discharge: HOME | End: 2023-11-21
Attending: EMERGENCY MEDICINE
Payer: MEDICARE

## 2023-11-21 VITALS
OXYGEN SATURATION: 92 % | BODY MASS INDEX: 24.2 KG/M2 | WEIGHT: 136.6 LBS | TEMPERATURE: 98.1 F | DIASTOLIC BLOOD PRESSURE: 92 MMHG | SYSTOLIC BLOOD PRESSURE: 167 MMHG | HEART RATE: 83 BPM | RESPIRATION RATE: 16 BRPM

## 2023-11-21 DIAGNOSIS — M10.9 ACUTE GOUT, UNSPECIFIED CAUSE, UNSPECIFIED SITE: Primary | ICD-10-CM

## 2023-11-21 DIAGNOSIS — M25.531 PAIN AND SWELLING OF RIGHT WRIST: Primary | ICD-10-CM

## 2023-11-21 DIAGNOSIS — M25.431 PAIN AND SWELLING OF RIGHT WRIST: Primary | ICD-10-CM

## 2023-11-21 PROCEDURE — 99285 EMERGENCY DEPT VISIT HI MDM: CPT

## 2023-11-21 PROCEDURE — 96372 THER/PROPH/DIAG INJ SC/IM: CPT

## 2023-11-21 PROCEDURE — 73110 X-RAY EXAM OF WRIST: CPT

## 2023-11-21 PROCEDURE — 99283 EMERGENCY DEPT VISIT LOW MDM: CPT

## 2023-11-21 PROCEDURE — 73130 X-RAY EXAM OF HAND: CPT

## 2023-11-21 RX ORDER — METHYLPREDNISOLONE 4 MG/1
TABLET ORAL
Qty: 21 EACH | Refills: 0 | Status: SHIPPED | OUTPATIENT
Start: 2023-11-21

## 2023-11-21 RX ORDER — COLCHICINE 0.6 MG/1
1.2 TABLET ORAL ONCE
Status: COMPLETED | OUTPATIENT
Start: 2023-11-21 | End: 2023-11-21

## 2023-11-21 RX ORDER — LIDOCAINE 40 MG/G
CREAM TOPICAL 2 TIMES DAILY PRN
Qty: 28 G | Refills: 0 | Status: SHIPPED | OUTPATIENT
Start: 2023-11-21

## 2023-11-21 RX ORDER — KETOROLAC TROMETHAMINE 30 MG/ML
15 INJECTION, SOLUTION INTRAMUSCULAR; INTRAVENOUS ONCE
Status: COMPLETED | OUTPATIENT
Start: 2023-11-21 | End: 2023-11-21

## 2023-11-21 RX ORDER — COLCHICINE 0.6 MG/1
0.6 TABLET ORAL 2 TIMES DAILY
Qty: 4 TABLET | Refills: 0 | Status: SHIPPED | OUTPATIENT
Start: 2023-11-22 | End: 2023-11-24

## 2023-11-21 RX ADMIN — COLCHICINE 1.2 MG: 0.6 TABLET, FILM COATED ORAL at 09:41

## 2023-11-21 RX ADMIN — KETOROLAC TROMETHAMINE 15 MG: 30 INJECTION, SOLUTION INTRAMUSCULAR; INTRAVENOUS at 09:29

## 2023-11-21 NOTE — TELEPHONE ENCOUNTER
Pt called stating she had gone to the doctors for her gout after three weeks, and they had prescribed her colchicine, 0.6mg. Pt states she's not supposed to start taking it until tomorrow, but is in significant pain. Wondering if  could order colchicine sooner, or pain medication to the [facility] pharmacy on [address] [place] Pt can be called back at [phone].

## 2023-11-21 NOTE — ED PROVIDER NOTES
History  Chief Complaint   Patient presents with    Hand Pain     Pt reports right hand and wrist pain x 3 weeks. Pt denies falls/trauma. 61-year-old female past medical history of hypertension, diabetes mellitus, hyperlipidemia, SAH, TAA, asthma presents to emergency department complaining of right hand and wrist pain for 3 weeks. Denies fall, trauma/injury. Reports swelling, pain, decreased range of motion due to pain. Denies fever, chills, erythema, warmth, numbness, weakness, wound. Denies arthralgia elsewhere or other joint swelling. Denies previous injury to the area. History provided by:  Patient  Hand Pain  Associated symptoms: no chest pain, no fever, no rash and no shortness of breath        Prior to Admission Medications   Prescriptions Last Dose Informant Patient Reported? Taking? Fluticasone-Salmeterol (Advair) 250-50 mcg/dose inhaler   No No   Sig: Inhale 1 puff 2 (two) times a day Rinse mouth after use.    Lancets (freestyle) lancets  Self No No   Sig: Test bid   MAGNESIUM PO  Self Yes No   Sig: Take by mouth daily   albuterol (2.5 mg/3 mL) 0.083 % nebulizer solution  Self No No   Sig: Take 3 mL (2.5 mg total) by nebulization every 6 (six) hours as needed for wheezing or shortness of breath   albuterol (PROVENTIL HFA,VENTOLIN HFA) 90 mcg/act inhaler  Self No No   Sig: Inhale 2 puffs every 6 (six) hours as needed for wheezing   amLODIPine (NORVASC) 5 mg tablet   No No   Sig: Take 1 tablet (5 mg total) by mouth daily   atorvastatin (LIPITOR) 40 mg tablet  Self No No   Sig: Take 1 tablet (40 mg total) by mouth daily   docusate sodium (COLACE) 100 mg capsule  Self No No   Sig: Take 1 capsule (100 mg total) by mouth 2 (two) times a day as needed for constipation   glucosamine-chondroitin 500-400 MG tablet  Self Yes No   Sig: Take 1 tablet by mouth 3 (three) times a day   glucose blood (FREESTYLE TEST STRIPS) test strip  Self No No   Sig: Use as instructed   glucose blood (OneTouch Ultra) test strip  Self No No   Sig: Use 1 each 2 (two) times a day Test   glucose monitoring kit (FREESTYLE) monitoring kit  Self No No   Sig: Use 1 each in the morning   hydrochlorothiazide (HYDRODIURIL) 12.5 mg tablet   No No   Sig: Take 1 tablet (12.5 mg total) by mouth daily   ibandronate (BONIVA) 150 MG tablet   No No   Sig: Take 1 tablet (150 mg total) by mouth every 30 (thirty) days   ibuprofen (MOTRIN) 800 mg tablet  Self Yes No   Sig: Take by mouth every 6 (six) hours as needed for mild pain   irbesartan (AVAPRO) 300 mg tablet   No No   Sig: Take 1 tablet (300 mg total) by mouth daily at bedtime   metFORMIN (GLUCOPHAGE) 850 mg tablet   No No   Sig: Take 1 tablet (850 mg total) by mouth 2 (two) times a day with meals   montelukast (SINGULAIR) 10 mg tablet   No No   Sig: Take 1 tablet (10 mg total) by mouth daily at bedtime   solifenacin (VESICARE) 5 mg tablet   No No   Sig: Take 1 tablet (5 mg total) by mouth daily      Facility-Administered Medications: None       Past Medical History:   Diagnosis Date    Asthma     Colon polyp     Diabetes mellitus ([address])     E coli infection     Fall     Hyperlipidemia     Hypertension     Pneumothorax     Rectal prolapse 09/03/2020    Sleep apnea     no CPAP    Subarachnoid hemorrhage (HCC)     Thoracic aortic aneurysm (HCC)     Vertigo        Past Surgical History:   Procedure Laterality Date    BONE GRAFT      R arm    BRONCHOSCOPY N/A 3/16/2016    Procedure: BRONCHOSCOPY FLEXIBLE/anesth.;  Surgeon: Memo Christianson DO;  Location: [facility];   Service:     COLONOSCOPY      COLONOSCOPY      EYE SURGERY      OOPHORECTOMY Left     age 49    IN LAPAROSCOPY COLECTOMY PARTIAL W/ANASTOMOSIS N/A 9/23/2020    Procedure: RESECTION COLON SIGMOID LAPAROSCOPIC;  Surgeon: Tenny Rubinstein, MD;  Location: [facility];  Service: Colorectal    IN SIGMOIDOSCOPY FLX DX W/COLLJ SPEC BR/WA IF PFRMD N/A 9/23/2020    Procedure: Osmar Berman;  Surgeon: Tenny Rubinstein, MD;  Location: [facility];  Service: Colorectal    RECTAL PROLAPSE REPAIR LAPAROSCOPIC N/A 9/23/2020    Procedure: Amarilys Porter;  Surgeon: Steven Jackson MD;  Location: [facility];  Service: Colorectal       Family History   Problem Relation Age of Onset    Endometrial cancer Mother 45    No Known Problems Father     No Known Problems Sister     Breast cancer Daughter 52    No Known Problems Daughter     No Known Problems Maternal Grandmother     No Known Problems Maternal Grandfather     No Known Problems Paternal Grandmother     No Known Problems Paternal Grandfather     Cancer Son 35        asbestosis related cancer    No Known Problems Maternal Aunt     No Known Problems Paternal Aunt      I have reviewed and agree with the history as documented. E-Cigarette/Vaping    E-Cigarette Use Never User      E-Cigarette/Vaping Substances    Nicotine No     THC No     CBD No     Flavoring No     Other No     Unknown No      Social History     Tobacco Use    Smoking status: Former     Years: 2.00     Types: Cigarettes    Smokeless tobacco: Never    Tobacco comments:     only smoked a couple a day for a couple years in her 25s   Vaping Use    Vaping Use: Never used   Substance Use Topics    Alcohol use: Yes     Comment: weekends     Drug use: No       Review of Systems   Constitutional:  Negative for chills and fever. Respiratory:  Negative for shortness of breath. Cardiovascular:  Negative for chest pain. Musculoskeletal:  Positive for arthralgias and joint swelling. Negative for neck pain. Skin:  Negative for color change, rash and wound. Neurological:  Negative for weakness and numbness. All other systems reviewed and are negative. Physical Exam  Physical Exam  Vitals and nursing note reviewed. Constitutional:       General: She is not in acute distress. Appearance: Normal appearance. She is not ill-appearing. HENT:      Head: Normocephalic and atraumatic.    Cardiovascular: Rate and Rhythm: Normal rate and regular rhythm. Pulses:           Radial pulses are 2+ on the right side and 2+ on the left side. Pulmonary:      Effort: Pulmonary effort is normal.   Musculoskeletal:        Hands:       Cervical back: Neck supple. Skin:     General: Skin is warm and dry. Capillary Refill: Capillary refill takes less than 2 seconds. Findings: No bruising, erythema or rash. Neurological:      Mental Status: She is alert. Gait: Gait normal.   Psychiatric:         Mood and Affect: Mood normal.         Behavior: Behavior normal.         Vital Signs  ED Triage Vitals   Temperature Pulse Respirations Blood Pressure SpO2   11/21/23 0821 11/21/23 0821 11/21/23 0821 11/21/23 0821 11/21/23 0821   98.1 °F (36.7 °C) 83 16 167/92 92 %      Temp Source Heart Rate Source Patient Position - Orthostatic VS BP Location FiO2 (%)   11/21/23 0821 11/21/23 0821 11/21/23 0821 11/21/23 0821 --   Tympanic Monitor Sitting Left arm       Pain Score       11/21/23 0929       10 - Worst Possible Pain           Vitals:    11/21/23 0821   BP: 167/92   Pulse: 83   Patient Position - Orthostatic VS: Sitting         Visual Acuity      ED Medications  Medications   ketorolac (TORADOL) injection 15 mg (15 mg Intramuscular Given 11/21/23 0929)   colchicine (COLCRYS) tablet 1.2 mg (1.2 mg Oral Given 11/21/23 0941)       Diagnostic Studies  Results Reviewed       None                   XR wrist 3+ views RIGHT   ED Interpretation by Jaya Perkins PA-C (11/21 0935)   Degenerative changes. No acute fx       Final Result by Yanci Celestin MD (11/21 1210)      No acute osseous abnormality. Degenerative changes in the wrist and hand. Workstation performed: [id number]         XR hand 3+ views RIGHT   ED Interpretation by Jaya Perkins PA-C (11/21 0936)   Degenerative changes. No acute fx       Final Result by Yanci Celestin MD (11/21 1210)      No acute osseous abnormality.  Degenerative changes in the wrist and hand. Workstation performed: [id number]                    Procedures  Procedures         ED Course  ED Course as of 11/22/23 2026 Tue Nov 21, 2023   0936 Discussed with attending, RA vs. Gout. Colchicine recommended. 4085 Imaging review done by myself and preliminary results were discussed with the patient and family members. Discussion was had with patient and family members that this read is preliminary and therefore discrepancies between this read and the final read by the radiologist are possible. Patient and family members were informed that any discrepancies found by would be relayed by phone call. SBIRT 22yo+      Flowsheet Row Most Recent Value   Initial Alcohol Screen: US AUDIT-C     1. How often do you have a drink containing alcohol? 3 Filed at: 11/21/2023 0853   2. How many drinks containing alcohol do you have on a typical day you are drinking? 3 Filed at: 11/21/2023 0853   3b. FEMALE Any Age, or MALE 65+: How often do you have 4 or more drinks on one occassion? 0 Filed at: 11/21/2023 0853   Audit-C Score 6 Filed at: 11/21/2023 3533   KYE: How many times in the past year have you. .. Used an illegal drug or used a prescription medication for non-medical reasons? Never Filed at: 11/21/2023 8991                      Medical Decision Making  Ddx includes but is not limited to OA, septic arthritis, gout, pseudogout, fracture, sprain. Neurovascularly intact. Mild tenderness, swelling. Afebrile, stable vitals, no erythema, warmth or decreased range of motion concerning for septic arthritis. Emergent arthrocentesis, antibiotics not indicated at this time. X-rays without acute fracture on my wet read and findings significant for degenerative changes on my wet read. Plan for NSAIDs, outpatient PCP follow-up. All imaging and/or lab testing discussed with patient, strict return to ED precautions discussed.  Patient recommended to follow up promptly with appropriate outpatient provider. Patient and/or family members verbalizes understanding and agrees with plan. Patient and/or family members were given opportunity to ask questions, all questions were answered at this time. Patient is stable for discharge. Portions of the record may have been created with voice recognition software. Occasional wrong word or "sound a like" substitutions may have occurred due to the inherent limitations of voice recognition software. Read the chart carefully and recognize, using context, where substitutions have occurred. Amount and/or Complexity of Data Reviewed  Radiology: ordered and independent interpretation performed. Risk  OTC drugs. Prescription drug management. Disposition  Final diagnoses:   Pain and swelling of right wrist     Time reflects when diagnosis was documented in both MDM as applicable and the Disposition within this note       Time User Action Codes Description Comment    11/21/2023  9:36 AM Filippo Irwin Add [T76.786,  M25.431] Pain and swelling of right wrist           ED Disposition       ED Disposition   Discharge    Condition   Stable    Date/Time   Tue Nov 21, 2023 [facility] discharge to home/self care.                    Follow-up Information       Follow up With Specialties Details Why Contact Info    Dusty Stoll MD Family Medicine Schedule an appointment as soon as possible for a visit  For follow up regarding your symptoms, inflammatory arthritis workup [address] [facility] [id number] [phone]              Discharge Medication List as of 11/21/2023  9:47 AM        START taking these medications    Details   colchicine (COLCRYS) 0.6 mg tablet Take 1 tablet (0.6 mg total) by mouth 2 (two) times a day for 2 days Do not start before November 22, 2023., Starting Wed 11/22/2023, Until Fri 11/24/2023, Normal      lidocaine (LMX) 4 % cream Apply topically 2 (two) times a day as needed for moderate pain, Starting Tue 11/21/2023, Normal           CONTINUE these medications which have NOT CHANGED    Details   hydrochlorothiazide (HYDRODIURIL) 12.5 mg tablet Take 1 tablet (12.5 mg total) by mouth daily, Starting Fri 9/8/2023, Normal      albuterol (2.5 mg/3 mL) 0.083 % nebulizer solution Take 3 mL (2.5 mg total) by nebulization every 6 (six) hours as needed for wheezing or shortness of breath, Starting Sat 12/17/2022, Normal      albuterol (PROVENTIL HFA,VENTOLIN HFA) 90 mcg/act inhaler Inhale 2 puffs every 6 (six) hours as needed for wheezing, Starting Thu 3/16/2023, Normal      amLODIPine (NORVASC) 5 mg tablet Take 1 tablet (5 mg total) by mouth daily, Starting Wed 10/18/2023, Normal      atorvastatin (LIPITOR) 40 mg tablet Take 1 tablet (40 mg total) by mouth daily, Starting Mon 4/10/2023, Normal      docusate sodium (COLACE) 100 mg capsule Take 1 capsule (100 mg total) by mouth 2 (two) times a day as needed for constipation, Starting Mon 8/8/2022, Normal      Fluticasone-Salmeterol (Advair) 250-50 mcg/dose inhaler Inhale 1 puff 2 (two) times a day Rinse mouth after use., Starting Tue 10/3/2023, Normal      glucosamine-chondroitin 500-400 MG tablet Take 1 tablet by mouth 3 (three) times a day, Historical Med      !! glucose blood (FREESTYLE TEST STRIPS) test strip Use as instructed, Normal      !! glucose blood (OneTouch Ultra) test strip Use 1 each 2 (two) times a day Test, Starting Mon 8/8/2022, Normal      glucose monitoring kit (FREESTYLE) monitoring kit Use 1 each in the morning, Starting Mon 2/6/2023, Normal      ibandronate (BONIVA) 150 MG tablet Take 1 tablet (150 mg total) by mouth every 30 (thirty) days, Starting Wed 10/18/2023, Normal      ibuprofen (MOTRIN) 800 mg tablet Take by mouth every 6 (six) hours as needed for mild pain, Historical Med      irbesartan (AVAPRO) 300 mg tablet Take 1 tablet (300 mg total) by mouth daily at bedtime, Starting Wed 8/23/2023, Normal Lancets (freestyle) lancets Test bid, Normal      MAGNESIUM PO Take by mouth daily, Historical Med      metFORMIN (GLUCOPHAGE) 850 mg tablet Take 1 tablet (850 mg total) by mouth 2 (two) times a day with meals, Starting Wed 8/16/2023, Normal      montelukast (SINGULAIR) 10 mg tablet Take 1 tablet (10 mg total) by mouth daily at bedtime, Starting Mon 11/20/2023, Normal      solifenacin (VESICARE) 5 mg tablet Take 1 tablet (5 mg total) by mouth daily, Starting Tue 5/2/2023, Normal       !! - Potential duplicate medications found. Please discuss with provider. No discharge procedures on file.     PDMP Review         Value Time User    PDMP Reviewed  Yes 10/14/2019  8:21 AM Scotty Velez MD            ED Provider  Electronically Signed by             Ankit Spence PA-C  11/22/23 2026

## 2023-11-21 NOTE — DISCHARGE INSTRUCTIONS
Follow up with your Primary Care provider. Only take the colchicine as prescribed. Do not take nsaids at the same time this includes motrin (ibuprofen), advil, aleeve, naproxen, etc. Return to ED for new or worsening symptoms as discussed.

## 2023-11-22 ENCOUNTER — TELEPHONE (OUTPATIENT)
Dept: FAMILY MEDICINE CLINIC | Facility: CLINIC | Age: 78
End: 2023-11-22

## 2023-11-22 DIAGNOSIS — M25.431 PAIN AND SWELLING OF RIGHT WRIST: ICD-10-CM

## 2023-11-22 DIAGNOSIS — M25.531 PAIN AND SWELLING OF RIGHT WRIST: ICD-10-CM

## 2023-11-22 RX ORDER — LIDOCAINE 40 MG/G
CREAM TOPICAL 2 TIMES DAILY PRN
Qty: 28 G | Refills: 0 | OUTPATIENT
Start: 2023-11-22

## 2023-11-22 RX ORDER — COLCHICINE 0.6 MG/1
0.6 TABLET ORAL 2 TIMES DAILY
Qty: 4 TABLET | Refills: 0 | OUTPATIENT
Start: 2023-11-22 | End: 2023-11-24

## 2023-11-22 NOTE — TELEPHONE ENCOUNTER
Patient had called our office today concerned for their medication being sent to he pharmacy on [address], patient medication has been sent to the [facility] in [place], adrress is [address] [facility], their phone number being [phone]. I called patient back to confirm that her colchicine (Colocrys) 0.6 Mg was sent for her. Thank you.

## 2023-11-22 NOTE — TELEPHONE ENCOUNTER
Yes, my name is Maude Laughlin. I need a medication called [employer], . This was given to me at .6 milligrams at the hospital. Apparently I have something called God. What is it anyway? It has to do with arthritis. It's real bad and they only gave me two pills for tomorrow, so I would appreciate it if you can call it in and and I'll see you next month in August, [place]? I've been very sick and I need I needed to tell Doctor Eladia Kamara about this pain I've had for three weeks because she seemed to help. There's another one, lidocaine 4% cream it's a creamy for them. She prescribed it but for some reason they were charging me $11.00. I couldn't take it so she could order something similar to them because it's bad, man, and it's swollen and my wrist and I was in pain all three weeks. Find the size of the Doctor today. And they discovered that, alright, my telephone number is [phone]. I'm at home. My name is Maude Laughlin. Date of birth August 15, 1945. I appreciate your help. OK, thank you so much. Jillian Moulton.

## 2023-11-22 NOTE — TELEPHONE ENCOUNTER
MF, Pt called requesting a refill for Colchicine . 6 mg which she got in the hospital. But it looks like she got # 4 today but is requesting refills.

## 2023-11-29 ENCOUNTER — TELEPHONE (OUTPATIENT)
Dept: FAMILY MEDICINE CLINIC | Facility: CLINIC | Age: 78
End: 2023-11-29

## 2023-11-29 DIAGNOSIS — N39.41 URGE INCONTINENCE OF URINE: ICD-10-CM

## 2023-11-29 DIAGNOSIS — I10 ESSENTIAL HYPERTENSION: ICD-10-CM

## 2023-11-29 DIAGNOSIS — E11.9 TYPE 2 DIABETES MELLITUS WITHOUT COMPLICATION, WITHOUT LONG-TERM CURRENT USE OF INSULIN (HCC): ICD-10-CM

## 2023-11-29 DIAGNOSIS — R80.9 PROTEINURIA, UNSPECIFIED TYPE: ICD-10-CM

## 2023-11-29 RX ORDER — IRBESARTAN 300 MG/1
300 TABLET ORAL
Qty: 90 TABLET | Refills: 1 | Status: SHIPPED | OUTPATIENT
Start: 2023-11-29

## 2023-11-29 RX ORDER — SOLIFENACIN SUCCINATE 5 MG/1
5 TABLET, FILM COATED ORAL DAILY
Qty: 30 TABLET | Refills: 5 | Status: SHIPPED | OUTPATIENT
Start: 2023-11-29

## 2023-11-29 NOTE — TELEPHONE ENCOUNTER
Patient needs an refill medication for irbestartan (Avapro) 300 MG tablet and solifenacin (Vesicare) 5 MG, patient would prescription to be ordered to [address] [place] [address], phone number for pharmacy is [phone]. Thank you.

## 2023-11-29 NOTE — TELEPHONE ENCOUNTER
Tried to contact patient to states refills have been sent in, was unable to leave voicemail as the line would disconnect immanently.

## 2023-12-05 ENCOUNTER — HOSPITAL ENCOUNTER (OUTPATIENT)
Dept: RADIOLOGY | Facility: HOSPITAL | Age: 78
Discharge: HOME | End: 2023-12-05
Payer: MEDICARE

## 2023-12-05 ENCOUNTER — TELEPHONE (OUTPATIENT)
Age: 78
End: 2023-12-05

## 2023-12-05 DIAGNOSIS — S22.42XD CLOSED FRACTURE OF MULTIPLE RIBS OF LEFT SIDE WITH ROUTINE HEALING, SUBSEQUENT ENCOUNTER: ICD-10-CM

## 2023-12-05 PROCEDURE — 71100 X-RAY EXAM RIBS UNI 2 VIEWS: CPT

## 2023-12-05 PROCEDURE — 71046 X-RAY EXAM CHEST 2 VIEWS: CPT

## 2023-12-05 NOTE — TELEPHONE ENCOUNTER
Patients GI provider:  new pt    Number to return call: [phone]    Reason for call: Pt returning call regarding referral. Referral almost . Advised pt to speak w/PCP about this on upcoming OV to see if still required.  If so, pt will ask PCP to enter new referral    Scheduled procedure/appointment date if applicable: N/A

## 2023-12-13 ENCOUNTER — OFFICE VISIT (OUTPATIENT)
Dept: FAMILY MEDICINE CLINIC | Facility: CLINIC | Age: 78
End: 2023-12-13
Payer: MEDICARE

## 2023-12-13 VITALS
TEMPERATURE: 97.4 F | HEART RATE: 84 BPM | WEIGHT: 132.25 LBS | BODY MASS INDEX: 23.43 KG/M2 | OXYGEN SATURATION: 97 % | DIASTOLIC BLOOD PRESSURE: 62 MMHG | SYSTOLIC BLOOD PRESSURE: 110 MMHG

## 2023-12-13 DIAGNOSIS — J45.30 MILD PERSISTENT ASTHMA WITHOUT COMPLICATION: ICD-10-CM

## 2023-12-13 DIAGNOSIS — M81.0 AGE-RELATED OSTEOPOROSIS WITHOUT CURRENT PATHOLOGICAL FRACTURE: ICD-10-CM

## 2023-12-13 DIAGNOSIS — E78.2 MIXED HYPERLIPIDEMIA: Chronic | ICD-10-CM

## 2023-12-13 DIAGNOSIS — E11.9 TYPE 2 DIABETES MELLITUS WITHOUT COMPLICATION, WITHOUT LONG-TERM CURRENT USE OF INSULIN (HCC): Chronic | ICD-10-CM

## 2023-12-13 DIAGNOSIS — M25.531 RIGHT WRIST PAIN: ICD-10-CM

## 2023-12-13 DIAGNOSIS — M79.89 LEFT LEG SWELLING: Primary | ICD-10-CM

## 2023-12-13 DIAGNOSIS — I10 ESSENTIAL HYPERTENSION: ICD-10-CM

## 2023-12-13 DIAGNOSIS — N39.41 URGE INCONTINENCE OF URINE: ICD-10-CM

## 2023-12-13 DIAGNOSIS — J45.20 MILD INTERMITTENT ASTHMA WITHOUT COMPLICATION: ICD-10-CM

## 2023-12-13 DIAGNOSIS — M79.605 PAIN OF LEFT LOWER EXTREMITY: ICD-10-CM

## 2023-12-13 DIAGNOSIS — R80.9 PROTEINURIA, UNSPECIFIED TYPE: ICD-10-CM

## 2023-12-13 PROCEDURE — 99214 OFFICE O/P EST MOD 30 MIN: CPT | Performed by: FAMILY MEDICINE

## 2023-12-13 RX ORDER — IBANDRONATE SODIUM 150 MG/1
150 TABLET, FILM COATED ORAL
Qty: 3 TABLET | Refills: 1 | Status: SHIPPED | OUTPATIENT
Start: 2023-12-13

## 2023-12-13 RX ORDER — ATORVASTATIN CALCIUM 40 MG/1
40 TABLET, FILM COATED ORAL DAILY
Qty: 90 TABLET | Refills: 1 | Status: SHIPPED | OUTPATIENT
Start: 2023-12-13

## 2023-12-13 RX ORDER — AMLODIPINE BESYLATE 5 MG/1
5 TABLET ORAL DAILY
Qty: 90 TABLET | Refills: 1 | Status: SHIPPED | OUTPATIENT
Start: 2023-12-13

## 2023-12-13 RX ORDER — FLUTICASONE PROPIONATE AND SALMETEROL 250; 50 UG/1; UG/1
1 POWDER RESPIRATORY (INHALATION) 2 TIMES DAILY
Qty: 180 BLISTER | Refills: 1 | Status: SHIPPED | OUTPATIENT
Start: 2023-12-13

## 2023-12-13 RX ORDER — IRBESARTAN 300 MG/1
300 TABLET ORAL
Qty: 90 TABLET | Refills: 1 | Status: SHIPPED | OUTPATIENT
Start: 2023-12-13

## 2023-12-13 RX ORDER — HYDROCHLOROTHIAZIDE 12.5 MG/1
12.5 TABLET ORAL DAILY
Qty: 90 TABLET | Refills: 1 | Status: SHIPPED | OUTPATIENT
Start: 2023-12-13

## 2023-12-13 RX ORDER — ALBUTEROL SULFATE 90 UG/1
2 AEROSOL, METERED RESPIRATORY (INHALATION) EVERY 6 HOURS PRN
Qty: 8.5 G | Refills: 5 | Status: SHIPPED | OUTPATIENT
Start: 2023-12-13

## 2023-12-13 RX ORDER — SOLIFENACIN SUCCINATE 5 MG/1
5 TABLET, FILM COATED ORAL DAILY
Qty: 90 TABLET | Refills: 1 | Status: SHIPPED | OUTPATIENT
Start: 2023-12-13

## 2023-12-13 RX ORDER — MONTELUKAST SODIUM 10 MG/1
10 TABLET ORAL
Qty: 90 TABLET | Refills: 1 | Status: SHIPPED | OUTPATIENT
Start: 2023-12-13

## 2023-12-13 NOTE — ASSESSMENT & PLAN NOTE
Patient is on hydrochlorothiazide and amlodipine as well as irbesartan blood pressure today is excellent

## 2023-12-13 NOTE — ASSESSMENT & PLAN NOTE
Asthma has been a little bit active with the weather change she went up using a nebulizer treatment this morning she is maintained on Proventil as needed and Advair.   Patient did get COVID and flu vaccine I did recommend also the RSV vaccine we will double check her immunization records

## 2023-12-13 NOTE — PROGRESS NOTES
Name: Enmanuel Huang      : 89      MRN: [id number]  Encounter Provider: Mary Kessler MD  Encounter Date: 2023   Encounter department: [facility] PRIMARY CARE    Assessment & Plan     1. Left leg swelling  -     D-dimer, quantitative; Future  -     B-Type Natriuretic Peptide(BNP); Future  -     VAS lower limb venous duplex study, unilateral/limited; Future; Expected date: 2023  -     CBC and differential; Future  -     TSH, 3rd generation; Future    2. Pain of left lower extremity  -     D-dimer, quantitative; Future  -     B-Type Natriuretic Peptide(BNP); Future  -     VAS lower limb venous duplex study, unilateral/limited; Future; Expected date: 2023  -     CBC and differential; Future  -     TSH, 3rd generation; Future  -     Uric acid; Future    3. Right wrist pain  -     C-reactive protein; Future  -     CBC and differential; Future  -     Uric acid; Future  -     Ambulatory Referral to Hand Surgery; Future    4. Essential hypertension  Assessment & Plan:  Patient is on hydrochlorothiazide and amlodipine as well as irbesartan blood pressure today is excellent    Orders:  -     TSH, 3rd generation; Future  -     amLODIPine (NORVASC) 5 mg tablet; Take 1 tablet (5 mg total) by mouth daily  -     hydrochlorothiazide (HYDRODIURIL) 12.5 mg tablet; Take 1 tablet (12.5 mg total) by mouth daily  -     irbesartan (AVAPRO) 300 mg tablet; Take 1 tablet (300 mg total) by mouth daily at bedtime    5. Proteinuria, unspecified type  -     Albumin / creatinine urine ratio; Future  -     irbesartan (AVAPRO) 300 mg tablet; Take 1 tablet (300 mg total) by mouth daily at bedtime    6. Type 2 diabetes mellitus without complication, without long-term current use of insulin (HCC)  Assessment & Plan:    Lab Results   Component Value Date    HGBA1C 6.4 (H) 2023   Patient's last HbA1c was 6.4 she is on metformin and tolerating the medicine well.   She did have some increased proteinuria on her last urine test and was switched to irbesartan for better control of that is due for follow-up of microalbuminuria    Orders:  -     irbesartan (AVAPRO) 300 mg tablet; Take 1 tablet (300 mg total) by mouth daily at bedtime  -     metFORMIN (GLUCOPHAGE) 850 mg tablet; Take 1 tablet (850 mg total) by mouth 2 (two) times a day with meals    7. Mild persistent asthma without complication  Assessment & Plan:  Asthma has been a little bit active with the weather change she went up using a nebulizer treatment this morning she is maintained on Proventil as needed and Advair. Patient did get COVID and flu vaccine I did recommend also the RSV vaccine we will double check her immunization records    Orders:  -     montelukast (SINGULAIR) 10 mg tablet; Take 1 tablet (10 mg total) by mouth daily at bedtime  -     Fluticasone-Salmeterol (Advair) 250-50 mcg/dose inhaler; Inhale 1 puff 2 (two) times a day Rinse mouth after use. 8. Age-related osteoporosis without current pathological fracture  -     ibandronate (BONIVA) 150 MG tablet; Take 1 tablet (150 mg total) by mouth every 30 (thirty) days    9. Mixed hyperlipidemia  Assessment & Plan:  Patient is on Lipitor 40 mg and is due for a lipid profile in January    Orders:  -     atorvastatin (LIPITOR) 40 mg tablet; Take 1 tablet (40 mg total) by mouth daily    10. Mild intermittent asthma without complication  -     albuterol (PROVENTIL HFA,VENTOLIN HFA) 90 mcg/act inhaler; Inhale 2 puffs every 6 (six) hours as needed for wheezing    11. Urge incontinence of urine  -     solifenacin (VESICARE) 5 mg tablet; Take 1 tablet (5 mg total) by mouth daily           Subjective      Patient presents with: Follow-up: For chronic conditions. Pt states she is having edema and pain in legs and feet x 2 days. Pt states her right wrist has a lot of pain as well.  mgb  Patient is here for left leg pain and swelling she had a vascular screening back in the summer that was normal for arterial issues. She has not had recent travel no history of injury she also is having right wrist pain she was in the emergency room back in November and an x-ray showed a fair amount of arthritis in her right wrist.  The doctor there thought she might have gout but no uric acid was done. Patient does have diabetes her last HbA1c was 6.4 which was stable. She did have a fair amount of protein in her urine last time and we have changed her medication to  irbesartan      Review of Systems   Constitutional:  Positive for fatigue. Negative for activity change, appetite change and unexpected weight change. Respiratory:  Positive for shortness of breath and wheezing. Cardiovascular:  Positive for leg swelling. Negative for chest pain. Musculoskeletal:  Positive for arthralgias and back pain. L leg pain  and  r wrist  pain   Neurological:  Negative for dizziness, light-headedness and headaches.        Current Outpatient Medications on File Prior to Visit   Medication Sig    albuterol (2.5 mg/3 mL) 0.083 % nebulizer solution Take 3 mL (2.5 mg total) by nebulization every 6 (six) hours as needed for wheezing or shortness of breath    docusate sodium (COLACE) 100 mg capsule Take 1 capsule (100 mg total) by mouth 2 (two) times a day as needed for constipation    glucosamine-chondroitin 500-400 MG tablet Take 1 tablet by mouth 3 (three) times a day    glucose blood (OneTouch Ultra) test strip Use 1 each 2 (two) times a day Test    ibuprofen (MOTRIN) 800 mg tablet Take by mouth every 6 (six) hours as needed for mild pain    lidocaine (LMX) 4 % cream Apply topically 2 (two) times a day as needed for moderate pain    MAGNESIUM PO Take by mouth daily    [DISCONTINUED] albuterol (PROVENTIL HFA,VENTOLIN HFA) 90 mcg/act inhaler Inhale 2 puffs every 6 (six) hours as needed for wheezing    [DISCONTINUED] amLODIPine (NORVASC) 5 mg tablet Take 1 tablet (5 mg total) by mouth daily    [DISCONTINUED] atorvastatin (LIPITOR) 40 mg tablet Take 1 tablet (40 mg total) by mouth daily    [DISCONTINUED] Fluticasone-Salmeterol (Advair) 250-50 mcg/dose inhaler Inhale 1 puff 2 (two) times a day Rinse mouth after use. [DISCONTINUED] glucose blood (FREESTYLE TEST STRIPS) test strip Use as instructed    [DISCONTINUED] glucose monitoring kit (FREESTYLE) monitoring kit Use 1 each in the morning    [DISCONTINUED] hydrochlorothiazide (HYDRODIURIL) 12.5 mg tablet Take 1 tablet (12.5 mg total) by mouth daily    [DISCONTINUED] ibandronate (BONIVA) 150 MG tablet Take 1 tablet (150 mg total) by mouth every 30 (thirty) days    [DISCONTINUED] irbesartan (AVAPRO) 300 mg tablet Take 1 tablet (300 mg total) by mouth daily at bedtime    [DISCONTINUED] Lancets (freestyle) lancets Test bid    [DISCONTINUED] metFORMIN (GLUCOPHAGE) 850 mg tablet Take 1 tablet (850 mg total) by mouth 2 (two) times a day with meals    [DISCONTINUED] montelukast (SINGULAIR) 10 mg tablet Take 1 tablet (10 mg total) by mouth daily at bedtime    [DISCONTINUED] solifenacin (VESICARE) 5 mg tablet Take 1 tablet (5 mg total) by mouth daily    [DISCONTINUED] colchicine (COLCRYS) 0.6 mg tablet Take 1 tablet (0.6 mg total) by mouth 2 (two) times a day for 2 days Do not start before November 22, 2023. [DISCONTINUED] methylPREDNISolone 4 MG tablet therapy pack Use as directed on package (Patient not taking: Reported on 12/13/2023)       Objective     /62 (BP Location: Left arm, Patient Position: Sitting, Cuff Size: Standard)   Pulse 84   Temp (!) 97.4 °F (36.3 °C) (Temporal)   Wt 60 kg (132 lb 4 oz)   LMP  (LMP Unknown)   SpO2 97%   BMI 23.43 kg/m²     Physical Exam  Vitals reviewed. Constitutional:       Appearance: Normal appearance. She is normal weight. Cardiovascular:      Rate and Rhythm: Normal rate and regular rhythm. Pulses: Normal pulses. Heart sounds: Normal heart sounds. Pulmonary:      Effort: Pulmonary effort is normal.      Breath sounds: Wheezing present. Musculoskeletal:         General: Swelling and tenderness present. Right lower leg: No edema. Left lower leg: Edema present. Comments: L leg  edema and  pain, positive  Reanna's   Lymphadenopathy:      Cervical: No cervical adenopathy. Neurological:      Mental Status: She is alert.        Aime Anderson MD

## 2023-12-13 NOTE — ASSESSMENT & PLAN NOTE
Lab Results   Component Value Date    HGBA1C 6.4 (H) 08/22/2023   Patient's last HbA1c was 6.4 she is on metformin and tolerating the medicine well.   She did have some increased proteinuria on her last urine test and was switched to irbesartan for better control of that is due for follow-up of microalbuminuria

## 2023-12-14 ENCOUNTER — HOSPITAL ENCOUNTER (OUTPATIENT)
Dept: NON INVASIVE DIAGNOSTICS | Facility: HOSPITAL | Age: 78
Discharge: HOME | End: 2023-12-14
Payer: MEDICARE

## 2023-12-14 DIAGNOSIS — M79.89 LEFT LEG SWELLING: ICD-10-CM

## 2023-12-14 DIAGNOSIS — M79.605 PAIN OF LEFT LOWER EXTREMITY: ICD-10-CM

## 2023-12-14 PROCEDURE — 93971 EXTREMITY STUDY: CPT

## 2023-12-14 PROCEDURE — 93971 EXTREMITY STUDY: CPT | Performed by: SURGERY

## 2023-12-19 ENCOUNTER — HOSPITAL ENCOUNTER (OUTPATIENT)
Dept: MAMMOGRAPHY | Facility: CLINIC | Age: 78
Discharge: HOME | End: 2023-12-19
Payer: MEDICARE

## 2023-12-19 VITALS — BODY MASS INDEX: 23.39 KG/M2 | WEIGHT: 132 LBS | HEIGHT: 63 IN

## 2023-12-19 DIAGNOSIS — N63.10 BREAST MASS, RIGHT: ICD-10-CM

## 2023-12-19 PROCEDURE — G0279 TOMOSYNTHESIS, MAMMO: HCPCS

## 2023-12-19 PROCEDURE — 77066 DX MAMMO INCL CAD BI: CPT

## 2023-12-19 PROCEDURE — 76642 ULTRASOUND BREAST LIMITED: CPT

## 2023-12-20 ENCOUNTER — OFFICE VISIT (OUTPATIENT)
Dept: OBGYN CLINIC | Facility: CLINIC | Age: 78
End: 2023-12-20
Payer: MEDICARE

## 2023-12-20 VITALS
DIASTOLIC BLOOD PRESSURE: 60 MMHG | SYSTOLIC BLOOD PRESSURE: 138 MMHG | HEIGHT: 63 IN | BODY MASS INDEX: 23.39 KG/M2 | WEIGHT: 132 LBS

## 2023-12-20 DIAGNOSIS — M25.531 RIGHT WRIST PAIN: ICD-10-CM

## 2023-12-20 DIAGNOSIS — M19.039 WRIST ARTHRITIS: Primary | ICD-10-CM

## 2023-12-20 PROCEDURE — 99213 OFFICE O/P EST LOW 20 MIN: CPT | Performed by: SURGERY

## 2023-12-20 NOTE — PROGRESS NOTES
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  Cock-up Velcro wrist brace provided in the office today.  Continue Aleve as needed  She may follow-up on an as-needed basis and let us know if symptoms persist or worsen in the future.  All questions answered.            Subjective:     HPI    Patient ID:  Jenna Chavarria is a right hand dominant 78 y.o. female presenting for evaluation of the right wrist.  According to the patient, she has a 1 month history of increased aching pain and swelling of the generalized wrist and base of thumb that came on without acute injury or trauma to the area.  There is no associated numbness and tingling.  She has been taking Aleve with some mild improvement over time but her symptoms persist.  She does have arthritis in other areas of her body and feels similar.  She has been using a compressive cotton sleeve as well.  She has not noticed any open wounds or erythema about the hand or wrist.  He has a history of surgery to the right humerus area for which she described as bone grafting many years ago.      The following portions of the patient's history were reviewed and updated as appropriate: allergies, current medications, past family history, past medical history, past social history, past surgical history, and problem list.    Review of Systems     Objective:    Imaging:  Right hand / wrist x-rays 11/21/2023    IMPRESSION:     No acute osseous abnormality. Degenerative changes in the wrist and hand.    Physical Exam     Vitals:    12/20/23 0757   BP: 138/60     General appearance:  NAD   Cardiac:  Regular rate  Lungs:  Unlabored breathing  Abdomen:  Non-distended    Orthopedic Examination:  Right wrist     Inspection: No open wounds or erythema.  Mild swelling about the dorsal wrist area.  No ecchymosis.    Palpation: Mild tenderness to palpation thumb CMC joint, all dorsal extensor compartments    Range-of-motion:  Normal wrist range of motion, full composite fist    Strength: 5/5 wrist flexion extension, , thumb opposition, APB, intrinsics    Sensation: Intact to light touch median radial ulnar nerve distribution    Special Tests: Palpable radial pulse  The upper extremities warm and well-perfused  Positive Finkelstein's  Positive grind test

## 2024-01-04 DIAGNOSIS — N63.25 MASS OVERLAPPING MULTIPLE QUADRANTS OF LEFT BREAST: Primary | ICD-10-CM

## 2024-01-04 DIAGNOSIS — N63.15 MASS OVERLAPPING MULTIPLE QUADRANTS OF RIGHT BREAST: ICD-10-CM

## 2024-01-17 ENCOUNTER — TELEPHONE (OUTPATIENT)
Age: 79
End: 2024-01-17

## 2024-01-17 DIAGNOSIS — J45.30 MILD PERSISTENT ASTHMA WITHOUT COMPLICATION: Primary | ICD-10-CM

## 2024-01-17 NOTE — TELEPHONE ENCOUNTER
Patient called in because she lost her nebulizer machine mask and she is in need of another ASAP. Pt has no form of transportation and she would need a new mask shipped to her house.

## 2024-02-16 ENCOUNTER — TELEPHONE (OUTPATIENT)
Age: 79
End: 2024-02-16

## 2024-02-16 NOTE — TELEPHONE ENCOUNTER
Lyubov from [facility] called about a fax they sent for a prescription on 2.13.24.  They did not have the correct fax number.  I did provide that.

## 2024-02-20 DIAGNOSIS — J45.30 MILD PERSISTENT ASTHMA WITHOUT COMPLICATION: ICD-10-CM

## 2024-02-20 RX ORDER — ALBUTEROL SULFATE 2.5 MG/3ML
2.5 SOLUTION RESPIRATORY (INHALATION) EVERY 6 HOURS PRN
Qty: 150 ML | Refills: 1 | Status: SHIPPED | OUTPATIENT
Start: 2024-02-20

## 2024-02-20 NOTE — TELEPHONE ENCOUNTER
Reason for call:   [x] Refill   [] Prior Auth  [] Other:     Office:   [x] PCP/Provider - Mariola Zuleta   [] Specialty/Provider -     Medication: albuterol 0.083 % nebulizer soluti     Dose/Frequency: 2.5 mg/3 mL    2 mL every 6 hours PRN wheezing or SOB    Quantity: 150 mL + 3 refills    Pharmacy: [facility] [address] [place]    Does the patient have enough for 3 days?   [] Yes   [x] No - Send as HP to POD

## 2024-02-22 NOTE — TELEPHONE ENCOUNTER
Received fax from [facility].     Gave form to MF, who asked if we could call pt to confirm she was switching pharmacy.    Called pt to confirm this was accurate. Pt denied switching pharmacies, stated [facility], as [facility] did not deliver, and she cannot drive.

## 2024-03-04 DIAGNOSIS — J45.30 MILD PERSISTENT ASTHMA WITHOUT COMPLICATION: ICD-10-CM

## 2024-03-04 RX ORDER — ALBUTEROL SULFATE 2.5 MG/3ML
SOLUTION RESPIRATORY (INHALATION)
Qty: 150 ML | Refills: 1 | Status: SHIPPED | OUTPATIENT
Start: 2024-03-04

## 2024-03-05 ENCOUNTER — TELEPHONE (OUTPATIENT)
Age: 79
End: 2024-03-05

## 2024-03-05 NOTE — TELEPHONE ENCOUNTER
Justa and Jenna were on phone had questions on how to get refills once last refill done at [facility] she will be using mail order .

## 2024-03-13 ENCOUNTER — RA CDI HCC (OUTPATIENT)
Dept: OTHER | Facility: HOSPITAL | Age: 79
End: 2024-03-13

## 2024-03-13 NOTE — PROGRESS NOTES
HCC coding opportunities          Chart Reviewed number of suggestions sent to Provider: 1     Patients Insurance     Medicare Insurance: [employer]          J45.30: Mild persistent asthma without complication [[id number]]     Last assessed on 12/13/2023 please review and assess and document per MEAT if applicable for 2024

## 2024-03-28 DIAGNOSIS — I10 ESSENTIAL HYPERTENSION: ICD-10-CM

## 2024-03-28 DIAGNOSIS — E11.9 TYPE 2 DIABETES MELLITUS WITHOUT COMPLICATION, WITHOUT LONG-TERM CURRENT USE OF INSULIN (HCC): Chronic | ICD-10-CM

## 2024-03-28 RX ORDER — AMLODIPINE BESYLATE 5 MG/1
TABLET ORAL
Qty: 90 TABLET | Refills: 1 | Status: SHIPPED | OUTPATIENT
Start: 2024-03-28

## 2024-04-08 DIAGNOSIS — E11.9 TYPE 2 DIABETES MELLITUS WITHOUT COMPLICATION, WITHOUT LONG-TERM CURRENT USE OF INSULIN (HCC): Chronic | ICD-10-CM

## 2024-04-08 DIAGNOSIS — I10 ESSENTIAL HYPERTENSION: ICD-10-CM

## 2024-04-08 DIAGNOSIS — M81.0 AGE-RELATED OSTEOPOROSIS WITHOUT CURRENT PATHOLOGICAL FRACTURE: ICD-10-CM

## 2024-04-08 DIAGNOSIS — J45.30 MILD PERSISTENT ASTHMA WITHOUT COMPLICATION: ICD-10-CM

## 2024-04-08 DIAGNOSIS — N39.41 URGE INCONTINENCE OF URINE: ICD-10-CM

## 2024-04-08 DIAGNOSIS — R80.9 PROTEINURIA, UNSPECIFIED TYPE: ICD-10-CM

## 2024-04-08 DIAGNOSIS — E78.2 MIXED HYPERLIPIDEMIA: Chronic | ICD-10-CM

## 2024-04-08 RX ORDER — IRBESARTAN 300 MG/1
300 TABLET ORAL
Qty: 90 TABLET | Refills: 1 | Status: SHIPPED | OUTPATIENT
Start: 2024-04-08

## 2024-04-08 RX ORDER — HYDROCHLOROTHIAZIDE 12.5 MG/1
12.5 TABLET ORAL DAILY
Qty: 90 TABLET | Refills: 1 | Status: SHIPPED | OUTPATIENT
Start: 2024-04-08

## 2024-04-08 RX ORDER — FLUTICASONE PROPIONATE AND SALMETEROL 250; 50 UG/1; UG/1
1 POWDER RESPIRATORY (INHALATION) 2 TIMES DAILY
Qty: 180 BLISTER | Refills: 1 | Status: SHIPPED | OUTPATIENT
Start: 2024-04-08

## 2024-04-08 RX ORDER — SOLIFENACIN SUCCINATE 5 MG/1
5 TABLET, FILM COATED ORAL DAILY
Qty: 90 TABLET | Refills: 1 | Status: SHIPPED | OUTPATIENT
Start: 2024-04-08

## 2024-04-08 RX ORDER — ATORVASTATIN CALCIUM 40 MG/1
40 TABLET, FILM COATED ORAL DAILY
Qty: 90 TABLET | Refills: 1 | Status: SHIPPED | OUTPATIENT
Start: 2024-04-08

## 2024-04-08 RX ORDER — IBANDRONATE SODIUM 150 MG/1
150 TABLET, FILM COATED ORAL
Qty: 3 TABLET | Refills: 1 | Status: SHIPPED | OUTPATIENT
Start: 2024-04-08

## 2024-04-08 NOTE — TELEPHONE ENCOUNTER
Reason for call: NOT A DUPLICATE. Patient requesting a different Pharmacy  [x] Refill   [] Prior Auth  [] Other:     Office:   [x] PCP/Provider - Mariola Zuleta MD   [] Specialty/Provider -         Pharmacy: [facility] - [address]     Does the patient have enough for 3 days?   [] Yes   [x] No - Send as HP to POD

## 2024-04-24 ENCOUNTER — OFFICE VISIT (OUTPATIENT)
Dept: FAMILY MEDICINE CLINIC | Facility: CLINIC | Age: 79
End: 2024-04-24
Payer: MEDICARE

## 2024-04-24 VITALS
HEART RATE: 86 BPM | BODY MASS INDEX: 23.29 KG/M2 | OXYGEN SATURATION: 98 % | TEMPERATURE: 97.8 F | WEIGHT: 131.5 LBS | DIASTOLIC BLOOD PRESSURE: 86 MMHG | SYSTOLIC BLOOD PRESSURE: 128 MMHG

## 2024-04-24 DIAGNOSIS — L30.9 DERMATITIS: Primary | ICD-10-CM

## 2024-04-24 DIAGNOSIS — E11.9 TYPE 2 DIABETES MELLITUS WITHOUT COMPLICATION, WITHOUT LONG-TERM CURRENT USE OF INSULIN (HCC): ICD-10-CM

## 2024-04-24 DIAGNOSIS — I77.810 THORACIC AORTIC ECTASIA (HCC): ICD-10-CM

## 2024-04-24 DIAGNOSIS — E11.9 TYPE 2 DIABETES MELLITUS WITHOUT COMPLICATION, WITHOUT LONG-TERM CURRENT USE OF INSULIN (HCC): Chronic | ICD-10-CM

## 2024-04-24 LAB — SL AMB POCT HEMOGLOBIN AIC: 7.3 (ref ?–6.5)

## 2024-04-24 PROCEDURE — 99214 OFFICE O/P EST MOD 30 MIN: CPT | Performed by: FAMILY MEDICINE

## 2024-04-24 PROCEDURE — 83036 HEMOGLOBIN GLYCOSYLATED A1C: CPT | Performed by: FAMILY MEDICINE

## 2024-04-24 RX ORDER — BLOOD SUGAR DIAGNOSTIC
1 STRIP MISCELLANEOUS 2 TIMES DAILY
Qty: 200 STRIP | Refills: 1 | Status: CANCELLED | OUTPATIENT
Start: 2024-04-24

## 2024-04-24 RX ORDER — METHYLPREDNISOLONE 4 MG/1
TABLET ORAL
Qty: 21 EACH | Refills: 0 | Status: SHIPPED | OUTPATIENT
Start: 2024-04-24

## 2024-04-24 RX ORDER — BLOOD SUGAR DIAGNOSTIC
1 STRIP MISCELLANEOUS 2 TIMES DAILY
Qty: 200 STRIP | Refills: 1 | Status: SHIPPED | OUTPATIENT
Start: 2024-04-24 | End: 2024-05-06

## 2024-04-24 NOTE — PROGRESS NOTES
Name: Jenna Chavarria      : 1945      MRN: [id number]  Encounter Provider: Mariola Zuleta MD  Encounter Date: 2024   Encounter department: [facility] PRIMARY CARE    Assessment & Plan     1. Dermatitis  -     C-reactive protein  -     Sedimentation rate, automated  -     YUMIKO Screen w/ Reflex to Titer/Pattern; Future  -     Complement, total; Future  -     Lyme Total AB W Reflex to IGM/IGG; Future  -     methylPREDNISolone 4 MG tablet therapy pack; Use as directed on package    2. Type 2 diabetes mellitus without complication, without long-term current use of insulin (HCC)  Assessment & Plan:    Lab Results   Component Value Date    HGBA1C 7.3 (A) 2024   Sugar not as well controlled, reinforce  diet    Orders:  -     POCT hemoglobin A1c  -     glucose blood (OneTouch Ultra) test strip; Use 1 each 2 (two) times a day Test  -     Hemoglobin A1C; Future; Expected date: 2024  -     Comprehensive metabolic panel; Future; Expected date: 2024  -     Albumin / creatinine urine ratio; Future; Expected date: 2024  -     Lipid Panel with Direct LDL reflex; Future; Expected date: 2024  -     TSH, 3rd generation with Free T4 reflex; Future; Expected date: 2024    3. Thoracic aortic ectasia (HCC)  Assessment & Plan:  Await  f/u ct chest    Orders:  -     CT chest wo contrast; Future; Expected date: 2024           Subjective      Patient presents with:  Rash: Pt states she is breaking out in a rash on multiple areas of her body (face,hands).  mgb  Rash has been on and off for couple months did  change the type of laundry detergent I, no change  in bath or  body  products.sugar  up  a  tad, has  been trying to watch diet, son has  been doing odd jobs, some  of  houses aren't too clean, not sure if  he brought  something home  with him      Review of Systems   Constitutional:  Negative for activity change, appetite change and fatigue.   Respiratory:  Negative for shortness  of breath.    Cardiovascular:  Negative for chest pain.   Musculoskeletal:  Negative for arthralgias.   Skin:  Positive for rash.   Hematological:  Negative for adenopathy.       Current Outpatient Medications on File Prior to Visit   Medication Sig    albuterol (2.5 mg/3 mL) 0.083 % nebulizer solution USE 3ML (2.5 MG TOTAL) BY NEBULIZATION EVERY 6 HOURS AS NEEDED FOR WHEEZING OR SHORTNESS OF BREATH    albuterol (PROVENTIL HFA,VENTOLIN HFA) 90 mcg/act inhaler Inhale 2 puffs every 6 (six) hours as needed for wheezing    amLODIPine (NORVASC) 5 mg tablet TAKE ONE TABLET BY MOUTH DAILY AT 9AM    atorvastatin (LIPITOR) 40 mg tablet Take 1 tablet (40 mg total) by mouth daily    docusate sodium (COLACE) 100 mg capsule Take 1 capsule (100 mg total) by mouth 2 (two) times a day as needed for constipation    Fluticasone-Salmeterol (Advair) 250-50 mcg/dose inhaler Inhale 1 puff 2 (two) times a day Rinse mouth after use.    glucosamine-chondroitin 500-400 MG tablet Take 1 tablet by mouth 3 (three) times a day    hydroCHLOROthiazide 12.5 mg tablet Take 1 tablet (12.5 mg total) by mouth daily    ibandronate (BONIVA) 150 MG tablet Take 1 tablet (150 mg total) by mouth every 30 (thirty) days    ibuprofen (MOTRIN) 800 mg tablet Take by mouth every 6 (six) hours as needed for mild pain    irbesartan (AVAPRO) 300 mg tablet Take 1 tablet (300 mg total) by mouth daily at bedtime    lidocaine (LMX) 4 % cream Apply topically 2 (two) times a day as needed for moderate pain    MAGNESIUM PO Take by mouth daily    metFORMIN (GLUCOPHAGE) 850 mg tablet TAKE ONE TABLET BY MOUTH TWICE DAILY @9AM-5PM WITH MEALS    montelukast (SINGULAIR) 10 mg tablet Take 1 tablet (10 mg total) by mouth daily at bedtime    solifenacin (VESICARE) 5 mg tablet Take 1 tablet (5 mg total) by mouth daily    [DISCONTINUED] glucose blood (OneTouch Ultra) test strip Use 1 each 2 (two) times a day Test       Objective     /86 (BP Location: Right arm, Patient Position:  Sitting, Cuff Size: Standard)   Pulse 86   Temp 97.8 °F (36.6 °C) (Temporal)   Wt 59.6 kg (131 lb 8 oz)   LMP  (LMP Unknown)   SpO2 98%   BMI 23.29 kg/m²     Physical Exam  Vitals reviewed.   Constitutional:       Appearance: Normal appearance.   Cardiovascular:      Rate and Rhythm: Normal rate and regular rhythm.      Pulses: Normal pulses.      Heart sounds: Normal heart sounds.   Pulmonary:      Effort: Pulmonary effort is normal.      Breath sounds: Normal breath sounds.   Lymphadenopathy:      Cervical: No cervical adenopathy.   Skin:     Findings: Rash present.      Comments: Erythematous macular Rash on legs ,face, upper arms   Neurological:      Mental Status: She is alert.   Psychiatric:         Mood and Affect: Mood normal.       Mariola Zuleta MD

## 2024-04-24 NOTE — PATIENT INSTRUCTIONS
Await  lab, see  how  pt  does on Prednisone  Type 2 Diabetes Management for Adults   AMBULATORY CARE:   Type 2 diabetes  is a disease that affects how your body uses glucose (sugar). Either your body cannot make enough insulin, or it cannot use the insulin correctly. It is important to keep diabetes controlled to prevent damage to your heart, blood vessels, and other organs. Management will help you feel well and enjoy your daily activities. Your diabetes care team providers can help you make a plan to fit diabetes care into your schedule. Your plan can change over time to fit your needs and your family's needs.       Have someone call your local emergency number (911 in the [place]) if:   You cannot be woken.    You have signs of diabetic ketoacidosis:     confusion, fatigue    vomiting    rapid heartbeat    fruity smelling breath    extreme thirst    dry mouth and skin    You have any of the following signs of a heart attack:      Squeezing, pressure, or pain in your chest    You may  also have any of the following:     Discomfort or pain in your back, neck, jaw, stomach, or arm    Shortness of breath    Nausea or vomiting    Lightheadedness or a sudden cold sweat    You have any of the following signs of a stroke:      Numbness or drooping on one side of your face     Weakness in an arm or leg    Confusion or difficulty speaking    Dizziness, a severe headache, or vision loss    Call your doctor or diabetes care team provider if:   You have a sore or wound that will not heal.    You have a change in the amount you urinate.    Your blood sugar levels are higher than your target goals.    You often have lower blood sugar levels than your target goals.    Your skin is red, dry, warm, or swollen.    You have trouble coping with diabetes, or you feel anxious or depressed.    You have trouble following any part of your care plan, such as your meal plan.    You have questions or concerns about your condition or  care.    What you need to know about high blood sugar levels:  High blood sugar levels may not cause any symptoms. You may feel more thirsty or urinate more often than usual. Over time, high blood sugar levels can damage your nerves, blood vessels, tissues, and organs. The following can increase your blood sugar levels:  Large meals or large amounts of carbohydrates at one time    Less physical activity    Stress    Illness    A lower dose of diabetes medicine or insulin, or a late dose    What you need to know about low blood sugar levels:  Symptoms include feeling shaky, dizzy, irritable, or confused. You can prevent symptoms by keeping your blood sugar levels from going too low.  Treat a low blood sugar level right away:      Drink 4 ounces of juice or have 1 tube of glucose gel.    Check your blood sugar level again 10 to 15 minutes later.    When the level goes back to normal, eat a meal or snack to prevent another decrease.       Keep glucose gel, raisins, or hard candy with you at all times to treat a low blood sugar level.     Your blood sugar level can get too low if you take diabetes medicine or insulin and do not eat enough food.     If you use insulin, check your blood sugar level before you exercise.      If your blood sugar level is below 100 mg/dL, eat 4 crackers or 2 ounces of raisins, or drink 4 ounces of juice.    Check your level every 30 minutes if you exercise longer than 1 hour.    You may need a snack during or after exercise.    What you can do to manage your blood sugar levels:   Check your blood sugar levels as directed and as needed.  Several items are available to use to check your levels. You may need to check by testing a drop of blood in a glucose monitor. You may instead be given a continuous glucose monitoring (CGM) device. The device is worn at all times. The CGM checks your blood sugar level every 5 minutes. It sends results to an electronic device such as a smart phone. A CGM can  be used with or without an insulin pump. You and your diabetes care team providers will decide on the best method for you. The goal for blood sugar levels before meals  is between 80 and 130 mg/dL and 2 hours after eating  is lower than 180 mg/dL.            Make healthy food choices.  Work with a dietitian to create a meal plan that works for you and your schedule. A dietitian can help you learn how to eat the right amount of carbohydrates (sugar and starchy foods) during your meals and snacks. Examples of carbohydrates are breads, cereals, rice, pasta, fruit, low-fat dairy, and sweets. Carbohydrates can raise your blood sugar level if you eat too many at one time.         Eat high-fiber foods as directed.  Fiber helps improve blood sugar levels. Fiber also lowers your risk for heart disease and other problems diabetes can cause. Examples of high-fiber foods include vegetables, whole-grain bread, and beans such as juarez beans. Your dietitian can tell you how much fiber to have each day.         Get regular physical activity.  Physical activity can help you get to your target blood sugar level goal and manage your weight. Get at least 150 minutes of moderate to vigorous aerobic physical activity each week. Resistance training, such as lifting weights, should be done 3 times each week. Do not miss more than 2 days of physical activity in a row. Do not sit longer than 30 minutes at a time. Your healthcare provider can help you create an activity plan. The plan can include the best activities for you and can help you build your strength and endurance.            Maintain a healthy weight.  Ask your team what a healthy weight is for you. A healthy weight can help you control diabetes and prevent heart disease. Ask your team to help you create a weight-loss plan, if needed. Even a loss of 3% to 7% of your excess body weight can help make a difference in managing diabetes. Your team will help you set a weight-loss goal,  such as 10 to 15 pounds, or 5% of your extra weight. Together you and your team can set manageable weight-loss goals.    Take your diabetes medicine or insulin as directed.  You may need diabetes medicine, insulin, or both to help control your blood sugar levels. Your healthcare provider will teach you how and when to take your diabetes medicine or insulin. You will also be taught about side effects oral diabetes medicine can cause. Insulin may be injected or given through a pump or pen. You and your providers will decide on the best method for you:    An insulin pump  is an implanted device that gives your insulin 24 hours a day. An insulin pump prevents the need for multiple insulin injections in a day.         An insulin pen  is a device prefilled with the right amount of insulin.         You and your family members will be taught how to draw up and give insulin  if this is the best method for you. Your providers will also teach you how to dispose of needles and syringes.    You will learn how much insulin you need  and when to give it. You will be taught when not to give insulin. You will also be taught what to do if your blood sugar level drops too low. This may happen if you take insulin and do not eat the right amount of carbohydrates.    More ways to manage type 2 diabetes:   Wear medical alert identification.  Wear medical alert jewelry or carry a card that says you have diabetes. Ask your provider where to get these items.         Do not smoke.  Nicotine and other chemicals in cigarettes and cigars can cause lung and blood vessel damage. It also makes it more difficult to manage your diabetes. Ask your provider for information if you currently smoke and need help to quit. Do not use e-cigarettes or smokeless tobacco in place of cigarettes or to help you quit. They still contain nicotine.    Check your feet each day for cuts, scratches, calluses, or other wounds.  Look for redness and swelling, and feel for  warmth. Wear shoes that fit well. Check your shoes for rocks or other objects that can hurt your feet. Do not walk barefoot or wear shoes without socks. Wear cotton socks to help keep your feet dry.         Ask about vaccines you may need.  You have a higher risk for serious illness if you get the flu, pneumonia, COVID-19, or hepatitis. Ask your provider if you should get vaccines to prevent these or other diseases, and when to get the vaccines.    Talk to your provider if you become stressed about diabetes care.  Sometimes being able to fit diabetes care into your life can cause increased stress. The stress can cause you not to take care of yourself properly. Your provider can help by offering tips about self-care. A mental health provider can listen and offer help with self-care issues. Other types of counseling can help you make nutrition or physical activity changes.    Have your A1c checked as directed.  Your provider may check your A1c every 3 months, or 2 times each year if your diabetes is controlled. An A1c test shows the average amount of sugar in your blood over the past 2 to 3 months. Your provider will tell you what your A1c level should be.    Have screening tests as directed.  Your provider may recommend screening for complications of diabetes and other conditions that may develop. Some screenings may begin right away and some may happen within the first 5 years of diagnosis:    Examples of diabetes complications  include kidney problems, high cholesterol, high blood pressure, blood vessel problems, eye problems, and sleep apnea.    You may be screened for a low vitamin B level  if you take oral diabetes medicine for a long time.    You may be screened for polycystic ovarian syndrome (PCOS)  if you are of childbearing age.    Follow up with your doctor or diabetes care team providers as directed:  You may need to have blood tests done before your follow-up visit. The test results will show if changes  need to be made in your treatment or self-care. Talk to your provider if you cannot afford your medicine. Write down your questions so you remember to ask them during your visits.  © Copyright [employer] 2023 Information is for End User's use only and may not be sold, redistributed or otherwise used for commercial purposes.  The above information is an  only. It is not intended as medical advice for individual conditions or treatments. Talk to your doctor, nurse or pharmacist before following any medical regimen to see if it is safe and effective for you.    Basic Carbohydrate Counting   AMBULATORY CARE:   Carbohydrate counting  is a way to plan your meals by counting the amount of carbohydrate in foods. Carbohydrates are the sugars, starches, and fiber found in fruit, grains, vegetables, and milk products. Carbohydrates increase your blood sugar levels. Carbohydrate counting can help you eat the right amount of carbohydrate to keep your blood sugar levels under control.   What you need to know about planning meals using carbohydrate counting:  A dietitian or healthcare provider will help you develop a healthy meal plan that works best for you. You will be taught how much carbohydrate to eat or drink for each meal and snack. Your meal plan will be based on your age, weight, usual food intake, and physical activity level. If you have diabetes, it will also include your blood sugar levels and diabetes medicine. Once you know how much carbohydrate you should eat, you can decide what type of food you want to eat.    You will need to know what foods contain carbohydrate and how much they contain. Keep track of the amount of carbohydrate in meals and snacks in order to follow your meal plan. Do not avoid carbohydrates or skip meals. Your blood sugar may fall too low if you do not eat enough carbohydrate or you skip meals.    Foods that contain carbohydrate:   Breads:  Each serving of food listed below  contains about 15 g of carbohydrate .    1 slice of bread (1 ounce) or 1 flour or corn tortilla (6 inch)    ½ of a hamburger bun or ¼ of a large bagel (about 1 ounce)    1 pancake (about 4 inches across and ¼ inch thick)    Cereals and grains:  Serving sizes of ready-to-eat cereals vary. Look at the serving size and the total carbohydrate amount listed on the food label. Each serving of food listed below contains about 15 g of carbohydrate .    ¾ cup of dry, unsweetened, ready-to-eat cereal or ¼ cup of low-fat granola     ½ cup of oatmeal or other cooked cereal     ? cup of cooked rice or pasta    Starchy vegetables and beans:  Each serving of food listed below contains about 15 g of carbohydrate .    ½ cup of corn, green peas, sweet potatoes, or mashed potatoes    ¼ of a large baked potato    ½ cup of beans, lentils, and peas (garbanzo, juarez, kidney, white, split, black-eyed)    Crackers and snacks:  Each serving of food listed below contains about 15 g of carbohydrate .    3 polina cracker squares or 8 animal crackers     6 saltine-type crackers    3 cups of popcorn or ¾ ounce of pretzels, potato chips, or tortilla chips    Fruit:  Each serving of food listed below contains about 15 g of carbohydrate .    1 small (4 ounce) piece of fresh fruit or ¾ to 1 cup of fresh fruit    ½ cup of canned or frozen fruit, packed in natural juice    ½ cup (4 ounces) of unsweetened fruit juice    2 tablespoons of dried fruit    Desserts or sugary foods:  Each serving of food listed below contains about 15 g of carbohydrate .    2-inch square unfrosted cake or brownie     2 small cookies    ½ cup of ice cream, frozen yogurt, or nondairy frozen yogurt    ¼ cup of sherbet or sorbet    1 tablespoon of regular syrup, jam, or jelly    2 tablespoons of light syrup    Milk and yogurt:  Foods from the milk group contain about 12 g of carbohydrate per serving.    1 cup of fat-free or low-fat milk    1 cup of soy milk    ? cup of fat-free,  yogurt sweetened with artificial sweetener    Non-starchy vegetables:  Each serving contains about 5 g of carbohydrate . Three servings of non-starch vegetables count as 1 carbohydrate serving.     ½ cup of cooked vegetables or 1 cup of raw vegetables. This includes beets, broccoli, cabbage, cauliflower, cucumber, mushrooms, tomatoes, and zucchini    ½ cup of vegetable juice    How to use carbohydrate counting to plan meals:   Count carbohydrate amounts using serving sizes:      Pasta dinner example:  You plan to have pasta, tossed salad, and an 8-ounce glass of milk. Your healthcare provider tells you that you may have 4 carbohydrate servings for dinner. One carbohydrate serving of pasta is ? cup. One cup of pasta will equal 3 carbohydrate servings. An 8-ounce glass of milk will count as 1 carbohydrate serving. These amounts of food would equal 4 carbohydrate servings. One cup of tossed salad does not count toward your carbohydrate servings.       Count carbohydrate amounts using food labels:  Find the total amount of carbohydrate in a packaged food by reading the food label. Food labels tell you the serving size of the food and the total carbohydrate amount in each serving. Find the serving size on the food label and then decide how many servings you will eat. Multiply the number of servings you plan to eat by the carbohydrate amount per serving.     Granola bar snack example:  Your meal plan allows you to have 2 carbohydrate servings (30 grams) of carbohydrate for a snack. You plan to eat 1 package of granola bars, which contains 2 bars. According to the food label, the serving size of food in this package is 1 bar. Each serving (1 bar) contains 25 grams of carbohydrate. The total amount of carbohydrate in this package of granola bars would be 50 g. Based on your meal plan, you should eat only 1 bar.      Follow up with your doctor as directed:  Write down your questions so you remember to ask them during your  visits.  © Copyright [employer] 2023 Information is for End User's use only and may not be sold, redistributed or otherwise used for commercial purposes.  The above information is an  only. It is not intended as medical advice for individual conditions or treatments. Talk to your doctor, nurse or pharmacist before following any medical regimen to see if it is safe and effective for you.

## 2024-04-24 NOTE — ASSESSMENT & PLAN NOTE
Lab Results   Component Value Date    HGBA1C 7.3 (A) 04/24/2024   Sugar not as well controlled, reinforce  diet

## 2024-04-25 ENCOUNTER — APPOINTMENT (OUTPATIENT)
Dept: LAB | Facility: HOSPITAL | Age: 79
End: 2024-04-25
Payer: MEDICARE

## 2024-04-25 DIAGNOSIS — I10 ESSENTIAL HYPERTENSION: ICD-10-CM

## 2024-04-25 DIAGNOSIS — L30.9 DERMATITIS: ICD-10-CM

## 2024-04-25 DIAGNOSIS — M25.531 RIGHT WRIST PAIN: ICD-10-CM

## 2024-04-25 DIAGNOSIS — E11.9 TYPE 2 DIABETES MELLITUS WITHOUT COMPLICATION, WITHOUT LONG-TERM CURRENT USE OF INSULIN (HCC): Chronic | ICD-10-CM

## 2024-04-25 DIAGNOSIS — I10 ESSENTIAL HYPERTENSION: Primary | ICD-10-CM

## 2024-04-25 DIAGNOSIS — M79.89 LEFT LEG SWELLING: Primary | ICD-10-CM

## 2024-04-25 DIAGNOSIS — M79.605 PAIN OF LEFT LOWER EXTREMITY: ICD-10-CM

## 2024-04-25 DIAGNOSIS — M79.89 LEFT LEG SWELLING: ICD-10-CM

## 2024-04-25 DIAGNOSIS — R79.82 ELEVATED C-REACTIVE PROTEIN (CRP): ICD-10-CM

## 2024-04-25 LAB
ANA SER QL IA: NEGATIVE
B BURGDOR IGG+IGM SER QL IA: NEGATIVE
BASOPHILS # BLD AUTO: 0.06 THOUSANDS/ÂΜL (ref 0–0.1)
BASOPHILS NFR BLD AUTO: 1 % (ref 0–1)
BNP SERPL-MCNC: 45 PG/ML (ref 0–100)
CRP SERPL QL: 47.8 MG/L
D DIMER PPP FEU-MCNC: 1.44 UG/ML FEU
EOSINOPHIL # BLD AUTO: 0.32 THOUSAND/ÂΜL (ref 0–0.61)
EOSINOPHIL NFR BLD AUTO: 3 % (ref 0–6)
ERYTHROCYTE [DISTWIDTH] IN BLOOD BY AUTOMATED COUNT: 12.6 % (ref 11.6–15.1)
ERYTHROCYTE [SEDIMENTATION RATE] IN BLOOD: 47 MM/HOUR (ref 0–29)
HCT VFR BLD AUTO: 40.4 % (ref 34.8–46.1)
HGB BLD-MCNC: 14 G/DL (ref 11.5–15.4)
IMM GRANULOCYTES # BLD AUTO: 0.05 THOUSAND/UL (ref 0–0.2)
IMM GRANULOCYTES NFR BLD AUTO: 0 % (ref 0–2)
LYMPHOCYTES # BLD AUTO: 1.34 THOUSANDS/ÂΜL (ref 0.6–4.47)
LYMPHOCYTES NFR BLD AUTO: 12 % (ref 14–44)
MCH RBC QN AUTO: 30.8 PG (ref 26.8–34.3)
MCHC RBC AUTO-ENTMCNC: 34.7 G/DL (ref 31.4–37.4)
MCV RBC AUTO: 89 FL (ref 82–98)
MONOCYTES # BLD AUTO: 0.54 THOUSAND/ÂΜL (ref 0.17–1.22)
MONOCYTES NFR BLD AUTO: 5 % (ref 4–12)
NEUTROPHILS # BLD AUTO: 9.24 THOUSANDS/ÂΜL (ref 1.85–7.62)
NEUTS SEG NFR BLD AUTO: 79 % (ref 43–75)
NRBC BLD AUTO-RTO: 0 /100 WBCS
PLATELET # BLD AUTO: 212 THOUSANDS/UL (ref 149–390)
PMV BLD AUTO: 12.4 FL (ref 8.9–12.7)
RBC # BLD AUTO: 4.54 MILLION/UL (ref 3.81–5.12)
TSH SERPL DL<=0.05 MIU/L-ACNC: 1.92 UIU/ML (ref 0.45–4.5)
URATE SERPL-MCNC: 3.9 MG/DL (ref 2–7.5)
WBC # BLD AUTO: 11.55 THOUSAND/UL (ref 4.31–10.16)

## 2024-04-25 PROCEDURE — 86038 ANTINUCLEAR ANTIBODIES: CPT

## 2024-04-25 PROCEDURE — 85379 FIBRIN DEGRADATION QUANT: CPT

## 2024-04-25 PROCEDURE — 85025 COMPLETE CBC W/AUTO DIFF WBC: CPT

## 2024-04-25 PROCEDURE — 84443 ASSAY THYROID STIM HORMONE: CPT

## 2024-04-25 PROCEDURE — 86430 RHEUMATOID FACTOR TEST QUAL: CPT | Performed by: FAMILY MEDICINE

## 2024-04-25 PROCEDURE — 85652 RBC SED RATE AUTOMATED: CPT | Performed by: FAMILY MEDICINE

## 2024-04-25 PROCEDURE — 36415 COLL VENOUS BLD VENIPUNCTURE: CPT

## 2024-04-25 PROCEDURE — 86140 C-REACTIVE PROTEIN: CPT | Performed by: FAMILY MEDICINE

## 2024-04-25 PROCEDURE — 83880 ASSAY OF NATRIURETIC PEPTIDE: CPT

## 2024-04-25 PROCEDURE — 84550 ASSAY OF BLOOD/URIC ACID: CPT

## 2024-04-25 PROCEDURE — 86162 COMPLEMENT TOTAL (CH50): CPT

## 2024-04-25 PROCEDURE — 86618 LYME DISEASE ANTIBODY: CPT

## 2024-04-26 LAB
CH50 SERPL-ACNC: >60 U/ML
RHEUMATOID FACT SER QL LA: NEGATIVE

## 2024-04-30 ENCOUNTER — TELEPHONE (OUTPATIENT)
Age: 79
End: 2024-04-30

## 2024-04-30 NOTE — TELEPHONE ENCOUNTER
Pt called, stating the incorrect test strips were sent to the pharmacy.  She said she switched to Freestyle a few months ago.  They were sent correctly last time, but this time she received the OneTouch Ultra strips, which do not work in her device.    She returned them to the pharmacy, they told her they would reach out to us, but she wanted to advise also.  Please send correct strips to:    [id number]  [address]

## 2024-05-01 ENCOUNTER — HOSPITAL ENCOUNTER (OUTPATIENT)
Dept: CT IMAGING | Facility: HOSPITAL | Age: 79
Discharge: HOME | End: 2024-05-01
Payer: MEDICARE

## 2024-05-01 ENCOUNTER — TELEPHONE (OUTPATIENT)
Dept: FAMILY MEDICINE CLINIC | Facility: CLINIC | Age: 79
End: 2024-05-01

## 2024-05-01 DIAGNOSIS — E11.9 TYPE 2 DIABETES MELLITUS WITHOUT COMPLICATION, WITHOUT LONG-TERM CURRENT USE OF INSULIN (HCC): Primary | Chronic | ICD-10-CM

## 2024-05-01 DIAGNOSIS — I77.810 THORACIC AORTIC ECTASIA (HCC): ICD-10-CM

## 2024-05-01 PROCEDURE — 71250 CT THORAX DX C-: CPT

## 2024-05-01 NOTE — TELEPHONE ENCOUNTER
----- Message from Mariola Zuleta MD sent at 4/30/2024  6:15 PM EDT -----  Complement  studies  normal, if  still with rash rec derm eval

## 2024-05-06 ENCOUNTER — TELEPHONE (OUTPATIENT)
Dept: FAMILY MEDICINE CLINIC | Facility: CLINIC | Age: 79
End: 2024-05-06

## 2024-05-06 RX ORDER — BLOOD-GLUCOSE METER
KIT MISCELLANEOUS
Qty: 100 STRIP | Refills: 3 | Status: SHIPPED | OUTPATIENT
Start: 2024-05-06

## 2024-05-06 NOTE — TELEPHONE ENCOUNTER
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

## 2024-05-06 NOTE — TELEPHONE ENCOUNTER
Pt called again regarding the glucose strips that were sent to her pharmacy, in the middle of the conversation we were cut off, pt stated she used the freestyle strips and that works better for her. Please advise [phone] thank you.

## 2024-05-08 NOTE — TELEPHONE ENCOUNTER
Patient called and stated her insurance will not pay for the Freestyle Lite test strips. Patient is requesting that OneTouch be sent in instead. Please advise.

## 2024-05-10 DIAGNOSIS — E11.9 TYPE 2 DIABETES MELLITUS WITHOUT COMPLICATION, WITHOUT LONG-TERM CURRENT USE OF INSULIN (HCC): Primary | ICD-10-CM

## 2024-05-10 RX ORDER — LANCETS 33 GAUGE
EACH MISCELLANEOUS
Qty: 100 EACH | Refills: 3 | Status: SHIPPED | OUTPATIENT
Start: 2024-05-10

## 2024-05-10 RX ORDER — BLOOD-GLUCOSE METER
EACH MISCELLANEOUS
Qty: 1 KIT | Refills: 0 | Status: SHIPPED | OUTPATIENT
Start: 2024-05-10

## 2024-05-10 RX ORDER — BLOOD SUGAR DIAGNOSTIC
STRIP MISCELLANEOUS
Qty: 100 EACH | Refills: 3 | Status: SHIPPED | OUTPATIENT
Start: 2024-05-10

## 2024-05-10 NOTE — TELEPHONE ENCOUNTER
Received call from patient asking about the status of her test strips.  Patient states that she has the Freestyle Lite monitor but her insurance will no longer cover for the test strips with that monitor.  She will need a prescription for a new monitor sent to the pharmacy but she is not sure what one she can afford.  Advised patient to call her pharmacy to find out pricing of monitors and to call back with which one she would like the provider to order.

## 2024-06-03 ENCOUNTER — HOSPITAL ENCOUNTER (OUTPATIENT)
Dept: NON INVASIVE DIAGNOSTICS | Facility: HOSPITAL | Age: 79
Discharge: HOME | End: 2024-06-03
Payer: COMMERCIAL

## 2024-06-03 DIAGNOSIS — M79.89 LEFT LEG SWELLING: ICD-10-CM

## 2024-06-03 PROCEDURE — 93971 EXTREMITY STUDY: CPT | Performed by: INTERNAL MEDICINE

## 2024-06-03 PROCEDURE — 93971 EXTREMITY STUDY: CPT

## 2024-06-04 ENCOUNTER — TELEPHONE (OUTPATIENT)
Dept: FAMILY MEDICINE CLINIC | Facility: CLINIC | Age: 79
End: 2024-06-04

## 2024-06-04 NOTE — TELEPHONE ENCOUNTER
----- Message from Michael Abernathy DO sent at 6/4/2024  7:33 AM EDT -----  Patient's venous duplex shows no DVT

## 2024-06-11 DIAGNOSIS — I10 ESSENTIAL HYPERTENSION: ICD-10-CM

## 2024-06-11 RX ORDER — HYDROCHLOROTHIAZIDE 12.5 MG/1
12.5 TABLET ORAL DAILY
Qty: 90 TABLET | Refills: 1 | Status: SHIPPED | OUTPATIENT
Start: 2024-06-11

## 2024-06-11 NOTE — TELEPHONE ENCOUNTER
Cc'd charts    Reason for call:   [x] Refill   [] Prior Auth  [] Other:     Office:   [x] PCP/Provider - Dvaid Zuleta/ Tessie MENCHACA  [] Specialty/Provider -     Medication: Hydrochlorothiazide    Dose/Frequency: 12.5 mg     Quantity: #90    Pharmacy: [facility] [address]    Does the patient have enough for 3 days?   [] Yes   [x] No - Send as HP to POD

## 2024-07-01 DIAGNOSIS — J45.30 MILD PERSISTENT ASTHMA WITHOUT COMPLICATION: ICD-10-CM

## 2024-07-02 RX ORDER — ALBUTEROL SULFATE 2.5 MG/3ML
SOLUTION RESPIRATORY (INHALATION)
Qty: 150 ML | Refills: 1 | Status: SHIPPED | OUTPATIENT
Start: 2024-07-02

## 2024-07-22 ENCOUNTER — RA CDI HCC (OUTPATIENT)
Dept: OTHER | Facility: HOSPITAL | Age: 79
End: 2024-07-22

## 2024-07-23 DIAGNOSIS — J45.30 MILD PERSISTENT ASTHMA WITHOUT COMPLICATION: ICD-10-CM

## 2024-07-23 RX ORDER — ALBUTEROL SULFATE 2.5 MG/3ML
SOLUTION RESPIRATORY (INHALATION)
Qty: 3 ML | Refills: 1 | Status: SHIPPED | OUTPATIENT
Start: 2024-07-23

## 2024-08-14 DIAGNOSIS — J45.30 MILD PERSISTENT ASTHMA WITHOUT COMPLICATION: ICD-10-CM

## 2024-08-15 RX ORDER — ALBUTEROL SULFATE 0.83 MG/ML
SOLUTION RESPIRATORY (INHALATION)
Qty: 225 ML | Refills: 1 | Status: SHIPPED | OUTPATIENT
Start: 2024-08-15

## 2024-08-29 DIAGNOSIS — J45.30 MILD PERSISTENT ASTHMA WITHOUT COMPLICATION: ICD-10-CM

## 2024-08-29 RX ORDER — FLUTICASONE PROPIONATE AND SALMETEROL 250; 50 UG/1; UG/1
POWDER RESPIRATORY (INHALATION)
Qty: 180 BLISTER | Refills: 1 | Status: SHIPPED | OUTPATIENT
Start: 2024-08-29

## 2024-09-23 DIAGNOSIS — J45.30 MILD PERSISTENT ASTHMA WITHOUT COMPLICATION: ICD-10-CM

## 2024-09-24 ENCOUNTER — APPOINTMENT (EMERGENCY)
Dept: RADIOLOGY | Facility: HOSPITAL | Age: 79
End: 2024-09-24
Payer: COMMERCIAL

## 2024-09-24 ENCOUNTER — HOSPITAL ENCOUNTER (EMERGENCY)
Facility: HOSPITAL | Age: 79
Discharge: HOME | End: 2024-09-24
Attending: EMERGENCY MEDICINE
Payer: COMMERCIAL

## 2024-09-24 VITALS
WEIGHT: 134.26 LBS | BODY MASS INDEX: 23.78 KG/M2 | DIASTOLIC BLOOD PRESSURE: 63 MMHG | HEART RATE: 70 BPM | RESPIRATION RATE: 18 BRPM | OXYGEN SATURATION: 94 % | SYSTOLIC BLOOD PRESSURE: 142 MMHG | TEMPERATURE: 97.9 F

## 2024-09-24 DIAGNOSIS — M53.3 DISORDER OF SI (SACROILIAC) JOINT: Primary | ICD-10-CM

## 2024-09-24 PROCEDURE — 99284 EMERGENCY DEPT VISIT MOD MDM: CPT | Performed by: EMERGENCY MEDICINE

## 2024-09-24 PROCEDURE — 73502 X-RAY EXAM HIP UNI 2-3 VIEWS: CPT

## 2024-09-24 PROCEDURE — 99283 EMERGENCY DEPT VISIT LOW MDM: CPT

## 2024-09-24 RX ORDER — IBUPROFEN 400 MG/1
400 TABLET, FILM COATED ORAL EVERY 8 HOURS PRN
Qty: 12 TABLET | Refills: 0 | Status: SHIPPED | OUTPATIENT
Start: 2024-09-24

## 2024-09-24 RX ORDER — FLUTICASONE PROPIONATE AND SALMETEROL 250; 50 UG/1; UG/1
POWDER RESPIRATORY (INHALATION)
Qty: 180 BLISTER | Refills: 0 | Status: SHIPPED | OUTPATIENT
Start: 2024-09-24

## 2024-09-24 RX ORDER — ACETAMINOPHEN 325 MG/1
650 TABLET ORAL EVERY 6 HOURS PRN
Qty: 24 TABLET | Refills: 0 | Status: SHIPPED | OUTPATIENT
Start: 2024-09-24

## 2024-09-24 RX ORDER — LIDOCAINE 50 MG/G
1 PATCH TOPICAL DAILY
Qty: 7 PATCH | Refills: 0 | Status: SHIPPED | OUTPATIENT
Start: 2024-09-24

## 2024-09-24 NOTE — ED PROVIDER NOTES
1. Disorder of SI (sacroiliac) joint      ED Disposition       ED Disposition   Discharge    Condition   Stable    Date/Time   Tue Sep 24, 2024  8:23 AM    Comment   Jenna Chavarria discharge to home/self care.                   Assessment & Plan       Medical Decision Making  Amount and/or Complexity of Data Reviewed  Radiology: ordered.    Risk  OTC drugs.  Prescription drug management.      Patient with pain in the right SI area.  I suspect that this area is getting irritated from her sitting a lot while she is attending to her son in the ICU.  She is neurovascularly intact.  There is no midline back pain.  X-ray of the hip/pelvis did not show any acute abnormality.  I believe she needs a referral to physical therapy and we can try some low-dose NSAIDs as she has been taking 800 mg without relief which may be too high a dose.  We can also try Lidoderm patch.  She is ambulatory.  I do not believe she needs any midline back imaging and there is no acute neurological complaints that necessitates advanced imaging like a CT or MRI.                Medications - No data to display    History of Present Illness       HPI  Chief Complaint   Patient presents with    Back Pain     Pt reports R sided buttock pain.  Has been ongoing for 4 days, last dose of ibuprofen at 800mg this morning.      Past Medical History:   Diagnosis Date    Asthma     Colon polyp     Diabetes mellitus (HCC)     E coli infection     Fall     Hyperlipidemia     Hypertension     Pneumothorax     Rectal prolapse 09/03/2020    Sleep apnea     no CPAP    Subarachnoid hemorrhage (HCC)     Thoracic aortic aneurysm (HCC)     Vertigo      Past Surgical History:   Procedure Laterality Date    BONE GRAFT      R arm    BRONCHOSCOPY N/A 3/16/2016    Procedure: BRONCHOSCOPY FLEXIBLE/anesth.;  Surgeon: Beth Brar DO;  Location: [facility];  Service:     COLONOSCOPY      COLONOSCOPY      EYE SURGERY      OOPHORECTOMY Left     age 54    WI LAPAROSCOPY COLECTOMY  PARTIAL W/ANASTOMOSIS N/A 9/23/2020    Procedure: RESECTION COLON SIGMOID LAPAROSCOPIC;  Surgeon: Ricci Christian MD;  Location: [facility];  Service: Colorectal    SD SIGMOIDOSCOPY FLX DX W/COLLJ SPEC BR/WA IF PFRMD N/A 9/23/2020    Procedure: SIGMOIDOSCOPY FLEXIBLE;  Surgeon: Ricci Christian MD;  Location: [facility];  Service: Colorectal    RECTAL PROLAPSE REPAIR LAPAROSCOPIC N/A 9/23/2020    Procedure: RECTOPEXY/PROCTOPEXY LAPAROSCOPIC;  Surgeon: Ricci Christian MD;  Location: [facility];  Service: Colorectal     Prior to Admission medications    Medication Sig Start Date End Date Taking? Authorizing Provider   acetaminophen (TYLENOL) 325 mg tablet Take 2 tablets (650 mg total) by mouth every 6 (six) hours as needed for mild pain or moderate pain for up to 12 doses 9/24/24  Yes Eleuterio Emery MD   ibuprofen (MOTRIN) 400 mg tablet Take 1 tablet (400 mg total) by mouth every 8 (eight) hours as needed for mild pain or moderate pain for up to 12 doses 9/24/24  Yes Eleuterio Emery MD   lidocaine (LIDODERM) 5 % Apply 1 patch topically over 12 hours daily Remove & Discard patch within 12 hours or as directed by MD 9/24/24  Yes Eleuterio Emery MD   albuterol (2.5 mg/3 mL) 0.083 % nebulizer solution USE 3 ML BY NEBULIZATION EVERY 6 HOURS AS NEEDED FOR WHEEZING OR FOR SHORTNESS OF BREATH 8/15/24   Mariola Zuleta MD   albuterol (PROVENTIL HFA,VENTOLIN HFA) 90 mcg/act inhaler Inhale 2 puffs every 6 (six) hours as needed for wheezing 12/13/23   Mariola Zuleta MD   amLODIPine (NORVASC) 5 mg tablet TAKE ONE TABLET BY MOUTH DAILY AT 9AM 3/28/24   Mariola Zuleta MD   atorvastatin (LIPITOR) 40 mg tablet Take 1 tablet (40 mg total) by mouth daily 4/8/24   Mariola Zuleta MD   Blood Glucose Monitoring Suppl (ONE TOUCH ULTRA 2) w/Device KIT Check blood sugars once daily. Please substitute with appropriate alternative as covered by patient's insurance. Dx: E11.65 5/10/24   Mariola Zuleta MD    docusate sodium (COLACE) 100 mg capsule Take 1 capsule (100 mg total) by mouth 2 (two) times a day as needed for constipation 8/8/22   Mariola Zuleta MD   Fluticasone-Salmeterol (Advair) 250-50 mcg/dose inhaler INHALE 1 PUFF BY MOUTH TWICE DAILY (RINSE MOUTH AFTER EACH USE) 9/24/24   Mariola Zuleta MD   glucosamine-chondroitin 500-400 MG tablet Take 1 tablet by mouth 3 (three) times a day    Historical Provider, MD   glucose blood (OneTouch Ultra) test strip Check blood sugars once daily. Please substitute with appropriate alternative as covered by patient's insurance. Dx: E11.65 5/10/24   Mariola Zuleta MD   hydroCHLOROthiazide 12.5 mg tablet Take 1 tablet (12.5 mg total) by mouth daily 6/11/24   Mariola Zuleta MD   ibandronate (BONIVA) 150 MG tablet Take 1 tablet (150 mg total) by mouth every 30 (thirty) days 4/8/24   Mariola Zuleta MD   ibuprofen (MOTRIN) 800 mg tablet Take by mouth every 6 (six) hours as needed for mild pain    Historical Provider, MD   irbesartan (AVAPRO) 300 mg tablet Take 1 tablet (300 mg total) by mouth daily at bedtime 4/8/24   Mariola Zuleta MD   lidocaine (LMX) 4 % cream Apply topically 2 (two) times a day as needed for moderate pain 11/21/23   Gina Zelaya PA-C   MAGNESIUM PO Take by mouth daily    Historical Provider, MD   metFORMIN (GLUCOPHAGE) 850 mg tablet TAKE ONE TABLET BY MOUTH TWICE DAILY @9AM-5PM WITH MEALS 3/28/24   Mariola Zuleta MD   methylPREDNISolone 4 MG tablet therapy pack Use as directed on package 4/24/24   Mariola Zuleta MD   montelukast (SINGULAIR) 10 mg tablet Take 1 tablet (10 mg total) by mouth daily at bedtime 12/13/23   MD Caroline Jara Delica Lancets 33G MISC Check blood sugars once daily. Please substitute with appropriate alternative as covered by patient's insurance. Dx: E11.65 5/10/24   Mariola Zuleta MD   solifenacin (VESICARE) 5 mg tablet Take 1 tablet (5 mg total) by mouth daily 4/8/24   Mariola Zuleta MD   Fluticasone-Salmeterol (Advair) 250-50 mcg/dose  inhaler INHALE 1 PUFF BY MOUTH TWICE DAILY (RINSE MOUTH AFTER EACH USE) 8/29/24 9/24/24  Mariola Zuleta MD     Social History     Socioeconomic History    Marital status: /Civil Union     Spouse name: Not on file    Number of children: Not on file    Years of education: Not on file    Highest education level: Not on file   Occupational History    Not on file   Tobacco Use    Smoking status: Former     Types: Cigarettes    Smokeless tobacco: Never    Tobacco comments:     only smoked a couple a day for a couple years in her 20s   Vaping Use    Vaping status: Never Used   Substance and Sexual Activity    Alcohol use: Yes     Comment: weekends     Drug use: No    Sexual activity: Not on file   Other Topics Concern    Not on file   Social History Narrative    Lives with daughter and son and boyfriend    4  Children,3 alive 18 grandcchildren     Social Determinants of Health     Financial Resource Strain: Low Risk  (8/16/2023)    Overall Financial Resource Strain (CARDIA)     Difficulty of Paying Living Expenses: Not hard at all   Food Insecurity: Not on file   Transportation Needs: No Transportation Needs (8/16/2023)    PRAPARE - Transportation     Lack of Transportation (Medical): No     Lack of Transportation (Non-Medical): No   Physical Activity: Not on file   Stress: Not on file   Social Connections: Not on file   Intimate Partner Violence: Not on file   Housing Stability: Not on file     Patient is complaining of pain for the last several days in the right SI area.  While there is been no apparent injury she has been sitting a lot in the ICU because her son was admitted.  She notes no trauma but has also been helping to turn him and could potentially have injured self.  There is no radiculopathy.  She is able to ambulate but is painful in that area.  She has no midline back pain.  There is no weakness or numbness.  There is no rash.  There is no fever.  No abdominal pain or urinary complaints.  Review of  Systems        Objective     ED Triage Vitals [09/24/24 0651]   Temperature Pulse Blood Pressure Respirations SpO2 Patient Position - Orthostatic VS   97.9 °F (36.6 °C) 71 156/72 18 94 % Sitting      Temp Source Heart Rate Source BP Location FiO2 (%) Pain Score    Oral Monitor Right arm -- --        Physical Exam  Vitals and nursing note reviewed.   Constitutional:       General: She is not in acute distress.     Appearance: Normal appearance. She is well-developed. She is not ill-appearing, toxic-appearing or diaphoretic.   HENT:      Head: Normocephalic and atraumatic.      Right Ear: Hearing normal. No drainage or swelling.      Left Ear: Hearing normal. No drainage or swelling.   Eyes:      General: Lids are normal.         Right eye: No discharge.         Left eye: No discharge.      Conjunctiva/sclera: Conjunctivae normal.   Neck:      Vascular: No JVD.      Trachea: Trachea normal.   Cardiovascular:      Rate and Rhythm: Normal rate and regular rhythm.      Pulses: Normal pulses.   Pulmonary:      Effort: Pulmonary effort is normal. No respiratory distress.   Abdominal:      Palpations: Abdomen is soft. There is no mass.      Tenderness: There is no abdominal tenderness. There is no guarding or rebound.   Musculoskeletal:         General: No swelling. Normal range of motion.      Cervical back: Normal range of motion.      Thoracic back: Normal.      Lumbar back: Normal.        Back:       Right lower leg: No edema.      Left lower leg: No edema.      Comments: There are some point tenderness over the right SI area without any overlying ecchymosis or rash.  Normal range of motion of the hip.  Palpation of the greater trochanter range of motion of that hip does not cause any additional pain.  She was able to get up and ambulate.   Skin:     General: Skin is warm and dry.      Coloration: Skin is not pale.      Findings: No bruising, erythema or rash.   Neurological:      General: No focal deficit present.       Mental Status: She is alert.      GCS: GCS eye subscore is 4. GCS verbal subscore is 5. GCS motor subscore is 6.      Sensory: No sensory deficit.      Motor: No weakness or abnormal muscle tone.      Gait: Gait normal.   Psychiatric:         Mood and Affect: Mood normal.         Speech: Speech normal.         Behavior: Behavior is cooperative.     I have reviewed the film, per my independent interpretation : No acute fracture or dislocation.  Formal read per radiology.      Labs Reviewed - No data to display  XR hip/pelv 2-3 vws right   Final Interpretation by Poppy Siddiqui MD (09/24 0823)      No acute osseous abnormality.         Computerized Assisted Algorithm (CAA) may have been used to analyze all applicable images.            Workstation performed: [id number]             Procedures    ED Medication and Procedure Management   Prior to Admission Medications   Prescriptions Last Dose Informant Patient Reported? Taking?   Blood Glucose Monitoring Suppl (ONE TOUCH ULTRA 2) w/Device KIT   No No   Sig: Check blood sugars once daily. Please substitute with appropriate alternative as covered by patient's insurance. Dx: E11.65   Fluticasone-Salmeterol (Advair) 250-50 mcg/dose inhaler   No No   Sig: INHALE 1 PUFF BY MOUTH TWICE DAILY (RINSE MOUTH AFTER EACH USE)   MAGNESIUM PO  Self Yes No   Sig: Take by mouth daily   OneTouch Delica Lancets 33G MISC   No No   Sig: Check blood sugars once daily. Please substitute with appropriate alternative as covered by patient's insurance. Dx: E11.65   albuterol (2.5 mg/3 mL) 0.083 % nebulizer solution   No No   Sig: USE 3 ML BY NEBULIZATION EVERY 6 HOURS AS NEEDED FOR WHEEZING OR FOR SHORTNESS OF BREATH   albuterol (PROVENTIL HFA,VENTOLIN HFA) 90 mcg/act inhaler   No No   Sig: Inhale 2 puffs every 6 (six) hours as needed for wheezing   amLODIPine (NORVASC) 5 mg tablet   No No   Sig: TAKE ONE TABLET BY MOUTH DAILY AT 9AM   atorvastatin (LIPITOR) 40 mg tablet   No No   Sig: Take 1  tablet (40 mg total) by mouth daily   docusate sodium (COLACE) 100 mg capsule  Self No No   Sig: Take 1 capsule (100 mg total) by mouth 2 (two) times a day as needed for constipation   glucosamine-chondroitin 500-400 MG tablet  Self Yes No   Sig: Take 1 tablet by mouth 3 (three) times a day   glucose blood (OneTouch Ultra) test strip   No No   Sig: Check blood sugars once daily. Please substitute with appropriate alternative as covered by patient's insurance. Dx: E11.65   hydroCHLOROthiazide 12.5 mg tablet   No No   Sig: Take 1 tablet (12.5 mg total) by mouth daily   ibandronate (BONIVA) 150 MG tablet   No No   Sig: Take 1 tablet (150 mg total) by mouth every 30 (thirty) days   ibuprofen (MOTRIN) 800 mg tablet  Self Yes No   Sig: Take by mouth every 6 (six) hours as needed for mild pain   irbesartan (AVAPRO) 300 mg tablet   No No   Sig: Take 1 tablet (300 mg total) by mouth daily at bedtime   lidocaine (LMX) 4 % cream   No No   Sig: Apply topically 2 (two) times a day as needed for moderate pain   metFORMIN (GLUCOPHAGE) 850 mg tablet   No No   Sig: TAKE ONE TABLET BY MOUTH TWICE DAILY @9AM-5PM WITH MEALS   methylPREDNISolone 4 MG tablet therapy pack   No No   Sig: Use as directed on package   montelukast (SINGULAIR) 10 mg tablet   No No   Sig: Take 1 tablet (10 mg total) by mouth daily at bedtime   solifenacin (VESICARE) 5 mg tablet   No No   Sig: Take 1 tablet (5 mg total) by mouth daily      Facility-Administered Medications: None     Discharge Medication List as of 9/24/2024  8:23 AM        START taking these medications    Details   acetaminophen (TYLENOL) 325 mg tablet Take 2 tablets (650 mg total) by mouth every 6 (six) hours as needed for mild pain or moderate pain for up to 12 doses, Starting Tue 9/24/2024, Normal      !! ibuprofen (MOTRIN) 400 mg tablet Take 1 tablet (400 mg total) by mouth every 8 (eight) hours as needed for mild pain or moderate pain for up to 12 doses, Starting Tue 9/24/2024, Normal       lidocaine (LIDODERM) 5 % Apply 1 patch topically over 12 hours daily Remove & Discard patch within 12 hours or as directed by MD, Starting Tue 9/24/2024, Normal       !! - Potential duplicate medications found. Please discuss with provider.        CONTINUE these medications which have NOT CHANGED    Details   albuterol (2.5 mg/3 mL) 0.083 % nebulizer solution USE 3 ML BY NEBULIZATION EVERY 6 HOURS AS NEEDED FOR WHEEZING OR FOR SHORTNESS OF BREATH, Normal      albuterol (PROVENTIL HFA,VENTOLIN HFA) 90 mcg/act inhaler Inhale 2 puffs every 6 (six) hours as needed for wheezing, Starting Wed 12/13/2023, Normal      amLODIPine (NORVASC) 5 mg tablet TAKE ONE TABLET BY MOUTH DAILY AT 9AM, Normal      atorvastatin (LIPITOR) 40 mg tablet Take 1 tablet (40 mg total) by mouth daily, Starting Mon 4/8/2024, Normal      Blood Glucose Monitoring Suppl (ONE TOUCH ULTRA 2) w/Device KIT Check blood sugars once daily. Please substitute with appropriate alternative as covered by patient's insurance. Dx: E11.65, Normal      docusate sodium (COLACE) 100 mg capsule Take 1 capsule (100 mg total) by mouth 2 (two) times a day as needed for constipation, Starting Mon 8/8/2022, Normal      Fluticasone-Salmeterol (Advair) 250-50 mcg/dose inhaler INHALE 1 PUFF BY MOUTH TWICE DAILY (RINSE MOUTH AFTER EACH USE), Normal      glucosamine-chondroitin 500-400 MG tablet Take 1 tablet by mouth 3 (three) times a day, Historical Med      glucose blood (OneTouch Ultra) test strip Check blood sugars once daily. Please substitute with appropriate alternative as covered by patient's insurance. Dx: E11.65, Normal      hydroCHLOROthiazide 12.5 mg tablet Take 1 tablet (12.5 mg total) by mouth daily, Starting Tue 6/11/2024, Normal      ibandronate (BONIVA) 150 MG tablet Take 1 tablet (150 mg total) by mouth every 30 (thirty) days, Starting Mon 4/8/2024, Normal      !! ibuprofen (MOTRIN) 800 mg tablet Take by mouth every 6 (six) hours as needed for mild pain,  Historical Med      irbesartan (AVAPRO) 300 mg tablet Take 1 tablet (300 mg total) by mouth daily at bedtime, Starting Mon 4/8/2024, Normal      lidocaine (LMX) 4 % cream Apply topically 2 (two) times a day as needed for moderate pain, Starting Tue 11/21/2023, Normal      MAGNESIUM PO Take by mouth daily, Historical Med      metFORMIN (GLUCOPHAGE) 850 mg tablet TAKE ONE TABLET BY MOUTH TWICE DAILY @9AM-5PM WITH MEALS, Normal      methylPREDNISolone 4 MG tablet therapy pack Use as directed on package, Normal      montelukast (SINGULAIR) 10 mg tablet Take 1 tablet (10 mg total) by mouth daily at bedtime, Starting Wed 12/13/2023, Normal      OneTouch Delica Lancets 33G MISC Check blood sugars once daily. Please substitute with appropriate alternative as covered by patient's insurance. Dx: E11.65, Normal      solifenacin (VESICARE) 5 mg tablet Take 1 tablet (5 mg total) by mouth daily, Starting Mon 4/8/2024, Normal       !! - Potential duplicate medications found. Please discuss with provider.             Eleuterio Emery MD  09/24/24 0541

## 2024-10-19 DIAGNOSIS — J45.30 MILD PERSISTENT ASTHMA WITHOUT COMPLICATION: ICD-10-CM

## 2024-10-21 RX ORDER — ALBUTEROL SULFATE 0.83 MG/ML
SOLUTION RESPIRATORY (INHALATION)
Qty: 225 ML | Refills: 1 | Status: SHIPPED | OUTPATIENT
Start: 2024-10-21

## 2024-10-22 DIAGNOSIS — I10 ESSENTIAL HYPERTENSION: ICD-10-CM

## 2024-10-22 DIAGNOSIS — E11.9 TYPE 2 DIABETES MELLITUS WITHOUT COMPLICATION, WITHOUT LONG-TERM CURRENT USE OF INSULIN (HCC): Chronic | ICD-10-CM

## 2024-10-22 DIAGNOSIS — R80.9 PROTEINURIA, UNSPECIFIED TYPE: ICD-10-CM

## 2024-10-23 ENCOUNTER — EVALUATION (OUTPATIENT)
Dept: PHYSICAL THERAPY | Facility: REHABILITATION | Age: 79
End: 2024-10-23
Payer: COMMERCIAL

## 2024-10-23 DIAGNOSIS — M53.3 DISORDER OF SI (SACROILIAC) JOINT: Primary | ICD-10-CM

## 2024-10-23 PROCEDURE — 97162 PT EVAL MOD COMPLEX 30 MIN: CPT

## 2024-10-23 PROCEDURE — 97112 NEUROMUSCULAR REEDUCATION: CPT

## 2024-10-23 RX ORDER — IRBESARTAN 300 MG/1
TABLET ORAL
Qty: 30 TABLET | Refills: 0 | Status: SHIPPED | OUTPATIENT
Start: 2024-10-23

## 2024-10-23 NOTE — PROGRESS NOTES
PT Evaluation     Today's date: 10/23/2024  Patient name: Jenna Chavarria  : 1945  MRN: [id number]  Referring provider: Eleuterio Emery  Dx:   Encounter Diagnosis     ICD-10-CM    1. Disorder of SI (sacroiliac) joint  M53.3 Ambulatory Referral to Physical Therapy          Start Time: 1130  Stop Time: 1215  Total time in clinic (min): 45 minutes    Assessment  Impairments: lacks appropriate home exercise program    Assessment details: Jenna Chavarria is a pleasant 79 y.o. female who presents with LBP. Upon eval today, she has agrawal l/s AROM, and LE WB intolerance resulting in worry over not knowing what's wrong, fear of not being able to keep active, and pain with prolonged standing. Patient stated this occurs after standing for ~2 hours and goes away when she sits and was educated that this is a typical response to activity. Further education provided surrounding l/s anatomy, sx response to activity and therapeutic exercise and activity modification for the purposes of sx management. Patient verbalized understanding of all of the information provided. No further referral appears necessary at this time based upon examination results. Patient wishes to trial HEP at this time and does not want to schedule future visits. HEP reviewed with patient and all querstions answered. Positive prognostic indicators include positive attitude toward recovery and good understanding of diagnosis and treatment plan options.  Negative prognostic indicators include chronicity of symptoms, hypertension, and multiple concurrent orthopedic problems.      Comparable signs:  1) prolonged standing    Problem List:  1) WB intolerance  2) agrawal l/s ext  Understanding of Dx/Px/POC: excellent     Prognosis: excellent    Goals  Functional based goals to be completed by time of d/c  Patient will demonstrate good understanding of l/s anatomy and biomechanics. - met  Patient will be independent with home exercise program.  - met  Patient will  "be able to manage symptoms independently. - met    Plan    Planned therapy interventions: abdominal trunk stabilization, activity modification, ADL training, balance/weight bearing training, gait training, home exercise program, therapeutic exercise, therapeutic activities, stretching, strengthening, patient education, neuromuscular re-education, postural training, therapeutic training, joint mobilization, IASTM, kinesiology taping, manual therapy, massage, [place] taping, motor coordination training, muscle pump exercises, nerve gliding, self care, work reintegration, fine motor coordination training, flexibility, functional ROM exercises, graded activity, graded exercise, graded motor, IADL retraining, balance, behavior modification, body mechanics training, breathing training, transfer training and coordination    Frequency: 2x week  Duration in weeks: 12  Treatment plan discussed with: patient        Subjective Evaluation    History of Present Illness  Mechanism of injury: 10/23/2024: Jenna Chavarria is a pleasant 79 y.o. female presenting to PT for ~3 week h/o LBP R>L pain. Per ED visit 24:    \"Patient with pain in the right SI area.  I suspect that this area is getting irritated from her sitting a lot while she is attending to her son in the ICU.  She is neurovascularly intact.  There is no midline back pain.  X-ray of the hip/pelvis did not show any acute abnormality.  I believe she needs a referral to physical therapy and we can try some low-dose NSAIDs as she has been taking 800 mg without relief which may be too high a dose.  We can also try Lidoderm patch.  She is ambulatory.  I do not believe she needs any midline back imaging and there is no acute neurological complaints that necessitates advanced imaging like a CT or MRI. \"  Patient Goals  Patient goals for therapy: increased strength, independence with ADLs/IADLs and decreased pain    Pain  Current pain ratin  At best pain ratin  At worst " pain ratin  Location: midline low back, R >L  Quality: sharp  Relieving factors: rest and support  Aggravating factors: standing (standing for 2 hours)  Progression: worsening    Social Support    Employment status: not working  Hand dominance: right      Diagnostic Tests  X-ray: normal        Objective    Postural Findings:     Head Position x Protracted   Neutral   Retracted   Scapular Position  Protracted  x Neutral   Retracted   Thoracic Spine   Inc Kyphosis x Neutral       Lumbar Spine   Inc Lordosis  x Neutral  Dec Lordosis   Pelvis   Anterior Tilt x Neutral   Posterior Tilt   Iliac Crest   L elevated x Neutral   R elevated   Feet   Pronated x Neutral   Supinated   Lateral Shift   Right   Left x None     Sensation:  Nerve root RIGHT  LEFT   L2 intact intact   L3 intact intact   L4  intact intact   L5 intact intact   S1 intact intact   S2 intact intact     Reflexes:   Joint / Motion  Right  Left    Patellar (L3-L4) 2+ 2+    Achilles (L5-S1) 2+ 2+        Strength and ROM evaluated B from a regional biomechanical perspective and values relevant to this episode recorded in tables below.     ROM:   Joint / Motion  Right  Left    Lumbar Flexion  100   Lumbar Extension   100   Lumbar Sidebending      Lumbar rotation           Strength: MMT revealed the following findings.  Joint Motion Right Left   Hip Flexion 5/5 5/5   Hip Abduction 5/5 5/5   Hip Adduction 5/5 5/5   Hip Extension 5/5 5/5   Hip ER 5/5 5/5   Hip IR 5/5 5/5   Knee Extension 5/5 5/5   Knee Flexion 5/5 5/5   Ankle Plantarflexion 5/5 5/5   Ankle Dorsiflexion 5/5 5/5         Repeated Movements: DNT     Standing Baseline:                                                   DURING MVMT.                     RESPONSE AFTER   Standing Flexion     Standing Extension        Lying Baseline:                                                   DURING MVMT.                     RESPONSE AFTER  Lying Flexion     Lying Extension             Additional Assessments:  Pain  with palpation noted: No TTP  Passive intervertebral motion: DNT   Squatting: DNT  Amb: no observable deviation        Special Tests:  Lumbar Specific and Neural Tension  SLR Positive B   X-SLR Negative   Slump Negative   Prone instability Negative   Bob Negative   90/90 Negative   Prone knee flx Negative     SI Joint Screen: DNT     Treatment Based Classification: Primary flexion responder; Secondary stability            Precautions:   Past Medical History:   Diagnosis Date    Asthma     Colon polyp     Diabetes mellitus (HCC)     E coli infection     Fall     Hyperlipidemia     Hypertension     Pneumothorax     Rectal prolapse 09/03/2020    Sleep apnea     no CPAP    Subarachnoid hemorrhage (HCC)     Thoracic aortic aneurysm (HCC)     Vertigo        Allergies   Allergen Reactions    Bactrim [Sulfamethoxazole-Trimethoprim] Hives    Sulfamethoxazole Rash    Trimethoprim Rash       POC expires Unit limit Auth Expiration date PT/OT + Visit Limit?   12 weeks - 1/15/2025 bomn bomn bomn         Visit/Unit Tracking  AUTH Status:  Date         bomn Used          Remaining             Pertinent Findings:      POC End Date:  12 weeks - 1/15/2025                                                                                     Test / Measure                         Access Code: [email or web address]  URL: [email or web address]  Date: 10/23/2024  Prepared by: Lupillo Carr    Exercises  - Supine Double Knee to Chest  - 1 x daily - 7 x weekly - 1 sets - 10 reps - 10 seconds hold  - Supine Lower Trunk Rotation  - 1 x daily - 7 x weekly - 1 sets - 10 reps - 5 seconds hold  - Supine Figure 4 Piriformis Stretch  - 1 x daily - 7 x weekly - 3 sets - 30 seconds hold  - Bridge With Posterior Pelvic Tilt  - 1 x daily - 7 x weekly - 2 sets - 10 reps - 3 seconds hold    Manuals 10/23                                                                Neuro Re-Ed             HEP review/pt education 25'                                                                                           Ther Ex                                                                                                                     Ther Activity                                       Gait Training                                       Modalities

## 2024-11-07 DIAGNOSIS — J45.30 MILD PERSISTENT ASTHMA WITHOUT COMPLICATION: ICD-10-CM

## 2024-11-08 RX ORDER — ALBUTEROL SULFATE 0.83 MG/ML
SOLUTION RESPIRATORY (INHALATION)
Qty: 225 ML | Refills: 1 | Status: SHIPPED | OUTPATIENT
Start: 2024-11-08

## 2024-12-16 ENCOUNTER — APPOINTMENT (OUTPATIENT)
Dept: LAB | Facility: HOSPITAL | Age: 79
End: 2024-12-16
Payer: COMMERCIAL

## 2024-12-16 ENCOUNTER — RESULTS FOLLOW-UP (OUTPATIENT)
Dept: FAMILY MEDICINE CLINIC | Facility: CLINIC | Age: 79
End: 2024-12-16

## 2024-12-16 DIAGNOSIS — E11.9 TYPE 2 DIABETES MELLITUS WITHOUT COMPLICATION, WITHOUT LONG-TERM CURRENT USE OF INSULIN (HCC): Chronic | ICD-10-CM

## 2024-12-16 LAB — TSH SERPL DL<=0.05 MIU/L-ACNC: 1.1 UIU/ML (ref 0.45–4.5)

## 2024-12-16 PROCEDURE — 84443 ASSAY THYROID STIM HORMONE: CPT

## 2024-12-16 PROCEDURE — 36415 COLL VENOUS BLD VENIPUNCTURE: CPT

## 2024-12-18 ENCOUNTER — APPOINTMENT (EMERGENCY)
Dept: RADIOLOGY | Facility: HOSPITAL | Age: 79
End: 2024-12-18
Payer: COMMERCIAL

## 2024-12-18 ENCOUNTER — HOSPITAL ENCOUNTER (EMERGENCY)
Facility: HOSPITAL | Age: 79
Discharge: HOME | End: 2024-12-18
Attending: EMERGENCY MEDICINE
Payer: COMMERCIAL

## 2024-12-18 ENCOUNTER — TELEPHONE (OUTPATIENT)
Dept: NEUROSURGERY | Facility: CLINIC | Age: 79
End: 2024-12-18

## 2024-12-18 ENCOUNTER — APPOINTMENT (EMERGENCY)
Dept: CT IMAGING | Facility: HOSPITAL | Age: 79
End: 2024-12-18
Payer: COMMERCIAL

## 2024-12-18 VITALS
TEMPERATURE: 98.3 F | WEIGHT: 146.44 LBS | SYSTOLIC BLOOD PRESSURE: 169 MMHG | BODY MASS INDEX: 25.94 KG/M2 | RESPIRATION RATE: 18 BRPM | HEART RATE: 85 BPM | DIASTOLIC BLOOD PRESSURE: 92 MMHG | OXYGEN SATURATION: 94 %
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
## 2024-12-18 DIAGNOSIS — M89.8X1 PAIN OF LEFT SCAPULA: ICD-10-CM

## 2024-12-18 DIAGNOSIS — S32.000A LUMBAR COMPRESSION FRACTURE (HCC): Primary | ICD-10-CM

## 2024-12-18 PROCEDURE — 72131 CT LUMBAR SPINE W/O DYE: CPT

## 2024-12-18 PROCEDURE — 97163 PT EVAL HIGH COMPLEX 45 MIN: CPT

## 2024-12-18 PROCEDURE — 99447 NTRPROF PH1/NTRNET/EHR 11-20: CPT | Performed by: PHYSICIAN ASSISTANT

## 2024-12-18 PROCEDURE — 99285 EMERGENCY DEPT VISIT HI MDM: CPT | Performed by: EMERGENCY MEDICINE

## 2024-12-18 PROCEDURE — 72100 X-RAY EXAM L-S SPINE 2/3 VWS: CPT

## 2024-12-18 PROCEDURE — 96372 THER/PROPH/DIAG INJ SC/IM: CPT

## 2024-12-18 PROCEDURE — 97760 ORTHOTIC MGMT&TRAING 1ST ENC: CPT

## 2024-12-18 PROCEDURE — 73010 X-RAY EXAM OF SHOULDER BLADE: CPT

## 2024-12-18 PROCEDURE — 99284 EMERGENCY DEPT VISIT MOD MDM: CPT

## 2024-12-18 RX ORDER — KETOROLAC TROMETHAMINE 30 MG/ML
15 INJECTION, SOLUTION INTRAMUSCULAR; INTRAVENOUS ONCE
Status: COMPLETED | OUTPATIENT
Start: 2024-12-18 | End: 2024-12-18

## 2024-12-18 RX ORDER — LIDOCAINE 50 MG/G
1 PATCH TOPICAL DAILY
Qty: 30 PATCH | Refills: 0 | Status: SHIPPED | OUTPATIENT
Start: 2024-12-18

## 2024-12-18 RX ORDER — OXYCODONE HYDROCHLORIDE 5 MG/1
5 TABLET ORAL ONCE
Refills: 0 | Status: COMPLETED | OUTPATIENT
Start: 2024-12-18 | End: 2024-12-18

## 2024-12-18 RX ORDER — OXYCODONE HYDROCHLORIDE 5 MG/1
5 TABLET ORAL EVERY 4 HOURS PRN
Qty: 12 TABLET | Refills: 0 | Status: SHIPPED | OUTPATIENT
Start: 2024-12-18

## 2024-12-18 RX ADMIN — OXYCODONE HYDROCHLORIDE 5 MG: 5 TABLET ORAL at 11:53

## 2024-12-18 RX ADMIN — KETOROLAC TROMETHAMINE 15 MG: 30 INJECTION, SOLUTION INTRAMUSCULAR; INTRAVENOUS at 11:50

## 2024-12-18 NOTE — ED PROVIDER NOTES
Time reflects when diagnosis was documented in both MDM as applicable and the Disposition within this note       Time User Action Codes Description Comment    12/18/2024 12:40 PM Caitlyn Adams Add [S32.000A] Lumbar compression fracture (HCC)     12/18/2024 12:41 PM Caitlyn Adams Modify [S32.000A] Lumbar compression fracture (HCC) Acute L1 superior endplate compression fracture with mild bony retropulsion causing mild canal stenosis at L1.    12/18/2024 12:41 PM Caitlyn Adams Add [W19.XXXA] Fall     12/18/2024 12:41 PM Caitlyn Adams Add [M89.8X1] Pain of left scapula           ED Disposition       ED Disposition   Discharge    Condition   Stable    Date/Time   Wed Dec 18, 2024  2:26 PM    Comment   Jenna Deon discharge to home/self care.                   Assessment & Plan       Medical Decision Making      ED Course as of 12/18/24 1438   Wed Dec 18, 2024   1239 Pt ambulatory in room.  Updated pt on CT/xray results.  Feels better after pain meds.  Secure chat sent to [facility] trauma for dispo.   1314 Discussed case with Dr. Santillan (trauma) who would like me to talk to neurosurgery.  [place] [facility]   [place]17 Discussed case with Zhane Varner (neurosurgery).  She states pt can have TLSO brace and L-spine xray.  Follow up with neurosurgery in 2 weeks.  This was relayed to pt.   1346 PT coming to see pt to place brace.   1409 Pt fitted with TSLO brace and ambulatory.  A/w xray.   1421 Pt instructed to wear brace while out of bed and keep head of bed at 45 degrees.  Instructed her to obtain repeat xray in 2 weeks prior to her NSG appt.  Instructed her to return to ER for LE weakness.   1432 [facility] [place] and okay for d/c home.  Office will call pt for appt.       Medications   ketorolac (TORADOL) injection 15 mg (has no administration in time range)   oxyCODONE (ROXICODONE) IR tablet 5 mg (has no administration in time range)       ED Risk Strat Scores                          SBIRT  20yo+      Flowsheet Row Most Recent Value   Initial Alcohol Screen: US AUDIT-C     1. How often do you have a drink containing alcohol? 3 Filed at: 12/18/2024 1106   2. How many drinks containing alcohol do you have on a typical day you are drinking?  0 Filed at: 12/18/2024 1106   3a. Male UNDER 65: How often do you have five or more drinks on one occasion? 0 Filed at: 12/18/2024 1106   3b. FEMALE Any Age, or MALE 65+: How often do you have 4 or more drinks on one occassion? 0 Filed at: 12/18/2024 1106   Audit-C Score 3 Filed at: 12/18/2024 1106   KYE: How many times in the past year have you...    Used an illegal drug or used a prescription medication for non-medical reasons? Never Filed at: 12/18/2024 1106                            History of Present Illness       Chief Complaint   Patient presents with    Fall     Pt reports she has a very high bed, and rolled off the edge this morning landing on her back. Reports no headstrike, but did strike her left ribs and left shoulder on a nearby table. Took tylenol, ibuprofen, (daily meds for DM, asthma, blood pressure) and pain patches with no relief of pain.        Past Medical History:   Diagnosis Date    Asthma     Colon polyp     Diabetes mellitus (HCC)     E coli infection     Fall     Hyperlipidemia     Hypertension     Pneumothorax     Rectal prolapse 09/03/2020    Sleep apnea     no CPAP    Subarachnoid hemorrhage (HCC)     Thoracic aortic aneurysm (HCC)     Vertigo       Past Surgical History:   Procedure Laterality Date    BONE GRAFT      R arm    BRONCHOSCOPY N/A 3/16/2016    Procedure: BRONCHOSCOPY FLEXIBLE/anesth.;  Surgeon: Beth Brar DO;  Location: [facility];  Service:     COLONOSCOPY      COLONOSCOPY      EYE SURGERY      OOPHORECTOMY Left     age 54    TN LAPAROSCOPY COLECTOMY PARTIAL W/ANASTOMOSIS N/A 9/23/2020    Procedure: RESECTION COLON SIGMOID LAPAROSCOPIC;  Surgeon: Ricci Christian MD;  Location: [facility];  Service: Colorectal    TN  "SIGMOIDOSCOPY FLX DX W/COLLJ SPEC BR/WA IF PFRMD N/A 9/23/2020    Procedure: SIGMOIDOSCOPY FLEXIBLE;  Surgeon: Ricci Christian MD;  Location: [facility];  Service: Colorectal    RECTAL PROLAPSE REPAIR LAPAROSCOPIC N/A 9/23/2020    Procedure: RECTOPEXY/PROCTOPEXY LAPAROSCOPIC;  Surgeon: Ricci Christian MD;  Location: [facility];  Service: Colorectal      Family History   Problem Relation Age of Onset    Endometrial cancer Mother 38    No Known Problems Father     No Known Problems Sister     Breast cancer Daughter 49    No Known Problems Daughter     No Known Problems Maternal Grandmother     No Known Problems Maternal Grandfather     No Known Problems Paternal Grandmother     No Known Problems Paternal Grandfather     Cancer Son 33        asbestosis related cancer    No Known Problems Maternal Aunt     No Known Problems Paternal Aunt       Social History     Tobacco Use    Smoking status: Former     Types: Cigarettes    Smokeless tobacco: Never    Tobacco comments:     only smoked a couple a day for a couple years in her 20s   Vaping Use    Vaping status: Never Used   Substance Use Topics    Alcohol use: Yes     Comment: weekends     Drug use: No      E-Cigarette/Vaping    E-Cigarette Use Never User       E-Cigarette/Vaping Substances    Nicotine No     THC No     CBD No     Flavoring No     Other No     Unknown No       I have reviewed and agree with the history as documented.     79 y.o. F p/w low back pain/left scapula pain s/p fall x this AM.  Reports she rolled out of bed this morning and landed on her back.  Having low back pain worse on left side and left scapula pain.  Denies head strike or LOC.  Took Tylenol, ibuprofen, and a \"pain patch\" without relief.  Waited to come in now since her family has a doctor's appt.      History provided by:  Patient   used: No    Fall  Associated symptoms: back pain    Associated symptoms: no abdominal pain, no chest pain, no headaches, no " nausea, no neck pain and no vomiting        Review of Systems   Eyes:  Negative for photophobia and visual disturbance.   Respiratory:  Negative for shortness of breath.    Cardiovascular:  Negative for chest pain.   Gastrointestinal:  Negative for abdominal pain, nausea and vomiting.   Musculoskeletal:  Positive for back pain. Negative for neck pain and neck stiffness.        Left scapula   Neurological:  Negative for headaches.           Objective       ED Triage Vitals   Temperature Pulse Blood Pressure Respirations SpO2 Patient Position - Orthostatic VS   12/18/24 1105 12/18/24 1105 12/18/24 1105 12/18/24 1105 12/18/24 1105 12/18/24 1105   98.3 °F (36.8 °C) 85 169/92 18 94 % Lying      Temp Source Heart Rate Source BP Location FiO2 (%) Pain Score    12/18/24 1105 -- 12/18/24 1105 -- 12/18/24 1150    Oral  Left arm  10 - Worst Possible Pain      Vitals      Date and Time Temp Pulse SpO2 Resp BP Pain Score FACES Pain Rating User   12/18/24 1227 -- -- -- -- -- 9 -- MG   12/18/24 1150 -- -- -- -- -- 10 - Worst Possible Pain -- MG   12/18/24 1105 98.3 °F (36.8 °C) 85 94 % 18 169/92 -- -- JN            Physical Exam  Vitals and nursing note reviewed.   Constitutional:       General: She is not in acute distress.     Appearance: Normal appearance. She is well-developed. She is not ill-appearing, toxic-appearing or diaphoretic.   HENT:      Head: Normocephalic and atraumatic. No raccoon eyes, Smith's sign, abrasion, contusion or laceration.   Eyes:      General:         Right eye: No discharge.         Left eye: No discharge.      Extraocular Movements: Extraocular movements intact.      Conjunctiva/sclera:      Right eye: No hemorrhage.     Left eye: No hemorrhage.     Pupils: Pupils are equal, round, and reactive to light.   Neck:      Vascular: No JVD.      Trachea: Trachea normal. No tracheal tenderness or tracheal deviation.   Cardiovascular:      Rate and Rhythm: Normal rate and regular rhythm.      Heart sounds:  Normal heart sounds. No murmur heard.     No friction rub.   Pulmonary:      Effort: Pulmonary effort is normal. No accessory muscle usage, respiratory distress or retractions.      Breath sounds: Normal breath sounds. No stridor. No decreased breath sounds, wheezing, rhonchi or rales.   Chest:      Chest wall: No tenderness.   Abdominal:      General: There is no distension.      Palpations: Abdomen is soft. Abdomen is not rigid. There is no mass.      Tenderness: There is no abdominal tenderness. There is no guarding or rebound.   Musculoskeletal:         General: No deformity. Normal range of motion.      Left shoulder: Normal.      Cervical back: Full passive range of motion without pain and normal range of motion. No bony tenderness. No spinous process tenderness or muscular tenderness. Normal range of motion.      Thoracic back: No bony tenderness.      Lumbar back: Tenderness (Left side) and bony tenderness present.      Left hip: Normal.      Comments: TTP over left scapula   Skin:     General: Skin is warm and dry.      Coloration: Skin is not pale.      Findings: No abrasion, bruising, ecchymosis or laceration.   Neurological:      Mental Status: She is alert.      GCS: GCS eye subscore is 4. GCS verbal subscore is 5. GCS motor subscore is 6.      Cranial Nerves: No cranial nerve deficit.      Sensory: No sensory deficit.      Motor: Motor function is intact.   Psychiatric:         Behavior: Behavior is cooperative.         Results Reviewed       None            XR spine lumbar 2 or 3 views injury   ED Interpretation by Caitlyn Adams DO (12/18 1329)   Interpreted by me as L1 compression fx      XR scapula LEFT   ED Interpretation by Caitlyn Adams DO (12/18 5281)   Interpreted by me as no scapula fx      Final Interpretation by Duarte Gomes MD (12/18 7681)      No acute osseous abnormality.      Stable chronic fractures of the left clavicular midshaft and left ribs.      Degenerative changes, as  above.         Computerized Assisted Algorithm (CAA) may have been used to analyze all applicable images.         Resident: Scot Soliz I, the attending radiologist, have reviewed the images and agree with the final report above.      Workstation performed: [id number]         CT spine lumbar without contrast   Final Interpretation by E. Alec Schoenberger, MD (12/18 0999)      Acute L1 superior endplate compression fracture with mild bony retropulsion causing mild canal stenosis at L1.   Mild degenerative spondylosis as described.      The study was marked in EPIC for immediate notification.      Workstation performed: [id number]         XR spine lumbar 2 or 3 views injury    (Results Pending)       Procedures    ED Medication and Procedure Management   Prior to Admission Medications   Prescriptions Last Dose Informant Patient Reported? Taking?   Blood Glucose Monitoring Suppl (ONE TOUCH ULTRA 2) w/Device KIT   No No   Sig: Check blood sugars once daily. Please substitute with appropriate alternative as covered by patient's insurance. Dx: E11.65   Fluticasone-Salmeterol (Advair) 250-50 mcg/dose inhaler   No No   Sig: INHALE 1 PUFF BY MOUTH TWICE DAILY (RINSE MOUTH AFTER EACH USE)   MAGNESIUM PO  Self Yes No   Sig: Take by mouth daily   OneTouch Delica Lancets 33G MISC   No No   Sig: Check blood sugars once daily. Please substitute with appropriate alternative as covered by patient's insurance. Dx: E11.65   acetaminophen (TYLENOL) 325 mg tablet   No No   Sig: Take 2 tablets (650 mg total) by mouth every 6 (six) hours as needed for mild pain or moderate pain for up to 12 doses   albuterol (2.5 mg/3 mL) 0.083 % nebulizer solution   No No   Sig: USE 3 ML BY NEBULIZATION EVERY 6 HOURS AS NEEDED FOR WHEEZING OR FOR SHORTNESS OF BREATH   albuterol (PROVENTIL HFA,VENTOLIN HFA) 90 mcg/act inhaler   No No   Sig: Inhale 2 puffs every 6 (six) hours as needed for wheezing   amLODIPine (NORVASC) 5 mg tablet   No No   Sig:  TAKE ONE TABLET BY MOUTH DAILY AT 9AM   atorvastatin (LIPITOR) 40 mg tablet   No No   Sig: Take 1 tablet (40 mg total) by mouth daily   docusate sodium (COLACE) 100 mg capsule  Self No No   Sig: Take 1 capsule (100 mg total) by mouth 2 (two) times a day as needed for constipation   glucosamine-chondroitin 500-400 MG tablet  Self Yes No   Sig: Take 1 tablet by mouth 3 (three) times a day   glucose blood (OneTouch Ultra) test strip   No No   Sig: Check blood sugars once daily. Please substitute with appropriate alternative as covered by patient's insurance. Dx: E11.65   hydroCHLOROthiazide 12.5 mg tablet   No No   Sig: Take 1 tablet (12.5 mg total) by mouth daily   ibandronate (BONIVA) 150 MG tablet   No No   Sig: Take 1 tablet (150 mg total) by mouth every 30 (thirty) days   ibuprofen (MOTRIN) 400 mg tablet   No No   Sig: Take 1 tablet (400 mg total) by mouth every 8 (eight) hours as needed for mild pain or moderate pain for up to 12 doses   ibuprofen (MOTRIN) 800 mg tablet  Self Yes No   Sig: Take by mouth every 6 (six) hours as needed for mild pain   irbesartan (AVAPRO) 300 mg tablet   No No   Sig: TAKE 1 TABLET BY MOUTH DAILY AT 9 PM AT BEDTIME   lidocaine (LIDODERM) 5 %   No No   Sig: Apply 1 patch topically over 12 hours daily Remove & Discard patch within 12 hours or as directed by MD   lidocaine (LMX) 4 % cream   No No   Sig: Apply topically 2 (two) times a day as needed for moderate pain   metFORMIN (GLUCOPHAGE) 850 mg tablet   No No   Sig: TAKE ONE TABLET BY MOUTH TWICE DAILY @9AM-5PM WITH MEALS   methylPREDNISolone 4 MG tablet therapy pack   No No   Sig: Use as directed on package   montelukast (SINGULAIR) 10 mg tablet   No No   Sig: Take 1 tablet (10 mg total) by mouth daily at bedtime   solifenacin (VESICARE) 5 mg tablet   No No   Sig: Take 1 tablet (5 mg total) by mouth daily      Facility-Administered Medications: None     Discharge Medication List as of 12/18/2024  2:26 PM        START taking these  medications    Details   !! lidocaine (Lidoderm) 5 % Apply 1 patch topically over 12 hours daily Remove & Discard patch within 12 hours or as directed by MD, Starting Wed 12/18/2024, Normal      oxyCODONE (Roxicodone) 5 immediate release tablet Take 1 tablet (5 mg total) by mouth every 4 (four) hours as needed for moderate pain Max Daily Amount: 30 mg, Starting Wed 12/18/2024, Normal       !! - Potential duplicate medications found. Please discuss with provider.        CONTINUE these medications which have NOT CHANGED    Details   acetaminophen (TYLENOL) 325 mg tablet Take 2 tablets (650 mg total) by mouth every 6 (six) hours as needed for mild pain or moderate pain for up to 12 doses, Starting Tue 9/24/2024, Normal      albuterol (2.5 mg/3 mL) 0.083 % nebulizer solution USE 3 ML BY NEBULIZATION EVERY 6 HOURS AS NEEDED FOR WHEEZING OR FOR SHORTNESS OF BREATH, Normal      albuterol (PROVENTIL HFA,VENTOLIN HFA) 90 mcg/act inhaler Inhale 2 puffs every 6 (six) hours as needed for wheezing, Starting Wed 12/13/2023, Normal      amLODIPine (NORVASC) 5 mg tablet TAKE ONE TABLET BY MOUTH DAILY AT 9AM, Normal      atorvastatin (LIPITOR) 40 mg tablet Take 1 tablet (40 mg total) by mouth daily, Starting Mon 4/8/2024, Normal      Blood Glucose Monitoring Suppl (ONE TOUCH ULTRA 2) w/Device KIT Check blood sugars once daily. Please substitute with appropriate alternative as covered by patient's insurance. Dx: E11.65, Normal      docusate sodium (COLACE) 100 mg capsule Take 1 capsule (100 mg total) by mouth 2 (two) times a day as needed for constipation, Starting Mon 8/8/2022, Normal      Fluticasone-Salmeterol (Advair) 250-50 mcg/dose inhaler INHALE 1 PUFF BY MOUTH TWICE DAILY (RINSE MOUTH AFTER EACH USE), Normal      glucosamine-chondroitin 500-400 MG tablet Take 1 tablet by mouth 3 (three) times a day, Historical Med      glucose blood (OneTouch Ultra) test strip Check blood sugars once daily. Please substitute with appropriate  alternative as covered by patient's insurance. Dx: E11.65, Normal      hydroCHLOROthiazide 12.5 mg tablet Take 1 tablet (12.5 mg total) by mouth daily, Starting Tue 6/11/2024, Normal      ibandronate (BONIVA) 150 MG tablet Take 1 tablet (150 mg total) by mouth every 30 (thirty) days, Starting Mon 4/8/2024, Normal      !! ibuprofen (MOTRIN) 400 mg tablet Take 1 tablet (400 mg total) by mouth every 8 (eight) hours as needed for mild pain or moderate pain for up to 12 doses, Starting Tue 9/24/2024, Normal      !! ibuprofen (MOTRIN) 800 mg tablet Take by mouth every 6 (six) hours as needed for mild pain, Historical Med      irbesartan (AVAPRO) 300 mg tablet TAKE 1 TABLET BY MOUTH DAILY AT 9 PM AT BEDTIME, Normal      !! lidocaine (LIDODERM) 5 % Apply 1 patch topically over 12 hours daily Remove & Discard patch within 12 hours or as directed by MD, Starting Tue 9/24/2024, Normal      lidocaine (LMX) 4 % cream Apply topically 2 (two) times a day as needed for moderate pain, Starting Tue 11/21/2023, Normal      MAGNESIUM PO Take by mouth daily, Historical Med      metFORMIN (GLUCOPHAGE) 850 mg tablet TAKE ONE TABLET BY MOUTH TWICE DAILY @9AM-5PM WITH MEALS, Normal      methylPREDNISolone 4 MG tablet therapy pack Use as directed on package, Normal      montelukast (SINGULAIR) 10 mg tablet Take 1 tablet (10 mg total) by mouth daily at bedtime, Starting Wed 12/13/2023, Normal      OneTouch Delica Lancets 33G MISC Check blood sugars once daily. Please substitute with appropriate alternative as covered by patient's insurance. Dx: E11.65, Normal      solifenacin (VESICARE) 5 mg tablet Take 1 tablet (5 mg total) by mouth daily, Starting Mon 4/8/2024, Normal       !! - Potential duplicate medications found. Please discuss with provider.        Outpatient Discharge Orders   XR spine lumbar 2 or 3 views injury   Standing Status: Future Standing Exp. Date: 12/18/28     ED SEPSIS DOCUMENTATION   Time reflects when diagnosis was  documented in both MDM as applicable and the Disposition within this note       Time User Action Codes Description Comment    12/18/2024 12:40 PM Caitlyn Adams Add [S32.000A] Lumbar compression fracture (HCC)     12/18/2024 12:41 PM Caitlyn Adams Modify [S32.000A] Lumbar compression fracture (HCC) Acute L1 superior endplate compression fracture with mild bony retropulsion causing mild canal stenosis at L1.    12/18/2024 12:41 PM Caitlyn Adams [W19.XXXA] Fall     12/18/2024 12:41 PM Caitlyn Adams [M89.8X1] Pain of left scapula                  Caitlyn Adams DO  12/18/24 9198

## 2024-12-18 NOTE — TELEMEDICINE
e-Consult (IPC)     Inpatient consult to Neurosurgery  Consult performed by: Janice Roman PA-C  Consult ordered by: Caitlyn Adams DO           [place] 79 y.o. female MRN: [id number]  Unit/Bed#: ED 10 Encounter: [id number]    Reason for Consult    Per provider report, patient presents after a fall out of bed this morning and she landed on her back.  She denies head strike but did hit her left side on a nearby table.  She is complaining of left shoulder pain, left rib pain, and back pain.. Available past medical history,social history, surgical history, medication list, drug allergies and review of systems were reviewed.    /92 (BP Location: Left arm)   Pulse 85   Temp 98.3 °F (36.8 °C) (Oral)   Resp 18   Wt 66.4 kg (146 lb 7 oz)   LMP  (LMP Unknown)   SpO2 94%   BMI 25.94 kg/m²      Clinical exam per provider report, no reports of back pain or tenderness on palpation.    Imaging personally reviewed.  CT lumbar spine w/o, 12/18/24: L1 burst fracture with mild loss of height and subtle bony retropulsion near spinal canal.  Posterior elements remain intact.  Alignment remains intact.    Assessment and Recommendations    1.  TLSO brace when out of bed and head of bed greater than 45 degrees  2.  Upright x-ray in brace with AP and lateral views for baseline study  3.  Pain control  4.  PT/OT evaluation  5.  Follow-up in our office in 2 weeks with repeat x-ray.  Please call questions or concerns.     All questions answered. Provider is in agreement with the course of action. 11-20 minutes, >50% of the total time devoted to medical consultative verbal/EMR discussion between providers. Written report will be generated in the EMR.

## 2024-12-18 NOTE — QUICK NOTE
XR lumbar spine obtained and reviewed. Ok to discharge home when medically appropriate; follow up in 2 weeks with repeat XR.

## 2024-12-18 NOTE — DISCHARGE INSTRUCTIONS
Please wear your brace when out of bed and keep head of bed greater than 45 degrees.  Obtain the repeat xray in 2 weeks before your appointment. Follow-up in neurosurgery office in 2 weeks with repeat x-ray.  You may also take Tylenol for pain.

## 2024-12-18 NOTE — TELEPHONE ENCOUNTER
12/20/24 - PT DISCHARGED TO HOME    12/18/24 - PT IN  [facility]  1/7/25 2 [facility] HFU W/XR [facility]  Please schedule 2 week f/u with XR lumbar prior, ap solo

## 2024-12-18 NOTE — DISCHARGE INSTR - AVS FIRST PAGE
Neurosurgery discharge instructions following spine fracture:     TLSO brace to be worn when out of bed of head of bed greater than 45 degrees.  May place brace on while sitting on edge of bed. May be removed for showering.   No bending, twisting or heavy lifting. No strenuous activities. No Driving.   Follow-up as scheduled in two weeks with repeat spine x-rays to be completed 2-3 days prior to visit.  Prescription has been entered electronically.      **Please notify MD immediately if you have increased back or leg pain. New numbness, tingling, weakness in your legs and/or bowel/bladder incontinence**

## 2024-12-19 NOTE — PHYSICAL THERAPY NOTE
Physical Therapy Evaluation    Patient's Name: Jenna Chavarria    Admitting Diagnosis  Unspecified multiple injuries, initial encounter [T07.XXXA]    Problem List  Patient Active Problem List   Diagnosis    Essential hypertension    Diabetes mellitus (HCC)    Hyperlipidemia    Hearing loss    NGOC (obstructive sleep apnea)    Urge incontinence of urine    Thoracic aortic aneurysm without rupture (HCC)    Osteoporosis    Dizziness    Rectal prolapse    Tear of right rotator cuff    Other fatigue    Allergic rhinitis due to animal hair and dander    Leg cramping    Varicose veins of both lower extremities with pain    Pancreatic cyst    Abnormal mammogram    Mild persistent asthma without complication    Chronic right shoulder pain    Wrist arthritis    Thoracic aortic ectasia (HCC)       Past Medical History  Past Medical History:   Diagnosis Date    Asthma     Colon polyp     Diabetes mellitus (HCC)     E coli infection     Fall     Hyperlipidemia     Hypertension     Pneumothorax     Rectal prolapse 09/03/2020    Sleep apnea     no CPAP    Subarachnoid hemorrhage (HCC)     Thoracic aortic aneurysm (HCC)     Vertigo        Past Surgical History  Past Surgical History:   Procedure Laterality Date    BONE GRAFT      R arm    BRONCHOSCOPY N/A 3/16/2016    Procedure: BRONCHOSCOPY FLEXIBLE/anesth.;  Surgeon: Beth Brar DO;  [facility]: [facility];  Service:     COLONOSCOPY      COLONOSCOPY      EYE SURGERY      OOPHORECTOMY Left     age 54    MT LAPAROSCOPY COLECTOMY PARTIAL W/ANASTOMOSIS N/A 9/23/2020    Procedure: RESECTION COLON SIGMOID LAPAROSCOPIC;  Surgeon: Ricci Christian MD;  [facility];  Service: Colorectal    MT SIGMOIDOSCOPY FLX DX W/COLLJ SPEC BR/WA IF PFRMD N/A 9/23/2020    Procedure: SIGMOIDOSCOPY FLEXIBLE;  Surgeon: Ricci Christian MD;  [facility];  Service: Colorectal    RECTAL PROLAPSE REPAIR LAPAROSCOPIC N/A 9/23/2020    Procedure: RECTOPEXY/PROCTOPEXY LAPAROSCOPIC;  Surgeon:  Ricci Christian MD;  [facility];  Service: Colorectal       Recent Imaging  XR spine lumbar 2 or 3 views injury   ED Interpretation by Caitlyn Adams DO (12/18 3719)   Interpreted by me as L1 compression fx      Final Result by Chaitanya Parks MD (12/19 8604)      Acute moderate compression fracture of the superior endplate of L1.         Computerized Assisted Algorithm (CAA) may have been used to analyze all applicable images.         Workstation performed: [id number]         XR scapula LEFT   ED Interpretation by Caitlyn Adams DO (12/18 1149)   Interpreted by me as no scapula fx      Final Result by Duarte Gomes MD (12/18 1131)      No acute osseous abnormality.      Stable chronic fractures of the left clavicular midshaft and left ribs.      Degenerative changes, as above.         Computerized Assisted Algorithm (CAA) may have been used to analyze all applicable images.         Resident: Scot Soliz I, the attending radiologist, have reviewed the images and agree with the final report above.      Workstation performed: [id number]         CT spine lumbar without contrast   Final Result by E. Alec Schoenberger, MD (12/18 1228)      Acute L1 superior endplate compression fracture with mild bony retropulsion causing mild canal stenosis at L1.   Mild degenerative spondylosis as described.      The study was marked in EPIC for immediate notification.      Workstation performed: [id number]         XR spine lumbar 2 or 3 views injury    (Results Pending)       Recent Vital Signs  Vitals:    12/18/24 1105   BP: 169/92   BP Location: Left arm   Pulse: 85   Resp: 18   Temp: 98.3 °F (36.8 °C)   TempSrc: Oral   SpO2: 94%   Weight: 66.4 kg (146 lb 7 oz)        12/18/24 1400   PT Last Visit   PT Visit Date 12/18/24   Note Type   Note type Evaluation;Orthotic Management/Training   Type of Brace   Brace Applied [place] [employer] 456 Back Pack TLSO   Bracing Recommendations   (TLSO brace when out  of bed and head of bed greater than 45 degrees)   Education   Education Provided   (yes to pt and dtr)   Pain Assessment   Pain Assessment Tool 0-10   Pain Score 8   Pain Location/Orientation Orientation: Lower;Location: Back  (improved with use of TLSO)   Restrictions/Precautions   Weight Bearing Precautions Per Order No   Braces or Orthoses TLSO   Other Precautions Pain;Spinal precautions   Home Living   Type of Home House   Home Layout Two level;Stairs to enter with rails   Bathroom Toilet Standard   Bathroom Accessibility Accessible   Home Equipment Cane   Prior Function   Level of [place] Independent with ADLs;Independent with functional mobility   Lives With Family   Receives Help From Family   Falls in the last 6 months 1 to 4   Comments lives with son and dtr   General   Family/Caregiver Present Yes   Cognition   Overall Cognitive Status WFL   Arousal/Participation Alert   Orientation Level Oriented X4   Memory Within functional limits   Following Commands Follows all commands and directions without difficulty   RLE Assessment   RLE Assessment   (3+/5)   LLE Assessment   LLE Assessment   (3+/5)   Coordination   Movements are Fluid and Coordinated 0   Coordination and Movement Description antalgic   Sensation WFL   Light Touch   RLE Light Touch Impaired   LLE Light Touch Impaired   Bed Mobility   Supine to Sit 6  Modified independent   Sit to Supine 6  Modified independent   Transfers   Sit to Stand 6  Modified independent   Stand to Sit 6  Modified independent   Ambulation/Elevation   Gait pattern Step through pattern;Decreased toe off;Decreased heel strike;Excessively slow;Short stride;Decreased foot clearance   Gait Assistance 6  Modified independent   Assistive Device SPC   Distance 100ft   Balance   Static Sitting Fair +   Dynamic Sitting Fair +   Static Standing Fair   Dynamic Standing Fair -   Ambulatory Fair -   Endurance Deficit   Endurance Deficit Yes   Endurance Deficit Description LBP    Activity Tolerance   Activity Tolerance Patient tolerated treatment well   Medical Staff Made Aware spoke to CM   Nurse Made Aware spoke to RN   Assessment   Prognosis Good   Problem List Decreased strength;Decreased range of motion;Decreased endurance;Decreased mobility;Impaired balance;Decreased coordination;Impaired sensation;Pain   Barriers to Discharge None   Goals   Patient Goals to return home with less pain   Discharge Recommendation   Rehab Resource Intensity Level, PT   (may benefit from OP PT evaluation, recommend pt discuss at [facility] med f/u)   Equipment Recommended Cane   AM-PAC Basic Mobility Inpatient   Turning in Flat Bed Without Bedrails 4   Lying on Back to Sitting on Edge of Flat Bed Without Bedrails 4   Moving Bed to Chair 4   Standing Up From Chair Using Arms 4   Walk in Room 4   Climb 3-5 Stairs With Railing 3   Basic Mobility Inpatient Raw Score 23   Basic Mobility Standardized Score 50.88   [facility] Highest Level Of Mobility   -HL Goal 7: Walk 25 feet or more   -HLM Achieved 7: Walk 25 feet or more   End of Consult   Patient Position at End of Consult Seated edge of bed;All needs within reach         ASSESSMENT                                                                                                                     Jenna Chavarria is a 79 y.o. female admitted to [facility] on 12/18/2024 for <principal problem not specified>. Pt  has a past medical history of Asthma, Colon polyp, Diabetes mellitus ([facility]), E coli infection, Fall, Hyperlipidemia, Hypertension, Pneumothorax, Rectal prolapse (09/03/2020), Sleep apnea, Subarachnoid hemorrhage (HCC), Thoracic aortic aneurysm (HCC), and Vertigo.. PT was consulted and pt was seen on 12/19/2024 for mobility assessment and d/c planning.  Impairments limiting pt at this time include decreased ROM, impaired balance, decreased endurance, decreased coordination, new onset of impairment of functional mobility, pain, decreased sensation, and  decreased strength. Pt is currently functioning at a modified independent assistance level for bed mobility, modified independent assistance level for transfers, modified independent assistance level for ambulation with Single Point Cane. The patient's AM-PAC Basic Mobility Inpatient Short Form Raw Score is 23. A Raw score of greater than 16 suggests the patient may benefit from discharge to home. Please also refer to the recommendation of the Physical Therapist for safe discharge planning.    Recommendations                                                                                                                DME: Single Point Cane    Discharge Disposition:   possible OP PT evaluation after f/u with [facility] med and neuro      Tiago Willams PT, DPT

## 2024-12-20 NOTE — TELEPHONE ENCOUNTER
12/20/2024- PT WAS DISCHARGED HOME. CALLED AND SPOKE WITH PT CONFIRMING 01/07/2025 APT W/ X-RAY NEEDED PRIOR. PT WAS INFORMED THAT THE X-RAY WAS A WALK IN SERVICE AND DID NOT NEED TO BE SCHEDULED.

## 2024-12-27 ENCOUNTER — TELEPHONE (OUTPATIENT)
Dept: NEUROSURGERY | Facility: CLINIC | Age: 79
End: 2024-12-27

## 2024-12-27 NOTE — TELEPHONE ENCOUNTER
Manuel transferd pt to me to myke her apt for 1/7/25 when h hung up so did she - I called her back she said dont cancel my apt I will have my granddaughter talke me to 1/7/25 apt I didn't touch her apt -ba

## 2025-01-03 ENCOUNTER — TELEPHONE (OUTPATIENT)
Age: 80
End: 2025-01-03

## 2025-01-03 NOTE — TELEPHONE ENCOUNTER
Pt called back and confirmed appt on the 7th, I provided her the hours of [place] [facility] for Saturday and she is getting xrays done 1/4/25.  Patient appreciative of assistance.

## 2025-01-03 NOTE — TELEPHONE ENCOUNTER
Pt calling to confirm her appt on tues 1/7/25 w/ anthony. I gave pt their ph # and transferred her. [phone].

## 2025-01-06 ENCOUNTER — HOSPITAL ENCOUNTER (OUTPATIENT)
Dept: RADIOLOGY | Facility: HOSPITAL | Age: 80
Discharge: HOME | End: 2025-01-06
Payer: COMMERCIAL

## 2025-01-06 DIAGNOSIS — R05.9 COUGH: ICD-10-CM

## 2025-01-06 DIAGNOSIS — S32.000A LUMBAR COMPRESSION FRACTURE (HCC): ICD-10-CM

## 2025-01-06 PROBLEM — S32.019A CLOSED L1 VERTEBRAL FRACTURE (HCC): Status: ACTIVE | Noted: 2025-01-06

## 2025-01-06 PROCEDURE — 72100 X-RAY EXAM L-S SPINE 2/3 VWS: CPT

## 2025-01-06 NOTE — ASSESSMENT & PLAN NOTE
2 week follow up of an L1 burst fracture 12/18/24 after a fall out of bed  Maintained in brace   Pain improved to 3-5/10 that can radiate out to the hips.  No BBI  Exam: Midline spinal TTP in low back, otherwise nonfocal    Imaging:  XR lumbar 1/6/25: Stable L1 compression fracture     Plan:  Reviewed imaging with patient.  Stable appearance to L1 burst fracture.  Thankfully her pain is improving.  Continue TLSO brace for about 6-12 weeks from the injury.  Recommend wearing brace when upright, remove to shower and sleep.   Continue thoracic and lumbar spine precautions to prevent further injury, including no bending, lifting, twisting, no driving while in brace.  Follow up in 4 weeks with repeat lumbar spine xray to see AP  Call neurosurgery office with questions or concerns.     Orders:    XR spine lumbar 2 or 3 views injury; Future    DXA bone density spine hip and pelvis; Future

## 2025-01-07 ENCOUNTER — OFFICE VISIT (OUTPATIENT)
Dept: NEUROSURGERY | Facility: CLINIC | Age: 80
End: 2025-01-07
Payer: COMMERCIAL

## 2025-01-07 VITALS
WEIGHT: 146 LBS | TEMPERATURE: 97.6 F | SYSTOLIC BLOOD PRESSURE: 124 MMHG | DIASTOLIC BLOOD PRESSURE: 58 MMHG | RESPIRATION RATE: 13 BRPM | OXYGEN SATURATION: 97 % | BODY MASS INDEX: 25.87 KG/M2 | HEART RATE: 63 BPM | HEIGHT: 63 IN

## 2025-01-07 DIAGNOSIS — S32.011A CLOSED STABLE BURST FRACTURE OF FIRST LUMBAR VERTEBRA, INITIAL ENCOUNTER (HCC): Primary | ICD-10-CM

## 2025-01-07 DIAGNOSIS — S32.000A LUMBAR COMPRESSION FRACTURE (HCC): ICD-10-CM

## 2025-01-07 PROCEDURE — 99202 OFFICE O/P NEW SF 15 MIN: CPT | Performed by: PHYSICIAN ASSISTANT

## 2025-01-07 NOTE — PROGRESS NOTES
Name: Jenna Chavarria      : 1945      MRN: [id number]  Encounter Provider: Zita Morrison PA-C  Encounter Date: 2025   Encounter department: [facility] [place]  :  Assessment & Plan  Closed stable burst fracture of first lumbar vertebra, initial encounter (HCC)  2 week follow up of an L1 burst fracture 24 after a fall out of bed  Maintained in brace   Pain improved to 3-5/10 that can radiate out to the hips.  No BBI  Exam: Midline spinal TTP in low back, otherwise nonfocal    Imaging:  XR lumbar 25: Stable L1 compression fracture     Plan:  Reviewed imaging with patient.  Stable appearance to L1 burst fracture.  Thankfully her pain is improving.  Continue TLSO brace for about 6-12 weeks from the injury.  Recommend wearing brace when upright, remove to shower and sleep.   Continue thoracic and lumbar spine precautions to prevent further injury, including no bending, lifting, twisting, no driving while in brace.  Follow up in 4 weeks with repeat lumbar spine xray to see AP  Call neurosurgery office with questions or concerns.     Orders:    XR spine lumbar 2 or 3 views injury; Future    DXA bone density spine hip and pelvis; Future    Lumbar compression fracture (HCC)    Orders:    Ambulatory Referral to Neurosurgery        History of Present Illness   79 year old female seen for 2 week follow up of an L1 burst fracture 24 after a fall out of bed. She feels that the pain is a bit better lately. There is still a gnawing 3-5/10 pain.  She will lay down once it gets to a 5/10 so that it does not worsen.  The pain is constant.  Also reports some pain that radiates to both hips.  No BBI.  She ambulates with a cane which is new since the fracture.  She is mostly compliant with her brace, does admit that she does some stretching and bending.      Review of Systems   Constitutional:  Positive for fatigue.   Gastrointestinal:  Positive for constipation.   Genitourinary:  Negative.    Musculoskeletal:  Positive for back pain (lbp does not radiate), gait problem (ambluates with cane) and myalgias (b/l thighs).   Neurological:  Negative for weakness and numbness.   Psychiatric/Behavioral:  Positive for sleep disturbance.     I have personally reviewed the MA's review of systems and made changes as necessary.    Past Medical History   Past Medical History:   Diagnosis Date    Asthma     Colon polyp     Diabetes mellitus (HCC)     E coli infection     Fall     Hyperlipidemia     Hypertension     Pneumothorax     Rectal prolapse 09/03/2020    Sleep apnea     no CPAP    Subarachnoid hemorrhage (HCC)     Thoracic aortic aneurysm (HCC)     Vertigo      Past Surgical History:   Procedure Laterality Date    BONE GRAFT      R arm    BRONCHOSCOPY N/A 3/16/2016    Procedure: BRONCHOSCOPY FLEXIBLE/anesth.;  Surgeon: Beth Brar DO;  [facility];  Service:     COLONOSCOPY      COLONOSCOPY      EYE SURGERY      OOPHORECTOMY Left     age 54    MI LAPAROSCOPY COLECTOMY PARTIAL W/ANASTOMOSIS N/A 9/23/2020    Procedure: RESECTION COLON SIGMOID LAPAROSCOPIC;  Surgeon: Ricci Christian MD;  [facility];  Service: Colorectal    MI SIGMOIDOSCOPY FLX DX W/COLLJ SPEC BR/WA IF PFRMD N/A 9/23/2020    Procedure: SIGMOIDOSCOPY FLEXIBLE;  Surgeon: Ricci Christian MD;  [facility];  Service: Colorectal    RECTAL PROLAPSE REPAIR LAPAROSCOPIC N/A 9/23/2020    Procedure: RECTOPEXY/PROCTOPEXY LAPAROSCOPIC;  Surgeon: Ricci Christian MD;  [facility];  Service: Colorectal     Family History   Problem Relation Age of Onset    Endometrial cancer Mother 38    No Known Problems Father     No Known Problems Sister     Breast cancer Daughter 49    No Known Problems Daughter     No Known Problems Maternal Grandmother     No Known Problems Maternal Grandfather     No Known Problems Paternal Grandmother     No Known Problems Paternal Grandfather     Cancer Son 33         asbestosis related cancer    No Known Problems Maternal Aunt     No Known Problems Paternal Aunt       reports that she has quit smoking. Her smoking use included cigarettes. She has never used smokeless tobacco. She reports that she does not currently use alcohol. She reports that she does not use drugs.  Current Outpatient Medications on File Prior to Visit   Medication Sig Dispense Refill    acetaminophen (TYLENOL) 325 mg tablet Take 2 tablets (650 mg total) by mouth every 6 (six) hours as needed for mild pain or moderate pain for up to 12 doses 24 tablet 0    albuterol (2.5 mg/3 mL) 0.083 % nebulizer solution USE 3 ML BY NEBULIZATION EVERY 6 HOURS AS NEEDED FOR WHEEZING OR FOR SHORTNESS OF BREATH 225 mL 1    albuterol (PROVENTIL HFA,VENTOLIN HFA) 90 mcg/act inhaler Inhale 2 puffs every 6 (six) hours as needed for wheezing 8.5 g 5    amLODIPine (NORVASC) 5 mg tablet TAKE ONE TABLET BY MOUTH DAILY AT 9AM 90 tablet 1    atorvastatin (LIPITOR) 40 mg tablet Take 1 tablet (40 mg total) by mouth daily 90 tablet 1    Blood Glucose Monitoring Suppl (ONE TOUCH ULTRA 2) w/Device KIT Check blood sugars once daily. Please substitute with appropriate alternative as covered by patient's insurance. Dx: E11.65 1 kit 0    docusate sodium (COLACE) 100 mg capsule Take 1 capsule (100 mg total) by mouth 2 (two) times a day as needed for constipation 60 capsule 3    Fluticasone-Salmeterol (Advair) 250-50 mcg/dose inhaler INHALE 1 PUFF BY MOUTH TWICE DAILY (RINSE MOUTH AFTER EACH USE) 180 blister 0    glucosamine-chondroitin 500-400 MG tablet Take 1 tablet by mouth 3 (three) times a day      glucose blood (OneTouch Ultra) test strip Check blood sugars once daily. Please substitute with appropriate alternative as covered by patient's insurance. Dx: E11.65 100 each 3    hydroCHLOROthiazide 12.5 mg tablet Take 1 tablet (12.5 mg total) by mouth daily 90 tablet 1    ibandronate (BONIVA) 150 MG tablet Take 1 tablet (150 mg total) by mouth  every 30 (thirty) days 3 tablet 1    ibuprofen (MOTRIN) 400 mg tablet Take 1 tablet (400 mg total) by mouth every 8 (eight) hours as needed for mild pain or moderate pain for up to 12 doses 12 tablet 0    ibuprofen (MOTRIN) 800 mg tablet Take by mouth every 6 (six) hours as needed for mild pain      irbesartan (AVAPRO) 300 mg tablet TAKE 1 TABLET BY MOUTH DAILY AT 9 PM AT BEDTIME 30 tablet 0    lidocaine (LIDODERM) 5 % Apply 1 patch topically over 12 hours daily Remove & Discard patch within 12 hours or as directed by MD 7 patch 0    lidocaine (Lidoderm) 5 % Apply 1 patch topically over 12 hours daily Remove & Discard patch within 12 hours or as directed by MD 30 patch 0    lidocaine (LMX) 4 % cream Apply topically 2 (two) times a day as needed for moderate pain 28 g 0    MAGNESIUM PO Take by mouth daily      metFORMIN (GLUCOPHAGE) 850 mg tablet TAKE ONE TABLET BY MOUTH TWICE DAILY @9AM-5PM WITH MEALS 180 tablet 1    montelukast (SINGULAIR) 10 mg tablet Take 1 tablet (10 mg total) by mouth daily at bedtime 90 tablet 1    OneTouch Delica Lancets 33G MISC Check blood sugars once daily. Please substitute with appropriate alternative as covered by patient's insurance. Dx: E11.65 100 each 3    oxyCODONE (Roxicodone) 5 immediate release tablet Take 1 tablet (5 mg total) by mouth every 4 (four) hours as needed for moderate pain Max Daily Amount: 30 mg 12 tablet 0    solifenacin (VESICARE) 5 mg tablet Take 1 tablet (5 mg total) by mouth daily 90 tablet 1    methylPREDNISolone 4 MG tablet therapy pack Use as directed on package (Patient not taking: Reported on 1/7/2025) 21 each 0     No current facility-administered medications on file prior to visit.     Allergies   Allergen Reactions    Bactrim [Sulfamethoxazole-Trimethoprim] Hives    Sulfamethoxazole Rash    Trimethoprim Rash      Social History     Tobacco Use    Smoking status: Former     Types: Cigarettes    Smokeless tobacco: Never    Tobacco comments:     only smoked  "a couple a day for a couple years in her 20s   Vaping Use    Vaping status: Never Used   Substance and Sexual Activity    Alcohol use: Not Currently     Comment: weekends     Drug use: No    Sexual activity: Not on file        Objective   /58 (BP Location: Right arm, Patient Position: Sitting, Cuff Size: Standard)   Pulse 63   Temp 97.6 °F (36.4 °C) (Temporal)   Resp 13   Ht 5' 3\" (1.6 m)   Wt 66.2 kg (146 lb)   LMP  (LMP Unknown)   SpO2 97%   BMI 25.86 kg/m²     Physical Exam  Vitals reviewed.   Constitutional:       General: She is awake.      Appearance: Normal appearance.   HENT:      Head: Normocephalic and atraumatic.   Eyes:      Conjunctiva/sclera: Conjunctivae normal.   Cardiovascular:      Rate and Rhythm: Normal rate.   Pulmonary:      Effort: Pulmonary effort is normal.   Musculoskeletal:      Comments: Wearing TLSO brace  Midline spinal TTP in low back   Skin:     General: Skin is warm and dry.   Neurological:      Mental Status: She is alert.      Deep Tendon Reflexes:      Reflex Scores:       Patellar reflexes are 1+ on the right side and 1+ on the left side.  Psychiatric:         Attention and Perception: Attention and perception normal.         Mood and Affect: Mood and affect normal.         Speech: Speech normal.         Behavior: Behavior normal. Behavior is cooperative.         Thought Content: Thought content normal.         Cognition and Memory: Cognition and memory normal.         Judgment: Judgment normal.       Neurological Exam  Mental Status  Awake and alert. Memory is normal. Speech is normal. Language is fluent with no aphasia. Attention and concentration are normal.    Motor  Normal muscle bulk throughout. Normal muscle tone.                                               Right                     Left  Hip flexion                              5                          5  Knee flexion                           5                          5  Knee extension                  "     5                          5  Plantarflexion                         5                          5  Dorsiflexion                            5                          5    Sensory  Intact to BLE.     Reflexes                                            Right                      Left  Patellar                                1+                         1+    Gait    Ambulates with cane.

## 2025-01-09 ENCOUNTER — APPOINTMENT (EMERGENCY)
Dept: CT IMAGING | Facility: HOSPITAL | Age: 80
DRG: 641 | End: 2025-01-09
Payer: COMMERCIAL

## 2025-01-09 ENCOUNTER — HOSPITAL ENCOUNTER (INPATIENT)
Facility: HOSPITAL | Age: 80
LOS: 2 days | Discharge: HOME | DRG: 641 | End: 2025-01-12
Attending: EMERGENCY MEDICINE | Admitting: STUDENT IN AN ORGANIZED HEALTH CARE EDUCATION/TRAINING PROGRAM
Payer: COMMERCIAL

## 2025-01-09 DIAGNOSIS — S09.90XA HEAD INJURY: ICD-10-CM
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
## 2025-01-09 DIAGNOSIS — E87.1 HYPONATREMIA: ICD-10-CM

## 2025-01-09 LAB
ALBUMIN SERPL BCG-MCNC: 4.3 G/DL (ref 3.5–5)
ALP SERPL-CCNC: 93 U/L (ref 34–104)
ALT SERPL W P-5'-P-CCNC: 6 U/L (ref 7–52)
ANION GAP SERPL CALCULATED.3IONS-SCNC: 8 MMOL/L (ref 4–13)
AST SERPL W P-5'-P-CCNC: 18 U/L (ref 13–39)
BASOPHILS # BLD AUTO: 0.06 THOUSANDS/ΜL (ref 0–0.1)
BASOPHILS NFR BLD AUTO: 1 % (ref 0–1)
BILIRUB DIRECT SERPL-MCNC: 0.11 MG/DL (ref 0–0.2)
BILIRUB SERPL-MCNC: 0.72 MG/DL (ref 0.2–1)
BNP SERPL-MCNC: 23 PG/ML (ref 0–100)
BUN SERPL-MCNC: 12 MG/DL (ref 5–25)
CALCIUM SERPL-MCNC: 9.8 MG/DL (ref 8.4–10.2)
CARDIAC TROPONIN I PNL SERPL HS: 3 NG/L (ref ?–50)
CHLORIDE SERPL-SCNC: 91 MMOL/L (ref 96–108)
CO2 SERPL-SCNC: 26 MMOL/L (ref 21–32)
CREAT SERPL-MCNC: 0.54 MG/DL (ref 0.6–1.3)
EOSINOPHIL # BLD AUTO: 0.38 THOUSAND/ΜL (ref 0–0.61)
EOSINOPHIL NFR BLD AUTO: 5 % (ref 0–6)
ERYTHROCYTE [DISTWIDTH] IN BLOOD BY AUTOMATED COUNT: 12 % (ref 11.6–15.1)
GFR SERPL CREATININE-BSD FRML MDRD: 90 ML/MIN/1.73SQ M
GLUCOSE SERPL-MCNC: 154 MG/DL (ref 65–140)
HCT VFR BLD AUTO: 37.1 % (ref 34.8–46.1)
HGB BLD-MCNC: 13.4 G/DL (ref 11.5–15.4)
IMM GRANULOCYTES # BLD AUTO: 0.02 THOUSAND/UL (ref 0–0.2)
IMM GRANULOCYTES NFR BLD AUTO: 0 % (ref 0–2)
LYMPHOCYTES # BLD AUTO: 3.28 THOUSANDS/ΜL (ref 0.6–4.47)
LYMPHOCYTES NFR BLD AUTO: 39 % (ref 14–44)
MCH RBC QN AUTO: 31.4 PG (ref 26.8–34.3)
MCHC RBC AUTO-ENTMCNC: 36.1 G/DL (ref 31.4–37.4)
MCV RBC AUTO: 87 FL (ref 82–98)
MONOCYTES # BLD AUTO: 0.49 THOUSAND/ΜL (ref 0.17–1.22)
MONOCYTES NFR BLD AUTO: 6 % (ref 4–12)
NEUTROPHILS # BLD AUTO: 4.16 THOUSANDS/ΜL (ref 1.85–7.62)
NEUTS SEG NFR BLD AUTO: 49 % (ref 43–75)
NRBC BLD AUTO-RTO: 0 /100 WBCS
PLATELET # BLD AUTO: 205 THOUSANDS/UL (ref 149–390)
PMV BLD AUTO: 12.1 FL (ref 8.9–12.7)
POTASSIUM SERPL-SCNC: 5 MMOL/L (ref 3.5–5.3)
PROT SERPL-MCNC: 7.1 G/DL (ref 6.4–8.4)
RBC # BLD AUTO: 4.27 MILLION/UL (ref 3.81–5.12)
SODIUM SERPL-SCNC: 125 MMOL/L (ref 135–147)
WBC # BLD AUTO: 8.39 THOUSAND/UL (ref 4.31–10.16)

## 2025-01-09 PROCEDURE — 80048 BASIC METABOLIC PNL TOTAL CA: CPT | Performed by: EMERGENCY MEDICINE

## 2025-01-09 PROCEDURE — 80076 HEPATIC FUNCTION PANEL: CPT | Performed by: EMERGENCY MEDICINE

## 2025-01-09 PROCEDURE — 99284 EMERGENCY DEPT VISIT MOD MDM: CPT

## 2025-01-09 PROCEDURE — 85025 COMPLETE CBC W/AUTO DIFF WBC: CPT | Performed by: EMERGENCY MEDICINE

## 2025-01-09 PROCEDURE — 84484 ASSAY OF TROPONIN QUANT: CPT | Performed by: EMERGENCY MEDICINE

## 2025-01-09 PROCEDURE — 36415 COLL VENOUS BLD VENIPUNCTURE: CPT | Performed by: EMERGENCY MEDICINE

## 2025-01-09 PROCEDURE — 96374 THER/PROPH/DIAG INJ IV PUSH: CPT

## 2025-01-09 PROCEDURE — 93005 ELECTROCARDIOGRAM TRACING: CPT

## 2025-01-09 PROCEDURE — 96361 HYDRATE IV INFUSION ADD-ON: CPT

## 2025-01-09 PROCEDURE — 70450 CT HEAD/BRAIN W/O DYE: CPT

## 2025-01-09 PROCEDURE — 72125 CT NECK SPINE W/O DYE: CPT

## 2025-01-09 PROCEDURE — 99285 EMERGENCY DEPT VISIT HI MDM: CPT | Performed by: EMERGENCY MEDICINE

## 2025-01-09 PROCEDURE — 83880 ASSAY OF NATRIURETIC PEPTIDE: CPT | Performed by: EMERGENCY MEDICINE

## 2025-01-09 RX ORDER — ACETAMINOPHEN 10 MG/ML
1000 INJECTION, SOLUTION INTRAVENOUS ONCE
Status: COMPLETED | OUTPATIENT
Start: 2025-01-09 | End: 2025-01-09

## 2025-01-09 RX ADMIN — ACETAMINOPHEN 1000 MG: 10 INJECTION INTRAVENOUS at 22:50

## 2025-01-09 RX ADMIN — SODIUM CHLORIDE 1000 ML: 0.9 INJECTION, SOLUTION INTRAVENOUS at 22:44

## 2025-01-10 LAB
2HR DELTA HS TROPONIN: 0 NG/L
ANION GAP SERPL CALCULATED.3IONS-SCNC: 6 MMOL/L (ref 4–13)
ATRIAL RATE: 69 BPM
ATRIAL RATE: 71 BPM
BUN SERPL-MCNC: 10 MG/DL (ref 5–25)
CALCIUM SERPL-MCNC: 9 MG/DL (ref 8.4–10.2)
CARDIAC TROPONIN I PNL SERPL HS: 3 NG/L (ref ?–50)
CHLORIDE SERPL-SCNC: 96 MMOL/L (ref 96–108)
CO2 SERPL-SCNC: 27 MMOL/L (ref 21–32)
CREAT SERPL-MCNC: 0.5 MG/DL (ref 0.6–1.3)
EST. AVERAGE GLUCOSE BLD GHB EST-MCNC: 166 MG/DL
GFR SERPL CREATININE-BSD FRML MDRD: 92 ML/MIN/1.73SQ M
GLUCOSE SERPL-MCNC: 118 MG/DL (ref 65–140)
GLUCOSE SERPL-MCNC: 132 MG/DL (ref 65–140)
GLUCOSE SERPL-MCNC: 177 MG/DL (ref 65–140)
GLUCOSE SERPL-MCNC: 192 MG/DL (ref 65–140)
GLUCOSE SERPL-MCNC: 238 MG/DL (ref 65–140)
HBA1C MFR BLD: 7.4 %
MAGNESIUM SERPL-MCNC: 1.3 MG/DL (ref 1.9–2.7)
P AXIS: 46 DEGREES
P AXIS: 71 DEGREES
PHOSPHATE SERPL-MCNC: 3.6 MG/DL (ref 2.3–4.1)
POTASSIUM SERPL-SCNC: 3.9 MMOL/L (ref 3.5–5.3)
PR INTERVAL: 162 MS
PR INTERVAL: 178 MS
QRS AXIS: 42 DEGREES
QRS AXIS: 53 DEGREES
QRSD INTERVAL: 90 MS
QRSD INTERVAL: 90 MS
QT INTERVAL: 428 MS
QT INTERVAL: 434 MS
QTC INTERVAL: 465 MS
QTC INTERVAL: 465 MS
SODIUM SERPL-SCNC: 129 MMOL/L (ref 135–147)
T WAVE AXIS: 60 DEGREES
T WAVE AXIS: 66 DEGREES
VENTRICULAR RATE: 69 BPM
VENTRICULAR RATE: 71 BPM

## 2025-01-10 PROCEDURE — 80048 BASIC METABOLIC PNL TOTAL CA: CPT

## 2025-01-10 PROCEDURE — 99223 1ST HOSP IP/OBS HIGH 75: CPT | Performed by: STUDENT IN AN ORGANIZED HEALTH CARE EDUCATION/TRAINING PROGRAM

## 2025-01-10 PROCEDURE — 84100 ASSAY OF PHOSPHORUS: CPT

## 2025-01-10 PROCEDURE — 83036 HEMOGLOBIN GLYCOSYLATED A1C: CPT

## 2025-01-10 PROCEDURE — 93010 ELECTROCARDIOGRAM REPORT: CPT | Performed by: INTERNAL MEDICINE

## 2025-01-10 PROCEDURE — 83735 ASSAY OF MAGNESIUM: CPT

## 2025-01-10 PROCEDURE — 82948 REAGENT STRIP/BLOOD GLUCOSE: CPT

## 2025-01-10 RX ORDER — POLYETHYLENE GLYCOL 3350 17 G/17G
17 POWDER, FOR SOLUTION ORAL DAILY
Status: DISCONTINUED | OUTPATIENT
Start: 2025-01-10 | End: 2025-01-12 | Stop reason: HOSPADM

## 2025-01-10 RX ORDER — HYDROCHLOROTHIAZIDE 12.5 MG/1
12.5 TABLET ORAL DAILY
Status: DISCONTINUED | OUTPATIENT
Start: 2025-01-10 | End: 2025-01-10

## 2025-01-10 RX ORDER — DOCUSATE SODIUM 100 MG/1
100 CAPSULE, LIQUID FILLED ORAL 2 TIMES DAILY PRN
Status: DISCONTINUED | OUTPATIENT
Start: 2025-01-10 | End: 2025-01-12 | Stop reason: HOSPADM

## 2025-01-10 RX ORDER — AMLODIPINE BESYLATE 5 MG/1
5 TABLET ORAL DAILY
Status: DISCONTINUED | OUTPATIENT
Start: 2025-01-10 | End: 2025-01-12 | Stop reason: HOSPADM

## 2025-01-10 RX ORDER — OXYBUTYNIN CHLORIDE 5 MG/1
5 TABLET, EXTENDED RELEASE ORAL DAILY
Status: DISCONTINUED | OUTPATIENT
Start: 2025-01-10 | End: 2025-01-12 | Stop reason: HOSPADM

## 2025-01-10 RX ORDER — FLUTICASONE FUROATE AND VILANTEROL 200; 25 UG/1; UG/1
1 POWDER RESPIRATORY (INHALATION)
Status: DISCONTINUED | OUTPATIENT
Start: 2025-01-10 | End: 2025-01-12 | Stop reason: HOSPADM

## 2025-01-10 RX ORDER — LOSARTAN POTASSIUM 50 MG/1
100 TABLET ORAL DAILY
Status: DISCONTINUED | OUTPATIENT
Start: 2025-01-10 | End: 2025-01-12 | Stop reason: HOSPADM

## 2025-01-10 RX ORDER — MAGNESIUM SULFATE HEPTAHYDRATE 40 MG/ML
2 INJECTION, SOLUTION INTRAVENOUS ONCE
Status: COMPLETED | OUTPATIENT
Start: 2025-01-10 | End: 2025-01-10

## 2025-01-10 RX ORDER — MONTELUKAST SODIUM 10 MG/1
10 TABLET ORAL
Status: DISCONTINUED | OUTPATIENT
Start: 2025-01-10 | End: 2025-01-12 | Stop reason: HOSPADM

## 2025-01-10 RX ORDER — MAGNESIUM HYDROXIDE/ALUMINUM HYDROXICE/SIMETHICONE 120; 1200; 1200 MG/30ML; MG/30ML; MG/30ML
30 SUSPENSION ORAL EVERY 6 HOURS PRN
Status: DISCONTINUED | OUTPATIENT
Start: 2025-01-10 | End: 2025-01-12 | Stop reason: HOSPADM

## 2025-01-10 RX ORDER — BISACODYL 10 MG
10 SUPPOSITORY, RECTAL RECTAL DAILY PRN
Status: DISCONTINUED | OUTPATIENT
Start: 2025-01-10 | End: 2025-01-11

## 2025-01-10 RX ORDER — HEPARIN SODIUM 5000 [USP'U]/ML
5000 INJECTION, SOLUTION INTRAVENOUS; SUBCUTANEOUS EVERY 8 HOURS SCHEDULED
Status: DISCONTINUED | OUTPATIENT
Start: 2025-01-10 | End: 2025-01-12 | Stop reason: HOSPADM

## 2025-01-10 RX ORDER — ONDANSETRON 2 MG/ML
4 INJECTION INTRAMUSCULAR; INTRAVENOUS EVERY 6 HOURS PRN
Status: DISCONTINUED | OUTPATIENT
Start: 2025-01-10 | End: 2025-01-12 | Stop reason: HOSPADM

## 2025-01-10 RX ORDER — ACETAMINOPHEN 325 MG/1
650 TABLET ORAL EVERY 6 HOURS PRN
Status: DISCONTINUED | OUTPATIENT
Start: 2025-01-10 | End: 2025-01-12 | Stop reason: HOSPADM

## 2025-01-10 RX ORDER — SODIUM CHLORIDE 9 MG/ML
100 INJECTION, SOLUTION INTRAVENOUS CONTINUOUS
Status: DISCONTINUED | OUTPATIENT
Start: 2025-01-10 | End: 2025-01-11

## 2025-01-10 RX ORDER — LIDOCAINE 50 MG/G
1 PATCH TOPICAL DAILY
Status: DISCONTINUED | OUTPATIENT
Start: 2025-01-10 | End: 2025-01-12 | Stop reason: HOSPADM

## 2025-01-10 RX ORDER — INSULIN LISPRO 100 [IU]/ML
1-5 INJECTION, SOLUTION INTRAVENOUS; SUBCUTANEOUS
Status: DISCONTINUED | OUTPATIENT
Start: 2025-01-10 | End: 2025-01-12 | Stop reason: HOSPADM

## 2025-01-10 RX ORDER — ATORVASTATIN CALCIUM 40 MG/1
40 TABLET, FILM COATED ORAL
Status: DISCONTINUED | OUTPATIENT
Start: 2025-01-10 | End: 2025-01-12 | Stop reason: HOSPADM

## 2025-01-10 RX ADMIN — ACETAMINOPHEN 650 MG: 325 TABLET, FILM COATED ORAL at 08:50

## 2025-01-10 RX ADMIN — SODIUM CHLORIDE 100 ML/HR: 0.9 INJECTION, SOLUTION INTRAVENOUS at 11:31

## 2025-01-10 RX ADMIN — POLYETHYLENE GLYCOL 3350 17 G: 17 POWDER, FOR SOLUTION ORAL at 08:38

## 2025-01-10 RX ADMIN — INSULIN LISPRO 1 UNITS: 100 INJECTION, SOLUTION INTRAVENOUS; SUBCUTANEOUS at 11:29

## 2025-01-10 RX ADMIN — HYDROCHLOROTHIAZIDE 12.5 MG: 12.5 TABLET ORAL at 08:38

## 2025-01-10 RX ADMIN — FLUTICASONE FUROATE AND VILANTEROL TRIFENATATE 1 PUFF: 200; 25 POWDER RESPIRATORY (INHALATION) at 08:39

## 2025-01-10 RX ADMIN — ATORVASTATIN CALCIUM 40 MG: 40 TABLET, FILM COATED ORAL at 17:10

## 2025-01-10 RX ADMIN — SODIUM CHLORIDE 100 ML/HR: 0.9 INJECTION, SOLUTION INTRAVENOUS at 02:04

## 2025-01-10 RX ADMIN — HEPARIN SODIUM 5000 UNITS: 5000 INJECTION INTRAVENOUS; SUBCUTANEOUS at 15:09

## 2025-01-10 RX ADMIN — OXYBUTYNIN CHLORIDE 5 MG: 5 TABLET, EXTENDED RELEASE ORAL at 08:38

## 2025-01-10 RX ADMIN — LIDOCAINE 1 PATCH: 50 PATCH TOPICAL at 08:40

## 2025-01-10 RX ADMIN — MAGNESIUM SULFATE HEPTAHYDRATE 2 G: 40 INJECTION, SOLUTION INTRAVENOUS at 18:40

## 2025-01-10 RX ADMIN — SODIUM CHLORIDE 100 ML/HR: 0.9 INJECTION, SOLUTION INTRAVENOUS at 22:40

## 2025-01-10 RX ADMIN — AMLODIPINE BESYLATE 5 MG: 5 TABLET ORAL at 08:38

## 2025-01-10 RX ADMIN — MONTELUKAST 10 MG: 10 TABLET, FILM COATED ORAL at 22:14

## 2025-01-10 RX ADMIN — MONTELUKAST 10 MG: 10 TABLET, FILM COATED ORAL at 02:03

## 2025-01-10 RX ADMIN — HEPARIN SODIUM 5000 UNITS: 5000 INJECTION INTRAVENOUS; SUBCUTANEOUS at 22:14

## 2025-01-10 RX ADMIN — INSULIN LISPRO 1 UNITS: 100 INJECTION, SOLUTION INTRAVENOUS; SUBCUTANEOUS at 22:14

## 2025-01-10 RX ADMIN — LOSARTAN POTASSIUM 100 MG: 50 TABLET, FILM COATED ORAL at 08:38

## 2025-01-10 RX ADMIN — INSULIN LISPRO 2 UNITS: 100 INJECTION, SOLUTION INTRAVENOUS; SUBCUTANEOUS at 17:10

## 2025-01-10 RX ADMIN — HEPARIN SODIUM 5000 UNITS: 5000 INJECTION INTRAVENOUS; SUBCUTANEOUS at 08:38

## 2025-01-10 NOTE — ASSESSMENT & PLAN NOTE
Sodium in the emergency room is 125  Patient was given 1 L of normal saline  Continue normal saline at 100 mL an hour overnight  Trend

## 2025-01-10 NOTE — ASSESSMENT & PLAN NOTE
Patient is a 79-year-old female with past medical history of diabetes, hypertension, hyperlipidemia, L1 vertebral fracture, and frequent falls who presents to the emergency room after she fell on her daughters hospital room and 447 and was a medical emergency.  She was hot and trying to change her pants in the room and tripped and fell and hit her head.  CAT scan of head and neck are stable with the exception of some soft tissue swelling.  She was found to have hyponatremia and mild confusion so she will be admitted for observation.  Every 4 hours neurochecks  Patient oriented x 4, however forgetful.  Unclear baseline  Use ice for pain control on the back of her head  CAT scan: Extracranial soft tissue swelling/scalp hematoma. No evidence of acute intracranial abnormality. 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

## 2025-01-10 NOTE — UTILIZATION REVIEW
Initial Clinical Review    Admission: Date/Time/Statement:   Admission Orders (From admission, onward)       Ordered        01/10/25 0049  Place in Observation  Once             SOC 1/9 @ 2200            Orders Placed This Encounter   Procedures    Place in Observation     Standing Status:   Standing     Number of Occurrences:   1     Level of Care:   Med Surg [16]     ED Arrival Information       Expected   -    Arrival   1/9/2025 21:52    Acuity   Emergent              Means of arrival   Walk-In    Escorted by   Family Member    Service   Hospitalist    Admission type   Emergency              Arrival complaint   fall             Chief Complaint   Patient presents with    Fall     Pt fell on [place] 4. Pt having memory issues, disoriented to time and place. No hx of dementia        Initial Presentation: 79 y.o. female presents to the ED from the nursing unit where she was visiting her daughter ind feel in the room with head strike.  PMH: DM, HTN, HLD, L1 Vertebral fx and freq falls.  In the ED she was HTN.  Treated with IV fluids x 1 L, IV Tylenol.  Labs - low NA.  ECG - NSR.  Imaging - Extracranial soft tissue swelling/scalp hematoma.  On exam soft tissue swelling back of head, mild confusion, GCS 15.  Admitted to Observation Status  with Fall, hyponatremia, DM -   PLAN:   Q 4 hr neuro checks, ice pack, trend NA, SSI cover, wear home TLSO brace. BP improved. NA up to 129.     Anticipated Length of Stay/Certification Statement:  Patient will be admitted on an observation basis with an anticipated length of stay of less than 2 midnights secondary to hyponatremia.         ED Treatment-Medication Administration from 01/09/2025 2152 to 01/10/2025 0148         Date/Time Order Dose Route Action     01/09/2025 2244 sodium chloride 0.9 % bolus 1,000 mL 1,000 mL Intravenous New Bag     01/09/2025 2250 acetaminophen (Ofirmev) injection 1,000 mg 1,000 mg Intravenous New Bag            Scheduled Medications:  amLODIPine, 5 mg,  Oral, Daily  atorvastatin, 40 mg, Oral, Daily With Dinner  fluticasone-vilanterol, 1 puff, Inhalation, Daily  heparin (porcine), 5,000 Units, Subcutaneous, Q8H DINORAH  hydroCHLOROthiazide, 12.5 mg, Oral, Daily  insulin lispro, 1-5 Units, Subcutaneous, 4x Daily (AC & HS)  lidocaine, 1 patch, Topical, Daily  losartan, 100 mg, Oral, Daily  montelukast, 10 mg, Oral, HS  oxybutynin, 5 mg, Oral, Daily  polyethylene glycol, 17 g, Oral, Daily      Continuous IV Infusions:  sodium chloride, 100 mL/hr, Intravenous, Continuous      PRN Meds:  acetaminophen, 650 mg, Oral, Q6H PRN  aluminum-magnesium hydroxide-simethicone, 30 mL, Oral, Q6H PRN  bisacodyl, 10 mg, Rectal, Daily PRN  docusate sodium, 100 mg, Oral, BID PRN  ondansetron, 4 mg, Intravenous, Q6H PRN      ED Triage Vitals   Temperature Pulse Respirations Blood Pressure SpO2 Pain Score   01/10/25 0100 01/09/25 2156 01/09/25 2156 01/09/25 2156 01/09/25 2156 01/10/25 0152   98.1 °F (36.7 °C) 71 20 (!) 197/79 97 % 2     Weight (last 2 days)       Date/Time Weight    01/10/25 0150 59.9 (132.06)            Vital Signs (last 3 days)       Date/Time Temp Pulse Resp BP MAP (mmHg) SpO2 O2 Device Patient Position - Orthostatic VS Kaylah Coma Scale Score Pain    01/10/25 1511 97.5 °F (36.4 °C) 64 18 120/64 80 96 % None (Room air) Lying -- --    01/10/25 0850 -- -- -- -- -- -- -- -- -- 4    01/10/25 0727 97.3 °F (36.3 °C) 60 20 114/63 75 96 % None (Room air) Lying -- --    01/10/25 0615 -- -- -- -- -- -- -- -- 14 --    01/10/25 0215 -- -- -- -- -- -- -- -- 14 --    01/10/25 0152 -- -- -- -- -- -- -- -- -- 2    01/10/25 0150 97 °F (36.1 °C) 61 20 147/68 98 96 % None (Room air) Lying -- --    01/10/25 0100 98.1 °F (36.7 °C) 69 20 146/66 95 100 % None (Room air) Lying -- --    01/09/25 2330 -- 69 19 168/74 107 98 % None (Room air) Lying -- --    01/09/25 2240 -- -- -- -- -- -- -- -- 14 --    01/09/25 2156 -- 71 20 197/79 -- 97 % None (Room air) Lying -- --              Pertinent  Labs/Diagnostic Test Results:   Radiology:  CT cervical spine without contrast   Final Interpretation by Marilin Pederson MD (01/09 2302)      No acute cervical spine fracture or traumatic malalignment. Multilevel degenerative changes are present.                  Workstation performed: [id number]         CT head without contrast   Final Interpretation by Marilin Pederson MD (01/09 2252)      Extracranial soft tissue swelling/scalp hematoma. No evidence of acute intracranial abnormality. 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                  Workstation performed: [id number]           Cardiology:  ECG 12 lead   Final Result by Isra Lion DO (01/10 0932)   Normal sinus rhythm   Nonspecific T wave abnormality   Abnormal ECG   When compared with ECG of 09-Jan-2025 22:01, (unconfirmed)   No significant change was found   Confirmed by Isra Lion ([id number]) on 1/10/2025 9:32:22 AM      ECG 12 lead   Final Result by Isra Lion DO (01/10 0930)   Normal sinus rhythm   Normal ECG   When compared with ECG of 29-Jul-2021 13:59,   T wave amplitude has decreased in Anterior leads   QT has lengthened   Confirmed by Isra Lion ([id number]) on 1/10/2025 9:30:47 AM        GI:  No orders to display           Results from last 7 days   Lab Units 01/09/25  2202   WBC Thousand/uL 8.39   HEMOGLOBIN g/dL 13.4   HEMATOCRIT % 37.1   PLATELETS Thousands/uL 205   TOTAL NEUT ABS Thousands/µL 4.16         Results from last 7 days   Lab Units 01/10/25  0609 01/09/25  2202   SODIUM mmol/L 129* 125*   POTASSIUM mmol/L 3.9 5.0   CHLORIDE mmol/L 96 91*   CO2 mmol/L 27 26   ANION GAP mmol/L 6 8   BUN mg/dL 10 12   CREATININE mg/dL 0.50* 0.54*   EGFR ml/min/1.73sq m 92 90   CALCIUM mg/dL 9.0 9.8   MAGNESIUM mg/dL 1.3*  --    PHOSPHORUS mg/dL 3.6  --      Results from last 7 days   Lab Units 01/09/25  2202   AST U/L 18   ALT U/L 6*   ALK PHOS U/L 93   TOTAL PROTEIN g/dL 7.1   ALBUMIN g/dL 4.3   TOTAL BILIRUBIN mg/dL  0.72   BILIRUBIN DIRECT mg/dL 0.11     Results from last 7 days   Lab Units 01/10/25  1101 01/10/25  0806   POC GLUCOSE mg/dl 177* 118     Results from last 7 days   Lab Units 01/10/25  0609 01/09/25  2202   GLUCOSE RANDOM mg/dL 132 154*         Results from last 7 days   Lab Units 01/10/25  0608   HEMOGLOBIN A1C % 7.4*   EAG mg/dl 166           Results from last 7 days   Lab Units 01/09/25  2356 01/09/25  2202   HS TNI 0HR ng/L  --  3   HS TNI 2HR ng/L 3  --    HSTNI D2 ng/L 0  --            Results from last 7 days   Lab Units 01/09/25  2202   BNP pg/mL 23     Past Medical History:   Diagnosis Date    Asthma     Colon polyp     Diabetes mellitus (HCC)     E coli infection     Fall     Hyperlipidemia     Hypertension     Pneumothorax     Rectal prolapse 09/03/2020    Sleep apnea     no CPAP    Subarachnoid hemorrhage (HCC)     Thoracic aortic aneurysm (HCC)     Vertigo      Present on Admission:   Closed L1 vertebral fracture (HCC)   Diabetes mellitus (HCC)   Essential hypertension   Hyperlipidemia   Hyponatremia   Fall      Admitting Diagnosis: Hyponatremia [E87.1]  Head injury [S09.90XA]  Fall [W19.XXXA]  Fall in elderly patient [R29.6]  Age/Sex: 79 y.o. female    Network Utilization Review Department  ATTENTION: Please call with any questions or concerns to [phone] and carefully listen to the prompts so that you are directed to the right person. All voicemails are confidential.   For Discharge needs, contact Care Management DC Support Team at [phone] opt. [phone]  Send all requests for admission clinical reviews, approved or denied determinations and any other requests to dedicated fax number below belonging to the campus where the patient is receiving treatment. List of dedicated fax numbers for the Facilities:  FACILITY NAME UR FAX NUMBER   ADMISSION DENIALS (Administrative/Medical Necessity) [phone]   DISCHARGE SUPPORT TEAM (NETWORK) [phone]   PARENT CHILD HEALTH (Maternity/NICU/Pediatrics)  [phone]   [facility] [phone]   [facility] [phone]   [facility] [phone]   [facility] [phone]   [facility] [phone]   [facility] [phone]   [facility] [phone]   [facility] [phone]   [facility] [phone]   [facility] [phone]   [facility] [phone]   [facility] [phone]

## 2025-01-10 NOTE — ED PROVIDER NOTES
Time reflects when diagnosis was documented in both MDM as applicable and the Disposition within this note       Time User Action Codes Description Comment    1/9/2025 11:23 PM Ricci Langford Add [W19.XXXA] Fall     1/9/2025 11:23 PM Ricci Langford Add [S09.90XA] Head injury     1/9/2025 11:23 PM Ricci Langford Add [E87.1] Hyponatremia           ED Disposition       ED Disposition   Admit    Condition   Stable    Date/Time   Thu Jan 9, 2025 11:23 PM    Comment   Case was discussed with ángel and the patient's admission status was agreed to be Admission Status: observation status to the service of  .               Assessment & Plan       Medical Decision Making  79-year-old female with fall while visiting a family member at our hospital.  She suffered a head strike and had a developing occipital hematoma on presentation as well as some confusion/change of mental status initially.  Fortunately this did improve somewhat during her ED stay.  She was able to follow commands and showed no gross focal neurologic deficits.  CT scans of the head and neck were negative for intracranial hemorrhage or acute fracture/dislocation.  Due to unclear fall etiology initially, laboratory studies were performed to rule out significant anemia, electrolyte abnormalities, and did show hyponatremia with serum sodium 125 which appears to be new.  Fortunately EKG and troponin without evidence of cardiac ischemia or arrhythmia.  We will plan for admission for further eval and treatment.    Amount and/or Complexity of Data Reviewed  Labs: ordered. Decision-making details documented in ED Course.  Radiology: ordered. Decision-making details documented in ED Course.  ECG/medicine tests: ordered and independent interpretation performed. Decision-making details documented in ED Course.     Details: Normal sinus rhythm at rate of 69, normal axis, normal intervals, no significant ST elevations or depressions to suggest cardiac  ischemia.    Risk  Prescription drug management.  Decision regarding hospitalization.             Medications   sodium chloride 0.9 % bolus 1,000 mL (0 mL Intravenous Stopped 1/9/25 2344)   acetaminophen (Ofirmev) injection 1,000 mg (0 mg Intravenous Stopped 1/9/25 2305)       ED Risk Strat Scores                                              History of Present Illness       Chief Complaint   Patient presents with    Fall     Pt [place] [address]. Pt having memory issues, disoriented to time and place. No hx of dementia        Past Medical History:   Diagnosis Date    Asthma     Colon polyp     Diabetes mellitus (HCC)     E coli infection     Fall     Hyperlipidemia     Hypertension     Pneumothorax     Rectal prolapse 09/03/2020    Sleep apnea     no CPAP    Subarachnoid hemorrhage (HCC)     Thoracic aortic aneurysm (HCC)     Vertigo       Past Surgical History:   Procedure Laterality Date    BONE GRAFT      R arm    BRONCHOSCOPY N/A 3/16/2016    Procedure: BRONCHOSCOPY FLEXIBLE/anesth.;  Surgeon: Beth Brar DO;  Location: [facility];  Service:     COLONOSCOPY      COLONOSCOPY      EYE SURGERY      OOPHORECTOMY Left     age 54    [facility] LAPAROSCOPY COLECTOMY PARTIAL W/ANASTOMOSIS N/A 9/23/2020    Procedure: RESECTION COLON SIGMOID LAPAROSCOPIC;  Surgeon: Ricci Christian MD;  Location: [facility];  Service: Colorectal    VA SIGMOIDOSCOPY FLX DX W/COLLJ SPEC BR/WA IF PFRMD N/A 9/23/2020    Procedure: SIGMOIDOSCOPY FLEXIBLE;  Surgeon: Ricci Christian MD;  Location: [facility];  Service: Colorectal    RECTAL PROLAPSE REPAIR LAPAROSCOPIC N/A 9/23/2020    Procedure: RECTOPEXY/PROCTOPEXY LAPAROSCOPIC;  Surgeon: Ricci Christian MD;  Location: [facility];  Service: Colorectal      Family History   Problem Relation Age of Onset    Endometrial cancer Mother 38    No Known Problems Father     No Known Problems Sister     Breast cancer Daughter 49    No Known Problems Daughter     No Known Problems Maternal  "Grandmother     No Known Problems Maternal Grandfather     No Known Problems Paternal Grandmother     No Known Problems Paternal Grandfather     Cancer Son 33        asbestosis related cancer    No Known Problems Maternal Aunt     No Known Problems Paternal Aunt       Social History     Tobacco Use    Smoking status: Former     Types: Cigarettes    Smokeless tobacco: Never    Tobacco comments:     only smoked a couple a day for a couple years in her 20s   Vaping Use    Vaping status: Never Used   Substance Use Topics    Alcohol use: Not Currently     Comment: weekends     Drug use: No      E-Cigarette/Vaping    E-Cigarette Use Never User       E-Cigarette/Vaping Substances    Nicotine No     THC No     CBD No     Flavoring No     Other No     Unknown No       I have reviewed and agree with the history as documented.     Jenna is a 79-year-old female here for evaluation after a fall.  She was a medical emergency when she had a fall while visiting family member on the medical floor of our hospital.  She struck her head and has a large occipital hematoma.  On my evaluation she is awake and alert but appears somewhat anxious/confused and is unable to give me any details about how she fell.  Some repetitive questioning is noted (\"where am I, what is happening?\")  Per report from nursing staff who are present for the immediate evaluation after the fall patient does seem to be demonstrating a decrease in cognitive status since the initial evaluation.  She does not have any documented history of dementia.  She is not currently able to give me a detailed past medical history but review of the EMR shows medications for hypertension, hyperlipidemia, diabetes.  She does not appear to be on anticoagulation or daily antiplatelet agents.  She is complaining of an occipital headache but denies significant neck pain or localized numbness, weakness, paresthesias.          Review of Systems   Unable to perform ROS: Mental status " change           Objective       ED Triage Vitals   Temperature Pulse Blood Pressure Respirations SpO2 Patient Position - Orthostatic VS   01/10/25 0100 01/09/25 2156 01/09/25 2156 01/09/25 2156 01/09/25 2156 01/09/25 2156   98.1 °F (36.7 °C) 71 (!) 197/79 20 97 % Lying      Temp Source Heart Rate Source BP Location FiO2 (%) Pain Score    01/10/25 0100 01/09/25 2156 01/09/25 2156 -- 01/10/25 0152    Oral Monitor Right arm  2      Vitals      Date and Time Temp Pulse SpO2 Resp BP Pain Score FACES Pain Rating User   01/12/25 0942 -- -- -- -- -- 3 -- [facility]   01/12/25 0900 97.8 °F (36.6 °C) 67 96 % 20 142/65 -- -- YC   01/11/25 2330 97.4 °F (36.3 °C) 53 94 % 18 118/60 -- -- FR   01/11/25 2128 -- -- -- -- -- 6 -- DP   01/11/25 2030 -- -- -- -- -- 6 -- DP   01/11/25 1540 97.6 °F (36.4 °C) 57 97 % 18 129/57 -- -- KB   01/11/25 1328 -- -- -- -- -- 5 -- DL   01/11/25 1001 -- -- -- -- -- 5 -- L   01/11/25 0900 -- -- -- -- -- 5 -- [facility]   01/11/25 0725 98 °F (36.7 °C) 55 97 % 18 128/67 -- -- YC   01/11/25 0401 -- -- -- -- -- 7 -- MR   01/10/25 2346 97.6 °F (36.4 °C) 62 96 % 18 136/66 -- -- FR   01/10/25 2015 -- -- -- -- -- 3 -- MR   01/10/25 1511 97.5 °F (36.4 °C) 64 96 % 18 120/64 -- -- KS   01/10/25 0850 -- -- -- -- -- 4 -- KML   01/10/25 0727 97.3 °F (36.3 °C) 60 96 % 20 114/63 -- -- KS   01/10/25 0152 -- -- -- -- -- 2 -- MR   01/10/25 0150 97 °F (36.1 °C) 61 96 % 20 147/68 -- -- ZC   01/10/25 0100 98.1 °F (36.7 °C) 69 100 % 20 146/66 -- -- NM   01/09/25 2330 -- 69 98 % 19 168/74 -- -- NM   01/09/25 2156 -- 71 97 % 20 197/79 -- -- AB            Physical Exam  Vitals and nursing note reviewed.   Constitutional:       General: She is not in acute distress.     Appearance: She is well-developed.   HENT:      Head:      Comments: Large developing occipital hematoma without laceration/abrasion/active bleeding.  No evidence of basilar skull fracture.  No trauma to the face or anterior scalp.     Mouth/Throat:      Mouth: Mucous  membranes are moist.   Eyes:      Conjunctiva/sclera: Conjunctivae normal.   Neck:      Comments: No significant midline C-spine T-spine, or L-spine tenderness.  No deformities or step-offs.  Cardiovascular:      Rate and Rhythm: Normal rate and regular rhythm.      Heart sounds: No murmur heard.  Pulmonary:      Effort: Pulmonary effort is normal. No respiratory distress.      Breath sounds: Normal breath sounds.   Abdominal:      Palpations: Abdomen is soft.      Tenderness: There is no abdominal tenderness.   Musculoskeletal:         General: No swelling.      Cervical back: Neck supple. No rigidity or tenderness.   Skin:     General: Skin is warm and dry.      Capillary Refill: Capillary refill takes less than 2 seconds.   Neurological:      General: No focal deficit present.      Mental Status: She is alert and oriented to person, place, and time.      Cranial Nerves: No cranial nerve deficit.      Motor: No weakness.      Comments: Somewhat confused but able to follow simple commands without gross focal deficits.   Psychiatric:         Mood and Affect: Mood normal.         Results Reviewed       Procedure Component Value Units Date/Time    HS Troponin I 2hr [[id number]]  (Normal) Collected: 01/09/25 2356    Lab Status: Final result Specimen: Blood from Arm, Left Updated: 01/10/25 0029     hs TnI 2hr 3 ng/L      Delta 2hr hsTnI 0 ng/L     HS Troponin 0hr (reflex protocol) [[id number]]  (Normal) Collected: 01/09/25 2202    Lab Status: Final result Specimen: Blood from Arm, Left Updated: 01/09/25 2232     hs TnI 0hr 3 ng/L     B-Type Natriuretic Peptide(BNP) [[id number]]  (Normal) Collected: 01/09/25 2202    Lab Status: Final result Specimen: Blood from Arm, Left Updated: 01/09/25 2232     BNP 23 pg/mL     Basic metabolic panel [[id number]]  (Abnormal) Collected: 01/09/25 2202    Lab Status: Final result Specimen: Blood from Arm, Left Updated: 01/09/25 2228     Sodium 125 mmol/L      Potassium 5.0 mmol/L       Chloride 91 mmol/L      CO2 26 mmol/L      ANION GAP 8 mmol/L      BUN 12 mg/dL      Creatinine 0.54 mg/dL      Glucose 154 mg/dL      Calcium 9.8 mg/dL      eGFR 90 ml/min/1.73sq m     Narrative:      [employer] guidelines for Chronic Kidney Disease (CKD):     Stage 1 with normal or high GFR (GFR > 90 mL/min/1.73 square meters)    Stage 2 Mild CKD (GFR = 60-89 mL/min/1.73 square meters)    Stage 3A Moderate CKD (GFR = 45-59 mL/min/1.73 square meters)    Stage 3B Moderate CKD (GFR = 30-44 mL/min/1.73 square meters)    Stage 4 Severe CKD (GFR = 15-29 mL/min/1.73 square meters)    Stage 5 End Stage CKD (GFR <15 mL/min/1.73 square meters)  Note: GFR calculation is accurate only with a steady state creatinine    Hepatic function panel [[id number]]  (Abnormal) Collected: 01/09/25 2202    Lab Status: Final result Specimen: Blood from Arm, Left Updated: 01/09/25 2228     Total Bilirubin 0.72 mg/dL      Bilirubin, Direct 0.11 mg/dL      Alkaline Phosphatase 93 U/L      AST 18 U/L      ALT 6 U/L      Total Protein 7.1 g/dL      Albumin 4.3 g/dL     CBC and differential [[id number]] Collected: 01/09/25 2202    Lab Status: Final result Specimen: Blood from Arm, Left Updated: 01/09/25 2207     WBC 8.39 Thousand/uL      RBC 4.27 Million/uL      Hemoglobin 13.4 g/dL      Hematocrit 37.1 %      MCV 87 fL      MCH 31.4 pg      MCHC 36.1 g/dL      RDW 12.0 %      MPV 12.1 fL      Platelets 205 Thousands/uL      nRBC 0 /100 WBCs      Segmented % 49 %      Immature Grans % 0 %      Lymphocytes % 39 %      Monocytes % 6 %      Eosinophils Relative 5 %      Basophils Relative 1 %      Absolute Neutrophils 4.16 Thousands/µL      Absolute Immature Grans 0.02 Thousand/uL      Absolute Lymphocytes 3.28 Thousands/µL      Absolute Monocytes 0.49 Thousand/µL      Eosinophils Absolute 0.38 Thousand/µL      Basophils Absolute 0.06 Thousands/µL             CT cervical spine without contrast   Final Interpretation by Marilin  Lovely Pederson MD (01/09 2302)      No acute cervical spine fracture or traumatic malalignment. Multilevel degenerative changes are present.                  Workstation performed: [id number]         CT head without contrast   Final Interpretation by Marilin Pederson MD (01/09 2252)      Extracranial soft tissue swelling/scalp hematoma. No evidence of acute intracranial abnormality. 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                  Workstation performed: [id number]             Procedures    ED Medication and Procedure Management   Prior to Admission Medications   Prescriptions Last Dose Informant Patient Reported? Taking?   Blood Glucose Monitoring Suppl (ONE TOUCH ULTRA 2) w/Device KIT   No No   Sig: Check blood sugars once daily. Please substitute with appropriate alternative as covered by patient's insurance. Dx: E11.65   Fluticasone-Salmeterol (Advair) 250-50 mcg/dose inhaler   No No   Sig: INHALE 1 PUFF BY MOUTH TWICE DAILY (RINSE MOUTH AFTER EACH USE)   MAGNESIUM PO  Self Yes No   Sig: Take by mouth daily   OneTouch Delica Lancets 33G MISC   No No   Sig: Check blood sugars once daily. Please substitute with appropriate alternative as covered by patient's insurance. Dx: E11.65   acetaminophen (TYLENOL) 325 mg tablet   No No   Sig: Take 2 tablets (650 mg total) by mouth every 6 (six) hours as needed for mild pain or moderate pain for up to 12 doses   albuterol (2.5 mg/3 mL) 0.083 % nebulizer solution   No No   Sig: USE 3 ML BY NEBULIZATION EVERY 6 HOURS AS NEEDED FOR WHEEZING OR FOR SHORTNESS OF BREATH   albuterol (PROVENTIL HFA,VENTOLIN HFA) 90 mcg/act inhaler   No No   Sig: Inhale 2 puffs every 6 (six) hours as needed for wheezing   amLODIPine (NORVASC) 5 mg tablet   No No   Sig: TAKE ONE TABLET BY MOUTH DAILY AT 9AM   atorvastatin (LIPITOR) 40 mg tablet   No No   Sig: Take 1 tablet (40 mg total) by mouth daily   docusate sodium (COLACE) 100 mg capsule  Self No No   Sig: Take 1 capsule (100  mg total) by mouth 2 (two) times a day as needed for constipation   glucosamine-chondroitin 500-400 MG tablet  Self Yes No   Sig: Take 1 tablet by mouth 3 (three) times a day   glucose blood (OneTouch Ultra) test strip   No No   Sig: Check blood sugars once daily. Please substitute with appropriate alternative as covered by patient's insurance. Dx: E11.65   hydroCHLOROthiazide 12.5 mg tablet   No No   Sig: Take 1 tablet (12.5 mg total) by mouth daily   ibandronate (BONIVA) 150 MG tablet   No No   Sig: Take 1 tablet (150 mg total) by mouth every 30 (thirty) days   ibuprofen (MOTRIN) 400 mg tablet   No No   Sig: Take 1 tablet (400 mg total) by mouth every 8 (eight) hours as needed for mild pain or moderate pain for up to 12 doses   ibuprofen (MOTRIN) 800 mg tablet  Self Yes No   Sig: Take by mouth every 6 (six) hours as needed for mild pain   irbesartan (AVAPRO) 300 mg tablet   No No   Sig: TAKE 1 TABLET BY MOUTH DAILY AT 9 PM AT BEDTIME   lidocaine (LIDODERM) 5 %   No No   Sig: Apply 1 patch topically over 12 hours daily Remove & Discard patch within 12 hours or as directed by MD   lidocaine (LMX) 4 % cream   No No   Sig: Apply topically 2 (two) times a day as needed for moderate pain   lidocaine (Lidoderm) 5 %   No No   Sig: Apply 1 patch topically over 12 hours daily Remove & Discard patch within 12 hours or as directed by MD   metFORMIN (GLUCOPHAGE) 850 mg tablet   No No   Sig: TAKE ONE TABLET BY MOUTH TWICE DAILY @9AM-5PM WITH MEALS   montelukast (SINGULAIR) 10 mg tablet   No No   Sig: Take 1 tablet (10 mg total) by mouth daily at bedtime   solifenacin (VESICARE) 5 mg tablet   No No   Sig: Take 1 tablet (5 mg total) by mouth daily      Facility-Administered Medications: None     Discharge Medication List as of 1/12/2025 11:10 AM        CONTINUE these medications which have NOT CHANGED    Details   acetaminophen (TYLENOL) 325 mg tablet Take 2 tablets (650 mg total) by mouth every 6 (six) hours as needed for mild  pain or moderate pain for up to 12 doses, Starting Tue 9/24/2024, Normal      albuterol (2.5 mg/3 mL) 0.083 % nebulizer solution USE 3 ML BY NEBULIZATION EVERY 6 HOURS AS NEEDED FOR WHEEZING OR FOR SHORTNESS OF BREATH, Normal      albuterol (PROVENTIL HFA,VENTOLIN HFA) 90 mcg/act inhaler Inhale 2 puffs every 6 (six) hours as needed for wheezing, Starting Wed 12/13/2023, Normal      amLODIPine (NORVASC) 5 mg tablet TAKE ONE TABLET BY MOUTH DAILY AT 9AM, Normal      atorvastatin (LIPITOR) 40 mg tablet Take 1 tablet (40 mg total) by mouth daily, Starting Mon 4/8/2024, Normal      Blood Glucose Monitoring Suppl (ONE TOUCH ULTRA 2) w/Device KIT Check blood sugars once daily. Please substitute with appropriate alternative as covered by patient's insurance. Dx: E11.65, Normal      docusate sodium (COLACE) 100 mg capsule Take 1 capsule (100 mg total) by mouth 2 (two) times a day as needed for constipation, Starting Mon 8/8/2022, Normal      Fluticasone-Salmeterol (Advair) 250-50 mcg/dose inhaler INHALE 1 PUFF BY MOUTH TWICE DAILY (RINSE MOUTH AFTER EACH USE), Normal      glucosamine-chondroitin 500-400 MG tablet Take 1 tablet by mouth 3 (three) times a day, Historical Med      glucose blood (OneTouch Ultra) test strip Check blood sugars once daily. Please substitute with appropriate alternative as covered by patient's insurance. Dx: E11.65, Normal      ibandronate (BONIVA) 150 MG tablet Take 1 tablet (150 mg total) by mouth every 30 (thirty) days, Starting Mon 4/8/2024, Normal      irbesartan (AVAPRO) 300 mg tablet TAKE 1 TABLET BY MOUTH DAILY AT 9 PM AT BEDTIME, Normal      !! lidocaine (LIDODERM) 5 % Apply 1 patch topically over 12 hours daily Remove & Discard patch within 12 hours or as directed by MD, Starting Tue 9/24/2024, Normal      !! lidocaine (Lidoderm) 5 % Apply 1 patch topically over 12 hours daily Remove & Discard patch within 12 hours or as directed by MD, Starting Wed 12/18/2024, Normal      lidocaine (LMX)  4 % cream Apply topically 2 (two) times a day as needed for moderate pain, Starting Tue 11/21/2023, Normal      MAGNESIUM PO Take by mouth daily, Historical Med      metFORMIN (GLUCOPHAGE) 850 mg tablet TAKE ONE TABLET BY MOUTH TWICE DAILY @9AM-5PM WITH MEALS, Normal      montelukast (SINGULAIR) 10 mg tablet Take 1 tablet (10 mg total) by mouth daily at bedtime, Starting Wed 12/13/2023, Normal      OneTouch Delica Lancets 33G MISC Check blood sugars once daily. Please substitute with appropriate alternative as covered by patient's insurance. Dx: E11.65, Normal      solifenacin (VESICARE) 5 mg tablet Take 1 tablet (5 mg total) by mouth daily, Starting Mon 4/8/2024, Normal       !! - Potential duplicate medications found. Please discuss with provider.        STOP taking these medications       hydroCHLOROthiazide 12.5 mg tablet Comments:   Reason for Stopping:         ibuprofen (MOTRIN) 400 mg tablet Comments:   Reason for Stopping:         ibuprofen (MOTRIN) 800 mg tablet Comments:   Reason for Stopping:             Outpatient Discharge Orders   Activity as tolerated     Call provider for:  difficulty breathing, headache or visual disturbances     Call provider for:  persistent dizziness or light-headedness     ED SEPSIS DOCUMENTATION   Time reflects when diagnosis was documented in both MDM as applicable and the Disposition within this note       Time User Action Codes Description Comment    1/9/2025 11:23 PM Ricci Langford [W19.XXXA] Fall     1/9/2025 11:23 PM Ricci Langford [S09.90XA] Head injury     1/9/2025 11:23 PM Ricci Lnagford [E87.1] Hyponatremia                  Ricci Langford MD  01/21/25 1252

## 2025-01-10 NOTE — ASSESSMENT & PLAN NOTE
Blood pressure stable  Continue hydrochlorothiazide 12.5 mg and amlodipine 5 mg daily  Irbesartan is nonformulary, continue dose equivalent losartan 100 mg daily  Routine vital signs

## 2025-01-10 NOTE — ASSESSMENT & PLAN NOTE
"Lab Results   Component Value Date    HGBA1C 7.3 (A) 04/24/2024       No results for input(s): \"POCGLU\" in the last 72 hours.    Blood Sugar Average: Last 72 hrs:    Hold metformin  Sliding scale insulin  ACHS glucose checks  Diabetic diet  Hypoglycemia protocol    "

## 2025-01-10 NOTE — PLAN OF CARE
Problem: Potential for Falls  Goal: Patient will remain free of falls  Description: INTERVENTIONS:  - Educate patient/family on patient safety including physical limitations  - Instruct patient to call for assistance with activity   - Consult OT/PT to assist with strengthening/mobility   - Keep Call bell within reach  - Keep bed low and locked with side rails adjusted as appropriate  - Keep care items and personal belongings within reach  - Initiate and maintain comfort rounds  - Make Fall Risk Sign visible to staff  - Offer Toileting every 2 Hours, in advance of need  - Initiate/Maintain bed alarm  - Obtain necessary fall risk management equipment:   - Apply yellow socks and bracelet for high fall risk patients  - Consider moving patient to room near nurses station  Outcome: Progressing     Problem: PAIN - ADULT  Goal: Verbalizes/displays adequate comfort level or baseline comfort level  Description: Interventions:  - Encourage patient to monitor pain and request assistance  - Assess pain using appropriate pain scale  - Administer analgesics based on type and severity of pain and evaluate response  - Implement non-pharmacological measures as appropriate and evaluate response  - Consider cultural and social influences on pain and pain management  - Notify physician/advanced practitioner if interventions unsuccessful or patient reports new pain  Outcome: Progressing     Problem: DISCHARGE PLANNING  Goal: Discharge to home or other facility with appropriate resources  Description: INTERVENTIONS:  - Identify barriers to discharge w/patient and caregiver  - Arrange for needed discharge resources and transportation as appropriate  - Identify discharge learning needs (meds, wound care, etc.)  - Arrange for interpretive services to assist at discharge as needed  - Refer to Case Management Department for coordinating discharge planning if the patient needs post-hospital services based on physician/advanced practitioner  order or complex needs related to functional status, cognitive ability, or social support system  Outcome: Progressing     Problem: Knowledge Deficit  Goal: Patient/family/caregiver demonstrates understanding of disease process, treatment plan, medications, and discharge instructions  Description: Complete learning assessment and assess knowledge base.  Interventions:  - Provide teaching at level of understanding  - Provide teaching via preferred learning methods  Outcome: Progressing     Problem: METABOLIC, FLUID AND ELECTROLYTES - ADULT  Goal: Electrolytes maintained within normal limits  Description: INTERVENTIONS:  - Monitor labs and assess patient for signs and symptoms of electrolyte imbalances  - Administer electrolyte replacement as ordered  - Monitor response to electrolyte replacements, including repeat lab results as appropriate  - Instruct patient on fluid and nutrition as appropriate  Outcome: Progressing  Goal: Fluid balance maintained  Description: INTERVENTIONS:  - Monitor labs   - Monitor I/O and WT  - Instruct patient on fluid and nutrition as appropriate  - Assess for signs & symptoms of volume excess or deficit  Outcome: Progressing

## 2025-01-10 NOTE — H&P
"H&P - Hospitalist   Name: Jenna Chavarria 79 y.o. female I MRN: [id number]  Unit/Bed#: E4 -01 I Date of Admission: 1/9/2025   Date of Service: 1/10/2025 I Hospital Day: 0     Assessment & Plan  Fall  Patient is a 79-year-old female with past medical history of diabetes, hypertension, hyperlipidemia, L1 vertebral fracture, and frequent falls who presents to the emergency room after she fell on her daughters hospital room and 447 and was a medical emergency.  She was hot and trying to change her pants in the room and tripped and fell and hit her head.  CAT scan of head and neck are stable with the exception of some soft tissue swelling.  She was found to have hyponatremia and mild confusion so she will be admitted for observation.  Every 4 hours neurochecks  Patient oriented x 4, however forgetful.  Unclear baseline  Use ice for pain control on the back of her head  CAT scan: Extracranial soft tissue swelling/scalp hematoma. No evidence of acute intracranial abnormality. 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   Hyponatremia  Sodium in the emergency room is 125  Patient was given 1 L of normal saline  Continue normal saline at 100 mL an hour overnight  Trend  Essential hypertension  Blood pressure stable  Continue hydrochlorothiazide 12.5 mg and amlodipine 5 mg daily  Irbesartan is nonformulary, continue dose equivalent losartan 100 mg daily  Routine vital signs  Diabetes mellitus (HCC)  Lab Results   Component Value Date    HGBA1C 7.3 (A) 04/24/2024       No results for input(s): \"POCGLU\" in the last 72 hours.    Blood Sugar Average: Last 72 hrs:    Hold metformin  Sliding scale insulin  ACHS glucose checks  Diabetic diet  Hypoglycemia protocol    Hyperlipidemia  Continue atorvastatin 40 mg daily  Closed L1 vertebral fracture (HCC)  From frequent falls  Stable, wears TSLO brace      VTE Pharmacologic Prophylaxis: VTE Score: 3 Moderate Risk (Score 3-4) - Pharmacological DVT Prophylaxis Ordered: " heparin.  Code Status: Level 1 - Full Code   Discussion with patient    Anticipated Length of Stay: Patient will be admitted on an observation basis with an anticipated length of stay of less than 2 midnights secondary to hyponatremia.    History of Present Illness   Chief Complaint: Medical emergency due to fall    Jenna Chavarria is a 79-year-old female with past medical history of diabetes, hypertension, hyperlipidemia, L1 vertebral fracture, and frequent falls who presents to the emergency room after she fell on her daughters hospital room and 447 and was a medical emergency.  She was hot and trying to change her pants in the room and tripped and fell and hit her head.  CAT scan of head and neck are stable with the exception of some soft tissue swelling.  She was found to have hyponatremia and mild confusion so she will be admitted for observation.     Review of Systems   Constitutional:  Negative for chills and fever.   HENT:  Negative for ear pain and sore throat.    Eyes:  Negative for pain and visual disturbance.   Respiratory:  Negative for cough and shortness of breath.    Cardiovascular:  Negative for chest pain and palpitations.   Gastrointestinal:  Negative for abdominal pain and vomiting.   Genitourinary:  Negative for dysuria and hematuria.   Musculoskeletal:  Positive for arthralgias. Negative for back pain, neck pain and neck stiffness.        Pain and swelling in the back of her head   Skin:  Negative for color change and rash.   Neurological:  Positive for weakness. Negative for dizziness, seizures and syncope.   All other systems reviewed and are negative.      Historical Information   Past Medical History:   Diagnosis Date    Asthma     Colon polyp     Diabetes mellitus (HCC)     E coli infection     Fall     Hyperlipidemia     Hypertension     Pneumothorax     Rectal prolapse 09/03/2020    Sleep apnea     no CPAP    Subarachnoid hemorrhage (HCC)     Thoracic aortic aneurysm (HCC)     Vertigo       Past Surgical History:   Procedure Laterality Date    BONE GRAFT      R arm    BRONCHOSCOPY N/A 3/16/2016    Procedure: BRONCHOSCOPY FLEXIBLE/anesth.;  Surgeon: Beth Brar DO;  Location: [facility];  Service:     COLONOSCOPY      COLONOSCOPY      EYE SURGERY      OOPHORECTOMY Left     age 54    MD LAPAROSCOPY COLECTOMY PARTIAL W/ANASTOMOSIS N/A 9/23/2020    Procedure: RESECTION COLON SIGMOID LAPAROSCOPIC;  Surgeon: Ricci Christian MD;  Location: [facility];  Service: Colorectal    MD SIGMOIDOSCOPY FLX DX W/COLLJ SPEC BR/WA IF PFRMD N/A 9/23/2020    Procedure: SIGMOIDOSCOPY FLEXIBLE;  Surgeon: Ricci Christian MD;  Location: [facility];  Service: Colorectal    RECTAL PROLAPSE REPAIR LAPAROSCOPIC N/A 9/23/2020    Procedure: RECTOPEXY/PROCTOPEXY LAPAROSCOPIC;  Surgeon: Ricci Christian MD;  Location: [facility];  Service: Colorectal     Social History     Tobacco Use    Smoking status: Former     Types: Cigarettes    Smokeless tobacco: Never    Tobacco comments:     only smoked a couple a day for a couple years in her 20s   Vaping Use    Vaping status: Never Used   Substance and Sexual Activity    Alcohol use: Not Currently     Comment: weekends     Drug use: No    Sexual activity: Not on file     E-Cigarette/Vaping    E-Cigarette Use Never User      E-Cigarette/Vaping Substances    Nicotine No     THC No     CBD No     Flavoring No     Other No     Unknown No      Family History   Problem Relation Age of Onset    Endometrial cancer Mother 38    No Known Problems Father     No Known Problems Sister     Breast cancer Daughter 49    No Known Problems Daughter     No Known Problems Maternal Grandmother     No Known Problems Maternal Grandfather     No Known Problems Paternal Grandmother     No Known Problems Paternal Grandfather     Cancer Son 33        asbestosis related cancer    No Known Problems Maternal Aunt     No Known Problems Paternal Aunt      Social History:  Marital Status: /Civil  Union   Occupation: Retired  Patient Pre-hospital Living Situation: Home with her children  Patient Pre-hospital Level of Mobility: walks  Patient Pre-hospital Diet Restrictions: None    Meds/Allergies   I have reviewed home medications with patient personally.  Prior to Admission medications    Medication Sig Start Date End Date Taking? Authorizing Provider   acetaminophen (TYLENOL) 325 mg tablet Take 2 tablets (650 mg total) by mouth every 6 (six) hours as needed for mild pain or moderate pain for up to 12 doses 9/24/24   Eleuterio Emery MD   albuterol (2.5 mg/3 mL) 0.083 % nebulizer solution USE 3 ML BY NEBULIZATION EVERY 6 HOURS AS NEEDED FOR WHEEZING OR FOR SHORTNESS OF BREATH 11/8/24   Mariola Zuleta MD   albuterol (PROVENTIL HFA,VENTOLIN HFA) 90 mcg/act inhaler Inhale 2 puffs every 6 (six) hours as needed for wheezing 12/13/23   Mariola Zuleta MD   amLODIPine (NORVASC) 5 mg tablet TAKE ONE TABLET BY MOUTH DAILY AT 9AM 3/28/24   Mariola Zuleta MD   atorvastatin (LIPITOR) 40 mg tablet Take 1 tablet (40 mg total) by mouth daily 4/8/24   aMriola Zuleta MD   Blood Glucose Monitoring Suppl (ONE TOUCH ULTRA 2) w/Device KIT Check blood sugars once daily. Please substitute with appropriate alternative as covered by patient's insurance. Dx: E11.65 5/10/24   Mariola Zuleta MD   docusate sodium (COLACE) 100 mg capsule Take 1 capsule (100 mg total) by mouth 2 (two) times a day as needed for constipation 8/8/22   Mariola Zuleta MD   Fluticasone-Salmeterol (Advair) 250-50 mcg/dose inhaler INHALE 1 PUFF BY MOUTH TWICE DAILY (RINSE MOUTH AFTER EACH USE) 9/24/24   Mariola Zuleta MD   glucosamine-chondroitin 500-400 MG tablet Take 1 tablet by mouth 3 (three) times a day    Historical Provider, MD   glucose blood (OneTouch Ultra) test strip Check blood sugars once daily. Please substitute with appropriate alternative as covered by patient's insurance. Dx: E11.65 5/10/24   Mariola Zuleta MD   hydroCHLOROthiazide 12.5 mg tablet Take 1  tablet (12.5 mg total) by mouth daily 6/11/24   Mariola Zuleta MD   ibandronate (BONIVA) 150 MG tablet Take 1 tablet (150 mg total) by mouth every 30 (thirty) days 4/8/24   Mariola Zuleta MD   ibuprofen (MOTRIN) 400 mg tablet Take 1 tablet (400 mg total) by mouth every 8 (eight) hours as needed for mild pain or moderate pain for up to 12 doses 9/24/24   Eleuterio Emery MD   ibuprofen (MOTRIN) 800 mg tablet Take by mouth every 6 (six) hours as needed for mild pain    Historical Provider, MD   irbesartan (AVAPRO) 300 mg tablet TAKE 1 TABLET BY MOUTH DAILY AT 9 PM AT BEDTIME 10/23/24   Mariola Zuleta MD   lidocaine (LIDODERM) 5 % Apply 1 patch topically over 12 hours daily Remove & Discard patch within 12 hours or as directed by MD 9/24/24   Eleuterio Emery MD   lidocaine (Lidoderm) 5 % Apply 1 patch topically over 12 hours daily Remove & Discard patch within 12 hours or as directed by MD 12/18/24   Caitlyn Adams DO   lidocaine (LMX) 4 % cream Apply topically 2 (two) times a day as needed for moderate pain 11/21/23   Gina Zelaya PA-C   MAGNESIUM PO Take by mouth daily    Historical Provider, MD   metFORMIN (GLUCOPHAGE) 850 mg tablet TAKE ONE TABLET BY MOUTH TWICE DAILY @9AM-5PM WITH MEALS 3/28/24   Mariola Zuleta MD   montelukast (SINGULAIR) 10 mg tablet Take 1 tablet (10 mg total) by mouth daily at bedtime 12/13/23   Mariola Song, MD   OneTouch Delica Lancets 33G MISC Check blood sugars once daily. Please substitute with appropriate alternative as covered by patient's insurance. Dx: E11.65 5/10/24   Mariola Zuleta MD   solifenacin (VESICARE) 5 mg tablet Take 1 tablet (5 mg total) by mouth daily 4/8/24   Mariola Zuleta MD   methylPREDNISolone 4 MG tablet therapy pack Use as directed on package  Patient not taking: Reported on 1/7/2025 4/24/24 1/10/25  Mariola Zuleta MD   oxyCODONE (Roxicodone) 5 immediate release tablet Take 1 tablet (5 mg total) by mouth every 4 (four) hours as needed for moderate pain  Max Daily Amount: 30 mg 12/18/24 1/10/25  Caitlyn Adams,      Allergies   Allergen Reactions    Bactrim [Sulfamethoxazole-Trimethoprim] Hives    Sulfamethoxazole Rash    Trimethoprim Rash       Objective :  Temp:  [97 °F (36.1 °C)-98.1 °F (36.7 °C)] 97 °F (36.1 °C)  HR:  [61-71] 61  BP: (146-197)/(66-79) 147/68  Resp:  [19-20] 20  SpO2:  [96 %-100 %] 96 %  O2 Device: None (Room air)    Physical Exam  Vitals and nursing note reviewed.   Constitutional:       General: She is not in acute distress.     Appearance: She is well-developed.   HENT:      Head: Normocephalic and atraumatic.   Eyes:      Conjunctiva/sclera: Conjunctivae normal.   Cardiovascular:      Rate and Rhythm: Normal rate and regular rhythm.      Heart sounds: No murmur heard.  Pulmonary:      Effort: Pulmonary effort is normal. No respiratory distress.      Breath sounds: Normal breath sounds.   Abdominal:      Palpations: Abdomen is soft.      Tenderness: There is no abdominal tenderness.   Musculoskeletal:         General: No swelling.      Cervical back: Neck supple.      Comments: Soft tissue swelling to the back of her head   Skin:     General: Skin is warm and dry.      Capillary Refill: Capillary refill takes less than 2 seconds.   Neurological:      Mental Status: She is alert and oriented to person, place, and time.      GCS: GCS eye subscore is 4. GCS verbal subscore is 5. GCS motor subscore is 6.      Cranial Nerves: Cranial nerves 2-12 are intact.      Motor: Motor function is intact. No weakness.      Comments: Mild confusion with some of the recall of her medications  But oriented to person place time and situation, normal insight   Psychiatric:         Mood and Affect: Mood normal.                Lab Results: I have reviewed the following results:  Results from last 7 days   Lab Units 01/09/25  2202   WBC Thousand/uL 8.39   HEMOGLOBIN g/dL 13.4   HEMATOCRIT % 37.1   PLATELETS Thousands/uL 205   SEGS PCT % 49   LYMPHO PCT % 39    MONO PCT % 6   EOS PCT % 5     Results from last 7 days   Lab Units 01/09/25  2202   SODIUM mmol/L 125*   POTASSIUM mmol/L 5.0   CHLORIDE mmol/L 91*   CO2 mmol/L 26   BUN mg/dL 12   CREATININE mg/dL 0.54*   ANION GAP mmol/L 8   CALCIUM mg/dL 9.8   ALBUMIN g/dL 4.3   TOTAL BILIRUBIN mg/dL 0.72   ALK PHOS U/L 93   ALT U/L 6*   AST U/L 18   GLUCOSE RANDOM mg/dL 154*             Lab Results   Component Value Date    HGBA1C 7.3 (A) 04/24/2024    HGBA1C 6.4 (H) 08/22/2023    HGBA1C 6.4 (H) 04/14/2023           Imaging Results Review: I reviewed radiology reports from this admission including: CT head and CT C-spine.  Other Study Results Review: EKG was reviewed.     ** Please Note: This note has been constructed using a voice recognition system. **

## 2025-01-11 LAB
ANION GAP SERPL CALCULATED.3IONS-SCNC: 6 MMOL/L (ref 4–13)
BASOPHILS # BLD AUTO: 0.04 THOUSANDS/ΜL (ref 0–0.1)
BASOPHILS NFR BLD AUTO: 1 % (ref 0–1)
BUN SERPL-MCNC: 11 MG/DL (ref 5–25)
CALCIUM SERPL-MCNC: 8.7 MG/DL (ref 8.4–10.2)
CHLORIDE SERPL-SCNC: 101 MMOL/L (ref 96–108)
CO2 SERPL-SCNC: 25 MMOL/L (ref 21–32)
CREAT SERPL-MCNC: 0.51 MG/DL (ref 0.6–1.3)
EOSINOPHIL # BLD AUTO: 0.28 THOUSAND/ΜL (ref 0–0.61)
EOSINOPHIL NFR BLD AUTO: 5 % (ref 0–6)
ERYTHROCYTE [DISTWIDTH] IN BLOOD BY AUTOMATED COUNT: 12.1 % (ref 11.6–15.1)
GFR SERPL CREATININE-BSD FRML MDRD: 92 ML/MIN/1.73SQ M
GLUCOSE SERPL-MCNC: 140 MG/DL (ref 65–140)
GLUCOSE SERPL-MCNC: 153 MG/DL (ref 65–140)
GLUCOSE SERPL-MCNC: 154 MG/DL (ref 65–140)
GLUCOSE SERPL-MCNC: 169 MG/DL (ref 65–140)
GLUCOSE SERPL-MCNC: 231 MG/DL (ref 65–140)
HCT VFR BLD AUTO: 33 % (ref 34.8–46.1)
HGB BLD-MCNC: 11.7 G/DL (ref 11.5–15.4)
IMM GRANULOCYTES # BLD AUTO: 0.02 THOUSAND/UL (ref 0–0.2)
IMM GRANULOCYTES NFR BLD AUTO: 0 % (ref 0–2)
LYMPHOCYTES # BLD AUTO: 1.51 THOUSANDS/ΜL (ref 0.6–4.47)
LYMPHOCYTES NFR BLD AUTO: 29 % (ref 14–44)
MAGNESIUM SERPL-MCNC: 1.8 MG/DL (ref 1.9–2.7)
MCH RBC QN AUTO: 31.4 PG (ref 26.8–34.3)
MCHC RBC AUTO-ENTMCNC: 35.5 G/DL (ref 31.4–37.4)
MCV RBC AUTO: 89 FL (ref 82–98)
MONOCYTES # BLD AUTO: 0.32 THOUSAND/ΜL (ref 0.17–1.22)
MONOCYTES NFR BLD AUTO: 6 % (ref 4–12)
NEUTROPHILS # BLD AUTO: 3.08 THOUSANDS/ΜL (ref 1.85–7.62)
NEUTS SEG NFR BLD AUTO: 59 % (ref 43–75)
NRBC BLD AUTO-RTO: 0 /100 WBCS
PLATELET # BLD AUTO: 163 THOUSANDS/UL (ref 149–390)
PMV BLD AUTO: 12.4 FL (ref 8.9–12.7)
POTASSIUM SERPL-SCNC: 4.3 MMOL/L (ref 3.5–5.3)
RBC # BLD AUTO: 3.73 MILLION/UL (ref 3.81–5.12)
SODIUM SERPL-SCNC: 132 MMOL/L (ref 135–147)
WBC # BLD AUTO: 5.25 THOUSAND/UL (ref 4.31–10.16)

## 2025-01-11 PROCEDURE — 99232 SBSQ HOSP IP/OBS MODERATE 35: CPT | Performed by: STUDENT IN AN ORGANIZED HEALTH CARE EDUCATION/TRAINING PROGRAM

## 2025-01-11 PROCEDURE — 83735 ASSAY OF MAGNESIUM: CPT | Performed by: STUDENT IN AN ORGANIZED HEALTH CARE EDUCATION/TRAINING PROGRAM

## 2025-01-11 PROCEDURE — 97162 PT EVAL MOD COMPLEX 30 MIN: CPT

## 2025-01-11 PROCEDURE — 85025 COMPLETE CBC W/AUTO DIFF WBC: CPT | Performed by: STUDENT IN AN ORGANIZED HEALTH CARE EDUCATION/TRAINING PROGRAM

## 2025-01-11 PROCEDURE — 82948 REAGENT STRIP/BLOOD GLUCOSE: CPT

## 2025-01-11 PROCEDURE — 80048 BASIC METABOLIC PNL TOTAL CA: CPT | Performed by: STUDENT IN AN ORGANIZED HEALTH CARE EDUCATION/TRAINING PROGRAM

## 2025-01-11 RX ORDER — MAGNESIUM SULFATE HEPTAHYDRATE 40 MG/ML
2 INJECTION, SOLUTION INTRAVENOUS ONCE
Status: COMPLETED | OUTPATIENT
Start: 2025-01-11 | End: 2025-01-11

## 2025-01-11 RX ORDER — BISACODYL 10 MG
10 SUPPOSITORY, RECTAL RECTAL DAILY PRN
Status: DISCONTINUED | OUTPATIENT
Start: 2025-01-11 | End: 2025-01-12 | Stop reason: HOSPADM

## 2025-01-11 RX ADMIN — FLUTICASONE FUROATE AND VILANTEROL TRIFENATATE 1 PUFF: 200; 25 POWDER RESPIRATORY (INHALATION) at 08:39

## 2025-01-11 RX ADMIN — MAGNESIUM SULFATE HEPTAHYDRATE 2 G: 40 INJECTION, SOLUTION INTRAVENOUS at 10:01

## 2025-01-11 RX ADMIN — OXYBUTYNIN CHLORIDE 5 MG: 5 TABLET, EXTENDED RELEASE ORAL at 08:40

## 2025-01-11 RX ADMIN — SODIUM CHLORIDE 100 ML/HR: 0.9 INJECTION, SOLUTION INTRAVENOUS at 08:45

## 2025-01-11 RX ADMIN — ACETAMINOPHEN 650 MG: 325 TABLET, FILM COATED ORAL at 21:28

## 2025-01-11 RX ADMIN — INSULIN LISPRO 1 UNITS: 100 INJECTION, SOLUTION INTRAVENOUS; SUBCUTANEOUS at 21:22

## 2025-01-11 RX ADMIN — INSULIN LISPRO 1 UNITS: 100 INJECTION, SOLUTION INTRAVENOUS; SUBCUTANEOUS at 08:39

## 2025-01-11 RX ADMIN — HEPARIN SODIUM 5000 UNITS: 5000 INJECTION INTRAVENOUS; SUBCUTANEOUS at 14:18

## 2025-01-11 RX ADMIN — LIDOCAINE 1 PATCH: 50 PATCH TOPICAL at 08:40

## 2025-01-11 RX ADMIN — HEPARIN SODIUM 5000 UNITS: 5000 INJECTION INTRAVENOUS; SUBCUTANEOUS at 21:26

## 2025-01-11 RX ADMIN — LOSARTAN POTASSIUM 100 MG: 50 TABLET, FILM COATED ORAL at 08:40

## 2025-01-11 RX ADMIN — ACETAMINOPHEN 650 MG: 325 TABLET, FILM COATED ORAL at 04:01

## 2025-01-11 RX ADMIN — ACETAMINOPHEN 650 MG: 325 TABLET, FILM COATED ORAL at 10:01

## 2025-01-11 RX ADMIN — ATORVASTATIN CALCIUM 40 MG: 40 TABLET, FILM COATED ORAL at 17:53

## 2025-01-11 RX ADMIN — POLYETHYLENE GLYCOL 3350 17 G: 17 POWDER, FOR SOLUTION ORAL at 08:40

## 2025-01-11 RX ADMIN — MONTELUKAST 10 MG: 10 TABLET, FILM COATED ORAL at 21:27

## 2025-01-11 RX ADMIN — HEPARIN SODIUM 5000 UNITS: 5000 INJECTION INTRAVENOUS; SUBCUTANEOUS at 06:00

## 2025-01-11 RX ADMIN — AMLODIPINE BESYLATE 5 MG: 5 TABLET ORAL at 08:40

## 2025-01-11 RX ADMIN — INSULIN LISPRO 2 UNITS: 100 INJECTION, SOLUTION INTRAVENOUS; SUBCUTANEOUS at 12:06

## 2025-01-11 NOTE — CASE MANAGEMENT
Case Management Assessment & Discharge Planning Note    Patient name Jenna Chavarria  [facility] /[facility] -* MRN [id number]  : 1945 Date 2025       Current Admission Date: 2025  Current Admission Diagnosis:Fall   Patient Active Problem List    Diagnosis Date Noted Date Diagnosed    Closed L1 vertebral fracture (HCC) 2025     Thoracic aortic ectasia (HCC) 2024     Wrist arthritis 2023     Chronic right shoulder pain 2023     Mild persistent asthma without complication 2023     Pancreatic cyst 2023     Abnormal mammogram 2023     Varicose veins of both lower extremities with pain 2022     Allergic rhinitis due to animal hair and dander 09/10/2021     Leg cramping 09/10/2021     Other fatigue 2021     Tear of right rotator cuff 2021     Rectal prolapse 2020     Dizziness 2020     Thoracic aortic aneurysm without rupture (HCC) 10/14/2019     Osteoporosis 10/14/2019     Urge incontinence of urine 2019     NGOC (obstructive sleep apnea) 2019     Hyponatremia 2018     Hyperlipidemia 2018     Essential hypertension 2017     Diabetes mellitus (HCC) 2017     Fall 2016     Hearing loss 2013       LOS (days): 1  Geometric Mean LOS (GMLOS) (days):   Days to GMLOS:     OBJECTIVE:    Risk of Unplanned Readmission Score: 11.6         Current admission status: Inpatient       Preferred Pharmacy:   [facility] - [place]  [address] [place] [address] Ave  [address] [place] [address] [place] [address]  Phone: [phone] Fax: [phone]    Primary Care Provider: Mariola Zuleta MD    Primary Insurance: [employer] REP  Secondary Insurance:     ASSESSMENT:  Active Health Care Proxies    There are no active Health Care Proxies on file.       Readmission Root Cause  30 Day Readmission: No    Patient Information  Admitted from:: Home  Mental Status: Alert  During Assessment patient was accompanied by:  Daughter (Who is Patient in room 447)  Assessment information provided by:: Patient  Primary Caregiver: Self  Support Systems: Self, Children, Family members  County of Residence: [place]  What city do you live in?: [place]  Home entry access options. Select all that apply.: Stairs  Number of steps to enter home.: 1  Do the steps have railings?: No  Type of Current Residence: Ranch (w/ Basement level kitchen, bed and bath)  Living Arrangements: Lives w/ Children, Lives w/ Family members    Activities of Daily Living Prior to Admission  Functional Status: Independent  Completes ADLs independently?: Yes  Ambulates independently?: Yes  Does patient use assisted devices?: Yes  Assisted Devices (DME) used: Walker  Does patient currently own DME?: Yes  What DME does the patient currently own?: Walker  Does the patient have a history of Short-Term Rehab?: No  Does patient have a history of HHC?: No  Does patient currently have HHC?: No    Patient Information Continued  Income Source: Pension/[employer]  Does patient have prescription coverage?: Yes  Does patient receive dialysis treatments?: No  Does patient have a history of substance abuse?: No  Does patient have a history of Mental Health Diagnosis?: No    Means of Transportation  Means of Transport to Appts:: Drives Self      DISCHARGE DETAILS:    Discharge planning discussed with:: Patient  Freedom of Choice: Yes  Comments - Freedom of Choice: Pt expects to discharge home w/ only bernardo lr to providers as needed.    Contacts  Patient Contacts: Jannet Rosenberg (Dtr)  Relationship to Patient:: Family  Contact Method: Phone  Phone Number: [phone]  Reason/Outcome: Emergency Contact    Treatment Team Recommendation: Home (w/ possible need for op f/u)    Additional Comments: CM met w/ Pt who became a patient when she fell in the bathroom of her Dtr's hospital room whilst visiting her daughter. No anticipated needs. Pt is most concerned about her Dtr's new symptoms and  hopes that her Dtr only has a virus. Dtr Jannet will be transporting her home, Pt states she will not leave hospital even after discharged as she prefers to stay by her Dtrs bedside. CM will remain available and follow through disharge as needed.

## 2025-01-11 NOTE — UTILIZATION REVIEW
SEE INITIAL REVIEW BY JUNIE KEN RN BELOW:    Continued Stay Review  OBS on 1/10 @0049 UPGRADED TO INPT 1/10@1757 D/T FALL WITH HYPONATREMIA WITH NEED FOR IVF, NEURO CHECKS.      Date: 1/11/25                          Current Patient Class: Inpatient  Current Level of Care: MED-SURG    HPI:79 y.o. female initially admitted OBS on 1/10 @0049 UPGRADED TO INPT 1/10@1757.  01/10/25 1758  INPATIENT ADMISSION  Once        Transfer Service: Hospitalist   Question Answer Comment   Level of Care Med Surg    Estimated length of stay More than 2 Midnights    Certification I certify that inpatient services are medically necessary for this patient for a duration of greater than two midnights. See H&P and MD Progress Notes for additional information about the patient's course of treatment.        01/10/25 1757     Assessment/Plan: . MG 1.8. . PT. IVF, NEURO CHECKS.PT stating no needs.discontinued hydrochlorothiazide. Continue IV hydration till this evening. continue amlodipine and ARB SSI.Anticipate discharge tomorrow to home.     Date: 1/12  Day 3: Has surpassed a 2nd midnight with active treatments and services.No new issues overnight. Hyponatremia Resolved with hydration and HCTZ cesation.na 134. continues to remain asymptomatic. Imaging findings on admission showed evidence of an extracranial soft tissue swelling/scalp hematoma. She currently is asymptomatic and verbalized understanding of the need to follow-up with her outpatient care provider for transition of care. DISCHARGED TODAY.     Medications:   Scheduled Medications:  amLODIPine, 5 mg, Oral, Daily  atorvastatin, 40 mg, Oral, Daily With Dinner  fluticasone-vilanterol, 1 puff, Inhalation, Daily  heparin (porcine), 5,000 Units, Subcutaneous, Q8H DINORAH  insulin lispro, 1-5 Units, Subcutaneous, 4x Daily (AC & HS)  lidocaine, 1 patch, Topical, Daily  losartan, 100 mg, Oral, Daily  montelukast, 10 mg, Oral, HS  oxybutynin, 5 mg, Oral, Daily  polyethylene  glycol, 17 g, Oral, Daily      Continuous IV Infusions:  sodium chloride, 100 mL/hr, Intravenous, Continuous      PRN Meds:  acetaminophen, 650 mg, Oral, Q6H PRN 1/10 X 1, 1/11 X 3, 1/12 x 1  aluminum-magnesium hydroxide-simethicone, 30 mL, Oral, Q6H PRN  bisacodyl, 10 mg, Rectal, Daily PRN  docusate sodium, 100 mg, Oral, BID PRN  ondansetron, 4 mg, Intravenous, Q6H PRN      Discharge Plan: TBD    Vital Signs (last 3 days)       Date/Time Temp Pulse Resp BP MAP (mmHg) SpO2 O2 Device Patient Position - Orthostatic VS [place] Coma Scale Score Pain    01/12/25 0942 -- -- -- -- -- -- -- -- -- 3    01/12/25 0900 97.8 °F (36.6 °C) 67 20 142/65 -- 96 % None (Room air) Lying -- --    01/11/25 2330 97.4 °F (36.3 °C) 53 18 118/60 54 94 % None (Room air) Lying -- --    01/11/25 2128 -- -- -- -- -- -- -- -- -- 6 01/11/25 2030 -- -- -- -- -- -- -- -- 15 6 01/11/25 1540 97.6 °F (36.4 °C) 57 18 129/57 82 97 % None (Room air) Sitting -- --    01/11/25 1328 -- -- -- -- -- -- -- -- -- 5 01/11/25 1001 -- -- -- -- -- -- -- -- -- 5 01/11/25 0900 -- -- -- -- -- -- -- -- -- 5 01/11/25 0725 98 °F (36.7 °C) 55 18 128/67 -- 97 % None (Room air) Lying -- --    01/11/25 0401 -- -- -- -- -- -- -- -- -- 7    01/10/25 2346 97.6 °F (36.4 °C) 62 18 136/66 95 96 % None (Room air) Lying -- --    01/10/25 2015 -- -- -- -- -- -- -- -- 14 3    01/10/25 1511 97.5 °F (36.4 °C) 64 18 120/64 80 96 % None (Room air) Lying -- --    01/10/25 0850 -- -- -- -- -- -- -- -- -- 4    01/10/25 0727 97.3 °F (36.3 °C) 60 20 114/63 75 96 % None (Room air) Lying -- --    01/10/25 0615 -- -- -- -- -- -- -- -- 14 --    01/10/25 0215 -- -- -- -- -- -- -- -- 14 --    01/10/25 0152 -- -- -- -- -- -- -- -- -- 2    01/10/25 0150 97 °F (36.1 °C) 61 20 147/68 98 96 % None (Room air) Lying -- --    01/10/25 0100 98.1 °F (36.7 °C) 69 20 146/66 95 100 % None (Room air) Lying -- --    01/09/25 2330 -- 69 19 168/74 107 98 % None (Room air) Lying -- --    01/09/25 2240  -- -- -- -- -- -- -- -- 14 --    01/09/25 2156 -- 71 20 197/79 -- 97 % None (Room air) Lying -- --          Weight (last 2 days)       Date/Time Weight    01/10/25 0150 59.9 (132.06)            Pertinent Labs/Diagnostic Results:   Radiology:  CT cervical spine without contrast   Final Interpretation by Marilin Pederson MD (01/09 2302)      No acute cervical spine fracture or traumatic malalignment. Multilevel degenerative changes are present.                  Workstation performed: [id number]         CT head without contrast   Final Interpretation by Marilin Pederson MD (01/09 2252)      Extracranial soft tissue swelling/scalp hematoma. No evidence of acute intracranial abnormality. 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                  Workstation performed: [id number]           Cardiology:  ECG 12 lead   Final Result by Isra Lion DO (01/10 0932)   Normal sinus rhythm   Nonspecific T wave abnormality   Abnormal ECG   When compared with ECG of 09-Jan-2025 22:01, (unconfirmed)   No significant change was found   Confirmed by Isra Lion ([id number]) on 1/10/2025 9:32:22 AM      ECG 12 lead   Final Result by Isra Lion DO (01/10 0930)   Normal sinus rhythm   Normal ECG   When compared with ECG of 29-Jul-2021 13:59,   T wave amplitude has decreased in Anterior leads   QT has lengthened   Confirmed by Isra Lion ([id number]) on 1/10/2025 9:30:47 AM              Results from last 7 days   Lab Units 01/11/25  0529 01/09/25  2202   WBC Thousand/uL 5.25 8.39   HEMOGLOBIN g/dL 11.7 13.4   HEMATOCRIT % 33.0* 37.1   PLATELETS Thousands/uL 163 205   TOTAL NEUT ABS Thousands/µL 3.08 4.16         Results from last 7 days   Lab Units 01/12/25  0524 01/11/25  0529 01/10/25  0609 01/09/25  2202   SODIUM mmol/L 134* 132* 129* 125*   POTASSIUM mmol/L 4.4 4.3 3.9 5.0   CHLORIDE mmol/L 100 101 96 91*   CO2 mmol/L 28 25 27 26   ANION GAP mmol/L 6 6 6 8   BUN mg/dL 11 11 10 12   CREATININE mg/dL 0.56*  0.51* 0.50* 0.54*   EGFR ml/min/1.73sq m 89 92 92 90   CALCIUM mg/dL 9.3 8.7 9.0 9.8   MAGNESIUM mg/dL 2.0 1.8* 1.3*  --    PHOSPHORUS mg/dL  --   --  3.6  --      Results from last 7 days   Lab Units 01/09/25  2202   AST U/L 18   ALT U/L 6*   ALK PHOS U/L 93   TOTAL PROTEIN g/dL 7.1   ALBUMIN g/dL 4.3   TOTAL BILIRUBIN mg/dL 0.72   BILIRUBIN DIRECT mg/dL 0.11     Results from last 7 days   Lab Units 01/12/25  0913 01/11/25  2044 01/11/25  1622 01/11/25  1151 01/11/25  0817 01/10/25  2121 01/10/25  1610 01/10/25  1101 01/10/25  0806   POC GLUCOSE mg/dl 189* 169* 140 231* 154* 192* 238* 177* 118     Results from last 7 days   Lab Units 01/12/25  0524 01/11/25  0529 01/10/25  0609 01/09/25  2202   GLUCOSE RANDOM mg/dL 144* 153* 132 154*         Results from last 7 days   Lab Units 01/10/25  0608   HEMOGLOBIN A1C % 7.4*   EAG mg/dl 166       Results from last 7 days   Lab Units 01/09/25  2356 01/09/25  2202   HS TNI 0HR ng/L  --  3   HS TNI 2HR ng/L 3  --    HSTNI D2 ng/L 0  --        Results from last 7 days   Lab Units 01/09/25  2202   BNP pg/mL 23       Network Utilization Review Department  ATTENTION: Please call with any questions or concerns to [phone] and carefully listen to the prompts so that you are directed to the right person. All voicemails are confidential.   For Discharge needs, contact Care Management DC Support Team at [phone] opt. 2  Send all requests for admission clinical reviews, approved or denied determinations and any other requests to dedicated fax number below belonging to the campus where the patient is receiving treatment. List of dedicated fax numbers for the Facilities:  FACILITY NAME UR FAX NUMBER   ADMISSION DENIALS (Administrative/Medical Necessity) [phone]   DISCHARGE SUPPORT TEAM (NETWORK) [phone]   PARENT CHILD HEALTH (Maternity/NICU/Pediatrics) [phone]   [facility] [phone]   [facility] [phone]    [facility] [phone]   [facility] [phone]   [facility] [phone]   [facility] [phone]   [facility] [phone]   [facility] [phone]   [facility] [phone]   [facility] [phone]   [facility] [phone]   [facility] [phone]

## 2025-01-11 NOTE — ASSESSMENT & PLAN NOTE
Sodium in the emergency room is 125, discontinued hydrochlorothiazide. Continue IV hydration till this evening.

## 2025-01-11 NOTE — PHYSICAL THERAPY NOTE
PHYSICAL THERAPY EVALUATION          Patient Name: Jenna Chavarria  Today's Date: 1/11/2025 01/11/25 1328   PT Last Visit   PT Visit Date 01/11/25   Note Type   Note type Evaluation   Pain Assessment   Pain Assessment Tool 0-10   Pain Score 5   Pain Location/Orientation Location: Head   Hospital Pain Intervention(s) Emotional support   Restrictions/Precautions   Braces or Orthoses TLSO  (from prev hospital encounter)   Other Precautions Chair Alarm;Bed Alarm;Multiple lines;Fall Risk;Spinal precautions   Home Living   Type of Home Apartment   Home Layout Two level;Laundry in basement;Able to live on main level with bedroom/bathroom   Home Equipment Walker;Cane   Additional Comments 2 RADHA   Prior Function   Level of [place] Independent with ADLs;Independent with functional mobility;Independent with IADLS   Lives With Son;Daughter   Receives Help From Family   IADLs Independent with driving;Independent with meal prep   Falls in the last 6 months 1 to 4   General   Additional Pertinent History pt admitted 1/9/25 for fall. activity as tolerated. seen at  ED on 12/18 for L1 burst fx s/p fall. recommend TLSO and spinal precautions (per OP neuro note, cont brace 6-12 weeks from date of fall).   Cognition   Overall Cognitive Status WFL   Arousal/Participation Cooperative   Memory Decreased recall of precautions  (recall 1/3 spinal precautions)   Following Commands Follows all commands and directions without difficulty   Transfers   Sit to Stand 6  Modified independent   Additional items Armrests;Increased time required   Stand to Sit 6  Modified independent   Additional items Increased time required   Ambulation/Elevation   Gait pattern Short stride;Excessively slow   Gait Assistance 5  Supervision   Additional items Assist x 1   Assistive Device SPC;None   Distance >250' SPC>no AD   Balance   Static Standing Fair +   Dynamic Standing Fair   Ambulatory Fair   Endurance Deficit   Endurance Deficit No    Activity Tolerance   Activity Tolerance Patient tolerated treatment well   Nurse Made Aware Mariangel JETER   Assessment   Prognosis Good   Assessment Jenna Chavarria is a 79 y.o. female admitted to [facility] on 1/9/2025 for Fall. PT was consulted and pt was seen on 1/11/2025 for mobility assessment and d/c planning. Pt presents w high fall risk, multiple lines, acute head pain. To maintain TLSO and spinal precautions following fall resulting in L1 burst fx in December. At home she is indep, w use of cane prn. She reports her daughter helps w IADLs (cleaning) and her son had a stroke but is still indep. Pt is currently functioning at a mod I- S for transfers and ambulation. Pt demonstrated ability to ambulate community distances. Slower gait speeds but no gross LOB. Able to progress from SPC to no AD without LOB. Does require reinforcement of bracing recommendations, spinal precautions and how to don/doff TLSO; it does not appear pt is very compliant w these things at home however she verbalize understanding to all education. No further acute PT needs identified. Pt will benefit from continued mobilization via nsg/restorative. The patient's AM-PAC Basic Mobility Inpatient Short Form Raw Score is 23. A Raw score of greater than 16 suggests the patient may benefit from discharge to home.   Barriers to Discharge None   Plan   PT Frequency   (d/c PT; maintain on restorative)   Discharge Recommendation   Rehab Resource Intensity Level, PT No post-acute rehabilitation needs   AM-PAC Basic Mobility Inpatient   Turning in Flat Bed Without Bedrails 4   Lying on Back to Sitting on Edge of Flat Bed Without Bedrails 4   Moving Bed to Chair 4   Standing Up From Chair Using Arms 4   Walk in Room 3   Climb 3-5 Stairs With Railing 4   Basic Mobility Inpatient Raw Score 23   Basic Mobility Standardized Score 50.88   [facility] Highest Level Of Mobility   -HLM Goal 7: Walk 25 feet or more   -HLM Achieved 8: Walk 250 feet ot more   End of  Consult   Patient Position at End of Consult Bedside chair;Bed/Chair alarm activated;All needs within reach   End of Consult Comments pt visiting dtr, alarmed in her room     History: co - morbidities including age, use of assistive device prn, current experience including fall risk, multiple lines, spinal precautions, TLSO  Exam: impairments in systems including multiple body structures involved; neuromuscular (balance, gait, transfers), joint integrity; activity limitations  (difficulties executing an action); participation restrictions (problems associated w involvement in life situations), am-pac  Clinical: stable/unpredictable  Complexity: Moderate    Beverly Parra, PT

## 2025-01-11 NOTE — ASSESSMENT & PLAN NOTE
Patient is a 79-year-old female with past medical history of diabetes, hypertension, hyperlipidemia, L1 vertebral fracture, and frequent falls who presents to the emergency room after she fell on her daughters hospital room and 447 and was a medical emergency.  She was hot and trying to change her pants in the room and tripped and fell and hit her head.  CAT scan of head and neck are stable with the exception of some soft tissue swelling.  She was found to have hyponatremia and mild confusion so she will be admitted for observation.  PT stating no needs

## 2025-01-11 NOTE — PROGRESS NOTES
Progress Note - Hospitalist   Name: Jenna Chavarria 79 y.o. female I MRN: [id number]  Unit/Bed#: E4 -01 I Date of Admission: 1/9/2025   Date of Service: 1/11/2025 I Hospital Day: 1    Assessment & Plan  Fall  Patient is a 79-year-old female with past medical history of diabetes, hypertension, hyperlipidemia, L1 vertebral fracture, and frequent falls who presents to the emergency room after she fell on her daughters hospital room and 447 and was a medical emergency.  She was hot and trying to change her pants in the room and tripped and fell and hit her head.  CAT scan of head and neck are stable with the exception of some soft tissue swelling.  She was found to have hyponatremia and mild confusion so she will be admitted for observation.  PT stating no needs  Hyponatremia  Sodium in the emergency room is 125, discontinued hydrochlorothiazide. Continue IV hydration till this evening.  Essential hypertension  Controlled  Hold HCTZ, continue amlodipine and ARB    Diabetes mellitus (HCC)  Lab Results   Component Value Date    HGBA1C 7.4 (H) 01/10/2025       Recent Labs     01/10/25  1610 01/10/25  2121 01/11/25  0817 01/11/25  1151   POCGLU 238* 192* 154* 231*       Blood Sugar Average: Last 72 hrs:  (P) 185  Hold metformin  Continue sliding scale    Hyperlipidemia  Continue atorvastatin 40 mg daily  Closed L1 vertebral fracture (HCC)  From frequent falls  Stable, wears TSLO brace    VTE Pharmacologic Prophylaxis: VTE Score: 3 Moderate Risk (Score 3-4) - Pharmacological DVT Prophylaxis Ordered: heparin.    Mobility:   Basic Mobility Inpatient Raw Score: 23  JH-HLM Goal: 7: Walk 25 feet or more  JH-HLM Achieved: 8: Walk 250 feet ot more    Discussions with Specialists or Other Care Team Provider: case management    Education and Discussions with Family / Patient: patient    Current Length of Stay: 1 day(s)  Current Patient Status: Inpatient   Certification Statement: The patient will continue to require additional  inpatient hospital stay due to iv hydration, hyponatremia  Discharge Plan: Anticipate discharge tomorrow to home.    Code Status: Level 1 - Full Code    Subjective   Patient seen and examined. No new concerns overnight.    Objective   Vitals:   Temp (24hrs), Av.8 °F (36.6 °C), Min:97.6 °F (36.4 °C), Max:98 °F (36.7 °C)    Temp:  [97.6 °F (36.4 °C)-98 °F (36.7 °C)] 98 °F (36.7 °C)  HR:  [55-62] 55  Resp:  [18] 18  BP: (128-136)/(66-67) 128/67  SpO2:  [96 %-97 %] 97 %  Body mass index is 23.39 kg/m².     Input and Output Summary (last 24 hours):     Intake/Output Summary (Last 24 hours) at 2025 1539  Last data filed at 2025 0844  Gross per 24 hour   Intake 1360 ml   Output --   Net 1360 ml       Physical Exam  Vitals reviewed.   Constitutional:       General: She is not in acute distress.  HENT:      Head: Normocephalic.      Nose: Nose normal.      Mouth/Throat:      Mouth: Mucous membranes are moist.   Eyes:      General: No scleral icterus.  Cardiovascular:      Rate and Rhythm: Normal rate.   Pulmonary:      Effort: Pulmonary effort is normal. No respiratory distress.   Abdominal:      Palpations: Abdomen is soft.      Tenderness: There is no abdominal tenderness.   Skin:     General: Skin is warm.   Neurological:      Mental Status: She is alert.   Psychiatric:         Mood and Affect: Mood normal.         Behavior: Behavior normal.       Lines/Drains:              Lab Results: I have reviewed the following results:   Results from last 7 days   Lab Units 25  0529 25  2202   WBC Thousand/uL 5.25 8.39   HEMOGLOBIN g/dL 11.7 13.4   PLATELETS Thousands/uL 163 205   MCV fL 89 87     Results from last 7 days   Lab Units 25  0529 01/10/25  0609 25  2202   SODIUM mmol/L 132* 129* 125*   POTASSIUM mmol/L 4.3 3.9 5.0   CHLORIDE mmol/L 101 96 91*   CO2 mmol/L 25 27 26   ANION GAP mmol/L 6 6 8   BUN mg/dL 11 10 12   CREATININE mg/dL 0.51* 0.50* 0.54*   CALCIUM mg/dL 8.7 9.0 9.8   ALBUMIN  g/dL  --   --  4.3   TOTAL BILIRUBIN mg/dL  --   --  0.72   ALK PHOS U/L  --   --  93   ALT U/L  --   --  6*   AST U/L  --   --  18   EGFR ml/min/1.73sq m 92 92 90   GLUCOSE RANDOM mg/dL 153* 132 154*     Results from last 7 days   Lab Units 01/11/25  0529 01/10/25  0609   MAGNESIUM mg/dL 1.8* 1.3*   PHOSPHORUS mg/dL  --  3.6         Results from last 7 days   Lab Units 01/09/25  2356 01/09/25  2202   HS TNI 0HR ng/L  --  3   HS TNI 2HR ng/L 3  --               Results from last 7 days   Lab Units 01/11/25  1151 01/11/25  0817 01/10/25  2121 01/10/25  1610 01/10/25  1101 01/10/25  0806   POC GLUCOSE mg/dl 231* 154* 192* 238* 177* 118     Results from last 7 days   Lab Units 01/10/25  0608   HEMOGLOBIN A1C % 7.4*           Recent Cultures (last 7 days):         Imaging:  Reviewed radiology reports from this admission: no new imaging    Last 24 Hours Medication List:     Current Facility-Administered Medications:     acetaminophen (TYLENOL) tablet 650 mg, Q6H PRN    aluminum-magnesium hydroxide-simethicone (MAALOX) oral suspension 30 mL, Q6H PRN    amLODIPine (NORVASC) tablet 5 mg, Daily    atorvastatin (LIPITOR) tablet 40 mg, Daily With Dinner    bisacodyl (DULCOLAX) rectal suppository 10 mg, Daily PRN    docusate sodium (COLACE) capsule 100 mg, BID PRN    fluticasone-vilanterol 200-25 mcg/actuation 1 puff, Daily    heparin (porcine) subcutaneous injection 5,000 Units, Q8H DINORAH    insulin lispro (HumALOG/ADMELOG) 100 units/mL subcutaneous injection 1-5 Units, 4x Daily (AC & HS)    lidocaine (LIDODERM) 5 % patch 1 patch, Daily    losartan (COZAAR) tablet 100 mg, Daily    montelukast (SINGULAIR) tablet 10 mg, HS    ondansetron (ZOFRAN) injection 4 mg, Q6H PRN    oxybutynin (DITROPAN-XL) 24 hr tablet 5 mg, Daily    polyethylene glycol (MIRALAX) packet 17 g, Daily    sodium chloride 0.9 % infusion, Continuous, Last Rate: 100 mL/hr (01/11/25 0841)      **Please Note: This note may have been constructed using a voice  recognition system.**

## 2025-01-11 NOTE — ASSESSMENT & PLAN NOTE
Lab Results   Component Value Date    HGBA1C 7.4 (H) 01/10/2025       Recent Labs     01/10/25  1610 01/10/25  2121 01/11/25  0817 01/11/25  1151   POCGLU 238* 192* 154* 231*       Blood Sugar Average: Last 72 hrs:  (P) 185  Hold metformin  Continue sliding scale

## 2025-01-12 VITALS
HEART RATE: 67 BPM | SYSTOLIC BLOOD PRESSURE: 142 MMHG | TEMPERATURE: 97.8 F | DIASTOLIC BLOOD PRESSURE: 65 MMHG | OXYGEN SATURATION: 96 % | BODY MASS INDEX: 23.4 KG/M2 | RESPIRATION RATE: 20 BRPM | WEIGHT: 132.06 LBS | HEIGHT: 63 IN

## 2025-01-12 LAB
ANION GAP SERPL CALCULATED.3IONS-SCNC: 6 MMOL/L (ref 4–13)
BUN SERPL-MCNC: 11 MG/DL (ref 5–25)
CALCIUM SERPL-MCNC: 9.3 MG/DL (ref 8.4–10.2)
CHLORIDE SERPL-SCNC: 100 MMOL/L (ref 96–108)
CO2 SERPL-SCNC: 28 MMOL/L (ref 21–32)
CREAT SERPL-MCNC: 0.56 MG/DL (ref 0.6–1.3)
GFR SERPL CREATININE-BSD FRML MDRD: 89 ML/MIN/1.73SQ M
GLUCOSE SERPL-MCNC: 144 MG/DL (ref 65–140)
GLUCOSE SERPL-MCNC: 162 MG/DL (ref 65–140)
GLUCOSE SERPL-MCNC: 189 MG/DL (ref 65–140)
MAGNESIUM SERPL-MCNC: 2 MG/DL (ref 1.9–2.7)
POTASSIUM SERPL-SCNC: 4.4 MMOL/L (ref 3.5–5.3)
SODIUM SERPL-SCNC: 134 MMOL/L (ref 135–147)

## 2025-01-12 PROCEDURE — 83735 ASSAY OF MAGNESIUM: CPT | Performed by: STUDENT IN AN ORGANIZED HEALTH CARE EDUCATION/TRAINING PROGRAM

## 2025-01-12 PROCEDURE — 82948 REAGENT STRIP/BLOOD GLUCOSE: CPT

## 2025-01-12 PROCEDURE — 99239 HOSP IP/OBS DSCHRG MGMT >30: CPT | Performed by: STUDENT IN AN ORGANIZED HEALTH CARE EDUCATION/TRAINING PROGRAM

## 2025-01-12 PROCEDURE — 80048 BASIC METABOLIC PNL TOTAL CA: CPT | Performed by: STUDENT IN AN ORGANIZED HEALTH CARE EDUCATION/TRAINING PROGRAM

## 2025-01-12 RX ADMIN — LOSARTAN POTASSIUM 100 MG: 50 TABLET, FILM COATED ORAL at 09:31

## 2025-01-12 RX ADMIN — OXYBUTYNIN CHLORIDE 5 MG: 5 TABLET, EXTENDED RELEASE ORAL at 09:31

## 2025-01-12 RX ADMIN — LIDOCAINE 1 PATCH: 50 PATCH TOPICAL at 09:31

## 2025-01-12 RX ADMIN — AMLODIPINE BESYLATE 5 MG: 5 TABLET ORAL at 09:31

## 2025-01-12 RX ADMIN — POLYETHYLENE GLYCOL 3350 17 G: 17 POWDER, FOR SOLUTION ORAL at 09:31

## 2025-01-12 RX ADMIN — FLUTICASONE FUROATE AND VILANTEROL TRIFENATATE 1 PUFF: 200; 25 POWDER RESPIRATORY (INHALATION) at 09:31

## 2025-01-12 RX ADMIN — ACETAMINOPHEN 650 MG: 325 TABLET, FILM COATED ORAL at 09:42

## 2025-01-12 RX ADMIN — INSULIN LISPRO 1 UNITS: 100 INJECTION, SOLUTION INTRAVENOUS; SUBCUTANEOUS at 09:31

## 2025-01-12 RX ADMIN — INSULIN LISPRO 1 UNITS: 100 INJECTION, SOLUTION INTRAVENOUS; SUBCUTANEOUS at 11:51

## 2025-01-12 NOTE — ASSESSMENT & PLAN NOTE
Lab Results   Component Value Date    HGBA1C 7.4 (H) 01/10/2025       Recent Labs     01/11/25  1151 01/11/25  1622 01/11/25 2044 01/12/25  0913   POCGLU 231* 140 169* 189*       Blood Sugar Average: Last 72 hrs:  (P) 178.7631537791034354  Resume metformin

## 2025-01-12 NOTE — RESTORATIVE TECHNICIAN NOTE
Restorative Technician Note      Patient Name: Jenna Chavarria     Restorative Tech Visit Date: 01/12/25  Note Type: Mobility  Patient Position Upon Consult: Bedside chair  Activity Performed: Ambulated  Assistive Device: Roller walker  Patient Position at End of Consult: Bedside chair; All needs within reach; Bed/Chair alarm activated            ns

## 2025-01-12 NOTE — DISCHARGE SUMMARY
Discharge Summary - Hospitalist   Name: Jenna Chavarria 79 y.o. female I MRN: [id number]  Unit/Bed#: E4 -01 I Date of Admission: 1/9/2025   Date of Service: 1/12/2025 I Hospital Day: 2     Assessment & Plan  Fall  Patient is a 79-year-old female with past medical history of diabetes, hypertension, hyperlipidemia, L1 vertebral fracture, and frequent falls who presents to the emergency room after she fell on her daughters hospital room and 447 and was a medical emergency.  She was hot and trying to change her pants in the room and tripped and fell and hit her head.  CAT scan of head and neck are stable with the exception of some soft tissue swelling.  She was found to have hyponatremia and mild confusion so she will be admitted for observation.  PT stating no needs  Hyponatremia  Resolved with hydration and  HCTZ cesation  Essential hypertension  Controlled  Discontined HCTZ, continue amlodipine and ARB    Diabetes mellitus (HCC)  Lab Results   Component Value Date    HGBA1C 7.4 (H) 01/10/2025       Recent Labs     01/11/25  1151 01/11/25  1622 01/11/25  2044 01/12/25  0913   POCGLU 231* 140 169* 189*       Blood Sugar Average: Last 72 hrs:  (P) 178.9831136448236644  Resume metformin    Hyperlipidemia  Continue atorvastatin 40 mg daily  Closed L1 vertebral fracture (HCC)  From frequent falls  Stable, wears TSLO brace     Discharging Physician / Practitioner: Lamin Salazar MD  PCP: Mariola Zuleta MD  Admission Date:   Admission Orders (From admission, onward)       Ordered        01/10/25 1757  INPATIENT ADMISSION  Once            01/10/25 0049  Place in Observation  Once                          Discharge Date: 01/12/25    Medical Problems       Resolved Problems  Date Reviewed: 10/23/2024   None         Consultations During Hospital Stay:  none    Procedures Performed:   none    Significant Findings / Test Results:   Imaging  CT Head    Extracranial soft tissue swelling/scalp hematoma. No evidence of acute  "intracranial abnormality. 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     CT spine cervical:    No acute cervical spine fracture or traumatic malalignment. Multilevel degenerative changes are present.      Outpatient Tests Requested:  Pcp  Cbc, bmp    Complications:  none    Reason for Admission: Fall    Hospital Course:     Jenna Chavarria is a 79 y.o. female patient who originally presented to the hospital on 1/9/2025 due to fall  79-year-old female with history of hyperlipidemia hypertension, and closed L1 vertebral fracture presented here after suffering from a fall.  She was found to have significant hyponatremia which was managed with IV hydration and hydrochlorothiazide cessation.  PT evaluated her and stated that she has no needs.  She continues to remain asymptomatic.  Imaging findings on admission showed evidence of an extracranial soft tissue swelling/scalp hematoma.  She currently is asymptomatic and verbalized understanding of the need to follow-up with her outpatient care provider for transition of care.    Patient agrees to follow-up with her providers as scheduled and to take her medications as prescribed. All questions were addressed.    she understood the need to seek immediate medical attention should she develop any chest pain, shortness of breath, severe pain, fever, chills, or any other concerning symptoms.    Please see above list of diagnoses and related plan for additional information.     Condition at Discharge: fair     Discharge Day Visit / Exam:     Subjective:  patient seen and examined at bedside. No new issues overnight.   Vitals: Blood Pressure: 142/65 (01/12/25 0900)  Pulse: 67 (01/12/25 0900)  Temperature: 97.8 °F (36.6 °C) (01/12/25 0900)  Temp Source: Temporal (01/12/25 0900)  Respirations: 20 (01/12/25 0900)  Height: 5' 3\" (160 cm) (01/10/25 0150)  Weight - Scale: 59.9 kg (132 lb 0.9 oz) (01/10/25 0150)  SpO2: 96 % (01/12/25 0900)  Exam:   Physical Exam  Vitals reviewed. "   Constitutional:       General: She is not in acute distress.  HENT:      Head: Normocephalic.      Nose: Nose normal.      Mouth/Throat:      Mouth: Mucous membranes are moist.   Eyes:      General: No scleral icterus.  Cardiovascular:      Rate and Rhythm: Normal rate and regular rhythm.   Pulmonary:      Effort: Pulmonary effort is normal. No respiratory distress.      Breath sounds: No wheezing or rales.   Abdominal:      General: There is no distension.      Palpations: Abdomen is soft.      Tenderness: There is no abdominal tenderness.   Skin:     General: Skin is warm.   Neurological:      Mental Status: She is alert.   Psychiatric:         Mood and Affect: Mood normal.         Behavior: Behavior normal.       Discussion with Family: daughter    Discharge instructions/Information to patient and family:   See after visit summary for information provided to patient and family.      Provisions for Follow-Up Care:  See after visit summary for information related to follow-up care and any pertinent home health orders.      Disposition:     Home    Planned Readmission: No     Discharge Statement:  I spent 40 minutes discharging the patient. This time was spent on the day of discharge. I had direct contact with the patient on the day of discharge. Greater than 50% of the total time was spent examining patient, answering all patient questions, arranging and discussing plan of care with patient as well as directly providing post-discharge instructions.  Additional time then spent on discharge activities.    Discharge Medications:  See after visit summary for reconciled discharge medications provided to patient and family.      ** Please Note: This note has been constructed using a voice recognition system **

## 2025-01-12 NOTE — CASE MANAGEMENT
Case Management Discharge Planning Note    Patient name Jenna Chavarria  Location [facility] /E4 -* MRN [id number]  : 1945 Date 2025       Current Admission Date: 2025  Current Admission Diagnosis:Fall   Patient Active Problem List    Diagnosis Date Noted Date Diagnosed    Closed L1 vertebral fracture (HCC) 2025     Thoracic aortic ectasia (HCC) 2024     Wrist arthritis 2023     Chronic right shoulder pain 2023     Mild persistent asthma without complication 2023     Pancreatic cyst 2023     Abnormal mammogram 2023     Varicose veins of both lower extremities with pain 2022     Allergic rhinitis due to animal hair and dander 09/10/2021     Leg cramping 09/10/2021     Other fatigue 2021     Tear of right rotator cuff 2021     Rectal prolapse 2020     Dizziness 2020     Thoracic aortic aneurysm without rupture (HCC) 10/14/2019     Osteoporosis 10/14/2019     Urge incontinence of urine 2019     NGOC (obstructive sleep apnea) 2019     Hyponatremia 2018     Hyperlipidemia 2018     Essential hypertension 2017     Diabetes mellitus (HCC) 2017     Fall 2016     Hearing loss 2013       LOS (days): 2  Geometric Mean LOS (GMLOS) (days):   Days to GMLOS:     OBJECTIVE:  Risk of Unplanned Readmission Score: 11.49         Current admission status: Inpatient   Preferred Pharmacy:   [facility] - [place] [facility] [address]  [facility] [place] [address]  Phone: [phone] Fax: [phone]    Primary Care Provider: Mariola Zuleta MD    Primary Insurance: [employer]  Secondary Insurance:     DISCHARGE DETAILS:    IMM Given (Date):: 25  IMM Given to:: Patient  Family notified:: Reviewed IMM with pt. Pt is in agreement with dc.

## 2025-01-12 NOTE — PLAN OF CARE
Problem: Potential for Falls  Goal: Patient will remain free of falls  Description: INTERVENTIONS:  - Educate patient/family on patient safety including physical limitations  - Instruct patient to call for assistance with activity   - Consult OT/PT to assist with strengthening/mobility   - Keep Call bell within reach  - Keep bed low and locked with side rails adjusted as appropriate  - Keep care items and personal belongings within reach  - Initiate and maintain comfort rounds  - Make Fall Risk Sign visible to staff  - Offer Toileting every 2 Hours, in advance of need  - Initiate/Maintain bed alarm  - Obtain necessary fall risk management equipment:   - Apply yellow socks and bracelet for high fall risk patients  - Consider moving patient to room near nurses station  1/11/2025 2307 by Jazmín Wilson RN  Outcome: Progressing  1/11/2025 2303 by Jazmín Wilson RN  Outcome: Progressing     Problem: PAIN - ADULT  Goal: Verbalizes/displays adequate comfort level or baseline comfort level  Description: Interventions:  - Encourage patient to monitor pain and request assistance  - Assess pain using appropriate pain scale  - Administer analgesics based on type and severity of pain and evaluate response  - Implement non-pharmacological measures as appropriate and evaluate response  - Consider cultural and social influences on pain and pain management  - Notify physician/advanced practitioner if interventions unsuccessful or patient reports new pain  1/11/2025 2307 by Jazmín Wilson RN  Outcome: Progressing  1/11/2025 2303 by Jazmín Wilson RN  Outcome: Progressing     Problem: DISCHARGE PLANNING  Goal: Discharge to home or other facility with appropriate resources  Description: INTERVENTIONS:  - Identify barriers to discharge w/patient and caregiver  - Arrange for needed discharge resources and transportation as appropriate  - Identify discharge learning needs (meds, wound care, etc.)  - Arrange for interpretive services  to assist at discharge as needed  - Refer to Case Management Department for coordinating discharge planning if the patient needs post-hospital services based on physician/advanced practitioner order or complex needs related to functional status, cognitive ability, or social support system  1/11/2025 2307 by Jazmín Wilson RN  Outcome: Progressing  1/11/2025 2303 by Jazmín Wilson RN  Outcome: Progressing     Problem: Knowledge Deficit  Goal: Patient/family/caregiver demonstrates understanding of disease process, treatment plan, medications, and discharge instructions  Description: Complete learning assessment and assess knowledge base.  Interventions:  - Provide teaching at level of understanding  - Provide teaching via preferred learning methods  1/11/2025 2307 by Jazmín Wilson RN  Outcome: Progressing  1/11/2025 2303 by Jazmín Wilson RN  Outcome: Progressing     Problem: METABOLIC, FLUID AND ELECTROLYTES - ADULT  Goal: Electrolytes maintained within normal limits  Description: INTERVENTIONS:  - Monitor labs and assess patient for signs and symptoms of electrolyte imbalances  - Administer electrolyte replacement as ordered  - Monitor response to electrolyte replacements, including repeat lab results as appropriate  - Instruct patient on fluid and nutrition as appropriate  1/11/2025 2307 by Jazmín Wilson RN  Outcome: Progressing  1/11/2025 2303 by Jazmín Wilson RN  Outcome: Progressing  Goal: Fluid balance maintained  Description: INTERVENTIONS:  - Monitor labs   - Monitor I/O and WT  - Instruct patient on fluid and nutrition as appropriate  - Assess for signs & symptoms of volume excess or deficit  1/11/2025 2307 by Jazmín Wilson RN  Outcome: Progressing  1/11/2025 2303 by Jazmín Wilson RN  Outcome: Progressing

## 2025-01-13 ENCOUNTER — TRANSITIONAL CARE MANAGEMENT (OUTPATIENT)
Dept: FAMILY MEDICINE CLINIC | Facility: CLINIC | Age: 80
End: 2025-01-13

## 2025-01-13 NOTE — UTILIZATION REVIEW
NOTIFICATION OF ADMISSION DISCHARGE   This is a Notification of Discharge from [facility]. Please be advised that this patient has been discharge from our facility. Below you will find the admission and discharge date and time including the patient’s disposition.   UTILIZATION REVIEW CONTACT:  Latasha Light  Utilization   Network Utilization Review Department  Phone: [phone] x carefully listen to the prompts. All voicemails are confidential.  Email: [email or web address]     ADMISSION INFORMATION  PRESENTATION DATE: 1/9/2025  9:52 PM  OBERVATION ADMISSION DATE: 01/10/2025 0049  INPATIENT ADMISSION DATE: 1/10/25  5:57 PM   DISCHARGE DATE: 1/12/2025  1:45 PM   DISPOSITION:Home/Self Care    Network Utilization Review Department  ATTENTION: Please call with any questions or concerns to [phone] and carefully listen to the prompts so that you are directed to the right person. All voicemails are confidential.   For Discharge needs, contact Care Management DC Support Team at [phone] opt. 2  Send all requests for admission clinical reviews, approved or denied determinations and any other requests to dedicated fax number below belonging to the campus where the patient is receiving treatment. List of dedicated fax numbers for the Facilities:  FACILITY NAME UR FAX NUMBER   ADMISSION DENIALS (Administrative/Medical Necessity) [phone]   DISCHARGE SUPPORT TEAM ([facility]) [phone]   PARENT CHILD HEALTH (Maternity/NICU/Pediatrics) [phone]   [facility] [phone]   [facility] [phone]   Counts include 234 beds at the [facility] [phone]   [facility] [phone]   [facility] [phone]   [facility] [phone]   [facility] [phone]   [facility]  [phone]   [facility] [phone]   [facility] [phone]   [facility] [phone]   [facility] [phone]

## 2025-01-15 ENCOUNTER — HOSPITAL ENCOUNTER (EMERGENCY)
Facility: HOSPITAL | Age: 80
Discharge: HOME | End: 2025-01-15
Attending: EMERGENCY MEDICINE
Payer: COMMERCIAL

## 2025-01-15 ENCOUNTER — APPOINTMENT (EMERGENCY)
Dept: CT IMAGING | Facility: HOSPITAL | Age: 80
End: 2025-01-15
Payer: COMMERCIAL

## 2025-01-15 VITALS
TEMPERATURE: 97.8 F | WEIGHT: 132.72 LBS | BODY MASS INDEX: 23.51 KG/M2 | SYSTOLIC BLOOD PRESSURE: 142 MMHG | DIASTOLIC BLOOD PRESSURE: 65 MMHG | RESPIRATION RATE: 18 BRPM | OXYGEN SATURATION: 98 % | HEART RATE: 70 BPM

## 2025-01-15 DIAGNOSIS — N39.0 UTI (URINARY TRACT INFECTION): ICD-10-CM

## 2025-01-15 DIAGNOSIS — M54.9 BACK PAIN: Primary | ICD-10-CM

## 2025-01-15 LAB
BACTERIA UR QL AUTO: ABNORMAL /HPF
BILIRUB UR QL STRIP: NEGATIVE
CLARITY UR: ABNORMAL
COLOR UR: YELLOW
GLUCOSE UR STRIP-MCNC: NEGATIVE MG/DL
HGB UR QL STRIP.AUTO: NEGATIVE
KETONES UR STRIP-MCNC: NEGATIVE MG/DL
LEUKOCYTE ESTERASE UR QL STRIP: ABNORMAL
NITRITE UR QL STRIP: POSITIVE
NON-SQ EPI CELLS URNS QL MICRO: ABNORMAL /HPF
PH UR STRIP.AUTO: 7 [PH] (ref 4.5–8)
PROT UR STRIP-MCNC: NEGATIVE MG/DL
RBC #/AREA URNS AUTO: ABNORMAL /HPF
SP GR UR STRIP.AUTO: 1.02 (ref 1–1.03)
UROBILINOGEN UR QL STRIP.AUTO: 0.2 E.U./DL
WBC #/AREA URNS AUTO: ABNORMAL /HPF

## 2025-01-15 PROCEDURE — 99284 EMERGENCY DEPT VISIT MOD MDM: CPT

## 2025-01-15 PROCEDURE — 81001 URINALYSIS AUTO W/SCOPE: CPT

## 2025-01-15 PROCEDURE — 87086 URINE CULTURE/COLONY COUNT: CPT

## 2025-01-15 PROCEDURE — 87077 CULTURE AEROBIC IDENTIFY: CPT

## 2025-01-15 PROCEDURE — 87186 SC STD MICRODIL/AGAR DIL: CPT

## 2025-01-15 PROCEDURE — 74176 CT ABD & PELVIS W/O CONTRAST: CPT

## 2025-01-15 PROCEDURE — 96372 THER/PROPH/DIAG INJ SC/IM: CPT

## 2025-01-15 RX ORDER — CEFPODOXIME PROXETIL 200 MG/1
200 TABLET, FILM COATED ORAL 2 TIMES DAILY
Qty: 20 TABLET | Refills: 0 | Status: SHIPPED | OUTPATIENT
Start: 2025-01-15 | End: 2025-01-25

## 2025-01-15 RX ORDER — KETOROLAC TROMETHAMINE 30 MG/ML
15 INJECTION, SOLUTION INTRAMUSCULAR; INTRAVENOUS ONCE
Status: COMPLETED | OUTPATIENT
Start: 2025-01-15 | End: 2025-01-15

## 2025-01-15 RX ADMIN — KETOROLAC TROMETHAMINE 15 MG: 30 INJECTION, SOLUTION INTRAMUSCULAR; INTRAVENOUS at 13:44

## 2025-01-15 NOTE — ED PROVIDER NOTES
Time reflects when diagnosis was documented in both MDM as applicable and the Disposition within this note       Time User Action Codes Description Comment    1/15/2025  1:15 PM Daniela Tam Add [M54.9] Back pain     1/15/2025  1:40 PM Daniela Tam Add [N39.0] UTI (urinary tract infection)           ED Disposition       ED Disposition   Discharge    Condition   Stable    Date/Time   Wed Ramírez 15, 2025  1:40 PM    Comment   Jenna Chavarria discharge to home/self care.                   Assessment & Plan       Medical Decision Making  UA positive for UTI. No kidney stone on CT. Back pain likely from UTI possible pyelo? Given her VSS do not feel further work up is needed. Will send home with cefpodoxime     I have discussed the plan to discharge pt from ED. The patient was discharged in stable condition.  Patient ambulated off the department.  Extensive return to emergency department precautions were discussed.  Follow up with appropriate providers including primary care physician was discussed.  Patient and/or their  primary decision maker expressed understanding.  Patient remained stable during entire emergency department stay.      Amount and/or Complexity of Data Reviewed  Labs: ordered. Decision-making details documented in ED Course.  Radiology: ordered. Decision-making details documented in ED Course.    Risk  Prescription drug management.        ED Course as of 01/15/25 1439   Wed Ramírez 15, 2025   1308 CT renal stone study abdomen pelvis wo contrast  No renal calculus, hydronephrosis. No other acute abnormality.        1340 Nitrite, UA(!): Positive       Medications   ketorolac (TORADOL) injection 15 mg (15 mg Intramuscular Given 1/15/25 1344)       ED Risk Strat Scores                          SBIRT 20yo+      Flowsheet Row Most Recent Value   Initial Alcohol Screen: US AUDIT-C     1. How often do you have a drink containing alcohol? 3 Filed at: 01/15/2025 1325   2. How many drinks containing alcohol do you have on  a typical day you are drinking?  0 Filed at: 01/15/2025 1325   3a. Male UNDER 65: How often do you have five or more drinks on one occasion? 0 Filed at: 01/15/2025 1325   3b. FEMALE Any Age, or MALE 65+: How often do you have 4 or more drinks on one occassion? 0 Filed at: 01/15/2025 1325   Audit-C Score 3 Filed at: 01/15/2025 1325   KYE: How many times in the past year have you...    Used an illegal drug or used a prescription medication for non-medical reasons? Never Filed at: 01/15/2025 1325                            History of Present Illness       Chief Complaint   Patient presents with    Hip Pain     Just discharged from the hospital, felt fine and shortly after being home started with right hip pain. No new trauma/injury. Taking tylenol without relief. No other sx.        Past Medical History:   Diagnosis Date    Asthma     Colon polyp     Diabetes mellitus (HCC)     E coli infection     Fall     Hyperlipidemia     Hypertension     Pneumothorax     Rectal prolapse 09/03/2020    Sleep apnea     no CPAP    Subarachnoid hemorrhage (HCC)     Thoracic aortic aneurysm (HCC)     Vertigo       Past Surgical History:   Procedure Laterality Date    BONE GRAFT      R arm    BRONCHOSCOPY N/A 3/16/2016    Procedure: BRONCHOSCOPY FLEXIBLE/anesth.;  Surgeon: Beth Brar DO;  Location: [facility];  Service:     COLONOSCOPY      COLONOSCOPY      EYE SURGERY      OOPHORECTOMY Left     age 54    [place] LAPAROSCOPY COLECTOMY PARTIAL W/ANASTOMOSIS N/A 9/23/2020    Procedure: RESECTION COLON SIGMOID LAPAROSCOPIC;  Surgeon: Ricci Christian MD;  Location: [facility];  Service: Colorectal    NY SIGMOIDOSCOPY FLX DX W/COLLJ SPEC BR/WA IF PFRMD N/A 9/23/2020    Procedure: SIGMOIDOSCOPY FLEXIBLE;  Surgeon: Ricci Christian MD;  Location: [facility];  Service: Colorectal    RECTAL PROLAPSE REPAIR LAPAROSCOPIC N/A 9/23/2020    Procedure: RECTOPEXY/PROCTOPEXY LAPAROSCOPIC;  Surgeon: Ricci Christian MD;  [facility];  Service: Colorectal      Family History   Problem Relation Age of Onset    Endometrial cancer Mother 38    No Known Problems Father     No Known Problems Sister     Breast cancer Daughter 49    No Known Problems Daughter     No Known Problems Maternal Grandmother     No Known Problems Maternal Grandfather     No Known Problems Paternal Grandmother     No Known Problems Paternal Grandfather     Cancer Son 33        asbestosis related cancer    No Known Problems Maternal Aunt     No Known Problems Paternal Aunt       Social History     Tobacco Use    Smoking status: Former     Types: Cigarettes    Smokeless tobacco: Never    Tobacco comments:     only smoked a couple a day for a couple years in her 20s   Vaping Use    Vaping status: Never Used   Substance Use Topics    Alcohol use: Not Currently     Comment: weekends     Drug use: No      E-Cigarette/Vaping    E-Cigarette Use Never User       E-Cigarette/Vaping Substances    Nicotine No     THC No     CBD No     Flavoring No     Other No     Unknown No       I have reviewed and agree with the history as documented.     79 YOF with PMH DM, HLD, hypertension and subarachnoid hemorrhage presents today due to right lower back pain.  Reports that she was just discharged from the hospital about 2 days ago and the pain started last night.  Denies any preceding injury.  Reports it does not feel like her typical back pain.  States that she was unable to sleep due to the pain.  Denies any abdominal pain, fevers, chills, urinary symptoms.        Review of Systems   Gastrointestinal:  Negative for nausea and vomiting.   Genitourinary:  Positive for flank pain. Negative for decreased urine volume, dysuria, frequency, hematuria and urgency.           Objective       ED Triage Vitals   Temperature Pulse Blood Pressure Respirations SpO2 Patient Position - Orthostatic VS   01/15/25 1121 01/15/25 1121 01/15/25 1121 01/15/25 1121 01/15/25 1121 --   97.8 °F (36.6 °C) 70 142/65 18 98  %       Temp src Heart Rate Source BP Location FiO2 (%) Pain Score    -- -- -- -- 01/15/25 1344        9      Vitals      Date and Time Temp Pulse SpO2 Resp BP Pain Score FACES Pain Rating User   01/15/25 1344 -- -- -- -- -- 9 -- DW   01/15/25 1121 97.8 °F (36.6 °C) 70 98 % 18 142/65 -- -- SAMIR            Physical Exam  Vitals and nursing note reviewed.   Constitutional:       General: She is not in acute distress.     Appearance: Normal appearance. She is well-developed.   HENT:      Head: Normocephalic and atraumatic.   Eyes:      Conjunctiva/sclera: Conjunctivae normal.   Cardiovascular:      Rate and Rhythm: Normal rate.   Pulmonary:      Effort: Pulmonary effort is normal.   Abdominal:      Palpations: Abdomen is soft.      Tenderness: There is no abdominal tenderness. There is right CVA tenderness.   Musculoskeletal:         General: No swelling. Normal range of motion.      Cervical back: Normal range of motion and neck supple.   Skin:     General: Skin is warm and dry.      Capillary Refill: Capillary refill takes less than 2 seconds.   Neurological:      Mental Status: She is alert.   Psychiatric:         Mood and Affect: Mood normal.         Behavior: Behavior normal.         Results Reviewed       Procedure Component Value Units Date/Time    Urine Microscopic [[id number]]  (Abnormal) Collected: 01/15/25 1319    Lab Status: Final result Specimen: Urine, Clean Catch Updated: 01/15/25 1355     RBC, UA 4-10 /hpf      WBC, UA 10-20 /hpf      Epithelial Cells Occasional /hpf      Bacteria, UA Moderate /hpf     Urine culture [[id number]] Collected: 01/15/25 1319    Lab Status: In process Specimen: Urine, Clean Catch Updated: 01/15/25 1355    Urine Macroscopic, POC [[id number]]  (Abnormal) Collected: 01/15/25 1319    Lab Status: Final result Specimen: Urine Updated: 01/15/25 1320     Color, UA Yellow     Clarity, UA Cloudy     pH, UA 7.0     Leukocytes, UA Small     Nitrite, UA Positive     Protein, UA Negative mg/dl       Glucose, UA Negative mg/dl      Ketones, UA Negative mg/dl      Urobilinogen, UA 0.2 E.U./dl      Bilirubin, UA Negative     Occult Blood, UA Negative     Specific Gravity, UA 1.020    Narrative:      CLINITEK RESULT            CT renal stone study abdomen pelvis wo contrast   Final Interpretation by Robb Mobley MD (01/15 1318)      No renal calculus, hydronephrosis. No other acute abnormality.         Workstation performed: [id number]             Procedures    ED Medication and Procedure Management   Prior to Admission Medications   Prescriptions Last Dose Informant Patient Reported? Taking?   Blood Glucose Monitoring Suppl (ONE TOUCH ULTRA 2) w/Device KIT   No Yes   Sig: Check blood sugars once daily. Please substitute with appropriate alternative as covered by patient's insurance. Dx: E11.65   Fluticasone-Salmeterol (Advair) 250-50 mcg/dose inhaler   No Yes   Sig: INHALE 1 PUFF BY MOUTH TWICE DAILY (RINSE MOUTH AFTER EACH USE)   MAGNESIUM PO  Self Yes Yes   Sig: Take by mouth daily   OneTouch Delica Lancets 33G MISC   No Yes   Sig: Check blood sugars once daily. Please substitute with appropriate alternative as covered by patient's insurance. Dx: E11.65   acetaminophen (TYLENOL) 325 mg tablet   No Yes   Sig: Take 2 tablets (650 mg total) by mouth every 6 (six) hours as needed for mild pain or moderate pain for up to 12 doses   albuterol (2.5 mg/3 mL) 0.083 % nebulizer solution   No Yes   Sig: USE 3 ML BY NEBULIZATION EVERY 6 HOURS AS NEEDED FOR WHEEZING OR FOR SHORTNESS OF BREATH   albuterol (PROVENTIL HFA,VENTOLIN HFA) 90 mcg/act inhaler   No Yes   Sig: Inhale 2 puffs every 6 (six) hours as needed for wheezing   amLODIPine (NORVASC) 5 mg tablet   No Yes   Sig: TAKE ONE TABLET BY MOUTH DAILY AT 9AM   atorvastatin (LIPITOR) 40 mg tablet   No Yes   Sig: Take 1 tablet (40 mg total) by mouth daily   docusate sodium (COLACE) 100 mg capsule  Self No Yes   Sig: Take 1 capsule (100 mg total) by mouth 2  (two) times a day as needed for constipation   glucosamine-chondroitin 500-400 MG tablet  Self Yes Yes   Sig: Take 1 tablet by mouth 3 (three) times a day   glucose blood (OneTouch Ultra) test strip   No Yes   Sig: Check blood sugars once daily. Please substitute with appropriate alternative as covered by patient's insurance. Dx: E11.65   ibandronate (BONIVA) 150 MG tablet   No Yes   Sig: Take 1 tablet (150 mg total) by mouth every 30 (thirty) days   irbesartan (AVAPRO) 300 mg tablet   No Yes   Sig: TAKE 1 TABLET BY MOUTH DAILY AT 9 PM AT BEDTIME   lidocaine (LIDODERM) 5 %   No Yes   Sig: Apply 1 patch topically over 12 hours daily Remove & Discard patch within 12 hours or as directed by MD   lidocaine (LMX) 4 % cream   No Yes   Sig: Apply topically 2 (two) times a day as needed for moderate pain   lidocaine (Lidoderm) 5 %   No Yes   Sig: Apply 1 patch topically over 12 hours daily Remove & Discard patch within 12 hours or as directed by MD   metFORMIN (GLUCOPHAGE) 850 mg tablet   No Yes   Sig: TAKE ONE TABLET BY MOUTH TWICE DAILY @9AM-5PM WITH MEALS   montelukast (SINGULAIR) 10 mg tablet   No Yes   Sig: Take 1 tablet (10 mg total) by mouth daily at bedtime   solifenacin (VESICARE) 5 mg tablet   No Yes   Sig: Take 1 tablet (5 mg total) by mouth daily      Facility-Administered Medications: None     Discharge Medication List as of 1/15/2025  1:44 PM        START taking these medications    Details   cefpodoxime (VANTIN) 200 mg tablet Take 1 tablet (200 mg total) by mouth 2 (two) times a day for 10 days, Starting Wed 1/15/2025, Until Sat 1/25/2025, Normal           CONTINUE these medications which have NOT CHANGED    Details   acetaminophen (TYLENOL) 325 mg tablet Take 2 tablets (650 mg total) by mouth every 6 (six) hours as needed for mild pain or moderate pain for up to 12 doses, Starting Tue 9/24/2024, Normal      albuterol (2.5 mg/3 mL) 0.083 % nebulizer solution USE 3 ML BY NEBULIZATION EVERY 6 HOURS AS NEEDED  FOR WHEEZING OR FOR SHORTNESS OF BREATH, Normal      albuterol (PROVENTIL HFA,VENTOLIN HFA) 90 mcg/act inhaler Inhale 2 puffs every 6 (six) hours as needed for wheezing, Starting Wed 12/13/2023, Normal      amLODIPine (NORVASC) 5 mg tablet TAKE ONE TABLET BY MOUTH DAILY AT 9AM, Normal      atorvastatin (LIPITOR) 40 mg tablet Take 1 tablet (40 mg total) by mouth daily, Starting Mon 4/8/2024, Normal      Blood Glucose Monitoring Suppl (ONE TOUCH ULTRA 2) w/Device KIT Check blood sugars once daily. Please substitute with appropriate alternative as covered by patient's insurance. Dx: E11.65, Normal      docusate sodium (COLACE) 100 mg capsule Take 1 capsule (100 mg total) by mouth 2 (two) times a day as needed for constipation, Starting Mon 8/8/2022, Normal      Fluticasone-Salmeterol (Advair) 250-50 mcg/dose inhaler INHALE 1 PUFF BY MOUTH TWICE DAILY (RINSE MOUTH AFTER EACH USE), Normal      glucosamine-chondroitin 500-400 MG tablet Take 1 tablet by mouth 3 (three) times a day, Historical Med      glucose blood (OneTouch Ultra) test strip Check blood sugars once daily. Please substitute with appropriate alternative as covered by patient's insurance. Dx: E11.65, Normal      ibandronate (BONIVA) 150 MG tablet Take 1 tablet (150 mg total) by mouth every 30 (thirty) days, Starting Mon 4/8/2024, Normal      irbesartan (AVAPRO) 300 mg tablet TAKE 1 TABLET BY MOUTH DAILY AT 9 PM AT BEDTIME, Normal      !! lidocaine (LIDODERM) 5 % Apply 1 patch topically over 12 hours daily Remove & Discard patch within 12 hours or as directed by MD, Starting Tue 9/24/2024, Normal      !! lidocaine (Lidoderm) 5 % Apply 1 patch topically over 12 hours daily Remove & Discard patch within 12 hours or as directed by MD, Starting Wed 12/18/2024, Normal      lidocaine (LMX) 4 % cream Apply topically 2 (two) times a day as needed for moderate pain, Starting Tue 11/21/2023, Normal      MAGNESIUM PO Take by mouth daily, Historical Med      metFORMIN  (GLUCOPHAGE) 850 mg tablet TAKE ONE TABLET BY MOUTH TWICE DAILY @9AM-5PM WITH MEALS, Normal      montelukast (SINGULAIR) 10 mg tablet Take 1 tablet (10 mg total) by mouth daily at bedtime, Starting Wed 12/13/2023, Normal      OneTouch Delica Lancets 33G MISC Check blood sugars once daily. Please substitute with appropriate alternative as covered by patient's insurance. Dx: E11.65, Normal      solifenacin (VESICARE) 5 mg tablet Take 1 tablet (5 mg total) by mouth daily, Starting Mon 4/8/2024, Normal       !! - Potential duplicate medications found. Please discuss with provider.        No discharge procedures on file.  ED SEPSIS DOCUMENTATION   Time reflects when diagnosis was documented in both MDM as applicable and the Disposition within this note       Time User Action Codes Description Comment    1/15/2025  1:15 PM Daniela Tam Add [M54.9] Back pain     1/15/2025  1:40 PM Daniela Tam Add [N39.0] UTI (urinary tract infection)                  Daniela Tam PA-C  01/15/25 6868

## 2025-01-16 ENCOUNTER — RA CDI HCC (OUTPATIENT)
Dept: OTHER | Facility: HOSPITAL | Age: 80
End: 2025-01-16

## 2025-01-16 NOTE — PROGRESS NOTES
HCC coding opportunities          Chart Reviewed number of suggestions sent to Provider: 1     Patients Insurance   I77.810  Medicare Insurance: [employer]vantage

## 2025-01-17 ENCOUNTER — TELEPHONE (OUTPATIENT)
Dept: FAMILY MEDICINE CLINIC | Facility: CLINIC | Age: 80
End: 2025-01-17

## 2025-01-17 ENCOUNTER — RESULTS FOLLOW-UP (OUTPATIENT)
Dept: EMERGENCY DEPT | Facility: HOSPITAL | Age: 80
End: 2025-01-17

## 2025-01-17 LAB
BACTERIA UR CULT: ABNORMAL
BACTERIA UR CULT: ABNORMAL

## 2025-01-21 ENCOUNTER — TELEMEDICINE (OUTPATIENT)
Dept: FAMILY MEDICINE CLINIC | Facility: CLINIC | Age: 80
End: 2025-01-21

## 2025-01-21 DIAGNOSIS — F33.0 MILD RECURRENT MAJOR DEPRESSION (HCC): Primary | ICD-10-CM

## 2025-01-21 DIAGNOSIS — E87.1 HYPONATREMIA: ICD-10-CM

## 2025-01-21 DIAGNOSIS — S32.000A LUMBAR COMPRESSION FRACTURE (HCC): ICD-10-CM

## 2025-01-21 DIAGNOSIS — S32.011A CLOSED STABLE BURST FRACTURE OF FIRST LUMBAR VERTEBRA, INITIAL ENCOUNTER (HCC): ICD-10-CM

## 2025-01-21 DIAGNOSIS — E11.9 TYPE 2 DIABETES MELLITUS WITHOUT COMPLICATION, WITHOUT LONG-TERM CURRENT USE OF INSULIN (HCC): ICD-10-CM

## 2025-01-21 DIAGNOSIS — N30.00 ACUTE CYSTITIS WITHOUT HEMATURIA: ICD-10-CM

## 2025-01-21 RX ORDER — TIZANIDINE 2 MG/1
2 TABLET ORAL EVERY 8 HOURS PRN
Qty: 30 TABLET | Refills: 1 | Status: SHIPPED | OUTPATIENT
Start: 2025-01-21

## 2025-01-21 RX ORDER — NITROFURANTOIN 25; 75 MG/1; MG/1
100 CAPSULE ORAL 2 TIMES DAILY
Qty: 14 CAPSULE | Refills: 0 | Status: SHIPPED | OUTPATIENT
Start: 2025-01-21 | End: 2025-01-28

## 2025-01-21 RX ORDER — LIDOCAINE 50 MG/G
1 PATCH TOPICAL DAILY
Qty: 30 PATCH | Refills: 0 | Status: SHIPPED | OUTPATIENT
Start: 2025-01-21

## 2025-01-21 NOTE — ASSESSMENT & PLAN NOTE
Patient was given Suprep by the hospital which does not seem to be really doing anything we will switch her to Macrobid

## 2025-01-21 NOTE — PROGRESS NOTES
"Transition of Care Visit  Name: Jenna Chavarria      : 1945      MRN: [id number]  Encounter Provider: Mariola Zuleta MD  Encounter Date: 2025   Encounter department: [facility]    Assessment & Plan  Mild recurrent major depression (HCC)  Depression Screening Follow-up Plan: Patient's depression screening was positive with a PHQ-2 score of 4. Their PHQ-9 score was 7. Patient assessed for underlying major depression. They have no active suicidal ideations. Brief counseling provided and recommend additional follow-up/re-evaluation next office visit. Continue regular follow-up with their psychologist/therapist/psychiatrist who is managing their mental health condition(s).              History of Present Illness     Transitional Care Management Review:   Jenna Chavarria is a 79 y.o. female here for TCM follow up.     During the TCM phone call patient stated:  TCM Call       Date and time call was made  2025  3:36 PM    Hospital care reviewed  Records reviewed    Patient was hospitialized at  [facility]    Date of Admission  25    Date of discharge  25    Diagnosis  Fall    Disposition  Home    Were the patients medications reviewed and updated  No    Current Symptoms  None          TCM Call       Post hospital issues  None    Should patient be enrolled in anticoag monitoring?  No    Scheduled for follow up?  No    Patients specialists  Other (comment)    Pulmonologist name  Dr. Eleuterio Clay    Other specialists names  [facility]    Did you obtain your prescribed medications  Yes    Do you need help managing your prescriptions or medications  No    Is transportation to your appointment needed  No    I have advised the patient to call PCP with any new or worsening symptoms  Sonia Craig MA    Living Arrangements  Family members    Support System  Family    Comments  A male picked up. Hung up quickly after i addressed where i was calling from. He said \"I'll " "let her know.\" And hung up.          Patient was a virtual TCM due to weather and inability to drive because of her back pain started off with the video and then we lost connection continued it is a phone visit I was able to see the patient for a few minutes before we lost video she Maegan L1 compression fracture back in 12 18-24 and when her daughter was in the hospital for weight loss and a possible lymph node cancer she took a fall in her daughter's hospital room and went down to the emergency room she did not hit her head and has a small scalp hematoma but her brain CT scan was up and was otherwise normal her next CT scan just showed degenerative changes she was hospitalized from 1/9/25 until 1/12/25 , at that time was also found to be hyponatremic recheck of her sodium at the time of discharge was 134 she was thought to be mildly dehydrated she went up in the ER again on 1/1525 for the UTI she was given IV Suprax which she does not feel is helping her major complaint right now is urinary frequency and burning and her back hurting her she is using Tylenol arthritis and a massager but that does not seem to be helping      Review of Systems   Constitutional:  Negative for activity change, appetite change and fatigue.   HENT:  Negative for congestion and rhinorrhea.    Respiratory:  Negative for shortness of breath.    Cardiovascular:  Negative for chest pain.   Genitourinary:  Positive for dysuria and frequency.   Musculoskeletal:  Positive for back pain.   Neurological:  Negative for dizziness, light-headedness and headaches.   Hematological:  Negative for adenopathy.   Psychiatric/Behavioral:  The patient is nervous/anxious.         About  daughter's  health  issues   of  lymphoma/weight loss     Objective   LMP  (LMP Unknown)     Physical Exam  Constitutional:       Appearance: Normal appearance.   HENT:      Nose: No congestion or rhinorrhea.   Eyes:      General:         Right eye: No discharge.         Left " eye: No discharge.   Pulmonary:      Effort: Pulmonary effort is normal.      Breath sounds: Normal breath sounds.   Musculoskeletal:      Comments: Looks  uncomfortable   Lymphadenopathy:      Cervical: No cervical adenopathy.   Neurological:      Mental Status: She is alert.   Psychiatric:         Mood and Affect: Mood is anxious.       Medications have been reviewed by provider in current encounter    Chronic depression Screening Follow-up Plan: Patient's depression screening was positive with a PHQ-2 score of 4. Their PHQ-9 score was 7. Patient assessed for underlying major depression. They have no active suicidal ideations. Brief counseling provided and recommend additional follow-up/re-evaluation next office visit. Continue regular follow-up with their psychologist/therapist/psychiatrist who is managing their mental health condition(s).Falls Plan of Care: Balance, strength, and gait training instructions were provided.

## 2025-01-21 NOTE — ASSESSMENT & PLAN NOTE
Depression Screening Follow-up Plan: Patient's depression screening was positive with a PHQ-2 score of 4. Their PHQ-9 score was 7. Patient assessed for underlying major depression. They have no active suicidal ideations. Brief counseling provided and recommend additional follow-up/re-evaluation next office visit. Continue regular follow-up with their psychologist/therapist/psychiatrist who is managing their mental health condition(s).

## 2025-01-21 NOTE — ASSESSMENT & PLAN NOTE
Lab Results   Component Value Date    HGBA1C 7.4 (H) 01/10/2025   Blood sugar in the hospital was stable continue metformin for now needs urine for microalbuminuria foot exam next time she has not had an office visit

## 2025-01-21 NOTE — ASSESSMENT & PLAN NOTE
Patient is having more pain from her lumbar compression fracture is using Tylenol arthritis she took a fall in the hospital which may have exacerbated it I would give her a low-dose of tizanidine to use as needed for muscle relaxant she is on Boniva because of her osteoporosis

## 2025-01-21 NOTE — PATIENT INSTRUCTIONS
"Add tizanidine 2 her Tylenol arthritis continue with the massage change antibiotic to Macrobid check blood work next week for her sodium and recheck her urine   patient Education     Depression in adults   The Basics   Written by the doctors and editors at [facility]   What is depression? -- Depression is a disorder that makes you sad, but it is different from normal sadness. Depression can make it hard for you to work, study, or do everyday tasks.  What causes depression? -- Depression is caused by problems with chemicals in the brain called \"neurotransmitters.\" Some people might be more likely to have depression if it runs in their family. Other things might also play a role, including hormones, certain health problems, medicines, stress, being mistreated as a child, family problems, and problems with friends or at school or work.  How do I know if I am depressed? -- People with depression feel down most of the time for at least 2 weeks. They also have at least 1 of these 2 symptoms:   They no longer enjoy or care about doing the things that they used to like to do.   They feel sad, down, hopeless, or cranky most of the day, almost every day.  People with depression can also have other symptoms. Examples include:   Changes in your appetite or weight. You might eat too little or too much, or gain or lose weight without trying.   Sleeping too much or too little   Feeling tired or like you have no energy   Feeling guilty, helpless, or like you are worth nothing   Trouble with concentration or memory   Acting restless or have trouble staying still, or moving or speaking more slowly than normal   Repeated thoughts of death or killing yourself  If you think that you might be depressed, see your doctor or nurse. Only someone trained in mental health can tell for sure if you are depressed.  How is depression diagnosed? -- Your doctor or nurse will do a physical exam, ask you questions, and might order tests. Depression can " "have a big impact on your life. Luckily, depression can be treated, and the sooner treatment is started, the better it works.  Get help right away if you are thinking of hurting or killing yourself! -- If you ever feel like you might hurt yourself or someone else, help is available:   In the , contact the [facility] [facility]icide [facility] Crisis Lifeline:   To speak to someone, call or text [employer].   To talk to someone online, go to www.Flyfit.org/chat.   Call your doctor or nurse, and tell them it is urgent.   Call for an ambulance (in the [place] and [place], call 9-1-1).   Go to the emergency department at the nearest hospital.  What are the treatments for depression? -- Your doctor or nurse will work with you to make a treatment plan. Treatment can include:   Helping you learn more about depression   Counseling (with a psychiatrist, psychologist, nurse, or )   Medicines that relieve depression   Creating a plan to limit access to items that you might use to harm yourself   Other treatments that pass magnetic waves or electricity into the brain  In addition to treatment, getting regular physical activity can also help you feel better.  People with depression that is not too severe can get better by taking medicines or talking with a counselor. People with severe depression usually need medicines to get better, and might also need to see a counselor.  Another treatment involves placing a device against the scalp to pass magnetic waves into the brain. This is called \"transcranial magnetic stimulation\" (\"TMS\"). Doctors might suggest TMS if medicines and counseling have not helped.  Some people with severe depression might need a treatment called \"electroconvulsive therapy\" (\"ECT\"). During ECT, doctors pass an electric current through a person's brain in a safe way.  When will I feel better? -- Most treatment options take a little while to start working.   Many people who take medicines start to feel better within 2 weeks, " but it might be 4 to 8 weeks before the medicine has its full effect.   Many people who see a counselor start to feel better within a few weeks, but it might take 8 to 10 weeks to get the greatest benefit.  If the first treatment you try does not help you, tell your doctor or nurse, but do not give up. Some people need to try different treatments or combinations of treatments before they find an approach that works. Your doctor, nurse, or counselor can work with you to find the treatment that is right for you. They can also help you figure out how to cope while you search for the right treatment or are waiting for your treatment to start working.  How do I decide which treatment to have? -- You and your doctor or nurse will need to work together to choose a treatment for you. Medicines might work a little faster than counseling. But medicines can also cause side effects. Plus, some people do not like the idea of taking medicine.  Seeing a counselor involves talking about your feelings. That is also hard for some people.  What if I take medicine for depression and I want to have a baby? -- Some depression medicines can cause problems for an unborn baby. But having untreated depression during pregnancy can also cause problems. If you want to get pregnant, tell your doctor but do not stop taking your medicines. Together, you can plan the safest way for you to have your baby.  It's also important to talk with your doctor if you want to breastfeed after your baby is born. Breastfeeding has lots of benefits for both mother and baby. Some depression medicines are safer than others to use while breastfeeding. But having untreated depression after giving birth can also cause problems, so do not stop taking your medicines. Your doctor can work with you to plan the safest way for you to feed your baby.  All topics are updated as new evidence becomes available and our peer review process is complete.  This topic retrieved from  "[employer] on: Mar 06, 2024.  Topic 80071 Version 22.0  Release: 32.2.4 - C32.64  © 2024 UpToDate, Inc. and/or its affiliates. All rights reserved.  figure 1: Mood disorders caused by problems in the brain     Mooddisorders, such as depression and bipolar disorder, are caused by problems with\"neurotransmitters.\" These are chemicals in the brain that can affect your emotions.Treatments for mood disorders seem to work by changing the levels of certainneurotransmitters.  Graphic 87117 Version 4.0  Consumer Information Use and Disclaimer   Disclaimer: This generalized information is a limited summary of diagnosis, treatment, and/or medication information. It is not meant to be comprehensive and should be used as a tool to help the user understand and/or assess potential diagnostic and treatment options. It does NOT include all information about conditions, treatments, medications, side effects, or risks that may apply to a specific patient. It is not intended to be medical advice or a substitute for the medical advice, diagnosis, or treatment of a health care provider based on the health care provider's examination and assessment of a patient's specific and unique circumstances. Patients must speak with a health care provider for complete information about their health, medical questions, and treatment options, including any risks or benefits regarding use of medications. This information does not endorse any treatments or medications as safe, effective, or approved for treating a specific patient. UpToDate, Inc. and its affiliates disclaim any warranty or liability relating to this information or the use thereof.The use of this information is governed by the Terms of Use, available at https://www.woltersNotifixiousuwer.com/en/know/clinical-effectiveness-terms. 2024© UpToDate, Inc. and its affiliates and/or licensors. All rights reserved.  Copyright   © 2024 UpToDate, Inc. and/or its affiliates. All rights reserved.    Patient " Education     Preventing falls in adults   The Basics   Written by the doctors and editors at [facility]   Am I at risk of falling? -- Falls can happen to anyone, no matter how old they are. Several things can increase your risk of a fall, including:   Vision problems   Having certain chronic health conditions, being sick, having recently been in the hospital, or having had surgery   Changing the medicines you take   An unsafe or unfamiliar setting (for example, a room with rugs or furniture that might trip you, or an area you don't know well)  Your risk of falling increases as you grow older. That's because getting older can make it harder to walk steadily and keep your balance. Also, the effects of falls are more serious for older people.  Overall, 3 to 4 out of every 10 people over the age of 65 fall each year. Many people who fracture a hip never fully recover to how they were before the fracture. If you have fallen in the past, you are at higher risk of falling again.  How can my doctor help me avoid falling? -- Your doctor can talk to you about the following things:   Past falls - It is important to tell your doctor about any times that you have fallen or almost fallen. They can then suggest ways to prevent another fall.   Your health conditions - Some health problems can put you at risk of falling. These include conditions that affect eyesight, hearing, muscle strength, or balance.   What to do if you are in the hospital - Falls can happen in the hospital because you are in an unfamiliar place. You might feel unsteady or drowsy from medicines or anesthesia. Or you might be attached to catheters or IV tubing that you could trip over. You are also likely to be weaker than usual.   Your medicines - Certain medicines can increase the risk of falling. These include some medicines for sleeping problems, anxiety, high blood pressure, or depression. Adding new medicines, or changing doses of some medicines, can also  affect your risk of falling.  The more your doctor knows about your situation, the better they can help you. For example, if you fell because you have a condition that causes pain, your doctor might suggest treatments to help with the pain. Or if any of your medicines are making you dizzy and more likely to fall, your doctor might switch you to a different medicine.  Is there anything I can do on my own? -- Yes. To help keep from falling, you can:   Make your home safer - To avoid falling at home, get rid of things that might make you trip or slip. This can include furniture, electrical cords, clutter, and loose rugs (figure 1). Keep your home well lit so you can easily see where you are going. Avoid storing things in high places so you don't have to reach or climb.   Wear non-slip socks or sturdy shoes that fit well - Wearing shoes with high heels or slippery soles, or shoes that are too loose, can lead to falls. Walking around in bare feet, or only socks, can also increase your risk of falling.   Take vitamin D pills - Taking vitamin D might lower the risk of falls in older people. This is because vitamin D helps make bones and muscles stronger. Your doctor can talk to you about whether you should take extra vitamin D, and how much.   Stay active - Moving your body regularly can help lower your risk of falling. It might also help prevent you from getting hurt if you do fall. It is best to do a few different activities that help with both strength and balance. There are many kinds of exercise that can be safe for older people. These include walking, swimming, and gabriela chi (a Chinese martial art involving slow, gentle movements).   Use a cane, walker, or other safety devices - If your doctor recommends that you use a cane or walker, make sure that it's the right size and you know how to use it. There are other devices that might help you avoid falling, too. These include grab bars or a sturdy seat for the shower,  non-slip bath mats, and hand rails or treads for the stairs (to prevent slipping).   Take extra care if you have had surgery or are in the hospital - Ask for help with getting out of bed, getting up from a chair, or going to the bathroom. Your body needs time to heal, and it's normal to need help with these things while you recover. It can also help to keep your belongings within reach, and avoid walking around in the dark. If you need help, use the call button instead of trying to get up.  If you worry that you could fall, you can get an emergency alert system. This is usually an alarm button that lets you call for help if you fall and can't get up. If you live alone and you do not have an emergency alert system, always carry a cell phone or portable phone with you when moving around the house.  How do I get up from a fall? -- If you do fall, try not to be afraid. Stay calm, and take slow, deep breaths. If someone is near you, call for help right away. If you have an emergency alert system, use it.  Try to find out if you are hurt. If you are hurt badly, trying to get up can make things worse. If you do not think that you are hurt badly, come up with a plan to get up off of the floor.  Some tips to get up after a fall if you are alone:   Look around you for a piece of sturdy furniture such as a couch or chair. Try to move your body to the furniture. You might need to scoot, crawl, or roll to get close. Do these movements very slowly. If you feel any sharp pains, you might need to stop.   Once you are close to the furniture, roll onto your side. Pull your knees up toward your chest, and try to get on your hands and knees.   Put your hands on the seat of the couch or chair.   Bring 1 leg forward so the foot is flat on the floor. If you have a stronger leg, move this leg first.   Now, try to stand up. Once both feet are on the ground, slowly turn and lower yourself to sit down on the seat.  What should I do after a  fall? -- If you had a fall, see your doctor right away, even if you aren't hurt. Your doctor can try to figure out what caused you to fall, and how likely you are to fall again. They will do an exam and talk to you about your health problems, medicines, and activities. They can also check how well you walk, move, and balance. Then, they can suggest things you can do to lower your risk of falling again.  Many older people have a hard time recovering after a fall. Doing things to prevent falling can help you protect your health and independence.  All topics are updated as new evidence becomes available and our peer review process is complete.  This topic retrieved from [employer] on: Apr 04, 2024.  Topic 41316 Version 25.0  Release: 32.2.4 - C32.93  © 2024 UpToDate, Inc. and/or its affiliates. All rights reserved.  figure 1: How to avoid falling at home     This picture shows some of the things that can cause a fall in your home. Look around and remove any loose rugs, electrical cords, clutter, or furniture that could trip you.  Graphic 32427 Version 1.0  Consumer Information Use and Disclaimer   Disclaimer: This generalized information is a limited summary of diagnosis, treatment, and/or medication information. It is not meant to be comprehensive and should be used as a tool to help the user understand and/or assess potential diagnostic and treatment options. It does NOT include all information about conditions, treatments, medications, side effects, or risks that may apply to a specific patient. It is not intended to be medical advice or a substitute for the medical advice, diagnosis, or treatment of a health care provider based on the health care provider's examination and assessment of a patient's specific and unique circumstances. Patients must speak with a health care provider for complete information about their health, medical questions, and treatment options, including any risks or benefits regarding use of  medications. This information does not endorse any treatments or medications as safe, effective, or approved for treating a specific patient. UpToDate, Inc. and its affiliates disclaim any warranty or liability relating to this information or the use thereof.The use of this information is governed by the Terms of Use, available at https://www.SPIL GAMES.com/en/know/clinical-effectiveness-terms. 2024© UpToDate, Inc. and its affiliates and/or licensors. All rights reserved.  Copyright   © 2024 UpToDate, Inc. and/or its affiliates. All rights reserved.    Patient Education     Dealing With Higher Risk of Falling From the Drugs You Take   About this topic   Sometimes, the drugs you take can raise your risk of falling. This can also be called a side effect. If you do not take your drugs the right way, a dose is changed, or a drug changes, you are at a higher risk for a fall. When you fall, you may break a bone, injure your head, or hurt yourself in some other way. Many kinds of drugs raise your risk of falling. You may be at a higher risk for a fall if you take drugs to treat:  Pain, anxiety, or problems sleeping  Low mood or other mental health problems  Seizures  Parkinson disease  High blood pressure  Heart problems  Too much fluid  Allergies  Eye problems  Enlarged prostate symptoms  Diabetes  Alzheimer disease  Nausea and vomiting  If you are on these drugs, you may need to find ways to prevent falls.  General   Some of these drugs affect your blood pressure or heart rate. Others can make you sleepy or confused. Some drugs can affect your balance or how much fluid you have in your body. Other drugs can cause low blood sugar or blurry vision. The most important thing to do is to keep taking your drug and talk to your doctor. Tell your doctor all prescription, over-the-counter, and herbs and supplements you are taking. Ask if there is another drug that does not raise your fall risk as much. Here are some other ways  to help you lower your chances of falling:  Ask the pharmacist if you can take your drug at night instead of during the day.  Talk with your doctor to see if any of the drugs can be stopped or changed to a lower dose.  Drink plenty of fluids to keep your blood pressure up.  Move slowly when changing positions. Take extra care when going from sitting to standing or lying to sitting. Sudden movements may cause fainting.  Sit on the edge of the bed and take deep breaths before getting out of bed.  Sit or lie down right away if you feel faint or dizzy. Take extra care to protect yourself from falls.  Use proper and safe foot wear. Wear rubber-soled shoes.  Keep your house free from rugs, clutter, and cords that may trip you. Be sure you have good lighting. Use grab bars and hand rails when possible.  Ask your doctor if there are exercises to help with strength and balance that may help you prevent falls.  Have your vision checked regularly. Wearing the correct glasses may help prevent falls.  Ask your doctor if there are any safety devices, for example a walker, to help with balance that may help you prevent falls.  Tell your doctor and other caregivers if you fall.  What drugs may be needed?   The doctor may change or stop one of your drugs. Other times, the doctor may order drugs to:  Relieve symptoms that can contribute to increased risk of falling  Prevent nausea, vomiting, bowel, or bladder problems  Help with other problems, such as neuropathy  Will physical activity be limited?   You may have to limit your activities if you feel light headed or dizzy because of your falls. Take extra care in very hot weather so you do not lose too many fluids or become dehydrated.  What problems could happen?   Injuries from a fall  Fainting  Loss of independence  When do I need to call the doctor?   You feel weak.  You have fainted or fallen.  You feel confused, dizzy, or unsteady  Your daily activities are limited due to falls  or fear of falling.  Helpful tips   Learn how to take your own blood pressure and heart rate. Take your blood pressure and heart rate numbers and write them down for your doctor.  Last Reviewed Date   2020-08-24  Consumer Information Use and Disclaimer   This generalized information is a limited summary of diagnosis, treatment, and/or medication information. It is not meant to be comprehensive and should be used as a tool to help the user understand and/or assess potential diagnostic and treatment options. It does NOT include all information about conditions, treatments, medications, side effects, or risks that may apply to a specific patient. It is not intended to be medical advice or a substitute for the medical advice, diagnosis, or treatment of a health care provider based on the health care provider's examination and assessment of a patient’s specific and unique circumstances. Patients must speak with a health care provider for complete information about their health, medical questions, and treatment options, including any risks or benefits regarding use of medications. This information does not endorse any treatments or medications as safe, effective, or approved for treating a specific patient. UpToDate, Inc. and its affiliates disclaim any warranty or liability relating to this information or the use thereof. The use of this information is governed by the Terms of Use, available at https://www.woltersPicplumuwer.com/en/know/clinical-effectiveness-terms   Copyright   Copyright © 2024 UpToDate, Inc. and its affiliates and/or licensors. All rights reserved.

## 2025-01-21 NOTE — ASSESSMENT & PLAN NOTE
Depression Screening Follow-up Plan: Patient's depression screening was positive with a PHQ-2 score of 4. Their PHQ-9 score was 7. Patient assessed for underlying major depression. They have no active suicidal ideations. Brief counseling provided and recommend additional follow-up/re-evaluation next office visit. Continue regular follow-up with their psychologist/therapist/psychiatrist who is managing their mental health condition(s).has  stress of  daughter's illness   yes

## 2025-01-24 ENCOUNTER — TELEPHONE (OUTPATIENT)
Dept: FAMILY MEDICINE CLINIC | Facility: CLINIC | Age: 80
End: 2025-01-24

## 2025-01-24 NOTE — TELEPHONE ENCOUNTER
Patient called, state she is still in pain , states Dr. Zuleta was to prescribe a medication, request status . Confirmed pharmacy receipt for tiZANidine (ZANAFLEX) 2 mg tablet [[id number]] lidocaine (Lidoderm) 5 % [[id number]] nitrofurantoin (MACROBID) 100 mg capsule [[id number]] 1/21/2025 . Patient state she will call pharmacy to confirm. Please advise Patient at [phone], if any further questions.

## 2025-01-27 NOTE — TELEPHONE ENCOUNTER
PA for lidocaine 5% SUBMITTED to [employer] [facility]    via    []CMM-KEY:    [x]Surescripts-Case ID # [id number]   []Availity-Auth ID #  NDC #     []Faxed to plan   []Other website    []Phone call Case ID #      [x]PA sent as URGENT    All office notes, labs and other pertaining documents and studies sent. Clinical questions answered. Awaiting determination from insurance company.     Turnaround time for your insurance to make a decision on your Prior Authorization can take 7-21 business days.

## 2025-01-28 NOTE — TELEPHONE ENCOUNTER
PA for lidocaine 5% DENIED    Reason:        Message sent to office clinical pool Yes    Denial letter scanned into Media Yes    Appeal started No (Provider will need to decide if appeal is warranted and send clinical documentation to Prior Authorization Team for initiation.)    **Please follow up with your patient regarding denial and next steps**

## 2025-02-03 DIAGNOSIS — S32.000A LUMBAR COMPRESSION FRACTURE (HCC): ICD-10-CM

## 2025-02-04 RX ORDER — TIZANIDINE 2 MG/1
2 TABLET ORAL EVERY 8 HOURS PRN
Qty: 30 TABLET | Refills: 1 | Status: SHIPPED | OUTPATIENT
Start: 2025-02-04

## 2025-02-07 ENCOUNTER — TELEPHONE (OUTPATIENT)
Age: 80
End: 2025-02-07

## 2025-02-07 NOTE — TELEPHONE ENCOUNTER
Medication: nitrofurantoin (MACROBID) 100 mg capsule    Dose/Frequency: Take 1 capsule (100 mg total) by mouth 2 (two) times a day for 7 days     Quantity: 14 capsule     Pharmacy:   [facility] - [place] - [id number]    Office:   [x] PCP/Provider - Mariola Zuleta MD   [] Speciality/Provider -     Does the patient have enough for 3 days?   [] Yes   [x] No - Send as HP to POD

## 2025-02-13 ENCOUNTER — HOSPITAL ENCOUNTER (EMERGENCY)
Facility: HOSPITAL | Age: 80
Discharge: HOME | End: 2025-02-13
Attending: EMERGENCY MEDICINE | Admitting: EMERGENCY MEDICINE
Payer: COMMERCIAL

## 2025-02-13 ENCOUNTER — APPOINTMENT (EMERGENCY)
Dept: RADIOLOGY | Facility: HOSPITAL | Age: 80
End: 2025-02-13
Payer: COMMERCIAL

## 2025-02-13 VITALS
DIASTOLIC BLOOD PRESSURE: 66 MMHG | TEMPERATURE: 97.7 F | OXYGEN SATURATION: 94 % | SYSTOLIC BLOOD PRESSURE: 134 MMHG | BODY MASS INDEX: 23.04 KG/M2 | WEIGHT: 130 LBS | HEART RATE: 91 BPM | HEIGHT: 63 IN | RESPIRATION RATE: 20 BRPM

## 2025-02-13 DIAGNOSIS — N30.90 CYSTITIS: Primary | ICD-10-CM

## 2025-02-13 DIAGNOSIS — K44.9 HIATAL HERNIA: ICD-10-CM

## 2025-02-13 LAB
ALBUMIN SERPL BCG-MCNC: 4.2 G/DL (ref 3.5–5)
ALP SERPL-CCNC: 72 U/L (ref 34–104)
ALT SERPL W P-5'-P-CCNC: 6 U/L (ref 7–52)
ANION GAP SERPL CALCULATED.3IONS-SCNC: 10 MMOL/L (ref 4–13)
AST SERPL W P-5'-P-CCNC: 10 U/L (ref 13–39)
ATRIAL RATE: 87 BPM
BACTERIA UR QL AUTO: ABNORMAL /HPF
BASOPHILS # BLD AUTO: 0.06 THOUSANDS/ΜL (ref 0–0.1)
BASOPHILS NFR BLD AUTO: 1 % (ref 0–1)
BILIRUB SERPL-MCNC: 0.31 MG/DL (ref 0.2–1)
BILIRUB UR QL STRIP: NEGATIVE
BUN SERPL-MCNC: 12 MG/DL (ref 5–25)
CALCIUM SERPL-MCNC: 9.2 MG/DL (ref 8.4–10.2)
CARDIAC TROPONIN I PNL SERPL HS: <2 NG/L (ref ?–50)
CHLORIDE SERPL-SCNC: 94 MMOL/L (ref 96–108)
CLARITY UR: ABNORMAL
CO2 SERPL-SCNC: 24 MMOL/L (ref 21–32)
COLOR UR: YELLOW
CREAT SERPL-MCNC: 0.62 MG/DL (ref 0.6–1.3)
EOSINOPHIL # BLD AUTO: 1.04 THOUSAND/ΜL (ref 0–0.61)
EOSINOPHIL NFR BLD AUTO: 11 % (ref 0–6)
ERYTHROCYTE [DISTWIDTH] IN BLOOD BY AUTOMATED COUNT: 12.3 % (ref 11.6–15.1)
GFR SERPL CREATININE-BSD FRML MDRD: 86 ML/MIN/1.73SQ M
GLUCOSE SERPL-MCNC: 251 MG/DL (ref 65–140)
GLUCOSE UR STRIP-MCNC: ABNORMAL MG/DL
HCT VFR BLD AUTO: 37.5 % (ref 34.8–46.1)
HGB BLD-MCNC: 12.8 G/DL (ref 11.5–15.4)
HGB UR QL STRIP.AUTO: NEGATIVE
IMM GRANULOCYTES # BLD AUTO: 0.02 THOUSAND/UL (ref 0–0.2)
IMM GRANULOCYTES NFR BLD AUTO: 0 % (ref 0–2)
KETONES UR STRIP-MCNC: NEGATIVE MG/DL
LEUKOCYTE ESTERASE UR QL STRIP: ABNORMAL
LIPASE SERPL-CCNC: 43 U/L (ref 11–82)
LYMPHOCYTES # BLD AUTO: 3.52 THOUSANDS/ΜL (ref 0.6–4.47)
LYMPHOCYTES NFR BLD AUTO: 37 % (ref 14–44)
MCH RBC QN AUTO: 31 PG (ref 26.8–34.3)
MCHC RBC AUTO-ENTMCNC: 34.1 G/DL (ref 31.4–37.4)
MCV RBC AUTO: 91 FL (ref 82–98)
MONOCYTES # BLD AUTO: 0.62 THOUSAND/ΜL (ref 0.17–1.22)
MONOCYTES NFR BLD AUTO: 7 % (ref 4–12)
NEUTROPHILS # BLD AUTO: 4.34 THOUSANDS/ΜL (ref 1.85–7.62)
NEUTS SEG NFR BLD AUTO: 44 % (ref 43–75)
NITRITE UR QL STRIP: POSITIVE
NON-SQ EPI CELLS URNS QL MICRO: ABNORMAL /HPF
NRBC BLD AUTO-RTO: 0 /100 WBCS
P AXIS: 52 DEGREES
PH UR STRIP.AUTO: 6.5 [PH]
PLATELET # BLD AUTO: 232 THOUSANDS/UL (ref 149–390)
PMV BLD AUTO: 12 FL (ref 8.9–12.7)
POTASSIUM SERPL-SCNC: 4.7 MMOL/L (ref 3.5–5.3)
PR INTERVAL: 160 MS
PROT SERPL-MCNC: 6.9 G/DL (ref 6.4–8.4)
PROT UR STRIP-MCNC: ABNORMAL MG/DL
QRS AXIS: 36 DEGREES
QRSD INTERVAL: 88 MS
QT INTERVAL: 358 MS
QTC INTERVAL: 431 MS
RBC # BLD AUTO: 4.13 MILLION/UL (ref 3.81–5.12)
RBC #/AREA URNS AUTO: ABNORMAL /HPF
SODIUM SERPL-SCNC: 128 MMOL/L (ref 135–147)
SP GR UR STRIP.AUTO: 1.01 (ref 1–1.03)
T WAVE AXIS: 57 DEGREES
UROBILINOGEN UR STRIP-ACNC: <2 MG/DL
VENTRICULAR RATE: 87 BPM
WBC # BLD AUTO: 9.6 THOUSAND/UL (ref 4.31–10.16)
WBC #/AREA URNS AUTO: ABNORMAL /HPF

## 2025-02-13 PROCEDURE — 74177 CT ABD & PELVIS W/CONTRAST: CPT

## 2025-02-13 PROCEDURE — 99285 EMERGENCY DEPT VISIT HI MDM: CPT

## 2025-02-13 PROCEDURE — 93005 ELECTROCARDIOGRAM TRACING: CPT

## 2025-02-13 PROCEDURE — 83690 ASSAY OF LIPASE: CPT

## 2025-02-13 PROCEDURE — 85025 COMPLETE CBC W/AUTO DIFF WBC: CPT

## 2025-02-13 PROCEDURE — 36415 COLL VENOUS BLD VENIPUNCTURE: CPT

## 2025-02-13 PROCEDURE — 96374 THER/PROPH/DIAG INJ IV PUSH: CPT

## 2025-02-13 PROCEDURE — 87186 SC STD MICRODIL/AGAR DIL: CPT

## 2025-02-13 PROCEDURE — 93010 ELECTROCARDIOGRAM REPORT: CPT | Performed by: INTERNAL MEDICINE

## 2025-02-13 PROCEDURE — 71046 X-RAY EXAM CHEST 2 VIEWS: CPT

## 2025-02-13 PROCEDURE — 81001 URINALYSIS AUTO W/SCOPE: CPT

## 2025-02-13 PROCEDURE — 96361 HYDRATE IV INFUSION ADD-ON: CPT

## 2025-02-13 PROCEDURE — 87086 URINE CULTURE/COLONY COUNT: CPT

## 2025-02-13 PROCEDURE — 80053 COMPREHEN METABOLIC PANEL: CPT

## 2025-02-13 PROCEDURE — 84484 ASSAY OF TROPONIN QUANT: CPT

## 2025-02-13 PROCEDURE — 87077 CULTURE AEROBIC IDENTIFY: CPT

## 2025-02-13 RX ORDER — ACETAMINOPHEN 325 MG/1
975 TABLET ORAL ONCE
Status: COMPLETED | OUTPATIENT
Start: 2025-02-13 | End: 2025-02-13

## 2025-02-13 RX ORDER — MAGNESIUM HYDROXIDE/ALUMINUM HYDROXICE/SIMETHICONE 120; 1200; 1200 MG/30ML; MG/30ML; MG/30ML
30 SUSPENSION ORAL ONCE
Status: COMPLETED | OUTPATIENT
Start: 2025-02-13 | End: 2025-02-13

## 2025-02-13 RX ORDER — CEPHALEXIN 500 MG/1
500 CAPSULE ORAL 2 TIMES DAILY
Qty: 14 CAPSULE | Refills: 0 | Status: SHIPPED | OUTPATIENT
Start: 2025-02-13 | End: 2025-02-13

## 2025-02-13 RX ORDER — CEPHALEXIN 500 MG/1
500 CAPSULE ORAL 2 TIMES DAILY
Qty: 14 CAPSULE | Refills: 0 | Status: SHIPPED | OUTPATIENT
Start: 2025-02-13 | End: 2025-02-20

## 2025-02-13 RX ORDER — ONDANSETRON 2 MG/ML
4 INJECTION INTRAMUSCULAR; INTRAVENOUS ONCE
Status: COMPLETED | OUTPATIENT
Start: 2025-02-13 | End: 2025-02-13

## 2025-02-13 RX ADMIN — IOHEXOL 85 ML: 350 INJECTION, SOLUTION INTRAVENOUS at 15:16

## 2025-02-13 RX ADMIN — ACETAMINOPHEN 975 MG: 325 TABLET, FILM COATED ORAL at 14:56

## 2025-02-13 RX ADMIN — ALUMINUM HYDROXIDE, MAGNESIUM HYDROXIDE, AND SIMETHICONE 30 ML: 200; 200; 20 SUSPENSION ORAL at 15:01

## 2025-02-13 RX ADMIN — SODIUM CHLORIDE 500 ML: 0.9 INJECTION, SOLUTION INTRAVENOUS at 14:56

## 2025-02-13 RX ADMIN — ONDANSETRON 4 MG: 2 INJECTION INTRAMUSCULAR; INTRAVENOUS at 14:55

## 2025-02-13 NOTE — ED PROVIDER NOTES
"  ED Disposition       None          Assessment & Plan       Medical Decision Making  Patient is a 79 y.o F with a PMH of asthma, DM, HLD, HTN presenting to the ER complaining of right lower quadrant abdominal pain that has been ongoing for 3 weeks. VS stable, patient in no acute distress. Physical exam revealing tenderness to palpation of RLQ; no CVA tenderness noted.    Will order basic labs, chest x-ray, UA, fluids, ct abdomen/pelvis.    Labs showing sodium 128, patient is usually on lower side. Remainder of labs WNL. Chest x-ray showing no consolidation on my interpretation. UA showing infection. CT abdomen pelvis showing \"1.  Nonspecific fluid-filled small bowel without wall thickening or stranding. Correlation for low-grade GI illness is recommended. 2.  Small to moderate hiatal hernia with abnormal gastric soft tissue at the GE junction. This appearance can be seen with fundoplication though this has not been reported. If there is no correlating history, a follow-up upper GI or endoscopy is recommended to exclude mass.\"  Discussed findings with patient who expressed verbal understanding.  Recommended patient follow-up with gastroenterology for CT findings.  Recommended patient take Keflex 2 times daily for 7 days to treat cystitis.  Recommended  patient follow-up with PCP for reevaluation.  Recommended patient return to the ER if symptoms worsen or change.  Patient is agreeable with plan and is stable at discharge.    Amount and/or Complexity of Data Reviewed  Labs: ordered.  Radiology: ordered.    Risk  OTC drugs.  Prescription drug management.        ED Course as of 02/13/25 1709   Thu Feb 13, 2025   1709 CT abdomen pelvis showing \"1.  Nonspecific fluid-filled small bowel without wall thickening or stranding. Correlation for low-grade GI illness is recommended. 2.  Small to moderate hiatal hernia with abnormal gastric soft tissue at the GE junction. This appearance can be seen with fundoplication though " "this has not been reported. If there is no correlating history, a follow-up upper GI or endoscopy is recommended to exclude mass.\"       Medications - No data to display    ED Risk Strat Scores                          SBIRT 20yo+      Flowsheet Row Most Recent Value   Initial Alcohol Screen: US AUDIT-C     1. How often do you have a drink containing alcohol? 0 Filed at: 02/13/2025 4629   2. How many drinks containing alcohol do you have on a typical day you are drinking?  0 Filed at: 02/13/2025 1352   3b. FEMALE Any Age, or MALE 65+: How often do you have 4 or more drinks on one occassion? 0 Filed at: 02/13/2025 1352   Audit-C Score 0 Filed at: 02/13/2025 1352   KYE: How many times in the past year have you...    Used an illegal drug or used a prescription medication for non-medical reasons? Never Filed at: 02/13/2025 7101                            History of Present Illness       Chief Complaint   Patient presents with    Shortness of Breath     Pt c/o shortness of breath since this am.pt states short of breath at rest and excerise.  Pt also having rt sided kidney pain for 3 days       Past Medical History:   Diagnosis Date    Asthma     Colon polyp     Diabetes mellitus (HCC)     E coli infection     Fall     Hyperlipidemia     Hypertension     Pneumothorax     Rectal prolapse 09/03/2020    Sleep apnea     no CPAP    Subarachnoid hemorrhage (HCC)     Thoracic aortic aneurysm (HCC)     Vertigo       Past Surgical History:   Procedure Laterality Date    BONE GRAFT      R arm    BRONCHOSCOPY N/A 3/16/2016    Procedure: BRONCHOSCOPY FLEXIBLE/anesth.;  Surgeon: Beth Brar DO;  Location: [facility];  Service:     COLONOSCOPY      COLONOSCOPY      EYE SURGERY      OOPHORECTOMY Left     age 54    CO LAPAROSCOPY COLECTOMY PARTIAL W/ANASTOMOSIS N/A 9/23/2020    Procedure: RESECTION COLON SIGMOID LAPAROSCOPIC;  Surgeon: Ricci Christian MD;  Location: [facility];  Service: Colorectal    CO SIGMOIDOSCOPY FLX DX " W/COLLJ SPEC BR/WA IF PFRMD N/A 9/23/2020    Procedure: SIGMOIDOSCOPY FLEXIBLE;  Surgeon: Ricci Christian MD;  Location: [facility];  Service: Colorectal    RECTAL PROLAPSE REPAIR LAPAROSCOPIC N/A 9/23/2020    Procedure: RECTOPEXY/PROCTOPEXY LAPAROSCOPIC;  Surgeon: Ricci Christian MD;  Location: [facility];  Service: Colorectal      Family History   Problem Relation Age of Onset    Endometrial cancer Mother 38    No Known Problems Father     No Known Problems Sister     Breast cancer Daughter 49    No Known Problems Daughter     No Known Problems Maternal Grandmother     No Known Problems Maternal Grandfather     No Known Problems Paternal Grandmother     No Known Problems Paternal Grandfather     Cancer Son 33        asbestosis related cancer    No Known Problems Maternal Aunt     No Known Problems Paternal Aunt       Social History     Tobacco Use    Smoking status: Former     Types: Cigarettes    Smokeless tobacco: Never    Tobacco comments:     only smoked a couple a day for a couple years in her 20s   Vaping Use    Vaping status: Never Used   Substance Use Topics    Alcohol use: Not Currently     Comment: weekends     Drug use: No      E-Cigarette/Vaping    E-Cigarette Use Never User       E-Cigarette/Vaping Substances    Nicotine No     THC No     CBD No     Flavoring No     Other No     Unknown No       I have reviewed and agree with the history as documented.     Patient is a 79 y.o F with a PMH of asthma, DM, HLD, HTN presenting to the ER complaining of right lower quadrant abdominal pain that has been ongoing for 3 weeks.  Patient states pain has been intermittent but worsened over the last couple of days.  Patient states pain is in her right lower abdomen and radiates into her groin.  Patient states she was diagnosed with UTI couple weeks ago and was on a course of antibiotics for it.  Patient states abdominal pain is worsened with any kind of movement and relieved with rest.  Patient  initially reported she is short of breath to triage, but states she is always short of breath and is at her baseline.  Patient states she is eating/drinking adequately, urinating, passing stools.  Patient also reporting nausea.  Patient denies vomiting, diarrhea, constipation, dysuria, urgency, hematuria, back pain, chest pain, increased respiratory distress, leg swelling, fevers/chills. She denies recent travel, immobilization, cancer treatment.       Shortness of Breath      Review of Systems   Respiratory:  Positive for shortness of breath.            Objective       ED Triage Vitals [02/13/25 1350]   Temperature Pulse Blood Pressure Respirations SpO2 Patient Position - Orthostatic VS   97.7 °F (36.5 °C) 91 134/66 20 94 % --      Temp Source Heart Rate Source BP Location FiO2 (%) Pain Score    Temporal -- -- -- 7      Vitals      Date and Time Temp Pulse SpO2 Resp BP Pain Score FACES Pain Rating User   02/13/25 1350 97.7 °F (36.5 °C) 91 94 % 20 134/66 7 -- BG            Physical Exam    Results Reviewed       Procedure Component Value Units Date/Time    CBC and differential [[id number]]  (Abnormal) Collected: 02/13/25 1357    Lab Status: Final result Specimen: Blood from Arm, Left Updated: 02/13/25 1412     WBC 9.60 Thousand/uL      RBC 4.13 Million/uL      Hemoglobin 12.8 g/dL      Hematocrit 37.5 %      MCV 91 fL      MCH 31.0 pg      MCHC 34.1 g/dL      RDW 12.3 %      MPV 12.0 fL      Platelets 232 Thousands/uL      nRBC 0 /100 WBCs      Segmented % 44 %      Immature Grans % 0 %      Lymphocytes % 37 %      Monocytes % 7 %      Eosinophils Relative 11 %      Basophils Relative 1 %      Absolute Neutrophils 4.34 Thousands/µL      Absolute Immature Grans 0.02 Thousand/uL      Absolute Lymphocytes 3.52 Thousands/µL      Absolute Monocytes 0.62 Thousand/µL      Eosinophils Absolute 1.04 Thousand/µL      Basophils Absolute 0.06 Thousands/µL     HS Troponin 0hr (reflex protocol) [[id number]] Collected: 02/13/25 4777     Lab Status: In process Specimen: Blood from Arm, Left Updated: 02/13/25 1408    Comprehensive metabolic panel [[id number]] Collected: 02/13/25 1357    Lab Status: In process Specimen: Blood from Arm, Left Updated: 02/13/25 1408            XR chest 2 views    (Results Pending)       Procedures    ED Medication and Procedure Management   Prior to Admission Medications   Prescriptions Last Dose Informant Patient Reported? Taking?   Blood Glucose Monitoring Suppl (ONE TOUCH ULTRA 2) w/Device KIT   No No   Sig: Check blood sugars once daily. Please substitute with appropriate alternative as covered by patient's insurance. Dx: E11.65   Calcium Carb-Cholecalciferol (calcium carbonate-vitamin D) 500 mg-5 mcg tablet   Yes No   Sig: TAKE 1 TABLET BY MOUTH DAILY WITH FOOD FOR OSTEOPOROSIS   Fluticasone-Salmeterol (Advair) 250-50 mcg/dose inhaler   No No   Sig: INHALE 1 PUFF BY MOUTH TWICE DAILY (RINSE MOUTH AFTER EACH USE)   MAGNESIUM PO  Self Yes No   Sig: Take by mouth daily   OneTouch Delica Lancets 33G MISC   No No   Sig: Check blood sugars once daily. Please substitute with appropriate alternative as covered by patient's insurance. Dx: E11.65   acetaminophen (TYLENOL) 325 mg tablet   No No   Sig: Take 2 tablets (650 mg total) by mouth every 6 (six) hours as needed for mild pain or moderate pain for up to 12 doses   albuterol (2.5 mg/3 mL) 0.083 % nebulizer solution   No No   Sig: USE 3 ML BY NEBULIZATION EVERY 6 HOURS AS NEEDED FOR WHEEZING OR FOR SHORTNESS OF BREATH   albuterol (PROVENTIL HFA,VENTOLIN HFA) 90 mcg/act inhaler   No No   Sig: Inhale 2 puffs every 6 (six) hours as needed for wheezing   amLODIPine (NORVASC) 5 mg tablet   No No   Sig: TAKE ONE TABLET BY MOUTH DAILY AT 9AM   atorvastatin (LIPITOR) 40 mg tablet   No No   Sig: Take 1 tablet (40 mg total) by mouth daily   docusate sodium (COLACE) 100 mg capsule  Self No No   Sig: Take 1 capsule (100 mg total) by mouth 2 (two) times a day as needed for  constipation   glucosamine-chondroitin 500-400 MG tablet  Self Yes No   Sig: Take 1 tablet by mouth 3 (three) times a day   glucose blood (OneTouch Ultra) test strip   No No   Sig: Check blood sugars once daily. Please substitute with appropriate alternative as covered by patient's insurance. Dx: E11.65   ibandronate (BONIVA) 150 MG tablet   No No   Sig: Take 1 tablet (150 mg total) by mouth every 30 (thirty) days   irbesartan (AVAPRO) 300 mg tablet   No No   Sig: TAKE 1 TABLET BY MOUTH DAILY AT 9 PM AT BEDTIME   lidocaine (Lidoderm) 5 %   No No   Sig: Apply 1 patch topically over 12 hours daily Remove & Discard patch within 12 hours or as directed by MD   metFORMIN (GLUCOPHAGE) 850 mg tablet   No No   Sig: TAKE ONE TABLET BY MOUTH TWICE DAILY @9AM-5PM WITH MEALS   montelukast (SINGULAIR) 10 mg tablet   No No   Sig: Take 1 tablet (10 mg total) by mouth daily at bedtime   solifenacin (VESICARE) 5 mg tablet   No No   Sig: Take 1 tablet (5 mg total) by mouth daily   tiZANidine (ZANAFLEX) 2 mg tablet   No No   Sig: TAKE 1 TABLET (2 MG TOTAL) BY MOUTH EVERY 8 (EIGHT) HOURS AS NEEDED FOR MUSCLE SPASMS      Facility-Administered Medications: None     Patient's Medications   Discharge Prescriptions    No medications on file     No discharge procedures on file.  ED SEPSIS DOCUMENTATION            Jessica Carlos PA-C  02/13/25 0186

## 2025-02-13 NOTE — DISCHARGE INSTRUCTIONS
Please take Keflex as prescribed to treat UTI. Please follow up with GI for further evaluation/management of hiatal hernia seen on CT. Please continue to take tylenol as needed for pain. Please follow up with PCP for re-evaluation. Please return to the ER if symptoms worsen or change.

## 2025-02-14 ENCOUNTER — RESULTS FOLLOW-UP (OUTPATIENT)
Dept: EMERGENCY DEPT | Facility: HOSPITAL | Age: 80
End: 2025-02-14

## 2025-02-15 LAB — BACTERIA UR CULT: ABNORMAL

## 2025-02-18 PROCEDURE — 99284 EMERGENCY DEPT VISIT MOD MDM: CPT

## 2025-02-19 ENCOUNTER — HOSPITAL ENCOUNTER (EMERGENCY)
Facility: HOSPITAL | Age: 80
Discharge: HOME | End: 2025-02-19
Attending: EMERGENCY MEDICINE
Payer: COMMERCIAL

## 2025-02-19 VITALS
SYSTOLIC BLOOD PRESSURE: 167 MMHG | TEMPERATURE: 97.8 F | RESPIRATION RATE: 16 BRPM | DIASTOLIC BLOOD PRESSURE: 70 MMHG | HEART RATE: 62 BPM | OXYGEN SATURATION: 96 %

## 2025-02-19 DIAGNOSIS — E87.1 HYPONATREMIA: ICD-10-CM

## 2025-02-19 DIAGNOSIS — S32.000A LUMBAR COMPRESSION FRACTURE (HCC): ICD-10-CM

## 2025-02-19 DIAGNOSIS — N39.0 UTI (URINARY TRACT INFECTION): Primary | ICD-10-CM

## 2025-02-19 LAB
ALBUMIN SERPL BCG-MCNC: 4.1 G/DL (ref 3.5–5)
ALP SERPL-CCNC: 62 U/L (ref 34–104)
ALT SERPL W P-5'-P-CCNC: 7 U/L (ref 7–52)
ANION GAP SERPL CALCULATED.3IONS-SCNC: 6 MMOL/L (ref 4–13)
AST SERPL W P-5'-P-CCNC: 10 U/L (ref 13–39)
BASOPHILS # BLD AUTO: 0.05 THOUSANDS/ΜL (ref 0–0.1)
BASOPHILS NFR BLD AUTO: 1 % (ref 0–1)
BILIRUB SERPL-MCNC: 0.39 MG/DL (ref 0.2–1)
BILIRUB UR QL STRIP: NEGATIVE
BUN SERPL-MCNC: 12 MG/DL (ref 5–25)
CALCIUM SERPL-MCNC: 9.5 MG/DL (ref 8.4–10.2)
CHLORIDE SERPL-SCNC: 91 MMOL/L (ref 96–108)
CLARITY UR: CLEAR
CO2 SERPL-SCNC: 29 MMOL/L (ref 21–32)
COLOR UR: COLORLESS
CREAT SERPL-MCNC: 0.57 MG/DL (ref 0.6–1.3)
EOSINOPHIL # BLD AUTO: 0.49 THOUSAND/ΜL (ref 0–0.61)
EOSINOPHIL NFR BLD AUTO: 8 % (ref 0–6)
ERYTHROCYTE [DISTWIDTH] IN BLOOD BY AUTOMATED COUNT: 12.2 % (ref 11.6–15.1)
GFR SERPL CREATININE-BSD FRML MDRD: 89 ML/MIN/1.73SQ M
GLUCOSE SERPL-MCNC: 146 MG/DL (ref 65–140)
GLUCOSE UR STRIP-MCNC: NEGATIVE MG/DL
HCT VFR BLD AUTO: 34.8 % (ref 34.8–46.1)
HGB BLD-MCNC: 12.3 G/DL (ref 11.5–15.4)
HGB UR QL STRIP.AUTO: NEGATIVE
IMM GRANULOCYTES # BLD AUTO: 0.01 THOUSAND/UL (ref 0–0.2)
IMM GRANULOCYTES NFR BLD AUTO: 0 % (ref 0–2)
KETONES UR STRIP-MCNC: NEGATIVE MG/DL
LEUKOCYTE ESTERASE UR QL STRIP: NEGATIVE
LYMPHOCYTES # BLD AUTO: 2.15 THOUSANDS/ΜL (ref 0.6–4.47)
LYMPHOCYTES NFR BLD AUTO: 33 % (ref 14–44)
MCH RBC QN AUTO: 31.4 PG (ref 26.8–34.3)
MCHC RBC AUTO-ENTMCNC: 35.3 G/DL (ref 31.4–37.4)
MCV RBC AUTO: 89 FL (ref 82–98)
MONOCYTES # BLD AUTO: 0.41 THOUSAND/ΜL (ref 0.17–1.22)
MONOCYTES NFR BLD AUTO: 6 % (ref 4–12)
NEUTROPHILS # BLD AUTO: 3.33 THOUSANDS/ΜL (ref 1.85–7.62)
NEUTS SEG NFR BLD AUTO: 52 % (ref 43–75)
NITRITE UR QL STRIP: NEGATIVE
NRBC BLD AUTO-RTO: 0 /100 WBCS
PH UR STRIP.AUTO: 7 [PH]
PLATELET # BLD AUTO: 192 THOUSANDS/UL (ref 149–390)
PMV BLD AUTO: 11 FL (ref 8.9–12.7)
POTASSIUM SERPL-SCNC: 4.6 MMOL/L (ref 3.5–5.3)
PROT SERPL-MCNC: 6.4 G/DL (ref 6.4–8.4)
PROT UR STRIP-MCNC: NEGATIVE MG/DL
RBC # BLD AUTO: 3.92 MILLION/UL (ref 3.81–5.12)
SODIUM SERPL-SCNC: 126 MMOL/L (ref 135–147)
SP GR UR STRIP.AUTO: 1.01 (ref 1–1.03)
UROBILINOGEN UR STRIP-ACNC: <2 MG/DL
WBC # BLD AUTO: 6.44 THOUSAND/UL (ref 4.31–10.16)

## 2025-02-19 PROCEDURE — 96374 THER/PROPH/DIAG INJ IV PUSH: CPT

## 2025-02-19 PROCEDURE — 81003 URINALYSIS AUTO W/O SCOPE: CPT

## 2025-02-19 PROCEDURE — 85025 COMPLETE CBC W/AUTO DIFF WBC: CPT

## 2025-02-19 PROCEDURE — 36415 COLL VENOUS BLD VENIPUNCTURE: CPT

## 2025-02-19 PROCEDURE — 51798 US URINE CAPACITY MEASURE: CPT

## 2025-02-19 PROCEDURE — 80053 COMPREHEN METABOLIC PANEL: CPT

## 2025-02-19 PROCEDURE — 99284 EMERGENCY DEPT VISIT MOD MDM: CPT | Performed by: EMERGENCY MEDICINE

## 2025-02-19 RX ORDER — ACETAMINOPHEN 325 MG/1
975 TABLET ORAL ONCE
Status: COMPLETED | OUTPATIENT
Start: 2025-02-19 | End: 2025-02-19

## 2025-02-19 RX ORDER — KETOROLAC TROMETHAMINE 30 MG/ML
15 INJECTION, SOLUTION INTRAMUSCULAR; INTRAVENOUS ONCE
Status: DISCONTINUED | OUTPATIENT
Start: 2025-02-19 | End: 2025-02-19

## 2025-02-19 RX ORDER — SODIUM CHLORIDE 1 G/1
1 TABLET ORAL 2 TIMES DAILY
Qty: 14 TABLET | Refills: 0 | Status: SHIPPED | OUTPATIENT
Start: 2025-02-19 | End: 2025-02-26

## 2025-02-19 RX ORDER — KETOROLAC TROMETHAMINE 30 MG/ML
15 INJECTION, SOLUTION INTRAMUSCULAR; INTRAVENOUS ONCE
Status: COMPLETED | OUTPATIENT
Start: 2025-02-19 | End: 2025-02-19

## 2025-02-19 RX ORDER — LIDOCAINE 50 MG/G
1 PATCH TOPICAL ONCE
Status: DISCONTINUED | OUTPATIENT
Start: 2025-02-19 | End: 2025-02-19 | Stop reason: HOSPADM

## 2025-02-19 RX ORDER — SODIUM CHLORIDE 1 G/1
1 TABLET ORAL 2 TIMES DAILY
Qty: 14 TABLET | Refills: 0 | Status: SHIPPED | OUTPATIENT
Start: 2025-02-19 | End: 2025-02-19

## 2025-02-19 RX ADMIN — ACETAMINOPHEN 975 MG: 325 TABLET, FILM COATED ORAL at 02:01

## 2025-02-19 RX ADMIN — KETOROLAC TROMETHAMINE 15 MG: 30 INJECTION, SOLUTION INTRAMUSCULAR; INTRAVENOUS at 02:01

## 2025-02-19 RX ADMIN — LIDOCAINE 1 PATCH: 700 PATCH TOPICAL at 02:01

## 2025-02-19 NOTE — ED PROVIDER NOTES
ED Disposition       None          Assessment & Plan   {Hyperlinks  Risk Stratification - NIHSS - HEART SCORE - Fill out sepsis note and make sure you call [phone] if severe or septic shock:[id number]}    Medical Decision Making  Amount and/or Complexity of Data Reviewed  Labs: ordered.    Risk  OTC drugs.  Prescription drug management.           Medications   acetaminophen (TYLENOL) tablet 975 mg (has no administration in time range)   ketorolac (TORADOL) injection 15 mg (has no administration in time range)   lidocaine (LIDODERM) 5 % patch 1 patch (has no administration in time range)       ED Risk Strat Scores                                                History of Present Illness   {Hyperlinks  History (Med, Surg, Fam, Social) - Current Medications - Allergies  :[id number]}    Chief Complaint   Patient presents with   • Flank Pain     Pt c/o of R lower flank pain. Pt recently diagnosed with a bladder infection and was given medication for it which hasn't been helping.        Past Medical History:   Diagnosis Date   • Asthma    • Colon polyp    • Diabetes mellitus (HCC)    • E coli infection    • Fall    • Hyperlipidemia    • Hypertension    • Pneumothorax    • Rectal prolapse 09/03/2020   • Sleep apnea     no CPAP   • Subarachnoid hemorrhage (HCC)    • Thoracic aortic aneurysm (HCC)    • Vertigo       Past Surgical History:   Procedure Laterality Date   • BONE GRAFT      R arm   • BRONCHOSCOPY N/A 3/16/2016    Procedure: BRONCHOSCOPY FLEXIBLE/anesth.;  Surgeon: Beth Brar DO;  Location: [facility];  Service:    • COLONOSCOPY     • COLONOSCOPY     • EYE SURGERY     • OOPHORECTOMY Left     age 54   • NH LAPAROSCOPY COLECTOMY PARTIAL W/ANASTOMOSIS N/A 9/23/2020    Procedure: RESECTION COLON SIGMOID LAPAROSCOPIC;  Surgeon: Ricci Christian MD;  Location: [facility];  Service: Colorectal   • NH SIGMOIDOSCOPY FLX DX W/COLLJ SPEC BR/WA IF PFRMD N/A 9/23/2020    Procedure: SIGMOIDOSCOPY FLEXIBLE;  Surgeon:  Ricci Christian MD;  Location: [facility];  Service: Colorectal   • RECTAL PROLAPSE REPAIR LAPAROSCOPIC N/A 9/23/2020    Procedure: RECTOPEXY/PROCTOPEXY LAPAROSCOPIC;  Surgeon: Ricci Christian MD;  Location: [facility];  Service: Colorectal      Family History   Problem Relation Age of Onset   • Endometrial cancer Mother 38   • No Known Problems Father    • No Known Problems Sister    • Breast cancer Daughter 49   • No Known Problems Daughter    • No Known Problems Maternal Grandmother    • No Known Problems Maternal Grandfather    • No Known Problems Paternal Grandmother    • No Known Problems Paternal Grandfather    • Cancer Son 33        asbestosis related cancer   • No Known Problems Maternal Aunt    • No Known Problems Paternal Aunt       Social History     Tobacco Use   • Smoking status: Former     Types: Cigarettes   • Smokeless tobacco: Never   • Tobacco comments:     only smoked a couple a day for a couple years in her 20s   Vaping Use   • Vaping status: Never Used   Substance Use Topics   • Alcohol use: Not Currently     Comment: weekends    • Drug use: No      E-Cigarette/Vaping   • E-Cigarette Use Never User       E-Cigarette/Vaping Substances   • Nicotine No    • THC No    • CBD No    • Flavoring No    • Other No    • Unknown No       I have reviewed and agree with the history as documented.     This is a 79 year old female with past history of primary hypertension, diabetes mellitus, hyperlipidemia, thoracic aortic aneurysm with ectesia, asthma, L1 compression fracture, and previous surgical history notable for partial colectomy with anastomosis for rectal prolapse. She was evaluated 5 days prior for RLQ and right pelvic pain, and had UA with nitrite positive urine with moderate bacteria without any acute findings on CT abdomen pelvis, and was given Kefflex BID. Urine culture from that visit showed good susceptibility for Kefflex. She presents today with persistent pelvic pain, which she  says is no better or worse than before and doesn't seem to improve with the Kefflex. She denies any nausea or vomiting, any new or worsened abdominal pain, any flank pain, subjective or objective fevers, chest pain or SOB. She states that she has noticed being more constipated than usual with a scant bowel movement this morning, and thinks her belly is slightly bigger than usual. Other than this she denies any changes to any of her previous symptom, for better or worse. Physical examination reveals mild abdominal distension with no fluid wave or tympany and mild RLQ pain, but no CVA tenderness. She has no post void residual or pyelonephritis on POC US.         Flank Pain      Review of Systems   Genitourinary:  Positive for flank pain.           Objective   {Hyperlinks  Historical Vitals - Historical Labs - Chart Review/Microbiology - Last Echo - Code Status  :[id number]}    ED Triage Vitals   Temperature Pulse Blood Pressure Respirations SpO2 Patient Position - Orthostatic VS   02/18/25 2314 02/18/25 2314 02/18/25 2315 02/18/25 2314 02/18/25 2314 02/18/25 2314   97.8 °F (36.6 °C) 65 145/73 22 97 % Sitting      Temp Source Heart Rate Source BP Location FiO2 (%) Pain Score    02/18/25 2314 02/19/25 0100 02/18/25 2314 -- --    Tympanic Monitor Left arm        Vitals      Date and Time Temp Pulse SpO2 Resp BP Pain Score FACES Pain Rating User   02/19/25 0100 -- 62 96 % 16 167/70 -- -- CM   02/18/25 2315 -- -- -- -- 145/73 -- -- RG   02/18/25 2314 97.8 °F (36.6 °C) 65 97 % 22 -- -- -- RG            Physical Exam    Results Reviewed       Procedure Component Value Units Date/Time    CBC and differential [[id number]] Collected: 02/19/25 0153    Lab Status: No result Specimen: Blood from Arm, Right     Comprehensive metabolic panel [[id number]] Collected: 02/19/25 0153    Lab Status: No result Specimen: Blood from Arm, Right     UA w Reflex to Microscopic w Reflex to Culture [[id number]] Collected: 02/19/25 0153    Lab  Status: No result Specimen: Urine, Clean Catch             No orders to display       Procedures    ED Medication and Procedure Management   Prior to Admission Medications   Prescriptions Last Dose Informant Patient Reported? Taking?   Blood Glucose Monitoring Suppl (ONE TOUCH ULTRA 2) w/Device KIT   No No   Sig: Check blood sugars once daily. Please substitute with appropriate alternative as covered by patient's insurance. Dx: E11.65   Calcium Carb-Cholecalciferol (calcium carbonate-vitamin D) 500 mg-5 mcg tablet   Yes No   Sig: TAKE 1 TABLET BY MOUTH DAILY WITH FOOD FOR OSTEOPOROSIS   Fluticasone-Salmeterol (Advair) 250-50 mcg/dose inhaler   No No   Sig: INHALE 1 PUFF BY MOUTH TWICE DAILY (RINSE MOUTH AFTER EACH USE)   MAGNESIUM PO  Self Yes No   Sig: Take by mouth daily   OneTouch Delica Lancets 33G MISC   No No   Sig: Check blood sugars once daily. Please substitute with appropriate alternative as covered by patient's insurance. Dx: E11.65   acetaminophen (TYLENOL) 325 mg tablet   No No   Sig: Take 2 tablets (650 mg total) by mouth every 6 (six) hours as needed for mild pain or moderate pain for up to 12 doses   albuterol (2.5 mg/3 mL) 0.083 % nebulizer solution   No No   Sig: USE 3 ML BY NEBULIZATION EVERY 6 HOURS AS NEEDED FOR WHEEZING OR FOR SHORTNESS OF BREATH   albuterol (PROVENTIL HFA,VENTOLIN HFA) 90 mcg/act inhaler   No No   Sig: Inhale 2 puffs every 6 (six) hours as needed for wheezing   amLODIPine (NORVASC) 5 mg tablet   No No   Sig: TAKE ONE TABLET BY MOUTH DAILY AT 9AM   atorvastatin (LIPITOR) 40 mg tablet   No No   Sig: Take 1 tablet (40 mg total) by mouth daily   cephalexin (KEFLEX) 500 mg capsule   No No   Sig: Take 1 capsule (500 mg total) by mouth 2 (two) times a day for 7 days   docusate sodium (COLACE) 100 mg capsule  Self No No   Sig: Take 1 capsule (100 mg total) by mouth 2 (two) times a day as needed for constipation   glucosamine-chondroitin 500-400 MG tablet  Self Yes No   Sig: Take 1  tablet by mouth 3 (three) times a day   glucose blood (OneTouch Ultra) test strip   No No   Sig: Check blood sugars once daily. Please substitute with appropriate alternative as covered by patient's insurance. Dx: E11.65   ibandronate (BONIVA) 150 MG tablet   No No   Sig: Take 1 tablet (150 mg total) by mouth every 30 (thirty) days   irbesartan (AVAPRO) 300 mg tablet   No No   Sig: TAKE 1 TABLET BY MOUTH DAILY AT 9 PM AT BEDTIME   lidocaine (Lidoderm) 5 %   No No   Sig: Apply 1 patch topically over 12 hours daily Remove & Discard patch within 12 hours or as directed by MD   metFORMIN (GLUCOPHAGE) 850 mg tablet   No No   Sig: TAKE ONE TABLET BY MOUTH TWICE DAILY @9AM-5PM WITH MEALS   montelukast (SINGULAIR) 10 mg tablet   No No   Sig: Take 1 tablet (10 mg total) by mouth daily at bedtime   solifenacin (VESICARE) 5 mg tablet   No No   Sig: Take 1 tablet (5 mg total) by mouth daily   tiZANidine (ZANAFLEX) 2 mg tablet   No No   Sig: TAKE 1 TABLET (2 MG TOTAL) BY MOUTH EVERY 8 (EIGHT) HOURS AS NEEDED FOR MUSCLE SPASMS      Facility-Administered Medications: None     Patient's Medications   Discharge Prescriptions    No medications on file     No discharge procedures on file.  ED SEPSIS DOCUMENTATION          0 %      Lymphocytes % 33 %      Monocytes % 6 %      Eosinophils Relative 8 %      Basophils Relative 1 %      Absolute Neutrophils 3.33 Thousands/µL      Absolute Immature Grans 0.01 Thousand/uL      Absolute Lymphocytes 2.15 Thousands/µL      Absolute Monocytes 0.41 Thousand/µL      Eosinophils Absolute 0.49 Thousand/µL      Basophils Absolute 0.05 Thousands/µL             No orders to display       Procedures    ED Medication and Procedure Management   Prior to Admission Medications   Prescriptions Last Dose Informant Patient Reported? Taking?   Blood Glucose Monitoring Suppl (ONE TOUCH ULTRA 2) w/Device KIT   No No   Sig: Check blood sugars once daily. Please substitute with appropriate alternative as covered by patient's insurance. Dx: E11.65   Calcium Carb-Cholecalciferol (calcium carbonate-vitamin D) 500 mg-5 mcg tablet   Yes No   Sig: TAKE 1 TABLET BY MOUTH DAILY WITH FOOD FOR OSTEOPOROSIS   Fluticasone-Salmeterol (Advair) 250-50 mcg/dose inhaler   No No   Sig: INHALE 1 PUFF BY MOUTH TWICE DAILY (RINSE MOUTH AFTER EACH USE)   MAGNESIUM PO  Self Yes No   Sig: Take by mouth daily   OneTouch Delica Lancets 33G MISC   No No   Sig: Check blood sugars once daily. Please substitute with appropriate alternative as covered by patient's insurance. Dx: E11.65   acetaminophen (TYLENOL) 325 mg tablet   No No   Sig: Take 2 tablets (650 mg total) by mouth every 6 (six) hours as needed for mild pain or moderate pain for up to 12 doses   albuterol (2.5 mg/3 mL) 0.083 % nebulizer solution   No No   Sig: USE 3 ML BY NEBULIZATION EVERY 6 HOURS AS NEEDED FOR WHEEZING OR FOR SHORTNESS OF BREATH   albuterol (PROVENTIL HFA,VENTOLIN HFA) 90 mcg/act inhaler   No No   Sig: Inhale 2 puffs every 6 (six) hours as needed for wheezing   amLODIPine (NORVASC) 5 mg tablet   No No   Sig: TAKE ONE TABLET BY MOUTH DAILY AT 9AM   atorvastatin (LIPITOR) 40 mg tablet   No No   Sig: Take 1 tablet (40 mg total) by mouth daily   cephalexin (KEFLEX)  500 mg capsule   No No   Sig: Take 1 capsule (500 mg total) by mouth 2 (two) times a day for 7 days   docusate sodium (COLACE) 100 mg capsule  Self No No   Sig: Take 1 capsule (100 mg total) by mouth 2 (two) times a day as needed for constipation   glucosamine-chondroitin 500-400 MG tablet  Self Yes No   Sig: Take 1 tablet by mouth 3 (three) times a day   glucose blood (OneTouch Ultra) test strip   No No   Sig: Check blood sugars once daily. Please substitute with appropriate alternative as covered by patient's insurance. Dx: E11.65   ibandronate (BONIVA) 150 MG tablet   No No   Sig: Take 1 tablet (150 mg total) by mouth every 30 (thirty) days   irbesartan (AVAPRO) 300 mg tablet   No No   Sig: TAKE 1 TABLET BY MOUTH DAILY AT 9 PM AT BEDTIME   lidocaine (Lidoderm) 5 %   No No   Sig: Apply 1 patch topically over 12 hours daily Remove & Discard patch within 12 hours or as directed by MD   metFORMIN (GLUCOPHAGE) 850 mg tablet   No No   Sig: TAKE ONE TABLET BY MOUTH TWICE DAILY @9AM-5PM WITH MEALS   montelukast (SINGULAIR) 10 mg tablet   No No   Sig: Take 1 tablet (10 mg total) by mouth daily at bedtime   solifenacin (VESICARE) 5 mg tablet   No No   Sig: Take 1 tablet (5 mg total) by mouth daily   tiZANidine (ZANAFLEX) 2 mg tablet   No No   Sig: TAKE 1 TABLET (2 MG TOTAL) BY MOUTH EVERY 8 (EIGHT) HOURS AS NEEDED FOR MUSCLE SPASMS      Facility-Administered Medications: None     Discharge Medication List as of 2/19/2025  2:25 AM        START taking these medications    Details   sodium chloride 1 g tablet Take 1 tablet (1 g total) by mouth 2 (two) times a day for 7 days, Starting Wed 2/19/2025, Until Wed 2/26/2025, Normal           CONTINUE these medications which have NOT CHANGED    Details   acetaminophen (TYLENOL) 325 mg tablet Take 2 tablets (650 mg total) by mouth every 6 (six) hours as needed for mild pain or moderate pain for up to 12 doses, Starting Tue 9/24/2024, Normal      albuterol (2.5 mg/3 mL) 0.083 %  nebulizer solution USE 3 ML BY NEBULIZATION EVERY 6 HOURS AS NEEDED FOR WHEEZING OR FOR SHORTNESS OF BREATH, Normal      albuterol (PROVENTIL HFA,VENTOLIN HFA) 90 mcg/act inhaler Inhale 2 puffs every 6 (six) hours as needed for wheezing, Starting Wed 12/13/2023, Normal      amLODIPine (NORVASC) 5 mg tablet TAKE ONE TABLET BY MOUTH DAILY AT 9AM, Normal      atorvastatin (LIPITOR) 40 mg tablet Take 1 tablet (40 mg total) by mouth daily, Starting Mon 4/8/2024, Normal      Blood Glucose Monitoring Suppl (ONE TOUCH ULTRA 2) w/Device KIT Check blood sugars once daily. Please substitute with appropriate alternative as covered by patient's insurance. Dx: E11.65, Normal      Calcium Carb-Cholecalciferol (calcium carbonate-vitamin D) 500 mg-5 mcg tablet TAKE 1 TABLET BY MOUTH DAILY WITH FOOD FOR OSTEOPOROSIS, Historical Med      cephalexin (KEFLEX) 500 mg capsule Take 1 capsule (500 mg total) by mouth 2 (two) times a day for 7 days, Starting Thu 2/13/2025, Until Thu 2/20/2025, Normal      docusate sodium (COLACE) 100 mg capsule Take 1 capsule (100 mg total) by mouth 2 (two) times a day as needed for constipation, Starting Mon 8/8/2022, Normal      Fluticasone-Salmeterol (Advair) 250-50 mcg/dose inhaler INHALE 1 PUFF BY MOUTH TWICE DAILY (RINSE MOUTH AFTER EACH USE), Normal      glucosamine-chondroitin 500-400 MG tablet Take 1 tablet by mouth 3 (three) times a day, Historical Med      glucose blood (OneTouch Ultra) test strip Check blood sugars once daily. Please substitute with appropriate alternative as covered by patient's insurance. Dx: E11.65, Normal      ibandronate (BONIVA) 150 MG tablet Take 1 tablet (150 mg total) by mouth every 30 (thirty) days, Starting Mon 4/8/2024, Normal      irbesartan (AVAPRO) 300 mg tablet TAKE 1 TABLET BY MOUTH DAILY AT 9 PM AT BEDTIME, Normal      lidocaine (Lidoderm) 5 % Apply 1 patch topically over 12 hours daily Remove & Discard patch within 12 hours or as directed by MD, Starting Tue  1/21/2025, Normal      MAGNESIUM PO Take by mouth daily, Historical Med      metFORMIN (GLUCOPHAGE) 850 mg tablet TAKE ONE TABLET BY MOUTH TWICE DAILY @9AM-5PM WITH MEALS, Normal      montelukast (SINGULAIR) 10 mg tablet Take 1 tablet (10 mg total) by mouth daily at bedtime, Starting Wed 12/13/2023, Normal      OneTouch Delica Lancets 33G MISC Check blood sugars once daily. Please substitute with appropriate alternative as covered by patient's insurance. Dx: E11.65, Normal      solifenacin (VESICARE) 5 mg tablet Take 1 tablet (5 mg total) by mouth daily, Starting Mon 4/8/2024, Normal      tiZANidine (ZANAFLEX) 2 mg tablet TAKE 1 TABLET (2 MG TOTAL) BY MOUTH EVERY 8 (EIGHT) HOURS AS NEEDED FOR MUSCLE SPASMS, Starting Tue 2/4/2025, Normal           No discharge procedures on file.  ED SEPSIS DOCUMENTATION   Time reflects when diagnosis was documented in both MDM as applicable and the Disposition within this note       Time User Action Codes Description Comment    2/19/2025  2:13 AM Carroll Valdez [N39.0] UTI (urinary tract infection)     2/19/2025  2:25 AM Yehuda Khalil Add [E87.1] Hyponatremia                  Carroll Valdez DO  02/24/25 0013

## 2025-02-19 NOTE — ED ATTENDING ATTESTATION
2/18/2025  IYehuda MD, saw and evaluated the patient. I have discussed the patient with the resident/non-physician practitioner and agree with the resident's/non-physician practitioner's findings, Plan of Care, and MDM as documented in the resident's/non-physician practitioner's note, except where noted. All available labs and Radiology studies were reviewed.  I was present for key portions of any procedure(s) performed by the resident/non-physician practitioner and I was immediately available to provide assistance.       At this point I agree with the current assessment done in the Emergency Department.  I have conducted an independent evaluation of this patient a history and physical is as follows:      Final Diagnosis:  1. UTI (urinary tract infection)    2. Hyponatremia      Chief Complaint   Patient presents with    Flank Pain     Pt c/o of R lower flank pain. Pt recently diagnosed with a bladder infection and was given medication for it which hasn't been helping.            A:  -79-year-old female who presents with right-sided back pain.      P:  -Likely musculoskeletal in nature.  Will treat symptomatically.  -CBC to evaluate for evidence of marked leukocytosis or anemia.  -CMP to evaluate renal function and electrolytes.  -Urinalysis to evaluate resolution of UTI.      H:    79-year-old female who presents with right-sided back pain.  Ongoing for the past 3 weeks.  Describes urinary frequency as well.  States that she has to run to the bathroom frequently or she will go in her underwear.  Seen on 2/13/2025 for the same complaint.  Placed on Keflex for UTI with minimal improvement.  Urine culture did grow E. coli which is susceptible to Keflex.      PMH:  Past Medical History:   Diagnosis Date    Asthma     Colon polyp     Diabetes mellitus (HCC)     E coli infection     Fall     Hyperlipidemia     Hypertension     Pneumothorax     Rectal prolapse 09/03/2020    Sleep apnea     no CPAP     Subarachnoid hemorrhage (HCC)     Thoracic aortic aneurysm (HCC)     Vertigo        PSH:  Past Surgical History:   Procedure Laterality Date    BONE GRAFT      R arm    BRONCHOSCOPY N/A 3/16/2016    Procedure: BRONCHOSCOPY FLEXIBLE/anesth.;  Surgeon: Beth Brar DO;  Location: [facility];  Service:     COLONOSCOPY      COLONOSCOPY      EYE SURGERY      OOPHORECTOMY Left     age 54    IL LAPAROSCOPY COLECTOMY PARTIAL W/ANASTOMOSIS N/A 9/23/2020    Procedure: RESECTION COLON SIGMOID LAPAROSCOPIC;  Surgeon: Ricci Christian MD;  Location: [facility];  Service: Colorectal    IL SIGMOIDOSCOPY FLX DX W/COLLJ SPEC BR/WA IF PFRMD N/A 9/23/2020    Procedure: SIGMOIDOSCOPY FLEXIBLE;  Surgeon: Ricci Christian MD;  Location: [facility];  Service: Colorectal    RECTAL PROLAPSE REPAIR LAPAROSCOPIC N/A 9/23/2020    Procedure: RECTOPEXY/PROCTOPEXY LAPAROSCOPIC;  Surgeon: Ricci Christian MD;  Location: [facility];  Service: Colorectal         PE:   Vitals:    02/18/25 2314 02/18/25 2315 02/19/25 0100   BP:  145/73 167/70   BP Location:   Right arm   Pulse: 65  62   Resp: 22  16   Temp: 97.8 °F (36.6 °C)     TempSrc: Tympanic     SpO2: 97%  96%         Constitutional: Vital signs are normal. She appears well-developed. She is cooperative. No distress.   Cardiovascular: Normal rate, regular rhythm, normal heart sounds.   No murmur heard.  Pulmonary/Chest: Effort normal and breath sounds normal.   Abdominal: Soft. Normal appearance and bowel sounds are normal. There is no tenderness. There is no rebound, no guarding.   Neurological: She is alert.   Skin: Skin is warm, dry and intact.   MSK: Tenderness over right SI joint and buttock.  Patient able to range right hip with minimal pain.  Negative straight leg raise.  Psychiatric: She has a normal mood and affect. Her speech is normal and behavior is normal. Thought content normal.          - 13 point ROS was performed and all are normal unless stated in the history  above.   - Nursing note reviewed. Vitals reviewed.   - Orders placed by myself and/or advanced practitioner / resident.    - Previous chart was reviewed  - No language barrier.   - History obtained from patient.   - There are no limitations to the history obtained. Reasons ROS could not be obtained:  N/A         Medications   lidocaine (LIDODERM) 5 % patch 1 patch (1 patch Topical Medication Applied 2/19/25 0201)   acetaminophen (TYLENOL) tablet 975 mg (975 mg Oral Given 2/19/25 0201)   ketorolac (TORADOL) injection 15 mg (15 mg Intravenous Given 2/19/25 0201)     No orders to display     Orders Placed This Encounter   Procedures    CBC and differential    Comprehensive metabolic panel    UA w Reflex to Microscopic w Reflex to Culture    Nursing communication Obtain bladder scan after patient returns from bathroom    Insert peripheral IV     Labs Reviewed   CBC AND DIFFERENTIAL - Abnormal       Result Value Ref Range Status    WBC 6.44  4.31 - 10.16 Thousand/uL Final    RBC 3.92  3.81 - 5.12 Million/uL Final    Hemoglobin 12.3  11.5 - 15.4 g/dL Final    Hematocrit 34.8  34.8 - 46.1 % Final    MCV 89  82 - 98 fL Final    MCH 31.4  26.8 - 34.3 pg Final    MCHC 35.3  31.4 - 37.4 g/dL Final    RDW 12.2  11.6 - 15.1 % Final    MPV 11.0  8.9 - 12.7 fL Final    Platelets 192  149 - 390 Thousands/uL Final    nRBC 0  /100 WBCs Final    Segmented % 52  43 - 75 % Final    Immature Grans % 0  0 - 2 % Final    Lymphocytes % 33  14 - 44 % Final    Monocytes % 6  4 - 12 % Final    Eosinophils Relative 8 (*) 0 - 6 % Final    Basophils Relative 1  0 - 1 % Final    Absolute Neutrophils 3.33  1.85 - 7.62 Thousands/µL Final    Absolute Immature Grans 0.01  0.00 - 0.20 Thousand/uL Final    Absolute Lymphocytes 2.15  0.60 - 4.47 Thousands/µL Final    Absolute Monocytes 0.41  0.17 - 1.22 Thousand/µL Final    Eosinophils Absolute 0.49  0.00 - 0.61 Thousand/µL Final    Basophils Absolute 0.05  0.00 - 0.10 Thousands/µL Final   COMPREHENSIVE  METABOLIC PANEL - Abnormal    Sodium 126 (*) 135 - 147 mmol/L Final    Potassium 4.6  3.5 - 5.3 mmol/L Final    Chloride 91 (*) 96 - 108 mmol/L Final    CO2 29  21 - 32 mmol/L Final    ANION GAP 6  4 - 13 mmol/L Final    BUN 12  5 - 25 mg/dL Final    Creatinine 0.57 (*) 0.60 - 1.30 mg/dL Final    Comment: Standardized to IDMS reference method    Glucose 146 (*) 65 - 140 mg/dL Final    Comment: If the patient is fasting, the ADA then defines impaired fasting glucose as > 100 mg/dL and diabetes as > or equal to 123 mg/dL.    Calcium 9.5  8.4 - 10.2 mg/dL Final    AST 10 (*) 13 - 39 U/L Final    ALT 7  7 - 52 U/L Final    Comment: Specimen collection should occur prior to Sulfasalazine administration due to the potential for falsely depressed results.     Alkaline Phosphatase 62  34 - 104 U/L Final    Total Protein 6.4  6.4 - 8.4 g/dL Final    Albumin 4.1  3.5 - 5.0 g/dL Final    Total Bilirubin 0.39  0.20 - 1.00 mg/dL Final    Comment: Use of this assay is not recommended for patients undergoing treatment with eltrombopag due to the potential for falsely elevated results.  N-acetyl-p-benzoquinone imine (metabolite of Acetaminophen) will generate erroneously low results in samples for patients that have taken an overdose of Acetaminophen.    eGFR 89  ml/min/1.73sq m Final    Narrative:     National Kidney Disease Foundation guidelines for Chronic Kidney Disease (CKD):     Stage 1 with normal or high GFR (GFR > 90 mL/min/1.73 square meters)    Stage 2 Mild CKD (GFR = 60-89 mL/min/1.73 square meters)    Stage 3A Moderate CKD (GFR = 45-59 mL/min/1.73 square meters)    Stage 3B Moderate CKD (GFR = 30-44 mL/min/1.73 square meters)    Stage 4 Severe CKD (GFR = 15-29 mL/min/1.73 square meters)    Stage 5 End Stage CKD (GFR <15 mL/min/1.73 square meters)  Note: GFR calculation is accurate only with a steady state creatinine   UA W REFLEX TO MICROSCOPIC WITH REFLEX TO CULTURE    Color, UA Colorless   Final    Clarity, UA Clear    Final    Specific Gravity, UA 1.006  1.003 - 1.030 Final    pH, UA 7.0  4.5, 5.0, 5.5, 6.0, 6.5, 7.0, 7.5, 8.0 Final    Leukocytes, UA Negative  Negative Final    Nitrite, UA Negative  Negative Final    Protein, UA Negative  Negative mg/dl Final    Glucose, UA Negative  Negative mg/dl Final    Ketones, UA Negative  Negative mg/dl Final    Urobilinogen, UA <2.0  <2.0 mg/dl mg/dl Final    Bilirubin, UA Negative  Negative Final    Occult Blood, UA Negative  Negative Final     Time reflects when diagnosis was documented in both MDM as applicable and the Disposition within this note       Time User Action Codes Description Comment    2/19/2025  2:13 AM Carroll Valdez Add [N39.0] UTI (urinary tract infection)     2/19/2025  2:25 AM Yehuda Khalil Add [E87.1] Hyponatremia           ED Disposition       ED Disposition   Discharge    Condition   Stable    Date/Time   Wed Feb 19, 2025  2:24 AM    Comment   Jenna Chavarria discharge to home/self care.                   Follow-up Information       Follow up With Specialties Details Why Contact Info Additional Information    Mariola Zuleta MD Family Medicine Schedule an appointment as soon as possible for a visit  As needed [facility] [address] [address] [phone]       Formerly [facility], [facility] Emergency Department Emergency Medicine Go to  If symptoms worsen [address] [place] [id number]  [phone] Formerly [facility], [facility] Emergency Department, [address] [place] [address], [phone]   [phone]          Patient's Medications   Discharge Prescriptions    SODIUM CHLORIDE 1 G TABLET    Take 1 tablet (1 g total) by mouth 2 (two) times a day for 7 days       Start Date: 2/19/2025 End Date: 2/26/2025       Order Dose: 1 g       Quantity: 14 tablet    Refills: 0     No discharge procedures on file.  Prior to Admission Medications   Prescriptions Last Dose Informant Patient Reported? Taking?   Blood Glucose Monitoring  Suppl (ONE TOUCH ULTRA 2) w/Device KIT   No No   Sig: Check blood sugars once daily. Please substitute with appropriate alternative as covered by patient's insurance. Dx: E11.65   Calcium Carb-Cholecalciferol (calcium carbonate-vitamin D) 500 mg-5 mcg tablet   Yes No   Sig: TAKE 1 TABLET BY MOUTH DAILY WITH FOOD FOR OSTEOPOROSIS   Fluticasone-Salmeterol (Advair) 250-50 mcg/dose inhaler   No No   Sig: INHALE 1 PUFF BY MOUTH TWICE DAILY (RINSE MOUTH AFTER EACH USE)   MAGNESIUM PO  Self Yes No   Sig: Take by mouth daily   OneTouch Delica Lancets 33G MISC   No No   Sig: Check blood sugars once daily. Please substitute with appropriate alternative as covered by patient's insurance. Dx: E11.65   acetaminophen (TYLENOL) 325 mg tablet   No No   Sig: Take 2 tablets (650 mg total) by mouth every 6 (six) hours as needed for mild pain or moderate pain for up to 12 doses   albuterol (2.5 mg/3 mL) 0.083 % nebulizer solution   No No   Sig: USE 3 ML BY NEBULIZATION EVERY 6 HOURS AS NEEDED FOR WHEEZING OR FOR SHORTNESS OF BREATH   albuterol (PROVENTIL HFA,VENTOLIN HFA) 90 mcg/act inhaler   No No   Sig: Inhale 2 puffs every 6 (six) hours as needed for wheezing   amLODIPine (NORVASC) 5 mg tablet   No No   Sig: TAKE ONE TABLET BY MOUTH DAILY AT 9AM   atorvastatin (LIPITOR) 40 mg tablet   No No   Sig: Take 1 tablet (40 mg total) by mouth daily   cephalexin (KEFLEX) 500 mg capsule   No No   Sig: Take 1 capsule (500 mg total) by mouth 2 (two) times a day for 7 days   docusate sodium (COLACE) 100 mg capsule  Self No No   Sig: Take 1 capsule (100 mg total) by mouth 2 (two) times a day as needed for constipation   glucosamine-chondroitin 500-400 MG tablet  Self Yes No   Sig: Take 1 tablet by mouth 3 (three) times a day   glucose blood (OneTouch Ultra) test strip   No No   Sig: Check blood sugars once daily. Please substitute with appropriate alternative as covered by patient's insurance. Dx: E11.65   ibandronate (BONIVA) 150 MG tablet   No  "No   Sig: Take 1 tablet (150 mg total) by mouth every 30 (thirty) days   irbesartan (AVAPRO) 300 mg tablet   No No   Sig: TAKE 1 TABLET BY MOUTH DAILY AT 9 PM AT BEDTIME   lidocaine (Lidoderm) 5 %   No No   Sig: Apply 1 patch topically over 12 hours daily Remove & Discard patch within 12 hours or as directed by MD   metFORMIN (GLUCOPHAGE) 850 mg tablet   No No   Sig: TAKE ONE TABLET BY MOUTH TWICE DAILY @9AM-5PM WITH MEALS   montelukast (SINGULAIR) 10 mg tablet   No No   Sig: Take 1 tablet (10 mg total) by mouth daily at bedtime   solifenacin (VESICARE) 5 mg tablet   No No   Sig: Take 1 tablet (5 mg total) by mouth daily   tiZANidine (ZANAFLEX) 2 mg tablet   No No   Sig: TAKE 1 TABLET (2 MG TOTAL) BY MOUTH EVERY 8 (EIGHT) HOURS AS NEEDED FOR MUSCLE SPASMS      Facility-Administered Medications: None       Portions of the record may have been created with voice recognition software. Occasional wrong word or \"sound a like\" substitutions may have occurred due to the inherent limitations of voice recognition software. Read the chart carefully and recognize, using context, where substitutions have occurred.       ED Course         Critical Care Time  Procedures      "

## 2025-02-19 NOTE — DISCHARGE INSTRUCTIONS
You have been evaluated in the emergency department for ongoing pelvic pain following diagnosis of a urinary tract infection. It is extremely common to have residual pelvic pain even after resolution of a urinary tract infection. Your labs do not show any abnormality that is concerning for kidney damage nor does your urine have evidence of worsening of the urinary tract infection, and it should continue to improve. Your sodium is low, which appears to be a chronic problem; you should stop taking hydrochlorothiazide if you are still taking it, supplement your salt intake, and consult with your primary care doctor regarding next steps. Continue taking your Kefflex at home. Return to the emergency department if you should have fever or chills, if you begin to have pain in your flanks, if you stop being able to urinate or defecate, if you should develop nausea and vomiting, or for any other concerning symptoms.

## 2025-02-20 PROBLEM — N30.00 ACUTE CYSTITIS WITHOUT HEMATURIA: Status: RESOLVED | Noted: 2025-01-21 | Resolved: 2025-02-20

## 2025-02-20 RX ORDER — TIZANIDINE 2 MG/1
2 TABLET ORAL EVERY 8 HOURS PRN
Qty: 30 TABLET | Refills: 1 | OUTPATIENT
Start: 2025-02-20

## 2025-03-19 ENCOUNTER — RESULTS FOLLOW-UP (OUTPATIENT)
Dept: EMERGENCY DEPT | Facility: HOSPITAL | Age: 80
End: 2025-03-19

## 2025-03-19 ENCOUNTER — HOSPITAL ENCOUNTER (EMERGENCY)
Facility: HOSPITAL | Age: 80
Discharge: HOME | End: 2025-03-19
Attending: EMERGENCY MEDICINE
Payer: COMMERCIAL

## 2025-03-19 VITALS
HEART RATE: 104 BPM | RESPIRATION RATE: 20 BRPM | TEMPERATURE: 97.9 F | DIASTOLIC BLOOD PRESSURE: 69 MMHG | BODY MASS INDEX: 23.9 KG/M2 | SYSTOLIC BLOOD PRESSURE: 136 MMHG | OXYGEN SATURATION: 96 % | WEIGHT: 134.92 LBS

## 2025-03-19 DIAGNOSIS — U07.1 COVID: Primary | ICD-10-CM

## 2025-03-19 LAB
FLUAV RNA RESP QL NAA+PROBE: NEGATIVE
FLUBV RNA RESP QL NAA+PROBE: NEGATIVE
RSV RNA RESP QL NAA+PROBE: NEGATIVE
SARS-COV-2 RNA RESP QL NAA+PROBE: POSITIVE

## 2025-03-19 PROCEDURE — 0241U HB NFCT DS VIR RESP RNA 4 TRGT: CPT | Performed by: PHYSICIAN ASSISTANT

## 2025-03-19 PROCEDURE — 99283 EMERGENCY DEPT VISIT LOW MDM: CPT

## 2025-03-19 RX ORDER — BENZONATATE 100 MG/1
100 CAPSULE ORAL ONCE
Status: DISCONTINUED | OUTPATIENT
Start: 2025-03-19 | End: 2025-03-19 | Stop reason: HOSPADM

## 2025-03-19 RX ORDER — ACETAMINOPHEN 325 MG/1
650 TABLET ORAL ONCE
Status: DISCONTINUED | OUTPATIENT
Start: 2025-03-19 | End: 2025-03-19 | Stop reason: HOSPADM

## 2025-03-19 NOTE — ED NOTES
During triage, pt had additional complaint and then retracted the additional. Triage updated.     Latasha Kerr RN  03/19/25 3601

## 2025-03-19 NOTE — ED PROVIDER NOTES
"  ED Disposition       ED Disposition   Left from Room after Provider Exam    Condition   --    Date/Time   Wed Mar 19, 2025  4:36 PM    Comment   --             Assessment & Plan       Medical Decision Making  79-year-old female with a past medical history of asthma, DM, HLD, HTN, TIA, history of subarachnoid hemorrhage who presented with new onset 24 to 48 hours of sore throat, cough, nasal congestion, and postnasal drip.  She notes tactile fevers, unmeasured, body aches as well.  She denies any chest pain or shortness of breath, change in bowel or bladder habits.  Patient states she has been staying at the hospital with her daughter who recently had a big operation as well as recently diagnosed with COVID.  Multiple sick contacts noted per patient.  No recent travel.  She offers no other complaints at this time.    Upon evaluation at bedside, patient has congestion, dry cough, tonsils without erythema or exudate.  No erythema noted of the ear canal or TM.    Differential diagnosis includes but not limited to COVID, flu, RSV, upper respiratory infection.  Plan:  -Flu, COVID, RSV swab  -Tessalon Perles and Tylenol    When nursing staff went to give patient medications, she had eloped after being evaluated by provider.  -Patient was COVID-positive    Amount and/or Complexity of Data Reviewed  Labs: ordered.    Risk  OTC drugs.  Prescription drug management.             Medications   acetaminophen (TYLENOL) tablet 650 mg (650 mg Oral Not Given 3/19/25 1633)   benzonatate (TESSALON PERLES) capsule 100 mg (100 mg Oral Not Given 3/19/25 1633)       ED Risk Strat Scores                                                History of Present Illness       Chief Complaint   Patient presents with    Flu Symptoms     Runny nose and feels \"sluggish\". Denies fever, denies N/V/D.        Past Medical History:   Diagnosis Date    Asthma     Colon polyp     Diabetes mellitus (HCC)     E coli infection     Fall     Hyperlipidemia     " Hypertension     Pneumothorax     Rectal prolapse 09/03/2020    Sleep apnea     no CPAP    Subarachnoid hemorrhage (HCC)     Thoracic aortic aneurysm (HCC)     Vertigo       Past Surgical History:   Procedure Laterality Date    BONE GRAFT      R arm    BRONCHOSCOPY N/A 3/16/2016    Procedure: BRONCHOSCOPY FLEXIBLE/anesth.;  Surgeon: Beth Brar DO;  Location: [facility];  Service:     COLONOSCOPY      COLONOSCOPY      EYE SURGERY      OOPHORECTOMY Left     age 54    NE LAPAROSCOPY COLECTOMY PARTIAL W/ANASTOMOSIS N/A 9/23/2020    Procedure: RESECTION COLON SIGMOID LAPAROSCOPIC;  Surgeon: Ricci Christian MD;  Location: [facility];  Service: Colorectal    NE SIGMOIDOSCOPY FLX DX W/COLLJ SPEC BR/WA IF PFRMD N/A 9/23/2020    Procedure: SIGMOIDOSCOPY FLEXIBLE;  Surgeon: Ricci Christian MD;  Location: [facility];  Service: Colorectal    RECTAL PROLAPSE REPAIR LAPAROSCOPIC N/A 9/23/2020    Procedure: RECTOPEXY/PROCTOPEXY LAPAROSCOPIC;  Surgeon: Ricci Christian MD;  Location: [facility];  Service: Colorectal      Family History   Problem Relation Age of Onset    Endometrial cancer Mother 38    No Known Problems Father     No Known Problems Sister     Breast cancer Daughter 49    No Known Problems Daughter     No Known Problems Maternal Grandmother     No Known Problems Maternal Grandfather     No Known Problems Paternal Grandmother     No Known Problems Paternal Grandfather     Cancer Son 33        asbestosis related cancer    No Known Problems Maternal Aunt     No Known Problems Paternal Aunt       Social History     Tobacco Use    Smoking status: Former     Types: Cigarettes    Smokeless tobacco: Never    Tobacco comments:     only smoked a couple a day for a couple years in her 20s   Vaping Use    Vaping status: Never Used   Substance Use Topics    Alcohol use: Not Currently     Comment: weekends     Drug use: No      E-Cigarette/Vaping    E-Cigarette Use Never User       E-Cigarette/Vaping Substances     Nicotine No     THC No     CBD No     Flavoring No     Other No     Unknown No       I have reviewed and agree with the history as documented.     79-year-old female with a past medical history of asthma, DM, HLD, HTN, TIA, history of subarachnoid hemorrhage who presented with new onset 24 to 48 hours of sore throat, cough, nasal congestion, and postnasal drip.  She notes tactile fevers, unmeasured, body aches as well.  She denies any chest pain or shortness of breath, change in bowel or bladder habits.  Patient states she has been staying at the hospital with her daughter who recently had a big operation as well as recently diagnosed with COVID.  Multiple sick contacts noted per patient.  No recent travel.  She offers no other complaints at this time.        Review of Systems   Constitutional:  Positive for chills, fatigue and fever (Tactile, unmeasured). Negative for activity change, appetite change and diaphoresis.   HENT:  Positive for postnasal drip, rhinorrhea, sinus pressure, sneezing and sore throat. Negative for congestion, ear discharge, ear pain and sinus pain.    Eyes:  Positive for discharge (Teary-eyed). Negative for pain, redness and itching.   Respiratory:  Positive for cough (Dry). Negative for chest tightness, shortness of breath and wheezing.    Cardiovascular:  Negative for chest pain and palpitations.   Gastrointestinal:  Negative for abdominal pain, diarrhea, nausea and vomiting.   Genitourinary:  Negative for difficulty urinating, dysuria, flank pain, frequency, hematuria and urgency.   Musculoskeletal:  Positive for myalgias. Negative for arthralgias, back pain, joint swelling and neck pain.   Skin:  Negative for color change and rash.   Neurological:  Negative for dizziness, weakness, light-headedness, numbness and headaches.   Psychiatric/Behavioral:  Negative for agitation and behavioral problems.            Objective       ED Triage Vitals [03/19/25 1447]   Temperature Pulse Blood  Pressure Respirations SpO2 Patient Position - Orthostatic VS   97.9 °F (36.6 °C) 104 136/69 20 96 % Sitting      Temp Source Heart Rate Source BP Location FiO2 (%) Pain Score    Oral Monitor Right arm -- --      Vitals      Date and Time Temp Pulse SpO2 Resp BP Pain Score FACES Pain Rating User   03/19/25 1447 97.9 °F (36.6 °C) 104 96 % 20 136/69 -- -- AW            Physical Exam  Vitals and nursing note reviewed.   Constitutional:       General: She is not in acute distress.     Appearance: She is well-developed. She is ill-appearing. She is not diaphoretic.   HENT:      Head: Normocephalic and atraumatic.      Right Ear: Tympanic membrane, ear canal and external ear normal.      Left Ear: Tympanic membrane, ear canal and external ear normal.      Nose: Congestion and rhinorrhea present.      Mouth/Throat:      Mouth: Mucous membranes are dry.      Pharynx: No oropharyngeal exudate or posterior oropharyngeal erythema.   Eyes:      Extraocular Movements: Extraocular movements intact.      Pupils: Pupils are equal, round, and reactive to light.   Cardiovascular:      Rate and Rhythm: Normal rate and regular rhythm.      Pulses: Normal pulses.      Heart sounds: Normal heart sounds. No murmur heard.  Pulmonary:      Effort: Pulmonary effort is normal. No respiratory distress.      Breath sounds: Normal breath sounds. Stridor present. No wheezing or rales.   Abdominal:      General: Abdomen is flat.      Palpations: Abdomen is soft.      Tenderness: There is no abdominal tenderness. There is no guarding or rebound.   Musculoskeletal:         General: No tenderness or deformity. Normal range of motion.      Cervical back: Normal range of motion and neck supple.   Skin:     General: Skin is warm and dry.      Capillary Refill: Capillary refill takes less than 2 seconds.   Neurological:      General: No focal deficit present.      Mental Status: She is alert and oriented to person, place, and time.   Psychiatric:          Mood and Affect: Mood normal.         Behavior: Behavior normal.         Results Reviewed       Procedure Component Value Units Date/Time    FLU/RSV/COVID - if FLU/RSV clinically relevant (2hr TAT) [[id number]]  (Abnormal) Collected: 03/19/25 1602    Lab Status: Final result Specimen: Nares from Nose Updated: 03/19/25 1649     SARS-CoV-2 Positive     INFLUENZA A PCR Negative     INFLUENZA B PCR Negative     RSV PCR Negative    Narrative:      This test has been performed using the CoV-2/Flu/RSV plus assay on the V[employer] platform. This test has been validated by the  and verified by the performing laboratory.     This test is designed to amplify and detect the following: nucleocapsid (N), envelope (E), and RNA-dependent RNA polymerase (RdRP) genes of the SARS-CoV-2 genome; matrix (M), basic polymerase (PB2), and acidic protein (PA) segments of the influenza A genome; matrix (M) and non-structural protein (NS) segments of the influenza B genome, and the nucleocapsid genes of RSV A and RSV B.     Positive results are indicative of the presence of Flu A, Flu B, RSV, and/or SARS-CoV-2 RNA. Positive results for SARS-CoV-2 or suspected novel influenza should be reported to state, local, or federal health departments according to local reporting requirements.      All results should be assessed in conjunction with clinical presentation and other laboratory markers for clinical management.     FOR PEDIATRIC PATIENTS - copy/paste COVID Guidelines URL to browser: [email or web address]               No orders to display       Procedures    ED Medication and Procedure Management   Prior to Admission Medications   Prescriptions Last Dose Informant Patient Reported? Taking?   Blood Glucose Monitoring Suppl (ONE TOUCH ULTRA 2) w/Device KIT   No No   Sig: Check blood sugars once daily. Please substitute with appropriate alternative as covered by patient's  insurance. Dx: E11.65   Calcium Carb-Cholecalciferol (calcium carbonate-vitamin D) 500 mg-5 mcg tablet   Yes No   Sig: TAKE 1 TABLET BY MOUTH DAILY WITH FOOD FOR OSTEOPOROSIS   Fluticasone-Salmeterol (Advair) 250-50 mcg/dose inhaler   No No   Sig: INHALE 1 PUFF BY MOUTH TWICE DAILY (RINSE MOUTH AFTER EACH USE)   MAGNESIUM PO  Self Yes No   Sig: Take by mouth daily   OneTouch Delica Lancets 33G MISC   No No   Sig: Check blood sugars once daily. Please substitute with appropriate alternative as covered by patient's insurance. Dx: E11.65   acetaminophen (TYLENOL) 325 mg tablet   No No   Sig: Take 2 tablets (650 mg total) by mouth every 6 (six) hours as needed for mild pain or moderate pain for up to 12 doses   albuterol (2.5 mg/3 mL) 0.083 % nebulizer solution   No No   Sig: USE 3 ML BY NEBULIZATION EVERY 6 HOURS AS NEEDED FOR WHEEZING OR FOR SHORTNESS OF BREATH   albuterol (PROVENTIL HFA,VENTOLIN HFA) 90 mcg/act inhaler   No No   Sig: Inhale 2 puffs every 6 (six) hours as needed for wheezing   amLODIPine (NORVASC) 5 mg tablet   No No   Sig: TAKE ONE TABLET BY MOUTH DAILY AT 9AM   atorvastatin (LIPITOR) 40 mg tablet   No No   Sig: Take 1 tablet (40 mg total) by mouth daily   docusate sodium (COLACE) 100 mg capsule  Self No No   Sig: Take 1 capsule (100 mg total) by mouth 2 (two) times a day as needed for constipation   glucosamine-chondroitin 500-400 MG tablet  Self Yes No   Sig: Take 1 tablet by mouth 3 (three) times a day   glucose blood (OneTouch Ultra) test strip   No No   Sig: Check blood sugars once daily. Please substitute with appropriate alternative as covered by patient's insurance. Dx: E11.65   ibandronate (BONIVA) 150 MG tablet   No No   Sig: Take 1 tablet (150 mg total) by mouth every 30 (thirty) days   irbesartan (AVAPRO) 300 mg tablet   No No   Sig: TAKE 1 TABLET BY MOUTH DAILY AT 9 PM AT BEDTIME   lidocaine (Lidoderm) 5 %   No No   Sig: Apply 1 patch topically over 12 hours daily Remove & Discard patch  within 12 hours or as directed by MD   metFORMIN (GLUCOPHAGE) 850 mg tablet   No No   Sig: TAKE ONE TABLET BY MOUTH TWICE DAILY @9AM-5PM WITH MEALS   montelukast (SINGULAIR) 10 mg tablet   No No   Sig: Take 1 tablet (10 mg total) by mouth daily at bedtime   sodium chloride 1 g tablet   No No   Sig: Take 1 tablet (1 g total) by mouth 2 (two) times a day for 7 days   solifenacin (VESICARE) 5 mg tablet   No No   Sig: Take 1 tablet (5 mg total) by mouth daily   tiZANidine (ZANAFLEX) 2 mg tablet   No No   Sig: TAKE 1 TABLET (2 MG TOTAL) BY MOUTH EVERY 8 (EIGHT) HOURS AS NEEDED FOR MUSCLE SPASMS      Facility-Administered Medications: None     Discharge Medication List as of 3/19/2025  4:41 PM        CONTINUE these medications which have NOT CHANGED    Details   acetaminophen (TYLENOL) 325 mg tablet Take 2 tablets (650 mg total) by mouth every 6 (six) hours as needed for mild pain or moderate pain for up to 12 doses, Starting Tue 9/24/2024, Normal      albuterol (2.5 mg/3 mL) 0.083 % nebulizer solution USE 3 ML BY NEBULIZATION EVERY 6 HOURS AS NEEDED FOR WHEEZING OR FOR SHORTNESS OF BREATH, Normal      albuterol (PROVENTIL HFA,VENTOLIN HFA) 90 mcg/act inhaler Inhale 2 puffs every 6 (six) hours as needed for wheezing, Starting Wed 12/13/2023, Normal      amLODIPine (NORVASC) 5 mg tablet TAKE ONE TABLET BY MOUTH DAILY AT 9AM, Normal      atorvastatin (LIPITOR) 40 mg tablet Take 1 tablet (40 mg total) by mouth daily, Starting Mon 4/8/2024, Normal      Blood Glucose Monitoring Suppl (ONE TOUCH ULTRA 2) w/Device KIT Check blood sugars once daily. Please substitute with appropriate alternative as covered by patient's insurance. Dx: E11.65, Normal      Calcium Carb-Cholecalciferol (calcium carbonate-vitamin D) 500 mg-5 mcg tablet TAKE 1 TABLET BY MOUTH DAILY WITH FOOD FOR OSTEOPOROSIS, Historical Med      docusate sodium (COLACE) 100 mg capsule Take 1 capsule (100 mg total) by mouth 2 (two) times a day as needed for  constipation, Starting Mon 8/8/2022, Normal      Fluticasone-Salmeterol (Advair) 250-50 mcg/dose inhaler INHALE 1 PUFF BY MOUTH TWICE DAILY (RINSE MOUTH AFTER EACH USE), Normal      glucosamine-chondroitin 500-400 MG tablet Take 1 tablet by mouth 3 (three) times a day, Historical Med      glucose blood (OneTouch Ultra) test strip Check blood sugars once daily. Please substitute with appropriate alternative as covered by patient's insurance. Dx: E11.65, Normal      ibandronate (BONIVA) 150 MG tablet Take 1 tablet (150 mg total) by mouth every 30 (thirty) days, Starting Mon 4/8/2024, Normal      irbesartan (AVAPRO) 300 mg tablet TAKE 1 TABLET BY MOUTH DAILY AT 9 PM AT BEDTIME, Normal      lidocaine (Lidoderm) 5 % Apply 1 patch topically over 12 hours daily Remove & Discard patch within 12 hours or as directed by MD, Starting Tue 1/21/2025, Normal      MAGNESIUM PO Take by mouth daily, Historical Med      metFORMIN (GLUCOPHAGE) 850 mg tablet TAKE ONE TABLET BY MOUTH TWICE DAILY @9AM-5PM WITH MEALS, Normal      montelukast (SINGULAIR) 10 mg tablet Take 1 tablet (10 mg total) by mouth daily at bedtime, Starting Wed 12/13/2023, Normal      OneTouch Delica Lancets 33G MISC Check blood sugars once daily. Please substitute with appropriate alternative as covered by patient's insurance. Dx: E11.65, Normal      sodium chloride 1 g tablet Take 1 tablet (1 g total) by mouth 2 (two) times a day for 7 days, Starting Wed 2/19/2025, Until Wed 2/26/2025, Normal      solifenacin (VESICARE) 5 mg tablet Take 1 tablet (5 mg total) by mouth daily, Starting Mon 4/8/2024, Normal      tiZANidine (ZANAFLEX) 2 mg tablet TAKE 1 TABLET (2 MG TOTAL) BY MOUTH EVERY 8 (EIGHT) HOURS AS NEEDED FOR MUSCLE SPASMS, Starting Tue 2/4/2025, Normal           No discharge procedures on file.  ED SEPSIS DOCUMENTATION            Jessica Lua PA-C  03/19/25 2383

## 2025-04-09 ENCOUNTER — VBI (OUTPATIENT)
Dept: ADMINISTRATIVE | Facility: OTHER | Age: 80
End: 2025-04-09

## 2025-04-09 NOTE — TELEPHONE ENCOUNTER
Patient contacted to schedule Annual Wellness Visit.   Patient declined to schedule appointment.     Thank you.  Antonia Lopez  [facility]

## 2025-04-23 DIAGNOSIS — J45.30 MILD PERSISTENT ASTHMA WITHOUT COMPLICATION: ICD-10-CM

## 2025-04-23 RX ORDER — FLUTICASONE PROPIONATE AND SALMETEROL 250; 50 UG/1; UG/1
POWDER RESPIRATORY (INHALATION)
Qty: 180 BLISTER | Refills: 1 | Status: SHIPPED | OUTPATIENT
Start: 2025-04-23

## 2025-05-06 ENCOUNTER — APPOINTMENT (EMERGENCY)
Dept: RADIOLOGY | Facility: HOSPITAL | Age: 80
End: 2025-05-06
Payer: COMMERCIAL

## 2025-05-06 ENCOUNTER — HOSPITAL ENCOUNTER (EMERGENCY)
Facility: HOSPITAL | Age: 80
Discharge: HOME | End: 2025-05-06
Attending: EMERGENCY MEDICINE
Payer: COMMERCIAL

## 2025-05-06 VITALS
BODY MASS INDEX: 23.9 KG/M2 | RESPIRATION RATE: 18 BRPM | TEMPERATURE: 98.6 F | DIASTOLIC BLOOD PRESSURE: 68 MMHG | HEIGHT: 64 IN | WEIGHT: 140 LBS | SYSTOLIC BLOOD PRESSURE: 153 MMHG | HEART RATE: 76 BPM | OXYGEN SATURATION: 96 %

## 2025-05-06 DIAGNOSIS — M25.579 ANKLE PAIN: Primary | ICD-10-CM

## 2025-05-06 PROCEDURE — 99283 EMERGENCY DEPT VISIT LOW MDM: CPT | Performed by: EMERGENCY MEDICINE

## 2025-05-06 PROCEDURE — 99283 EMERGENCY DEPT VISIT LOW MDM: CPT

## 2025-05-06 PROCEDURE — 73630 X-RAY EXAM OF FOOT: CPT

## 2025-05-06 PROCEDURE — 73610 X-RAY EXAM OF ANKLE: CPT

## 2025-05-06 RX ORDER — ACETAMINOPHEN 500 MG
500 TABLET ORAL EVERY 8 HOURS
Qty: 21 TABLET | Refills: 0 | Status: SHIPPED | OUTPATIENT
Start: 2025-05-06 | End: 2025-05-13

## 2025-05-06 RX ORDER — METHYLPREDNISOLONE 4 MG/1
TABLET ORAL
Qty: 21 TABLET | Refills: 0 | Status: SHIPPED | OUTPATIENT
Start: 2025-05-06 | End: 2025-05-06

## 2025-05-06 RX ORDER — ACETAMINOPHEN 325 MG/1
975 TABLET ORAL ONCE
Status: COMPLETED | OUTPATIENT
Start: 2025-05-06 | End: 2025-05-06

## 2025-05-06 RX ORDER — ACETAMINOPHEN 500 MG
500 TABLET ORAL EVERY 8 HOURS
Qty: 21 TABLET | Refills: 0 | Status: SHIPPED | OUTPATIENT
Start: 2025-05-06 | End: 2025-05-06

## 2025-05-06 RX ORDER — METHYLPREDNISOLONE 4 MG/1
TABLET ORAL
Qty: 21 TABLET | Refills: 0 | Status: SHIPPED | OUTPATIENT
Start: 2025-05-06

## 2025-05-06 RX ADMIN — ACETAMINOPHEN 975 MG: 325 TABLET, FILM COATED ORAL at 05:19

## 2025-05-06 RX ADMIN — DICLOFENAC SODIUM 2 G: 10 GEL TOPICAL at 06:17

## 2025-05-06 NOTE — DISCHARGE INSTRUCTIONS
Hello you were seen today for ankle pain    X-ray does not reveal any fracture    Use the medication as prescribed    Please follow-up with orthopedic surgery    Return to ER if you develop any worsening pain, fever, chills, redness, numbness, weakness, worsening swelling, any new symptoms

## 2025-05-06 NOTE — ED PROVIDER NOTES
Time reflects when diagnosis was documented in both MDM as applicable and the Disposition within this note       Time User Action Codes Description Comment    5/6/2025  6:48 AM Gómez Mckeon Add [M25.579] Ankle pain           ED Disposition       ED Disposition   Discharge    Condition   Stable    Date/Time   Tue May 6, 2025  6:48 AM    Comment   Jenna Chavarria discharge to home/self care.                   Assessment & Plan       Medical Decision Making  Patient is well-appearing on exam GCS 15, AO x 4.  Atraumatic left ankle pain.  Given her age we will do x-ray to rule out stress fracture.  Pain management here.  Outpatient follow-up.    Differential diagnosis includes but is not limited to: OA, RA, stress fracture    Considered septic arthritis, DVT, gout however based on the patient's history and physical exam doubt.  Patient was seen emergency department a couple of years ago and diagnosed with gout however her joint today does not appear to be gouty.  No redness, no warmth.  Still able to range her foot well actively.    Based on patient's clinical history and physical exam there are no red flag signs or symptoms     Patient denies any additional symptoms on direct questioning except those explicitly noted in the HPI and ROS.     Triage note was reviewed and patient asked directly about concerns mentioned in triage note.     I will order appropriate testing to narrow my differential    Unless otherwise noted:  - There is no language barrier  - Chart was reviewed   - Labs and imaging were reviewed    Amount and/or Complexity of Data Reviewed  Radiology: ordered.    Risk  OTC drugs.        ED Course as of 05/06/25 0657   Tue May 06, 2025   0637 X-ray images reviewed no acute osseous abnormalities       Medications   acetaminophen (TYLENOL) tablet 975 mg (975 mg Oral Given 5/6/25 0519)   Diclofenac Sodium (VOLTAREN) 1 % topical gel 2 g (2 g Topical Given 5/6/25 0617)       ED Risk Strat Scores                     No data recorded        SBIRT 20yo+      Flowsheet Row Most Recent Value   Initial Alcohol Screen: US AUDIT-C     1. How often do you have a drink containing alcohol? 0 Filed at: 05/06/2025 0500   Audit-C Score 0 Filed at: 05/06/2025 0500   KYE: How many times in the past year have you...    Used an illegal drug or used a prescription medication for non-medical reasons? Never Filed at: 05/06/2025 0500                            History of Present Illness       Chief Complaint   Patient presents with    Foot Pain     Left foot/ankle pain and swelling since yesterday morning, no trauma. Tylenol at 1900.       Past Medical History:   Diagnosis Date    Asthma     Colon polyp     Diabetes mellitus (HCC)     E coli infection     Fall     Hyperlipidemia     Hypertension     Pneumothorax     Rectal prolapse 09/03/2020    Sleep apnea     no CPAP    Subarachnoid hemorrhage (HCC)     Thoracic aortic aneurysm (HCC)     Vertigo       Past Surgical History:   Procedure Laterality Date    BONE GRAFT      R arm    BRONCHOSCOPY N/A 3/16/2016    Procedure: BRONCHOSCOPY FLEXIBLE/anesth.;  Surgeon: Beth Brar DO;  Location: [facility];  Service:     COLONOSCOPY      COLONOSCOPY      EYE SURGERY      OOPHORECTOMY Left     age 54    TX LAPAROSCOPY COLECTOMY PARTIAL W/ANASTOMOSIS N/A 9/23/2020    Procedure: RESECTION COLON SIGMOID LAPAROSCOPIC;  Surgeon: Ricci Christian MD;  Location: [facility];  Service: Colorectal    TX SIGMOIDOSCOPY FLX DX W/COLLJ SPEC BR/WA IF PFRMD N/A 9/23/2020    Procedure: SIGMOIDOSCOPY FLEXIBLE;  Surgeon: Ricci Christian MD;  Location: [facility];  Service: Colorectal    RECTAL PROLAPSE REPAIR LAPAROSCOPIC N/A 9/23/2020    Procedure: RECTOPEXY/PROCTOPEXY LAPAROSCOPIC;  Surgeon: Ricci Christian MD;  Location: [facility];  Service: Colorectal      Family History   Problem Relation Age of Onset    Endometrial cancer Mother 38    No Known Problems Father     No Known Problems  Sister     Breast cancer Daughter 49    No Known Problems Daughter     No Known Problems Maternal Grandmother     No Known Problems Maternal Grandfather     No Known Problems Paternal Grandmother     No Known Problems Paternal Grandfather     Cancer Son 33        asbestosis related cancer    No Known Problems Maternal Aunt     No Known Problems Paternal Aunt       Social History     Tobacco Use    Smoking status: Former     Types: Cigarettes    Smokeless tobacco: Never    Tobacco comments:     only smoked a couple a day for a couple years in her 20s   Vaping Use    Vaping status: Never Used   Substance Use Topics    Alcohol use: Not Currently     Comment: weekends     Drug use: No      E-Cigarette/Vaping    E-Cigarette Use Never User       E-Cigarette/Vaping Substances    Nicotine No     THC No     CBD No     Flavoring No     Other No     Unknown No       I have reviewed and agree with the history as documented.     Patient is a 79-year-old female past medical history of asthma, diabetes, hypertension presents emergency department with left ankle pain starting today.  Denies injury.  Denies fever or chills.  She says it has been a little more swollen over the past day and is more painful to the point where it is limiting her ability to walk.         Review of Systems   Constitutional:  Negative for chills and fever.   Respiratory:  Negative for cough and shortness of breath.    Cardiovascular:  Negative for chest pain and leg swelling.   Gastrointestinal:  Negative for abdominal pain.   Genitourinary:  Negative for dysuria.   Neurological:  Negative for seizures and syncope.   All other systems reviewed and are negative.          Objective       ED Triage Vitals [05/06/25 0448]   Temperature Pulse Blood Pressure Respirations SpO2 Patient Position - Orthostatic VS   98.6 °F (37 °C) 76 153/68 18 96 % Sitting      Temp Source Heart Rate Source BP Location FiO2 (%) Pain Score    Temporal Monitor Left arm -- 9       Vitals      Date and Time Temp Pulse SpO2 Resp BP Pain Score FACES Pain Rating User   05/06/25 0519 -- -- -- -- -- 9 -- MB   05/06/25 0448 98.6 °F (37 °C) 76 96 % 18 153/68 9 -- BLG            Physical Exam  Vitals and nursing note reviewed.   Constitutional:       General: She is not in acute distress.     Appearance: Normal appearance. She is not ill-appearing, toxic-appearing or diaphoretic.   HENT:      Head: Normocephalic and atraumatic.      Nose: Nose normal.      Mouth/Throat:      Mouth: Mucous membranes are moist.   Eyes:      General: No scleral icterus.        Right eye: No discharge.         Left eye: No discharge.      Extraocular Movements: Extraocular movements intact.      Conjunctiva/sclera: Conjunctivae normal.      Pupils: Pupils are equal, round, and reactive to light.   Cardiovascular:      Rate and Rhythm: Normal rate and regular rhythm.      Pulses: Normal pulses.      Heart sounds: Normal heart sounds. No murmur heard.     No friction rub. No gallop.   Pulmonary:      Effort: Pulmonary effort is normal. No tachypnea, bradypnea, accessory muscle usage or respiratory distress.      Breath sounds: Normal breath sounds. No stridor. No wheezing, rhonchi or rales.   Chest:      Chest wall: No tenderness.   Abdominal:      General: Abdomen is flat. There is no distension.      Palpations: Abdomen is soft. There is no mass.      Tenderness: There is no abdominal tenderness. There is no right CVA tenderness, left CVA tenderness, guarding or rebound.      Hernia: No hernia is present.   Musculoskeletal:      Cervical back: Normal range of motion and neck supple. No rigidity.      Right lower leg: No edema.      Left lower leg: No edema.      Comments: Bilateral feet neurovascularly intact.  Sensation equal bilaterally.  She has some swelling over the left ankle.  Otherwise legs appear grossly symmetric.  No redness or swelling of the ankle.  No ecchymosis.  She will actively range her foot  dorsiflexion, plantarflexion, inversion and eversion throughout the full range of motion.   Skin:     General: Skin is warm and dry.      Capillary Refill: Capillary refill takes less than 2 seconds.      Coloration: Skin is not jaundiced.      Findings: No bruising.   Neurological:      Mental Status: She is alert and oriented to person, place, and time.      GCS: GCS eye subscore is 4. GCS verbal subscore is 5. GCS motor subscore is 6.      Cranial Nerves: No dysarthria or facial asymmetry.   Psychiatric:         Mood and Affect: Mood normal.         Behavior: Behavior normal. Behavior is cooperative.         Thought Content: Thought content normal.         Judgment: Judgment normal.         Results Reviewed       None            XR ankle 3+ views LEFT    (Results Pending)   XR foot 3+ views LEFT    (Results Pending)       Procedures    ED Medication and Procedure Management   Prior to Admission Medications   Prescriptions Last Dose Informant Patient Reported? Taking?   Blood Glucose Monitoring Suppl (ONE TOUCH ULTRA 2) w/Device KIT   No No   Sig: Check blood sugars once daily. Please substitute with appropriate alternative as covered by patient's insurance. Dx: E11.65   Calcium Carb-Cholecalciferol (calcium carbonate-vitamin D) 500 mg-5 mcg tablet   Yes No   Sig: TAKE 1 TABLET BY MOUTH DAILY WITH FOOD FOR OSTEOPOROSIS   Fluticasone-Salmeterol (Advair) 250-50 mcg/dose inhaler   No No   Sig: INHALE 1 PUFF BY MOUTH TWICE DAILY (RINSE MOUTH AFTER EACH USE)   MAGNESIUM PO  Self Yes No   Sig: Take by mouth daily   OneTouch Delica Lancets 33G MISC   No No   Sig: Check blood sugars once daily. Please substitute with appropriate alternative as covered by patient's insurance. Dx: E11.65   acetaminophen (TYLENOL) 325 mg tablet   No No   Sig: Take 2 tablets (650 mg total) by mouth every 6 (six) hours as needed for mild pain or moderate pain for up to 12 doses   albuterol (2.5 mg/3 mL) 0.083 % nebulizer solution   No No    Sig: USE 3 ML BY NEBULIZATION EVERY 6 HOURS AS NEEDED FOR WHEEZING OR FOR SHORTNESS OF BREATH   albuterol (PROVENTIL HFA,VENTOLIN HFA) 90 mcg/act inhaler   No No   Sig: Inhale 2 puffs every 6 (six) hours as needed for wheezing   amLODIPine (NORVASC) 5 mg tablet   No No   Sig: TAKE ONE TABLET BY MOUTH DAILY AT 9AM   atorvastatin (LIPITOR) 40 mg tablet   No No   Sig: Take 1 tablet (40 mg total) by mouth daily   docusate sodium (COLACE) 100 mg capsule  Self No No   Sig: Take 1 capsule (100 mg total) by mouth 2 (two) times a day as needed for constipation   glucosamine-chondroitin 500-400 MG tablet  Self Yes No   Sig: Take 1 tablet by mouth 3 (three) times a day   glucose blood (OneTouch Ultra) test strip   No No   Sig: Check blood sugars once daily. Please substitute with appropriate alternative as covered by patient's insurance. Dx: E11.65   ibandronate (BONIVA) 150 MG tablet   No No   Sig: Take 1 tablet (150 mg total) by mouth every 30 (thirty) days   irbesartan (AVAPRO) 300 mg tablet   No No   Sig: TAKE 1 TABLET BY MOUTH DAILY AT 9 PM AT BEDTIME   lidocaine (Lidoderm) 5 %   No No   Sig: Apply 1 patch topically over 12 hours daily Remove & Discard patch within 12 hours or as directed by MD   metFORMIN (GLUCOPHAGE) 850 mg tablet   No No   Sig: TAKE ONE TABLET BY MOUTH TWICE DAILY @9AM-5PM WITH MEALS   montelukast (SINGULAIR) 10 mg tablet   No No   Sig: Take 1 tablet (10 mg total) by mouth daily at bedtime   sodium chloride 1 g tablet   No No   Sig: Take 1 tablet (1 g total) by mouth 2 (two) times a day for 7 days   solifenacin (VESICARE) 5 mg tablet   No No   Sig: Take 1 tablet (5 mg total) by mouth daily   tiZANidine (ZANAFLEX) 2 mg tablet   No No   Sig: TAKE 1 TABLET (2 MG TOTAL) BY MOUTH EVERY 8 (EIGHT) HOURS AS NEEDED FOR MUSCLE SPASMS      Facility-Administered Medications: None     Patient's Medications   Discharge Prescriptions    ACETAMINOPHEN (TYLENOL) 500 MG TABLET    Take 1 tablet (500 mg total) by mouth  every 8 (eight) hours for 7 days       Start Date: 5/6/2025  End Date: 5/13/2025       Order Dose: 500 mg       Quantity: 21 tablet    Refills: 0    DICLOFENAC SODIUM (VOLTAREN) 1 %    Apply 2 g topically 4 (four) times a day for 7 days       Start Date: 5/6/2025  End Date: 5/13/2025       Order Dose: 2 g       Quantity: 56 g    Refills: 0       ED SEPSIS DOCUMENTATION   Time reflects when diagnosis was documented in both MDM as applicable and the Disposition within this note       Time User Action Codes Description Comment    5/6/2025  6:48 AM Gómez Mckeon Add [M25.579] Ankle pain                  Gómez Mckeon MD  05/06/25 0657

## 2025-05-15 NOTE — ED ATTENDING ATTESTATION
5/6/2025  I, Gregg William MD, saw and evaluated the patient. I have discussed the patient with the resident/non-physician practitioner and agree with the resident's/non-physician practitioner's findings, Plan of Care, and MDM as documented in the resident's/non-physician practitioner's note, except where noted. All available labs and Radiology studies were reviewed.  I was present for key portions of any procedure(s) performed by the resident/non-physician practitioner and I was immediately available to provide assistance.       At this point I agree with the current assessment done in the Emergency Department.  I have conducted an independent evaluation of this patient a history and physical is as follows:    ED Course     Patient presents for evaluation due to left ankle pain that started this morning.  Patient denies any trauma.  She states that she noticed that it has been swollen over the last few days and reports the pain is worse with walking.  Denies any fevers or erythema.  No additional complaints.  Exam: AAOx3, NAD, mild swelling over lateral ankle without erythema. A/P: Ankle pain.  Differential diagnosis includes trauma, stress fracture, arthritis, and less likely septic arthritis, DVT, and gout.  Will check x-ray and treat pain.    Critical Care Time  Procedures

## 2025-07-07 ENCOUNTER — TELEPHONE (OUTPATIENT)
Age: 80
End: 2025-07-07

## 2025-07-07 ENCOUNTER — HOSPITAL ENCOUNTER (OUTPATIENT)
Dept: BONE DENSITY | Facility: CLINIC | Age: 80
Discharge: HOME | End: 2025-07-07
Attending: FAMILY MEDICINE
Payer: COMMERCIAL

## 2025-07-07 ENCOUNTER — HOSPITAL ENCOUNTER (OUTPATIENT)
Dept: MAMMOGRAPHY | Facility: CLINIC | Age: 80
Discharge: HOME | End: 2025-07-07
Attending: FAMILY MEDICINE
Payer: COMMERCIAL

## 2025-07-07 VITALS — HEIGHT: 62 IN | WEIGHT: 137 LBS | BODY MASS INDEX: 25.21 KG/M2

## 2025-07-07 DIAGNOSIS — Z12.31 ENCOUNTER FOR SCREENING MAMMOGRAM FOR MALIGNANT NEOPLASM OF BREAST: ICD-10-CM

## 2025-07-07 DIAGNOSIS — M81.0 AGE-RELATED OSTEOPOROSIS WITHOUT CURRENT PATHOLOGICAL FRACTURE: ICD-10-CM

## 2025-07-07 DIAGNOSIS — Z12.31 BREAST CANCER SCREENING BY MAMMOGRAM: ICD-10-CM

## 2025-07-07 PROCEDURE — 77067 SCR MAMMO BI INCL CAD: CPT

## 2025-07-07 PROCEDURE — 77063 BREAST TOMOSYNTHESIS BI: CPT

## 2025-07-07 PROCEDURE — 77080 DXA BONE DENSITY AXIAL: CPT

## 2025-07-07 NOTE — TELEPHONE ENCOUNTER
[facility] called and stated they needed Dr Zuleta to sign the order for the patients mammogram.

## 2025-07-17 ENCOUNTER — DOCUMENTATION (OUTPATIENT)
Dept: ADMINISTRATIVE | Facility: OTHER | Age: 80
End: 2025-07-17

## 2025-07-17 NOTE — PROGRESS NOTES
07/17/25 10:02 AM    Annual Wellness Visit outreach is not required, patient was called within the last 3 months.    Thank you.  Perfecto Mills MA  [facility]

## 2025-07-24 ENCOUNTER — TELEPHONE (OUTPATIENT)
Age: 80
End: 2025-07-24

## 2025-07-24 NOTE — TELEPHONE ENCOUNTER
Patient called and stated she received a missed call from pcp office. I advised patient unfortunately I did not see that outreach had been made from the pcp office. Patient stated she will review her messages when she arrives home. Patient stated she is currently in the hospital.

## 2025-08-16 ENCOUNTER — HOSPITAL ENCOUNTER (EMERGENCY)
Facility: HOSPITAL | Age: 80
Discharge: HOME | End: 2025-08-16
Attending: EMERGENCY MEDICINE | Admitting: EMERGENCY MEDICINE
Payer: COMMERCIAL

## 2025-08-16 ENCOUNTER — APPOINTMENT (EMERGENCY)
Dept: RADIOLOGY | Facility: HOSPITAL | Age: 80
End: 2025-08-16
Payer: COMMERCIAL

## 2025-08-16 VITALS
OXYGEN SATURATION: 97 % | HEART RATE: 72 BPM | SYSTOLIC BLOOD PRESSURE: 144 MMHG | DIASTOLIC BLOOD PRESSURE: 88 MMHG | TEMPERATURE: 97.1 F | RESPIRATION RATE: 20 BRPM

## 2025-08-16 DIAGNOSIS — N39.0 UTI (URINARY TRACT INFECTION): ICD-10-CM

## 2025-08-16 DIAGNOSIS — J45.909 ASTHMA: Primary | ICD-10-CM

## 2025-08-16 LAB
ANION GAP SERPL CALCULATED.3IONS-SCNC: 8 MMOL/L (ref 4–13)
BACTERIA UR QL AUTO: ABNORMAL /HPF
BASOPHILS # BLD AUTO: 0.04 THOUSANDS/ÂΜL (ref 0–0.1)
BASOPHILS NFR BLD AUTO: 1 % (ref 0–1)
BILIRUB UR QL STRIP: NEGATIVE
BUN SERPL-MCNC: 13 MG/DL (ref 5–25)
CALCIUM SERPL-MCNC: 9.7 MG/DL (ref 8.4–10.2)
CHLORIDE SERPL-SCNC: 95 MMOL/L (ref 96–108)
CLARITY UR: ABNORMAL
CO2 SERPL-SCNC: 31 MMOL/L (ref 21–32)
COLOR UR: ABNORMAL
CREAT SERPL-MCNC: 0.74 MG/DL (ref 0.6–1.3)
D DIMER PPP FEU-MCNC: 0.38 UG/ML FEU
EOSINOPHIL # BLD AUTO: 0.58 THOUSAND/ÂΜL (ref 0–0.61)
EOSINOPHIL NFR BLD AUTO: 8 % (ref 0–6)
ERYTHROCYTE [DISTWIDTH] IN BLOOD BY AUTOMATED COUNT: 13.1 % (ref 11.6–15.1)
GFR SERPL CREATININE-BSD FRML MDRD: 77 ML/MIN/1.73SQ M
GLUCOSE SERPL-MCNC: 271 MG/DL (ref 65–140)
GLUCOSE UR STRIP-MCNC: ABNORMAL MG/DL
HCT VFR BLD AUTO: 37 % (ref 34.8–46.1)
HGB BLD-MCNC: 12.6 G/DL (ref 11.5–15.4)
HGB UR QL STRIP.AUTO: ABNORMAL
IMM GRANULOCYTES # BLD AUTO: 0.03 THOUSAND/UL (ref 0–0.2)
IMM GRANULOCYTES NFR BLD AUTO: 0 % (ref 0–2)
KETONES UR STRIP-MCNC: NEGATIVE MG/DL
LEUKOCYTE ESTERASE UR QL STRIP: ABNORMAL
LYMPHOCYTES # BLD AUTO: 1.8 THOUSANDS/ÂΜL (ref 0.6–4.47)
LYMPHOCYTES NFR BLD AUTO: 25 % (ref 14–44)
MCH RBC QN AUTO: 31 PG (ref 26.8–34.3)
MCHC RBC AUTO-ENTMCNC: 34.1 G/DL (ref 31.4–37.4)
MCV RBC AUTO: 91 FL (ref 82–98)
MONOCYTES # BLD AUTO: 0.57 THOUSAND/ÂΜL (ref 0.17–1.22)
MONOCYTES NFR BLD AUTO: 8 % (ref 4–12)
NEUTROPHILS # BLD AUTO: 4.08 THOUSANDS/ÂΜL (ref 1.85–7.62)
NEUTS SEG NFR BLD AUTO: 58 % (ref 43–75)
NITRITE UR QL STRIP: POSITIVE
NON-SQ EPI CELLS URNS QL MICRO: ABNORMAL /HPF
NRBC BLD AUTO-RTO: 0 /100 WBCS
PH UR STRIP.AUTO: 6 [PH]
PLATELET # BLD AUTO: 163 THOUSANDS/UL (ref 149–390)
PMV BLD AUTO: 12.5 FL (ref 8.9–12.7)
POTASSIUM SERPL-SCNC: 4.5 MMOL/L (ref 3.5–5.3)
PROT UR STRIP-MCNC: NEGATIVE MG/DL
RBC # BLD AUTO: 4.07 MILLION/UL (ref 3.81–5.12)
RBC #/AREA URNS AUTO: ABNORMAL /HPF
SODIUM SERPL-SCNC: 134 MMOL/L (ref 135–147)
SP GR UR STRIP.AUTO: 1.01 (ref 1–1.03)
UROBILINOGEN UR STRIP-ACNC: <2 MG/DL
WBC # BLD AUTO: 7.1 THOUSAND/UL (ref 4.31–10.16)
WBC #/AREA URNS AUTO: ABNORMAL /HPF

## 2025-08-16 PROCEDURE — 85379 FIBRIN DEGRADATION QUANT: CPT

## 2025-08-16 PROCEDURE — 87077 CULTURE AEROBIC IDENTIFY: CPT

## 2025-08-16 PROCEDURE — 81001 URINALYSIS AUTO W/SCOPE: CPT

## 2025-08-16 PROCEDURE — 99284 EMERGENCY DEPT VISIT MOD MDM: CPT | Performed by: EMERGENCY MEDICINE

## 2025-08-16 PROCEDURE — 80048 BASIC METABOLIC PNL TOTAL CA: CPT

## 2025-08-16 PROCEDURE — 99283 EMERGENCY DEPT VISIT LOW MDM: CPT

## 2025-08-16 PROCEDURE — 85025 COMPLETE CBC W/AUTO DIFF WBC: CPT

## 2025-08-16 PROCEDURE — 36415 COLL VENOUS BLD VENIPUNCTURE: CPT

## 2025-08-16 PROCEDURE — 87086 URINE CULTURE/COLONY COUNT: CPT

## 2025-08-16 PROCEDURE — 71046 X-RAY EXAM CHEST 2 VIEWS: CPT

## 2025-08-16 PROCEDURE — 94640 AIRWAY INHALATION TREATMENT: CPT

## 2025-08-16 PROCEDURE — 87186 SC STD MICRODIL/AGAR DIL: CPT

## 2025-08-16 RX ORDER — IPRATROPIUM BROMIDE AND ALBUTEROL SULFATE 2.5; .5 MG/3ML; MG/3ML
3 SOLUTION RESPIRATORY (INHALATION) ONCE
Status: COMPLETED | OUTPATIENT
Start: 2025-08-16 | End: 2025-08-16

## 2025-08-16 RX ORDER — CEPHALEXIN 500 MG/1
500 CAPSULE ORAL EVERY 12 HOURS SCHEDULED
Qty: 10 CAPSULE | Refills: 0 | Status: SHIPPED | OUTPATIENT
Start: 2025-08-16 | End: 2025-08-21

## 2025-08-16 RX ORDER — CEPHALEXIN 500 MG/1
500 CAPSULE ORAL EVERY 12 HOURS SCHEDULED
Qty: 10 CAPSULE | Refills: 0 | Status: SHIPPED | OUTPATIENT
Start: 2025-08-16 | End: 2025-08-16

## 2025-08-16 RX ADMIN — IPRATROPIUM BROMIDE AND ALBUTEROL SULFATE 3 ML: 2.5; .5 SOLUTION RESPIRATORY (INHALATION) at 10:20

## 2025-08-17 ENCOUNTER — RESULTS FOLLOW-UP (OUTPATIENT)
Dept: EMERGENCY DEPT | Facility: HOSPITAL | Age: 80
End: 2025-08-17

## 2025-08-18 LAB — BACTERIA UR CULT: ABNORMAL

## (undated) DEVICE — SUT VICRYL 0 REEL 54 IN J287G

## (undated) DEVICE — INTENDED FOR TISSUE SEPARATION, AND OTHER PROCEDURES THAT REQUIRE A SHARP SURGICAL BLADE TO PUNCTURE OR CUT.: Brand: BARD-PARKER SAFETY BLADES SIZE 11, STERILE

## (undated) DEVICE — CONTOUR CURVED CUTTER STAPLER: Brand: CONTOUR

## (undated) DEVICE — SUT PROLENE 2-0 KS 30 IN 8623H

## (undated) DEVICE — GOWN ,SIRUS ,NONREINFORCED 4XL: Brand: MEDLINE

## (undated) DEVICE — SUT VICRYL 3-0 SH 27 IN J416H

## (undated) DEVICE — GLOVE INDICATOR UNDERGLOVE SZ 6 BLUE

## (undated) DEVICE — UNDER BUTTOCKS DRAPE W/FLUID CONTROL POUCH: Brand: CONVERTORS

## (undated) DEVICE — GLOVE SRG BIOGEL ECLIPSE 7.5

## (undated) DEVICE — ADHESIVE SKIN HIGH VISCOSITY EXOFIN 1ML

## (undated) DEVICE — TRAY FOLEY 16FR URIMETER SILICONE SURESTEP

## (undated) DEVICE — CO2 AND WATER TUBING/CAP SET FOR OLYMPUS® SCOPES & UCR: Brand: ERBE

## (undated) DEVICE — SUT VICRYL 1 CT-1 27 IN J261H

## (undated) DEVICE — SUT MONOCRYL PLUS 4-0 PS-2 18 IN MCP496G

## (undated) DEVICE — ENDOSCOPIC CURVED INTRALUMINAL STAPLER (ILS) 24 TITANIUM ADJUSTABLE HEIGHT STAPLES

## (undated) DEVICE — PAD GROUNDING ADULT

## (undated) DEVICE — TROCAR: Brand: KII® SLEEVE

## (undated) DEVICE — TRAVELKIT CONTAINS FIRST STEP KIT (200ML EP-4 KIT) AND SOILED SCOPE BAG - 1 KIT: Brand: TRAVELKIT CONTAINS FIRST STEP KIT AND SOILED SCOPE BAG

## (undated) DEVICE — GLOVE INDICATOR PI UNDERGLOVE SZ 7.5 BLUE

## (undated) DEVICE — INSUFLATION TUBING INSUFLOW (LEXION)

## (undated) DEVICE — SYRINGE 30ML LL

## (undated) DEVICE — GLOVE SRG BIOGEL ECLIPSE 7

## (undated) DEVICE — Device: Brand: DEFENDO AIR/WATER/SUCTION AND BIOPSY VALVE

## (undated) DEVICE — IRRIG ENDO FLO TUBING

## (undated) DEVICE — TOWEL SURG XR DETECT GREEN STRL RFD

## (undated) DEVICE — ELECTRODE BLADE MOD  E-Z CLEAN 6.5IN -0014M

## (undated) DEVICE — 3M™ IOBAN™ 2 ANTIMICROBIAL INCISE DRAPE 6650EZ: Brand: IOBAN™ 2

## (undated) DEVICE — TUBING SMOKE EVAC W/FILTRATION DEVICE PLUMEPORT ACTIV

## (undated) DEVICE — VEST COOLING PLUS SZ SNGL USE

## (undated) DEVICE — TUBING SUCTION 5MM X 12 FT

## (undated) DEVICE — CONTOUR CURVED CUTTER STAPLER RELOAD: Brand: CONTOUR

## (undated) DEVICE — VISUALIZATION SYSTEM: Brand: CLEARIFY

## (undated) DEVICE — WORKING LENGTH 235CM, WORKING CHANNEL 2.8MM: Brand: RESOLUTION 360 CLIP

## (undated) DEVICE — POOLE SUCTION HANDLE: Brand: CARDINAL HEALTH

## (undated) DEVICE — 40595 XL TRENDELENBURG POSITIONING KIT: Brand: 40595 XL TRENDELENBURG POSITIONING KIT

## (undated) DEVICE — CHLORAPREP HI-LITE 26ML ORANGE

## (undated) DEVICE — PLUMEPEN PRO 10FT

## (undated) DEVICE — INTENDED FOR TISSUE SEPARATION, AND OTHER PROCEDURES THAT REQUIRE A SHARP SURGICAL BLADE TO PUNCTURE OR CUT.: Brand: BARD-PARKER SAFETY BLADES SIZE 15, STERILE

## (undated) DEVICE — ACCESS PLATFORM FOR MINIMALLY INVASIVE SURGERY.: Brand: GELPORT® LAPAROSCOPIC  SYSTEM

## (undated) DEVICE — STANDARD SURGICAL GOWN, L: Brand: CONVERTORS

## (undated) DEVICE — GLOVE SRG BIOGEL 7.5

## (undated) DEVICE — TROCAR: Brand: KII FIOS FIRST ENTRY

## (undated) DEVICE — PACK PBDS STERILE LAP LITHOTOMY RF

## (undated) DEVICE — GLOVE SRG BIOGEL ECLIPSE 6

## (undated) DEVICE — ENSEAL LAPAROSCOPIC TISSUE SEALER G2 STRAIGHT JAW FOR USE WITH G2 GENERATOR 5MM DIAMETER 35CM SHAFT LENGTH: Brand: ENSEAL

## (undated) DEVICE — SUT PDS II 2-0 SH 27 IN Z317H

## (undated) DEVICE — SUT VICRYL 2-0 SH 27 IN UNDYED J417H

## (undated) DEVICE — SUT PDS II 1 CTX 36 IN Z371T